# Patient Record
Sex: FEMALE | Race: BLACK OR AFRICAN AMERICAN | NOT HISPANIC OR LATINO
[De-identification: names, ages, dates, MRNs, and addresses within clinical notes are randomized per-mention and may not be internally consistent; named-entity substitution may affect disease eponyms.]

---

## 2019-10-17 ENCOUNTER — TRANSCRIPTION ENCOUNTER (OUTPATIENT)
Age: 54
End: 2019-10-17

## 2023-05-31 ENCOUNTER — NON-APPOINTMENT (OUTPATIENT)
Age: 58
End: 2023-05-31

## 2023-05-31 PROBLEM — Z00.00 ENCOUNTER FOR PREVENTIVE HEALTH EXAMINATION: Status: ACTIVE | Noted: 2023-05-31

## 2023-06-14 ENCOUNTER — APPOINTMENT (OUTPATIENT)
Dept: GYNECOLOGIC ONCOLOGY | Facility: CLINIC | Age: 58
End: 2023-06-14
Payer: COMMERCIAL

## 2023-06-14 VITALS
SYSTOLIC BLOOD PRESSURE: 131 MMHG | HEART RATE: 60 BPM | BODY MASS INDEX: 30.94 KG/M2 | HEIGHT: 61.5 IN | DIASTOLIC BLOOD PRESSURE: 80 MMHG | WEIGHT: 166 LBS

## 2023-06-14 DIAGNOSIS — Z80.9 FAMILY HISTORY OF MALIGNANT NEOPLASM, UNSPECIFIED: ICD-10-CM

## 2023-06-14 DIAGNOSIS — R73.03 PREDIABETES.: ICD-10-CM

## 2023-06-14 PROCEDURE — 99204 OFFICE O/P NEW MOD 45 MIN: CPT

## 2023-06-19 ENCOUNTER — NON-APPOINTMENT (OUTPATIENT)
Age: 58
End: 2023-06-19

## 2023-06-20 ENCOUNTER — NON-APPOINTMENT (OUTPATIENT)
Age: 58
End: 2023-06-20

## 2023-06-23 ENCOUNTER — NON-APPOINTMENT (OUTPATIENT)
Age: 58
End: 2023-06-23

## 2023-06-26 ENCOUNTER — NON-APPOINTMENT (OUTPATIENT)
Age: 58
End: 2023-06-26

## 2023-06-27 ENCOUNTER — NON-APPOINTMENT (OUTPATIENT)
Age: 58
End: 2023-06-27

## 2023-06-28 LAB
APTT BLD: 32.4 SEC
HCT VFR BLD CALC: 36.1 %
HGB BLD-MCNC: 11.1 G/DL
INR PPP: 1.18 RATIO
MCHC RBC-ENTMCNC: 25.6 PG
MCHC RBC-ENTMCNC: 30.7 GM/DL
MCV RBC AUTO: 83.2 FL
PLATELET # BLD AUTO: 461 K/UL
PT BLD: 13.7 SEC
RBC # BLD: 4.34 M/UL
RBC # FLD: 16.5 %
WBC # FLD AUTO: 7.52 K/UL

## 2023-06-29 ENCOUNTER — NON-APPOINTMENT (OUTPATIENT)
Age: 58
End: 2023-06-29

## 2023-06-30 ENCOUNTER — APPOINTMENT (OUTPATIENT)
Dept: ULTRASOUND IMAGING | Facility: IMAGING CENTER | Age: 58
End: 2023-06-30
Payer: COMMERCIAL

## 2023-06-30 ENCOUNTER — OUTPATIENT (OUTPATIENT)
Dept: OUTPATIENT SERVICES | Facility: HOSPITAL | Age: 58
LOS: 1 days | End: 2023-06-30
Payer: COMMERCIAL

## 2023-06-30 ENCOUNTER — RESULT REVIEW (OUTPATIENT)
Age: 58
End: 2023-06-30

## 2023-06-30 DIAGNOSIS — R19.00 INTRA-ABDOMINAL AND PELVIC SWELLING, MASS AND LUMP, UNSPECIFIED SITE: ICD-10-CM

## 2023-06-30 PROCEDURE — 88341 IMHCHEM/IMCYTCHM EA ADD ANTB: CPT

## 2023-06-30 PROCEDURE — 20206 BIOPSY MUSCLE PERQ NEEDLE: CPT

## 2023-06-30 PROCEDURE — 76942 ECHO GUIDE FOR BIOPSY: CPT

## 2023-06-30 PROCEDURE — 88360 TUMOR IMMUNOHISTOCHEM/MANUAL: CPT | Mod: 26,59

## 2023-06-30 PROCEDURE — 88342 IMHCHEM/IMCYTCHM 1ST ANTB: CPT | Mod: 26,59

## 2023-06-30 PROCEDURE — 88342 IMHCHEM/IMCYTCHM 1ST ANTB: CPT

## 2023-06-30 PROCEDURE — 88341 IMHCHEM/IMCYTCHM EA ADD ANTB: CPT | Mod: 26,59

## 2023-06-30 PROCEDURE — 88305 TISSUE EXAM BY PATHOLOGIST: CPT | Mod: 26

## 2023-06-30 PROCEDURE — 88360 TUMOR IMMUNOHISTOCHEM/MANUAL: CPT

## 2023-06-30 PROCEDURE — 88305 TISSUE EXAM BY PATHOLOGIST: CPT

## 2023-06-30 PROCEDURE — 76942 ECHO GUIDE FOR BIOPSY: CPT | Mod: 26

## 2023-07-13 LAB — SURGICAL PATHOLOGY STUDY: SIGNIFICANT CHANGE UP

## 2023-07-14 ENCOUNTER — NON-APPOINTMENT (OUTPATIENT)
Age: 58
End: 2023-07-14

## 2023-07-17 ENCOUNTER — APPOINTMENT (OUTPATIENT)
Dept: SURGICAL ONCOLOGY | Facility: CLINIC | Age: 58
End: 2023-07-17
Payer: COMMERCIAL

## 2023-07-17 VITALS
HEART RATE: 65 BPM | BODY MASS INDEX: 30.94 KG/M2 | DIASTOLIC BLOOD PRESSURE: 76 MMHG | RESPIRATION RATE: 16 BRPM | HEIGHT: 61.5 IN | OXYGEN SATURATION: 99 % | SYSTOLIC BLOOD PRESSURE: 136 MMHG | WEIGHT: 166 LBS

## 2023-07-17 PROCEDURE — 99205 OFFICE O/P NEW HI 60 MIN: CPT

## 2023-07-19 ENCOUNTER — OUTPATIENT (OUTPATIENT)
Dept: OUTPATIENT SERVICES | Facility: HOSPITAL | Age: 58
LOS: 1 days | Discharge: HOME | End: 2023-07-19
Payer: COMMERCIAL

## 2023-07-19 DIAGNOSIS — C18.1 MALIGNANT NEOPLASM OF APPENDIX: ICD-10-CM

## 2023-07-20 ENCOUNTER — RESULT REVIEW (OUTPATIENT)
Age: 58
End: 2023-07-20

## 2023-07-20 ENCOUNTER — APPOINTMENT (OUTPATIENT)
Dept: HEMATOLOGY ONCOLOGY | Facility: CLINIC | Age: 58
End: 2023-07-20
Payer: COMMERCIAL

## 2023-07-20 ENCOUNTER — NON-APPOINTMENT (OUTPATIENT)
Age: 58
End: 2023-07-20

## 2023-07-20 VITALS
WEIGHT: 165.56 LBS | HEART RATE: 69 BPM | HEIGHT: 61.54 IN | DIASTOLIC BLOOD PRESSURE: 86 MMHG | TEMPERATURE: 97.3 F | SYSTOLIC BLOOD PRESSURE: 138 MMHG | BODY MASS INDEX: 30.86 KG/M2 | RESPIRATION RATE: 16 BRPM

## 2023-07-20 DIAGNOSIS — Z78.9 OTHER SPECIFIED HEALTH STATUS: ICD-10-CM

## 2023-07-20 DIAGNOSIS — Z63.5 DISRUPTION OF FAMILY BY SEPARATION AND DIVORCE: ICD-10-CM

## 2023-07-20 DIAGNOSIS — Z87.59 PERSONAL HISTORY OF OTHER COMPLICATIONS OF PREGNANCY, CHILDBIRTH AND THE PUERPERIUM: ICD-10-CM

## 2023-07-20 LAB
ALBUMIN SERPL ELPH-MCNC: 4.4 G/DL
ALP BLD-CCNC: 79 U/L
ALT SERPL-CCNC: 16 U/L
ANION GAP SERPL CALC-SCNC: 14 MMOL/L
APTT BLD: 32.6 SEC
AST SERPL-CCNC: 20 U/L
BASOPHILS # BLD AUTO: 0.04 K/UL — SIGNIFICANT CHANGE UP (ref 0–0.2)
BASOPHILS NFR BLD AUTO: 0.5 % — SIGNIFICANT CHANGE UP (ref 0–2)
BILIRUB SERPL-MCNC: 0.4 MG/DL
BUN SERPL-MCNC: 16 MG/DL
CALCIUM SERPL-MCNC: 9.9 MG/DL
CEA SERPL-MCNC: 60.3 NG/ML
CHLORIDE SERPL-SCNC: 104 MMOL/L
CO2 SERPL-SCNC: 23 MMOL/L
CREAT SERPL-MCNC: 0.81 MG/DL
EGFR: 84 ML/MIN/1.73M2
EOSINOPHIL # BLD AUTO: 0.1 K/UL — SIGNIFICANT CHANGE UP (ref 0–0.5)
EOSINOPHIL NFR BLD AUTO: 1.3 % — SIGNIFICANT CHANGE UP (ref 0–6)
GLUCOSE SERPL-MCNC: 89 MG/DL
HCT VFR BLD CALC: 37 % — SIGNIFICANT CHANGE UP (ref 34.5–45)
HGB BLD-MCNC: 11.9 G/DL — SIGNIFICANT CHANGE UP (ref 11.5–15.5)
IMM GRANULOCYTES NFR BLD AUTO: 0.4 % — SIGNIFICANT CHANGE UP (ref 0–0.9)
INR PPP: 1.2 RATIO
LYMPHOCYTES # BLD AUTO: 1.69 K/UL — SIGNIFICANT CHANGE UP (ref 1–3.3)
LYMPHOCYTES # BLD AUTO: 21.5 % — SIGNIFICANT CHANGE UP (ref 13–44)
MCHC RBC-ENTMCNC: 25.1 PG — LOW (ref 27–34)
MCHC RBC-ENTMCNC: 32.2 G/DL — SIGNIFICANT CHANGE UP (ref 32–36)
MCV RBC AUTO: 78.1 FL — LOW (ref 80–100)
MONOCYTES # BLD AUTO: 0.41 K/UL — SIGNIFICANT CHANGE UP (ref 0–0.9)
MONOCYTES NFR BLD AUTO: 5.2 % — SIGNIFICANT CHANGE UP (ref 2–14)
NEUTROPHILS # BLD AUTO: 5.58 K/UL — SIGNIFICANT CHANGE UP (ref 1.8–7.4)
NEUTROPHILS NFR BLD AUTO: 71.1 % — SIGNIFICANT CHANGE UP (ref 43–77)
NRBC # BLD: 0 /100 WBCS — SIGNIFICANT CHANGE UP (ref 0–0)
PLATELET # BLD AUTO: 494 K/UL — HIGH (ref 150–400)
POTASSIUM SERPL-SCNC: 4 MMOL/L
PROT SERPL-MCNC: 8 G/DL
PT BLD: 14 SEC
RBC # BLD: 4.74 M/UL — SIGNIFICANT CHANGE UP (ref 3.8–5.2)
RBC # FLD: 15.8 % — HIGH (ref 10.3–14.5)
SODIUM SERPL-SCNC: 141 MMOL/L
WBC # BLD: 7.85 K/UL — SIGNIFICANT CHANGE UP (ref 3.8–10.5)
WBC # FLD AUTO: 7.85 K/UL — SIGNIFICANT CHANGE UP (ref 3.8–10.5)

## 2023-07-20 PROCEDURE — 99205 OFFICE O/P NEW HI 60 MIN: CPT

## 2023-07-20 RX ORDER — OMEPRAZOLE 20 MG/1
20 TABLET, DELAYED RELEASE ORAL
Qty: 30 | Refills: 0 | Status: ACTIVE | COMMUNITY
Start: 2023-07-20

## 2023-07-20 SDOH — SOCIAL STABILITY - SOCIAL INSECURITY: DISRUPTION OF FAMILY BY SEPARATION AND DIVORCE: Z63.5

## 2023-07-20 NOTE — PHYSICAL EXAM
[Normal] : oriented to person, place and time, with appropriate affect [de-identified] : abdominal distention, moderate; firmness without tenderness. Pelvic mass to level of subcostal region

## 2023-07-20 NOTE — ASSESSMENT
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 We discussed the options for appendiceal cancer being surgery first, with CRS-HIPEC, if a complete cytoreduction is anticipated at this time. I told the patient that given her 4-quadrant disease appearance on CT (right diaphragm and spleen also appear involved), a complete cytoreduction might be difficult, so I recommend systemic chemotherapy for 3-6 months now, followed by CRS-HIPEC, and I will have her see Dr. Ora Khan immediately for this. We discussed the possibility of her progressing while on chemo and needing to operate on a palliative basis.\par \par I explained to the patient that in the case of a high grade appendiceal lesion or true primary colonic malignancy, several cycles of systemic chemotherapy wound be indicated, after which I can attempt a cytoreduction and HIPEC if they achieve a good response. In the case of a low-grade appendiceal primary, I recommended a surgery first approach without systemic chemotherapy. I told the patient that CRS-HIPEC is not offered as a curative procedure, but one that can offer greater survival than systemic therapy alone when a good cytoreduction can be achieved. I counseled the patient that this surgery can involve a multi-visceral resection, has the possibility of ostomy creation, and carries a 30-40% major morbidity rate, than can include return to OR, prolonged hospitalization, or need for interventional radiology procedures.\par \par Plan:\par See Dr. Khan this week\par mediport placement ASAP\par presentation at tumor board to see if further tissue needed to make diagnosis. \par

## 2023-07-20 NOTE — REASON FOR VISIT
[Initial Consultation] : an initial consultation for [Spouse] : spouse [FreeTextEntry2] : mucinous adenocarcinoma of the appendix

## 2023-07-20 NOTE — HISTORY OF PRESENT ILLNESS
[de-identified] : Ms. Singletary presents to the office with her  for initial consultation for appendiceal cancer\par \par She is 58 year old female with history of pre diabetes noted to have pelvic mass on CT. Patient she was seen by her PCP due to concerns of weight gain since beginning of the year. PCP referred her to GYN pt had US done noted to have fluid within the endometrium, had tumor markers done that were elevated. She was then referred to GYN/ onc. Dr. Sanchez had CT scan done then had biopsy done of omentum mass pathology positive for mucinous adenocarcinoma. \par Patient reports she has been feeling well. Denies any recent SOB, CP, fever, chills, n/v/d. Appetite has been good no unintentional weight loss. Reports having GERD symptoms noticed it more since march, also reports abdominal bloating, distension and pelvic pressure over the last few months. She had repeat EGD/Colonoscopy on 6/16/23. \par \par She denies having any medical problems. Reports having ectopic pregnancy, had salpingectomy. She denies having any other surgeries. \par \par  \par

## 2023-07-21 ENCOUNTER — APPOINTMENT (OUTPATIENT)
Dept: SURGICAL ONCOLOGY | Facility: HOSPITAL | Age: 58
End: 2023-07-21

## 2023-07-21 ENCOUNTER — OUTPATIENT (OUTPATIENT)
Dept: INPATIENT UNIT | Facility: HOSPITAL | Age: 58
LOS: 1 days | Discharge: HOME | End: 2023-07-21
Payer: COMMERCIAL

## 2023-07-21 ENCOUNTER — TRANSCRIPTION ENCOUNTER (OUTPATIENT)
Age: 58
End: 2023-07-21

## 2023-07-21 VITALS
HEART RATE: 56 BPM | DIASTOLIC BLOOD PRESSURE: 69 MMHG | TEMPERATURE: 97 F | RESPIRATION RATE: 14 BRPM | SYSTOLIC BLOOD PRESSURE: 116 MMHG | OXYGEN SATURATION: 98 %

## 2023-07-21 VITALS
SYSTOLIC BLOOD PRESSURE: 129 MMHG | OXYGEN SATURATION: 100 % | RESPIRATION RATE: 14 BRPM | DIASTOLIC BLOOD PRESSURE: 88 MMHG | TEMPERATURE: 98 F | HEART RATE: 78 BPM | HEIGHT: 61 IN | WEIGHT: 166.01 LBS

## 2023-07-21 DIAGNOSIS — C18.1 MALIGNANT NEOPLASM OF APPENDIX: ICD-10-CM

## 2023-07-21 DIAGNOSIS — Z87.59 PERSONAL HISTORY OF OTHER COMPLICATIONS OF PREGNANCY, CHILDBIRTH AND THE PUERPERIUM: Chronic | ICD-10-CM

## 2023-07-21 DIAGNOSIS — Z98.890 OTHER SPECIFIED POSTPROCEDURAL STATES: Chronic | ICD-10-CM

## 2023-07-21 LAB
HBV CORE IGG+IGM SER QL: NONREACTIVE
HBV SURFACE AB SER QL: NONREACTIVE
HBV SURFACE AG SER QL: NONREACTIVE
HCV AB SER QL: NONREACTIVE
HCV S/CO RATIO: 0.09 S/CO

## 2023-07-21 PROCEDURE — 76000 FLUOROSCOPY <1 HR PHYS/QHP: CPT

## 2023-07-21 PROCEDURE — 71045 X-RAY EXAM CHEST 1 VIEW: CPT

## 2023-07-21 PROCEDURE — 71045 X-RAY EXAM CHEST 1 VIEW: CPT | Mod: 26

## 2023-07-21 PROCEDURE — C1788: CPT

## 2023-07-21 PROCEDURE — 36561 INSERT TUNNELED CV CATH: CPT | Mod: LT

## 2023-07-21 PROCEDURE — 77001 FLUOROGUIDE FOR VEIN DEVICE: CPT | Mod: 26

## 2023-07-21 PROCEDURE — C1894: CPT

## 2023-07-21 RX ORDER — ONDANSETRON 8 MG/1
4 TABLET, FILM COATED ORAL ONCE
Refills: 0 | Status: DISCONTINUED | OUTPATIENT
Start: 2023-07-21 | End: 2023-07-21

## 2023-07-21 RX ORDER — OXYCODONE HYDROCHLORIDE 5 MG/1
5 TABLET ORAL ONCE
Refills: 0 | Status: DISCONTINUED | OUTPATIENT
Start: 2023-07-21 | End: 2023-07-21

## 2023-07-21 RX ORDER — SODIUM CHLORIDE 9 MG/ML
1000 INJECTION, SOLUTION INTRAVENOUS
Refills: 0 | Status: DISCONTINUED | OUTPATIENT
Start: 2023-07-21 | End: 2023-07-21

## 2023-07-21 NOTE — BRIEF OPERATIVE NOTE - NSICDXBRIEFPROCEDURE_GEN_ALL_CORE_FT
PROCEDURES:  Insertion, central venous catheter, tunneled, with subcutaneous pump 21-Jul-2023 13:19:28  Kinsey Olivas

## 2023-07-21 NOTE — ASU DISCHARGE PLAN (ADULT/PEDIATRIC) - CARE PROVIDER_API CALL
Silvio Dong  Surgery  [address] [place] [address], [address]  [place] [address]  Phone: [phone]  Fax: [phone]  Follow Up Time: 2 weeks

## 2023-07-21 NOTE — ASU DISCHARGE PLAN (ADULT/PEDIATRIC) - NS MD DC FALL RISK RISK
For information on Fall & Injury Prevention, visit: [email or web address] OR  [email or web address] OR  https://www.cdc.gov/steadi/patient.html

## 2023-07-21 NOTE — ASU DISCHARGE PLAN (ADULT/PEDIATRIC) - NURSING INSTRUCTIONS
For any problems or concerns,contact your doctor. [facility] patients should call the [facility]. If you cannot reach the doctor or clinic, call [facility] Emergency Department at [phone] or go to your local Emergency Department.  A responsible adult should be with you for the rest of the day and night for your safety and to help you if you needed. Resume your medications as listed on the attached Medication Record. Begin with liquids and light food ( tea, toast, Jello, soups). Advance to what you normally eat. Liquids should taken in adequate amounts today.     CALL the DOCTOR:    -Fever greater than  101F  - Signs  of infection such as : increase pain,swelling,redness,or a bad  smell coming from the wound.  -Excessive amount of bleeding.  - Any pain that appears to be getting worse.  - Vomiting  -  If you have  not urinated 8 hours after surgery or have any difficulty urinating.     A responsible adult should be with you for the rest of the day and night for your safety and to help you if you needed.    Review attached FACT SHEET if applicable.

## 2023-07-21 NOTE — BRIEF OPERATIVE NOTE - OPERATION/FINDINGS
Mediport placed in the left subclavian and position of the tip at the aortocaval junction confirmed with Xray intraop

## 2023-07-23 PROBLEM — Z87.59 HISTORY OF ECTOPIC PREGNANCY: Status: RESOLVED | Noted: 2023-07-23 | Resolved: 2023-07-23

## 2023-07-23 NOTE — PHYSICAL EXAM
[Fully active, able to carry on all pre-disease performance without restriction] : Status 0 - Fully active, able to carry on all pre-disease performance without restriction [Normal] : affect appropriate [de-identified] : distended, firm abdomen, not tender

## 2023-07-23 NOTE — HISTORY OF PRESENT ILLNESS
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 \par \par Yojana started experiencing weight gain and lower abdominal pelvic pressure in April/May 2023. She went to her GYN who palpated a mass on exam. US Abdomen on 5/24/23 noted fluid within the endometrium, 12.9 cm complex cystic R adnexal mass suspicious for ovarian neoplasm. CEA and CA 19-9 noted to be elevated by AFP, CA  were normal. She was then referred to GYN/ onc. Dr. Sanchez and ultimately to Dr. Dong, surg onc. \par \par  6/20/23: CT CAP large bilateral complex adnexal masses. Extensive soft tissue infiltration of the mesentery and nodularity of the omentum as well as a moderate amount of ascites. \par 6/30/23: Omental bx: mucinous adenocarcinoma, CK7, CDX2, CK19+, neg for PAX8, TTF1, MMR intact. \par 7/20/23: Case reviewed at [facility], likely appendiceal primary given abnormal appendix and IHC staining suggesting GI, elevated CEA, normal , plan for neoadjuvant FOLFOX followed by cytoreduction, HIPEC and adjuvant chemo \par \par Referred to medical oncology for systemic therapy.  [de-identified] : pre treatment CEA 63 [de-identified] : c/o abdominal fullness, distention. No n/v. Normal BMs. + dyspepsia controlled with PPI. Denies any pain.

## 2023-07-23 NOTE — REASON FOR VISIT
[Initial Consultation] : an initial consultation [Family Member] : family member [FreeTextEntry2] : Stage IV appendiceal ca

## 2023-07-23 NOTE — REVIEW OF SYSTEMS
[Diarrhea: Grade 0] : Diarrhea: Grade 0 [Negative] : Allergic/Immunologic [FreeTextEntry7] : + abdominal distention

## 2023-07-24 ENCOUNTER — APPOINTMENT (OUTPATIENT)
Dept: HEMATOLOGY ONCOLOGY | Facility: CLINIC | Age: 58
End: 2023-07-24

## 2023-07-24 ENCOUNTER — APPOINTMENT (OUTPATIENT)
Dept: INFUSION THERAPY | Facility: HOSPITAL | Age: 58
End: 2023-07-24

## 2023-07-24 ENCOUNTER — NON-APPOINTMENT (OUTPATIENT)
Age: 58
End: 2023-07-24

## 2023-07-24 DIAGNOSIS — R11.2 NAUSEA WITH VOMITING, UNSPECIFIED: ICD-10-CM

## 2023-07-24 DIAGNOSIS — Z51.11 ENCOUNTER FOR ANTINEOPLASTIC CHEMOTHERAPY: ICD-10-CM

## 2023-07-24 PROCEDURE — 93010 ELECTROCARDIOGRAM REPORT: CPT

## 2023-07-25 DIAGNOSIS — K21.9 GASTRO-ESOPHAGEAL REFLUX DISEASE WITHOUT ESOPHAGITIS: ICD-10-CM

## 2023-07-25 DIAGNOSIS — C18.1 MALIGNANT NEOPLASM OF APPENDIX: ICD-10-CM

## 2023-07-25 DIAGNOSIS — Z88.0 ALLERGY STATUS TO PENICILLIN: ICD-10-CM

## 2023-07-25 DIAGNOSIS — C48.2 MALIGNANT NEOPLASM OF PERITONEUM, UNSPECIFIED: ICD-10-CM

## 2023-07-26 ENCOUNTER — APPOINTMENT (OUTPATIENT)
Dept: INFUSION THERAPY | Facility: HOSPITAL | Age: 58
End: 2023-07-26

## 2023-07-26 ENCOUNTER — APPOINTMENT (OUTPATIENT)
Dept: HEMATOLOGY ONCOLOGY | Facility: CLINIC | Age: 58
End: 2023-07-26
Payer: COMMERCIAL

## 2023-07-26 PROBLEM — Z86.2 PERSONAL HISTORY OF DISEASES OF THE BLOOD AND BLOOD-FORMING ORGANS AND CERTAIN DISORDERS INVOLVING THE IMMUNE MECHANISM: Chronic | Status: ACTIVE | Noted: 2023-07-21

## 2023-07-26 PROCEDURE — 99213 OFFICE O/P EST LOW 20 MIN: CPT

## 2023-07-26 NOTE — PHYSICAL EXAM
[Fully active, able to carry on all pre-disease performance without restriction] : Status 0 - Fully active, able to carry on all pre-disease performance without restriction [Normal] : RRR, normal S1S2, no murmurs, rubs, gallops [de-identified] : +distension

## 2023-07-26 NOTE — HISTORY OF PRESENT ILLNESS
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 \par \par Yojana started experiencing weight gain and lower abdominal pelvic pressure in April/May 2023. She went to her GYN who palpated a mass on exam. US Abdomen on 5/24/23 noted fluid within the endometrium, 12.9 cm complex cystic R adnexal mass suspicious for ovarian neoplasm. CEA and CA 19-9 noted to be elevated by AFP, CA  were normal. She was then referred to GYN/ onc. Dr. Sanchez and ultimately to Dr. Dong, surg onc. \par \par  6/20/23: CT CAP large bilateral complex adnexal masses. Extensive soft tissue infiltration of the mesentery and nodularity of the omentum as well as a moderate amount of ascites. \par 6/30/23: Omental bx: mucinous adenocarcinoma, CK7, CDX2, CK19+, neg for PAX8, TTF1, MMR intact. \par 7/20/23: Case reviewed at [facility], likely appendiceal primary given abnormal appendix and IHC staining suggesting GI, elevated CEA, normal , plan for neoadjuvant FOLFOX followed by cytoreduction, HIPEC and adjuvant chemo \par \par Referred to medical oncology for systemic therapy. \par \par 7/24/23: C1 mFOLFOX [de-identified] : mucinous adenocarcinoma [de-identified] : CEA 63 [Therapy: ___] : Therapy: [unfilled] [Cycle: ___] : Cycle: [unfilled] [Day: ___] : Day: [unfilled] [de-identified] : Patient came in today for CADD disconnect. C/o new mid-sternal pain. Patient states it started last night after sitting in the air conditioning. Worse with deep inspiration. radiates up and down her chest. Better with her mouth closed. Changes with position. VS /86, 02 97% RA, hr:75, temp 97.7. Of note, she had port placed last week. Did experience sore throat after procedure. She did not take PPI today. Very small amount of vomiting this morning. Eating less d/t GI upset

## 2023-07-27 ENCOUNTER — APPOINTMENT (OUTPATIENT)
Dept: CT IMAGING | Facility: IMAGING CENTER | Age: 58
End: 2023-07-27
Payer: COMMERCIAL

## 2023-07-27 ENCOUNTER — OUTPATIENT (OUTPATIENT)
Dept: OUTPATIENT SERVICES | Facility: HOSPITAL | Age: 58
LOS: 1 days | End: 2023-07-27
Payer: COMMERCIAL

## 2023-07-27 DIAGNOSIS — Z98.890 OTHER SPECIFIED POSTPROCEDURAL STATES: Chronic | ICD-10-CM

## 2023-07-27 DIAGNOSIS — C18.1 MALIGNANT NEOPLASM OF APPENDIX: ICD-10-CM

## 2023-07-27 PROCEDURE — 74177 CT ABD & PELVIS W/CONTRAST: CPT

## 2023-07-27 PROCEDURE — 74177 CT ABD & PELVIS W/CONTRAST: CPT | Mod: 26

## 2023-07-27 PROCEDURE — 71275 CT ANGIOGRAPHY CHEST: CPT

## 2023-07-27 PROCEDURE — 71275 CT ANGIOGRAPHY CHEST: CPT | Mod: 26

## 2023-08-03 ENCOUNTER — APPOINTMENT (OUTPATIENT)
Dept: HEMATOLOGY ONCOLOGY | Facility: CLINIC | Age: 58
End: 2023-08-03
Payer: COMMERCIAL

## 2023-08-03 PROCEDURE — 99213 OFFICE O/P EST LOW 20 MIN: CPT | Mod: 95

## 2023-08-03 NOTE — HISTORY OF PRESENT ILLNESS
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   Yojana started experiencing weight gain and lower abdominal pelvic pressure in April/May 2023. She went to her GYN who palpated a mass on exam. US Abdomen on 5/24/23 noted fluid within the endometrium, 12.9 cm complex cystic R adnexal mass suspicious for ovarian neoplasm. CEA and CA 19-9 noted to be elevated by AFP, CA  were normal. She was then referred to GYN/ onc. Dr. Sanchez and ultimately to Dr. Dong, surg onc.    6/20/23: CT CAP large bilateral complex adnexal masses. Extensive soft tissue infiltration of the mesentery and nodularity of the omentum as well as a moderate amount of ascites.  6/30/23: Omental bx: mucinous adenocarcinoma, CK7, CDX2, CK19+, neg for PAX8, TTF1, MMR intact.  7/20/23: Case reviewed at , likely appendiceal primary given abnormal appendix and IHC staining suggesting GI, elevated CEA, normal , plan for neoadjuvant FOLFOX followed by cytoreduction, HIPEC and adjuvant chemo   Referred to medical oncology for systemic therapy.   7/24/23: C1 mFOLFOX 7/27/23: CT Chest PE protocol: No PE. New small left lower lobe consolidation, considerations include atelectasis or pneumonia. Decreased size of right cardiophrenic LAD 7/27/23: CT AP: Appendix is dilated and hypodense promixally consistent with known m ucinous adenocarcinoma. Bilateral large, mildly complex adnexal cystic mass, likely ovarian etiology. Trace perihepatic ascites and moderate omental caking [de-identified] : mucinous adenocarcinoma [de-identified] : CEA 63 [Therapy: ___] : Therapy: [unfilled] [Cycle: ___] : Cycle: [unfilled] [Day: ___] : Day: [unfilled] [de-identified] : Patient seen today for clinical evaluation. Started antibiotics as directed for possible pneumonia. Reporting improvement in mid-sternal pain. Mostly resolved except if she lays on her right side. She is urinating more frequently without dysuria. Feels her distended abdomen is pressing on her bladder. Needs to use the bathroom about every 90 minutes. Interfering with her sleep. Appetite is good. Independent in all ADLs. Staying active

## 2023-08-03 NOTE — REASON FOR VISIT
[Follow-Up Visit] : a follow-up [FreeTextEntry2] : Stage IV mucinous appendiceal adenocarcinoma  [Home] : at home, [unfilled] , at the time of the visit. [Medical Office: ([facility])___] : at the medical office located in  [Patient] : the patient

## 2023-08-07 ENCOUNTER — APPOINTMENT (OUTPATIENT)
Dept: HEMATOLOGY ONCOLOGY | Facility: CLINIC | Age: 58
End: 2023-08-07

## 2023-08-07 ENCOUNTER — RESULT REVIEW (OUTPATIENT)
Age: 58
End: 2023-08-07

## 2023-08-07 ENCOUNTER — APPOINTMENT (OUTPATIENT)
Dept: INFUSION THERAPY | Facility: HOSPITAL | Age: 58
End: 2023-08-07

## 2023-08-07 LAB
ALBUMIN SERPL ELPH-MCNC: 3.8 G/DL — SIGNIFICANT CHANGE UP (ref 3.3–5)
ALP SERPL-CCNC: 62 U/L — SIGNIFICANT CHANGE UP (ref 40–120)
ALT FLD-CCNC: 10 U/L — SIGNIFICANT CHANGE UP (ref 10–45)
ANION GAP SERPL CALC-SCNC: 11 MMOL/L — SIGNIFICANT CHANGE UP (ref 5–17)
AST SERPL-CCNC: 23 U/L — SIGNIFICANT CHANGE UP (ref 10–40)
BASOPHILS # BLD AUTO: 0.04 K/UL — SIGNIFICANT CHANGE UP (ref 0–0.2)
BASOPHILS NFR BLD AUTO: 0.6 % — SIGNIFICANT CHANGE UP (ref 0–2)
BILIRUB SERPL-MCNC: 0.5 MG/DL — SIGNIFICANT CHANGE UP (ref 0.2–1.2)
BUN SERPL-MCNC: 9 MG/DL — SIGNIFICANT CHANGE UP (ref 7–23)
CALCIUM SERPL-MCNC: 9 MG/DL — SIGNIFICANT CHANGE UP (ref 8.4–10.5)
CEA SERPL-MCNC: 65.8 NG/ML — HIGH (ref 0–3.8)
CHLORIDE SERPL-SCNC: 104 MMOL/L — SIGNIFICANT CHANGE UP (ref 96–108)
CO2 SERPL-SCNC: 23 MMOL/L — SIGNIFICANT CHANGE UP (ref 22–31)
CREAT SERPL-MCNC: 0.69 MG/DL — SIGNIFICANT CHANGE UP (ref 0.5–1.3)
EGFR: 100 ML/MIN/1.73M2 — SIGNIFICANT CHANGE UP
EOSINOPHIL # BLD AUTO: 0.1 K/UL — SIGNIFICANT CHANGE UP (ref 0–0.5)
EOSINOPHIL NFR BLD AUTO: 1.4 % — SIGNIFICANT CHANGE UP (ref 0–6)
GLUCOSE SERPL-MCNC: 87 MG/DL — SIGNIFICANT CHANGE UP (ref 70–99)
HCT VFR BLD CALC: 31.1 % — LOW (ref 34.5–45)
HGB BLD-MCNC: 10.5 G/DL — LOW (ref 11.5–15.5)
IMM GRANULOCYTES NFR BLD AUTO: 1.3 % — HIGH (ref 0–0.9)
LYMPHOCYTES # BLD AUTO: 1.38 K/UL — SIGNIFICANT CHANGE UP (ref 1–3.3)
LYMPHOCYTES # BLD AUTO: 19.6 % — SIGNIFICANT CHANGE UP (ref 13–44)
MCHC RBC-ENTMCNC: 25.2 PG — LOW (ref 27–34)
MCHC RBC-ENTMCNC: 33.8 G/DL — SIGNIFICANT CHANGE UP (ref 32–36)
MCV RBC AUTO: 74.6 FL — LOW (ref 80–100)
MONOCYTES # BLD AUTO: 0.88 K/UL — SIGNIFICANT CHANGE UP (ref 0–0.9)
MONOCYTES NFR BLD AUTO: 12.5 % — SIGNIFICANT CHANGE UP (ref 2–14)
NEUTROPHILS # BLD AUTO: 4.54 K/UL — SIGNIFICANT CHANGE UP (ref 1.8–7.4)
NEUTROPHILS NFR BLD AUTO: 64.6 % — SIGNIFICANT CHANGE UP (ref 43–77)
NRBC # BLD: 0 /100 WBCS — SIGNIFICANT CHANGE UP (ref 0–0)
PLATELET # BLD AUTO: 345 K/UL — SIGNIFICANT CHANGE UP (ref 150–400)
POTASSIUM SERPL-MCNC: 4.2 MMOL/L — SIGNIFICANT CHANGE UP (ref 3.5–5.3)
POTASSIUM SERPL-SCNC: 4.2 MMOL/L — SIGNIFICANT CHANGE UP (ref 3.5–5.3)
PROT SERPL-MCNC: 7.5 G/DL — SIGNIFICANT CHANGE UP (ref 6–8.3)
RBC # BLD: 4.17 M/UL — SIGNIFICANT CHANGE UP (ref 3.8–5.2)
RBC # FLD: 15.4 % — HIGH (ref 10.3–14.5)
SODIUM SERPL-SCNC: 137 MMOL/L — SIGNIFICANT CHANGE UP (ref 135–145)
WBC # BLD: 7.03 K/UL — SIGNIFICANT CHANGE UP (ref 3.8–10.5)
WBC # FLD AUTO: 7.03 K/UL — SIGNIFICANT CHANGE UP (ref 3.8–10.5)

## 2023-08-09 ENCOUNTER — APPOINTMENT (OUTPATIENT)
Dept: INFUSION THERAPY | Facility: HOSPITAL | Age: 58
End: 2023-08-09

## 2023-08-14 ENCOUNTER — APPOINTMENT (OUTPATIENT)
Dept: HEMATOLOGY ONCOLOGY | Facility: CLINIC | Age: 58
End: 2023-08-14
Payer: COMMERCIAL

## 2023-08-14 PROCEDURE — 99213 OFFICE O/P EST LOW 20 MIN: CPT | Mod: 95

## 2023-08-14 NOTE — REVIEW OF SYSTEMS
[Diarrhea: Grade 0] : Diarrhea: Grade 0 [Negative] : Allergic/Immunologic [de-identified] : + cold sensitivity

## 2023-08-14 NOTE — HISTORY OF PRESENT ILLNESS
[Disease: _____________________] : Disease: [unfilled] [AJCC Stage: ____] : AJCC Stage: [unfilled] [Therapy: ___] : Therapy: [unfilled] [Cycle: ___] : Cycle: [unfilled] [Day: ___] : Day: [unfilled] [de-identified] : 58 F w/ pMMR, stage IV mucinous appendiceal adenocarcinoma presents for further management.   Yojana started experiencing weight gain and lower abdominal pelvic pressure in April/May 2023. She went to her GYN who palpated a mass on exam. US Abdomen on 5/24/23 noted fluid within the endometrium, 12.9 cm complex cystic R adnexal mass suspicious for ovarian neoplasm. CEA and CA 19-9 noted to be elevated by AFP, CA  were normal. She was then referred to GYN/ onc. Dr. Sanchez and ultimately to Dr. Dong, surg onc.    6/20/23: CT CAP large bilateral complex adnexal masses. Extensive soft tissue infiltration of the mesentery and nodularity of the omentum as well as a moderate amount of ascites.  6/30/23: Omental bx: mucinous adenocarcinoma, CK7, CDX2, CK19+, neg for PAX8, TTF1, MMR intact.  7/20/23: Case reviewed at , likely appendiceal primary given abnormal appendix and IHC staining suggesting GI, elevated CEA, normal , plan for neoadjuvant FOLFOX followed by cytoreduction, HIPEC and adjuvant chemo   Referred to medical oncology for systemic therapy.  [de-identified] : mucinous adenocarcinoma [de-identified] : pre treatment CEA 63 [de-identified] : still with cold sensitivity since getting the treatment last wed. Only with cold though. had diarrhea thurs-fri, 3 BMs on those days, loose. Did not take Imodium. No change in appetite. Nausea controlled with meds. Weighs 161 at home. Abdomen still feels distended. No pain. sleeps well. Appetite is stable.

## 2023-08-14 NOTE — PHYSICAL EXAM
[Fully active, able to carry on all pre-disease performance without restriction] : Status 0 - Fully active, able to carry on all pre-disease performance without restriction [Normal] : affect appropriate [de-identified] : no respiratory distress

## 2023-08-21 ENCOUNTER — APPOINTMENT (OUTPATIENT)
Dept: INFUSION THERAPY | Facility: HOSPITAL | Age: 58
End: 2023-08-21

## 2023-08-21 ENCOUNTER — RESULT REVIEW (OUTPATIENT)
Age: 58
End: 2023-08-21

## 2023-08-21 ENCOUNTER — NON-APPOINTMENT (OUTPATIENT)
Age: 58
End: 2023-08-21

## 2023-08-21 ENCOUNTER — APPOINTMENT (OUTPATIENT)
Dept: HEMATOLOGY ONCOLOGY | Facility: CLINIC | Age: 58
End: 2023-08-21

## 2023-08-21 LAB
ALBUMIN SERPL ELPH-MCNC: 3.8 G/DL — SIGNIFICANT CHANGE UP (ref 3.3–5)
ALP SERPL-CCNC: 59 U/L — SIGNIFICANT CHANGE UP (ref 40–120)
ALT FLD-CCNC: 7 U/L — LOW (ref 10–45)
ANION GAP SERPL CALC-SCNC: 11 MMOL/L — SIGNIFICANT CHANGE UP (ref 5–17)
AST SERPL-CCNC: 20 U/L — SIGNIFICANT CHANGE UP (ref 10–40)
BASOPHILS # BLD AUTO: 0.03 K/UL — SIGNIFICANT CHANGE UP (ref 0–0.2)
BASOPHILS NFR BLD AUTO: 0.5 % — SIGNIFICANT CHANGE UP (ref 0–2)
BILIRUB SERPL-MCNC: 0.4 MG/DL — SIGNIFICANT CHANGE UP (ref 0.2–1.2)
BUN SERPL-MCNC: 10 MG/DL — SIGNIFICANT CHANGE UP (ref 7–23)
CALCIUM SERPL-MCNC: 9.3 MG/DL — SIGNIFICANT CHANGE UP (ref 8.4–10.5)
CEA SERPL-MCNC: 69.5 NG/ML — HIGH (ref 0–3.8)
CHLORIDE SERPL-SCNC: 103 MMOL/L — SIGNIFICANT CHANGE UP (ref 96–108)
CO2 SERPL-SCNC: 24 MMOL/L — SIGNIFICANT CHANGE UP (ref 22–31)
CREAT SERPL-MCNC: 0.76 MG/DL — SIGNIFICANT CHANGE UP (ref 0.5–1.3)
EGFR: 90 ML/MIN/1.73M2 — SIGNIFICANT CHANGE UP
EOSINOPHIL # BLD AUTO: 0.11 K/UL — SIGNIFICANT CHANGE UP (ref 0–0.5)
EOSINOPHIL NFR BLD AUTO: 1.8 % — SIGNIFICANT CHANGE UP (ref 0–6)
GLUCOSE SERPL-MCNC: 90 MG/DL — SIGNIFICANT CHANGE UP (ref 70–99)
HCT VFR BLD CALC: 31.6 % — LOW (ref 34.5–45)
HGB BLD-MCNC: 10.3 G/DL — LOW (ref 11.5–15.5)
IMM GRANULOCYTES NFR BLD AUTO: 0.3 % — SIGNIFICANT CHANGE UP (ref 0–0.9)
LYMPHOCYTES # BLD AUTO: 1.32 K/UL — SIGNIFICANT CHANGE UP (ref 1–3.3)
LYMPHOCYTES # BLD AUTO: 21.7 % — SIGNIFICANT CHANGE UP (ref 13–44)
MCHC RBC-ENTMCNC: 24.6 PG — LOW (ref 27–34)
MCHC RBC-ENTMCNC: 32.6 G/DL — SIGNIFICANT CHANGE UP (ref 32–36)
MCV RBC AUTO: 75.4 FL — LOW (ref 80–100)
MONOCYTES # BLD AUTO: 0.69 K/UL — SIGNIFICANT CHANGE UP (ref 0–0.9)
MONOCYTES NFR BLD AUTO: 11.3 % — SIGNIFICANT CHANGE UP (ref 2–14)
NEUTROPHILS # BLD AUTO: 3.92 K/UL — SIGNIFICANT CHANGE UP (ref 1.8–7.4)
NEUTROPHILS NFR BLD AUTO: 64.4 % — SIGNIFICANT CHANGE UP (ref 43–77)
NRBC # BLD: 0 /100 WBCS — SIGNIFICANT CHANGE UP (ref 0–0)
PLATELET # BLD AUTO: 242 K/UL — SIGNIFICANT CHANGE UP (ref 150–400)
POTASSIUM SERPL-MCNC: 4 MMOL/L — SIGNIFICANT CHANGE UP (ref 3.5–5.3)
POTASSIUM SERPL-SCNC: 4 MMOL/L — SIGNIFICANT CHANGE UP (ref 3.5–5.3)
PROT SERPL-MCNC: 7.5 G/DL — SIGNIFICANT CHANGE UP (ref 6–8.3)
RBC # BLD: 4.19 M/UL — SIGNIFICANT CHANGE UP (ref 3.8–5.2)
RBC # FLD: 16 % — HIGH (ref 10.3–14.5)
SODIUM SERPL-SCNC: 138 MMOL/L — SIGNIFICANT CHANGE UP (ref 135–145)
WBC # BLD: 6.09 K/UL — SIGNIFICANT CHANGE UP (ref 3.8–10.5)
WBC # FLD AUTO: 6.09 K/UL — SIGNIFICANT CHANGE UP (ref 3.8–10.5)

## 2023-08-23 ENCOUNTER — APPOINTMENT (OUTPATIENT)
Dept: INFUSION THERAPY | Facility: HOSPITAL | Age: 58
End: 2023-08-23

## 2023-09-05 ENCOUNTER — RESULT REVIEW (OUTPATIENT)
Age: 58
End: 2023-09-05

## 2023-09-05 ENCOUNTER — APPOINTMENT (OUTPATIENT)
Dept: INFUSION THERAPY | Facility: HOSPITAL | Age: 58
End: 2023-09-05

## 2023-09-05 ENCOUNTER — APPOINTMENT (OUTPATIENT)
Dept: HEMATOLOGY ONCOLOGY | Facility: CLINIC | Age: 58
End: 2023-09-05
Payer: COMMERCIAL

## 2023-09-05 LAB
ALBUMIN SERPL ELPH-MCNC: 3.7 G/DL — SIGNIFICANT CHANGE UP (ref 3.3–5)
ALP SERPL-CCNC: 58 U/L — SIGNIFICANT CHANGE UP (ref 40–120)
ALT FLD-CCNC: 13 U/L — SIGNIFICANT CHANGE UP (ref 10–45)
ANION GAP SERPL CALC-SCNC: 14 MMOL/L — SIGNIFICANT CHANGE UP (ref 5–17)
AST SERPL-CCNC: 22 U/L — SIGNIFICANT CHANGE UP (ref 10–40)
BASOPHILS # BLD AUTO: 0.04 K/UL — SIGNIFICANT CHANGE UP (ref 0–0.2)
BASOPHILS NFR BLD AUTO: 0.8 % — SIGNIFICANT CHANGE UP (ref 0–2)
BILIRUB SERPL-MCNC: 0.6 MG/DL — SIGNIFICANT CHANGE UP (ref 0.2–1.2)
BUN SERPL-MCNC: 8 MG/DL — SIGNIFICANT CHANGE UP (ref 7–23)
CALCIUM SERPL-MCNC: 9.2 MG/DL — SIGNIFICANT CHANGE UP (ref 8.4–10.5)
CEA SERPL-MCNC: 58.9 NG/ML — HIGH (ref 0–3.8)
CHLORIDE SERPL-SCNC: 104 MMOL/L — SIGNIFICANT CHANGE UP (ref 96–108)
CO2 SERPL-SCNC: 23 MMOL/L — SIGNIFICANT CHANGE UP (ref 22–31)
CREAT SERPL-MCNC: 0.66 MG/DL — SIGNIFICANT CHANGE UP (ref 0.5–1.3)
EGFR: 101 ML/MIN/1.73M2 — SIGNIFICANT CHANGE UP
EOSINOPHIL # BLD AUTO: 0.06 K/UL — SIGNIFICANT CHANGE UP (ref 0–0.5)
EOSINOPHIL NFR BLD AUTO: 1.3 % — SIGNIFICANT CHANGE UP (ref 0–6)
GLUCOSE SERPL-MCNC: 89 MG/DL — SIGNIFICANT CHANGE UP (ref 70–99)
HCT VFR BLD CALC: 31.1 % — LOW (ref 34.5–45)
HGB BLD-MCNC: 10.2 G/DL — LOW (ref 11.5–15.5)
IMM GRANULOCYTES NFR BLD AUTO: 0.2 % — SIGNIFICANT CHANGE UP (ref 0–0.9)
LYMPHOCYTES # BLD AUTO: 1.31 K/UL — SIGNIFICANT CHANGE UP (ref 1–3.3)
LYMPHOCYTES # BLD AUTO: 27.3 % — SIGNIFICANT CHANGE UP (ref 13–44)
MCHC RBC-ENTMCNC: 24.7 PG — LOW (ref 27–34)
MCHC RBC-ENTMCNC: 32.8 G/DL — SIGNIFICANT CHANGE UP (ref 32–36)
MCV RBC AUTO: 75.3 FL — LOW (ref 80–100)
MONOCYTES # BLD AUTO: 0.64 K/UL — SIGNIFICANT CHANGE UP (ref 0–0.9)
MONOCYTES NFR BLD AUTO: 13.4 % — SIGNIFICANT CHANGE UP (ref 2–14)
NEUTROPHILS # BLD AUTO: 2.73 K/UL — SIGNIFICANT CHANGE UP (ref 1.8–7.4)
NEUTROPHILS NFR BLD AUTO: 57 % — SIGNIFICANT CHANGE UP (ref 43–77)
NRBC # BLD: 0 /100 WBCS — SIGNIFICANT CHANGE UP (ref 0–0)
PLATELET # BLD AUTO: 245 K/UL — SIGNIFICANT CHANGE UP (ref 150–400)
POTASSIUM SERPL-MCNC: 3.6 MMOL/L — SIGNIFICANT CHANGE UP (ref 3.5–5.3)
POTASSIUM SERPL-SCNC: 3.6 MMOL/L — SIGNIFICANT CHANGE UP (ref 3.5–5.3)
PROT SERPL-MCNC: 7.5 G/DL — SIGNIFICANT CHANGE UP (ref 6–8.3)
RBC # BLD: 4.13 M/UL — SIGNIFICANT CHANGE UP (ref 3.8–5.2)
RBC # FLD: 17.6 % — HIGH (ref 10.3–14.5)
SODIUM SERPL-SCNC: 140 MMOL/L — SIGNIFICANT CHANGE UP (ref 135–145)
WBC # BLD: 4.79 K/UL — SIGNIFICANT CHANGE UP (ref 3.8–10.5)
WBC # FLD AUTO: 4.79 K/UL — SIGNIFICANT CHANGE UP (ref 3.8–10.5)

## 2023-09-05 PROCEDURE — 99213 OFFICE O/P EST LOW 20 MIN: CPT

## 2023-09-05 NOTE — PHYSICAL EXAM
[Restricted in physically strenuous activity but ambulatory and able to carry out work of a light or sedentary nature] : Status 1- Restricted in physically strenuous activity but ambulatory and able to carry out work of a light or sedentary nature, e.g., light house work, office work [Normal] : affect appropriate [de-identified] : no respiratory distress  [de-identified] : +distended, TTP over umbilicus

## 2023-09-05 NOTE — HISTORY OF PRESENT ILLNESS
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   Yojana started experiencing weight gain and lower abdominal pelvic pressure in April/May 2023. She went to her GYN who palpated a mass on exam. US Abdomen on 5/24/23 noted fluid within the endometrium, 12.9 cm complex cystic R adnexal mass suspicious for ovarian neoplasm. CEA and CA 19-9 noted to be elevated by AFP, CA  were normal. She was then referred to GYN/ onc. Dr. Sanchez and ultimately to Dr. Dong, surg onc.    6/20/23: CT CAP large bilateral complex adnexal masses. Extensive soft tissue infiltration of the mesentery and nodularity of the omentum as well as a moderate amount of ascites.  6/30/23: Omental bx: mucinous adenocarcinoma, CK7, CDX2, CK19+, neg for PAX8, TTF1, MMR intact.  7/20/23: Case reviewed at , likely appendiceal primary given abnormal appendix and IHC staining suggesting GI, elevated CEA, normal , plan for neoadjuvant FOLFOX followed by cytoreduction, HIPEC and adjuvant chemo   Referred to medical oncology for systemic therapy.   7/24/23 - 9/5/23: C1-C4 FOLFOX  [de-identified] : mucinous adenocarcinoma [de-identified] : pre treatment CEA 62 [Therapy: ___] : Therapy: [unfilled] [Cycle: ___] : Cycle: [unfilled] [Day: ___] : Day: [unfilled] [de-identified] : patient here to continue treatment. C/o ongoing cold sensitivity. Lasts duration of two weeks. Hands always feel cold. Gets full quickly, lost 6 lbs since C3. Gets winded easily. Feels like there is a tight band around her upper abdomen. Has to urinate every 2 hours. Bowels are normal. C/o dry eyes

## 2023-09-05 NOTE — REVIEW OF SYSTEMS
[Recent Change In Weight] : ~T recent weight change [Dry Eyes] : dryness of the eyes [SOB on Exertion] : shortness of breath during exertion [Abdominal Pain] : abdominal pain [Diarrhea: Grade 0] : Diarrhea: Grade 0 [Negative] : Allergic/Immunologic [FreeTextEntry8] : +urinary frequency  [de-identified] : + cold sensitivity

## 2023-09-07 ENCOUNTER — APPOINTMENT (OUTPATIENT)
Dept: INFUSION THERAPY | Facility: HOSPITAL | Age: 58
End: 2023-09-07

## 2023-09-07 ENCOUNTER — RESULT REVIEW (OUTPATIENT)
Age: 58
End: 2023-09-07

## 2023-09-11 ENCOUNTER — OUTPATIENT (OUTPATIENT)
Dept: OUTPATIENT SERVICES | Facility: HOSPITAL | Age: 58
LOS: 1 days | Discharge: HOME | End: 2023-09-11

## 2023-09-11 ENCOUNTER — APPOINTMENT (OUTPATIENT)
Dept: CT IMAGING | Facility: IMAGING CENTER | Age: 58
End: 2023-09-11

## 2023-09-11 ENCOUNTER — NON-APPOINTMENT (OUTPATIENT)
Age: 58
End: 2023-09-11

## 2023-09-11 ENCOUNTER — OUTPATIENT (OUTPATIENT)
Dept: OUTPATIENT SERVICES | Facility: HOSPITAL | Age: 58
LOS: 1 days | End: 2023-09-11
Payer: COMMERCIAL

## 2023-09-11 ENCOUNTER — APPOINTMENT (OUTPATIENT)
Dept: ULTRASOUND IMAGING | Facility: IMAGING CENTER | Age: 58
End: 2023-09-11
Payer: COMMERCIAL

## 2023-09-11 DIAGNOSIS — C18.1 MALIGNANT NEOPLASM OF APPENDIX: ICD-10-CM

## 2023-09-11 DIAGNOSIS — Z98.890 OTHER SPECIFIED POSTPROCEDURAL STATES: Chronic | ICD-10-CM

## 2023-09-11 DIAGNOSIS — Z87.59 PERSONAL HISTORY OF OTHER COMPLICATIONS OF PREGNANCY, CHILDBIRTH AND THE PUERPERIUM: Chronic | ICD-10-CM

## 2023-09-11 DIAGNOSIS — Z00.8 ENCOUNTER FOR OTHER GENERAL EXAMINATION: ICD-10-CM

## 2023-09-11 PROCEDURE — 76700 US EXAM ABDOM COMPLETE: CPT | Mod: 26

## 2023-09-11 PROCEDURE — 71260 CT THORAX DX C+: CPT

## 2023-09-11 PROCEDURE — 74177 CT ABD & PELVIS W/CONTRAST: CPT | Mod: 26

## 2023-09-11 PROCEDURE — 74177 CT ABD & PELVIS W/CONTRAST: CPT

## 2023-09-11 PROCEDURE — 76700 US EXAM ABDOM COMPLETE: CPT

## 2023-09-11 PROCEDURE — 71260 CT THORAX DX C+: CPT | Mod: 26

## 2023-09-18 ENCOUNTER — APPOINTMENT (OUTPATIENT)
Dept: INFUSION THERAPY | Facility: HOSPITAL | Age: 58
End: 2023-09-18

## 2023-09-20 ENCOUNTER — APPOINTMENT (OUTPATIENT)
Dept: INFUSION THERAPY | Facility: HOSPITAL | Age: 58
End: 2023-09-20

## 2023-09-21 ENCOUNTER — NON-APPOINTMENT (OUTPATIENT)
Age: 58
End: 2023-09-21

## 2023-09-21 ENCOUNTER — OUTPATIENT (OUTPATIENT)
Dept: OUTPATIENT SERVICES | Facility: HOSPITAL | Age: 58
LOS: 1 days | End: 2023-09-21
Payer: COMMERCIAL

## 2023-09-21 VITALS
HEART RATE: 99 BPM | RESPIRATION RATE: 16 BRPM | HEIGHT: 61.5 IN | WEIGHT: 149.91 LBS | SYSTOLIC BLOOD PRESSURE: 113 MMHG | DIASTOLIC BLOOD PRESSURE: 78 MMHG | OXYGEN SATURATION: 100 % | TEMPERATURE: 99 F

## 2023-09-21 DIAGNOSIS — Z98.890 OTHER SPECIFIED POSTPROCEDURAL STATES: Chronic | ICD-10-CM

## 2023-09-21 DIAGNOSIS — C18.1 MALIGNANT NEOPLASM OF APPENDIX: ICD-10-CM

## 2023-09-21 DIAGNOSIS — I82.90 ACUTE EMBOLISM AND THROMBOSIS OF UNSPECIFIED VEIN: ICD-10-CM

## 2023-09-21 DIAGNOSIS — M54.2 CERVICALGIA: ICD-10-CM

## 2023-09-21 DIAGNOSIS — K21.9 GASTRO-ESOPHAGEAL REFLUX DISEASE WITHOUT ESOPHAGITIS: ICD-10-CM

## 2023-09-21 DIAGNOSIS — R06.09 OTHER FORMS OF DYSPNEA: ICD-10-CM

## 2023-09-21 DIAGNOSIS — Z87.59 PERSONAL HISTORY OF OTHER COMPLICATIONS OF PREGNANCY, CHILDBIRTH AND THE PUERPERIUM: Chronic | ICD-10-CM

## 2023-09-21 LAB
A1C WITH ESTIMATED AVERAGE GLUCOSE RESULT: 6.2 % — HIGH (ref 4–5.6)
ALBUMIN SERPL ELPH-MCNC: 4 G/DL — SIGNIFICANT CHANGE UP (ref 3.3–5)
ALP SERPL-CCNC: 61 U/L — SIGNIFICANT CHANGE UP (ref 40–120)
ALT FLD-CCNC: 7 U/L — SIGNIFICANT CHANGE UP (ref 4–33)
ANION GAP SERPL CALC-SCNC: 13 MMOL/L — SIGNIFICANT CHANGE UP (ref 7–14)
AST SERPL-CCNC: 13 U/L — SIGNIFICANT CHANGE UP (ref 4–32)
BILIRUB SERPL-MCNC: 0.4 MG/DL — SIGNIFICANT CHANGE UP (ref 0.2–1.2)
BLD GP AB SCN SERPL QL: NEGATIVE — SIGNIFICANT CHANGE UP
BUN SERPL-MCNC: 8 MG/DL — SIGNIFICANT CHANGE UP (ref 7–23)
CALCIUM SERPL-MCNC: 9.9 MG/DL — SIGNIFICANT CHANGE UP (ref 8.4–10.5)
CHLORIDE SERPL-SCNC: 102 MMOL/L — SIGNIFICANT CHANGE UP (ref 98–107)
CO2 SERPL-SCNC: 24 MMOL/L — SIGNIFICANT CHANGE UP (ref 22–31)
CREAT SERPL-MCNC: 0.7 MG/DL — SIGNIFICANT CHANGE UP (ref 0.5–1.3)
EGFR: 100 ML/MIN/1.73M2 — SIGNIFICANT CHANGE UP
ESTIMATED AVERAGE GLUCOSE: 131 — SIGNIFICANT CHANGE UP
GLUCOSE SERPL-MCNC: 85 MG/DL — SIGNIFICANT CHANGE UP (ref 70–99)
HCT VFR BLD CALC: 31.7 % — LOW (ref 34.5–45)
HGB BLD-MCNC: 10.2 G/DL — LOW (ref 11.5–15.5)
MCHC RBC-ENTMCNC: 24.4 PG — LOW (ref 27–34)
MCHC RBC-ENTMCNC: 32.2 GM/DL — SIGNIFICANT CHANGE UP (ref 32–36)
MCV RBC AUTO: 75.8 FL — LOW (ref 80–100)
NRBC # BLD: 0 /100 WBCS — SIGNIFICANT CHANGE UP (ref 0–0)
NRBC # FLD: 0 K/UL — SIGNIFICANT CHANGE UP (ref 0–0)
PLATELET # BLD AUTO: 361 K/UL — SIGNIFICANT CHANGE UP (ref 150–400)
POTASSIUM SERPL-MCNC: 3.7 MMOL/L — SIGNIFICANT CHANGE UP (ref 3.5–5.3)
POTASSIUM SERPL-SCNC: 3.7 MMOL/L — SIGNIFICANT CHANGE UP (ref 3.5–5.3)
PROT SERPL-MCNC: 8.6 G/DL — HIGH (ref 6–8.3)
RBC # BLD: 4.18 M/UL — SIGNIFICANT CHANGE UP (ref 3.8–5.2)
RBC # FLD: 19.9 % — HIGH (ref 10.3–14.5)
RH IG SCN BLD-IMP: POSITIVE — SIGNIFICANT CHANGE UP
SODIUM SERPL-SCNC: 139 MMOL/L — SIGNIFICANT CHANGE UP (ref 135–145)
WBC # BLD: 3.5 K/UL — LOW (ref 3.8–10.5)
WBC # FLD AUTO: 3.5 K/UL — LOW (ref 3.8–10.5)

## 2023-09-21 PROCEDURE — 93010 ELECTROCARDIOGRAM REPORT: CPT

## 2023-09-21 RX ORDER — SODIUM CHLORIDE 9 MG/ML
1000 INJECTION, SOLUTION INTRAVENOUS
Refills: 0 | Status: DISCONTINUED | OUTPATIENT
Start: 2023-10-04 | End: 2023-10-04

## 2023-09-21 NOTE — H&P PST ADULT - GASTROINTESTINAL COMMENTS
Pre op dx- malignant neoplasm of appendix Pt has recent hx of pelvic mass with malignant neoplasm of appendix.

## 2023-09-21 NOTE — H&P PST ADULT - ALLERGIC/IMMUNOLOGIC
[facility] placed at OSH. Removal in toto. Tip for cx. No signs of tract or pocket infection. Comp-none. details…

## 2023-09-21 NOTE — H&P PST ADULT - MUSCULOSKELETAL
ROM intact/no calf tenderness/normal gait/extremities exam details… ROM intact/no calf tenderness/normal gait/strength 5/5 bilateral upper extremities/strength 5/5 bilateral lower extremities/extremities exam

## 2023-09-21 NOTE — H&P PST ADULT - NSICDXPASTMEDICALHX_GEN_ALL_CORE_FT
PAST MEDICAL HISTORY:  History of sickle cell trait      PAST MEDICAL HISTORY:  History of sickle cell trait     Malignant neoplasm of appendix

## 2023-09-21 NOTE — H&P PST ADULT - PROBLEM SELECTOR PLAN 3
Unable to assess mets due to dyspnea on exertion .  Requesting cardiology evaluation. Unable to assess mets due to dyspnea on exertion .  Requesting cardiology evaluation.    Pt says she saw her cardiologist in 2020 and requesting a new cardiologist.  Dr Nate Lewis - cardiologist was recommended.

## 2023-09-21 NOTE — H&P PST ADULT - HISTORY OF PRESENT ILLNESS
Interval/Overnight Events: Nicardiprine increased to 1 mcg/kg/m for HTN    VITAL SIGNS:  T(C): 36.7, Max: 36.7 (03-14 @ 23:00)  HR: 86 (66 - 133)  BP: 132/68 (121/64 - 142/91)  ABP: --  ABP(mean): --  RR: 20 (10 - 23)  SpO2: 97% (94% - 100%)  Wt(kg): --  CVP(mm Hg): --  End-Tidal CO2:  NIRS:    ===============================RESPIRATORY==============================  [ ] FiO2: ___ 	[ ] Heliox: ____ 		[ ] BiPAP: ___   [ ] NC: __  Liters			[ ] HFNC: __ 	Liters, FiO2: __  [ ] Mechanical Ventilation:   [ ] Inhaled Nitric Oxide:    Respiratory Medications:    [ ] Extubation Readiness Assessed  Comments:    =============================CARDIOVASCULAR============================  Cardiovascular Medications:  niCARdipine Infusion - Peds 1MICROgram(s)/kG/Min IV Continuous <Continuous>    Cardiac Rhythm:	[x] NSR		[ ] Other:  Comments:    =========================HEMATOLOGY/ONCOLOGY=========================    Transfusions:	[ ] PRBC	[ ] Platelets	[ ] FFP		[ ] Cryoprecipitate    Hematologic/Oncologic Medications:    DVT Prophylaxis:  Comments:    ============================INFECTIOUS DISEASE===========================  Antimicrobials/Immunologic Medications:    RECENT CULTURES:  03-10 @ 11:05 URINE CATHETER               ======================FLUIDS/ELECTROLYTES/NUTRITION=====================  I&O's Summary    I & Os for current day (as of 15 Mar 2017 07:18)  =============================================  IN: 884.3 ml / OUT: 3050 ml / NET: -2165.7 ml    Daily       Diet:	[ ] Regular	[ ] Soft		[ ] Clears	[ ] NPO  .	[ ] Other:  .	[ ] NGT		[ ] NDT		[ ] GT		[ ] GJT    Gastrointestinal Medications:  ranitidine  Oral Tab/Cap - Peds 75milliGRAM(s) Oral two times a day    Comments:    ==============================NEUROLOGY===============================  [ ] SBS:		[ ] DANIELLE-1:	[ ] BIS:  [x] Adequacy of sedation and pain control has been assessed and adjusted    Neurologic Medications:  oxyCODONE  IR Oral Tab/Cap - Peds 5milliGRAM(s) Oral every 6 hours PRN  oxyCODONE  IR Oral Tab/Cap - Peds 10milliGRAM(s) Oral every 6 hours PRN  levETIRAcetam  Oral Tab/Cap - Peds 1000milliGRAM(s) Oral every 12 hours  acetaminophen   Oral Tab/Cap - Peds. 650milliGRAM(s) Oral every 6 hours PRN    Comments:    OTHER MEDICATIONS:  Endocrine/Metabolic Medications:  Genitourinary Medications:  Topical/Other Medications:      ======================PATIENT CARE ACCESS DEVICES=======================  [ ] Peripheral IV  [ ] Central Venous Line	[ ] R	[ ] L	[ ] IJ	[ ] Fem	[ ] SC			Placed:   [ ] Arterial Line		[ ] R	[ ] L	[ ] PT	[ ] DP	[ ] Fem	[ ] Rad	[ ] Ax	Placed:   [ ] PICC:				[ ] Broviac		[ ] Mediport  [ ] Urinary Catheter, Date Placed:   [ ] Necessity of urinary, arterial, and venous catheters discussed    =============================PHYSICAL EXAM=============================  GENERAL: In no acute distress  RESPIRATORY: Lungs clear to auscultation bilaterally. Good aeration. No rales, rhonchi, retractions or wheezing. Effort even and unlabored.  CARDIOVASCULAR: Regular rate and rhythm. Normal S1/S2. No murmurs, rubs, or gallop. Capillary refill < 2 seconds. Distal pulses 2+ and equal.  ABDOMEN: Soft, non-distended. Bowel sounds present. No palpable hepatosplenomegaly.  SKIN: No rash.  EXTREMITIES: Warm and well perfused. No gross extremity deformities.  NEUROLOGIC: Alert and oriented. No acute change from baseline exam.    =======================================================================  IMAGING STUDIES:    Parent/Guardian is at the bedside:	[ ] Yes	[ ] No  Patient and Parent/Guardian updated as to the progress/plan of care:	[ ] Yes	[ ] No    [ ] The patient remains in critical and unstable condition, and requires ICU care and monitoring  [ ] The patient is improving but requires continued monitoring and adjustment of therapy Interval/Overnight Events: Nicardiprine increased to 1 mcg/kg/m for HTN    VITAL SIGNS:  T(C): 36.7, Max: 36.7 (03-14 @ 23:00)  HR: 86 (66 - 133)  BP: 132/68 (121/64 - 142/91)  ABP: --  ABP(mean): --  RR: 20 (10 - 23)  SpO2: 97% (94% - 100%)  Wt(kg): --  CVP(mm Hg): --  End-Tidal CO2:  NIRS:    ===============================RESPIRATORY==============================  [ ] FiO2: ___ 	[ ] Heliox: ____ 		[ ] BiPAP: ___   [ ] NC: __  Liters			[ ] HFNC: __ 	Liters, FiO2: __  [ ] Mechanical Ventilation:   [ ] Inhaled Nitric Oxide:    Respiratory Medications:    [ ] Extubation Readiness Assessed  Comments:    =============================CARDIOVASCULAR============================  Cardiovascular Medications:  niCARdipine Infusion - Peds 1MICROgram(s)/kG/Min IV Continuous <Continuous>    Cardiac Rhythm:	[x] NSR		[ ] Other:  Comments:    =========================HEMATOLOGY/ONCOLOGY=========================    Transfusions:	[ ] PRBC	[ ] Platelets	[ ] FFP		[ ] Cryoprecipitate    Hematologic/Oncologic Medications:    DVT Prophylaxis:  Comments:    ============================INFECTIOUS DISEASE===========================  Antimicrobials/Immunologic Medications:    RECENT CULTURES:  03-10 @ 11:05 URINE CATHETER               ======================FLUIDS/ELECTROLYTES/NUTRITION=====================  I&O's Summary    I & Os for current day (as of 15 Mar 2017 07:18)  =============================================  IN: 884.3 ml / OUT: 3050 ml / NET: -2165.7 ml    Daily       Diet:	[ ] Regular	[ ] Soft		[ ] Clears	[ ] NPO  .	[ ] Other:  .	[ ] NGT		[ ] NDT		[ ] GT		[ ] GJT    Gastrointestinal Medications:  ranitidine  Oral Tab/Cap - Peds 75milliGRAM(s) Oral two times a day    Comments:    ==============================NEUROLOGY===============================  [ ] SBS:		[ ] DANIELLE-1:	[ ] BIS:  [x] Adequacy of sedation and pain control has been assessed and adjusted    Neurologic Medications:  oxyCODONE  IR Oral Tab/Cap - Peds 5milliGRAM(s) Oral every 6 hours PRN  oxyCODONE  IR Oral Tab/Cap - Peds 10milliGRAM(s) Oral every 6 hours PRN  levETIRAcetam  Oral Tab/Cap - Peds 1000milliGRAM(s) Oral every 12 hours  acetaminophen   Oral Tab/Cap - Peds. 650milliGRAM(s) Oral every 6 hours PRN    Comments:  3/14: MRI Head w/o contrast:   IMPRESSION:    Postoperative changes with associated mass effect and residual hemorrhage   within the postoperative cavity. The appearance is not significantly   changed from the prior exam given differences in scan technique.    Small amount of intraventricular blood and interhemispheric subdural   blood.      OTHER MEDICATIONS:  Endocrine/Metabolic Medications:  Genitourinary Medications:  Topical/Other Medications:      ======================PATIENT CARE ACCESS DEVICES=======================  [x ] Peripheral IV  [ ] Central Venous Line	[ ] R	[ ] L	[ ] IJ	[ ] Fem	[ ] SC			Placed:   [ ] Arterial Line		[ ] R	[ ] L	[ ] PT	[ ] DP	[ ] Fem	[ ] Rad	[ ] Ax	Placed:   [ ] PICC:				[ ] Broviac		[ ] Mediport  [ ] Urinary Catheter, Date Placed:   [ ] Necessity of urinary, arterial, and venous catheters discussed    =============================PHYSICAL EXAM=============================  GENERAL: In no acute distress  HEENT: incision and sutures CDI, PEARRLA, external ear nl, MMM, normal oropharynx  RESPIRATORY: Lungs clear to auscultation bilaterally. Good aeration. No rales, rhonchi, retractions or wheezing. Effort even and unlabored.  CARDIOVASCULAR: Regular rate and rhythm. Normal S1/S2. No murmurs, rubs, or gallop. Capillary refill < 2 seconds. Distal pulses 2+ and equal.  ABDOMEN: Soft, non-distended. Bowel sounds present. No palpable hepatosplenomegaly.  SKIN: No rash.  EXTREMITIES: Warm and well perfused. No gross extremity deformities.  NEUROLOGIC: Alert and oriented. No acute change from baseline exam.    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

## 2023-09-21 NOTE — H&P PST ADULT - OTHER CARE PROVIDERS
[facility] [phone] ,Dr Bill Payan - Oncologist [facility] [phone] ,Dr Bill Payan - Oncologist [phone]

## 2023-09-21 NOTE — H&P PST ADULT - PROBLEM SELECTOR PLAN 5
Pt c/o of neck pain due to left side catheter related clot.  Pt c/o pain during hyperextention and flexion of neck at left side.

## 2023-09-21 NOTE — H&P PST ADULT - PROBLEM SELECTOR PLAN 1
Patient tentatively scheduled for cytoreduction and hipec for appendiceal adenocarcinoma on 10/04/2023.    Pre-op instructions provided. Pt given verbal and written instructions with teach back on chlorhexidine wash  and pepcid. Pt verbalized understanding with return demonstration.    Labs done.

## 2023-09-21 NOTE — H&P PST ADULT - PROBLEM SELECTOR PLAN 4
Pt has catheter related clot - on Eliquis twice a day.  Requesting hematology evaluation and Eliquis plan pre op. Pt has left side catheter related clot - on Eliquis twice a day.  Requesting hematology evaluation and Eliquis plan pre op.

## 2023-09-21 NOTE — H&P PST ADULT - EKG AND INTERPRETATION
If you are a smoker, it is important for your health to stop smoking. Please be aware that second hand smoke is also harmful.
EKG

## 2023-09-21 NOTE — H&P PST ADULT - HEMATOLOGY/LYMPHATICS COMMENTS
as per hematologist -pt 's disease  is chemo resistant, large ovarian masses unlikely to shrink with further chemotherapy, plan for debulking with HIPEC , Pt has catheter related clot - on Eliquis twice a day.

## 2023-09-21 NOTE — H&P PST ADULT - NSANTHOSAYNRD_GEN_A_CORE
No. [place] screening performed.  STOP BANG Legend: 0-2 = LOW Risk; 3-4 = INTERMEDIATE Risk; 5-8 = HIGH Risk

## 2023-10-02 ENCOUNTER — APPOINTMENT (OUTPATIENT)
Dept: INFUSION THERAPY | Facility: HOSPITAL | Age: 58
End: 2023-10-02

## 2023-10-02 ENCOUNTER — APPOINTMENT (OUTPATIENT)
Dept: HEMATOLOGY ONCOLOGY | Facility: CLINIC | Age: 58
End: 2023-10-02

## 2023-10-03 ENCOUNTER — TRANSCRIPTION ENCOUNTER (OUTPATIENT)
Age: 58
End: 2023-10-03

## 2023-10-04 ENCOUNTER — APPOINTMENT (OUTPATIENT)
Dept: INFUSION THERAPY | Facility: HOSPITAL | Age: 58
End: 2023-10-04

## 2023-10-04 ENCOUNTER — RESULT REVIEW (OUTPATIENT)
Age: 58
End: 2023-10-04

## 2023-10-04 ENCOUNTER — INPATIENT (INPATIENT)
Facility: HOSPITAL | Age: 58
LOS: 34 days | Discharge: HOME HEALTH | End: 2023-11-08
Attending: SURGERY | Admitting: SURGERY
Payer: COMMERCIAL

## 2023-10-04 ENCOUNTER — APPOINTMENT (OUTPATIENT)
Dept: SURGICAL ONCOLOGY | Facility: HOSPITAL | Age: 58
End: 2023-10-04

## 2023-10-04 VITALS
OXYGEN SATURATION: 100 % | TEMPERATURE: 98 F | SYSTOLIC BLOOD PRESSURE: 132 MMHG | HEART RATE: 96 BPM | DIASTOLIC BLOOD PRESSURE: 85 MMHG | RESPIRATION RATE: 16 BRPM | WEIGHT: 151.9 LBS | HEIGHT: 61 IN

## 2023-10-04 DIAGNOSIS — Z87.59 PERSONAL HISTORY OF OTHER COMPLICATIONS OF PREGNANCY, CHILDBIRTH AND THE PUERPERIUM: Chronic | ICD-10-CM

## 2023-10-04 DIAGNOSIS — Z98.890 OTHER SPECIFIED POSTPROCEDURAL STATES: Chronic | ICD-10-CM

## 2023-10-04 DIAGNOSIS — C18.1 MALIGNANT NEOPLASM OF APPENDIX: ICD-10-CM

## 2023-10-04 LAB
ALBUMIN SERPL ELPH-MCNC: 2.8 G/DL — LOW (ref 3.3–5)
ALP SERPL-CCNC: 27 U/L — LOW (ref 40–120)
ALT FLD-CCNC: 192 U/L — HIGH (ref 4–33)
ANION GAP SERPL CALC-SCNC: 7 MMOL/L — SIGNIFICANT CHANGE UP (ref 7–14)
APTT BLD: 27.4 SEC — SIGNIFICANT CHANGE UP (ref 24.5–35.6)
APTT BLD: 29.8 SEC — SIGNIFICANT CHANGE UP (ref 24.5–35.6)
AST SERPL-CCNC: 398 U/L — HIGH (ref 4–32)
BILIRUB SERPL-MCNC: 1.2 MG/DL — SIGNIFICANT CHANGE UP (ref 0.2–1.2)
BLOOD GAS ARTERIAL - LYTES,HGB,ICA,LACT RESULT: SIGNIFICANT CHANGE UP
BUN SERPL-MCNC: 4 MG/DL — LOW (ref 7–23)
CALCIUM SERPL-MCNC: 7.9 MG/DL — LOW (ref 8.4–10.5)
CHLORIDE SERPL-SCNC: 117 MMOL/L — HIGH (ref 98–107)
CO2 SERPL-SCNC: 19 MMOL/L — LOW (ref 22–31)
CREAT SERPL-MCNC: 0.5 MG/DL — SIGNIFICANT CHANGE UP (ref 0.5–1.3)
EGFR: 108 ML/MIN/1.73M2 — SIGNIFICANT CHANGE UP
GAS PNL BLDA: SIGNIFICANT CHANGE UP
GLUCOSE BLDC GLUCOMTR-MCNC: 124 MG/DL — HIGH (ref 70–99)
GLUCOSE BLDC GLUCOMTR-MCNC: 95 MG/DL — SIGNIFICANT CHANGE UP (ref 70–99)
GLUCOSE SERPL-MCNC: 165 MG/DL — HIGH (ref 70–99)
HCT VFR BLD CALC: 22.8 % — LOW (ref 34.5–45)
HCT VFR BLD CALC: 28.1 % — LOW (ref 34.5–45)
HGB BLD-MCNC: 7.7 G/DL — LOW (ref 11.5–15.5)
HGB BLD-MCNC: 9.7 G/DL — LOW (ref 11.5–15.5)
INR BLD: 1.38 RATIO — HIGH (ref 0.85–1.18)
INR BLD: 1.59 RATIO — HIGH (ref 0.85–1.18)
MCHC RBC-ENTMCNC: 26.2 PG — LOW (ref 27–34)
MCHC RBC-ENTMCNC: 26.9 PG — LOW (ref 27–34)
MCHC RBC-ENTMCNC: 33.8 GM/DL — SIGNIFICANT CHANGE UP (ref 32–36)
MCHC RBC-ENTMCNC: 34.5 GM/DL — SIGNIFICANT CHANGE UP (ref 32–36)
MCV RBC AUTO: 77.6 FL — LOW (ref 80–100)
MCV RBC AUTO: 77.8 FL — LOW (ref 80–100)
NRBC # BLD: 0 /100 WBCS — SIGNIFICANT CHANGE UP (ref 0–0)
NRBC # BLD: 0 /100 WBCS — SIGNIFICANT CHANGE UP (ref 0–0)
NRBC # FLD: 0 K/UL — SIGNIFICANT CHANGE UP (ref 0–0)
NRBC # FLD: 0 K/UL — SIGNIFICANT CHANGE UP (ref 0–0)
PLATELET # BLD AUTO: 167 K/UL — SIGNIFICANT CHANGE UP (ref 150–400)
PLATELET # BLD AUTO: 236 K/UL — SIGNIFICANT CHANGE UP (ref 150–400)
POTASSIUM SERPL-MCNC: 4.2 MMOL/L — SIGNIFICANT CHANGE UP (ref 3.5–5.3)
POTASSIUM SERPL-SCNC: 4.2 MMOL/L — SIGNIFICANT CHANGE UP (ref 3.5–5.3)
PROT SERPL-MCNC: 4.5 G/DL — LOW (ref 6–8.3)
PROTHROM AB SERPL-ACNC: 15.3 SEC — HIGH (ref 9.5–13)
PROTHROM AB SERPL-ACNC: 17.6 SEC — HIGH (ref 9.5–13)
RBC # BLD: 2.94 M/UL — LOW (ref 3.8–5.2)
RBC # BLD: 3.61 M/UL — LOW (ref 3.8–5.2)
RBC # FLD: 17.5 % — HIGH (ref 10.3–14.5)
RBC # FLD: 18.5 % — HIGH (ref 10.3–14.5)
SODIUM SERPL-SCNC: 143 MMOL/L — SIGNIFICANT CHANGE UP (ref 135–145)
WBC # BLD: 6.83 K/UL — SIGNIFICANT CHANGE UP (ref 3.8–10.5)
WBC # BLD: 6.86 K/UL — SIGNIFICANT CHANGE UP (ref 3.8–10.5)
WBC # FLD AUTO: 6.83 K/UL — SIGNIFICANT CHANGE UP (ref 3.8–10.5)
WBC # FLD AUTO: 6.86 K/UL — SIGNIFICANT CHANGE UP (ref 3.8–10.5)

## 2023-10-04 PROCEDURE — 88305 TISSUE EXAM BY PATHOLOGIST: CPT | Mod: 26

## 2023-10-04 PROCEDURE — 71045 X-RAY EXAM CHEST 1 VIEW: CPT | Mod: 26

## 2023-10-04 PROCEDURE — 99291 CRITICAL CARE FIRST HOUR: CPT | Mod: 25

## 2023-10-04 PROCEDURE — 58150 TOTAL HYSTERECTOMY: CPT

## 2023-10-04 PROCEDURE — 44955 APPENDECTOMY ADD-ON: CPT

## 2023-10-04 PROCEDURE — 96542 CHEMOTHERAPY INJECTION: CPT

## 2023-10-04 PROCEDURE — 49205: CPT

## 2023-10-04 PROCEDURE — 88304 TISSUE EXAM BY PATHOLOGIST: CPT | Mod: 26

## 2023-10-04 PROCEDURE — 88307 TISSUE EXAM BY PATHOLOGIST: CPT | Mod: 26

## 2023-10-04 PROCEDURE — 58150 TOTAL HYSTERECTOMY: CPT | Mod: 80

## 2023-10-04 PROCEDURE — 49205: CPT | Mod: 80

## 2023-10-04 PROCEDURE — 44145 PARTIAL REMOVAL OF COLON: CPT | Mod: 22

## 2023-10-04 PROCEDURE — 44145 PARTIAL REMOVAL OF COLON: CPT | Mod: 80

## 2023-10-04 PROCEDURE — 77605 HYPERTHERMIA EXT GEN DEEP: CPT | Mod: 26,80

## 2023-10-04 PROCEDURE — 47120 PARTIAL REMOVAL OF LIVER: CPT

## 2023-10-04 PROCEDURE — 77605 HYPERTHERMIA EXT GEN DEEP: CPT | Mod: 26

## 2023-10-04 PROCEDURE — 96542 CHEMOTHERAPY INJECTION: CPT | Mod: 80

## 2023-10-04 PROCEDURE — 47120 PARTIAL REMOVAL OF LIVER: CPT | Mod: 80

## 2023-10-04 DEVICE — SURGICEL NU-KNIT 6 X 9": Type: IMPLANTABLE DEVICE | Status: FUNCTIONAL

## 2023-10-04 DEVICE — STAPLER COVIDIEN PURSTRING 65MM: Type: IMPLANTABLE DEVICE | Status: FUNCTIONAL

## 2023-10-04 DEVICE — STAPLER COVIDIEN TRI-STAPLE 45MM TAN RELOAD: Type: IMPLANTABLE DEVICE | Status: FUNCTIONAL

## 2023-10-04 DEVICE — LIGATING CLIPS WECK HORIZON LARGE (ORANGE) 24: Type: IMPLANTABLE DEVICE | Status: FUNCTIONAL

## 2023-10-04 DEVICE — LIGATING CLIPS WECK HORIZON MEDIUM (BLUE) 24: Type: IMPLANTABLE DEVICE | Status: FUNCTIONAL

## 2023-10-04 DEVICE — SURGICEL 2 X 14": Type: IMPLANTABLE DEVICE | Status: FUNCTIONAL

## 2023-10-04 DEVICE — VISTASEAL FIBRIN HUMAN 10ML: Type: IMPLANTABLE DEVICE | Status: FUNCTIONAL

## 2023-10-04 DEVICE — STAPLER COVIDIEN TRI-STAPLE 60MM PURPLE RELOAD: Type: IMPLANTABLE DEVICE | Status: FUNCTIONAL

## 2023-10-04 DEVICE — STAPLER CONTOUR CURVED WITH BLUE CART: Type: IMPLANTABLE DEVICE | Status: FUNCTIONAL

## 2023-10-04 RX ORDER — ONDANSETRON 8 MG/1
4 TABLET, FILM COATED ORAL EVERY 6 HOURS
Refills: 0 | Status: DISCONTINUED | OUTPATIENT
Start: 2023-10-04 | End: 2023-10-04

## 2023-10-04 RX ORDER — PANTOPRAZOLE SODIUM 20 MG/1
40 TABLET, DELAYED RELEASE ORAL EVERY 24 HOURS
Refills: 0 | Status: DISCONTINUED | OUTPATIENT
Start: 2023-10-04 | End: 2023-10-07

## 2023-10-04 RX ORDER — INSULIN LISPRO 100/ML
VIAL (ML) SUBCUTANEOUS EVERY 6 HOURS
Refills: 0 | Status: DISCONTINUED | OUTPATIENT
Start: 2023-10-04 | End: 2023-10-09

## 2023-10-04 RX ORDER — CHLORHEXIDINE GLUCONATE 213 G/1000ML
1 SOLUTION TOPICAL ONCE
Refills: 0 | Status: COMPLETED | OUTPATIENT
Start: 2023-10-04 | End: 2023-10-04

## 2023-10-04 RX ORDER — MITOMYCIN 5 MG/10ML
30 INJECTION, POWDER, LYOPHILIZED, FOR SOLUTION INTRAVENOUS ONCE
Refills: 0 | Status: DISCONTINUED | OUTPATIENT
Start: 2023-10-04 | End: 2023-10-05

## 2023-10-04 RX ORDER — HYDROMORPHONE HYDROCHLORIDE 2 MG/ML
0.5 INJECTION INTRAMUSCULAR; INTRAVENOUS; SUBCUTANEOUS
Refills: 0 | Status: DISCONTINUED | OUTPATIENT
Start: 2023-10-04 | End: 2023-10-05

## 2023-10-04 RX ORDER — PROPOFOL 10 MG/ML
50 INJECTION, EMULSION INTRAVENOUS
Qty: 1000 | Refills: 0 | Status: DISCONTINUED | OUTPATIENT
Start: 2023-10-04 | End: 2023-10-05

## 2023-10-04 RX ORDER — SODIUM CHLORIDE 9 MG/ML
1000 INJECTION, SOLUTION INTRAVENOUS
Refills: 0 | Status: DISCONTINUED | OUTPATIENT
Start: 2023-10-04 | End: 2023-10-05

## 2023-10-04 RX ORDER — CIPROFLOXACIN LACTATE 400MG/40ML
400 VIAL (ML) INTRAVENOUS EVERY 12 HOURS
Refills: 0 | Status: DISCONTINUED | OUTPATIENT
Start: 2023-10-04 | End: 2023-10-08

## 2023-10-04 RX ORDER — HYDROMORPHONE HYDROCHLORIDE 2 MG/ML
30 INJECTION INTRAMUSCULAR; INTRAVENOUS; SUBCUTANEOUS
Refills: 0 | Status: DISCONTINUED | OUTPATIENT
Start: 2023-10-04 | End: 2023-10-05

## 2023-10-04 RX ORDER — ONDANSETRON 8 MG/1
4 TABLET, FILM COATED ORAL ONCE
Refills: 0 | Status: DISCONTINUED | OUTPATIENT
Start: 2023-10-04 | End: 2023-10-04

## 2023-10-04 RX ORDER — PHENYLEPHRINE HYDROCHLORIDE 10 MG/ML
0.7 INJECTION INTRAVENOUS
Qty: 40 | Refills: 0 | Status: DISCONTINUED | OUTPATIENT
Start: 2023-10-04 | End: 2023-10-05

## 2023-10-04 RX ORDER — METRONIDAZOLE 500 MG
500 TABLET ORAL EVERY 8 HOURS
Refills: 0 | Status: DISCONTINUED | OUTPATIENT
Start: 2023-10-04 | End: 2023-10-08

## 2023-10-04 RX ORDER — HEPARIN SODIUM 5000 [USP'U]/ML
5000 INJECTION INTRAVENOUS; SUBCUTANEOUS EVERY 8 HOURS
Refills: 0 | Status: DISCONTINUED | OUTPATIENT
Start: 2023-10-04 | End: 2023-10-09

## 2023-10-04 RX ORDER — CHLORHEXIDINE GLUCONATE 213 G/1000ML
1 SOLUTION TOPICAL DAILY
Refills: 0 | Status: DISCONTINUED | OUTPATIENT
Start: 2023-10-04 | End: 2023-10-10

## 2023-10-04 RX ORDER — CHLORHEXIDINE GLUCONATE 213 G/1000ML
15 SOLUTION TOPICAL EVERY 12 HOURS
Refills: 0 | Status: DISCONTINUED | OUTPATIENT
Start: 2023-10-04 | End: 2023-10-04

## 2023-10-04 RX ORDER — HYDROMORPHONE HYDROCHLORIDE 2 MG/ML
1 INJECTION INTRAMUSCULAR; INTRAVENOUS; SUBCUTANEOUS
Refills: 0 | Status: DISCONTINUED | OUTPATIENT
Start: 2023-10-04 | End: 2023-10-05

## 2023-10-04 RX ORDER — MITOMYCIN 5 MG/10ML
10 INJECTION, POWDER, LYOPHILIZED, FOR SOLUTION INTRAVENOUS ONCE
Refills: 0 | Status: DISCONTINUED | OUTPATIENT
Start: 2023-10-04 | End: 2023-10-05

## 2023-10-04 RX ORDER — NALOXONE HYDROCHLORIDE 4 MG/.1ML
0.1 SPRAY NASAL
Refills: 0 | Status: DISCONTINUED | OUTPATIENT
Start: 2023-10-04 | End: 2023-11-08

## 2023-10-04 RX ADMIN — PHENYLEPHRINE HYDROCHLORIDE 18.1 MICROGRAM(S)/KG/MIN: 10 INJECTION INTRAVENOUS at 21:17

## 2023-10-04 RX ADMIN — CHLORHEXIDINE GLUCONATE 1 APPLICATION(S): 213 SOLUTION TOPICAL at 07:57

## 2023-10-04 RX ADMIN — SODIUM CHLORIDE 30 MILLILITER(S): 9 INJECTION, SOLUTION INTRAVENOUS at 07:57

## 2023-10-04 RX ADMIN — HEPARIN SODIUM 5000 UNIT(S): 5000 INJECTION INTRAVENOUS; SUBCUTANEOUS at 22:50

## 2023-10-04 RX ADMIN — PROPOFOL 20.7 MICROGRAM(S)/KG/MIN: 10 INJECTION, EMULSION INTRAVENOUS at 21:34

## 2023-10-04 NOTE — CONSULT NOTE ADULT - SUBJECTIVE AND OBJECTIVE BOX
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      Surgery Information   EBL: 1L       IV Fluids: 4.2L      Blood Products: 4 PRBC and 2 FFP  UOP: 1150          PAST MEDICAL & SURGICAL HISTORY:  History of sickle cell trait      Malignant neoplasm of appendix      History of ectopic pregnancy      H/O colonoscopy      H/O endoscopy        Home Meds: Home Medications:  Eliquis 5 mg oral tablet: 1 tab(s) orally 2 times a day started on 9/11/2023 (04 Oct 2023 08:00)  metoclopramide 10 mg oral tablet: 1 tab(s) orally every 6 hours as needed (04 Oct 2023 08:00)  omeprazole 40 mg oral delayed release capsule: 1 cap(s) orally once a day as needed (04 Oct 2023 08:00)  Tylenol 500 mg oral tablet: 1 tab(s) orally as needed (04 Oct 2023 08:00)    Allergies: Allergies    penicillin (Rash)    Intolerances      Soc:   Advanced Directives: Presumed Full Code     ROS:    REVIEW OF SYSTEMS    [ ] A ten-point review of systems was otherwise negative except as noted.  [ ] Due to altered mental status/intubation, subjective information were not able to be obtained from the patient. History was obtained, to the extent possible, from review of the chart and collateral sources of information.      CURRENT MEDICATIONS:   --------------------------------------------------------------------------------------  Neurologic Medications  HYDROmorphone  Injectable 1 milliGRAM(s) IV Push every 10 minutes PRN Severe Pain (7 - 10)  HYDROmorphone  Injectable 0.5 milliGRAM(s) IV Push every 10 minutes PRN Moderate Pain (4 - 6)  HYDROmorphone PCA (1 mG/mL) 30 milliLiter(s) PCA Continuous PCA Continuous  ondansetron Injectable 4 milliGRAM(s) IV Push every 6 hours PRN Nausea  ondansetron Injectable 4 milliGRAM(s) IV Push once PRN Nausea and/or Vomiting  propofol Infusion 50 MICROgram(s)/kG/Min IV Continuous <Continuous>    Respiratory Medications    Cardiovascular Medications  phenylephrine    Infusion 0.701 MICROgram(s)/kG/Min IV Continuous <Continuous>    Gastrointestinal Medications  lactated ringers. 1000 milliLiter(s) IV Continuous <Continuous>    Genitourinary Medications    Hematologic/Oncologic Medications  mitoMYcin INTRAPERITONEAL  (eMAR) 30 milliGRAM(s) IntraPeritoneal once  mitoMYcin INTRAPERITONEAL  (eMAR) 10 milliGRAM(s) IntraPeritoneal once    Antimicrobial/Immunologic Medications    Endocrine/Metabolic Medications    Topical/Other Medications  chlorhexidine 2% Cloths 1 Application(s) Topical daily  naloxone Injectable 0.1 milliGRAM(s) IV Push every 3 minutes PRN For ANY of the following changes in patient status:  A. RR LESS THAN 10 breaths per minute, B. Oxygen saturation LESS THAN 90%, C. Sedation score of 6    --------------------------------------------------------------------------------------    VITAL SIGNS, INS/OUTS (last 24 hours):  --------------------------------------------------------------------------------------  ICU Vital Signs Last 24 Hrs  T(C): 35.8 (04 Oct 2023 21:00), Max: 36.7 (04 Oct 2023 07:27)  T(F): 96.4 (04 Oct 2023 21:00), Max: 98.1 (04 Oct 2023 07:27)  HR: 63 (04 Oct 2023 21:00) (62 - 96)  BP: 106/75 (04 Oct 2023 21:00) (103/73 - 132/85)  BP(mean): 80 (04 Oct 2023 21:00) (80 - 90)  ABP: 107/59 (04 Oct 2023 21:00) (107/59 - 131/69)  ABP(mean): 78 (04 Oct 2023 21:00) (78 - 93)  RR: 12 (04 Oct 2023 21:00) (10 - 16)  SpO2: 100% (04 Oct 2023 21:00) (100% - 100%)    O2 Parameters below as of 04 Oct 2023 20:20  Patient On (Oxygen Delivery Method): ventilator    O2 Concentration (%): 50      I&O's Summary    --------------------------------------------------------------------------------------    EXAM:  General/Neuro  Exam: Sedated on propofol    Respiratory  Exam: Lungs clear to auscultation, Normal expansion/effort.  ***  [] Tracheostomy   [x] Intubated  Mechanical Ventilation: Mode: AC/ CMV (Assist Control/ Continuous Mandatory Ventilation)  RR (machine): 10  TV (machine): 450  FiO2: 50  PEEP: 5  ITime: 7.5  MAP: 9  PIP: 19      Cardiovascular  Exam: S1, S2.  Regular rate and rhythm. On Nash   Cardiac Rhythm: Normal Sinus Rhythm    GI  Exam: Abdomen soft, Non-tender, Non-distended. Nasogastric tube in place with bilious output. Ileostomy with red rubber  Wound: Preveena vac  Current Diet:  NPO    Extremities  Exam: Extremities warm, pink, well-perfused.      Derm:  Exam: Good skin turgor, no skin breakdown.      :   Exam: Pierce catheter in place.     Tubes/Lines/Drains  ***  [x] Peripheral IV  [] Central Venous Line     	[] R	[] L	[] IJ	[] Fem	[] SC        Type:	    Date Placed:   [] Arterial Line		[] R	[] L	[] Fem	[] Rad	[] Ax	Date Placed:   [] PICC:         	[] Midline		[] Mediport           [] Urinary Catheter		Date Placed:       LABS  --------------------------------------------------------------------------------------  Labs:  CAPILLARY BLOOD GLUCOSE      POCT Blood Glucose.: 95 mg/dL (04 Oct 2023 07:33)                          7.7    6.86  )-----------( 236      ( 04 Oct 2023 14:29 )             22.8        LFTs:     Blood Gas Arterial, Lactate: 1.4 mmol/L (10-04-23 @ 19:34)  Blood Gas Arterial, Lactate: 1.2 mmol/L (10-04-23 @ 17:45)  Blood Gas Arterial, Lactate: 1.1 mmol/L (10-04-23 @ 17:00)  Blood Gas Arterial, Lactate: 1.0 mmol/L (10-04-23 @ 15:32)  Blood Gas Arterial, Lactate: 0.9 mmol/L (10-04-23 @ 13:49)  Blood Gas Arterial, Lactate: 0.8 mmol/L (10-04-23 @ 12:52)  Blood Gas Arterial, Lactate: 0.9 mmol/L (10-04-23 @ 11:48)  Blood Gas Arterial, Lactate: 0.9 mmol/L (10-04-23 @ 10:52)  Blood Gas Arterial, Lactate: 1.3 mmol/L (10-04-23 @ 09:20)    ABG - ( 04 Oct 2023 19:34 )  pH: 7.23  /  pCO2: 42    /  pO2: 205   / HCO3: 18    / Base Excess: -9.4  /  SaO2: 99.8            ABG - ( 04 Oct 2023 17:45 )  pH: 7.28  /  pCO2: 35    /  pO2: 202   / HCO3: 16    / Base Excess: -9.5  /  SaO2: 98.5            ABG - ( 04 Oct 2023 17:00 )  pH: 7.23  /  pCO2: 37    /  pO2: 208   / HCO3: 16    / Base Excess: -11.2 /  SaO2: 99.8              Coags:     17.6   ----< 1.59    ( 04 Oct 2023 14:29 )     29.8              --------------------------------------------------------------------------------------    OTHER LABS    IMAGING RESULTS

## 2023-10-04 NOTE — CONSULT NOTE ADULT - ASSESSMENT
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        PLAN  NEUROLOGIC   -Sedated with prop and fent    RESPIRATORY   - Currently 10/450/5/50%  - Monitor SpO2 goal >92%    CARDIOVASCULAR   - Monitor hemodynamics   - MAP goal > 65  - Wean bari as tolerated    GASTROINTESTINAL   - Diet: NPO  - GI PPX: Protonix  - NGT to suction    /RENAL   - IV fluids: LR @ 100cc/hr  - Monitor BMP  - Strict I/Os  - Monitor electrolytes, replete PRN  - Maintain cristina catheter for 2 weeks per primary team    HEMATOLOGIC  - Monitor H/H   - DVT prophylaxis: SCDs and subq heparin    INFECTIOUS DISEASE  - Monitor fever/WC  - Cipro/ flagyl    ENDOCRINE  - Monitor gluc  - ISS    LINES  - rad a line  - PIV  - Cristina    Disposition:   SICU  --------------------------------------------------------------------------------------    Critical Care Diagnoses:

## 2023-10-04 NOTE — BRIEF OPERATIVE NOTE - NSICDXBRIEFPROCEDURE_GEN_ALL_CORE_FT
PROCEDURES:  Cytoreductive surgery, with HIPEC 04-Oct-2023 19:40:05  Augustina Bhakta  Exploratory laparotomy with total abdominal hysterectomy and bilateral salpingo-oophorectomy 04-Oct-2023 19:40:23  Augustina Bhakta  Bladder repair 04-Oct-2023 19:40:30  Augustina Bhakta  Open low anterior resection of colon 04-Oct-2023 19:40:43  Augustina Bhakta  Open creation of diverting loop ileostomy 04-Oct-2023 19:40:51  Augustina Bhakta  Cholecystectomy 04-Oct-2023 19:41:02  Augustina Bhakta  Appendectomy 04-Oct-2023 19:41:08  Augustina Bhakta  Omentectomy 04-Oct-2023 19:41:23  Augustina Bhakta  Extensive surgical removal of peritoneum with administration of hyperthermic intraperitoneal chemotherapy (HIPEC) 04-Oct-2023 19:42:03  Augustina Bhakta

## 2023-10-04 NOTE — BRIEF OPERATIVE NOTE - OPERATION/FINDINGS
At time of opening there was significant mucinous about 10 kg which was removed. The total PCI score was 24. The pelvic was cleaned and completed anterior pelvic exoneration was performed ( hysterectomy , strapping of bladder and repair of bleeder, low anterior resection). The peritoneum also was removed. Then peritoneum of lateral abdominal wall and diaphragm was removed. The omentectomy was performed. The left side of peritoneum also was removed. The e was limited disease on the bowel which was removed with cusa. the capsule of liver also was removed and cauterized and part of disease was removed by Cusa. Appendectomy was performed and appendix was ruptured with mucinous. Cholecystectomy was performed and the port hepatis also was cleaned. After complete cytoreduction she was CC1. HIPEC with mitomycin-C was perfused for 1.5 hours and then anastomosis was performed. Due to duration of case and receive a lot of product decision was made to make a ileostomy.

## 2023-10-04 NOTE — BRIEF OPERATIVE NOTE - SPECIMENS
total 15 specimens (Bilateral adenex, Omentum, Appendix, sigmoid colon , Uterus, Peritoneum , cervical os, right pelvic colic and diaphragm, Morison's pouch, round ligament, gallbladder, lesser omentum, portion of liver and diaphragm, left diaphragm peritonaeum, proximal and distal donuts)

## 2023-10-05 LAB
ANION GAP SERPL CALC-SCNC: 11 MMOL/L — SIGNIFICANT CHANGE UP (ref 7–14)
ANION GAP SERPL CALC-SCNC: 12 MMOL/L — SIGNIFICANT CHANGE UP (ref 7–14)
BLOOD GAS ARTERIAL - LYTES,HGB,ICA,LACT RESULT: SIGNIFICANT CHANGE UP
BUN SERPL-MCNC: 4 MG/DL — LOW (ref 7–23)
BUN SERPL-MCNC: 5 MG/DL — LOW (ref 7–23)
CALCIUM SERPL-MCNC: 7.7 MG/DL — LOW (ref 8.4–10.5)
CALCIUM SERPL-MCNC: 8.2 MG/DL — LOW (ref 8.4–10.5)
CHLORIDE SERPL-SCNC: 112 MMOL/L — HIGH (ref 98–107)
CHLORIDE SERPL-SCNC: 116 MMOL/L — HIGH (ref 98–107)
CO2 SERPL-SCNC: 18 MMOL/L — LOW (ref 22–31)
CO2 SERPL-SCNC: 19 MMOL/L — LOW (ref 22–31)
CREAT SERPL-MCNC: 0.53 MG/DL — SIGNIFICANT CHANGE UP (ref 0.5–1.3)
CREAT SERPL-MCNC: 0.6 MG/DL — SIGNIFICANT CHANGE UP (ref 0.5–1.3)
EGFR: 103 ML/MIN/1.73M2 — SIGNIFICANT CHANGE UP
EGFR: 106 ML/MIN/1.73M2 — SIGNIFICANT CHANGE UP
GLUCOSE BLDC GLUCOMTR-MCNC: 102 MG/DL — HIGH (ref 70–99)
GLUCOSE BLDC GLUCOMTR-MCNC: 104 MG/DL — HIGH (ref 70–99)
GLUCOSE BLDC GLUCOMTR-MCNC: 121 MG/DL — HIGH (ref 70–99)
GLUCOSE SERPL-MCNC: 145 MG/DL — HIGH (ref 70–99)
GLUCOSE SERPL-MCNC: 149 MG/DL — HIGH (ref 70–99)
HCT VFR BLD CALC: 27.7 % — LOW (ref 34.5–45)
HGB BLD-MCNC: 10 G/DL — LOW (ref 11.5–15.5)
MAGNESIUM SERPL-MCNC: 1.1 MG/DL — LOW (ref 1.6–2.6)
MAGNESIUM SERPL-MCNC: 2.6 MG/DL — SIGNIFICANT CHANGE UP (ref 1.6–2.6)
MCHC RBC-ENTMCNC: 27.9 PG — SIGNIFICANT CHANGE UP (ref 27–34)
MCHC RBC-ENTMCNC: 36.1 GM/DL — HIGH (ref 32–36)
MCV RBC AUTO: 77.2 FL — LOW (ref 80–100)
NRBC # BLD: 0 /100 WBCS — SIGNIFICANT CHANGE UP (ref 0–0)
NRBC # FLD: 0 K/UL — SIGNIFICANT CHANGE UP (ref 0–0)
PHOSPHATE SERPL-MCNC: 3 MG/DL — SIGNIFICANT CHANGE UP (ref 2.5–4.5)
PHOSPHATE SERPL-MCNC: 3.1 MG/DL — SIGNIFICANT CHANGE UP (ref 2.5–4.5)
PLATELET # BLD AUTO: 168 K/UL — SIGNIFICANT CHANGE UP (ref 150–400)
POTASSIUM SERPL-MCNC: 3.9 MMOL/L — SIGNIFICANT CHANGE UP (ref 3.5–5.3)
POTASSIUM SERPL-MCNC: 4.3 MMOL/L — SIGNIFICANT CHANGE UP (ref 3.5–5.3)
POTASSIUM SERPL-SCNC: 3.9 MMOL/L — SIGNIFICANT CHANGE UP (ref 3.5–5.3)
POTASSIUM SERPL-SCNC: 4.3 MMOL/L — SIGNIFICANT CHANGE UP (ref 3.5–5.3)
RBC # BLD: 3.59 M/UL — LOW (ref 3.8–5.2)
RBC # FLD: 17.2 % — HIGH (ref 10.3–14.5)
SODIUM SERPL-SCNC: 143 MMOL/L — SIGNIFICANT CHANGE UP (ref 135–145)
SODIUM SERPL-SCNC: 145 MMOL/L — SIGNIFICANT CHANGE UP (ref 135–145)
WBC # BLD: 8.52 K/UL — SIGNIFICANT CHANGE UP (ref 3.8–10.5)
WBC # FLD AUTO: 8.52 K/UL — SIGNIFICANT CHANGE UP (ref 3.8–10.5)

## 2023-10-05 PROCEDURE — 99292 CRITICAL CARE ADDL 30 MIN: CPT

## 2023-10-05 PROCEDURE — 99291 CRITICAL CARE FIRST HOUR: CPT

## 2023-10-05 PROCEDURE — 93010 ELECTROCARDIOGRAM REPORT: CPT

## 2023-10-05 RX ORDER — SODIUM CHLORIDE 9 MG/ML
1000 INJECTION, SOLUTION INTRAVENOUS ONCE
Refills: 0 | Status: COMPLETED | OUTPATIENT
Start: 2023-10-05 | End: 2023-10-05

## 2023-10-05 RX ORDER — HYDROMORPHONE HYDROCHLORIDE 2 MG/ML
0.2 INJECTION INTRAMUSCULAR; INTRAVENOUS; SUBCUTANEOUS EVERY 4 HOURS
Refills: 0 | Status: DISCONTINUED | OUTPATIENT
Start: 2023-10-05 | End: 2023-10-09

## 2023-10-05 RX ORDER — ACETAMINOPHEN 500 MG
1000 TABLET ORAL ONCE
Refills: 0 | Status: COMPLETED | OUTPATIENT
Start: 2023-10-05 | End: 2023-10-05

## 2023-10-05 RX ORDER — ONDANSETRON 8 MG/1
4 TABLET, FILM COATED ORAL EVERY 6 HOURS
Refills: 0 | Status: DISCONTINUED | OUTPATIENT
Start: 2023-10-15 | End: 2023-10-30

## 2023-10-05 RX ORDER — FENTANYL CITRATE 50 UG/ML
0.5 INJECTION INTRAVENOUS
Qty: 2500 | Refills: 0 | Status: DISCONTINUED | OUTPATIENT
Start: 2023-10-05 | End: 2023-10-05

## 2023-10-05 RX ORDER — PHENYLEPHRINE HYDROCHLORIDE 10 MG/ML
0.3 INJECTION INTRAVENOUS
Qty: 40 | Refills: 0 | Status: DISCONTINUED | OUTPATIENT
Start: 2023-10-05 | End: 2023-10-07

## 2023-10-05 RX ORDER — SODIUM CHLORIDE 9 MG/ML
1000 INJECTION, SOLUTION INTRAVENOUS
Refills: 0 | Status: DISCONTINUED | OUTPATIENT
Start: 2023-10-05 | End: 2023-10-06

## 2023-10-05 RX ORDER — HYDROMORPHONE HYDROCHLORIDE 2 MG/ML
0.5 INJECTION INTRAMUSCULAR; INTRAVENOUS; SUBCUTANEOUS EVERY 4 HOURS
Refills: 0 | Status: DISCONTINUED | OUTPATIENT
Start: 2023-10-05 | End: 2023-10-12

## 2023-10-05 RX ORDER — ONDANSETRON 8 MG/1
4 TABLET, FILM COATED ORAL EVERY 6 HOURS
Refills: 0 | Status: DISCONTINUED | OUTPATIENT
Start: 2023-10-05 | End: 2023-10-12

## 2023-10-05 RX ORDER — DEXMEDETOMIDINE HYDROCHLORIDE IN 0.9% SODIUM CHLORIDE 4 UG/ML
0.1 INJECTION INTRAVENOUS
Qty: 400 | Refills: 0 | Status: DISCONTINUED | OUTPATIENT
Start: 2023-10-05 | End: 2023-10-05

## 2023-10-05 RX ORDER — MAGNESIUM SULFATE 500 MG/ML
2 VIAL (ML) INJECTION
Refills: 0 | Status: DISCONTINUED | OUTPATIENT
Start: 2023-10-05 | End: 2023-10-05

## 2023-10-05 RX ORDER — CALCIUM GLUCONATE 100 MG/ML
1 VIAL (ML) INTRAVENOUS ONCE
Refills: 0 | Status: COMPLETED | OUTPATIENT
Start: 2023-10-05 | End: 2023-10-05

## 2023-10-05 RX ADMIN — PANTOPRAZOLE SODIUM 40 MILLIGRAM(S): 20 TABLET, DELAYED RELEASE ORAL at 11:16

## 2023-10-05 RX ADMIN — Medication 25 GRAM(S): at 04:49

## 2023-10-05 RX ADMIN — HEPARIN SODIUM 5000 UNIT(S): 5000 INJECTION INTRAVENOUS; SUBCUTANEOUS at 06:37

## 2023-10-05 RX ADMIN — FENTANYL CITRATE 3.45 MICROGRAM(S)/KG/HR: 50 INJECTION INTRAVENOUS at 02:11

## 2023-10-05 RX ADMIN — Medication 25 GRAM(S): at 06:55

## 2023-10-05 RX ADMIN — HEPARIN SODIUM 5000 UNIT(S): 5000 INJECTION INTRAVENOUS; SUBCUTANEOUS at 15:09

## 2023-10-05 RX ADMIN — Medication 100 MILLIGRAM(S): at 17:04

## 2023-10-05 RX ADMIN — SODIUM CHLORIDE 1000 MILLILITER(S): 9 INJECTION, SOLUTION INTRAVENOUS at 12:34

## 2023-10-05 RX ADMIN — SODIUM CHLORIDE 1000 MILLILITER(S): 9 INJECTION, SOLUTION INTRAVENOUS at 01:23

## 2023-10-05 RX ADMIN — SODIUM CHLORIDE 125 MILLILITER(S): 9 INJECTION, SOLUTION INTRAVENOUS at 17:05

## 2023-10-05 RX ADMIN — PHENYLEPHRINE HYDROCHLORIDE 7.75 MICROGRAM(S)/KG/MIN: 10 INJECTION INTRAVENOUS at 17:05

## 2023-10-05 RX ADMIN — Medication 200 MILLIGRAM(S): at 07:03

## 2023-10-05 RX ADMIN — DEXMEDETOMIDINE HYDROCHLORIDE IN 0.9% SODIUM CHLORIDE 1.72 MICROGRAM(S)/KG/HR: 4 INJECTION INTRAVENOUS at 01:15

## 2023-10-05 RX ADMIN — Medication 400 MILLIGRAM(S): at 18:49

## 2023-10-05 RX ADMIN — Medication 100 GRAM(S): at 03:33

## 2023-10-05 RX ADMIN — Medication 100 MILLIGRAM(S): at 03:41

## 2023-10-05 RX ADMIN — DEXMEDETOMIDINE HYDROCHLORIDE IN 0.9% SODIUM CHLORIDE 1.72 MICROGRAM(S)/KG/HR: 4 INJECTION INTRAVENOUS at 15:09

## 2023-10-05 RX ADMIN — Medication 100 MILLIGRAM(S): at 10:07

## 2023-10-05 RX ADMIN — Medication 200 MILLIGRAM(S): at 18:48

## 2023-10-05 NOTE — PROGRESS NOTE ADULT - ASSESSMENT
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 SICU sontuled for ventilator management and hemodynamic monitoring post-op.     PLAN  NEUROLOGIC   -Sedated with prop and fent    RESPIRATORY   - Currently 12/400/5/50%  - Monitor SpO2 goal >92%    CARDIOVASCULAR   - Monitor hemodynamics, on bari     GASTROINTESTINAL   - Diet: NPO  - GI PPX: Protonix  - NGT to suction    /RENAL   - IV fluids: LR @100cc/hr  - Maintain cristina catheter for 2 weeks per primary team    HEMATOLOGIC  - Monitor H/H   - DVT prophylaxis: SCDs and SQH    INFECTIOUS DISEASE  - IV abx ppx: Cipro/Flagyl    ENDOCRINE  - Monitor gluc  - ISS    LINES  - Rad a line, PIV, cristina, NGT     Disposition: SICU  Code: FULL

## 2023-10-05 NOTE — PROGRESS NOTE ADULT - SUBJECTIVE AND OBJECTIVE BOX
24 HOUR EVENTS:  - NAEO     SUBJECTIVE/ROS:  Patient seen at bedside this AM.     [x] Due to altered mental status/intubation, subjective information were not able to be obtained from the patient. History was obtained, to the extent possible, from review of the chart and collateral sources of information.    OBJECTIVE:    VITALS:  Vital Signs Last 24 Hrs  T(C): 36.8 (04 Oct 2023 23:30), Max: 36.9 (04 Oct 2023 23:30)  T(F): 98.4 (04 Oct 2023 23:30), Max: 98.4 (04 Oct 2023 23:30)  HR: 84 (05 Oct 2023 00:06) (62 - 96)  BP: 96/71 (04 Oct 2023 23:30) (93/67 - 132/85)  BP(mean): 77 (04 Oct 2023 23:30) (72 - 90)  RR: 12 (04 Oct 2023 23:30) (10 - 16)  SpO2: 100% (05 Oct 2023 00:06) (100% - 100%)    Parameters below as of 04 Oct 2023 20:20  Patient On (Oxygen Delivery Method): ventilator    O2 Concentration (%): 50  Mode: AC/ CMV (Assist Control/ Continuous Mandatory Ventilation)  RR (machine): 12  TV (machine): 400  FiO2: 40  PEEP: 5  ITime: 7.5  MAP: 10  PIP: 20    I&O's Summary    04 Oct 2023 07:01  -  05 Oct 2023 00:09  --------------------------------------------------------  IN: 136.7 mL / OUT: 595 mL / NET: -458.3 mL        PHYSICAL EXAM:    General/Neuro  Exam: Sedated on propofol    Respiratory  Mechanical Ventilation: Mode: AC/ CMV (Assist Control/ Continuous Mandatory Ventilation)    Cardiovascular  Exam: S1, S2.  Regular rate and rhythm. On Nash   Cardiac Rhythm: Normal Sinus Rhythm    GI  Exam: Abdomen soft, Non-tender, Non-distended. Nasogastric tube in place with bilious output. Ileostomy with red rubber  Wound: Preveena vac  Current Diet:  NPO    Extremities  Exam: Extremities warm, pink, well-perfused.      Derm:  Exam: Good skin turgor, no skin breakdown.      :   Exam: Pierce catheter in place.       LABS:                        9.7    6.83  )-----------( 167      ( 04 Oct 2023 22:00 )             28.1   10-04    143  |  117<H>  |  4<L>  ----------------------------<  165<H>  4.2   |  19<L>  |  0.50    Ca    7.9<L>      04 Oct 2023 22:00    TPro  4.5<L>  /  Alb  2.8<L>  /  TBili  1.2  /  DBili  x   /  AST  398<H>  /  ALT  192<H>  /  AlkPhos  27<L>  10-04    CAPILLARY BLOOD GLUCOSE  POCT Blood Glucose.: 124 mg/dL (04 Oct 2023 23:40)  POCT Blood Glucose.: 95 mg/dL (04 Oct 2023 07:33)      MEDICATIONS:   MEDICATIONS  (STANDING):  chlorhexidine 2% Cloths 1 Application(s) Topical daily  ciprofloxacin   IVPB 400 milliGRAM(s) IV Intermittent every 12 hours  heparin   Injectable 5000 Unit(s) SubCutaneous every 8 hours  HYDROmorphone PCA (1 mG/mL) 30 milliLiter(s) PCA Continuous PCA Continuous  insulin lispro (ADMELOG) corrective regimen sliding scale   SubCutaneous every 6 hours  lactated ringers. 1000 milliLiter(s) (100 mL/Hr) IV Continuous <Continuous>  metroNIDAZOLE  IVPB 500 milliGRAM(s) IV Intermittent every 8 hours  mitoMYcin INTRAPERITONEAL  (eMAR) 10 milliGRAM(s) IntraPeritoneal once  mitoMYcin INTRAPERITONEAL  (eMAR) 30 milliGRAM(s) IntraPeritoneal once  pantoprazole  Injectable 40 milliGRAM(s) IV Push every 24 hours  phenylephrine    Infusion 0.701 MICROgram(s)/kG/Min (18.1 mL/Hr) IV Continuous <Continuous>  propofol Infusion 50 MICROgram(s)/kG/Min (20.7 mL/Hr) IV Continuous <Continuous>    MEDICATIONS  (PRN):  HYDROmorphone  Injectable 0.5 milliGRAM(s) IV Push every 10 minutes PRN Moderate Pain (4 - 6)  HYDROmorphone  Injectable 1 milliGRAM(s) IV Push every 10 minutes PRN Severe Pain (7 - 10)  naloxone Injectable 0.1 milliGRAM(s) IV Push every 3 minutes PRN For ANY of the following changes in patient status:  A. RR LESS THAN 10 breaths per minute, B. Oxygen saturation LESS THAN 90%, C. Sedation score of 6

## 2023-10-05 NOTE — CONSULT NOTE ADULT - ASSESSMENT
Shock   possible related to sedatives on vent   on Nash  plan as per SICU  recent echo in my office showed normal LV / RV function     resp failure   on vent  wean off vent as tolerated     DVT prophylaxis  on heparin sq

## 2023-10-05 NOTE — PROGRESS NOTE ADULT - ASSESSMENT
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    PLAN:  - Appreciate SICU Care  - Extubate when possible  - IVF resuscitation/vasopressor support per SICU  - NPO/NGT  - IV Protonix  - Monitor Ostomy  - Monitor JUAQUIN outpt  - FS q6h with ISS  - ABX: Cipro/Flagyl  - VTE ppx: SQH    D Team Surgery  [id number]  weight-bearing as tolerated

## 2023-10-05 NOTE — CONSULT NOTE ADULT - SUBJECTIVE AND OBJECTIVE BOX
CHIEF COMPLAINT:Patient is a 58y old  Female who presents with a chief complaint of " I am having appendix surgery' (21 Sep 2023 16:45)      HISTORY OF PRESENT ILLNESS:    57 yo 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 pt known to me from office for pre op eval   currently on vent        PAST MEDICAL & SURGICAL HISTORY:  History of sickle cell trait      Malignant neoplasm of appendix      History of ectopic pregnancy      H/O colonoscopy      H/O endoscopy              MEDICATIONS:  heparin   Injectable 5000 Unit(s) SubCutaneous every 8 hours  phenylephrine    Infusion 0.701 MICROgram(s)/kG/Min IV Continuous <Continuous>    ciprofloxacin   IVPB 400 milliGRAM(s) IV Intermittent every 12 hours  metroNIDAZOLE  IVPB 500 milliGRAM(s) IV Intermittent every 8 hours      dexMEDEtomidine Infusion 0.1 MICROgram(s)/kG/Hr IV Continuous <Continuous>  fentaNYL   Infusion 0.5 MICROgram(s)/kG/Hr IV Continuous <Continuous>  HYDROmorphone  Injectable 1 milliGRAM(s) IV Push every 10 minutes PRN  HYDROmorphone  Injectable 0.5 milliGRAM(s) IV Push every 10 minutes PRN  HYDROmorphone PCA (1 mG/mL) 30 milliLiter(s) PCA Continuous PCA Continuous  propofol Infusion 50 MICROgram(s)/kG/Min IV Continuous <Continuous>    pantoprazole  Injectable 40 milliGRAM(s) IV Push every 24 hours    insulin lispro (ADMELOG) corrective regimen sliding scale   SubCutaneous every 6 hours    chlorhexidine 2% Cloths 1 Application(s) Topical daily  lactated ringers. 1000 milliLiter(s) IV Continuous <Continuous>  magnesium sulfate  IVPB 2 Gram(s) IV Intermittent every 2 hours  mitoMYcin INTRAPERITONEAL  (eMAR) 10 milliGRAM(s) IntraPeritoneal once  mitoMYcin INTRAPERITONEAL  (eMAR) 30 milliGRAM(s) IntraPeritoneal once      FAMILY HISTORY:      Non-contributory    SOCIAL HISTORY:    No tobacco, drugs or etoh    Allergies    penicillin (Rash)    Intolerances    	    REVIEW OF SYSTEMS:  as above  The rest of the 14 points ROS reviewed and except above they are unremarkable.        PHYSICAL EXAM:  T(C): 37 (10-05-23 @ 06:00), Max: 37 (10-05-23 @ 06:00)  HR: 69 (10-05-23 @ 06:00) (59 - 96)  BP: 105/72 (10-05-23 @ 06:00) (77/49 - 151/83)  RR: 13 (10-05-23 @ 06:00) (10 - 20)  SpO2: 100% (10-05-23 @ 06:00) (99% - 100%)  Wt(kg): --  I&O's Summary    04 Oct 2023 07:01  -  05 Oct 2023 07:00  --------------------------------------------------------  IN: 1123 mL / OUT: 2325 mL / NET: -1202 mL    Appearance: on vent 	  Cardiovascular: Normal S1 S2,    Murmur:   Neck: JVP limited to be evaluated   Respiratory: Lungs few rhonchi   Gastrointestinal:  Soft  Skin: normal   Neuro: limited as pt on vent      LABS/DATA:    TELEMETRY: 	    ECG:  	   	  CARDIAC MARKERS:                                      10.0   8.52  )-----------( 168      ( 05 Oct 2023 00:37 )             27.7     10-05    143  |  112<H>  |  5<L>  ----------------------------<  149<H>  4.3   |  19<L>  |  0.53    Ca    7.7<L>      05 Oct 2023 00:37  Phos  3.0     10-05  Mg     1.10     10-05    TPro  4.5<L>  /  Alb  2.8<L>  /  TBili  1.2  /  DBili  x   /  AST  398<H>  /  ALT  192<H>  /  AlkPhos  27<L>  10-04    proBNP:   Lipid Profile:   HgA1c:   TSH:

## 2023-10-05 NOTE — PROGRESS NOTE ADULT - SUBJECTIVE AND OBJECTIVE BOX
D TEAM Surgery Progress Note  Patient is a 58y old  Female who presents with a chief complaint of " I am having appendix surgery' (21 Sep 2023 16:45)    INTERVAL EVENTS: Patient is POD#1 s/p ex lap, BREONNA, TAHBSO, appendectomy, cholecystectomy, omentectomy, LAR, DLI, HIPEC. Patient remains in PACU under SICU care, intubated, sedated, on vasopressor support.     SUBJECTIVE: Patient seen and examined at bedside with surgical team, patient  intubated, sedated, and unable to offer CC or ROS.    OBJECTIVE:    Vital Signs Last 24 Hrs  T(C): 37.1 (05 Oct 2023 07:00), Max: 37.1 (05 Oct 2023 07:00)  T(F): 98.8 (05 Oct 2023 07:00), Max: 98.8 (05 Oct 2023 07:00)  HR: 67 (05 Oct 2023 09:00) (59 - 89)  BP: 107/70 (05 Oct 2023 09:00) (77/49 - 151/83)  BP(mean): 78 (05 Oct 2023 09:00) (56 - 97)  RR: 14 (05 Oct 2023 09:00) (10 - 20)  SpO2: 100% (05 Oct 2023 09:00) (99% - 100%)    Parameters below as of 05 Oct 2023 03:00  Patient On (Oxygen Delivery Method): ventilator    O2 Concentration (%): 50I&O's Detail    04 Oct 2023 07:01  -  05 Oct 2023 07:00  --------------------------------------------------------  IN:    Dexmedetomidine: 51 mL    FentaNYL: 68 mL    Lactated Ringers: 800 mL    Phenylephrine: 91.8 mL    Propofol: 112.2 mL  Total IN: 1123 mL    OUT:    Bulb (mL): 625 mL    Ileostomy (mL): 50 mL    Indwelling Catheter - Urethral (mL): 1850 mL    Nasogastric/Oral tube (mL): 75 mL  Total OUT: 2600 mL    Total NET: -1477 mL      05 Oct 2023 07:01  -  05 Oct 2023 09:31  --------------------------------------------------------  IN:    Dexmedetomidine: 20.4 mL    FentaNYL: 27.2 mL    Lactated Ringers: 100 mL    Phenylephrine: 28.1 mL    Propofol: 2.7 mL  Total IN: 178.4 mL    OUT:    Indwelling Catheter - Urethral (mL): 340 mL  Total OUT: 340 mL    Total NET: -161.6 mL      MEDICATIONS  (STANDING):  chlorhexidine 2% Cloths 1 Application(s) Topical daily  ciprofloxacin   IVPB 400 milliGRAM(s) IV Intermittent every 12 hours  dexMEDEtomidine Infusion 0.1 MICROgram(s)/kG/Hr (1.72 mL/Hr) IV Continuous <Continuous>  fentaNYL   Infusion 0.5 MICROgram(s)/kG/Hr (3.45 mL/Hr) IV Continuous <Continuous>  heparin   Injectable 5000 Unit(s) SubCutaneous every 8 hours  HYDROmorphone PCA (1 mG/mL) 30 milliLiter(s) PCA Continuous PCA Continuous  insulin lispro (ADMELOG) corrective regimen sliding scale   SubCutaneous every 6 hours  lactated ringers. 1000 milliLiter(s) (100 mL/Hr) IV Continuous <Continuous>  metroNIDAZOLE  IVPB 500 milliGRAM(s) IV Intermittent every 8 hours  mitoMYcin INTRAPERITONEAL  (eMAR) 30 milliGRAM(s) IntraPeritoneal once  mitoMYcin INTRAPERITONEAL  (eMAR) 10 milliGRAM(s) IntraPeritoneal once  pantoprazole  Injectable 40 milliGRAM(s) IV Push every 24 hours  phenylephrine    Infusion 0.701 MICROgram(s)/kG/Min (18.1 mL/Hr) IV Continuous <Continuous>  propofol Infusion 50 MICROgram(s)/kG/Min (20.7 mL/Hr) IV Continuous <Continuous>    MEDICATIONS  (PRN):  HYDROmorphone  Injectable 0.5 milliGRAM(s) IV Push every 10 minutes PRN Moderate Pain (4 - 6)  HYDROmorphone  Injectable 1 milliGRAM(s) IV Push every 10 minutes PRN Severe Pain (7 - 10)  naloxone Injectable 0.1 milliGRAM(s) IV Push every 3 minutes PRN For ANY of the following changes in patient status:  A. RR LESS THAN 10 breaths per minute, B. Oxygen saturation LESS THAN 90%, C. Sedation score of 6      PHYSICAL EXAM:  Constitutional: Sedated.  Respiratory: On ACMV, equal chest wall expansion b/l.   Abdomen: Soft, nondistended, NTTP. Midline incision with prevena vac in place. Ostomy pink with red rubber catheter in place, no gas or stool, bowel sweat. JUAQUIN drain SS.  Extremities: WWP, SARABIA spontaneously    LABS:                        10.0   8.52  )-----------( 168      ( 05 Oct 2023 00:37 )             27.7     10-05    143  |  112<H>  |  5<L>  ----------------------------<  149<H>  4.3   |  19<L>  |  0.53    Ca    7.7<L>      05 Oct 2023 00:37  Phos  3.0     10-05  Mg     1.10     10-05    TPro  4.5<L>  /  Alb  2.8<L>  /  TBili  1.2  /  DBili  x   /  AST  398<H>  /  ALT  192<H>  /  AlkPhos  27<L>  10-04    PT/INR - ( 04 Oct 2023 22:00 )   PT: 15.3 sec;   INR: 1.38 ratio         PTT - ( 04 Oct 2023 22:00 )  PTT:27.4 sec  LIVER FUNCTIONS - ( 04 Oct 2023 22:00 )  Alb: 2.8 g/dL / Pro: 4.5 g/dL / ALK PHOS: 27 U/L / ALT: 192 U/L / AST: 398 U/L / GGT: x           Urinalysis Basic - ( 05 Oct 2023 00:37 )    Color: x / Appearance: x / SG: x / pH: x  Gluc: 149 mg/dL / Ketone: x  / Bili: x / Urobili: x   Blood: x / Protein: x / Nitrite: x   Leuk Esterase: x / RBC: x / WBC x   Sq Epi: x / Non Sq Epi: x / Bacteria: x

## 2023-10-05 NOTE — PATIENT PROFILE ADULT - FALL HARM RISK - HARM RISK INTERVENTIONS

## 2023-10-06 LAB
ALBUMIN SERPL ELPH-MCNC: 2.3 G/DL — LOW (ref 3.3–5)
ALP SERPL-CCNC: 36 U/L — LOW (ref 40–120)
ALT FLD-CCNC: 140 U/L — HIGH (ref 4–33)
ANION GAP SERPL CALC-SCNC: 8 MMOL/L — SIGNIFICANT CHANGE UP (ref 7–14)
AST SERPL-CCNC: 129 U/L — HIGH (ref 4–32)
BILIRUB SERPL-MCNC: 0.7 MG/DL — SIGNIFICANT CHANGE UP (ref 0.2–1.2)
BUN SERPL-MCNC: 5 MG/DL — LOW (ref 7–23)
CALCIUM SERPL-MCNC: 8.1 MG/DL — LOW (ref 8.4–10.5)
CHLORIDE SERPL-SCNC: 114 MMOL/L — HIGH (ref 98–107)
CO2 SERPL-SCNC: 21 MMOL/L — LOW (ref 22–31)
CREAT SERPL-MCNC: 0.54 MG/DL — SIGNIFICANT CHANGE UP (ref 0.5–1.3)
EGFR: 106 ML/MIN/1.73M2 — SIGNIFICANT CHANGE UP
GLUCOSE BLDC GLUCOMTR-MCNC: 112 MG/DL — HIGH (ref 70–99)
GLUCOSE BLDC GLUCOMTR-MCNC: 115 MG/DL — HIGH (ref 70–99)
GLUCOSE BLDC GLUCOMTR-MCNC: 83 MG/DL — SIGNIFICANT CHANGE UP (ref 70–99)
GLUCOSE BLDC GLUCOMTR-MCNC: 94 MG/DL — SIGNIFICANT CHANGE UP (ref 70–99)
GLUCOSE SERPL-MCNC: 116 MG/DL — HIGH (ref 70–99)
HCT VFR BLD CALC: 28.3 % — LOW (ref 34.5–45)
HGB BLD-MCNC: 9.7 G/DL — LOW (ref 11.5–15.5)
MAGNESIUM SERPL-MCNC: 1.9 MG/DL — SIGNIFICANT CHANGE UP (ref 1.6–2.6)
MCHC RBC-ENTMCNC: 26.9 PG — LOW (ref 27–34)
MCHC RBC-ENTMCNC: 34.3 GM/DL — SIGNIFICANT CHANGE UP (ref 32–36)
MCV RBC AUTO: 78.6 FL — LOW (ref 80–100)
NRBC # BLD: 0 /100 WBCS — SIGNIFICANT CHANGE UP (ref 0–0)
NRBC # FLD: 0 K/UL — SIGNIFICANT CHANGE UP (ref 0–0)
PHOSPHATE SERPL-MCNC: 2.3 MG/DL — LOW (ref 2.5–4.5)
PLATELET # BLD AUTO: 199 K/UL — SIGNIFICANT CHANGE UP (ref 150–400)
POTASSIUM SERPL-MCNC: 3.5 MMOL/L — SIGNIFICANT CHANGE UP (ref 3.5–5.3)
POTASSIUM SERPL-SCNC: 3.5 MMOL/L — SIGNIFICANT CHANGE UP (ref 3.5–5.3)
PROT SERPL-MCNC: 4.6 G/DL — LOW (ref 6–8.3)
RBC # BLD: 3.6 M/UL — LOW (ref 3.8–5.2)
RBC # FLD: 18.6 % — HIGH (ref 10.3–14.5)
SODIUM SERPL-SCNC: 143 MMOL/L — SIGNIFICANT CHANGE UP (ref 135–145)
WBC # BLD: 12.97 K/UL — HIGH (ref 3.8–10.5)
WBC # FLD AUTO: 12.97 K/UL — HIGH (ref 3.8–10.5)

## 2023-10-06 PROCEDURE — 99024 POSTOP FOLLOW-UP VISIT: CPT

## 2023-10-06 RX ORDER — MAGNESIUM SULFATE 500 MG/ML
1 VIAL (ML) INJECTION ONCE
Refills: 0 | Status: COMPLETED | OUTPATIENT
Start: 2023-10-06 | End: 2023-10-06

## 2023-10-06 RX ORDER — KETOROLAC TROMETHAMINE 30 MG/ML
15 SYRINGE (ML) INJECTION EVERY 6 HOURS
Refills: 0 | Status: DISCONTINUED | OUTPATIENT
Start: 2023-10-06 | End: 2023-10-11

## 2023-10-06 RX ORDER — ONDANSETRON 8 MG/1
4 TABLET, FILM COATED ORAL ONCE
Refills: 0 | Status: DISCONTINUED | OUTPATIENT
Start: 2023-10-06 | End: 2023-10-07

## 2023-10-06 RX ORDER — ACETAMINOPHEN 500 MG
1000 TABLET ORAL EVERY 6 HOURS
Refills: 0 | Status: COMPLETED | OUTPATIENT
Start: 2023-10-06 | End: 2023-10-07

## 2023-10-06 RX ORDER — SODIUM CHLORIDE 9 MG/ML
1000 INJECTION, SOLUTION INTRAVENOUS ONCE
Refills: 0 | Status: COMPLETED | OUTPATIENT
Start: 2023-10-06 | End: 2023-10-06

## 2023-10-06 RX ORDER — POTASSIUM CHLORIDE 20 MEQ
10 PACKET (EA) ORAL
Refills: 0 | Status: COMPLETED | OUTPATIENT
Start: 2023-10-06 | End: 2023-10-06

## 2023-10-06 RX ORDER — POTASSIUM PHOSPHATE, MONOBASIC POTASSIUM PHOSPHATE, DIBASIC 236; 224 MG/ML; MG/ML
15 INJECTION, SOLUTION INTRAVENOUS ONCE
Refills: 0 | Status: COMPLETED | OUTPATIENT
Start: 2023-10-06 | End: 2023-10-06

## 2023-10-06 RX ORDER — DEXTROSE MONOHYDRATE, SODIUM CHLORIDE, AND POTASSIUM CHLORIDE 50; .745; 4.5 G/1000ML; G/1000ML; G/1000ML
1000 INJECTION, SOLUTION INTRAVENOUS
Refills: 0 | Status: DISCONTINUED | OUTPATIENT
Start: 2023-10-06 | End: 2023-10-07

## 2023-10-06 RX ORDER — INFLUENZA VIRUS VACCINE 15; 15; 15; 15 UG/.5ML; UG/.5ML; UG/.5ML; UG/.5ML
0.5 SUSPENSION INTRAMUSCULAR ONCE
Refills: 0 | Status: DISCONTINUED | OUTPATIENT
Start: 2023-10-06 | End: 2023-11-08

## 2023-10-06 RX ADMIN — HEPARIN SODIUM 5000 UNIT(S): 5000 INJECTION INTRAVENOUS; SUBCUTANEOUS at 06:04

## 2023-10-06 RX ADMIN — Medication 15 MILLIGRAM(S): at 17:42

## 2023-10-06 RX ADMIN — HYDROMORPHONE HYDROCHLORIDE 0.2 MILLIGRAM(S): 2 INJECTION INTRAMUSCULAR; INTRAVENOUS; SUBCUTANEOUS at 01:25

## 2023-10-06 RX ADMIN — Medication 1000 MILLIGRAM(S): at 17:43

## 2023-10-06 RX ADMIN — HYDROMORPHONE HYDROCHLORIDE 0.2 MILLIGRAM(S): 2 INJECTION INTRAMUSCULAR; INTRAVENOUS; SUBCUTANEOUS at 01:10

## 2023-10-06 RX ADMIN — PHENYLEPHRINE HYDROCHLORIDE 7.75 MICROGRAM(S)/KG/MIN: 10 INJECTION INTRAVENOUS at 08:10

## 2023-10-06 RX ADMIN — SODIUM CHLORIDE 125 MILLILITER(S): 9 INJECTION, SOLUTION INTRAVENOUS at 08:10

## 2023-10-06 RX ADMIN — Medication 15 MILLIGRAM(S): at 17:12

## 2023-10-06 RX ADMIN — HYDROMORPHONE HYDROCHLORIDE 0.5 MILLIGRAM(S): 2 INJECTION INTRAMUSCULAR; INTRAVENOUS; SUBCUTANEOUS at 08:35

## 2023-10-06 RX ADMIN — Medication 100 GRAM(S): at 04:45

## 2023-10-06 RX ADMIN — Medication 400 MILLIGRAM(S): at 17:13

## 2023-10-06 RX ADMIN — HEPARIN SODIUM 5000 UNIT(S): 5000 INJECTION INTRAVENOUS; SUBCUTANEOUS at 01:00

## 2023-10-06 RX ADMIN — Medication 200 MILLIGRAM(S): at 17:12

## 2023-10-06 RX ADMIN — Medication 100 MILLIGRAM(S): at 17:12

## 2023-10-06 RX ADMIN — Medication 100 MILLIGRAM(S): at 08:10

## 2023-10-06 RX ADMIN — Medication 1000 MILLIGRAM(S): at 12:37

## 2023-10-06 RX ADMIN — DEXTROSE MONOHYDRATE, SODIUM CHLORIDE, AND POTASSIUM CHLORIDE 100 MILLILITER(S): 50; .745; 4.5 INJECTION, SOLUTION INTRAVENOUS at 15:24

## 2023-10-06 RX ADMIN — PANTOPRAZOLE SODIUM 40 MILLIGRAM(S): 20 TABLET, DELAYED RELEASE ORAL at 12:11

## 2023-10-06 RX ADMIN — Medication 100 MILLIEQUIVALENT(S): at 04:45

## 2023-10-06 RX ADMIN — Medication 400 MILLIGRAM(S): at 12:07

## 2023-10-06 RX ADMIN — Medication 100 MILLIEQUIVALENT(S): at 05:35

## 2023-10-06 RX ADMIN — Medication 100 MILLIGRAM(S): at 01:00

## 2023-10-06 RX ADMIN — SODIUM CHLORIDE 1000 MILLILITER(S): 9 INJECTION, SOLUTION INTRAVENOUS at 12:07

## 2023-10-06 RX ADMIN — POTASSIUM PHOSPHATE, MONOBASIC POTASSIUM PHOSPHATE, DIBASIC 62.5 MILLIMOLE(S): 236; 224 INJECTION, SOLUTION INTRAVENOUS at 06:07

## 2023-10-06 RX ADMIN — Medication 15 MILLIGRAM(S): at 12:11

## 2023-10-06 RX ADMIN — HYDROMORPHONE HYDROCHLORIDE 0.5 MILLIGRAM(S): 2 INJECTION INTRAMUSCULAR; INTRAVENOUS; SUBCUTANEOUS at 08:05

## 2023-10-06 RX ADMIN — Medication 15 MILLIGRAM(S): at 12:41

## 2023-10-06 RX ADMIN — Medication 100 MILLIEQUIVALENT(S): at 06:09

## 2023-10-06 RX ADMIN — Medication 200 MILLIGRAM(S): at 06:04

## 2023-10-06 RX ADMIN — HEPARIN SODIUM 5000 UNIT(S): 5000 INJECTION INTRAVENOUS; SUBCUTANEOUS at 14:31

## 2023-10-06 RX ADMIN — CHLORHEXIDINE GLUCONATE 1 APPLICATION(S): 213 SOLUTION TOPICAL at 12:13

## 2023-10-06 RX ADMIN — PHENYLEPHRINE HYDROCHLORIDE 7.75 MICROGRAM(S)/KG/MIN: 10 INJECTION INTRAVENOUS at 04:45

## 2023-10-06 RX ADMIN — ONDANSETRON 4 MILLIGRAM(S): 8 TABLET, FILM COATED ORAL at 15:24

## 2023-10-06 NOTE — PROGRESS NOTE ADULT - ASSESSMENT
Shock   wean off pressers as tolerated  plan as per SICU  recent echo in my office showed normal LV / RV function     resp failure   off vent     DVT prophylaxis  on heparin sq

## 2023-10-06 NOTE — PROGRESS NOTE ADULT - SUBJECTIVE AND OBJECTIVE BOX
Subjective: Patient seen and examined. No new events except as noted.     SUBJECTIVE/ROS:  extubated  nad      MEDICATIONS:  MEDICATIONS  (STANDING):  chlorhexidine 2% Cloths 1 Application(s) Topical daily  ciprofloxacin   IVPB 400 milliGRAM(s) IV Intermittent every 12 hours  heparin   Injectable 5000 Unit(s) SubCutaneous every 8 hours  insulin lispro (ADMELOG) corrective regimen sliding scale   SubCutaneous every 6 hours  lactated ringers. 1000 milliLiter(s) (125 mL/Hr) IV Continuous <Continuous>  metroNIDAZOLE  IVPB 500 milliGRAM(s) IV Intermittent every 8 hours  pantoprazole  Injectable 40 milliGRAM(s) IV Push every 24 hours  phenylephrine    Infusion 0.3 MICROgram(s)/kG/Min (7.75 mL/Hr) IV Continuous <Continuous>      PHYSICAL EXAM:  T(C): 36.9 (10-06-23 @ 04:00), Max: 38.7 (10-05-23 @ 13:50)  HR: 52 (10-06-23 @ 06:00) (50 - 84)  BP: 129/57 (10-06-23 @ 06:00) (87/60 - 155/133)  RR: 17 (10-06-23 @ 06:00) (11 - 25)  SpO2: 99% (10-06-23 @ 06:00) (97% - 100%)  Wt(kg): --  I&O's Summary    05 Oct 2023 07:01  -  06 Oct 2023 07:00  --------------------------------------------------------  IN: 2793.1 mL / OUT: 2900 mL / NET: -106.9 mL            JVP: Normal  Neck: supple  Lung: clear   CV: S1 S2 , Murmur:  Abd: soft  Ext: No edema  neuro: Awake / alert  Psych: flat affect  Skin: normal``    LABS/DATA:    CARDIAC MARKERS:                                9.7    12.97 )-----------( 199      ( 06 Oct 2023 01:00 )             28.3     10-06    143  |  114<H>  |  5<L>  ----------------------------<  116<H>  3.5   |  21<L>  |  0.54    Ca    8.1<L>      06 Oct 2023 01:00  Phos  2.3     10-06  Mg     1.90     10-06    TPro  4.6<L>  /  Alb  2.3<L>  /  TBili  0.7  /  DBili  x   /  AST  129<H>  /  ALT  140<H>  /  AlkPhos  36<L>  10-06    proBNP:   Lipid Profile:   HgA1c:   TSH:     TELE:  EKG:

## 2023-10-06 NOTE — DIETITIAN INITIAL EVALUATION ADULT - ADD RECOMMEND
1. Suggest advancing diet as tolerated to Low Fiber. 2. Monitor weights, labs, BM's, skin integrity, p.o. intake and edema. 3. Follow pt as per protocol.

## 2023-10-06 NOTE — PROGRESS NOTE ADULT - ASSESSMENT
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    PLAN:  - Appreciate SICU Care  - Continue to IVF resuscitate and ween bari per SICU  - NPO/NGT  - IV Protonix  - Monitor Ostomy  - Monitor JUAQUIN outpt  - FS q6h with ISS  - ABX: Cipro/Flagyl  - VTE ppx: SQH    D Team Surgery  [id number]

## 2023-10-06 NOTE — PROGRESS NOTE ADULT - SUBJECTIVE AND OBJECTIVE BOX
SICU Daily Progress Note  =====================================================  24 HOUR EVENTS:  - Extubated  - LUE accucath placed    ALLERGIES:  penicillin (Rash)      --------------------------------------------------------------------------------------    MEDICATIONS:    Neurologic Medications  HYDROmorphone  Injectable 0.2 milliGRAM(s) IV Push every 4 hours PRN Moderate Pain (4 - 6)  HYDROmorphone  Injectable 0.5 milliGRAM(s) IV Push every 4 hours PRN Severe Pain (7 - 10)  ondansetron Injectable 4 milliGRAM(s) IV Push every 6 hours PRN Nausea and/or Vomiting    Respiratory Medications    Cardiovascular Medications  phenylephrine    Infusion 0.3 MICROgram(s)/kG/Min IV Continuous <Continuous>    Gastrointestinal Medications  lactated ringers. 1000 milliLiter(s) IV Continuous <Continuous>  pantoprazole  Injectable 40 milliGRAM(s) IV Push every 24 hours    Genitourinary Medications    Hematologic/Oncologic Medications  heparin   Injectable 5000 Unit(s) SubCutaneous every 8 hours    Antimicrobial/Immunologic Medications  ciprofloxacin   IVPB 400 milliGRAM(s) IV Intermittent every 12 hours  metroNIDAZOLE  IVPB 500 milliGRAM(s) IV Intermittent every 8 hours    Endocrine/Metabolic Medications  insulin lispro (ADMELOG) corrective regimen sliding scale   SubCutaneous every 6 hours    Topical/Other Medications  chlorhexidine 2% Cloths 1 Application(s) Topical daily  naloxone Injectable 0.1 milliGRAM(s) IV Push every 3 minutes PRN For ANY of the following changes in patient status:  A. RR LESS THAN 10 breaths per minute, B. Oxygen saturation LESS THAN 90%, C. Sedation score of 6    --------------------------------------------------------------------------------------    VITAL SIGNS:  ICU Vital Signs Last 24 Hrs  T(C): 36.8 (05 Oct 2023 20:00), Max: 38.7 (05 Oct 2023 13:50)  T(F): 98.3 (05 Oct 2023 20:00), Max: 101.6 (05 Oct 2023 13:50)  HR: 55 (06 Oct 2023 00:00) (52 - 85)  BP: 133/61 (06 Oct 2023 00:00) (77/49 - 155/133)  BP(mean): 81 (06 Oct 2023 00:00) (56 - 142)  ABP: 117/58 (06 Oct 2023 00:00) (72/41 - 160/76)  ABP(mean): 80 (06 Oct 2023 00:00) (18 - 107)  RR: 18 (06 Oct 2023 00:00) (11 - 22)  SpO2: 100% (06 Oct 2023 00:00) (97% - 100%)    O2 Parameters below as of 05 Oct 2023 18:00  Patient On (Oxygen Delivery Method): face tent  O2 Flow (L/min): 6  O2 Concentration (%): 28      --------------------------------------------------------------------------------------    INS AND OUTS:  I&O's Detail    04 Oct 2023 07:01  -  05 Oct 2023 07:00  --------------------------------------------------------  IN:    Dexmedetomidine: 51 mL    FentaNYL: 68 mL    Lactated Ringers: 800 mL    Phenylephrine: 91.8 mL    Propofol: 112.2 mL  Total IN: 1123 mL    OUT:    Bulb (mL): 625 mL    Ileostomy (mL): 50 mL    Indwelling Catheter - Urethral (mL): 1850 mL    Nasogastric/Oral tube (mL): 75 mL  Total OUT: 2600 mL    Total NET: -1477 mL      05 Oct 2023 07:01  -  06 Oct 2023 00:59  --------------------------------------------------------  IN:    Dexmedetomidine: 83.6 mL    FentaNYL: 68 mL    Lactated Ringers: 400 mL    Lactated Ringers: 750 mL    Phenylephrine: 81.8 mL    Phenylephrine: 137 mL    Propofol: 8.7 mL  Total IN: 1529.1 mL    OUT:    Bulb (mL): 435 mL    Indwelling Catheter - Urethral (mL): 1505 mL  Total OUT: 1940 mL    Total NET: -410.9 mL    --------------------------------------------------------------------------------------    General/Neuro  Exam: A&Ox4    Respiratory  Exam: satting well on RA    Cardiovascular  Exam: S1, S2.  Regular rate and rhythm. On Nash   Cardiac Rhythm: Normal Sinus Rhythm    GI  Exam: Abdomen soft, Non-tender, Non-distended. Nasogastric tube in place with bilious output. Ileostomy with red rubber  Wound: Preveena vac  Current Diet:  NPO    Extremities  Exam: Extremities warm, pink, well-perfused.      Derm:  Exam: Good skin turgor, no skin breakdown.      :   Exam: Pierce catheter in place.     METABOLIC / FLUIDS / ELECTROLYTES  lactated ringers. 1000 milliLiter(s) IV Continuous <Continuous>      HEMATOLOGIC  [x] VTE Prophylaxis: heparin   Injectable 5000 Unit(s) SubCutaneous every 8 hours    Transfusions:	[] PRBC	[] Platelets		[] FFP	[] Cryoprecipitate    INFECTIOUS DISEASE  Antimicrobials/Immunologic Medications:  ciprofloxacin   IVPB 400 milliGRAM(s) IV Intermittent every 12 hours  metroNIDAZOLE  IVPB 500 milliGRAM(s) IV Intermittent every 8 hours    Day #      of     ***    TUBES / LINES / DRAINS  ***  [x] Peripheral IV  [] Central Venous Line     	[] R	[] L	[] IJ	[] Fem	[] SC	Date Placed:   [] Arterial Line		[] R	[] L	[] Fem	[] Rad	[] Ax	Date Placed:   [] PICC		[] Midline		[] Mediport  [] Urinary Catheter		Date Placed:   [x] Necessity of urinary, arterial, and venous catheters discussed    --------------------------------------------------------------------------------------    LABS                          10.0   8.52  )-----------( 168      ( 05 Oct 2023 00:37 )             27.7   10-05    145  |  116<H>  |  4<L>  ----------------------------<  145<H>  3.9   |  18<L>  |  0.60    Ca    8.2<L>      05 Oct 2023 08:50  Phos  3.1     10-05  Mg     2.60     10-05    TPro  4.5<L>  /  Alb  2.8<L>  /  TBili  1.2  /  DBili  x   /  AST  398<H>  /  ALT  192<H>  /  AlkPhos  27<L>  10-04    --------------------------------------------------------------------------------------    OTHER LABORATORY:     IMAGING STUDIES:   CXR:

## 2023-10-06 NOTE — DIETITIAN INITIAL EVALUATION ADULT - % CHANGE
RAPID RESPONSE RESPIRATORY THERAPY PROACTIVE NOTE           Time of visit: 1154     Code Status: Full Code   : 1960  Bed: 1155/1155 A:   MRN: [id number]  Time spent at the bedside: < 15 min    SITUATION    Evaluated patient for: LDA Check     BACKGROUND    Patient has no past medical history on file.  Clinically Significant Surgical Hx: tracheostomy    24 Hours Vitals Range:  Temp:  [97.4 °F (36.3 °C)-98.5 °F (36.9 °C)]   Pulse:  [87-92]   Resp:  [17-18]   BP: (121-146)/(57-80)   SpO2:  [98 %-100 %]     Labs:    Recent Labs     23  0403 23  1028   * 139   K 4.5 4.1   CL 97 101   CO2 26 27   CREATININE 1.0 1.0   * 184*   PHOS  --  3.2   MG  --  2.0        No results for input(s): PH, PCO2, PO2, HCO3, POCSATURATED, BE in the last 72 hours.    ASSESSMENT/INTERVENTIONS  Pt resting in bed, no s/s of distress on exam. All supplies at bedside.      Last VS   Temp: 98.5 °F (36.9 °C) (1100)  Pulse: 88 (1100)  Resp: 18 (1100)  BP: 135/78 (1100)  SpO2: 98 % (1100)      Extra trachs at bedside: 6.0 & 5.0 Shiley XLT cuffed  Level of Consciousness: Level of Consciousness (AVPU): responds to voice  Respiratory Effort: Respiratory Effort: Normal, Unlabored Expansion/Accessory Muscle Usage: Expansion/Accessory Muscles/Retractions: (P) no use of accessory muscles  All Lung Field Breath Sounds: All Lung Fields Breath Sounds: (P) coarse  ELSIE Breath Sounds: continuous  O2 Device/Concentration: trach collar 5L/28%  Surgical airway: Yes, Type: Shiley Size: 6 XLT, cuffed  Ambu at bedside: Ambu bag with the patient?: Yes, Adult Ambu     Active Orders   Respiratory Care    Inhalation Treatment Q4H PRN     Frequency: Q4H PRN     Number of Occurrences: Until Specified    Oxygen PRN     Frequency: PRN     Number of Occurrences: Until Specified     Order Questions:      Device type: Low flow      Device: Trach Collar      Titrate O2 per Oxygen Titration Protocol: Yes      To maintain  SpO2 goal of: >= 90%      Notify MD of: Inability to achieve desired SpO2; Sudden change in patient status and requires 20% increase in FiO2; Patient requires >60% FiO2    Pulse Oximetry Q4H     Frequency: Q4H     Number of Occurrences: Until Specified    Routine tracheostomy care     Frequency: BID     Number of Occurrences: Until Specified       RECOMMENDATIONS    We recommend: RRT Recs: Continue POC per primary team.      FOLLOW-UP    Please call back the Rapid Response RT, Stef Benites, RRT at x [phone] for any questions or concerns.          8.98

## 2023-10-06 NOTE — DIETITIAN INITIAL EVALUATION ADULT - PERTINENT MEDS FT
MEDICATIONS  (STANDING):  acetaminophen   IVPB .. 1000 milliGRAM(s) IV Intermittent every 6 hours  chlorhexidine 2% Cloths 1 Application(s) Topical daily  ciprofloxacin   IVPB 400 milliGRAM(s) IV Intermittent every 12 hours  dextrose 5% + sodium chloride 0.45% with potassium chloride 20 mEq/L 1000 milliLiter(s) (100 mL/Hr) IV Continuous <Continuous>  heparin   Injectable 5000 Unit(s) SubCutaneous every 8 hours  insulin lispro (ADMELOG) corrective regimen sliding scale   SubCutaneous every 6 hours  ketorolac   Injectable 15 milliGRAM(s) IV Push every 6 hours  metroNIDAZOLE  IVPB 500 milliGRAM(s) IV Intermittent every 8 hours  pantoprazole  Injectable 40 milliGRAM(s) IV Push every 24 hours  phenylephrine    Infusion 0.3 MICROgram(s)/kG/Min (7.75 mL/Hr) IV Continuous <Continuous>    MEDICATIONS  (PRN):  HYDROmorphone  Injectable 0.2 milliGRAM(s) IV Push every 4 hours PRN Moderate Pain (4 - 6)  HYDROmorphone  Injectable 0.5 milliGRAM(s) IV Push every 4 hours PRN Severe Pain (7 - 10)  naloxone Injectable 0.1 milliGRAM(s) IV Push every 3 minutes PRN For ANY of the following changes in patient status:  A. RR LESS THAN 10 breaths per minute, B. Oxygen saturation LESS THAN 90%, C. Sedation score of 6  ondansetron Injectable 4 milliGRAM(s) IV Push once PRN Nausea and/or Vomiting  ondansetron Injectable 4 milliGRAM(s) IV Push every 6 hours PRN Nausea and/or Vomiting

## 2023-10-06 NOTE — PROGRESS NOTE ADULT - ASSESSMENT
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 SICU sontuled for ventilator management and hemodynamic monitoring post-op.     PLAN  NEUROLOGIC   - Pain control with tylenol and dilaudid PRN    RESPIRATORY   - on RA  - Monitor SpO2 goal >92%    CARDIOVASCULAR   - Monitor hemodynamics, on bari     GASTROINTESTINAL   - Diet: NPO with TF  - GI PPX: Protonix  - Zofran PRN    /RENAL   - IV fluids: LR @125cc/hr  - Maintain cristina catheter for 2 weeks per primary team  - JESSE REZA drain, monitor output     HEMATOLOGIC  - Monitor H/H   - DVT prophylaxis: SCDs and SQH    INFECTIOUS DISEASE  - IV abx ppx: Cipro/Flagyl    ENDOCRINE  - Monitor gluc  - ISS    LINES  - Rad a line, PIV, NGT, LLQ JUAQUIN, Cristina    Disposition: SICU  Code: FULL  57 yo 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 SICU sontuled for ventilator management and hemodynamic monitoring post-op.     PLAN  NEUROLOGIC   - Pain control with tylenol and dilaudid PRN    RESPIRATORY   - on RA  - Monitor SpO2 goal >92%    CARDIOVASCULAR   - Monitor hemodynamics, on bari     GASTROINTESTINAL   - Diet: NPO  - NGT to LCWS  - GI PPX: Protonix  - Zofran PRN    /RENAL   - IV fluids: LR @125cc/hr  - Maintain cristina catheter for 2 weeks per primary team  - JESSE REZA drain, monitor output     HEMATOLOGIC  - Monitor H/H   - DVT prophylaxis: SCDs and SQH    INFECTIOUS DISEASE  - IV abx ppx: Cipro/Flagyl    ENDOCRINE  - Monitor gluc  - ISS    LINES  - Rad a line, PIV, NGT, LLQ JUAQUIN, Cristina    Disposition: SICU  Code: FULL

## 2023-10-06 NOTE — DIETITIAN INITIAL EVALUATION ADULT - SIGNS/SYMPTOMS
>5% weight lost >1 month PTA with <75% of estimated nutrition needs met > 1 month PTA malignant appendix ca with demands for surgical healing

## 2023-10-06 NOTE — DIETITIAN INITIAL EVALUATION ADULT - NSFNSGIIOFT_GEN_A_CORE
10-05-23 @ 07:01  -  10-06-23 @ 07:00  --------------------------------------------------------  OUT:    Ileostomy (mL): 200 mL  Total OUT: 200 mL    Total NET: -200 mL      10-06-23 @ 07:01  -  10-06-23 @ 16:05  --------------------------------------------------------  OUT:    Nasogastric/Oral tube (mL): 50 mL  Total OUT: 50 mL    Total NET: -50 mL

## 2023-10-06 NOTE — DIETITIAN INITIAL EVALUATION ADULT - WEIGHT FOR BMI (KG)
Nadia and Emmett  Pt with recurrent small bowel bleeding I ordered shivam based on prior clinic notes but I think he is reluctanct   Can yall get him in to discuss with dr reed please thanks 68.9

## 2023-10-06 NOTE — PROGRESS NOTE ADULT - SUBJECTIVE AND OBJECTIVE BOX
Surgery Progress Note    S: Patient seen and examined. No acute events overnight. No complaints of nausea, vomiting, voiding without issues    O:  Physical Exam:  Gen: Laying in bed, NAD  HEENT: atrumatic, EMOI  Resp: Unlabored breathing  Abd: soft, dania-incisional tenderness, nondistended, no rebound or guarding. Drains serosanguinous, drain dressing changed during AM rounds with some serosang drainage, Ostomy productive of appropriate enteric contents  Ext: Moves 4 extremities spontaneously    Vital Signs Last 24 Hrs  T(C): 36.9 (06 Oct 2023 04:00), Max: 38.7 (05 Oct 2023 13:50)  T(F): 98.5 (06 Oct 2023 04:00), Max: 101.6 (05 Oct 2023 13:50)  HR: 63 (06 Oct 2023 07:00) (50 - 84)  BP: 132/60 (06 Oct 2023 07:00) (92/63 - 155/133)  BP(mean): 81 (06 Oct 2023 07:00) (70 - 142)  RR: 20 (06 Oct 2023 07:00) (11 - 25)  SpO2: 98% (06 Oct 2023 07:00) (97% - 100%)    Parameters below as of 05 Oct 2023 18:00  Patient On (Oxygen Delivery Method): face tent  O2 Flow (L/min): 6  O2 Concentration (%): 28    I&O's Detail    05 Oct 2023 07:01  -  06 Oct 2023 07:00  --------------------------------------------------------  IN:    Dexmedetomidine: 83.6 mL    FentaNYL: 68 mL    Lactated Ringers: 400 mL    Lactated Ringers: 1875 mL    Phenylephrine: 81.8 mL    Phenylephrine: 276 mL    Propofol: 8.7 mL  Total IN: 2793.1 mL    OUT:    Bulb (mL): 475 mL    Ileostomy (mL): 200 mL    Indwelling Catheter - Urethral (mL): 2225 mL  Total OUT: 2900 mL    Total NET: -106.9 mL                                9.7    12.97 )-----------( 199      ( 06 Oct 2023 01:00 )             28.3       10-06    143  |  114<H>  |  5<L>  ----------------------------<  116<H>  3.5   |  21<L>  |  0.54    Ca    8.1<L>      06 Oct 2023 01:00  Phos  2.3     10-06  Mg     1.90     10-06    TPro  4.6<L>  /  Alb  2.3<L>  /  TBili  0.7  /  DBili  x   /  AST  129<H>  /  ALT  140<H>  /  AlkPhos  36<L>  10-06

## 2023-10-06 NOTE — DIETITIAN INITIAL EVALUATION ADULT - OTHER INFO
59 y/o female with hx Sickle Cell Dz and malignant appendix ca s/p appendectomy, cholecystectomy, hysterectomy, omenectomy, peritenectomy, LAR with diverting loop ileostomy and bladder injury with primary repair on 10/4. Pt extubated today and maintained NPO receiving IVF of D5W + NS 0.45% and IVPB fluids. Visited with pt to obtain nutrition hx. Pt said she had inadequate oral intake over the past couple months due to her chemo Tx and lack of appetite. Her UBW had been 167 lbs a couple months ago with current dosing wt of 152 lbs indicating a clinically significant loss of 9%. Pt presents at severe risk for malnutrition based on suboptimal PO intake with noted loss of weight over the past 2 months PTA. NKFA. She denies food allergies, nausea/vomiting/diarrhea/constipation, or issues with chewing/swallowing. +Ileostomy output of 200 mL over the past 24 hrs. Suggest advancing diet as tolerated to Clear Liquids with Ensure Clear 3x daily (540 john and 24 gm protein) to optimize oral intake and overall nutrition. Consider Low Fiber Diet if pt able to tolerate diet advancement. Education pending nutrition plan of care appropriate for d/c.  RDN services to remain available as needed.
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
## 2023-10-07 LAB
ANION GAP SERPL CALC-SCNC: 11 MMOL/L — SIGNIFICANT CHANGE UP (ref 7–14)
BUN SERPL-MCNC: 6 MG/DL — LOW (ref 7–23)
CALCIUM SERPL-MCNC: 7.8 MG/DL — LOW (ref 8.4–10.5)
CHLORIDE SERPL-SCNC: 110 MMOL/L — HIGH (ref 98–107)
CO2 SERPL-SCNC: 20 MMOL/L — LOW (ref 22–31)
CREAT SERPL-MCNC: 0.51 MG/DL — SIGNIFICANT CHANGE UP (ref 0.5–1.3)
EGFR: 107 ML/MIN/1.73M2 — SIGNIFICANT CHANGE UP
GLUCOSE BLDC GLUCOMTR-MCNC: 100 MG/DL — HIGH (ref 70–99)
GLUCOSE BLDC GLUCOMTR-MCNC: 101 MG/DL — HIGH (ref 70–99)
GLUCOSE BLDC GLUCOMTR-MCNC: 146 MG/DL — HIGH (ref 70–99)
GLUCOSE BLDC GLUCOMTR-MCNC: 150 MG/DL — HIGH (ref 70–99)
GLUCOSE BLDC GLUCOMTR-MCNC: 88 MG/DL — SIGNIFICANT CHANGE UP (ref 70–99)
GLUCOSE SERPL-MCNC: 148 MG/DL — HIGH (ref 70–99)
HCT VFR BLD CALC: 23.9 % — LOW (ref 34.5–45)
HGB BLD-MCNC: 8.2 G/DL — LOW (ref 11.5–15.5)
MAGNESIUM SERPL-MCNC: 1.9 MG/DL — SIGNIFICANT CHANGE UP (ref 1.6–2.6)
MCHC RBC-ENTMCNC: 27.1 PG — SIGNIFICANT CHANGE UP (ref 27–34)
MCHC RBC-ENTMCNC: 34.3 GM/DL — SIGNIFICANT CHANGE UP (ref 32–36)
MCV RBC AUTO: 78.9 FL — LOW (ref 80–100)
NRBC # BLD: 0 /100 WBCS — SIGNIFICANT CHANGE UP (ref 0–0)
NRBC # FLD: 0 K/UL — SIGNIFICANT CHANGE UP (ref 0–0)
PHOSPHATE SERPL-MCNC: 1.6 MG/DL — LOW (ref 2.5–4.5)
PLATELET # BLD AUTO: 196 K/UL — SIGNIFICANT CHANGE UP (ref 150–400)
POTASSIUM SERPL-MCNC: 3.2 MMOL/L — LOW (ref 3.5–5.3)
POTASSIUM SERPL-SCNC: 3.2 MMOL/L — LOW (ref 3.5–5.3)
RBC # BLD: 3.03 M/UL — LOW (ref 3.8–5.2)
RBC # FLD: 19.4 % — HIGH (ref 10.3–14.5)
SODIUM SERPL-SCNC: 141 MMOL/L — SIGNIFICANT CHANGE UP (ref 135–145)
WBC # BLD: 11.83 K/UL — HIGH (ref 3.8–10.5)
WBC # FLD AUTO: 11.83 K/UL — HIGH (ref 3.8–10.5)

## 2023-10-07 PROCEDURE — 99233 SBSQ HOSP IP/OBS HIGH 50: CPT

## 2023-10-07 PROCEDURE — 74018 RADEX ABDOMEN 1 VIEW: CPT | Mod: 26

## 2023-10-07 RX ORDER — MAGNESIUM SULFATE 500 MG/ML
1 VIAL (ML) INJECTION ONCE
Refills: 0 | Status: COMPLETED | OUTPATIENT
Start: 2023-10-07 | End: 2023-10-07

## 2023-10-07 RX ORDER — DEXTROSE MONOHYDRATE, SODIUM CHLORIDE, AND POTASSIUM CHLORIDE 50; .745; 4.5 G/1000ML; G/1000ML; G/1000ML
1000 INJECTION, SOLUTION INTRAVENOUS
Refills: 0 | Status: DISCONTINUED | OUTPATIENT
Start: 2023-10-07 | End: 2023-10-10

## 2023-10-07 RX ORDER — ACETAMINOPHEN 500 MG
1000 TABLET ORAL EVERY 6 HOURS
Refills: 0 | Status: COMPLETED | OUTPATIENT
Start: 2023-10-07 | End: 2023-10-08

## 2023-10-07 RX ORDER — FOLIC ACID/VIT B COMPLEX AND C 400 MCG
10000 TABLET ORAL DAILY
Refills: 0 | Status: DISCONTINUED | OUTPATIENT
Start: 2023-10-07 | End: 2023-10-07

## 2023-10-07 RX ORDER — CALCIUM GLUCONATE 100 MG/ML
1 VIAL (ML) INTRAVENOUS ONCE
Refills: 0 | Status: COMPLETED | OUTPATIENT
Start: 2023-10-07 | End: 2023-10-07

## 2023-10-07 RX ORDER — POTASSIUM CHLORIDE 20 MEQ
10 PACKET (EA) ORAL
Refills: 0 | Status: COMPLETED | OUTPATIENT
Start: 2023-10-07 | End: 2023-10-07

## 2023-10-07 RX ORDER — POTASSIUM PHOSPHATE, MONOBASIC POTASSIUM PHOSPHATE, DIBASIC 236; 224 MG/ML; MG/ML
30 INJECTION, SOLUTION INTRAVENOUS ONCE
Refills: 0 | Status: COMPLETED | OUTPATIENT
Start: 2023-10-07 | End: 2023-10-07

## 2023-10-07 RX ADMIN — HEPARIN SODIUM 5000 UNIT(S): 5000 INJECTION INTRAVENOUS; SUBCUTANEOUS at 00:38

## 2023-10-07 RX ADMIN — Medication 15 MILLIGRAM(S): at 00:38

## 2023-10-07 RX ADMIN — CHLORHEXIDINE GLUCONATE 1 APPLICATION(S): 213 SOLUTION TOPICAL at 11:16

## 2023-10-07 RX ADMIN — Medication 15 MILLIGRAM(S): at 05:01

## 2023-10-07 RX ADMIN — DEXTROSE MONOHYDRATE, SODIUM CHLORIDE, AND POTASSIUM CHLORIDE 100 MILLILITER(S): 50; .745; 4.5 INJECTION, SOLUTION INTRAVENOUS at 07:40

## 2023-10-07 RX ADMIN — Medication 400 MILLIGRAM(S): at 19:32

## 2023-10-07 RX ADMIN — HEPARIN SODIUM 5000 UNIT(S): 5000 INJECTION INTRAVENOUS; SUBCUTANEOUS at 13:54

## 2023-10-07 RX ADMIN — Medication 100 MILLIEQUIVALENT(S): at 06:30

## 2023-10-07 RX ADMIN — Medication 100 MILLIGRAM(S): at 00:38

## 2023-10-07 RX ADMIN — HEPARIN SODIUM 5000 UNIT(S): 5000 INJECTION INTRAVENOUS; SUBCUTANEOUS at 05:01

## 2023-10-07 RX ADMIN — Medication 100 GRAM(S): at 04:54

## 2023-10-07 RX ADMIN — Medication 100 MILLIGRAM(S): at 09:34

## 2023-10-07 RX ADMIN — Medication 200 MILLIGRAM(S): at 17:15

## 2023-10-07 RX ADMIN — Medication 100 MILLIEQUIVALENT(S): at 04:55

## 2023-10-07 RX ADMIN — Medication 15 MILLIGRAM(S): at 17:15

## 2023-10-07 RX ADMIN — Medication 15 MILLIGRAM(S): at 12:00

## 2023-10-07 RX ADMIN — Medication 400 MILLIGRAM(S): at 00:38

## 2023-10-07 RX ADMIN — ONDANSETRON 4 MILLIGRAM(S): 8 TABLET, FILM COATED ORAL at 00:17

## 2023-10-07 RX ADMIN — PHENYLEPHRINE HYDROCHLORIDE 7.75 MICROGRAM(S)/KG/MIN: 10 INJECTION INTRAVENOUS at 07:40

## 2023-10-07 RX ADMIN — Medication 1000 MILLIGRAM(S): at 20:32

## 2023-10-07 RX ADMIN — Medication 100 GRAM(S): at 04:55

## 2023-10-07 RX ADMIN — Medication 15 MILLIGRAM(S): at 11:16

## 2023-10-07 RX ADMIN — Medication 400 MILLIGRAM(S): at 05:00

## 2023-10-07 RX ADMIN — DEXTROSE MONOHYDRATE, SODIUM CHLORIDE, AND POTASSIUM CHLORIDE 50 MILLILITER(S): 50; .745; 4.5 INJECTION, SOLUTION INTRAVENOUS at 19:33

## 2023-10-07 RX ADMIN — Medication 15 MILLIGRAM(S): at 18:00

## 2023-10-07 RX ADMIN — Medication 200 MILLIGRAM(S): at 05:00

## 2023-10-07 RX ADMIN — Medication 100 MILLIGRAM(S): at 16:31

## 2023-10-07 RX ADMIN — POTASSIUM PHOSPHATE, MONOBASIC POTASSIUM PHOSPHATE, DIBASIC 83.33 MILLIMOLE(S): 236; 224 INJECTION, SOLUTION INTRAVENOUS at 04:54

## 2023-10-07 RX ADMIN — ONDANSETRON 4 MILLIGRAM(S): 8 TABLET, FILM COATED ORAL at 19:48

## 2023-10-07 RX ADMIN — DEXTROSE MONOHYDRATE, SODIUM CHLORIDE, AND POTASSIUM CHLORIDE 50 MILLILITER(S): 50; .745; 4.5 INJECTION, SOLUTION INTRAVENOUS at 17:15

## 2023-10-07 RX ADMIN — Medication 100 MILLIEQUIVALENT(S): at 07:41

## 2023-10-07 RX ADMIN — HEPARIN SODIUM 5000 UNIT(S): 5000 INJECTION INTRAVENOUS; SUBCUTANEOUS at 22:59

## 2023-10-07 NOTE — PROGRESS NOTE ADULT - ASSESSMENT
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    Plan:   58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    PLAN:  - Appreciate SICU Care  - Continue to IVF resuscitate and ween bari per SICU  - NPO/NGT  - IV Protonix  - Monitor Ostomy  - Monitor JUAQUIN outpt  - FS q6h with ISS  - ABX: Cipro/Flagyl  - VTE ppx: SQH  - PT/OT consult appreciated  - KUB for nausea to evaluate stomach    D Team Surgery  [id number]

## 2023-10-07 NOTE — PROGRESS NOTE ADULT - SUBJECTIVE AND OBJECTIVE BOX
24 HOUR EVENTS:  - 1L bolus during day  - RUE midline placed       SUBJECTIVE/ROS:  Patient seen at bedside this AM.       OBJECTIVE:    VITALS:  Vital Signs Last 24 Hrs  T(C): 36.3 (07 Oct 2023 00:00), Max: 36.9 (06 Oct 2023 04:00)  T(F): 97.4 (07 Oct 2023 00:00), Max: 98.5 (06 Oct 2023 04:00)  HR: 55 (07 Oct 2023 01:00) (50 - 87)  BP: 114/64 (07 Oct 2023 00:00) (92/47 - 137/65)  BP(mean): 79 (07 Oct 2023 00:00) (60 - 89)  RR: 15 (07 Oct 2023 01:00) (0 - 21)  SpO2: 98% (07 Oct 2023 01:00) (95% - 100%)    Parameters below as of 06 Oct 2023 16:00  Patient On (Oxygen Delivery Method): room air        I&O's Summary    05 Oct 2023 07:01  -  06 Oct 2023 07:00  --------------------------------------------------------  IN: 2808.1 mL / OUT: 3000 mL / NET: -191.9 mL    06 Oct 2023 07:01  -  07 Oct 2023 01:16  --------------------------------------------------------  IN: 2986 mL / OUT: 930 mL / NET: 2056 mL        PHYSICAL EXAM:    General/Neuro  Exam: A&Ox4    Respiratory  Exam: satting well on RA    Cardiovascular  Exam: S1, S2.  Regular rate and rhythm. On Nash   Cardiac Rhythm: Normal Sinus Rhythm    GI  Exam: Abdomen soft, Non-tender, Non-distended. Nasogastric tube in place with bilious output. Ileostomy with some output   Wound: Preveena vac  Current Diet:  NPO    Extremities  Exam: Extremities warm, pink, well-perfused.      :   Exam: Pierce catheter in place.       LABS:                        8.2    11.83 )-----------( 196      ( 07 Oct 2023 01:08 )             23.9   10-06    143  |  114<H>  |  5<L>  ----------------------------<  116<H>  3.5   |  21<L>  |  0.54    Ca    8.1<L>      06 Oct 2023 01:00  Phos  2.3     10-06  Mg     1.90     10-06    TPro  4.6<L>  /  Alb  2.3<L>  /  TBili  0.7  /  DBili  x   /  AST  129<H>  /  ALT  140<H>  /  AlkPhos  36<L>  10-06    CAPILLARY BLOOD GLUCOSE      POCT Blood Glucose.: 146 mg/dL (07 Oct 2023 00:36)  POCT Blood Glucose.: 94 mg/dL (06 Oct 2023 17:09)  POCT Blood Glucose.: 83 mg/dL (06 Oct 2023 11:38)  POCT Blood Glucose.: 115 mg/dL (06 Oct 2023 06:29)      MEDICATIONS:   MEDICATIONS  (STANDING):  acetaminophen   IVPB .. 1000 milliGRAM(s) IV Intermittent every 6 hours  chlorhexidine 2% Cloths 1 Application(s) Topical daily  ciprofloxacin   IVPB 400 milliGRAM(s) IV Intermittent every 12 hours  dextrose 5% + sodium chloride 0.45% with potassium chloride 20 mEq/L 1000 milliLiter(s) (100 mL/Hr) IV Continuous <Continuous>  heparin   Injectable 5000 Unit(s) SubCutaneous every 8 hours  influenza   Vaccine 0.5 milliLiter(s) IntraMuscular once  insulin lispro (ADMELOG) corrective regimen sliding scale   SubCutaneous every 6 hours  ketorolac   Injectable 15 milliGRAM(s) IV Push every 6 hours  metroNIDAZOLE  IVPB 500 milliGRAM(s) IV Intermittent every 8 hours  pantoprazole  Injectable 40 milliGRAM(s) IV Push every 24 hours  phenylephrine    Infusion 0.3 MICROgram(s)/kG/Min (7.75 mL/Hr) IV Continuous <Continuous>    MEDICATIONS  (PRN):  HYDROmorphone  Injectable 0.2 milliGRAM(s) IV Push every 4 hours PRN Moderate Pain (4 - 6)  HYDROmorphone  Injectable 0.5 milliGRAM(s) IV Push every 4 hours PRN Severe Pain (7 - 10)  naloxone Injectable 0.1 milliGRAM(s) IV Push every 3 minutes PRN For ANY of the following changes in patient status:  A. RR LESS THAN 10 breaths per minute, B. Oxygen saturation LESS THAN 90%, C. Sedation score of 6  ondansetron Injectable 4 milliGRAM(s) IV Push every 6 hours PRN Nausea and/or Vomiting  ondansetron Injectable 4 milliGRAM(s) IV Push once PRN Nausea and/or Vomiting

## 2023-10-07 NOTE — PROGRESS NOTE ADULT - SUBJECTIVE AND OBJECTIVE BOX
Surgery Progress Note     Subjective:  Patient seen and examined.       Vital Signs:  Vital Signs Last 24 Hrs  T(C): 36.1 (07 Oct 2023 16:00), Max: 36.3 (07 Oct 2023 00:00)  T(F): 97 (07 Oct 2023 16:00), Max: 97.4 (07 Oct 2023 00:00)  HR: 60 (07 Oct 2023 18:00) (48 - 84)  BP: 114/65 (07 Oct 2023 08:00) (106/54 - 139/61)  BP(mean): 80 (07 Oct 2023 08:00) (68 - 89)  RR: 19 (07 Oct 2023 18:00) (0 - 23)  SpO2: 95% (07 Oct 2023 18:00) (94% - 100%)    Parameters below as of 07 Oct 2023 18:00  Patient On (Oxygen Delivery Method): room air        CAPILLARY BLOOD GLUCOSE      POCT Blood Glucose.: 88 mg/dL (07 Oct 2023 17:13)  POCT Blood Glucose.: 101 mg/dL (07 Oct 2023 11:15)  POCT Blood Glucose.: 150 mg/dL (07 Oct 2023 05:20)  POCT Blood Glucose.: 146 mg/dL (07 Oct 2023 00:36)      I&O's Detail    06 Oct 2023 07:01  -  07 Oct 2023 07:00  --------------------------------------------------------  IN:    dextrose 5% + sodium chloride 0.45% w/ Additives: 1700 mL    IV PiggyBack: 650 mL    Lactated Ringers: 625 mL    Lactated Ringers Bolus: 1000 mL    Phenylephrine: 307 mL  Total IN: 4282 mL    OUT:    Bulb (mL): 240 mL    Ileostomy (mL): 150 mL    Indwelling Catheter - Urethral (mL): 1405 mL    Nasogastric/Oral tube (mL): 50 mL  Total OUT: 1845 mL    Total NET: 2437 mL      07 Oct 2023 07:01  -  07 Oct 2023 18:44  --------------------------------------------------------  IN:    dextrose 5% + sodium chloride 0.45% w/ Additives: 200 mL    dextrose 5% + sodium chloride 0.45% w/ Additives: 150 mL    IV PiggyBack: 600 mL  Total IN: 950 mL    OUT:    Bulb (mL): 45 mL    Indwelling Catheter - Urethral (mL): 1075 mL    Phenylephrine: 0 mL    VAC (Vacuum Assisted Closure) System (mL): 0 mL  Total OUT: 1120 mL    Total NET: -170 mL            Physical Exam:  General: NAD, resting comfortably in bed  Respiratory: Nonlabored respirations  Cardio: pulse present  Abdomen: softly distended, appropriately tender  Vascular: extremities are warm and well perfused.       Labs:    10-07    141  |  110<H>  |  6<L>  ----------------------------<  148<H>  3.2<L>   |  20<L>  |  0.51    Ca    7.8<L>      07 Oct 2023 01:08  Phos  1.6     10-07  Mg     1.90     10-07    TPro  4.6<L>  /  Alb  2.3<L>  /  TBili  0.7  /  DBili  x   /  AST  129<H>  /  ALT  140<H>  /  AlkPhos  36<L>  10-06    LIVER FUNCTIONS - ( 06 Oct 2023 01:00 )  Alb: 2.3 g/dL / Pro: 4.6 g/dL / ALK PHOS: 36 U/L / ALT: 140 U/L / AST: 129 U/L / GGT: x                                 8.2    11.83 )-----------( 196      ( 07 Oct 2023 01:08 )             23.9          Surgery Progress Note     Subjective:  Patient seen and examined.       Vital Signs:  Vital Signs Last 24 Hrs  T(C): 36.1 (07 Oct 2023 16:00), Max: 36.3 (07 Oct 2023 00:00)  T(F): 97 (07 Oct 2023 16:00), Max: 97.4 (07 Oct 2023 00:00)  HR: 60 (07 Oct 2023 18:00) (48 - 84)  BP: 114/65 (07 Oct 2023 08:00) (106/54 - 139/61)  BP(mean): 80 (07 Oct 2023 08:00) (68 - 89)  RR: 19 (07 Oct 2023 18:00) (0 - 23)  SpO2: 95% (07 Oct 2023 18:00) (94% - 100%)    Parameters below as of 07 Oct 2023 18:00  Patient On (Oxygen Delivery Method): room air        CAPILLARY BLOOD GLUCOSE      POCT Blood Glucose.: 88 mg/dL (07 Oct 2023 17:13)  POCT Blood Glucose.: 101 mg/dL (07 Oct 2023 11:15)  POCT Blood Glucose.: 150 mg/dL (07 Oct 2023 05:20)  POCT Blood Glucose.: 146 mg/dL (07 Oct 2023 00:36)      I&O's Detail    06 Oct 2023 07:01  -  07 Oct 2023 07:00  --------------------------------------------------------  IN:    dextrose 5% + sodium chloride 0.45% w/ Additives: 1700 mL    IV PiggyBack: 650 mL    Lactated Ringers: 625 mL    Lactated Ringers Bolus: 1000 mL    Phenylephrine: 307 mL  Total IN: 4282 mL    OUT:    Bulb (mL): 240 mL    Ileostomy (mL): 150 mL    Indwelling Catheter - Urethral (mL): 1405 mL    Nasogastric/Oral tube (mL): 50 mL  Total OUT: 1845 mL    Total NET: 2437 mL      07 Oct 2023 07:01  -  07 Oct 2023 18:44  --------------------------------------------------------  IN:    dextrose 5% + sodium chloride 0.45% w/ Additives: 200 mL    dextrose 5% + sodium chloride 0.45% w/ Additives: 150 mL    IV PiggyBack: 600 mL  Total IN: 950 mL    OUT:    Bulb (mL): 45 mL    Indwelling Catheter - Urethral (mL): 1075 mL    Phenylephrine: 0 mL    VAC (Vacuum Assisted Closure) System (mL): 0 mL  Total OUT: 1120 mL    Total NET: -170 mL            Physical Exam:  Physical Exam:  Gen: Laying in bed, NAD  HEENT: atrumatic, EMOI  Resp: Unlabored breathing  Abd: soft, dania-incisional tenderness, mildly distended, no rebound or guarding. Drains serosanguinous, Ostomy productive of appropriate enteric contents  Ext: Moves 4 extremities spontaneously    Labs:    10-07    141  |  110<H>  |  6<L>  ----------------------------<  148<H>  3.2<L>   |  20<L>  |  0.51    Ca    7.8<L>      07 Oct 2023 01:08  Phos  1.6     10-07  Mg     1.90     10-07    TPro  4.6<L>  /  Alb  2.3<L>  /  TBili  0.7  /  DBili  x   /  AST  129<H>  /  ALT  140<H>  /  AlkPhos  36<L>  10-06    LIVER FUNCTIONS - ( 06 Oct 2023 01:00 )  Alb: 2.3 g/dL / Pro: 4.6 g/dL / ALK PHOS: 36 U/L / ALT: 140 U/L / AST: 129 U/L / GGT: x                                 8.2    11.83 )-----------( 196      ( 07 Oct 2023 01:08 )             23.9

## 2023-10-07 NOTE — PROGRESS NOTE ADULT - ASSESSMENT
57 yo 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 SICU sontuled for ventilator management and hemodynamic monitoring post-op.     PLAN  NEUROLOGIC   - AOx3   - Pain control: Tylenol and dilaudid PRN    RESPIRATORY   - on RA   - Monitor SpO2 goal >92%    CARDIOVASCULAR   - Monitor hemodynamics, on Nash, weaning     GASTROINTESTINAL   - Diet: NPO  - GI PPX: Protonix  - Zofran PRN    /RENAL   - IV fluids: LR @100cc/hr  - Maintain cristina catheter for 2 weeks per primary team  - JESSE REZA drain, monitor output     HEMATOLOGIC  - Monitor H/H   - DVT prophylaxis: SCDs and SQH    INFECTIOUS DISEASE  - IV abx ppx: Cipro/Flagyl    ENDOCRINE  - Monitor gluc  - ISS    LINES  - Rad a line, PIV, CELIAQ JUAQUIN, Cristina      Disposition: SICU  Code: FULL    57 yo F w/ PMHx of malignant neoplasm of appendix is s/p appendectomy, cholecystectomy, hysterectomy, omentectomy peritonectomy LAR with diverting loop ileostomy and bladder injury with primary repair on 10/4. SICU consulted for ventilator management and hemodynamic monitoring post-op. Patient extubated doing well.    PLAN  NEUROLOGIC   - AOx3   - Pain control: Tylenol and dilaudid PRN    RESPIRATORY   - on RA   - Monitor SpO2 goal >92%    CARDIOVASCULAR   - Monitor hemodynamics, on Nash, weaning     GASTROINTESTINAL   - Diet: NPO  - Zofran PRN    /RENAL   - IV fluids: LR @100cc/hr  - Maintain cristina catheter for 2 weeks per primary team  - JESSE REZA drain, monitor output     HEMATOLOGIC  - Monitor H/H   - DVT prophylaxis: SCDs and SQH    INFECTIOUS DISEASE  - IV abx ppx: Cipro/Flagyl until Sunday    ENDOCRINE  - Monitor gluc  - ISS    LINES  - Rad a line, PIV, LLQ JUAQUIN, Cristina      Disposition: SICU  Code: FULL

## 2023-10-07 NOTE — PROGRESS NOTE ADULT - SUBJECTIVE AND OBJECTIVE BOX
Subjective: Patient seen and examined. No new events except as noted.     SUBJECTIVE/ROS:  feels ok       MEDICATIONS:  MEDICATIONS  (STANDING):  chlorhexidine 2% Cloths 1 Application(s) Topical daily  ciprofloxacin   IVPB 400 milliGRAM(s) IV Intermittent every 12 hours  dextrose 5% + sodium chloride 0.45% with potassium chloride 20 mEq/L 1000 milliLiter(s) (100 mL/Hr) IV Continuous <Continuous>  heparin   Injectable 5000 Unit(s) SubCutaneous every 8 hours  influenza   Vaccine 0.5 milliLiter(s) IntraMuscular once  insulin lispro (ADMELOG) corrective regimen sliding scale   SubCutaneous every 6 hours  ketorolac   Injectable 15 milliGRAM(s) IV Push every 6 hours  metroNIDAZOLE  IVPB 500 milliGRAM(s) IV Intermittent every 8 hours  pantoprazole  Injectable 40 milliGRAM(s) IV Push every 24 hours  phenylephrine    Infusion 0.3 MICROgram(s)/kG/Min (7.75 mL/Hr) IV Continuous <Continuous>  vitamin A 23636 Unit(s) Oral daily      PHYSICAL EXAM:  T(C): 36.2 (10-07-23 @ 04:00), Max: 36.3 (10-07-23 @ 00:00)  HR: 65 (10-07-23 @ 06:00) (48 - 87)  BP: 120/60 (10-07-23 @ 06:00) (92/47 - 139/61)  RR: 16 (10-07-23 @ 06:00) (0 - 22)  SpO2: 97% (10-07-23 @ 06:00) (94% - 100%)  Wt(kg): --  I&O's Summary    06 Oct 2023 07:01  -  07 Oct 2023 07:00  --------------------------------------------------------  IN: 4179.5 mL / OUT: 1845 mL / NET: 2334.5 mL            JVP: Normal  Neck: supple  Lung: clear   CV: S1 S2 , Murmur:  Abd: soft  Ext: No edema  neuro: Awake / alert  Psych: flat affect  Skin: normal``    LABS/DATA:    CARDIAC MARKERS:                                8.2    11.83 )-----------( 196      ( 07 Oct 2023 01:08 )             23.9     10-07    141  |  110<H>  |  6<L>  ----------------------------<  148<H>  3.2<L>   |  20<L>  |  0.51    Ca    7.8<L>      07 Oct 2023 01:08  Phos  1.6     10-07  Mg     1.90     10-07    TPro  4.6<L>  /  Alb  2.3<L>  /  TBili  0.7  /  DBili  x   /  AST  129<H>  /  ALT  140<H>  /  AlkPhos  36<L>  10-06    proBNP:   Lipid Profile:   HgA1c:   TSH:     TELE:  EKG:

## 2023-10-08 LAB
ALBUMIN SERPL ELPH-MCNC: 2 G/DL — LOW (ref 3.3–5)
ALP SERPL-CCNC: 43 U/L — SIGNIFICANT CHANGE UP (ref 40–120)
ALT FLD-CCNC: 69 U/L — HIGH (ref 4–33)
ANION GAP SERPL CALC-SCNC: 11 MMOL/L — SIGNIFICANT CHANGE UP (ref 7–14)
AST SERPL-CCNC: 37 U/L — HIGH (ref 4–32)
BILIRUB SERPL-MCNC: 0.7 MG/DL — SIGNIFICANT CHANGE UP (ref 0.2–1.2)
BUN SERPL-MCNC: 4 MG/DL — LOW (ref 7–23)
CALCIUM SERPL-MCNC: 7.6 MG/DL — LOW (ref 8.4–10.5)
CHLORIDE SERPL-SCNC: 108 MMOL/L — HIGH (ref 98–107)
CO2 SERPL-SCNC: 19 MMOL/L — LOW (ref 22–31)
CREAT SERPL-MCNC: 0.51 MG/DL — SIGNIFICANT CHANGE UP (ref 0.5–1.3)
EGFR: 107 ML/MIN/1.73M2 — SIGNIFICANT CHANGE UP
GLUCOSE BLDC GLUCOMTR-MCNC: 83 MG/DL — SIGNIFICANT CHANGE UP (ref 70–99)
GLUCOSE BLDC GLUCOMTR-MCNC: 94 MG/DL — SIGNIFICANT CHANGE UP (ref 70–99)
GLUCOSE BLDC GLUCOMTR-MCNC: 95 MG/DL — SIGNIFICANT CHANGE UP (ref 70–99)
GLUCOSE SERPL-MCNC: 105 MG/DL — HIGH (ref 70–99)
HCT VFR BLD CALC: 22.7 % — LOW (ref 34.5–45)
HGB BLD-MCNC: 7.7 G/DL — LOW (ref 11.5–15.5)
MAGNESIUM SERPL-MCNC: 1.8 MG/DL — SIGNIFICANT CHANGE UP (ref 1.6–2.6)
MCHC RBC-ENTMCNC: 27.2 PG — SIGNIFICANT CHANGE UP (ref 27–34)
MCHC RBC-ENTMCNC: 33.9 GM/DL — SIGNIFICANT CHANGE UP (ref 32–36)
MCV RBC AUTO: 80.2 FL — SIGNIFICANT CHANGE UP (ref 80–100)
NRBC # BLD: 0 /100 WBCS — SIGNIFICANT CHANGE UP (ref 0–0)
NRBC # FLD: 0.02 K/UL — HIGH (ref 0–0)
PHOSPHATE SERPL-MCNC: 2.6 MG/DL — SIGNIFICANT CHANGE UP (ref 2.5–4.5)
PLATELET # BLD AUTO: 214 K/UL — SIGNIFICANT CHANGE UP (ref 150–400)
POTASSIUM SERPL-MCNC: 3.5 MMOL/L — SIGNIFICANT CHANGE UP (ref 3.5–5.3)
POTASSIUM SERPL-SCNC: 3.5 MMOL/L — SIGNIFICANT CHANGE UP (ref 3.5–5.3)
PROT SERPL-MCNC: 4.3 G/DL — LOW (ref 6–8.3)
RBC # BLD: 2.83 M/UL — LOW (ref 3.8–5.2)
RBC # FLD: 19.9 % — HIGH (ref 10.3–14.5)
SODIUM SERPL-SCNC: 138 MMOL/L — SIGNIFICANT CHANGE UP (ref 135–145)
WBC # BLD: 8.62 K/UL — SIGNIFICANT CHANGE UP (ref 3.8–10.5)
WBC # FLD AUTO: 8.62 K/UL — SIGNIFICANT CHANGE UP (ref 3.8–10.5)

## 2023-10-08 PROCEDURE — 99233 SBSQ HOSP IP/OBS HIGH 50: CPT

## 2023-10-08 RX ORDER — POTASSIUM CHLORIDE 20 MEQ
10 PACKET (EA) ORAL
Refills: 0 | Status: COMPLETED | OUTPATIENT
Start: 2023-10-08 | End: 2023-10-08

## 2023-10-08 RX ORDER — MAGNESIUM SULFATE 500 MG/ML
2 VIAL (ML) INJECTION ONCE
Refills: 0 | Status: COMPLETED | OUTPATIENT
Start: 2023-10-08 | End: 2023-10-08

## 2023-10-08 RX ORDER — ACETAMINOPHEN 500 MG
1000 TABLET ORAL EVERY 6 HOURS
Refills: 0 | Status: ACTIVE | OUTPATIENT
Start: 2023-10-08 | End: 2023-10-09

## 2023-10-08 RX ADMIN — Medication 100 MILLIGRAM(S): at 16:20

## 2023-10-08 RX ADMIN — Medication 100 MILLIGRAM(S): at 08:31

## 2023-10-08 RX ADMIN — Medication 1000 MILLIGRAM(S): at 06:39

## 2023-10-08 RX ADMIN — ONDANSETRON 4 MILLIGRAM(S): 8 TABLET, FILM COATED ORAL at 19:39

## 2023-10-08 RX ADMIN — Medication 15 MILLIGRAM(S): at 17:55

## 2023-10-08 RX ADMIN — Medication 400 MILLIGRAM(S): at 06:28

## 2023-10-08 RX ADMIN — Medication 15 MILLIGRAM(S): at 11:52

## 2023-10-08 RX ADMIN — Medication 400 MILLIGRAM(S): at 00:12

## 2023-10-08 RX ADMIN — HEPARIN SODIUM 5000 UNIT(S): 5000 INJECTION INTRAVENOUS; SUBCUTANEOUS at 13:31

## 2023-10-08 RX ADMIN — Medication 100 MILLIEQUIVALENT(S): at 03:19

## 2023-10-08 RX ADMIN — HYDROMORPHONE HYDROCHLORIDE 0.5 MILLIGRAM(S): 2 INJECTION INTRAMUSCULAR; INTRAVENOUS; SUBCUTANEOUS at 23:00

## 2023-10-08 RX ADMIN — CHLORHEXIDINE GLUCONATE 1 APPLICATION(S): 213 SOLUTION TOPICAL at 11:52

## 2023-10-08 RX ADMIN — Medication 200 MILLIGRAM(S): at 06:46

## 2023-10-08 RX ADMIN — Medication 200 MILLIGRAM(S): at 17:55

## 2023-10-08 RX ADMIN — Medication 100 MILLIEQUIVALENT(S): at 05:24

## 2023-10-08 RX ADMIN — Medication 15 MILLIGRAM(S): at 01:00

## 2023-10-08 RX ADMIN — Medication 100 MILLIEQUIVALENT(S): at 04:21

## 2023-10-08 RX ADMIN — Medication 15 MILLIGRAM(S): at 00:09

## 2023-10-08 RX ADMIN — Medication 15 MILLIGRAM(S): at 06:12

## 2023-10-08 RX ADMIN — HEPARIN SODIUM 5000 UNIT(S): 5000 INJECTION INTRAVENOUS; SUBCUTANEOUS at 22:05

## 2023-10-08 RX ADMIN — Medication 15 MILLIGRAM(S): at 12:30

## 2023-10-08 RX ADMIN — HEPARIN SODIUM 5000 UNIT(S): 5000 INJECTION INTRAVENOUS; SUBCUTANEOUS at 06:08

## 2023-10-08 RX ADMIN — Medication 15 MILLIGRAM(S): at 06:37

## 2023-10-08 RX ADMIN — Medication 400 MILLIGRAM(S): at 11:52

## 2023-10-08 RX ADMIN — Medication 1000 MILLIGRAM(S): at 01:00

## 2023-10-08 RX ADMIN — HYDROMORPHONE HYDROCHLORIDE 0.5 MILLIGRAM(S): 2 INJECTION INTRAMUSCULAR; INTRAVENOUS; SUBCUTANEOUS at 22:10

## 2023-10-08 RX ADMIN — Medication 100 MILLIGRAM(S): at 01:02

## 2023-10-08 RX ADMIN — DEXTROSE MONOHYDRATE, SODIUM CHLORIDE, AND POTASSIUM CHLORIDE 50 MILLILITER(S): 50; .745; 4.5 INJECTION, SOLUTION INTRAVENOUS at 07:35

## 2023-10-08 RX ADMIN — DEXTROSE MONOHYDRATE, SODIUM CHLORIDE, AND POTASSIUM CHLORIDE 50 MILLILITER(S): 50; .745; 4.5 INJECTION, SOLUTION INTRAVENOUS at 19:52

## 2023-10-08 RX ADMIN — Medication 1000 MILLIGRAM(S): at 12:45

## 2023-10-08 RX ADMIN — Medication 25 GRAM(S): at 03:23

## 2023-10-08 RX ADMIN — Medication 15 MILLIGRAM(S): at 18:22

## 2023-10-08 NOTE — PHYSICAL THERAPY INITIAL EVALUATION ADULT - DIAGNOSIS, PT EVAL
s/p appendectomy, cholecystectomy, hysterectomy, omentectomy, peritonectomy, diverting loop ileostomy and bladder injury with primary repair

## 2023-10-08 NOTE — PROGRESS NOTE ADULT - ASSESSMENT
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    PLAN:  - Appreciate SICU Care  - Continue to IVF resuscitate and ween bari per SICU  - NPO/NGT  - IV Protonix  - Monitor Ostomy  - Monitor JUAQUIN outpt  - FS q6h with ISS  - ABX: Cipro/Flagyl  - VTE ppx: SQH  - PT/OT consult appreciated  - KUB for nausea to evaluate stomach    D Team Surgery  [id number]  58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    PLAN:  - List to floor  - Do not touch cristina  - Follow up LUE US read  - NPO/NGT, sips ok  - IV Protonix  - Monitor Ostomy  - Monitor JUAQUIN outpt  - FS q6h with ISS  - ABX: Cipro/Flagyl  - VTE ppx: SQH  - PT/OT consult appreciated  - Appreciate SICU Care    D Team Surgery  [id number]

## 2023-10-08 NOTE — PROGRESS NOTE ADULT - SUBJECTIVE AND OBJECTIVE BOX
Surgery Progress Note    S: Patient seen and examined. No acute events overnight. Reports tolerating diet without nausea, vomiting, passing flatus, having bowel movements, voiding without issues, have been ambulating and out of bed. Denies fever, chills, SOB, chest pain.     O:  Physical Exam:  Gen: Laying in bed, NAD  HEENT: atrumatic, EMOI  Resp: Unlabored breathing  Abd: soft, dania-incisional tenderness, nondistended, no rebound or guarding. Drains serosanguinous   Ext: Moves 4 extremities spontaneously    Vital Signs Last 24 Hrs  T(C): 36.3 (08 Oct 2023 04:00), Max: 36.5 (08 Oct 2023 00:00)  T(F): 97.3 (08 Oct 2023 04:00), Max: 97.7 (08 Oct 2023 00:00)  HR: 58 (08 Oct 2023 04:00) (50 - 92)  BP: 114/65 (07 Oct 2023 08:00) (114/65 - 122/59)  BP(mean): 80 (07 Oct 2023 08:00) (77 - 80)  RR: 19 (08 Oct 2023 04:00) (10 - 23)  SpO2: 96% (08 Oct 2023 04:00) (94% - 100%)    Parameters below as of 08 Oct 2023 00:00  Patient On (Oxygen Delivery Method): room air        I&O's Detail    06 Oct 2023 07:01  -  07 Oct 2023 07:00  --------------------------------------------------------  IN:    dextrose 5% + sodium chloride 0.45% w/ Additives: 1700 mL    IV PiggyBack: 650 mL    Lactated Ringers: 625 mL    Lactated Ringers Bolus: 1000 mL    Phenylephrine: 307 mL  Total IN: 4282 mL    OUT:    Bulb (mL): 240 mL    Ileostomy (mL): 150 mL    Indwelling Catheter - Urethral (mL): 1405 mL    Nasogastric/Oral tube (mL): 50 mL  Total OUT: 1845 mL    Total NET: 2437 mL      07 Oct 2023 07:01  -  08 Oct 2023 04:37  --------------------------------------------------------  IN:    dextrose 5% + sodium chloride 0.45% w/ Additives: 200 mL    dextrose 5% + sodium chloride 0.45% w/ Additives: 600 mL    IV PiggyBack: 1050 mL  Total IN: 1850 mL    OUT:    Bulb (mL): 45 mL    Indwelling Catheter - Urethral (mL): 1580 mL    Phenylephrine: 0 mL    VAC (Vacuum Assisted Closure) System (mL): 0 mL  Total OUT: 1625 mL    Total NET: 225 mL                                7.7    8.62  )-----------( 214      ( 08 Oct 2023 01:05 )             22.7       10-08    138  |  108<H>  |  4<L>  ----------------------------<  105<H>  3.5   |  19<L>  |  0.51    Ca    7.6<L>      08 Oct 2023 01:05  Phos  2.6     10-08  Mg     1.80     10-08    TPro  4.3<L>  /  Alb  2.0<L>  /  TBili  0.7  /  DBili  x   /  AST  37<H>  /  ALT  69<H>  /  AlkPhos  43  10-08       Surgery Progress Note    S: Patient seen and examined. Was a little nauseous overnight, AXR showed nothing of concern, patient's nausea resolved on its own. Reports tolerating diet without nausea, vomiting, passing flatus, having bowel movements, voiding without issues, have been ambulating and out of bed. Denies fever, chills, SOB, chest pain.     O:  Physical Exam:  Gen: Laying in bed, NAD  HEENT: atrumatic, EMOI  Resp: Unlabored breathing  Abd: soft, dania-incisional tenderness, nondistended, no rebound or guarding. Drains serosanguinous   Ext: Moves 4 extremities spontaneously    Vital Signs Last 24 Hrs  T(C): 36.3 (08 Oct 2023 04:00), Max: 36.5 (08 Oct 2023 00:00)  T(F): 97.3 (08 Oct 2023 04:00), Max: 97.7 (08 Oct 2023 00:00)  HR: 58 (08 Oct 2023 04:00) (50 - 92)  BP: 114/65 (07 Oct 2023 08:00) (114/65 - 122/59)  BP(mean): 80 (07 Oct 2023 08:00) (77 - 80)  RR: 19 (08 Oct 2023 04:00) (10 - 23)  SpO2: 96% (08 Oct 2023 04:00) (94% - 100%)    Parameters below as of 08 Oct 2023 00:00  Patient On (Oxygen Delivery Method): room air        I&O's Detail    06 Oct 2023 07:01  -  07 Oct 2023 07:00  --------------------------------------------------------  IN:    dextrose 5% + sodium chloride 0.45% w/ Additives: 1700 mL    IV PiggyBack: 650 mL    Lactated Ringers: 625 mL    Lactated Ringers Bolus: 1000 mL    Phenylephrine: 307 mL  Total IN: 4282 mL    OUT:    Bulb (mL): 240 mL    Ileostomy (mL): 150 mL    Indwelling Catheter - Urethral (mL): 1405 mL    Nasogastric/Oral tube (mL): 50 mL  Total OUT: 1845 mL    Total NET: 2437 mL      07 Oct 2023 07:01  -  08 Oct 2023 04:37  --------------------------------------------------------  IN:    dextrose 5% + sodium chloride 0.45% w/ Additives: 200 mL    dextrose 5% + sodium chloride 0.45% w/ Additives: 600 mL    IV PiggyBack: 1050 mL  Total IN: 1850 mL    OUT:    Bulb (mL): 45 mL    Indwelling Catheter - Urethral (mL): 1580 mL    Phenylephrine: 0 mL    VAC (Vacuum Assisted Closure) System (mL): 0 mL  Total OUT: 1625 mL    Total NET: 225 mL                                7.7    8.62  )-----------( 214      ( 08 Oct 2023 01:05 )             22.7       10-08    138  |  108<H>  |  4<L>  ----------------------------<  105<H>  3.5   |  19<L>  |  0.51    Ca    7.6<L>      08 Oct 2023 01:05  Phos  2.6     10-08  Mg     1.80     10-08    TPro  4.3<L>  /  Alb  2.0<L>  /  TBili  0.7  /  DBili  x   /  AST  37<H>  /  ALT  69<H>  /  AlkPhos  43  10-08       Surgery Progress Note    S: Patient seen and examined. Was a little nauseous overnight, AXR showed nothing of concern, patient's nausea resolved on its own. Reports tolerating sips and chips without nausea, vomiting, with productive ostomy, voiding without issues, have been ambulating and out of bed to chair.     O:  Physical Exam:  Gen: Laying in bed, NAD  HEENT: atrumatic, EMOI  Resp: Unlabored breathing  Abd: soft, dania-incisional tenderness, nondistended, no rebound or guarding. Ostomy productive  Ext: Moves 4 extremities spontaneously    Vital Signs Last 24 Hrs  T(C): 36.3 (08 Oct 2023 04:00), Max: 36.5 (08 Oct 2023 00:00)  T(F): 97.3 (08 Oct 2023 04:00), Max: 97.7 (08 Oct 2023 00:00)  HR: 58 (08 Oct 2023 04:00) (50 - 92)  BP: 114/65 (07 Oct 2023 08:00) (114/65 - 122/59)  BP(mean): 80 (07 Oct 2023 08:00) (77 - 80)  RR: 19 (08 Oct 2023 04:00) (10 - 23)  SpO2: 96% (08 Oct 2023 04:00) (94% - 100%)    Parameters below as of 08 Oct 2023 00:00  Patient On (Oxygen Delivery Method): room air        I&O's Detail    06 Oct 2023 07:01  -  07 Oct 2023 07:00  --------------------------------------------------------  IN:    dextrose 5% + sodium chloride 0.45% w/ Additives: 1700 mL    IV PiggyBack: 650 mL    Lactated Ringers: 625 mL    Lactated Ringers Bolus: 1000 mL    Phenylephrine: 307 mL  Total IN: 4282 mL    OUT:    Bulb (mL): 240 mL    Ileostomy (mL): 150 mL    Indwelling Catheter - Urethral (mL): 1405 mL    Nasogastric/Oral tube (mL): 50 mL  Total OUT: 1845 mL    Total NET: 2437 mL      07 Oct 2023 07:01  -  08 Oct 2023 04:37  --------------------------------------------------------  IN:    dextrose 5% + sodium chloride 0.45% w/ Additives: 200 mL    dextrose 5% + sodium chloride 0.45% w/ Additives: 600 mL    IV PiggyBack: 1050 mL  Total IN: 1850 mL    OUT:    Bulb (mL): 45 mL    Indwelling Catheter - Urethral (mL): 1580 mL    Phenylephrine: 0 mL    VAC (Vacuum Assisted Closure) System (mL): 0 mL  Total OUT: 1625 mL    Total NET: 225 mL                                7.7    8.62  )-----------( 214      ( 08 Oct 2023 01:05 )             22.7       10-08    138  |  108<H>  |  4<L>  ----------------------------<  105<H>  3.5   |  19<L>  |  0.51    Ca    7.6<L>      08 Oct 2023 01:05  Phos  2.6     10-08  Mg     1.80     10-08    TPro  4.3<L>  /  Alb  2.0<L>  /  TBili  0.7  /  DBili  x   /  AST  37<H>  /  ALT  69<H>  /  AlkPhos  43  10-08

## 2023-10-08 NOTE — PROGRESS NOTE ADULT - SUBJECTIVE AND OBJECTIVE BOX
SICU Daily Progress Note  =====================================================  Interval/Overnight Events:       - Spit up overnight, ab xray without large gastric bubble, hold off on NGT     ALLERGIES:  penicillin (Rash)      --------------------------------------------------------------------------------------    MEDICATIONS:    Neurologic Medications  acetaminophen   IVPB .. 1000 milliGRAM(s) IV Intermittent every 6 hours  HYDROmorphone  Injectable 0.2 milliGRAM(s) IV Push every 4 hours PRN Moderate Pain (4 - 6)  HYDROmorphone  Injectable 0.5 milliGRAM(s) IV Push every 4 hours PRN Severe Pain (7 - 10)  ketorolac   Injectable 15 milliGRAM(s) IV Push every 6 hours  ondansetron Injectable 4 milliGRAM(s) IV Push every 6 hours PRN Nausea and/or Vomiting    Respiratory Medications    Cardiovascular Medications    Gastrointestinal Medications  dextrose 5% + sodium chloride 0.45% with potassium chloride 20 mEq/L 1000 milliLiter(s) IV Continuous <Continuous>    Genitourinary Medications    Hematologic/Oncologic Medications  heparin   Injectable 5000 Unit(s) SubCutaneous every 8 hours  influenza   Vaccine 0.5 milliLiter(s) IntraMuscular once    Antimicrobial/Immunologic Medications  ciprofloxacin   IVPB 400 milliGRAM(s) IV Intermittent every 12 hours  metroNIDAZOLE  IVPB 500 milliGRAM(s) IV Intermittent every 8 hours    Endocrine/Metabolic Medications  insulin lispro (ADMELOG) corrective regimen sliding scale   SubCutaneous every 6 hours    Topical/Other Medications  chlorhexidine 2% Cloths 1 Application(s) Topical daily  naloxone Injectable 0.1 milliGRAM(s) IV Push every 3 minutes PRN For ANY of the following changes in patient status:  A. RR LESS THAN 10 breaths per minute, B. Oxygen saturation LESS THAN 90%, C. Sedation score of 6    --------------------------------------------------------------------------------------    VITAL SIGNS:  T(C): 36.5 (10-08-23 @ 00:00), Max: 36.5 (10-08-23 @ 00:00)  HR: 62 (10-08-23 @ 00:00) (48 - 92)  BP: 114/65 (10-07-23 @ 08:00) (114/65 - 139/61)  RR: 20 (10-08-23 @ 00:00) (8 - 23)  SpO2: 96% (10-08-23 @ 00:00) (94% - 100%)  --------------------------------------------------------------------------------------    INS AND OUTS:    10-06-23 @ 07:01  -  10-07-23 @ 07:00  --------------------------------------------------------  IN: 4282 mL / OUT: 1845 mL / NET: 2437 mL    10-07-23 @ 07:01  -  10-08-23 @ 00:39  --------------------------------------------------------  IN: 1500 mL / OUT: 1430 mL / NET: 70 mL      --------------------------------------------------------------------------------------    EXAM    NEURO: CLAYTON KUHN: NC/AT  RESPIRATORY: nonlabored respirations, normal CW expansion  CARDIO: RRR, S1S2  ABDOMEN: soft, nontender, nondistended, incision c/d/i, JUAQUIN drain x 1 SS  EXTREMITIES: normal strength, no deformities    --------------------------------------------------------------------------------------    LABS                        8.2    11.83 )-----------( 196      ( 07 Oct 2023 01:08 )             23.9       10-07    141  |  110<H>  |  6<L>  ----------------------------<  148<H>  3.2<L>   |  20<L>  |  0.51    Ca    7.8<L>      07 Oct 2023 01:08  Phos  1.6     10-07  Mg     1.90     10-07    TPro  4.6<L>  /  Alb  2.3<L>  /  TBili  0.7  /  DBili  x   /  AST  129<H>  /  ALT  140<H>  /  AlkPhos  36<L>  10-06  --------------------------------------------------------------------------------------

## 2023-10-08 NOTE — PHYSICAL THERAPY INITIAL EVALUATION ADULT - PERTINENT HX OF CURRENT PROBLEM, REHAB EVAL
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

## 2023-10-08 NOTE — PROGRESS NOTE ADULT - ASSESSMENT
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 SICU consulted for ventilator management and hemodynamic monitoring post-op, now extubated.    PLAN  NEUROLOGIC   - AOx3   - Pain control: Tylenol, toradol and dilaudid PRN    RESPIRATORY   - on RA   - Monitor SpO2 goal >92%    CARDIOVASCULAR   - Monitor hemodynamics, MAP > 65 off pressors    GASTROINTESTINAL   - Diet: NPO  - Zofran PRN    /RENAL   - IV fluids: D5 1/2 + 20 K @ 50  - Maintain cristina catheter for 2 weeks per primary team for bladder injury  - LLQ JUAQUIN drain, monitor output     HEMATOLOGIC  - Monitor H/H   - DVT prophylaxis: SCDs and SQH    INFECTIOUS DISEASE  - IV abx ppx: Cipro/Flagyl until Sunday    ENDOCRINE  - Monitor gluc  - ISS    LINES  - Rad a line, PIV, LLQ JUAQUIN, Cristina    Disposition: SICU  Code: FULL

## 2023-10-08 NOTE — PROGRESS NOTE ADULT - SUBJECTIVE AND OBJECTIVE BOX
Subjective: Patient seen and examined. No new events except as noted.     SUBJECTIVE/ROS:  nad      MEDICATIONS:  MEDICATIONS  (STANDING):  acetaminophen   IVPB .. 1000 milliGRAM(s) IV Intermittent every 6 hours  chlorhexidine 2% Cloths 1 Application(s) Topical daily  ciprofloxacin   IVPB 400 milliGRAM(s) IV Intermittent every 12 hours  dextrose 5% + sodium chloride 0.45% with potassium chloride 20 mEq/L 1000 milliLiter(s) (50 mL/Hr) IV Continuous <Continuous>  heparin   Injectable 5000 Unit(s) SubCutaneous every 8 hours  influenza   Vaccine 0.5 milliLiter(s) IntraMuscular once  insulin lispro (ADMELOG) corrective regimen sliding scale   SubCutaneous every 6 hours  ketorolac   Injectable 15 milliGRAM(s) IV Push every 6 hours  metroNIDAZOLE  IVPB 500 milliGRAM(s) IV Intermittent every 8 hours      PHYSICAL EXAM:  T(C): 36.3 (10-08-23 @ 04:00), Max: 36.5 (10-08-23 @ 00:00)  HR: 62 (10-08-23 @ 06:00) (58 - 92)  BP: 114/65 (10-07-23 @ 08:00) (114/65 - 117/65)  RR: 19 (10-08-23 @ 06:00) (10 - 23)  SpO2: 96% (10-08-23 @ 06:00) (95% - 100%)  Wt(kg): --  I&O's Summary    06 Oct 2023 07:01  -  07 Oct 2023 07:00  --------------------------------------------------------  IN: 4282 mL / OUT: 1845 mL / NET: 2437 mL    07 Oct 2023 07:01  -  08 Oct 2023 06:59  --------------------------------------------------------  IN: 2450 mL / OUT: 2000 mL / NET: 450 mL            JVP: Normal  Neck: supple  Lung: clear   CV: S1 S2 , Murmur:  Abd: soft  Ext: No edema  neuro: Awake / alert  Psych: flat affect  Skin: normal``    LABS/DATA:    CARDIAC MARKERS:                                7.7    8.62  )-----------( 214      ( 08 Oct 2023 01:05 )             22.7     10-08    138  |  108<H>  |  4<L>  ----------------------------<  105<H>  3.5   |  19<L>  |  0.51    Ca    7.6<L>      08 Oct 2023 01:05  Phos  2.6     10-08  Mg     1.80     10-08    TPro  4.3<L>  /  Alb  2.0<L>  /  TBili  0.7  /  DBili  x   /  AST  37<H>  /  ALT  69<H>  /  AlkPhos  43  10-08    proBNP:   Lipid Profile:   HgA1c:   TSH:     TELE:  EKG:

## 2023-10-09 LAB
ANION GAP SERPL CALC-SCNC: 10 MMOL/L — SIGNIFICANT CHANGE UP (ref 7–14)
ANION GAP SERPL CALC-SCNC: 10 MMOL/L — SIGNIFICANT CHANGE UP (ref 7–14)
BUN SERPL-MCNC: 5 MG/DL — LOW (ref 7–23)
BUN SERPL-MCNC: 5 MG/DL — LOW (ref 7–23)
CA-I BLD-SCNC: 1.04 MMOL/L — LOW (ref 1.15–1.29)
CALCIUM SERPL-MCNC: 7.5 MG/DL — LOW (ref 8.4–10.5)
CALCIUM SERPL-MCNC: 7.5 MG/DL — LOW (ref 8.4–10.5)
CHLORIDE SERPL-SCNC: 110 MMOL/L — HIGH (ref 98–107)
CHLORIDE SERPL-SCNC: 110 MMOL/L — HIGH (ref 98–107)
CO2 SERPL-SCNC: 19 MMOL/L — LOW (ref 22–31)
CO2 SERPL-SCNC: 19 MMOL/L — LOW (ref 22–31)
CREAT SERPL-MCNC: 0.48 MG/DL — LOW (ref 0.5–1.3)
CREAT SERPL-MCNC: 0.48 MG/DL — LOW (ref 0.5–1.3)
EGFR: 109 ML/MIN/1.73M2 — SIGNIFICANT CHANGE UP
EGFR: 109 ML/MIN/1.73M2 — SIGNIFICANT CHANGE UP
GLUCOSE BLDC GLUCOMTR-MCNC: 100 MG/DL — HIGH (ref 70–99)
GLUCOSE BLDC GLUCOMTR-MCNC: 94 MG/DL — SIGNIFICANT CHANGE UP (ref 70–99)
GLUCOSE SERPL-MCNC: 102 MG/DL — HIGH (ref 70–99)
GLUCOSE SERPL-MCNC: 102 MG/DL — HIGH (ref 70–99)
HCT VFR BLD CALC: 25 % — LOW (ref 34.5–45)
HGB BLD-MCNC: 8.2 G/DL — LOW (ref 11.5–15.5)
MAGNESIUM SERPL-MCNC: 1.9 MG/DL — SIGNIFICANT CHANGE UP (ref 1.6–2.6)
MAGNESIUM SERPL-MCNC: 1.9 MG/DL — SIGNIFICANT CHANGE UP (ref 1.6–2.6)
MCHC RBC-ENTMCNC: 26.9 PG — LOW (ref 27–34)
MCHC RBC-ENTMCNC: 32.8 GM/DL — SIGNIFICANT CHANGE UP (ref 32–36)
MCV RBC AUTO: 82 FL — SIGNIFICANT CHANGE UP (ref 80–100)
NRBC # BLD: 0 /100 WBCS — SIGNIFICANT CHANGE UP (ref 0–0)
NRBC # FLD: 0 K/UL — SIGNIFICANT CHANGE UP (ref 0–0)
PHOSPHATE SERPL-MCNC: 2.5 MG/DL — SIGNIFICANT CHANGE UP (ref 2.5–4.5)
PHOSPHATE SERPL-MCNC: 2.5 MG/DL — SIGNIFICANT CHANGE UP (ref 2.5–4.5)
PLATELET # BLD AUTO: 239 K/UL — SIGNIFICANT CHANGE UP (ref 150–400)
POTASSIUM SERPL-MCNC: 4 MMOL/L — SIGNIFICANT CHANGE UP (ref 3.5–5.3)
POTASSIUM SERPL-MCNC: 4 MMOL/L — SIGNIFICANT CHANGE UP (ref 3.5–5.3)
POTASSIUM SERPL-SCNC: 4 MMOL/L — SIGNIFICANT CHANGE UP (ref 3.5–5.3)
POTASSIUM SERPL-SCNC: 4 MMOL/L — SIGNIFICANT CHANGE UP (ref 3.5–5.3)
RBC # BLD: 3.05 M/UL — LOW (ref 3.8–5.2)
RBC # FLD: 20.5 % — HIGH (ref 10.3–14.5)
SODIUM SERPL-SCNC: 139 MMOL/L — SIGNIFICANT CHANGE UP (ref 135–145)
SODIUM SERPL-SCNC: 139 MMOL/L — SIGNIFICANT CHANGE UP (ref 135–145)
WBC # BLD: 6.06 K/UL — SIGNIFICANT CHANGE UP (ref 3.8–10.5)
WBC # FLD AUTO: 6.06 K/UL — SIGNIFICANT CHANGE UP (ref 3.8–10.5)

## 2023-10-09 PROCEDURE — 93971 EXTREMITY STUDY: CPT | Mod: 26,LT

## 2023-10-09 PROCEDURE — 99233 SBSQ HOSP IP/OBS HIGH 50: CPT | Mod: GC

## 2023-10-09 RX ORDER — OMEPRAZOLE 10 MG/1
1 CAPSULE, DELAYED RELEASE ORAL
Refills: 0 | DISCHARGE

## 2023-10-09 RX ORDER — PANTOPRAZOLE SODIUM 20 MG/1
40 TABLET, DELAYED RELEASE ORAL
Refills: 0 | Status: ACTIVE | OUTPATIENT
Start: 2023-10-09 | End: 2024-09-06

## 2023-10-09 RX ORDER — ENOXAPARIN SODIUM 100 MG/ML
70 INJECTION SUBCUTANEOUS EVERY 12 HOURS
Refills: 0 | Status: DISCONTINUED | OUTPATIENT
Start: 2023-10-09 | End: 2023-10-16

## 2023-10-09 RX ORDER — MAGNESIUM SULFATE 500 MG/ML
2 VIAL (ML) INJECTION ONCE
Refills: 0 | Status: COMPLETED | OUTPATIENT
Start: 2023-10-09 | End: 2023-10-09

## 2023-10-09 RX ORDER — PANTOPRAZOLE SODIUM 20 MG/1
40 TABLET, DELAYED RELEASE ORAL DAILY
Refills: 0 | Status: DISCONTINUED | OUTPATIENT
Start: 2023-10-09 | End: 2023-10-09

## 2023-10-09 RX ADMIN — HYDROMORPHONE HYDROCHLORIDE 0.2 MILLIGRAM(S): 2 INJECTION INTRAMUSCULAR; INTRAVENOUS; SUBCUTANEOUS at 16:21

## 2023-10-09 RX ADMIN — ENOXAPARIN SODIUM 70 MILLIGRAM(S): 100 INJECTION SUBCUTANEOUS at 12:25

## 2023-10-09 RX ADMIN — Medication 15 MILLIGRAM(S): at 18:22

## 2023-10-09 RX ADMIN — Medication 15 MILLIGRAM(S): at 18:51

## 2023-10-09 RX ADMIN — HYDROMORPHONE HYDROCHLORIDE 0.2 MILLIGRAM(S): 2 INJECTION INTRAMUSCULAR; INTRAVENOUS; SUBCUTANEOUS at 18:22

## 2023-10-09 RX ADMIN — HYDROMORPHONE HYDROCHLORIDE 0.5 MILLIGRAM(S): 2 INJECTION INTRAMUSCULAR; INTRAVENOUS; SUBCUTANEOUS at 23:20

## 2023-10-09 RX ADMIN — Medication 63.75 MILLIMOLE(S): at 05:45

## 2023-10-09 RX ADMIN — Medication 15 MILLIGRAM(S): at 23:47

## 2023-10-09 RX ADMIN — Medication 15 MILLIGRAM(S): at 06:30

## 2023-10-09 RX ADMIN — Medication 15 MILLIGRAM(S): at 12:24

## 2023-10-09 RX ADMIN — Medication 15 MILLIGRAM(S): at 00:11

## 2023-10-09 RX ADMIN — HYDROMORPHONE HYDROCHLORIDE 0.5 MILLIGRAM(S): 2 INJECTION INTRAMUSCULAR; INTRAVENOUS; SUBCUTANEOUS at 22:50

## 2023-10-09 RX ADMIN — ENOXAPARIN SODIUM 70 MILLIGRAM(S): 100 INJECTION SUBCUTANEOUS at 23:16

## 2023-10-09 RX ADMIN — DEXTROSE MONOHYDRATE, SODIUM CHLORIDE, AND POTASSIUM CHLORIDE 50 MILLILITER(S): 50; .745; 4.5 INJECTION, SOLUTION INTRAVENOUS at 22:50

## 2023-10-09 RX ADMIN — Medication 15 MILLIGRAM(S): at 05:45

## 2023-10-09 RX ADMIN — Medication 1000 MILLIGRAM(S): at 06:30

## 2023-10-09 RX ADMIN — Medication 15 MILLIGRAM(S): at 23:17

## 2023-10-09 RX ADMIN — Medication 1000 MILLIGRAM(S): at 18:22

## 2023-10-09 RX ADMIN — Medication 1000 MILLIGRAM(S): at 00:11

## 2023-10-09 RX ADMIN — PANTOPRAZOLE SODIUM 40 MILLIGRAM(S): 20 TABLET, DELAYED RELEASE ORAL at 08:09

## 2023-10-09 RX ADMIN — Medication 400 MILLIGRAM(S): at 00:11

## 2023-10-09 RX ADMIN — Medication 25 GRAM(S): at 02:02

## 2023-10-09 RX ADMIN — Medication 400 MILLIGRAM(S): at 12:24

## 2023-10-09 RX ADMIN — CHLORHEXIDINE GLUCONATE 1 APPLICATION(S): 213 SOLUTION TOPICAL at 12:33

## 2023-10-09 RX ADMIN — HEPARIN SODIUM 5000 UNIT(S): 5000 INJECTION INTRAVENOUS; SUBCUTANEOUS at 05:45

## 2023-10-09 RX ADMIN — DEXTROSE MONOHYDRATE, SODIUM CHLORIDE, AND POTASSIUM CHLORIDE 50 MILLILITER(S): 50; .745; 4.5 INJECTION, SOLUTION INTRAVENOUS at 08:09

## 2023-10-09 RX ADMIN — Medication 1000 MILLIGRAM(S): at 18:51

## 2023-10-09 RX ADMIN — Medication 400 MILLIGRAM(S): at 05:45

## 2023-10-09 NOTE — PROGRESS NOTE ADULT - SUBJECTIVE AND OBJECTIVE BOX
Subjective: Patient seen and examined. No new events except as noted.     SUBJECTIVE/ROS:  nad      MEDICATIONS:  MEDICATIONS  (STANDING):  acetaminophen   IVPB .. 1000 milliGRAM(s) IV Intermittent every 6 hours  chlorhexidine 2% Cloths 1 Application(s) Topical daily  dextrose 5% + sodium chloride 0.45% with potassium chloride 20 mEq/L 1000 milliLiter(s) (50 mL/Hr) IV Continuous <Continuous>  heparin   Injectable 5000 Unit(s) SubCutaneous every 8 hours  influenza   Vaccine 0.5 milliLiter(s) IntraMuscular once  insulin lispro (ADMELOG) corrective regimen sliding scale   SubCutaneous every 6 hours  ketorolac   Injectable 15 milliGRAM(s) IV Push every 6 hours  pantoprazole  Injectable 40 milliGRAM(s) IV Push daily      PHYSICAL EXAM:  T(C): 36.2 (10-09-23 @ 08:00), Max: 36.4 (10-08-23 @ 20:00)  HR: 71 (10-09-23 @ 08:00) (55 - 86)  BP: 129/72 (10-09-23 @ 08:00) (129/72 - 129/72)  RR: 18 (10-09-23 @ 08:00) (15 - 24)  SpO2: 97% (10-09-23 @ 04:00) (94% - 100%)  Wt(kg): --  I&O's Summary    08 Oct 2023 07:01  -  09 Oct 2023 07:00  --------------------------------------------------------  IN: 2225 mL / OUT: 2505 mL / NET: -280 mL    09 Oct 2023 07:01  -  09 Oct 2023 08:32  --------------------------------------------------------  IN: 0 mL / OUT: 400 mL / NET: -400 mL            JVP: Normal  Neck: supple  Lung: clear   CV: S1 S2 , Murmur:  Abd: soft  Ext: No edema  neuro: Awake / alert  Psych: flat affect  Skin: normal``    LABS/DATA:    CARDIAC MARKERS:                                8.2    6.06  )-----------( 239      ( 09 Oct 2023 00:30 )             25.0     10-09    139  |  110<H>  |  5<L>  ----------------------------<  102<H>  4.0   |  19<L>  |  0.48<L>    Ca    7.5<L>      09 Oct 2023 00:30  Phos  2.5     10-09  Mg     1.90     10-09    TPro  4.3<L>  /  Alb  2.0<L>  /  TBili  0.7  /  DBili  x   /  AST  37<H>  /  ALT  69<H>  /  AlkPhos  43  10-08    proBNP:   Lipid Profile:   HgA1c:   TSH:     TELE:  EKG:

## 2023-10-09 NOTE — OCCUPATIONAL THERAPY INITIAL EVALUATION ADULT - PERTINENT HX OF CURRENT PROBLEM, REHAB EVAL
58 year old female with history of malignant neoplasm of appendix presenting s/p appendectomy, cholecystectomy, hysterectomy, omenectomy, peritenectomy, LAR with diverting loop ileostomy and bladder injury with primary repair on 10/4.

## 2023-10-09 NOTE — OCCUPATIONAL THERAPY INITIAL EVALUATION ADULT - NSOTDISCHREC_GEN_A_CORE
Anticipating home with no OT needs. Patient may benefit from continued PT services. OT to continue to follow. Anticipating home with home OT to improve ability to perform ADL tasks. Patient may benefit from continued PT services. OT to continue to follow./Home OT

## 2023-10-09 NOTE — OCCUPATIONAL THERAPY INITIAL EVALUATION ADULT - LIVES WITH, PROFILE
Patient lives alone on the 2nd floor of a 2 family home with 5 steps to enter + 14 steps to 2nd floor.

## 2023-10-09 NOTE — PROGRESS NOTE ADULT - ASSESSMENT
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 SICU consulted for ventilator management and hemodynamic monitoring post-op, now extubated.    PLAN  NEUROLOGIC   - AOx3   - Pain control: Tylenol, toradol and dilaudid PRN    RESPIRATORY   - on RA   - Monitor SpO2 goal >92%    CARDIOVASCULAR   - Monitor hemodynamics, MAP > 65 off pressors    GASTROINTESTINAL   - Diet: NPO  - Zofran PRN    /RENAL   - IV fluids: D5 1/2 + 20 K @ 50  - Maintain cristina catheter for 2 weeks per primary team for bladder injury  - LLQ JUAQUIN drain, monitor output     HEMATOLOGIC  - Monitor H/H   - DVT prophylaxis: SCDs and SQH    INFECTIOUS DISEASE  - IV abx ppx: Cipro/Flagyl discontinued     ENDOCRINE  - Monitor glucose  - ISS    LINES  - Rad a line, PIV, LLQ JUAQUIN, Cristina    Disposition: SICU  Code: FULL  59 yo 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 SICU consulted for ventilator management and hemodynamic monitoring post-op, now extubated.    PLAN  NEUROLOGIC   - AOx3   - Pain control: Tylenol, toradol and dilaudid PRN    RESPIRATORY   - on RA   - Monitor SpO2 goal >92%    CARDIOVASCULAR   - Monitor hemodynamics, MAP > 65 off pressors    GASTROINTESTINAL   - Diet: CLD   - Zofran PRN  - Protonix ppx     /RENAL   - IV fluids: D5 1/2 + 20 K @ 50  - Maintain cristina catheter for 2 weeks per primary team for bladder injury  - LLQ JUAQUIN drain, monitor output     HEMATOLOGIC  - DVT prophylaxis: SCDs and SQH  - F/u LUE duplex r/o DVT     INFECTIOUS DISEASE  - Completed abx Cipro/Flagyl (10/4 - 10/8)     ENDOCRINE  - Monitor glucose  - ISS    LINES  - Rad a line, PIV, LLQ JUAQUIN, Cristina    Disposition: SICU  Code: FULL

## 2023-10-09 NOTE — OCCUPATIONAL THERAPY INITIAL EVALUATION ADULT - DIAGNOSIS, OT EVAL
s/p appendectomy, s/p ileostomy, s/p cholecystectomy; decreased functional mobility, decreased ADL performance

## 2023-10-09 NOTE — PROGRESS NOTE ADULT - ASSESSMENT
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      PLAN:  - Do not touch cristina x2 weeks  - Follow up LUE US read  - NPO sips ok  - IV Protonix  - Monitor Ostomy  - Monitor JUAQUIN outpt  - FS q6h with ISS  - VTE ppx: SQH  - PT/OT consult appreciated  - Appreciate SICU Care    D Team Surgery  [id number]

## 2023-10-09 NOTE — PROGRESS NOTE ADULT - SUBJECTIVE AND OBJECTIVE BOX
SICU Consultation Note  ====================================================  24 Hours Events:  - Discontinued antibiotics  - Pending LUE duplex  to r/o DVT  - Listed to the floor     HPI:  58 year old female with pre op dx of malignant neoplasm of appendix is scheduled for cytoreduction and hipec for appendiceal adenocarcinoma. (21 Sep 2023 16:45)      PAST MEDICAL & SURGICAL HISTORY:  History of sickle cell trait      Malignant neoplasm of appendix      History of ectopic pregnancy      H/O colonoscopy      H/O endoscopy        Home Meds: Home Medications:  Eliquis 5 mg oral tablet: 1 tab(s) orally 2 times a day started on 9/11/2023 (04 Oct 2023 08:00)  metoclopramide 10 mg oral tablet: 1 tab(s) orally every 6 hours as needed (04 Oct 2023 08:00)  omeprazole 40 mg oral delayed release capsule: 1 cap(s) orally once a day as needed (04 Oct 2023 08:00)  Tylenol 500 mg oral tablet: 1 tab(s) orally as needed (04 Oct 2023 08:00)    Allergies: Allergies    penicillin (Rash)    Intolerances      Soc:   Advanced Directives: Presumed Full Code     ROS:    REVIEW OF SYSTEMS    [ ] A ten-point review of systems was otherwise negative except as noted.  [ ] Due to altered mental status/intubation, subjective information were not able to be obtained from the patient. History was obtained, to the extent possible, from review of the chart and collateral sources of information.      CURRENT MEDICATIONS:   --------------------------------------------------------------------------------------  Neurologic Medications  acetaminophen   IVPB .. 1000 milliGRAM(s) IV Intermittent every 6 hours  HYDROmorphone  Injectable 0.2 milliGRAM(s) IV Push every 4 hours PRN Moderate Pain (4 - 6)  HYDROmorphone  Injectable 0.5 milliGRAM(s) IV Push every 4 hours PRN Severe Pain (7 - 10)  ketorolac   Injectable 15 milliGRAM(s) IV Push every 6 hours  ondansetron Injectable 4 milliGRAM(s) IV Push every 6 hours PRN Nausea and/or Vomiting    Respiratory Medications    Cardiovascular Medications    Gastrointestinal Medications  dextrose 5% + sodium chloride 0.45% with potassium chloride 20 mEq/L 1000 milliLiter(s) IV Continuous <Continuous>    Genitourinary Medications    Hematologic/Oncologic Medications  heparin   Injectable 5000 Unit(s) SubCutaneous every 8 hours  influenza   Vaccine 0.5 milliLiter(s) IntraMuscular once    Antimicrobial/Immunologic Medications    Endocrine/Metabolic Medications  insulin lispro (ADMELOG) corrective regimen sliding scale   SubCutaneous every 6 hours    Topical/Other Medications  chlorhexidine 2% Cloths 1 Application(s) Topical daily  naloxone Injectable 0.1 milliGRAM(s) IV Push every 3 minutes PRN For ANY of the following changes in patient status:  A. RR LESS THAN 10 breaths per minute, B. Oxygen saturation LESS THAN 90%, C. Sedation score of 6    --------------------------------------------------------------------------------------    VITAL SIGNS, INS/OUTS (last 24 hours):  --------------------------------------------------------------------------------------  ICU Vital Signs Last 24 Hrs  T(C): 36.4 (08 Oct 2023 20:00), Max: 36.4 (08 Oct 2023 20:00)  T(F): 97.5 (08 Oct 2023 20:00), Max: 97.5 (08 Oct 2023 20:00)  HR: 63 (08 Oct 2023 22:00) (55 - 86)  BP: --  BP(mean): --  ABP: 131/71 (08 Oct 2023 22:00) (105/53 - 144/71)  ABP(mean): 95 (08 Oct 2023 22:00) (73 - 101)  RR: 21 (08 Oct 2023 22:00) (17 - 24)  SpO2: 100% (08 Oct 2023 22:00) (94% - 100%)    O2 Parameters below as of 08 Oct 2023 20:00  Patient On (Oxygen Delivery Method): room air          I&O's Summary    07 Oct 2023 07:01  -  08 Oct 2023 07:00  --------------------------------------------------------  IN: 2500 mL / OUT: 2000 mL / NET: 500 mL    08 Oct 2023 07:01  -  09 Oct 2023 00:01  --------------------------------------------------------  IN: 1325 mL / OUT: 1685 mL / NET: -360 mL      --------------------------------------------------------------------------------------    EXAM:  NEURO: NAD,   HEENT: NC/AT  RESPIRATORY: nonlabored respirations, normal CW expansion  CARDIO: RRR, S1S2  ABDOMEN: soft, nontender, nondistended, incision c/d/i, JUAQUIN drain x 1 SS  EXTREMITIES: normal strength, no deformities      LABS  --------------------------------------------------------------------------------------  Labs:  CAPILLARY BLOOD GLUCOSE      POCT Blood Glucose.: 83 mg/dL (08 Oct 2023 18:00)  POCT Blood Glucose.: 94 mg/dL (08 Oct 2023 11:46)  POCT Blood Glucose.: 95 mg/dL (08 Oct 2023 06:06)                          7.7    8.62  )-----------( 214      ( 08 Oct 2023 01:05 )             22.7         10-08    138  |  108<H>  |  4<L>  ----------------------------<  105<H>  3.5   |  19<L>  |  0.51      Calcium: 7.6 mg/dL (10-08-23 @ 01:05)      LFTs:             4.3  | 0.7  | 37       ------------------[43      ( 08 Oct 2023 01:05 )  2.0  | x    | 69          Lipase:x      Amylase:x           ABG - ( 05 Oct 2023 00:37 )  pH: 7.35  /  pCO2: 34    /  pO2: 215   / HCO3: 19    / Base Excess: -6.1  /  SaO2: 100.0           ABG - ( 04 Oct 2023 22:00 )  pH: 7.31  /  pCO2: 35    /  pO2: 187   / HCO3: 18    / Base Excess: -7.9  /  SaO2: 99.1            ABG - ( 04 Oct 2023 19:34 )  pH: 7.23  /  pCO2: 42    /  pO2: 205   / HCO3: 18    / Base Excess: -9.4  /  SaO2: 99.8              Coags:            Urinalysis Basic - ( 08 Oct 2023 01:05 )    Color: x / Appearance: x / SG: x / pH: x  Gluc: 105 mg/dL / Ketone: x  / Bili: x / Urobili: x   Blood: x / Protein: x / Nitrite: x   Leuk Esterase: x / RBC: x / WBC x   Sq Epi: x / Non Sq Epi: x / Bacteria: x          --------------------------------------------------------------------------------------      IMAGING RESULTS      ASSESSMENT:  58y Female ***    PLAN:   Neurologic:   Respiratory:   Cardiovascular:   Gastrointestinal/Nutrition:   Renal/Genitourinary:   Hematologic:   Infectious Disease:   Lines/Tubes:  Endocrine:   Disposition:       Critical Care Diagnoses:   SICU Consultation Note  ====================================================  24 Hours Events:  - Discontinued antibiotics  - Pending LUE duplex  to r/o DVT  - Listed to the floor     HPI:  58 year old female with pre op dx of malignant neoplasm of appendix is scheduled for cytoreduction and hipec for appendiceal adenocarcinoma. (21 Sep 2023 16:45)      PAST MEDICAL & SURGICAL HISTORY:  History of sickle cell trait      Malignant neoplasm of appendix      History of ectopic pregnancy      H/O colonoscopy      H/O endoscopy        Home Meds: Home Medications:  Eliquis 5 mg oral tablet: 1 tab(s) orally 2 times a day started on 9/11/2023 (04 Oct 2023 08:00)  metoclopramide 10 mg oral tablet: 1 tab(s) orally every 6 hours as needed (04 Oct 2023 08:00)  omeprazole 40 mg oral delayed release capsule: 1 cap(s) orally once a day as needed (04 Oct 2023 08:00)  Tylenol 500 mg oral tablet: 1 tab(s) orally as needed (04 Oct 2023 08:00)    Allergies: Allergies    penicillin (Rash)    Intolerances      Soc:   Advanced Directives: Presumed Full Code     ROS:    REVIEW OF SYSTEMS    [ ] A ten-point review of systems was otherwise negative except as noted.  [ ] Due to altered mental status/intubation, subjective information were not able to be obtained from the patient. History was obtained, to the extent possible, from review of the chart and collateral sources of information.      CURRENT MEDICATIONS:   --------------------------------------------------------------------------------------  Neurologic Medications  acetaminophen   IVPB .. 1000 milliGRAM(s) IV Intermittent every 6 hours  HYDROmorphone  Injectable 0.2 milliGRAM(s) IV Push every 4 hours PRN Moderate Pain (4 - 6)  HYDROmorphone  Injectable 0.5 milliGRAM(s) IV Push every 4 hours PRN Severe Pain (7 - 10)  ketorolac   Injectable 15 milliGRAM(s) IV Push every 6 hours  ondansetron Injectable 4 milliGRAM(s) IV Push every 6 hours PRN Nausea and/or Vomiting    Respiratory Medications    Cardiovascular Medications    Gastrointestinal Medications  dextrose 5% + sodium chloride 0.45% with potassium chloride 20 mEq/L 1000 milliLiter(s) IV Continuous <Continuous>    Genitourinary Medications    Hematologic/Oncologic Medications  heparin   Injectable 5000 Unit(s) SubCutaneous every 8 hours  influenza   Vaccine 0.5 milliLiter(s) IntraMuscular once    Antimicrobial/Immunologic Medications    Endocrine/Metabolic Medications  insulin lispro (ADMELOG) corrective regimen sliding scale   SubCutaneous every 6 hours    Topical/Other Medications  chlorhexidine 2% Cloths 1 Application(s) Topical daily  naloxone Injectable 0.1 milliGRAM(s) IV Push every 3 minutes PRN For ANY of the following changes in patient status:  A. RR LESS THAN 10 breaths per minute, B. Oxygen saturation LESS THAN 90%, C. Sedation score of 6    --------------------------------------------------------------------------------------    VITAL SIGNS, INS/OUTS (last 24 hours):  --------------------------------------------------------------------------------------  ICU Vital Signs Last 24 Hrs  T(C): 36.4 (08 Oct 2023 20:00), Max: 36.4 (08 Oct 2023 20:00)  T(F): 97.5 (08 Oct 2023 20:00), Max: 97.5 (08 Oct 2023 20:00)  HR: 63 (08 Oct 2023 22:00) (55 - 86)  BP: --  BP(mean): --  ABP: 131/71 (08 Oct 2023 22:00) (105/53 - 144/71)  ABP(mean): 95 (08 Oct 2023 22:00) (73 - 101)  RR: 21 (08 Oct 2023 22:00) (17 - 24)  SpO2: 100% (08 Oct 2023 22:00) (94% - 100%)    O2 Parameters below as of 08 Oct 2023 20:00  Patient On (Oxygen Delivery Method): room air          I&O's Summary    07 Oct 2023 07:01  -  08 Oct 2023 07:00  --------------------------------------------------------  IN: 2500 mL / OUT: 2000 mL / NET: 500 mL    08 Oct 2023 07:01  -  09 Oct 2023 00:01  --------------------------------------------------------  IN: 1325 mL / OUT: 1685 mL / NET: -360 mL      --------------------------------------------------------------------------------------    EXAM:  NEURO: NAD,   HEENT: NC/AT  RESPIRATORY: nonlabored respirations, normal CW expansion  CARDIO: RRR, S1S2  ABDOMEN: soft, nontender, nondistended, incision c/d/i, JUAQUIN drain x 1 SS  EXTREMITIES: normal strength, no deformities      LABS  --------------------------------------------------------------------------------------  Labs:  CAPILLARY BLOOD GLUCOSE      POCT Blood Glucose.: 83 mg/dL (08 Oct 2023 18:00)  POCT Blood Glucose.: 94 mg/dL (08 Oct 2023 11:46)  POCT Blood Glucose.: 95 mg/dL (08 Oct 2023 06:06)                          7.7    8.62  )-----------( 214      ( 08 Oct 2023 01:05 )             22.7         10-08    138  |  108<H>  |  4<L>  ----------------------------<  105<H>  3.5   |  19<L>  |  0.51      Calcium: 7.6 mg/dL (10-08-23 @ 01:05)      LFTs:             4.3  | 0.7  | 37       ------------------[43      ( 08 Oct 2023 01:05 )  2.0  | x    | 69          Lipase:x      Amylase:x           ABG - ( 05 Oct 2023 00:37 )  pH: 7.35  /  pCO2: 34    /  pO2: 215   / HCO3: 19    / Base Excess: -6.1  /  SaO2: 100.0           ABG - ( 04 Oct 2023 22:00 )  pH: 7.31  /  pCO2: 35    /  pO2: 187   / HCO3: 18    / Base Excess: -7.9  /  SaO2: 99.1            ABG - ( 04 Oct 2023 19:34 )  pH: 7.23  /  pCO2: 42    /  pO2: 205   / HCO3: 18    / Base Excess: -9.4  /  SaO2: 99.8              Coags:            Urinalysis Basic - ( 08 Oct 2023 01:05 )    Color: x / Appearance: x / SG: x / pH: x  Gluc: 105 mg/dL / Ketone: x  / Bili: x / Urobili: x   Blood: x / Protein: x / Nitrite: x   Leuk Esterase: x / RBC: x / WBC x   Sq Epi: x / Non Sq Epi: x / Bacteria: x          --------------------------------------------------------------------------------------       SICU Consultation Note  ====================================================  24 Hours Events:  - Advanced to CLD   - Completed abx cipro/flagyl (10/4-10/8)  - F/u LUE duplex r/o DVT  - Listed to the floor     HPI:  58 year old female with pre op dx of malignant neoplasm of appendix is scheduled for cytoreduction and hipec for appendiceal adenocarcinoma. (21 Sep 2023 16:45)      PAST MEDICAL & SURGICAL HISTORY:  History of sickle cell trait      Malignant neoplasm of appendix      History of ectopic pregnancy      H/O colonoscopy      H/O endoscopy        Home Meds: Home Medications:  Eliquis 5 mg oral tablet: 1 tab(s) orally 2 times a day started on 9/11/2023 (04 Oct 2023 08:00)  metoclopramide 10 mg oral tablet: 1 tab(s) orally every 6 hours as needed (04 Oct 2023 08:00)  omeprazole 40 mg oral delayed release capsule: 1 cap(s) orally once a day as needed (04 Oct 2023 08:00)  Tylenol 500 mg oral tablet: 1 tab(s) orally as needed (04 Oct 2023 08:00)    Allergies: Allergies    penicillin (Rash)    Intolerances      Soc:   Advanced Directives: Presumed Full Code     ROS:    REVIEW OF SYSTEMS    [ ] A ten-point review of systems was otherwise negative except as noted.  [ ] Due to altered mental status/intubation, subjective information were not able to be obtained from the patient. History was obtained, to the extent possible, from review of the chart and collateral sources of information.      CURRENT MEDICATIONS:   --------------------------------------------------------------------------------------  Neurologic Medications  acetaminophen   IVPB .. 1000 milliGRAM(s) IV Intermittent every 6 hours  HYDROmorphone  Injectable 0.2 milliGRAM(s) IV Push every 4 hours PRN Moderate Pain (4 - 6)  HYDROmorphone  Injectable 0.5 milliGRAM(s) IV Push every 4 hours PRN Severe Pain (7 - 10)  ketorolac   Injectable 15 milliGRAM(s) IV Push every 6 hours  ondansetron Injectable 4 milliGRAM(s) IV Push every 6 hours PRN Nausea and/or Vomiting    Respiratory Medications    Cardiovascular Medications    Gastrointestinal Medications  dextrose 5% + sodium chloride 0.45% with potassium chloride 20 mEq/L 1000 milliLiter(s) IV Continuous <Continuous>    Genitourinary Medications    Hematologic/Oncologic Medications  heparin   Injectable 5000 Unit(s) SubCutaneous every 8 hours  influenza   Vaccine 0.5 milliLiter(s) IntraMuscular once    Antimicrobial/Immunologic Medications    Endocrine/Metabolic Medications  insulin lispro (ADMELOG) corrective regimen sliding scale   SubCutaneous every 6 hours    Topical/Other Medications  chlorhexidine 2% Cloths 1 Application(s) Topical daily  naloxone Injectable 0.1 milliGRAM(s) IV Push every 3 minutes PRN For ANY of the following changes in patient status:  A. RR LESS THAN 10 breaths per minute, B. Oxygen saturation LESS THAN 90%, C. Sedation score of 6    --------------------------------------------------------------------------------------    VITAL SIGNS, INS/OUTS (last 24 hours):  --------------------------------------------------------------------------------------  ICU Vital Signs Last 24 Hrs  T(C): 36.4 (08 Oct 2023 20:00), Max: 36.4 (08 Oct 2023 20:00)  T(F): 97.5 (08 Oct 2023 20:00), Max: 97.5 (08 Oct 2023 20:00)  HR: 63 (08 Oct 2023 22:00) (55 - 86)  BP: --  BP(mean): --  ABP: 131/71 (08 Oct 2023 22:00) (105/53 - 144/71)  ABP(mean): 95 (08 Oct 2023 22:00) (73 - 101)  RR: 21 (08 Oct 2023 22:00) (17 - 24)  SpO2: 100% (08 Oct 2023 22:00) (94% - 100%)    O2 Parameters below as of 08 Oct 2023 20:00  Patient On (Oxygen Delivery Method): room air          I&O's Summary    07 Oct 2023 07:01  -  08 Oct 2023 07:00  --------------------------------------------------------  IN: 2500 mL / OUT: 2000 mL / NET: 500 mL    08 Oct 2023 07:01  -  09 Oct 2023 00:01  --------------------------------------------------------  IN: 1325 mL / OUT: 1685 mL / NET: -360 mL      --------------------------------------------------------------------------------------    EXAM:  NEURO: NAD,   HEENT: NC/AT  RESPIRATORY: nonlabored respirations, normal CW expansion  CARDIO: RRR, S1S2  ABDOMEN: soft, nontender, nondistended, incision c/d/i, JUAQUIN drain x 1 SS  EXTREMITIES: normal strength, no deformities      LABS  --------------------------------------------------------------------------------------  Labs:  CAPILLARY BLOOD GLUCOSE      POCT Blood Glucose.: 83 mg/dL (08 Oct 2023 18:00)  POCT Blood Glucose.: 94 mg/dL (08 Oct 2023 11:46)  POCT Blood Glucose.: 95 mg/dL (08 Oct 2023 06:06)                          7.7    8.62  )-----------( 214      ( 08 Oct 2023 01:05 )             22.7         10-08    138  |  108<H>  |  4<L>  ----------------------------<  105<H>  3.5   |  19<L>  |  0.51      Calcium: 7.6 mg/dL (10-08-23 @ 01:05)      LFTs:             4.3  | 0.7  | 37       ------------------[43      ( 08 Oct 2023 01:05 )  2.0  | x    | 69          Lipase:x      Amylase:x           ABG - ( 05 Oct 2023 00:37 )  pH: 7.35  /  pCO2: 34    /  pO2: 215   / HCO3: 19    / Base Excess: -6.1  /  SaO2: 100.0           ABG - ( 04 Oct 2023 22:00 )  pH: 7.31  /  pCO2: 35    /  pO2: 187   / HCO3: 18    / Base Excess: -7.9  /  SaO2: 99.1            ABG - ( 04 Oct 2023 19:34 )  pH: 7.23  /  pCO2: 42    /  pO2: 205   / HCO3: 18    / Base Excess: -9.4  /  SaO2: 99.8              Coags:            Urinalysis Basic - ( 08 Oct 2023 01:05 )    Color: x / Appearance: x / SG: x / pH: x  Gluc: 105 mg/dL / Ketone: x  / Bili: x / Urobili: x   Blood: x / Protein: x / Nitrite: x   Leuk Esterase: x / RBC: x / WBC x   Sq Epi: x / Non Sq Epi: x / Bacteria: x          --------------------------------------------------------------------------------------

## 2023-10-09 NOTE — PROGRESS NOTE ADULT - SUBJECTIVE AND OBJECTIVE BOX
TEAM [ D ] Surgery Daily Progress Note  =====================================================    SUBJECTIVE: Patient seen and examined at bedside on AM rounds. Patient reports that they're feeling well. NPO denies nausea, vomiting.   +gas/+stool ostomy . OOB/Amublating as tolerated. Denies fever, chills.    ALLERGIES:  penicillin (Rash)      --------------------------------------------------------------------------------------    MEDICATIONS:    Neurologic Medications  acetaminophen   IVPB .. 1000 milliGRAM(s) IV Intermittent every 6 hours  HYDROmorphone  Injectable 0.2 milliGRAM(s) IV Push every 4 hours PRN Moderate Pain (4 - 6)  HYDROmorphone  Injectable 0.5 milliGRAM(s) IV Push every 4 hours PRN Severe Pain (7 - 10)  ketorolac   Injectable 15 milliGRAM(s) IV Push every 6 hours  ondansetron Injectable 4 milliGRAM(s) IV Push every 6 hours PRN Nausea and/or Vomiting    Gastrointestinal Medications  dextrose 5% + sodium chloride 0.45% with potassium chloride 20 mEq/L 1000 milliLiter(s) IV Continuous <Continuous>  pantoprazole  Injectable 40 milliGRAM(s) IV Push daily    Genitourinary Medications    Hematologic/Oncologic Medications  heparin   Injectable 5000 Unit(s) SubCutaneous every 8 hours  influenza   Vaccine 0.5 milliLiter(s) IntraMuscular once    Antimicrobial/Immunologic Medications    Endocrine/Metabolic Medications  insulin lispro (ADMELOG) corrective regimen sliding scale   SubCutaneous every 6 hours    Topical/Other Medications  chlorhexidine 2% Cloths 1 Application(s) Topical daily  naloxone Injectable 0.1 milliGRAM(s) IV Push every 3 minutes PRN For ANY of the following changes in patient status:  A. RR LESS THAN 10 breaths per minute, B. Oxygen saturation LESS THAN 90%, C. Sedation score of 6    --------------------------------------------------------------------------------------    VITAL SIGNS:  T(C): 36.2 (10-09-23 @ 04:00), Max: 36.4 (10-08-23 @ 20:00)  HR: 55 (10-09-23 @ 04:00) (55 - 86)  BP: --  RR: 15 (10-09-23 @ 04:00) (15 - 24)  SpO2: 97% (10-09-23 @ 04:00) (94% - 100%)  --------------------------------------------------------------------------------------    EXAM    General: NAD, resting in bed comfortably.  Cardiac: regular rate, warm and well perfused  Respiratory: Nonlabored respirations, normal cw expansion.  Abdomen: soft, appropriately tender, midline incision with prevena vac removed on AM rounds, midline surgical incision c/d/i, ostomy +/+ with red rubber , simón drain right abdomen s/s output.   Extremities: normal strength, FROM, no deformities    --------------------------------------------------------------------------------------    LABS                        8.2    6.06  )-----------( 239      ( 09 Oct 2023 00:30 )             25.0     10-09    139  |  110<H>  |  5<L>  ----------------------------<  102<H>  4.0   |  19<L>  |  0.48<L>    Ca    7.5<L>      09 Oct 2023 00:30  Phos  2.5     10-09  Mg     1.90     10-09    TPro  4.3<L>  /  Alb  2.0<L>  /  TBili  0.7  /  DBili  x   /  AST  37<H>  /  ALT  69<H>  /  AlkPhos  43  10-08    --------------------------------------------------------------------------------------    INS AND OUTS:    10-08-23 @ 07:01  -  10-09-23 @ 07:00  --------------------------------------------------------  IN: 2225 mL / OUT: 2505 mL / NET: -280 mL      --------------------------------------------------------------------------------------

## 2023-10-10 LAB
ALBUMIN SERPL ELPH-MCNC: 2.3 G/DL — LOW (ref 3.3–5)
ALP SERPL-CCNC: 238 U/L — HIGH (ref 40–120)
ALT FLD-CCNC: 59 U/L — HIGH (ref 4–33)
ANION GAP SERPL CALC-SCNC: 7 MMOL/L — SIGNIFICANT CHANGE UP (ref 7–14)
AST SERPL-CCNC: 62 U/L — HIGH (ref 4–32)
BILIRUB SERPL-MCNC: 0.4 MG/DL — SIGNIFICANT CHANGE UP (ref 0.2–1.2)
BUN SERPL-MCNC: 7 MG/DL — SIGNIFICANT CHANGE UP (ref 7–23)
CALCIUM SERPL-MCNC: 7.9 MG/DL — LOW (ref 8.4–10.5)
CHLORIDE SERPL-SCNC: 109 MMOL/L — HIGH (ref 98–107)
CO2 SERPL-SCNC: 24 MMOL/L — SIGNIFICANT CHANGE UP (ref 22–31)
CREAT SERPL-MCNC: 0.48 MG/DL — LOW (ref 0.5–1.3)
EGFR: 109 ML/MIN/1.73M2 — SIGNIFICANT CHANGE UP
GLUCOSE SERPL-MCNC: 94 MG/DL — SIGNIFICANT CHANGE UP (ref 70–99)
HCT VFR BLD CALC: 24.3 % — LOW (ref 34.5–45)
HGB BLD-MCNC: 8.1 G/DL — LOW (ref 11.5–15.5)
MAGNESIUM SERPL-MCNC: 2 MG/DL — SIGNIFICANT CHANGE UP (ref 1.6–2.6)
MCHC RBC-ENTMCNC: 27.3 PG — SIGNIFICANT CHANGE UP (ref 27–34)
MCHC RBC-ENTMCNC: 33.3 GM/DL — SIGNIFICANT CHANGE UP (ref 32–36)
MCV RBC AUTO: 81.8 FL — SIGNIFICANT CHANGE UP (ref 80–100)
NRBC # BLD: 0 /100 WBCS — SIGNIFICANT CHANGE UP (ref 0–0)
NRBC # FLD: 0 K/UL — SIGNIFICANT CHANGE UP (ref 0–0)
PHOSPHATE SERPL-MCNC: 2.7 MG/DL — SIGNIFICANT CHANGE UP (ref 2.5–4.5)
PLATELET # BLD AUTO: 305 K/UL — SIGNIFICANT CHANGE UP (ref 150–400)
POTASSIUM SERPL-MCNC: 4 MMOL/L — SIGNIFICANT CHANGE UP (ref 3.5–5.3)
POTASSIUM SERPL-SCNC: 4 MMOL/L — SIGNIFICANT CHANGE UP (ref 3.5–5.3)
PROT SERPL-MCNC: 4.5 G/DL — LOW (ref 6–8.3)
RBC # BLD: 2.97 M/UL — LOW (ref 3.8–5.2)
RBC # FLD: 20.7 % — HIGH (ref 10.3–14.5)
SODIUM SERPL-SCNC: 140 MMOL/L — SIGNIFICANT CHANGE UP (ref 135–145)
WBC # BLD: 6.75 K/UL — SIGNIFICANT CHANGE UP (ref 3.8–10.5)
WBC # FLD AUTO: 6.75 K/UL — SIGNIFICANT CHANGE UP (ref 3.8–10.5)

## 2023-10-10 RX ORDER — ACETAMINOPHEN 500 MG
1000 TABLET ORAL EVERY 6 HOURS
Refills: 0 | Status: COMPLETED | OUTPATIENT
Start: 2023-10-10 | End: 2023-10-11

## 2023-10-10 RX ORDER — ACETAMINOPHEN 500 MG
1000 TABLET ORAL EVERY 6 HOURS
Refills: 0 | Status: DISCONTINUED | OUTPATIENT
Start: 2023-10-10 | End: 2023-10-10

## 2023-10-10 RX ADMIN — Medication 15 MILLIGRAM(S): at 13:34

## 2023-10-10 RX ADMIN — HYDROMORPHONE HYDROCHLORIDE 0.5 MILLIGRAM(S): 2 INJECTION INTRAMUSCULAR; INTRAVENOUS; SUBCUTANEOUS at 19:55

## 2023-10-10 RX ADMIN — HYDROMORPHONE HYDROCHLORIDE 0.5 MILLIGRAM(S): 2 INJECTION INTRAMUSCULAR; INTRAVENOUS; SUBCUTANEOUS at 04:53

## 2023-10-10 RX ADMIN — Medication 1000 MILLIGRAM(S): at 12:34

## 2023-10-10 RX ADMIN — HYDROMORPHONE HYDROCHLORIDE 0.5 MILLIGRAM(S): 2 INJECTION INTRAMUSCULAR; INTRAVENOUS; SUBCUTANEOUS at 21:47

## 2023-10-10 RX ADMIN — Medication 15 MILLIGRAM(S): at 17:32

## 2023-10-10 RX ADMIN — HYDROMORPHONE HYDROCHLORIDE 0.5 MILLIGRAM(S): 2 INJECTION INTRAMUSCULAR; INTRAVENOUS; SUBCUTANEOUS at 13:34

## 2023-10-10 RX ADMIN — HYDROMORPHONE HYDROCHLORIDE 0.5 MILLIGRAM(S): 2 INJECTION INTRAMUSCULAR; INTRAVENOUS; SUBCUTANEOUS at 05:23

## 2023-10-10 RX ADMIN — ENOXAPARIN SODIUM 70 MILLIGRAM(S): 100 INJECTION SUBCUTANEOUS at 12:34

## 2023-10-10 RX ADMIN — Medication 15 MILLIGRAM(S): at 06:22

## 2023-10-10 RX ADMIN — Medication 1000 MILLIGRAM(S): at 13:34

## 2023-10-10 RX ADMIN — HYDROMORPHONE HYDROCHLORIDE 0.5 MILLIGRAM(S): 2 INJECTION INTRAMUSCULAR; INTRAVENOUS; SUBCUTANEOUS at 12:33

## 2023-10-10 RX ADMIN — Medication 15 MILLIGRAM(S): at 12:34

## 2023-10-10 RX ADMIN — Medication 1000 MILLIGRAM(S): at 17:32

## 2023-10-10 RX ADMIN — Medication 15 MILLIGRAM(S): at 05:52

## 2023-10-10 NOTE — PROGRESS NOTE ADULT - ASSESSMENT
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 found to have LIJ And inominate vein acute non-occlusive thrombosis now on T lovenox.    PLAN:  - Do not touch cristina x2 weeks  - LFD  - PO protonix  - Monitor Ostomy: ostomy nursing consult  - Monitor JUAQUIN outpt  - FS q6h with ISS  - VTE ppx: T Lovenox for IJ and inominate thrombus  - ACE wrap LUE  - PT/OT consult appreciated    D Team Surgery  [id number]

## 2023-10-10 NOTE — PROGRESS NOTE ADULT - SUBJECTIVE AND OBJECTIVE BOX
Subjective: Patient seen and examined. No new events except as noted.     SUBJECTIVE/ROS:  No chest pain, dyspnea, palpitation, or dizziness.       MEDICATIONS:  MEDICATIONS  (STANDING):  acetaminophen     Tablet .. 1000 milliGRAM(s) Oral every 6 hours  enoxaparin Injectable 70 milliGRAM(s) SubCutaneous every 12 hours  influenza   Vaccine 0.5 milliLiter(s) IntraMuscular once  ketorolac   Injectable 15 milliGRAM(s) IV Push every 6 hours  pantoprazole    Tablet 40 milliGRAM(s) Oral before breakfast      PHYSICAL EXAM:  T(C): 36.7 (10-10-23 @ 04:40), Max: 36.9 (10-09-23 @ 20:00)  HR: 74 (10-10-23 @ 04:40) (60 - 77)  BP: 138/65 (10-10-23 @ 04:40) (129/72 - 138/65)  RR: 17 (10-10-23 @ 04:40) (17 - 21)  SpO2: 99% (10-10-23 @ 04:40) (99% - 100%)  Wt(kg): --  I&O's Summary    09 Oct 2023 07:01  -  10 Oct 2023 07:00  --------------------------------------------------------  IN: 2120 mL / OUT: 3465 mL / NET: -1345 mL            JVP: Normal  Neck: supple  Lung: clear   CV: S1 S2 , Murmur:  Abd: soft  Ext: No edema  neuro: Awake / alert  Psych: flat affect  Skin: normal``    LABS/DATA:    CARDIAC MARKERS:                                8.2    6.06  )-----------( 239      ( 09 Oct 2023 00:30 )             25.0     10-09    139  |  110<H>  |  5<L>  ----------------------------<  102<H>  4.0   |  19<L>  |  0.48<L>    Ca    7.5<L>      09 Oct 2023 00:30  Phos  2.5     10-09  Mg     1.90     10-09      proBNP:   Lipid Profile:   HgA1c:   TSH:     TELE:  EKG:

## 2023-10-10 NOTE — PROGRESS NOTE ADULT - SUBJECTIVE AND OBJECTIVE BOX
TEAM [ D ] Surgery Daily Progress Note  =====================================================    SUBJECTIVE: Patient seen and examined at bedside on AM rounds. Patient reports that they're feeling well. Tolerating diet, denies nausea, vomiting.   +gas/+stool ostomy bag. OOB/Amublating as tolerated. Denies fever, chills.    ALLERGIES:  penicillin (Rash)      --------------------------------------------------------------------------------------    MEDICATIONS:    Neurologic Medications  acetaminophen     Tablet .. 1000 milliGRAM(s) Oral every 6 hours  HYDROmorphone  Injectable 0.2 milliGRAM(s) IV Push every 4 hours PRN Moderate Pain (4 - 6)  HYDROmorphone  Injectable 0.5 milliGRAM(s) IV Push every 4 hours PRN Severe Pain (7 - 10)  ketorolac   Injectable 15 milliGRAM(s) IV Push every 6 hours  ondansetron Injectable 4 milliGRAM(s) IV Push every 6 hours PRN Nausea and/or Vomiting    Gastrointestinal Medications  pantoprazole    Tablet 40 milliGRAM(s) Oral before breakfast    Hematologic/Oncologic Medications  enoxaparin Injectable 70 milliGRAM(s) SubCutaneous every 12 hours  influenza   Vaccine 0.5 milliLiter(s) IntraMuscular once    Topical/Other Medications  naloxone Injectable 0.1 milliGRAM(s) IV Push every 3 minutes PRN For ANY of the following changes in patient status:  A. RR LESS THAN 10 breaths per minute, B. Oxygen saturation LESS THAN 90%, C. Sedation score of 6    --------------------------------------------------------------------------------------    VITAL SIGNS:  T(C): 36.7 (10-10-23 @ 04:40), Max: 36.9 (10-09-23 @ 20:00)  HR: 74 (10-10-23 @ 04:40) (60 - 77)  BP: 138/65 (10-10-23 @ 04:40) (129/72 - 138/65)  RR: 17 (10-10-23 @ 04:40) (17 - 21)  SpO2: 99% (10-10-23 @ 04:40) (99% - 100%)  --------------------------------------------------------------------------------------    EXAM    General: NAD, resting in bed comfortably.  Cardiac: regular rate, warm and well perfused  Respiratory: Nonlabored respirations, normal cw expansion.  Abdomen: soft, nontender, nondistended. midline incision is c/d/i, ostomy +/+ with red rubber, simón drain s/s   Extremities: normal strength, FROM, LUE is swollen with blistering    --------------------------------------------------------------------------------------    LABS                        8.2    6.06  )-----------( 239      ( 09 Oct 2023 00:30 )             25.0     10-09    139  |  110<H>  |  5<L>  ----------------------------<  102<H>  4.0   |  19<L>  |  0.48<L>    Ca    7.5<L>      09 Oct 2023 00:30  Phos  2.5     10-09  Mg     1.90     10-09      --------------------------------------------------------------------------------------    INS AND OUTS:    10-09-23 @ 07:01  -  10-10-23 @ 07:00  --------------------------------------------------------  IN: 2120 mL / OUT: 3465 mL / NET: -1345 mL      --------------------------------------------------------------------------------------

## 2023-10-10 NOTE — ADVANCED PRACTICE NURSE CONSULT - ASSESSMENT
General: A&Ox4, turns one assist, indwelling urinary cathter, RLQ ileostomy with loose stool. Skin warm, dry with increased moisture in intertriginous folds (bilateral breasts), adequate skin turgor, scattered areas of hyperpigmentation and hypopigmentation, naturally darkened pigmentation. LLQ JUAQUIN drain dressing reinforced due to drainage, midline surgical dressing- followed by surgery- changed earlier this morning as per patient.     Vascular of upper extremities: Bilateral upper extremities with scattered blisters. Edema to left arm including hand, (+) radial pulse, no temperature changes, Capillary refill <3 seconds. Scattered areas of ecchymosis to LUE.     Bilateral upper arms with multiple fluid filled blisters in different stages of healing, patient with sensitivity to adhesives:   Right upper arm- largest fluid filled blister measuring 3cmx1.4mur5jd, largest deroofed blister measuring 2pmm8lpe7.2cm exposing pink moist dermis  Left upper arm- largest fluid filled blister measuring 1pcr1fps4bi, largest deroofed blister measuring 3.2yzo1ike5.2cm exposing pale pink dermis. Scant serosanguinous drainage, no odor. Periwound skin with hyperpigmentation. no s/s of soft tissue infection. Goals of care: Atraumatic removal, Protect from friction and shear, moist wound healing.     Abdominal pannus with multiple intact blisters possible frictional:   Right side measuring 2fay4grt2ea  Left side measuring 4jsh0pmz7kh. No s/s of infection. No active drainage. Goals of care: Atraumatic removal, protect from friction and shear, maintain intact blisters.     RLQ ileostomy: Patient Actively participating in pouch change. Overview of surgical procedure and need of ostomy reinforced. Terms defined utilized: Ostomy, Ileostomy, wafer, pouch, stoma. Frequency of pouch change and emptying discussed, teach back method utilized. Stool consistency with ileostomy reviewed. Importance of hydration reinforced.  Patient and  able to show back how to open, empty and close pouch. Removed Ileostomy pouch using pull and push technique after application of adhesive removers, cleansed skin with water, patted dry. Taught patient how to properly assess stoma viability and peristomal skin. Actively participated in stoma measurement, creating template, cutting wafer, applying barrier ring (to protect peristomal skin from enzymatic irritation), applying pouch. Reviewed s/s to report to MD. Showering with a pouch discussed as well as dietary restrictions. Informed that visiting nurse would follow up after discharge to set up account for supplies. Educational material provided and reviewed.     Stoma size: 11/4" See A&I flowsheet for full stoma assessment details.  General: A&Ox4, turns one assist, indwelling urinary cathter, RLQ ileostomy with loose stool. Skin warm, dry with increased moisture in intertriginous folds (bilateral breasts), adequate skin turgor, scattered areas of hyperpigmentation and hypopigmentation, naturally darkened pigmentation. LLQ JUAQUIN drain dressing reinforced due to drainage, midline surgical dressing- followed by surgery- changed earlier this morning as per patient.     Vascular of upper extremities: Bilateral upper extremities with scattered blisters. Edema to left arm including hand, (+) radial pulse, no temperature changes, Capillary refill <3 seconds. Scattered areas of ecchymosis to LUE.     Bilateral upper arms with multiple fluid filled blisters in different stages of healing, patient with sensitivity to adhesives:   Right upper arm- largest fluid filled blister measuring 3cmx1.2rxa2uz, largest deroofed blister measuring 7rbt2gee8.2cm exposing pink moist dermis  Left upper arm- largest fluid filled blister measuring 8tfn3zru9wg, largest deroofed blister measuring 3.7uid9nro0.2cm exposing pale pink dermis. Scant serosanguinous drainage, no odor. Periwound skin with hyperpigmentation. no s/s of soft tissue infection. Goals of care: Atraumatic removal, Protect from friction and shear, moist wound healing.     Abdominal pannus with multiple intact blisters possible frictional:   Right side measuring 0bis9lxl4yy  Left side measuring 9kff6jfk6ql. No s/s of infection. No active drainage. Goals of care: Atraumatic removal, protect from friction and shear, maintain intact blisters.     RLQ ileostomy: Patient Actively participating in pouch change. Overview of surgical procedure and need of ostomy reinforced. Terms defined utilized: Ostomy, Ileostomy, wafer, pouch, stoma. Frequency of pouch change and emptying discussed, teach back method utilized. Stool consistency with ileostomy reviewed. Importance of hydration reinforced.  Patient and  able to show back how to open, empty and close pouch. Removed Ileostomy pouch using pull and push technique after application of adhesive removers, cleansed skin with water, patted dry. Taught patient how to properly assess stoma viability and peristomal skin. Actively participated in stoma measurement, creating template, cutting wafer, applying barrier ring (to protect peristomal skin from enzymatic irritation), applying pouch. Red rubber cathter removed as per surgical JOSÉ MIGUEL Solis. Reviewed s/s to report to MD. Showering with a pouch discussed as well as dietary restrictions. Informed that visiting nurse would follow up after discharge to set up account for supplies. Educational material provided and reviewed.     Stoma size: 11/4" See A&I flowsheet for full stoma assessment details.

## 2023-10-11 ENCOUNTER — TRANSCRIPTION ENCOUNTER (OUTPATIENT)
Age: 58
End: 2023-10-11

## 2023-10-11 LAB
ALBUMIN SERPL ELPH-MCNC: 2.3 G/DL — LOW (ref 3.3–5)
ALP SERPL-CCNC: 312 U/L — HIGH (ref 40–120)
ALT FLD-CCNC: 55 U/L — HIGH (ref 4–33)
ANION GAP SERPL CALC-SCNC: 10 MMOL/L — SIGNIFICANT CHANGE UP (ref 7–14)
AST SERPL-CCNC: 59 U/L — HIGH (ref 4–32)
BILIRUB SERPL-MCNC: 0.4 MG/DL — SIGNIFICANT CHANGE UP (ref 0.2–1.2)
BUN SERPL-MCNC: 8 MG/DL — SIGNIFICANT CHANGE UP (ref 7–23)
CALCIUM SERPL-MCNC: 8 MG/DL — LOW (ref 8.4–10.5)
CHLORIDE SERPL-SCNC: 107 MMOL/L — SIGNIFICANT CHANGE UP (ref 98–107)
CO2 SERPL-SCNC: 23 MMOL/L — SIGNIFICANT CHANGE UP (ref 22–31)
CREAT SERPL-MCNC: 0.45 MG/DL — LOW (ref 0.5–1.3)
EGFR: 111 ML/MIN/1.73M2 — SIGNIFICANT CHANGE UP
GLUCOSE SERPL-MCNC: 80 MG/DL — SIGNIFICANT CHANGE UP (ref 70–99)
HCT VFR BLD CALC: 23.5 % — LOW (ref 34.5–45)
HGB BLD-MCNC: 7.9 G/DL — LOW (ref 11.5–15.5)
MAGNESIUM SERPL-MCNC: 1.9 MG/DL — SIGNIFICANT CHANGE UP (ref 1.6–2.6)
MCHC RBC-ENTMCNC: 27.5 PG — SIGNIFICANT CHANGE UP (ref 27–34)
MCHC RBC-ENTMCNC: 33.6 GM/DL — SIGNIFICANT CHANGE UP (ref 32–36)
MCV RBC AUTO: 81.9 FL — SIGNIFICANT CHANGE UP (ref 80–100)
NRBC # BLD: 0 /100 WBCS — SIGNIFICANT CHANGE UP (ref 0–0)
NRBC # FLD: 0 K/UL — SIGNIFICANT CHANGE UP (ref 0–0)
PHOSPHATE SERPL-MCNC: 2.7 MG/DL — SIGNIFICANT CHANGE UP (ref 2.5–4.5)
PLATELET # BLD AUTO: 356 K/UL — SIGNIFICANT CHANGE UP (ref 150–400)
POTASSIUM SERPL-MCNC: 3.8 MMOL/L — SIGNIFICANT CHANGE UP (ref 3.5–5.3)
POTASSIUM SERPL-SCNC: 3.8 MMOL/L — SIGNIFICANT CHANGE UP (ref 3.5–5.3)
PROT SERPL-MCNC: 4.9 G/DL — LOW (ref 6–8.3)
RBC # BLD: 2.87 M/UL — LOW (ref 3.8–5.2)
RBC # FLD: 20.7 % — HIGH (ref 10.3–14.5)
SODIUM SERPL-SCNC: 140 MMOL/L — SIGNIFICANT CHANGE UP (ref 135–145)
WBC # BLD: 5.97 K/UL — SIGNIFICANT CHANGE UP (ref 3.8–10.5)
WBC # FLD AUTO: 5.97 K/UL — SIGNIFICANT CHANGE UP (ref 3.8–10.5)

## 2023-10-11 PROCEDURE — 99221 1ST HOSP IP/OBS SF/LOW 40: CPT

## 2023-10-11 PROCEDURE — 74018 RADEX ABDOMEN 1 VIEW: CPT | Mod: 26

## 2023-10-11 PROCEDURE — 93010 ELECTROCARDIOGRAM REPORT: CPT

## 2023-10-11 RX ORDER — METOCLOPRAMIDE HCL 10 MG
10 TABLET ORAL EVERY 8 HOURS
Refills: 0 | Status: DISCONTINUED | OUTPATIENT
Start: 2023-10-11 | End: 2023-10-19

## 2023-10-11 RX ORDER — POTASSIUM CHLORIDE 20 MEQ
20 PACKET (EA) ORAL ONCE
Refills: 0 | Status: COMPLETED | OUTPATIENT
Start: 2023-10-11 | End: 2023-10-11

## 2023-10-11 RX ORDER — METOCLOPRAMIDE HCL 10 MG
5 TABLET ORAL EVERY 8 HOURS
Refills: 0 | Status: DISCONTINUED | OUTPATIENT
Start: 2023-10-11 | End: 2023-10-11

## 2023-10-11 RX ADMIN — Medication 400 MILLIGRAM(S): at 13:18

## 2023-10-11 RX ADMIN — HYDROMORPHONE HYDROCHLORIDE 0.5 MILLIGRAM(S): 2 INJECTION INTRAMUSCULAR; INTRAVENOUS; SUBCUTANEOUS at 20:19

## 2023-10-11 RX ADMIN — Medication 10 MILLIGRAM(S): at 10:49

## 2023-10-11 RX ADMIN — Medication 400 MILLIGRAM(S): at 18:41

## 2023-10-11 RX ADMIN — Medication 10 MILLIGRAM(S): at 18:38

## 2023-10-11 RX ADMIN — Medication 15 MILLIGRAM(S): at 13:19

## 2023-10-11 RX ADMIN — Medication 1000 MILLIGRAM(S): at 06:03

## 2023-10-11 RX ADMIN — HYDROMORPHONE HYDROCHLORIDE 0.5 MILLIGRAM(S): 2 INJECTION INTRAMUSCULAR; INTRAVENOUS; SUBCUTANEOUS at 10:50

## 2023-10-11 RX ADMIN — Medication 10 MILLIGRAM(S): at 23:01

## 2023-10-11 RX ADMIN — Medication 15 MILLIGRAM(S): at 06:03

## 2023-10-11 RX ADMIN — Medication 400 MILLIGRAM(S): at 05:28

## 2023-10-11 RX ADMIN — Medication 15 MILLIGRAM(S): at 18:38

## 2023-10-11 RX ADMIN — HYDROMORPHONE HYDROCHLORIDE 0.5 MILLIGRAM(S): 2 INJECTION INTRAMUSCULAR; INTRAVENOUS; SUBCUTANEOUS at 20:35

## 2023-10-11 RX ADMIN — ENOXAPARIN SODIUM 70 MILLIGRAM(S): 100 INJECTION SUBCUTANEOUS at 13:20

## 2023-10-11 RX ADMIN — ENOXAPARIN SODIUM 70 MILLIGRAM(S): 100 INJECTION SUBCUTANEOUS at 00:07

## 2023-10-11 RX ADMIN — Medication 1000 MILLIGRAM(S): at 00:53

## 2023-10-11 RX ADMIN — Medication 400 MILLIGRAM(S): at 00:07

## 2023-10-11 RX ADMIN — Medication 15 MILLIGRAM(S): at 05:28

## 2023-10-11 RX ADMIN — ENOXAPARIN SODIUM 70 MILLIGRAM(S): 100 INJECTION SUBCUTANEOUS at 23:01

## 2023-10-11 NOTE — DISCHARGE NOTE PROVIDER - CARE PROVIDER_API CALL
Silvio Dong  Surgery  [address] [place] [address], [address]  [place] [address]  Phone: [phone]  Fax: [phone]  Follow Up Time: 2 weeks   Silvio Dong  Surgery  [address] [facility], [address] 2  [place] [address]  Phone: [phone]  Fax: [phone]  Follow Up Time: 2 weeks    Ora Khan  Hematology/Oncology  [address] [place] [address]  Phone: [phone]  Fax: [phone]  Established Patient  Scheduled Appointment: 11/06/2023 01:40 PM    Nate Lewis  Cardiovascular Disease  [address] [facility] [facility] [address]  [place] [address]  Phone: [phone]  Fax: [phone]  Follow Up Time: 2 weeks   Silvio Dong  Surgery  [address] [facility][[address]], Floor 2  [place] [address]  Phone: [phone]  Fax: [phone]  Follow Up Time: 2 weeks    Ora Khan  Hematology/Oncology  [address] [place] [address]  Phone: [phone]  Fax: [phone]  Established Patient  Scheduled Appointment: 11/13/2023 12:20 PM    Nate Lewis  Cardiovascular Disease  [address] [place] [address] [address]  [place] [address]  Phone: [phone]  Fax: [phone]  Follow Up Time: 1 month

## 2023-10-11 NOTE — DISCHARGE NOTE PROVIDER - NSDCCPCAREPLAN_GEN_ALL_CORE_FT
PRINCIPAL DISCHARGE DIAGNOSIS  Diagnosis: Malignant neoplasm of appendix  Assessment and Plan of Treatment: WOUND CARE:  Please keep incisions clean and dry. Please do not Scrub or rub incisions. Do not use lotion or powder on incisions.   BATHING: You may shower and/or sponge bathe. You may use warm soapy water in the shower and rinse, pat dry.  ACTIVITY: No heavy lifting or straining. Otherwise, you may return to your usual level of physical activity. If you are taking narcotic pain medication DO NOT drive a car, operate machinery or make important decisions.  DIET: Return to your usual diet.  NOTIFY YOUR SURGEON IF YOU HAVE: any bleeding that does not stop, any pus draining from your wound(s), any fever (over 100.4 F) persistent nausea/vomiting, or if your pain is not controlled on your discharge pain medications, unable to urinate.  Please follow up with your primary care physician in one week regarding your hospitalization, bring copies of your discharge paperwork.  Please follow up with your surgeon, Dr. Dong within 1-2weeks of discharge.     PRINCIPAL DISCHARGE DIAGNOSIS  Diagnosis: Malignant neoplasm of appendix  Assessment and Plan of Treatment: WOUND CARE:   Bilateral arms: Cleanse with NS, pat dry. Apply Liquid barrier film to periwound skin (allow to dry). Apply Adaptic touch silicone contact layer, cover with gauze, wrap with kerlix. Change every other day.   Abdominal pannus: Cleanse with NS, pat dry. Apply Liquid barrier film to periwound skin (allow to dry). Apply Adpatic touch silicone contact layer, gauze, abdominal pad, secure with mesh brief.    RLQ ileostomy:   Adhesive remover wipes   Skin barrier ring- item # [id number]  Flat waffer (1 3/4")- item # 72868  Drainable pouch (1 3/4")- item # 61202  BATHING: You may shower and/or sponge bathe. You may use warm soapy water in the shower and rinse, pat dry.  ACTIVITY: No heavy lifting or straining. Otherwise, you may return to your usual level of physical activity. If you are taking narcotic pain medication DO NOT drive a car, operate machinery or make important decisions.  DIET: Low fiber diet  NOTIFY YOUR SURGEON IF YOU HAVE: any bleeding that does not stop, any pus draining from your wound(s), any fever (over 100.4 F) persistent nausea/vomiting, or if your pain is not controlled on your discharge pain medications, unable to urinate.  Please follow up with your primary care physician in one week regarding your hospitalization, bring copies of your discharge paperwork.  Please follow up with your surgeon, Dr. Dong within 1-2weeks of discharge.      SECONDARY DISCHARGE DIAGNOSES  Diagnosis: Bladder injury  Assessment and Plan of Treatment: Please continue the cristina until 2 weeks post operatively (10/18/23).   Please follow up with Urology at the [facility] regarding your cristina catheter. Located at [address] [place] [address]. Call to make an appointment.  Phone: [phone]     PRINCIPAL DISCHARGE DIAGNOSIS  Diagnosis: Malignant neoplasm of appendix  Assessment and Plan of Treatment: WOUND CARE:   Bilateral arms: Cleanse with NS, pat dry. Apply Liquid barrier film to periwound skin (allow to dry). Apply Adaptic touch silicone contact layer, cover with gauze, wrap with kerlix. Change every other day.   Abdominal pannus: Cleanse with NS, pat dry. Apply Liquid barrier film to periwound skin (allow to dry). Apply Adpatic touch silicone contact layer, gauze, abdominal pad, secure with mesh brief.    RLQ ileostomy:   Adhesive remover wipes   Skin barrier ring- item # [id number]  Flat waffer (1 3/4")- item # 92305  Drainable pouch (1 3/4")- item # 26723  BATHING: You may shower and/or sponge bathe. You may use warm soapy water in the shower and rinse, pat dry.  ACTIVITY: No heavy lifting or straining. Otherwise, you may return to your usual level of physical activity. If you are taking narcotic pain medication DO NOT drive a car, operate machinery or make important decisions.  DIET: NPO with TPN  PICC: Continue to care for your PICC line you've been taught. A nurse will visit your home to help familiarize you with how to administer your TPN.   NOTIFY YOUR SURGEON IF YOU HAVE: any bleeding that does not stop, any pus draining from your wound(s), any fever (over 100.4 F) persistent nausea/vomiting, or if your pain is not controlled on your discharge pain medications, unable to urinate.  Please follow up with your primary care physician in one week regarding your hospitalization, bring copies of your discharge paperwork.  Please follow up with your surgeon, Dr. Dong within 1-2weeks of discharge.     PRINCIPAL DISCHARGE DIAGNOSIS  Diagnosis: Malignant neoplasm of appendix  Assessment and Plan of Treatment: WOUND CARE:   Bilateral arms: Cleanse with NS, pat dry. Apply Liquid barrier film to periwound skin (allow to dry). Apply Adaptic touch silicone contact layer, cover with gauze, wrap with kerlix. Change every other day.   Abdominal pannus: Cleanse with NS, pat dry. Apply Liquid barrier film to periwound skin (allow to dry). Apply Adpatic touch silicone contact layer, gauze, abdominal pad, secure with mesh brief.    RLQ ileostomy:   Adhesive remover wipes   Skin barrier ring- item # 030850  Flat waffer (1 3/4")- item # 12660  Drainable pouch (1 3/4")- item # 36668  BATHING: You may shower and/or sponge bathe. You may use warm soapy water in the shower and rinse, pat dry.  ACTIVITY: No heavy lifting or straining. Otherwise, you may return to your usual level of physical activity. If you are taking narcotic pain medication DO NOT drive a car, operate machinery or make important decisions.  DIET: NPO with TPN  PICC: Continue to care for your PICC line you've been taught. A nurse will visit your home to help familiarize you with how to administer your TPN.   NOTIFY YOUR SURGEON IF YOU HAVE: any bleeding that does not stop, any pus draining from your wound(s), any fever (over 100.4 F) persistent nausea/vomiting, or if your pain is not controlled on your discharge pain medications, unable to urinate.  Please follow up with your primary care physician in one week regarding your hospitalization, bring copies of your discharge paperwork.  Please follow up with your surgeon, Dr. Dong within 1-2weeks of discharge.  Please follow up with cardiologist Dr Lewis who has been seeing you at [facility]        SECONDARY DISCHARGE DIAGNOSES  Diagnosis: Malignant neoplasm of appendix  Assessment and Plan of Treatment: The pallative care team will contact you for follow up   Please follow up with Dr Khan your oncologist in the meantime as Dr Khan will manage your symptoms/pain control until follow up with pallative, we made you an appointment for 11/6/23 at 1:40 PM       PRINCIPAL DISCHARGE DIAGNOSIS  Diagnosis: Malignant neoplasm of appendix  Assessment and Plan of Treatment: WOUND CARE:   Bilateral arms: Cleanse with NS, pat dry. Apply Liquid barrier film to periwound skin (allow to dry). Apply Adaptic touch silicone contact layer, cover with gauze, wrap with kerlix. Change every other day.   Abdominal pannus: Cleanse with NS, pat dry. Apply Liquid barrier film to periwound skin (allow to dry). Apply Adpatic touch silicone contact layer, gauze, abdominal pad, secure with mesh brief.    RLQ ileostomy:   Adhesive remover wipes   Skin barrier ring- item # 039039  Flat waffer (1 3/4")- item # 78287  Drainable pouch (1 3/4")- item # 40190  BATHING: You may shower and/or sponge bathe. You may use warm soapy water in the shower and rinse, pat dry.  ACTIVITY: No heavy lifting or straining. Otherwise, you may return to your usual level of physical activity. If you are taking narcotic pain medication DO NOT drive a car, operate machinery or make important decisions.  DIET: NPO with TPN  PICC: Continue to care for your PICC line you've been taught. A nurse will visit your home to help familiarize you with how to administer your TPN.   NOTIFY YOUR SURGEON IF YOU HAVE: any bleeding that does not stop, any pus draining from your wound(s), any fever (over 100.4 F) persistent nausea/vomiting, or if your pain is not controlled on your discharge pain medications, unable to urinate.  Please follow up with your primary care physician in one week regarding your hospitalization, bring copies of your discharge paperwork.  Please follow up with your surgeon, Dr. Dong within 1-2weeks of discharge.  Please follow up with cardiologist Dr Lewis who has been seeing you at [facility]        SECONDARY DISCHARGE DIAGNOSES  Diagnosis: Malignant neoplasm of appendix  Assessment and Plan of Treatment: 1. Please follow up with Supportive Oncology (Palliative Care)  [facility], [facility] at [address] [facility] [address] [place] [address]  Phone: [phone]   You have an appointment scheduled for 12/15/23 at 9AM  2. Please follow up with Dr Khan your oncologist in the meantime as Dr Khan will manage your symptoms/pain control until follow up with pallative, we made you an appointment for ***********       PRINCIPAL DISCHARGE DIAGNOSIS  Diagnosis: Malignant neoplasm of appendix  Assessment and Plan of Treatment: WOUND CARE:   Bilateral arms: Cleanse with NS, pat dry. Apply Liquid barrier film to periwound skin (allow to dry). Apply Adaptic touch silicone contact layer, cover with gauze, wrap with kerlix. Change every other day.   Abdominal pannus: Cleanse with NS, pat dry. Apply Liquid barrier film to periwound skin (allow to dry). Apply Adpatic touch silicone contact layer, gauze, abdominal pad, secure with mesh brief.    RLQ ileostomy:   Adhesive remover wipes   Skin barrier ring- item # [id number]  Flat waffer (1 3/4")- item # 88199  Drainable pouch (1 3/4")- item # 71024  BATHING: You may shower and/or sponge bathe. You may use warm soapy water in the shower and rinse, pat dry.  ACTIVITY: No heavy lifting or straining. Otherwise, you may return to your usual level of physical activity. If you are taking narcotic pain medication DO NOT drive a car, operate machinery or make important decisions.  DIET: NPO with TPN  PICC: Continue to care for your PICC line you've been taught. A nurse will visit your home to help you administer your TPN.   NOTIFY YOUR SURGEON IF YOU HAVE: any bleeding that does not stop, any pus draining from your wound(s), any fever (over 100.4 F) persistent nausea/vomiting, or if your pain is not controlled on your discharge pain medications, unable to urinate.  FOLLOW UP:  1. Please follow up with Dr Khan your oncologist. You have an appointment for Monday 11/13 at 12:20PM.  2. Please follow up with Supportive Oncology (Palliative Care)  [facility], [facility] at [address] [facility] [address] [place] [address]  Phone: [phone]   You have an appointment scheduled for 12/15/23 at 9AM  3. Please follow up with your surgeon, Dr. Dong within 2 weeks of discharge.  4. Please follow up with your primary care physician in one week regarding your hospitalization, bring copies of your discharge paperwork.  5. Please follow up with cardiologist Dr Lewis within 1 month of discharge.        SECONDARY DISCHARGE DIAGNOSES  Diagnosis: Malignant neoplasm of appendix  Assessment and Plan of Treatment:

## 2023-10-11 NOTE — DISCHARGE NOTE PROVIDER - CARE PROVIDERS DIRECT ADDRESSES
,[email or web address]

## 2023-10-11 NOTE — PROGRESS NOTE ADULT - SUBJECTIVE AND OBJECTIVE BOX
TEAM [ D ] Surgery Daily Progress Note  =====================================================    SUBJECTIVE: Patient seen and examined at bedside on AM rounds. Patient reports that they're feeling well. Tolerating diet, denies nausea, vomiting.  +/+ ostomy function. OOB/Amublating as tolerated. Denies fever, chills.    ALLERGIES:  penicillin (Rash)      --------------------------------------------------------------------------------------    MEDICATIONS:    Neurologic Medications  acetaminophen   IVPB .. 1000 milliGRAM(s) IV Intermittent every 6 hours  HYDROmorphone  Injectable 0.2 milliGRAM(s) IV Push every 4 hours PRN Moderate Pain (4 - 6)  HYDROmorphone  Injectable 0.5 milliGRAM(s) IV Push every 4 hours PRN Severe Pain (7 - 10)  ketorolac   Injectable 15 milliGRAM(s) IV Push every 6 hours  ondansetron Injectable 4 milliGRAM(s) IV Push every 6 hours PRN Nausea and/or Vomiting    Respiratory Medications    Cardiovascular Medications    Gastrointestinal Medications  pantoprazole    Tablet 40 milliGRAM(s) Oral before breakfast    Genitourinary Medications    Hematologic/Oncologic Medications  enoxaparin Injectable 70 milliGRAM(s) SubCutaneous every 12 hours  influenza   Vaccine 0.5 milliLiter(s) IntraMuscular once    Antimicrobial/Immunologic Medications    Endocrine/Metabolic Medications    Topical/Other Medications  naloxone Injectable 0.1 milliGRAM(s) IV Push every 3 minutes PRN For ANY of the following changes in patient status:  A. RR LESS THAN 10 breaths per minute, B. Oxygen saturation LESS THAN 90%, C. Sedation score of 6    --------------------------------------------------------------------------------------    VITAL SIGNS:  T(C): 36.9 (10-11-23 @ 04:30), Max: 37.2 (10-10-23 @ 16:24)  HR: 89 (10-11-23 @ 04:30) (65 - 89)  BP: 150/50 (10-11-23 @ 04:30) (144/66 - 154/59)  RR: 18 (10-11-23 @ 04:30) (18 - 18)  SpO2: 100% (10-11-23 @ 04:30) (99% - 100%)  --------------------------------------------------------------------------------------    EXAM    General: NAD, resting in bed comfortably.  Cardiac: regular rate, warm and well perfused  Respiratory: Nonlabored respirations, normal cw expansion.  Abdomen: soft, nontender, nondistended. midline incision is c/d/i,+/+ ostomy, simón drain s/s output  Extremities: normal strength, FROM, no deformities    --------------------------------------------------------------------------------------    LABS                        8.1    6.75  )-----------( 305      ( 10 Oct 2023 07:10 )             24.3   10-10    140  |  109<H>  |  7   ----------------------------<  94  4.0   |  24  |  0.48<L>    Ca    7.9<L>      10 Oct 2023 07:10  Phos  2.7     10-10  Mg     2.00     10-10    TPro  4.5<L>  /  Alb  2.3<L>  /  TBili  0.4  /  DBili  x   /  AST  62<H>  /  ALT  59<H>  /  AlkPhos  238<H>  10-10      --------------------------------------------------------------------------------------    INS AND OUTS:    10-10-23 @ 07:01  -  10-11-23 @ 07:00  --------------------------------------------------------  IN: 180 mL / OUT: 2940 mL / NET: -2760 mL      --------------------------------------------------------------------------------------

## 2023-10-11 NOTE — DISCHARGE NOTE PROVIDER - PROVIDER TOKENS
PROVIDER:[TOKEN:[[id number]],FOLLOWUP:[2 weeks]] PROVIDER:[TOKEN:[[id number]],FOLLOWUP:[2 weeks]],PROVIDER:[TOKEN:[[id number]],SCHEDULEDAPPT:[11/06/2023],SCHEDULEDAPPTTIME:[01:40 PM],ESTABLISHEDPATIENT:[T]],PROVIDER:[TOKEN:[[id number]],FOLLOWUP:[2 weeks]] PROVIDER:[TOKEN:[[id number]],FOLLOWUP:[2 weeks]],PROVIDER:[TOKEN:[[id number]],SCHEDULEDAPPT:[11/13/2023],SCHEDULEDAPPTTIME:[12:20 PM],ESTABLISHEDPATIENT:[T]],PROVIDER:[TOKEN:[[id number]],FOLLOWUP:[1 month]]

## 2023-10-11 NOTE — PROGRESS NOTE ADULT - ASSESSMENT
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 found to have LIJ And inominate vein acute non-occlusive thrombosis now on T lovenox.    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 found to have LIJ And inominate vein acute non-occlusive thrombosis now on T lovenox.    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 found to have LIJ And inominate vein acute non-occlusive thrombosis now on T lovenox.    PLAN:  - Do not touch cristina x2 weeks  - LFD  - Abdominal x-ray  - Start Reglan 5mg q8 hours  - PO protonix  - Monitor Ostomy: ostomy nursing consult  - Monitor JUAQUIN outpt  - FS q6h with ISS  - VTE ppx: T Lovenox for IJ and inominate thrombus  - ACE wrap LUE  - PT/OT consult appreciated, PM&R consult for possible acute rehab disposition  - Acute rehab criteria: Hgb >8, oral pain management only, no chemotherapy during rehab admission.    D Team Surgery  [id number]

## 2023-10-11 NOTE — DISCHARGE NOTE PROVIDER - NSDCMRMEDTOKEN_GEN_ALL_CORE_FT
Eliquis 5 mg oral tablet: 1 tab(s) orally 2 times a day started on 9/11/2023  metoclopramide 10 mg oral tablet: 1 tab(s) orally every 6 hours as needed  omeprazole 40 mg oral delayed release capsule: 1 cap(s) orally once a day as needed  Tylenol 500 mg oral tablet: 1 tab(s) orally as needed   ciprofloxacin 500 mg oral tablet: 1 tab(s) orally every 12 hours  Dilaudid 2 mg oral tablet: 0.5 tab(s) orally MDD: 3 mg  Eliquis 5 mg oral tablet: 1 tab(s) orally 2 times a day started on 2023  fentaNYL 12 mcg/hr transdermal film, extended release: 1 patch transdermal every 72 hours MDD: 1  LORazepam 0.5 mg oral tablet: 0.5 tab(s) orally every 6 hours as needed for  nausea and vomiting MDD: 3  Narcan 4 mg/0.1 mL nasal spray: 4 milligram(s) intranasally every 2 to 3 minutes x 1 days as needed for Over sedation If used please call 911  omeprazole 40 mg oral delayed release capsule: 1 cap(s) orally once a day as needed  ondansetron 8 mg oral tablet, disintegratin tab(s) orally every 8 hours as needed for Nausea  scopolamine 1 mg/72 hr transdermal film, extended release: 1 patch transdermally every 3 days  Tylenol 500 mg oral tablet: 1 tab(s) orally every 6 hours as needed for pain as needed   ciprofloxacin 500 mg oral tablet: 1 tab(s) orally every 12 hours  Dilaudid 2 mg oral tablet: 0.5 tab(s) orally every 4 hours MDD: 3 mg  Eliquis 5 mg oral tablet: 1 tab(s) orally 2 times a day started on 2023  fentaNYL 12 mcg/hr transdermal film, extended release: 1 patch transdermal every 72 hours MDD: 1  LORazepam 0.5 mg oral tablet: 0.5 tab(s) orally every 6 hours as needed for  nausea and vomiting MDD: 3  Narcan 4 mg/0.1 mL nasal spray: 4 milligram(s) intranasally every 2 to 3 minutes x 1 days as needed for Over sedation If used please call 911  omeprazole 40 mg oral delayed release capsule: 1 cap(s) orally once a day as needed  ondansetron 8 mg oral tablet, disintegratin tab(s) orally every 8 hours as needed for Nausea  scopolamine 1 mg/72 hr transdermal film, extended release: 1 patch transdermally every 3 days  Tylenol 500 mg oral tablet: 1 tab(s) orally every 6 hours as needed for pain as needed   Dilaudid 2 mg oral tablet: 0.5 tab(s) orally every 4 hours MDD: 3 mg  Eliquis 5 mg oral tablet: 1 tab(s) orally 2 times a day started on 2023  fentaNYL 12 mcg/hr transdermal film, extended release: 1 patch transdermal every 72 hours MDD: 1  LORazepam 0.5 mg oral tablet: 0.5 tab(s) orally every 6 hours as needed for  nausea and vomiting MDD: 3  Narcan 4 mg/0.1 mL nasal spray: 4 milligram(s) intranasally every 2 to 3 minutes x 1 days as needed for Over sedation If used please call 911  omeprazole 40 mg oral delayed release capsule: 1 cap(s) orally once a day (before a meal)  ondansetron 8 mg oral tablet, disintegratin tab(s) orally every 8 hours as needed for Nausea  scopolamine 1 mg/72 hr transdermal film, extended release: 1 patch transdermally every 3 days  Tylenol 500 mg oral tablet: 1 tab(s) orally every 6 hours as needed for pain as needed

## 2023-10-11 NOTE — DISCHARGE NOTE PROVIDER - NSDCFUADDAPPT_GEN_ALL_CORE_FT
Supportive Oncology (Palliative Care)  [facility], [facility] Phone: [phone] [address] [id number] Supportive Oncology (Palliative Care)  [facility], [facility] Phone: [phone] [address] [place] [address]  You have an appointment scheduled for 12/15/23 at 9AM Supportive Oncology (Palliative Care)  [facility], [facility]   Phone: [phone] [address] [place] [address]  You have an appointment scheduled for 12/15/23 at 9AM Supportive Oncology (Palliative Care)  [facility], [facility]   Phone: [phone]   [address] [place] [address]  You have an appointment scheduled for 12/15/23 at 9AM    Outpatient Total Parenteral Nutrition (TPN)  via [facility] and follow up with Dr. Jeffery Sullivan  [address]  [place] [id number]  Entrance on [address]  Phone [phone]    When calling to make the appointment please schedule for the first MONDAY (11/13/23) after discharge from hospital. Please explain that you are on TPN when making the appointment.   Checking of labs, CMP, electrolytes, ionized Calcium, triglycerides and fingersticks- frequency is to be determined by Dr. Sullivan as outpatient.

## 2023-10-11 NOTE — DISCHARGE NOTE PROVIDER - NSDCFUSCHEDAPPT_GEN_ALL_CORE_FT
Petrona Hall  [facility] Physician [facility]  GERIATRICS [address] [place] [address]   Scheduled Appointment: 12/01/2023     Ora Khan  [facility] Physician [facility]  [place]  [place]  Scheduled Appointment: 11/13/2023    Petrona Hall  [facility] Physician [facility]  GERIATRICS [address] [place] [address]   Scheduled Appointment: 12/01/2023

## 2023-10-11 NOTE — DISCHARGE NOTE PROVIDER - HOSPITAL COURSE
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 found to have LIJ And inominate vein acute non-occlusive thrombosis now on T lovenox.   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  Post operatively the patient was recovered in SICU intubated.  10/5 Vasopressors weaned. Patient extubated to face tent.  10/6-7 Nash weaned off.  10/8 PT consulted, recommending rehab  10/9 OT consulted, recommending home with home OT. LUE duplex positive for LIJ thrombus, started on T Lovenox. Bowel function via ostomy. Started on CLD. Transferred from SICU to floors.  10/10 Ostomy nursing team consulted, recommendations appreciated. Education started for ostomy care.  10/11 Acute Rehab PM&R consulted, recommending Acute Rehab disposition.   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  Post operatively the patient was recovered in SICU intubated.  10/5 Vasopressors weaned. Patient extubated to face tent.  10/6-7 Nash weaned off.  10/8 PT consulted, recommending rehab  10/9 OT consulted, recommending home with home OT. LUE duplex positive for LIJ thrombus, started on T Lovenox. Bowel function via ostomy. Started on CLD. Transferred from SICU to floors.  10/10 Ostomy nursing team consulted, recommendations appreciated. Education started for ostomy care.  10/11 Acute Rehab PM&R consulted, recommending Acute Rehab disposition.  10/12 GI consulted for persistent spit up and intolerance of po since her surgery.  c/w reglan, would also add standing zofran if qtc allows  CT Abdomen and pelvis performed: Post surgical changes within the abdomen and pelvis as above. No evidence of abscess. New right greater than left pleural effusions with compressive lower lobe atelectasis. Heterogeneous enhancement of the right inferior hepatic lobe with subcapsular hypoattenuation, which may represent hepatic injury or focal infarct.  10/13  Gi recommending baclofen for possible diaphragmatic spasm  10/14-15 Patient reports that overall she has remained stable, and her symptoms of spitting up and low PO tolerance haven't gotten any worse or better for the past 48 hours.   10/16 GI consulted for possible venting G tube, feeding  J. Recommended to try ND tube feeds. If tolerates would possibly be candidate for PEg/J.  10/17 Discussed further with GI. Plan to place PEG-J.  10/18 Discussed further with patient and patient family, patient declining PEG-J  10/19 TPN consulted recommending calorie count with bariatric diet. Patient unable to tolerate  10/20 TPN re consulted, recommending post pyloric feeding. A ko feeding tube was placed post pyloric. Jevity started and was not tolerated by patient exemplified by vomiting. Palliative consulted for recommendations for symptom management of chemo related vomiting, recommended and started patient on Ativan.  10/21-22 Patient did not tolerated jevity tube feeds.    10/23 TPN reconsulted, recommended re-attempt post pyloric feedings, change tube feeds to 2Cal and reassess. Patient did not tolerate, vomiting tube feeds,  10/24 PICC line placed and TPN Was started.  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  Post operatively the patient was recovered in SICU intubated.  10/5 Vasopressors weaned. Patient extubated to face tent.  10/6-7 Nash weaned off.  10/8 PT consulted, recommending rehab  10/9 OT consulted, recommending home with home OT. LUE duplex positive for LIJ thrombus, started on T Lovenox. Bowel function via ostomy. Started on CLD. Transferred from SICU to floors.  10/10 Ostomy nursing team consulted, recommendations appreciated. Education started for ostomy care.  10/11 Acute Rehab PM&R consulted, recommending Acute Rehab disposition.  10/12 GI consulted for persistent spit up and intolerance of po since her surgery.  c/w reglan, would also add standing zofran if qtc allows  CT Abdomen and pelvis performed: Post surgical changes within the abdomen and pelvis as above. No evidence of abscess. New right greater than left pleural effusions with compressive lower lobe atelectasis. Heterogeneous enhancement of the right inferior hepatic lobe with subcapsular hypoattenuation, which may represent hepatic injury or focal infarct.  10/13  Gi recommending baclofen for possible diaphragmatic spasm  10/14-15 Patient reports that overall she has remained stable, and her symptoms of spitting up and low PO tolerance haven't gotten any worse or better for the past 48 hours.   10/16 GI consulted for possible venting G tube, feeding  J. Recommended to try ND tube feeds. If tolerates would possibly be candidate for PEg/J.  10/17 Discussed further with GI. Plan to place PEG-J.  10/18 Discussed further with patient and patient family, patient declining PEG-J  10/19 TPN consulted recommending calorie count with bariatric diet. Patient unable to tolerate  10/20 TPN re consulted, recommending post pyloric feeding. A ko feeding tube was placed post pyloric. Jevity started and was not tolerated by patient exemplified by vomiting. Palliative consulted for recommendations for symptom management of chemo related vomiting, recommended and started patient on Ativan.  10/21-22 Patient did not tolerated jevity tube feeds.    10/23 TPN reconsulted, recommended re-attempt post pyloric feedings, change tube feeds to 2Cal and reassess. Patient did not tolerate, vomiting tube feeds,  10/24 PICC line placed and TPN Was started.  10/26 UGI refused by patient  10/27 Patient seen for ileostomy follow up for ostomy RN  10/28 Patient intermittently tachycardiac and febrile ID consulted abx switch to CTX 1G and Flagyl 500mg BID, RVP was negative and chest x ray was clear  10/29  10/30 flagyl stopped by ID, will need abx until 11/7.   10/31 Patient started on fentanyl patch  11/1 Patient ready for discharge

## 2023-10-11 NOTE — CONSULT NOTE ADULT - SUBJECTIVE AND OBJECTIVE BOX
HPI:  58 year old female with pre op dx of malignant neoplasm of appendix is scheduled for cytoreduction and hipec for appendiceal adenocarcinoma. (21 Sep 2023 16:45). s/p appendectomy, cholecystectomy, hysterectomy, omenectomy, peritenectomy, LAR with diverting loop ileostomy and bladder injury with primary repair on 10/4. found to have LIJ And inominate vein acute non-occlusive thrombosis now on T lovenox.      REVIEW OF SYSTEMS/Subjective: Patient in bed in NAD, no SOB, no n/v, pain controlled.  Constitutional - No fever, (+) fatigue  HEENT - No visual disturbances,  No neck pain  Respiratory - No cough, No shortness of breath  Cardiovascular - No chest pain  Gastrointestinal - (+) abdominal pain  Genitourinary - No dysuria  Neurological - No headaches, No loss of strength, No numbness  Musculoskeletal - No joint pain, No joint swelling, No muscle pain  Psychiatric - No depression, No anxiety  All other review of systems negative    PAST MEDICAL & SURGICAL HISTORY  Malignant neoplasm of appendix    Primary appendiceal adenocarcinoma    Handoff    MEWS Score    History of sickle cell trait    Malignant neoplasm of appendix    Mucinous adenocarcinoma of appendix    Neoplasm of appendix    Malignant neoplasm of appendix    GERD (gastroesophageal reflux disease)    Dyspnea on exertion    Clot    Neck pain    Cytoreductive surgery, with HIPEC    Exploratory laparotomy with total abdominal hysterectomy and bilateral salpingo-oophorectomy    Bladder repair    Open low anterior resection of colon    Open creation of diverting loop ileostomy    Cholecystectomy    Appendectomy    Omentectomy    Extensive surgical removal of peritoneum with administration of hyperthermic intraperitoneal chemotherapy (HIPEC)    History of ectopic pregnancy    H/O colonoscopy    H/O endoscopy    [id number]        SOCIAL HISTORY    Smoking - Denied  EtOH - Denied   Drugs - Denied    FUNCTIONAL HISTORY  Lives alone in an apt with 1 flight of steps to negotiate. (-)HHA  Independent in ambulation, ADL's, transfers prior to hospitalization        FAMILY HISTORY   Reviewed and non-contributory    ALLERGIES  penicillin (Rash)    VITALS  T(C): 37.1 (10-11-23 @ 08:00)  T(F): 98.7 (10-11-23 @ 08:00), Max: 98.9 (10-10-23 @ 16:24)  HR: 84 (10-11-23 @ 08:00) (65 - 89)  BP: 142/64 (10-11-23 @ 08:00) (142/64 - 154/59)  RR:  (16 - 18)  SpO2:  (99% - 100%)  Wt(kg): --    PHYSICAL EXAM  Constitutional - NAD, Comfortable  HEENT - NCAT, MMM  Neck - Supple  Chest - CTA bilaterally  Cardiovascular - RRR, S1S2  Abdomen - BS+, Soft, ND, midline incision c/d/i, (+)ostomy  Extremities - (+)LUE edema, No calf tenderness   Neurologic Exam -                    Cognitive - Awake, Alert, Oriented to self, place, date, year, situation     Communication - Fluent, No dysarthria     Cranial Nerves - CN 2-12 grossly intact     Motor -                     LEFT    UE - ShAB 5/5, EF 5/5, EE 5/5, WE 5/5,  5/5                    RIGHT UE - ShAB 5/5, EF 5/5, EE 5/5, WE 5/5,  5/5                    LEFT    LE - HF 5/5, KE 5/5, DF 5/5, PF 5/5                    RIGHT LE - HF 5/5, KE 5/5, DF 5/5, PF 5/5        Sensory - Intact to light touch diffusely     Reflexes - DTR intact and symmetrical, Negative Babinski's bilaterally      Coordination - Finger-to-nose intact bilaterally   Psychiatric - Affect WNL    CURRENT FUNCTIONAL STATUS    Transfer: Stand to Sit:     · Physical Assist/Nonphysical Assist	1 person assist; verbal cues  · Assistive Device	rolling walker    Gait Skills:   · Physical Assist/Nonphysical Assist	1 person assist; verbal cues  · Assistive Device	rolling walker  · Gait Distance	25 feet      RECENT LABS/IMAGING                        7.9    5.97  )-----------( 356      ( 11 Oct 2023 05:00 )             23.5     10-11    140  |  107  |  8   ----------------------------<  80  3.8   |  23  |  0.45<L>    Ca    8.0<L>      11 Oct 2023 05:00  Phos  2.7     10-11  Mg     1.90     10-11    TPro  4.9<L>  /  Alb  2.3<L>  /  TBili  0.4  /  DBili  x   /  AST  59<H>  /  ALT  55<H>  /  AlkPhos  312<H>  10-11      Urinalysis Basic - ( 11 Oct 2023 05:00 )    Color: x / Appearance: x / SG: x / pH: x  Gluc: 80 mg/dL / Ketone: x  / Bili: x / Urobili: x   Blood: x / Protein: x / Nitrite: x   Leuk Esterase: x / RBC: x / WBC x   Sq Epi: x / Non Sq Epi: x / Bacteria: x        HbA1C -   TSH -   CHL -   Tri -   HDL -   LDL -     MEDICATIONS   MEDICATIONS  (STANDING):  acetaminophen   IVPB .. 1000 milliGRAM(s) IV Intermittent every 6 hours  enoxaparin Injectable 70 milliGRAM(s) SubCutaneous every 12 hours  influenza   Vaccine 0.5 milliLiter(s) IntraMuscular once  ketorolac   Injectable 15 milliGRAM(s) IV Push every 6 hours  metoclopramide Injectable 10 milliGRAM(s) IV Push every 8 hours  pantoprazole    Tablet 40 milliGRAM(s) Oral before breakfast    MEDICATIONS  (PRN):  HYDROmorphone  Injectable 0.2 milliGRAM(s) IV Push every 4 hours PRN Moderate Pain (4 - 6)  HYDROmorphone  Injectable 0.5 milliGRAM(s) IV Push every 4 hours PRN Severe Pain (7 - 10)  naloxone Injectable 0.1 milliGRAM(s) IV Push every 3 minutes PRN For ANY of the following changes in patient status:  A. RR LESS THAN 10 breaths per minute, B. Oxygen saturation LESS THAN 90%, C. Sedation score of 6  ondansetron Injectable 4 milliGRAM(s) IV Push every 6 hours PRN Nausea and/or Vomiting      ASSESSMENT/PLAN  The patient is a 58 year old female with pre op dx of malignant neoplasm of appendix s/p appendectomy, cholecystectomy, hysterectomy, omenectomy, peritenectomy, LAR with diverting loop ileostomy and bladder injury with primary repair on 10/4. s/p left inominate vein acute non-occlusive thrombosis now on lovenox with gait impairment and disuse muscle weakness.     Disposition - At this point in time patient is a candidate for restorative inpatient rehab, acute unit. Patient can tolerate 3 hours/day of rehab services and requires daily physician visits.  PT - ROM, Bed mobility, Transfers, Ambulation with assistive device  OT - ADLs, ROM  Precautions - Falls  DVT Prophylaxis - Lovenox  Weight bearing status - WBAT  Skin - Turn Q2hrs  Diet - low fiber

## 2023-10-11 NOTE — DISCHARGE NOTE PROVIDER - DETAILS OF MALNUTRITION DIAGNOSIS/DIAGNOSES
This patient has been assessed with a concern for Malnutrition and was treated during this hospitalization for the following Nutrition diagnosis/diagnoses:     -  10/06/2023: Severe protein-calorie malnutrition

## 2023-10-11 NOTE — PROGRESS NOTE ADULT - SUBJECTIVE AND OBJECTIVE BOX
Subjective: Patient seen and examined. No new events except as noted.     SUBJECTIVE/ROS:  nad      MEDICATIONS:  MEDICATIONS  (STANDING):  acetaminophen   IVPB .. 1000 milliGRAM(s) IV Intermittent every 6 hours  enoxaparin Injectable 70 milliGRAM(s) SubCutaneous every 12 hours  influenza   Vaccine 0.5 milliLiter(s) IntraMuscular once  ketorolac   Injectable 15 milliGRAM(s) IV Push every 6 hours  metoclopramide Injectable 10 milliGRAM(s) IV Push every 8 hours  pantoprazole    Tablet 40 milliGRAM(s) Oral before breakfast  potassium chloride    Tablet ER 20 milliEquivalent(s) Oral once      PHYSICAL EXAM:  T(C): 37.1 (10-11-23 @ 08:00), Max: 37.2 (10-10-23 @ 16:24)  HR: 84 (10-11-23 @ 08:00) (65 - 89)  BP: 142/64 (10-11-23 @ 08:00) (142/64 - 154/59)  RR: 16 (10-11-23 @ 08:00) (16 - 18)  SpO2: 100% (10-11-23 @ 08:00) (99% - 100%)  Wt(kg): --  I&O's Summary    10 Oct 2023 07:01  -  11 Oct 2023 07:00  --------------------------------------------------------  IN: 180 mL / OUT: 2940 mL / NET: -2760 mL            JVP: Normal  Neck: supple  Lung: clear   CV: S1 S2 , Murmur:  Abd: soft  Ext: No edema  neuro: Awake / alert  Psych: flat affect  Skin: normal``    LABS/DATA:    CARDIAC MARKERS:                                7.9    5.97  )-----------( 356      ( 11 Oct 2023 05:00 )             23.5     10-11    140  |  107  |  8   ----------------------------<  80  3.8   |  23  |  0.45<L>    Ca    8.0<L>      11 Oct 2023 05:00  Phos  2.7     10-11  Mg     1.90     10-11    TPro  4.9<L>  /  Alb  2.3<L>  /  TBili  0.4  /  DBili  x   /  AST  59<H>  /  ALT  55<H>  /  AlkPhos  312<H>  10-11    proBNP:   Lipid Profile:   HgA1c:   TSH:     TELE:  EKG:

## 2023-10-12 LAB
ALBUMIN SERPL ELPH-MCNC: 2.5 G/DL — LOW (ref 3.3–5)
ALP SERPL-CCNC: 319 U/L — HIGH (ref 40–120)
ALT FLD-CCNC: 47 U/L — HIGH (ref 4–33)
ANION GAP SERPL CALC-SCNC: 15 MMOL/L — HIGH (ref 7–14)
AST SERPL-CCNC: 43 U/L — HIGH (ref 4–32)
BILIRUB SERPL-MCNC: 0.3 MG/DL — SIGNIFICANT CHANGE UP (ref 0.2–1.2)
BUN SERPL-MCNC: 8 MG/DL — SIGNIFICANT CHANGE UP (ref 7–23)
CALCIUM SERPL-MCNC: 8 MG/DL — LOW (ref 8.4–10.5)
CHLORIDE SERPL-SCNC: 106 MMOL/L — SIGNIFICANT CHANGE UP (ref 98–107)
CO2 SERPL-SCNC: 19 MMOL/L — LOW (ref 22–31)
CREAT SERPL-MCNC: 0.46 MG/DL — LOW (ref 0.5–1.3)
EGFR: 110 ML/MIN/1.73M2 — SIGNIFICANT CHANGE UP
GLUCOSE SERPL-MCNC: 83 MG/DL — SIGNIFICANT CHANGE UP (ref 70–99)
HCT VFR BLD CALC: 23.2 % — LOW (ref 34.5–45)
HGB BLD-MCNC: 7.9 G/DL — LOW (ref 11.5–15.5)
MAGNESIUM SERPL-MCNC: 1.8 MG/DL — SIGNIFICANT CHANGE UP (ref 1.6–2.6)
MCHC RBC-ENTMCNC: 27.6 PG — SIGNIFICANT CHANGE UP (ref 27–34)
MCHC RBC-ENTMCNC: 34.1 GM/DL — SIGNIFICANT CHANGE UP (ref 32–36)
MCV RBC AUTO: 81.1 FL — SIGNIFICANT CHANGE UP (ref 80–100)
NRBC # BLD: 0 /100 WBCS — SIGNIFICANT CHANGE UP (ref 0–0)
NRBC # FLD: 0.02 K/UL — HIGH (ref 0–0)
PHOSPHATE SERPL-MCNC: 2.9 MG/DL — SIGNIFICANT CHANGE UP (ref 2.5–4.5)
PLATELET # BLD AUTO: 404 K/UL — HIGH (ref 150–400)
POTASSIUM SERPL-MCNC: 3.5 MMOL/L — SIGNIFICANT CHANGE UP (ref 3.5–5.3)
POTASSIUM SERPL-SCNC: 3.5 MMOL/L — SIGNIFICANT CHANGE UP (ref 3.5–5.3)
PROT SERPL-MCNC: 5.2 G/DL — LOW (ref 6–8.3)
RBC # BLD: 2.86 M/UL — LOW (ref 3.8–5.2)
RBC # FLD: 20.7 % — HIGH (ref 10.3–14.5)
SODIUM SERPL-SCNC: 140 MMOL/L — SIGNIFICANT CHANGE UP (ref 135–145)
WBC # BLD: 7.3 K/UL — SIGNIFICANT CHANGE UP (ref 3.8–10.5)
WBC # FLD AUTO: 7.3 K/UL — SIGNIFICANT CHANGE UP (ref 3.8–10.5)

## 2023-10-12 PROCEDURE — 74177 CT ABD & PELVIS W/CONTRAST: CPT | Mod: 26

## 2023-10-12 PROCEDURE — 99222 1ST HOSP IP/OBS MODERATE 55: CPT | Mod: GC

## 2023-10-12 RX ORDER — POTASSIUM CHLORIDE 20 MEQ
40 PACKET (EA) ORAL ONCE
Refills: 0 | Status: DISCONTINUED | OUTPATIENT
Start: 2023-10-12 | End: 2023-10-12

## 2023-10-12 RX ORDER — MAGNESIUM SULFATE 500 MG/ML
1 VIAL (ML) INJECTION ONCE
Refills: 0 | Status: COMPLETED | OUTPATIENT
Start: 2023-10-12 | End: 2023-10-12

## 2023-10-12 RX ORDER — POTASSIUM CHLORIDE 20 MEQ
10 PACKET (EA) ORAL
Refills: 0 | Status: COMPLETED | OUTPATIENT
Start: 2023-10-12 | End: 2023-10-12

## 2023-10-12 RX ORDER — PANTOPRAZOLE SODIUM 20 MG/1
40 TABLET, DELAYED RELEASE ORAL DAILY
Refills: 0 | Status: DISCONTINUED | OUTPATIENT
Start: 2023-10-12 | End: 2023-10-17

## 2023-10-12 RX ORDER — ACETAMINOPHEN 500 MG
1000 TABLET ORAL EVERY 6 HOURS
Refills: 0 | Status: DISCONTINUED | OUTPATIENT
Start: 2023-10-12 | End: 2023-10-12

## 2023-10-12 RX ORDER — POTASSIUM CHLORIDE 20 MEQ
20 PACKET (EA) ORAL
Refills: 0 | Status: COMPLETED | OUTPATIENT
Start: 2023-10-12 | End: 2023-10-12

## 2023-10-12 RX ORDER — ACETAMINOPHEN 500 MG
1000 TABLET ORAL EVERY 6 HOURS
Refills: 0 | Status: COMPLETED | OUTPATIENT
Start: 2023-10-12 | End: 2023-10-13

## 2023-10-12 RX ADMIN — Medication 1000 MILLIGRAM(S): at 21:40

## 2023-10-12 RX ADMIN — Medication 100 MILLIEQUIVALENT(S): at 16:05

## 2023-10-12 RX ADMIN — HYDROMORPHONE HYDROCHLORIDE 0.5 MILLIGRAM(S): 2 INJECTION INTRAMUSCULAR; INTRAVENOUS; SUBCUTANEOUS at 19:51

## 2023-10-12 RX ADMIN — ENOXAPARIN SODIUM 70 MILLIGRAM(S): 100 INJECTION SUBCUTANEOUS at 12:15

## 2023-10-12 RX ADMIN — HYDROMORPHONE HYDROCHLORIDE 0.5 MILLIGRAM(S): 2 INJECTION INTRAMUSCULAR; INTRAVENOUS; SUBCUTANEOUS at 12:45

## 2023-10-12 RX ADMIN — Medication 100 MILLIEQUIVALENT(S): at 18:05

## 2023-10-12 RX ADMIN — ENOXAPARIN SODIUM 70 MILLIGRAM(S): 100 INJECTION SUBCUTANEOUS at 23:22

## 2023-10-12 RX ADMIN — Medication 100 GRAM(S): at 12:14

## 2023-10-12 RX ADMIN — Medication 10 MILLIGRAM(S): at 21:22

## 2023-10-12 RX ADMIN — HYDROMORPHONE HYDROCHLORIDE 0.5 MILLIGRAM(S): 2 INJECTION INTRAMUSCULAR; INTRAVENOUS; SUBCUTANEOUS at 20:05

## 2023-10-12 RX ADMIN — Medication 10 MILLIGRAM(S): at 05:23

## 2023-10-12 RX ADMIN — Medication 400 MILLIGRAM(S): at 21:22

## 2023-10-12 RX ADMIN — Medication 400 MILLIGRAM(S): at 16:30

## 2023-10-12 RX ADMIN — HYDROMORPHONE HYDROCHLORIDE 0.5 MILLIGRAM(S): 2 INJECTION INTRAMUSCULAR; INTRAVENOUS; SUBCUTANEOUS at 04:51

## 2023-10-12 RX ADMIN — HYDROMORPHONE HYDROCHLORIDE 0.5 MILLIGRAM(S): 2 INJECTION INTRAMUSCULAR; INTRAVENOUS; SUBCUTANEOUS at 12:14

## 2023-10-12 RX ADMIN — Medication 100 MILLIEQUIVALENT(S): at 14:15

## 2023-10-12 RX ADMIN — Medication 1000 MILLIGRAM(S): at 17:00

## 2023-10-12 NOTE — CONSULT NOTE ADULT - SUBJECTIVE AND OBJECTIVE BOX
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 found to have LIJ And inominate vein acute non-occlusive thrombosis now on T lovenox.  GI consulted for persistent spit up and intolerance of po since her surgery. Patient states that prior to her surgery, she was already suffering from nausea and frequent spit-up once she started chemotherapy in June. At that time, she was tolerating solid food. Patient states that since the surgery, the spit-up has gone from clear to yellow/green and she continues to have nausea. The spit-up occurs about 30 minutes after having clears. Her diet had been advanced on 10/10 but she has not been eating solids because she does not think she will tolerate them. No change noted in loop ileostomy output quantity. Denies dysphagia, odynophagia, abdominal pain, regurgitation, melena, hematochezia, blood from ostomy, bloating, abdominal distension.  AXR 10/11 w/ nonobstructive pattern.    Allergies:  penicillin (Rash)    Home Medications:  Eliquis 5 mg oral tablet: 1 tab(s) orally 2 times a day started on 9/11/2023 (04 Oct 2023 08:00)  metoclopramide 10 mg oral tablet: 1 tab(s) orally every 6 hours as needed (04 Oct 2023 08:00)  omeprazole 40 mg oral delayed release capsule: 1 cap(s) orally once a day as needed (04 Oct 2023 08:00)  Tylenol 500 mg oral tablet: 1 tab(s) orally as needed (04 Oct 2023 08:00)    Hospital Medications:  acetaminophen     Tablet .. 1000 milliGRAM(s) Oral every 6 hours PRN  enoxaparin Injectable 70 milliGRAM(s) SubCutaneous every 12 hours  HYDROmorphone  Injectable 0.2 milliGRAM(s) IV Push every 4 hours PRN  HYDROmorphone  Injectable 0.5 milliGRAM(s) IV Push every 4 hours PRN  influenza   Vaccine 0.5 milliLiter(s) IntraMuscular once  metoclopramide Injectable 10 milliGRAM(s) IV Push every 8 hours  naloxone Injectable 0.1 milliGRAM(s) IV Push every 3 minutes PRN  pantoprazole    Tablet 40 milliGRAM(s) Oral before breakfast  potassium chloride    Tablet ER 20 milliEquivalent(s) Oral every 2 hours    PMHX/PSHX:  History of sickle cell trait    Malignant neoplasm of appendix    History of ectopic pregnancy    H/O colonoscopy    H/O endoscopy    Family history:      Denies family history of colon cancer/polyps, stomach cancer/polyps, pancreatic cancer/masses, liver cancer/disease, ovarian cancer and endometrial cancer.    Social History:   Tob: Denies  EtOH: Denies  Illicit Drugs: Denies    ROS:   General: no fevers, chills  ENT:  No sore throat, pain, dysphagia  CV:  No pain, palpitations   Pulm:  No dyspnea, cough   GI:  see HPI  Muscle:  No pain, weakness  Neuro:  No weakness, tingling  Psych:  No fatigue, insomnia  Heme:  No petechiae, ecchymosis  Skin:  No rash, tattoos    PHYSICAL EXAM:   GENERAL:  No acute distress  HEENT:  NCAT, no scleral icterus   CHEST:  no respiratory distress  HEART:  Regular rate and rhythm  ABDOMEN:  Soft, non-tender, non-distended. Healing vertical incision. LLQ drain. Ostomy w/ air and green stool  EXTREMITIES: No edema  SKIN:  No rash/erythema/ecchymoses   NEURO:  Alert and oriented x 3     Vital Signs:  Vital Signs Last 24 Hrs  T(C): 37.4 (12 Oct 2023 08:56), Max: 37.4 (12 Oct 2023 08:56)  T(F): 99.4 (12 Oct 2023 08:56), Max: 99.4 (12 Oct 2023 08:56)  HR: 80 (12 Oct 2023 08:56) (80 - 92)  BP: 129/62 (12 Oct 2023 08:56) (124/57 - 151/58)  BP(mean): --  RR: 18 (12 Oct 2023 08:56) (17 - 18)  SpO2: 98% (12 Oct 2023 08:56) (96% - 100%)    Parameters below as of 12 Oct 2023 08:56  Patient On (Oxygen Delivery Method): room air      Daily     Daily     LABS:                        7.9    7.30  )-----------( 404      ( 12 Oct 2023 05:41 )             23.2     Mean Cell Volume: 81.1 fL (10-12-23 @ 05:41)    10-12    140  |  106  |  8   ----------------------------<  83  3.5   |  19<L>  |  0.46<L>    Ca    8.0<L>      12 Oct 2023 05:41  Phos  2.9     10-12  Mg     1.80     10-12    TPro  5.2<L>  /  Alb  2.5<L>  /  TBili  0.3  /  DBili  x   /  AST  43<H>  /  ALT  47<H>  /  AlkPhos  319<H>  10-12    LIVER FUNCTIONS - ( 12 Oct 2023 05:41 )  Alb: 2.5 g/dL / Pro: 5.2 g/dL / ALK PHOS: 319 U/L / ALT: 47 U/L / AST: 43 U/L / GGT: x           Urinalysis Basic - ( 12 Oct 2023 05:41 )    Color: x / Appearance: x / SG: x / pH: x  Gluc: 83 mg/dL / Ketone: x  / Bili: x / Urobili: x   Blood: x / Protein: x / Nitrite: x   Leuk Esterase: x / RBC: x / WBC x   Sq Epi: x / Non Sq Epi: x / Bacteria: x                        7.9    7.30  )-----------( 404      ( 12 Oct 2023 05:41 )             23.2                         7.9    5.97  )-----------( 356      ( 11 Oct 2023 05:00 )             23.5                         8.1    6.75  )-----------( 305      ( 10 Oct 2023 07:10 )             24.3       Imaging:    EXAM: [id number] - CT ABDOMEN AND PELVIS OC IC  - ORDERED BY: WILLIAMS KELLER    EXAM: [id number] - CT CHEST IC  - ORDERED BY: WILLIAMS KELLER    PROCEDURE DATE:  09/11/2023        INTERPRETATION:  CLINICAL INFORMATION: Mucinous adenocarcinoma the   appendix. Restaging.    COMPARISON: CT 6/20/2023 and 7/27/2023.    CONTRAST/COMPLICATIONS:  IV Contrast: Omnipaque 350  90 cc administered   10 cc discarded  Oral Contrast: Omnipaque 300  Complications: None reported at time of study completion    PROCEDURE:  CT of the Chest, Abdomen and Pelvis was performed.  Sagittal and coronal reformats were performed.    FINDINGS:  CHEST:  LUNGS AND LARGE AIRWAYS: Patent central airways. Unchanged 4 mm upper   lobe pulmonary nodule (6, 23).  PLEURA: No pleural effusion.  VESSELS: Left chest wall port with catheter tip terminating at the   cavoatrial junction. Filling defect along the course of the catheter   within the left brachiocephalic vein and adjacent left internal jugular   vein (2, 6-16), consistent with line related thrombus.  HEART: Heart size is normal. No pericardial effusion.  MEDIASTINUM AND PATRICK: No lymphadenopathy.  CHEST WALL AND LOWER NECK: Within normal limits.    ABDOMEN AND PELVIS:  LIVER: Stable subcentimeter hypodense foci of the liver that are too   small to characterize.  BILE DUCTS: Normal caliber.  GALLBLADDER: Within normal limits.  SPLEEN: Within normal limits.  PANCREAS: Within normal limits.  ADRENALS: Within normal limits.  KIDNEYS/URETERS: Within normal limits.    BLADDER: Within normal limits.  REPRODUCTIVE ORGANS: Interval increase in size of bilateral adnexal   masses. Right adnexal mass measures 22.1 x 14.6 cm, previously 20.1 x   12.5 cm. Left adnexal mass measuring 14.8 x 12.1 cm, previously 12.7 x   10.4 cm.    BOWEL: No bowel obstruction. Mucinous appendiceal mass is without   significant change.  PERITONEUM: Mild increase in peritoneal carcinomatosis compared to   7/27/2023. For reference, area of nodularity within the left upper   quadrant measures 2.8x 1.5 cm (2, 50), previously 2.6 x 1.1 cm.   Additional left upper quadrant nodule measures 1.7 x 1.0 cm, previously   1.2 x 0.7 cm. Omental caking within the lower abdomen is without   significant change. Small volume ascites is increased.  VESSELS:Within normal limits.  RETROPERITONEUM/LYMPH NODES: No lymphadenopathy.  ABDOMINAL WALL: Within normal limits.  BONES: Within normal limits.    IMPRESSION:  1.  Progression of disease compared to 7/27/2023 with interval increase   in size of large bilateral adnexal masses and mild increase in peritoneal   carcinomatosis. Small volume ascites is increased.  2.  Line-related thrombus along the Mediport catheter within the left   brachiocephalic vein and adjacent internal jugular vein.      --- End of Report ---       MARISOL ARROYO DO; Attending Radiologist   This document has been electronically signed. Sep 11 2023  3:58PM

## 2023-10-12 NOTE — PROGRESS NOTE ADULT - ASSESSMENT
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 found to have LIJ And inominate vein acute non-occlusive thrombosis now on T lovenox.    PLAN:  - Do not touch cristina x2 weeks  - LFD  - Reglan 10mg q8 hours  - PO protonix  - Monitor Ostomy: ostomy nursing consult  - Monitor JUAQUIN outpt  - FS q6h with ISS  - VTE ppx: T Lovenox for IJ and inominate thrombus  - ACE wrap LUE  - PT/OT consult appreciated, PM&R consult for possible acute rehab disposition  - Acute rehab criteria: Hgb >8, oral pain management only, no chemotherapy during rehab admission.    D Team Surgery  [id number]

## 2023-10-12 NOTE — PROGRESS NOTE ADULT - SUBJECTIVE AND OBJECTIVE BOX
D TEAM Surgery Progress Note  Patient is a 58y old  Female who presents with a chief complaint of appendix Ca (11 Oct 2023 10:56)    INTERVAL EVENTS: Patient is POD#8 s/p appendectomy, cholecystectomy, hysterectomy, omenectomy, peritenectomy, LAR with diverting loop ileostomy and bladder injury with primary repair. No acute events overnight.    SUBJECTIVE: Patient seen and examined at bedside with surgical team, patient with abdominal pain that is tolerable on pain meds. Patient not taking in much of solid food. Denies nausea, vomiting. Ostomy functioning. Denies fever, chills, CP, SOB.    OBJECTIVE:    Vital Signs Last 24 Hrs  T(C): 36.9 (12 Oct 2023 04:50), Max: 37.2 (11 Oct 2023 12:25)  T(F): 98.4 (12 Oct 2023 04:50), Max: 98.9 (11 Oct 2023 12:25)  HR: 92 (12 Oct 2023 04:50) (81 - 92)  BP: 124/57 (12 Oct 2023 04:50) (124/57 - 151/58)  BP(mean): --  RR: 18 (12 Oct 2023 04:50) (17 - 18)  SpO2: 96% (12 Oct 2023 04:50) (96% - 100%)    Parameters below as of 12 Oct 2023 04:50  Patient On (Oxygen Delivery Method): room air    I&O's Detail    11 Oct 2023 07:01  -  12 Oct 2023 07:00  --------------------------------------------------------  IN:    Oral Fluid: 1150 mL  Total IN: 1150 mL    OUT:    Bulb (mL): 225 mL    Ileostomy (mL): 1050 mL    Indwelling Catheter - Urethral (mL): 2275 mL  Total OUT: 3550 mL    Total NET: -2400 mL      MEDICATIONS  (STANDING):  enoxaparin Injectable 70 milliGRAM(s) SubCutaneous every 12 hours  influenza   Vaccine 0.5 milliLiter(s) IntraMuscular once  magnesium sulfate  IVPB 1 Gram(s) IV Intermittent once  metoclopramide Injectable 10 milliGRAM(s) IV Push every 8 hours  pantoprazole    Tablet 40 milliGRAM(s) Oral before breakfast  potassium chloride    Tablet ER 40 milliEquivalent(s) Oral once    MEDICATIONS  (PRN):  acetaminophen     Tablet .. 1000 milliGRAM(s) Oral every 6 hours PRN Mild Pain (1 - 3)  HYDROmorphone  Injectable 0.2 milliGRAM(s) IV Push every 4 hours PRN Moderate Pain (4 - 6)  HYDROmorphone  Injectable 0.5 milliGRAM(s) IV Push every 4 hours PRN Severe Pain (7 - 10)  naloxone Injectable 0.1 milliGRAM(s) IV Push every 3 minutes PRN For ANY of the following changes in patient status:  A. RR LESS THAN 10 breaths per minute, B. Oxygen saturation LESS THAN 90%, C. Sedation score of 6      PHYSICAL EXAM:  Constitutional: A&Ox3, NAD  Respiratory: Unlabored breathing  Abdomen: Soft, nondistended, NTTP. No rebound or guarding. Midline incision C/D/I. Ostomy pink with gas and stool in bag. JUAQUIN drain SS.  Extremities: WWP, SARABIA spontaneously    LABS:                        7.9    7.30  )-----------( 404      ( 12 Oct 2023 05:41 )             23.2     10-12    140  |  106  |  8   ----------------------------<  83  3.5   |  19<L>  |  0.46<L>    Ca    8.0<L>      12 Oct 2023 05:41  Phos  2.9     10-12  Mg     1.80     10-12    TPro  5.2<L>  /  Alb  2.5<L>  /  TBili  0.3  /  DBili  x   /  AST  43<H>  /  ALT  47<H>  /  AlkPhos  319<H>  10-12      LIVER FUNCTIONS - ( 12 Oct 2023 05:41 )  Alb: 2.5 g/dL / Pro: 5.2 g/dL / ALK PHOS: 319 U/L / ALT: 47 U/L / AST: 43 U/L / GGT: x           Urinalysis Basic - ( 12 Oct 2023 05:41 )    Color: x / Appearance: x / SG: x / pH: x  Gluc: 83 mg/dL / Ketone: x  / Bili: x / Urobili: x   Blood: x / Protein: x / Nitrite: x   Leuk Esterase: x / RBC: x / WBC x   Sq Epi: x / Non Sq Epi: x / Bacteria: x

## 2023-10-12 NOTE — PROGRESS NOTE ADULT - SUBJECTIVE AND OBJECTIVE BOX
Subjective: Patient seen and examined. No new events except as noted.     SUBJECTIVE/ROS:  nad      MEDICATIONS:  MEDICATIONS  (STANDING):  enoxaparin Injectable 70 milliGRAM(s) SubCutaneous every 12 hours  influenza   Vaccine 0.5 milliLiter(s) IntraMuscular once  metoclopramide Injectable 10 milliGRAM(s) IV Push every 8 hours  pantoprazole    Tablet 40 milliGRAM(s) Oral before breakfast      PHYSICAL EXAM:  T(C): 36.9 (10-12-23 @ 04:50), Max: 37.2 (10-11-23 @ 12:25)  HR: 92 (10-12-23 @ 04:50) (81 - 92)  BP: 124/57 (10-12-23 @ 04:50) (124/57 - 151/58)  RR: 18 (10-12-23 @ 04:50) (16 - 18)  SpO2: 96% (10-12-23 @ 04:50) (96% - 100%)  Wt(kg): --  I&O's Summary    11 Oct 2023 07:01  -  12 Oct 2023 07:00  --------------------------------------------------------  IN: 1150 mL / OUT: 3550 mL / NET: -2400 mL            JVP: Normal  Neck: supple  Lung: clear   CV: S1 S2 , Murmur:  Abd: soft  Ext: No edema  neuro: Awake / alert  Psych: flat affect  Skin: normal``    LABS/DATA:    CARDIAC MARKERS:                                7.9    7.30  )-----------( 404      ( 12 Oct 2023 05:41 )             23.2     10-12    140  |  106  |  8   ----------------------------<  83  3.5   |  19<L>  |  0.46<L>    Ca    8.0<L>      12 Oct 2023 05:41  Phos  2.9     10-12  Mg     1.80     10-12    TPro  5.2<L>  /  Alb  2.5<L>  /  TBili  0.3  /  DBili  x   /  AST  43<H>  /  ALT  47<H>  /  AlkPhos  319<H>  10-12    proBNP:   Lipid Profile:   HgA1c:   TSH:     TELE:  EKG:

## 2023-10-12 NOTE — CONSULT NOTE ADULT - ASSESSMENT
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 found to have LIJ And inominate vein acute non-occlusive thrombosis now on T lovenox. GI consulted for persistent spit up and intolerance of po since her surgery.     #Spit-up, nausea, low po intake. Ddx broad, includes nausea 2/2 recent chemotherapy, obstruction, ileus, gastroparesis. Also may have component of food avoidance since patient is worried that solid food will aggravate her symptoms.   Denies vomiting, regurgitation, change in ostomy output.  AXR 10/11 w/ nonobstructive bowel gas pattern  #Neoplasm of appendic s/p appendectomy, cholecystectomy, hysterectomy, omenectomy, peritenectomy, LAR with diverting loop ileostomy and bladder injury with primary repair on 10/4    Recommendations  - c/w reglan, would also add standing zofran if qtc allows  - CT A/P w/ po contrast to rule out obstruction  - wean IV pain medications as tolerated  - diet as tolerated     All recommendations are tentative until note is attested by attending.     Petrona Coelho, PGY6  Gastroenterology/Hepatology Fellow  Available on Microsoft Teams  [phone] ([facility] Short Range Pager)  [phone] (Long Range Pager)    After 5pm, please contact the on-call GI fellow. [phone]

## 2023-10-13 LAB
ALBUMIN SERPL ELPH-MCNC: 2.7 G/DL — LOW (ref 3.3–5)
ALP SERPL-CCNC: 264 U/L — HIGH (ref 40–120)
ALT FLD-CCNC: 35 U/L — HIGH (ref 4–33)
ANION GAP SERPL CALC-SCNC: 15 MMOL/L — HIGH (ref 7–14)
AST SERPL-CCNC: 28 U/L — SIGNIFICANT CHANGE UP (ref 4–32)
BILIRUB SERPL-MCNC: 0.2 MG/DL — SIGNIFICANT CHANGE UP (ref 0.2–1.2)
BUN SERPL-MCNC: 6 MG/DL — LOW (ref 7–23)
CALCIUM SERPL-MCNC: 8.6 MG/DL — SIGNIFICANT CHANGE UP (ref 8.4–10.5)
CHLORIDE SERPL-SCNC: 108 MMOL/L — HIGH (ref 98–107)
CO2 SERPL-SCNC: 21 MMOL/L — LOW (ref 22–31)
CREAT SERPL-MCNC: 0.42 MG/DL — LOW (ref 0.5–1.3)
EGFR: 113 ML/MIN/1.73M2 — SIGNIFICANT CHANGE UP
GLUCOSE SERPL-MCNC: 92 MG/DL — SIGNIFICANT CHANGE UP (ref 70–99)
HCT VFR BLD CALC: 22.9 % — LOW (ref 34.5–45)
HGB BLD-MCNC: 7.5 G/DL — LOW (ref 11.5–15.5)
MAGNESIUM SERPL-MCNC: 2 MG/DL — SIGNIFICANT CHANGE UP (ref 1.6–2.6)
MCHC RBC-ENTMCNC: 27 PG — SIGNIFICANT CHANGE UP (ref 27–34)
MCHC RBC-ENTMCNC: 32.8 GM/DL — SIGNIFICANT CHANGE UP (ref 32–36)
MCV RBC AUTO: 82.4 FL — SIGNIFICANT CHANGE UP (ref 80–100)
NRBC # BLD: 0 /100 WBCS — SIGNIFICANT CHANGE UP (ref 0–0)
NRBC # FLD: 0.02 K/UL — HIGH (ref 0–0)
PHOSPHATE SERPL-MCNC: 2.8 MG/DL — SIGNIFICANT CHANGE UP (ref 2.5–4.5)
PLATELET # BLD AUTO: 459 K/UL — HIGH (ref 150–400)
POTASSIUM SERPL-MCNC: 3.6 MMOL/L — SIGNIFICANT CHANGE UP (ref 3.5–5.3)
POTASSIUM SERPL-SCNC: 3.6 MMOL/L — SIGNIFICANT CHANGE UP (ref 3.5–5.3)
PROT SERPL-MCNC: 5.6 G/DL — LOW (ref 6–8.3)
RBC # BLD: 2.78 M/UL — LOW (ref 3.8–5.2)
RBC # FLD: 21.2 % — HIGH (ref 10.3–14.5)
SODIUM SERPL-SCNC: 144 MMOL/L — SIGNIFICANT CHANGE UP (ref 135–145)
WBC # BLD: 7.09 K/UL — SIGNIFICANT CHANGE UP (ref 3.8–10.5)
WBC # FLD AUTO: 7.09 K/UL — SIGNIFICANT CHANGE UP (ref 3.8–10.5)

## 2023-10-13 PROCEDURE — 99232 SBSQ HOSP IP/OBS MODERATE 35: CPT | Mod: GC

## 2023-10-13 RX ORDER — POTASSIUM CHLORIDE 20 MEQ
10 PACKET (EA) ORAL
Refills: 0 | Status: COMPLETED | OUTPATIENT
Start: 2023-10-13 | End: 2023-10-13

## 2023-10-13 RX ORDER — ACETAMINOPHEN 500 MG
1000 TABLET ORAL EVERY 6 HOURS
Refills: 0 | Status: COMPLETED | OUTPATIENT
Start: 2023-10-13 | End: 2023-10-14

## 2023-10-13 RX ORDER — BACLOFEN 100 %
5 POWDER (GRAM) MISCELLANEOUS EVERY 8 HOURS
Refills: 0 | Status: DISCONTINUED | OUTPATIENT
Start: 2023-10-13 | End: 2023-10-13

## 2023-10-13 RX ORDER — HYDROMORPHONE HYDROCHLORIDE 2 MG/ML
0.5 INJECTION INTRAMUSCULAR; INTRAVENOUS; SUBCUTANEOUS EVERY 4 HOURS
Refills: 0 | Status: DISCONTINUED | OUTPATIENT
Start: 2023-10-13 | End: 2023-10-18

## 2023-10-13 RX ORDER — BACLOFEN 100 %
5 POWDER (GRAM) MISCELLANEOUS EVERY 8 HOURS
Refills: 0 | Status: DISCONTINUED | OUTPATIENT
Start: 2023-10-13 | End: 2023-10-22

## 2023-10-13 RX ORDER — HYDROMORPHONE HYDROCHLORIDE 2 MG/ML
0.2 INJECTION INTRAMUSCULAR; INTRAVENOUS; SUBCUTANEOUS EVERY 4 HOURS
Refills: 0 | Status: DISCONTINUED | OUTPATIENT
Start: 2023-10-13 | End: 2023-10-18

## 2023-10-13 RX ADMIN — Medication 1000 MILLIGRAM(S): at 12:18

## 2023-10-13 RX ADMIN — Medication 5 MILLIGRAM(S): at 22:37

## 2023-10-13 RX ADMIN — Medication 400 MILLIGRAM(S): at 11:55

## 2023-10-13 RX ADMIN — HYDROMORPHONE HYDROCHLORIDE 0.5 MILLIGRAM(S): 2 INJECTION INTRAMUSCULAR; INTRAVENOUS; SUBCUTANEOUS at 18:30

## 2023-10-13 RX ADMIN — Medication 100 MILLIEQUIVALENT(S): at 10:41

## 2023-10-13 RX ADMIN — Medication 100 MILLIEQUIVALENT(S): at 09:03

## 2023-10-13 RX ADMIN — Medication 400 MILLIGRAM(S): at 05:04

## 2023-10-13 RX ADMIN — HYDROMORPHONE HYDROCHLORIDE 0.5 MILLIGRAM(S): 2 INJECTION INTRAMUSCULAR; INTRAVENOUS; SUBCUTANEOUS at 02:30

## 2023-10-13 RX ADMIN — HYDROMORPHONE HYDROCHLORIDE 0.5 MILLIGRAM(S): 2 INJECTION INTRAMUSCULAR; INTRAVENOUS; SUBCUTANEOUS at 23:10

## 2023-10-13 RX ADMIN — HYDROMORPHONE HYDROCHLORIDE 0.5 MILLIGRAM(S): 2 INJECTION INTRAMUSCULAR; INTRAVENOUS; SUBCUTANEOUS at 11:15

## 2023-10-13 RX ADMIN — HYDROMORPHONE HYDROCHLORIDE 0.5 MILLIGRAM(S): 2 INJECTION INTRAMUSCULAR; INTRAVENOUS; SUBCUTANEOUS at 17:44

## 2023-10-13 RX ADMIN — Medication 10 MILLIGRAM(S): at 12:03

## 2023-10-13 RX ADMIN — Medication 10 MILLIGRAM(S): at 22:37

## 2023-10-13 RX ADMIN — Medication 400 MILLIGRAM(S): at 17:44

## 2023-10-13 RX ADMIN — HYDROMORPHONE HYDROCHLORIDE 0.5 MILLIGRAM(S): 2 INJECTION INTRAMUSCULAR; INTRAVENOUS; SUBCUTANEOUS at 10:41

## 2023-10-13 RX ADMIN — HYDROMORPHONE HYDROCHLORIDE 0.5 MILLIGRAM(S): 2 INJECTION INTRAMUSCULAR; INTRAVENOUS; SUBCUTANEOUS at 02:15

## 2023-10-13 RX ADMIN — Medication 1000 MILLIGRAM(S): at 05:20

## 2023-10-13 RX ADMIN — HYDROMORPHONE HYDROCHLORIDE 0.5 MILLIGRAM(S): 2 INJECTION INTRAMUSCULAR; INTRAVENOUS; SUBCUTANEOUS at 22:43

## 2023-10-13 RX ADMIN — ENOXAPARIN SODIUM 70 MILLIGRAM(S): 100 INJECTION SUBCUTANEOUS at 22:37

## 2023-10-13 RX ADMIN — PANTOPRAZOLE SODIUM 40 MILLIGRAM(S): 20 TABLET, DELAYED RELEASE ORAL at 11:56

## 2023-10-13 RX ADMIN — Medication 5 MILLIGRAM(S): at 15:46

## 2023-10-13 RX ADMIN — ENOXAPARIN SODIUM 70 MILLIGRAM(S): 100 INJECTION SUBCUTANEOUS at 11:56

## 2023-10-13 RX ADMIN — Medication 1000 MILLIGRAM(S): at 18:30

## 2023-10-13 RX ADMIN — Medication 100 MILLIEQUIVALENT(S): at 12:03

## 2023-10-13 RX ADMIN — Medication 10 MILLIGRAM(S): at 05:05

## 2023-10-13 NOTE — PROGRESS NOTE ADULT - SUBJECTIVE AND OBJECTIVE BOX
D TEAM Surgery Progress Note  Patient is a 58y old  Female who presents with a chief complaint of appendix Ca (11 Oct 2023 10:56)    INTERVAL EVENTS: Patient is POD#9 s/p appendectomy, cholecystectomy, hysterectomy, omenectomy, peritenectomy, LAR with diverting loop ileostomy and bladder injury with primary repair. No acute events overnight.    SUBJECTIVE: Patient seen and examined at bedside with surgical team, patient with abdominal pain that is tolerable on pain meds. Patient not taking in much of solid food. Denies nausea but is spitting. Denies fever, chills, CP, SOB.    OBJECTIVE:    Vital Signs Last 24 Hrs  T(C): 36.7 (10-13-23 @ 05:30), Max: 37.3 (10-12-23 @ 20:22)  HR: 76 (10-13-23 @ 05:30) (76 - 96)  BP: 132/57 (10-13-23 @ 05:30) (115/53 - 143/55)  BP(mean): --  ABP: --  ABP(mean): --  RR: 18 (10-13-23 @ 05:30) (18 - 18)  SpO2: 98% (10-13-23 @ 05:30) (98% - 100%)  Wt(kg): --  CVP(mm Hg): --  CI: --  CAPILLARY BLOOD GLUCOSE       N/A      10-12 @ 07:01  -  10-13 @ 07:00  --------------------------------------------------------  IN:    Oral Fluid: 920 mL  Total IN: 920 mL    OUT:    Bulb (mL): 47.5 mL    Ileostomy (mL): 400 mL    Indwelling Catheter - Urethral (mL): 2250 mL  Total OUT: 2697.5 mL    Total NET: -1777.5 mL    PHYSICAL EXAM:  Constitutional: A&Ox3, NAD  Respiratory: Unlabored breathing  Abdomen: Soft, nondistended, NTTP. No rebound or guarding. Midline incision C/D/I. Ostomy pink. JUAQUIN drain SS.  Extremities: WWP, SARABIA spontaneously    LABS:           CBC (10-13 @ 05:43)                              7.5<L>                         7.09    )----------------(  459<H>     --    % Neutrophils, --    % Lymphocytes, ANC: --                                  22.9<L>  CBC (10-12 @ 05:41)                              7.9<L>                         7.30    )----------------(  404<H>     --    % Neutrophils, --    % Lymphocytes, ANC: --                                  23.2<L>    BMP (10-13 @ 05:43)             144     |  108<H>  |  6<L>  		Ca++ --      Ca 8.6                ---------------------------------( 92    		Mg 2.00               3.6     |  21<L>   |  0.42<L>			Ph 2.8     BMP (10-12 @ 05:41)             140     |  106     |  8     		Ca++ --      Ca 8.0<L>             ---------------------------------( 83    		Mg 1.80               3.5     |  19<L>   |  0.46<L>			Ph 2.9       LFTs (10-13 @ 05:43)      TPro 5.6<L> / Alb 2.7<L> / TBili 0.2 / DBili -- / AST 28 / ALT 35<H> / AlkPhos 264<H>  LFTs (10-12 @ 05:41)      TPro 5.2<L> / Alb 2.5<L> / TBili 0.3 / DBili -- / AST 43<H> / ALT 47<H> / AlkPhos 319<H>    Urinalysis (10-13 @ 05:43):     Color:  / Appearance:  / SG:  / pH:  / Gluc: 92 / Ketones:  / Bili:  / Urobili:  / Protein : / Nitrites:  / Leuk.Est:  / RBC:  / WBC:  / Sq Epi:  / Non Sq Epi:  / Bacteria          Assessment and Plan:   · Assessment	  58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 found to have LIJ And inominate vein acute non-occlusive thrombosis now on T lovenox.    PLAN:  - Do not touch cristina x2 weeks  - LFD  - Reglan 10mg q8 hours  - PO protonix  - Monitor Ostomy: ostomy nursing consult  - Monitor JUAQUIN outpt  - FS premeal with ISS  - VTE ppx: T Lovenox for IJ and inominate thrombus  - ACE wrap LUE  - PT/OT consult appreciated, PM&R consult for possible acute rehab disposition  - Acute rehab criteria: Hgb >8, oral pain management only, no chemotherapy during rehab admission.    D Team Surgery  [id number]

## 2023-10-13 NOTE — PROGRESS NOTE ADULT - SUBJECTIVE AND OBJECTIVE BOX
Subjective: Patient seen and examined. No new events except as noted.     SUBJECTIVE/ROS:  nad      MEDICATIONS:  MEDICATIONS  (STANDING):  acetaminophen   IVPB .. 1000 milliGRAM(s) IV Intermittent every 6 hours  baclofen 5 milliGRAM(s) Oral every 8 hours  enoxaparin Injectable 70 milliGRAM(s) SubCutaneous every 12 hours  influenza   Vaccine 0.5 milliLiter(s) IntraMuscular once  metoclopramide Injectable 10 milliGRAM(s) IV Push every 8 hours  pantoprazole  Injectable 40 milliGRAM(s) IV Push daily  potassium chloride  10 mEq/100 mL IVPB 10 milliEquivalent(s) IV Intermittent every 1 hour      PHYSICAL EXAM:  T(C): 36.7 (10-13-23 @ 05:30), Max: 37.3 (10-12-23 @ 20:22)  HR: 76 (10-13-23 @ 05:30) (76 - 96)  BP: 132/57 (10-13-23 @ 05:30) (115/53 - 143/55)  RR: 18 (10-13-23 @ 05:30) (18 - 18)  SpO2: 98% (10-13-23 @ 05:30) (98% - 100%)  Wt(kg): --  I&O's Summary    12 Oct 2023 07:01  -  13 Oct 2023 07:00  --------------------------------------------------------  IN: 920 mL / OUT: 2697.5 mL / NET: -1777.5 mL            JVP: Normal  Neck: supple  Lung: clear   CV: S1 S2 , Murmur:  Abd: soft  Ext: No edema  neuro: Awake / alert  Psych: flat affect  Skin: normal``    LABS/DATA:    CARDIAC MARKERS:                                7.5    7.09  )-----------( 459      ( 13 Oct 2023 05:43 )             22.9     10-13    144  |  108<H>  |  6<L>  ----------------------------<  92  3.6   |  21<L>  |  0.42<L>    Ca    8.6      13 Oct 2023 05:43  Phos  2.8     10-13  Mg     2.00     10-13    TPro  5.6<L>  /  Alb  2.7<L>  /  TBili  0.2  /  DBili  x   /  AST  28  /  ALT  35<H>  /  AlkPhos  264<H>  10-13    proBNP:   Lipid Profile:   HgA1c:   TSH:     TELE:  EKG:

## 2023-10-13 NOTE — PROGRESS NOTE ADULT - ASSESSMENT
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 found to have LIJ And inominate vein acute non-occlusive thrombosis now on T lovenox. GI consulted for persistent spit up and intolerance of po since her surgery.     #Spit-up, nausea, low po intake. Ddx broad, includes nausea 2/2 recent chemotherapy, obstruction, ileus, gastroparesis, diaphragmatic spasm. Also may have component of food avoidance since patient is worried that solid food will aggravate her symptoms.   Denies vomiting, regurgitation, change in ostomy output. States she had a normal EGD when her symptoms began.  AXR 10/11 w/ nonobstructive bowel gas pattern  - CT A/P IVC (patient declined po) without evidence of obstruction  #Neoplasm of appendic s/p appendectomy, cholecystectomy, hysterectomy, omenectomy, peritenectomy, LAR with diverting loop ileostomy and bladder injury with primary repair on 10/4    Recommendations  - c/w reglan, can also add zofran if qtc allows  - wean IV pain medications as tolerated  - diet as tolerated, recommend small frequent meals  - c/w baclofen for possible diaphragmatic spasm  - daily PPI     All recommendations are tentative until note is attested by attending.     Petrona Coelho, PGY6  Gastroenterology/Hepatology Fellow  Available on [employer] Teams  [phone] ([facility] Short Range Pager)  [phone] (Long Range Pager)    After 5pm, please contact the on-call GI fellow. [phone]

## 2023-10-13 NOTE — PROGRESS NOTE ADULT - ASSESSMENT
Shock   resolved     resp failure   off vent   stable now    DVT prophylaxis  on heparin sq    anemia  Monitor hemoglobin, transfuse as needed.  plan as per primary team

## 2023-10-13 NOTE — PROGRESS NOTE ADULT - SUBJECTIVE AND OBJECTIVE BOX
Gastroenterology/Hepatology Progress Note    Interval Events: No events overnight. Patient states that her spitting is better today. States she would like to get some rest and sleep.    Allergies:  penicillin (Rash)    Hospital Medications:  acetaminophen   IVPB .. 1000 milliGRAM(s) IV Intermittent every 6 hours  baclofen 5 milliGRAM(s) Oral every 8 hours  enoxaparin Injectable 70 milliGRAM(s) SubCutaneous every 12 hours  HYDROmorphone  Injectable 0.5 milliGRAM(s) IV Push every 4 hours PRN  HYDROmorphone  Injectable 0.2 milliGRAM(s) IV Push every 4 hours PRN  influenza   Vaccine 0.5 milliLiter(s) IntraMuscular once  metoclopramide Injectable 10 milliGRAM(s) IV Push every 8 hours  naloxone Injectable 0.1 milliGRAM(s) IV Push every 3 minutes PRN  pantoprazole  Injectable 40 milliGRAM(s) IV Push daily    ROS: 14 point ROS negative unless otherwise state in subjective    PHYSICAL EXAM:   Vital Signs:  Vital Signs Last 24 Hrs  T(C): 36.7 (13 Oct 2023 12:26), Max: 37.3 (12 Oct 2023 20:22)  T(F): 98.1 (13 Oct 2023 12:26), Max: 99.2 (12 Oct 2023 20:22)  HR: 84 (13 Oct 2023 12:26) (76 - 96)  BP: 132/65 (13 Oct 2023 12:26) (128/50 - 143/55)  BP(mean): --  RR: 18 (13 Oct 2023 12:26) (16 - 18)  SpO2: 100% (13 Oct 2023 12:26) (98% - 100%)    Parameters below as of 13 Oct 2023 12:26  Patient On (Oxygen Delivery Method): room air      Daily     Daily     GENERAL:  No acute distress  HEENT:  NCAT, no scleral icterus  CHEST: no resp distress  HEART:  RRR  ABDOMEN:  Soft, non-tender, non-distended. Vertical healing incision, ostomy w/ green/brown liquid and semi-formed stool  EXTREMITIES:  No cyanosis   SKIN:  No rash/erythema/ecchymoses  NEURO:  Alert and oriented x 3     LABS:                        7.5    7.09  )-----------( 459      ( 13 Oct 2023 05:43 )             22.9     Mean Cell Volume: 82.4 fL (10-13-23 @ 05:43)    10-13    144  |  108<H>  |  6<L>  ----------------------------<  92  3.6   |  21<L>  |  0.42<L>    Ca    8.6      13 Oct 2023 05:43  Phos  2.8     10-13  Mg     2.00     10-13    TPro  5.6<L>  /  Alb  2.7<L>  /  TBili  0.2  /  DBili  x   /  AST  28  /  ALT  35<H>  /  AlkPhos  264<H>  10-13    LIVER FUNCTIONS - ( 13 Oct 2023 05:43 )  Alb: 2.7 g/dL / Pro: 5.6 g/dL / ALK PHOS: 264 U/L / ALT: 35 U/L / AST: 28 U/L / GGT: x             Urinalysis Basic - ( 13 Oct 2023 05:43 )    Color: x / Appearance: x / SG: x / pH: x  Gluc: 92 mg/dL / Ketone: x  / Bili: x / Urobili: x   Blood: x / Protein: x / Nitrite: x   Leuk Esterase: x / RBC: x / WBC x   Sq Epi: x / Non Sq Epi: x / Bacteria: x      Imaging:    ACC: [id number] EXAM:  CT ABDOMEN AND PELVIS IC   ORDERED BY: JASSON SHORE     PROCEDURE DATE:  10/12/2023          INTERPRETATION:  CLINICAL INFORMATION: Vomiting status post CORNELIA, BSO,   omentectomy, cholecystectomy, appendectomy, LAR, DLI HIPEC for mucinous   neoplasm.    COMPARISON: CT abdomen pelvis 9/11/2023    CONTRAST/COMPLICATIONS:  IV Contrast: Omnipaque 350  90 cc administered   10 cc discarded  Oral Contrast: NONE  Complications: None reported at time of study completion    PROCEDURE:  CT of the Abdomen and Pelvis was performed.  Sagittal and coronal reformats were performed.    FINDINGS:  LOWER CHEST: Small right greater than left pleural effusions with   compressive lower lobe atelectasis. Small amount of pneumomediastinum.    LIVER: Stable subcentimeter hypoattenuating foci too small to   characterize. Heterogeneous enhancement of the right inferior hepatic   lobe with subcapsular hypoattenuation, which may represent hepatic injury   or focal infarct (4/78).  BILE DUCTS: Normal caliber.  GALLBLADDER: Cholecystectomy.  SPLEEN: Within normal limits.  PANCREAS: Within normal limits.  ADRENALS: Within normal limits.  KIDNEYS/URETERS: Bilateral renal cysts and right subcentimeter   hypoattenuating foci too small to characterize. No hydronephrosis.    BLADDER: Pierce catheter.  REPRODUCTIVE ORGANS: Hysterectomy and bilateral oophorectomy.    BOWEL: Right lower quadrant diverting loop ileostomy. Rectocolic   anastomosis. No bowel obstruction. Appendectomy  PERITONEUM: Omentectomy with peritoneal carcinomatosis resection. Left   pelvic approach drain terminates within the lower pelvis with small fluid   in the presacral space. Trace ascites.  VESSELS: Within normal limits.  RETROPERITONEUM/LYMPH NODES: Small amount of dania rectal fluid. Small   amount of gas and fluid within the space of Retzius. No lymphadenopathy.  ABDOMINAL WALL: Right lower quadrant ostomy. Post surgical changes.  BONES: Within normal limits.    IMPRESSION:  Post surgical changes within the abdomen and pelvis as above. No evidence   of abscess.  New right greater than left pleural effusions with compressive lower lobe   atelectasis.  Small postoperative pneumomediastinum.  Heterogeneous enhancement of the right inferior hepatic lobe with   subcapsular hypoattenuation, which may represent hepatic injury or focal   infarct.    --- End of Report ---    MARYLOU HANSON MD; Resident Radiologist  This document has been electronically signed.  URBAN VARGAS MD; Attending Radiologist  This document has been electronically signed. Oct 13 2023  9:00AM

## 2023-10-14 LAB
ALBUMIN SERPL ELPH-MCNC: 2.8 G/DL — LOW (ref 3.3–5)
ALP SERPL-CCNC: 230 U/L — HIGH (ref 40–120)
ALT FLD-CCNC: 29 U/L — SIGNIFICANT CHANGE UP (ref 4–33)
ANION GAP SERPL CALC-SCNC: 14 MMOL/L — SIGNIFICANT CHANGE UP (ref 7–14)
AST SERPL-CCNC: 23 U/L — SIGNIFICANT CHANGE UP (ref 4–32)
BILIRUB SERPL-MCNC: 0.2 MG/DL — SIGNIFICANT CHANGE UP (ref 0.2–1.2)
BUN SERPL-MCNC: 6 MG/DL — LOW (ref 7–23)
CALCIUM SERPL-MCNC: 8.8 MG/DL — SIGNIFICANT CHANGE UP (ref 8.4–10.5)
CHLORIDE SERPL-SCNC: 109 MMOL/L — HIGH (ref 98–107)
CO2 SERPL-SCNC: 23 MMOL/L — SIGNIFICANT CHANGE UP (ref 22–31)
CREAT SERPL-MCNC: 0.4 MG/DL — LOW (ref 0.5–1.3)
EGFR: 114 ML/MIN/1.73M2 — SIGNIFICANT CHANGE UP
GLUCOSE SERPL-MCNC: 93 MG/DL — SIGNIFICANT CHANGE UP (ref 70–99)
HCT VFR BLD CALC: 23.6 % — LOW (ref 34.5–45)
HGB BLD-MCNC: 7.7 G/DL — LOW (ref 11.5–15.5)
MAGNESIUM SERPL-MCNC: 1.9 MG/DL — SIGNIFICANT CHANGE UP (ref 1.6–2.6)
MCHC RBC-ENTMCNC: 27.1 PG — SIGNIFICANT CHANGE UP (ref 27–34)
MCHC RBC-ENTMCNC: 32.6 GM/DL — SIGNIFICANT CHANGE UP (ref 32–36)
MCV RBC AUTO: 83.1 FL — SIGNIFICANT CHANGE UP (ref 80–100)
NRBC # BLD: 0 /100 WBCS — SIGNIFICANT CHANGE UP (ref 0–0)
NRBC # FLD: 0.06 K/UL — HIGH (ref 0–0)
PHOSPHATE SERPL-MCNC: 3.2 MG/DL — SIGNIFICANT CHANGE UP (ref 2.5–4.5)
PLATELET # BLD AUTO: 491 K/UL — HIGH (ref 150–400)
POTASSIUM SERPL-MCNC: 3.6 MMOL/L — SIGNIFICANT CHANGE UP (ref 3.5–5.3)
POTASSIUM SERPL-SCNC: 3.6 MMOL/L — SIGNIFICANT CHANGE UP (ref 3.5–5.3)
PROT SERPL-MCNC: 5.8 G/DL — LOW (ref 6–8.3)
RBC # BLD: 2.84 M/UL — LOW (ref 3.8–5.2)
RBC # FLD: 21.1 % — HIGH (ref 10.3–14.5)
SODIUM SERPL-SCNC: 146 MMOL/L — HIGH (ref 135–145)
WBC # BLD: 8.38 K/UL — SIGNIFICANT CHANGE UP (ref 3.8–10.5)
WBC # FLD AUTO: 8.38 K/UL — SIGNIFICANT CHANGE UP (ref 3.8–10.5)

## 2023-10-14 PROCEDURE — 74019 RADEX ABDOMEN 2 VIEWS: CPT | Mod: 26

## 2023-10-14 RX ORDER — POTASSIUM CHLORIDE 20 MEQ
10 PACKET (EA) ORAL
Refills: 0 | Status: COMPLETED | OUTPATIENT
Start: 2023-10-14 | End: 2023-10-14

## 2023-10-14 RX ORDER — MAGNESIUM SULFATE 500 MG/ML
1 VIAL (ML) INJECTION ONCE
Refills: 0 | Status: COMPLETED | OUTPATIENT
Start: 2023-10-14 | End: 2023-10-14

## 2023-10-14 RX ORDER — SODIUM CHLORIDE 9 MG/ML
1000 INJECTION, SOLUTION INTRAVENOUS
Refills: 0 | Status: DISCONTINUED | OUTPATIENT
Start: 2023-10-14 | End: 2023-10-15

## 2023-10-14 RX ADMIN — Medication 100 MILLIEQUIVALENT(S): at 14:18

## 2023-10-14 RX ADMIN — Medication 1000 MILLIGRAM(S): at 14:00

## 2023-10-14 RX ADMIN — HYDROMORPHONE HYDROCHLORIDE 0.5 MILLIGRAM(S): 2 INJECTION INTRAMUSCULAR; INTRAVENOUS; SUBCUTANEOUS at 18:23

## 2023-10-14 RX ADMIN — HYDROMORPHONE HYDROCHLORIDE 0.5 MILLIGRAM(S): 2 INJECTION INTRAMUSCULAR; INTRAVENOUS; SUBCUTANEOUS at 07:10

## 2023-10-14 RX ADMIN — HYDROMORPHONE HYDROCHLORIDE 0.5 MILLIGRAM(S): 2 INJECTION INTRAMUSCULAR; INTRAVENOUS; SUBCUTANEOUS at 06:47

## 2023-10-14 RX ADMIN — PANTOPRAZOLE SODIUM 40 MILLIGRAM(S): 20 TABLET, DELAYED RELEASE ORAL at 13:13

## 2023-10-14 RX ADMIN — Medication 1000 MILLIGRAM(S): at 07:00

## 2023-10-14 RX ADMIN — Medication 5 MILLIGRAM(S): at 06:27

## 2023-10-14 RX ADMIN — Medication 100 MILLIEQUIVALENT(S): at 15:51

## 2023-10-14 RX ADMIN — HYDROMORPHONE HYDROCHLORIDE 0.5 MILLIGRAM(S): 2 INJECTION INTRAMUSCULAR; INTRAVENOUS; SUBCUTANEOUS at 15:01

## 2023-10-14 RX ADMIN — SODIUM CHLORIDE 30 MILLILITER(S): 9 INJECTION, SOLUTION INTRAVENOUS at 18:04

## 2023-10-14 RX ADMIN — HYDROMORPHONE HYDROCHLORIDE 0.5 MILLIGRAM(S): 2 INJECTION INTRAMUSCULAR; INTRAVENOUS; SUBCUTANEOUS at 14:22

## 2023-10-14 RX ADMIN — Medication 400 MILLIGRAM(S): at 13:12

## 2023-10-14 RX ADMIN — Medication 10 MILLIGRAM(S): at 13:31

## 2023-10-14 RX ADMIN — Medication 5 MILLIGRAM(S): at 22:40

## 2023-10-14 RX ADMIN — HYDROMORPHONE HYDROCHLORIDE 0.5 MILLIGRAM(S): 2 INJECTION INTRAMUSCULAR; INTRAVENOUS; SUBCUTANEOUS at 19:00

## 2023-10-14 RX ADMIN — Medication 100 MILLIEQUIVALENT(S): at 17:16

## 2023-10-14 RX ADMIN — Medication 5 MILLIGRAM(S): at 13:31

## 2023-10-14 RX ADMIN — ENOXAPARIN SODIUM 70 MILLIGRAM(S): 100 INJECTION SUBCUTANEOUS at 13:12

## 2023-10-14 RX ADMIN — HYDROMORPHONE HYDROCHLORIDE 0.5 MILLIGRAM(S): 2 INJECTION INTRAMUSCULAR; INTRAVENOUS; SUBCUTANEOUS at 23:10

## 2023-10-14 RX ADMIN — Medication 400 MILLIGRAM(S): at 06:25

## 2023-10-14 RX ADMIN — Medication 10 MILLIGRAM(S): at 22:41

## 2023-10-14 RX ADMIN — HYDROMORPHONE HYDROCHLORIDE 0.5 MILLIGRAM(S): 2 INJECTION INTRAMUSCULAR; INTRAVENOUS; SUBCUTANEOUS at 22:40

## 2023-10-14 RX ADMIN — Medication 100 GRAM(S): at 13:12

## 2023-10-14 RX ADMIN — Medication 10 MILLIGRAM(S): at 06:27

## 2023-10-14 NOTE — SWALLOW BEDSIDE ASSESSMENT ADULT - COMMENTS
Pt. lying in bed in NAD upon SLP arrival. Pt. AAOx3 and agreed to participate.     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 found to have LIJ And inominate vein acute non-occlusive thrombosis now on T lovenox."    -WBC WNL per lab review  -CXR: "Postop left lower lobe atelectasis. Remainder of lungs are clear"    Pt. denied difficulty swallowing food/liquids, rather frequent spitting up and unable to tolerate food 2/2 chemotherapy treatments. Pt. reported after treatments it usually takes 10 days before she is able to tolerate more types of foods/liquids without spitting them up. Pt. denied any physical swallowing difficulty, rather "keeping it down". Pt. reported tolerating bananas, tea and fruits.

## 2023-10-14 NOTE — SWALLOW BEDSIDE ASSESSMENT ADULT - ADDITIONAL RECOMMENDATIONS
1). Pt. took 3 bites of a banana, did not want any liquid trials or further solid trials. Pt. denied difficulty swallowing.   2). Diet deferred to MD at this time  3). This service to f/u as scheduling permits   4). Medical team to re-consult as warranted

## 2023-10-14 NOTE — PROGRESS NOTE ADULT - ASSESSMENT
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 found to have LIJ And inominate vein acute non-occlusive thrombosis now on T lovenox.    PLAN:  - Do not touch cristina x2 weeks  - LFD  - Reglan 10mg q8 hours  - PO protonix  - Monitor Ostomy: ostomy nursing consult  - Monitor JUAQUIN outpt  - FS premeal with ISS  - VTE ppx: T Lovenox for IJ and inominate thrombus  - ACE wrap LUE  - PT/OT consult appreciated, PM&R consult for possible acute rehab disposition  - Acute rehab criteria: Hgb >8, oral pain management only, no chemotherapy during rehab admission.    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 found to have LIJ And inominate vein acute non-occlusive thrombosis now on T lovenox.    PLAN:  - Do not touch cristina x2 weeks  - Diet: regular as tolerated, given poor PO intake, consider initiating TPN on Monday if continues to have poor PO intake  - Follow up AXR  - Reglan 10mg q8 hours  - PO protonix  - Monitor Ostomy: ostomy nursing consult  - Monitor JUAQUIN outpt  - FS premeal with ISS  - VTE ppx: T Lovenox for IJ and inominate thrombus  - ACE wrap LUE  - PT/OT consult appreciated, PM&R consult for possible acute rehab disposition  - Acute rehab criteria: Hgb >8, oral pain management only, no chemotherapy during rehab admission.    D Team Surgery  [id number]

## 2023-10-14 NOTE — SWALLOW BEDSIDE ASSESSMENT ADULT - SWALLOW EVAL: DIAGNOSIS
Pt. took 3 bites of a banana. 1. Functional oral stage with adequate bolus retrieval and complete oral containment. Prompt swallow initiation with efficient bolus manipulation/formation and posterior transfer. Complete oral clearance noted. 2. Functional pharyngeal stage marked by present swallow trigger judged via hyolaryngeal digital palpation. No overt s/s of airway compromise. Of note: pt. refused other trials of solids and all liquids

## 2023-10-14 NOTE — PROGRESS NOTE ADULT - SUBJECTIVE AND OBJECTIVE BOX
Subjective: Patient seen and examined. No new events except as noted.     SUBJECTIVE/ROS:  No chest pain, dyspnea, palpitation, or dizziness.       MEDICATIONS:  MEDICATIONS  (STANDING):  acetaminophen   IVPB .. 1000 milliGRAM(s) IV Intermittent every 6 hours  baclofen 5 milliGRAM(s) Oral every 8 hours  enoxaparin Injectable 70 milliGRAM(s) SubCutaneous every 12 hours  influenza   Vaccine 0.5 milliLiter(s) IntraMuscular once  metoclopramide Injectable 10 milliGRAM(s) IV Push every 8 hours  pantoprazole  Injectable 40 milliGRAM(s) IV Push daily      PHYSICAL EXAM:  T(C): 37 (10-14-23 @ 05:46), Max: 37 (10-14-23 @ 05:46)  HR: 96 (10-14-23 @ 05:46) (76 - 98)  BP: 143/70 (10-14-23 @ 05:46) (127/59 - 150/58)  RR: 18 (10-14-23 @ 05:46) (16 - 18)  SpO2: 100% (10-14-23 @ 05:46) (100% - 100%)  Wt(kg): --  I&O's Summary    13 Oct 2023 07:01  -  14 Oct 2023 07:00  --------------------------------------------------------  IN: 980 mL / OUT: 2477.5 mL / NET: -1497.5 mL            JVP: Normal  Neck: supple  Lung: clear   CV: S1 S2 , Murmur:  Abd: soft  Ext: No edema  neuro: Awake / alert  Psych: flat affect  Skin: normal``    LABS/DATA:    CARDIAC MARKERS:                                7.7    8.38  )-----------( 491      ( 14 Oct 2023 05:52 )             23.6     10-13    144  |  108<H>  |  6<L>  ----------------------------<  92  3.6   |  21<L>  |  0.42<L>    Ca    8.6      13 Oct 2023 05:43  Phos  2.8     10-13  Mg     2.00     10-13    TPro  5.6<L>  /  Alb  2.7<L>  /  TBili  0.2  /  DBili  x   /  AST  28  /  ALT  35<H>  /  AlkPhos  264<H>  10-13    proBNP:   Lipid Profile:   HgA1c:   TSH:     TELE:  EKG:

## 2023-10-14 NOTE — PROGRESS NOTE ADULT - SUBJECTIVE AND OBJECTIVE BOX
Surgery Progress Note    S: Patient seen and examined. No acute events overnight. Reports tolerating diet without nausea, vomiting, passing flatus, having bowel movements, voiding without issues, have been ambulating and out of bed. Denies fever, chills, SOB, chest pain.     O:  Physical Exam:  Gen: Laying in bed, NAD  HEENT: atrumatic, EMOI  Resp: Unlabored breathing  Abd: soft, dania-incisional tenderness, nondistended, no rebound or guarding. Drains serosanguinous   Ext: Moves 4 extremities spontaneously    Vital Signs Last 24 Hrs  T(C): 36.7 (14 Oct 2023 09:15), Max: 37 (14 Oct 2023 05:46)  T(F): 98 (14 Oct 2023 09:15), Max: 98.6 (14 Oct 2023 05:46)  HR: 84 (14 Oct 2023 09:15) (76 - 98)  BP: 156/63 (14 Oct 2023 09:15) (127/59 - 156/63)  BP(mean): --  RR: 17 (14 Oct 2023 09:15) (17 - 18)  SpO2: 100% (14 Oct 2023 09:15) (100% - 100%)    Parameters below as of 14 Oct 2023 09:15  Patient On (Oxygen Delivery Method): room air        I&O's Detail    13 Oct 2023 07:01  -  14 Oct 2023 07:00  --------------------------------------------------------  IN:    Oral Fluid: 980 mL  Total IN: 980 mL    OUT:    Bulb (mL): 2.5 mL    Ileostomy (mL): 400 mL    Indwelling Catheter - Urethral (mL): 2075 mL  Total OUT: 2477.5 mL    Total NET: -1497.5 mL                                7.7    8.38  )-----------( 491      ( 14 Oct 2023 05:52 )             23.6       10-14    146<H>  |  109<H>  |  6<L>  ----------------------------<  93  3.6   |  23  |  0.40<L>    Ca    8.8      14 Oct 2023 05:52  Phos  3.2     10-14  Mg     1.90     10-14    TPro  5.8<L>  /  Alb  2.8<L>  /  TBili  0.2  /  DBili  x   /  AST  23  /  ALT  29  /  AlkPhos  230<H>  10-14       Surgery Progress Note    S: Patient seen and examined. No acute events overnight. Reports tolerating diet with nausea and continued spit ups with some more bile tinge in the afternoon, ostomy productive, voiding without issues, have been ambulating and out of bed. Denies fever, chills, SOB, chest pain.     O:  Physical Exam:  Gen: Laying in bed, NAD  HEENT: atrumatic, EMOI  Resp: Unlabored breathing  Abd: soft, dania-incisional tenderness, nondistended, no rebound or guarding. Ileostomy productive  Ext: Moves 4 extremities spontaneously    Vital Signs Last 24 Hrs  T(C): 36.7 (14 Oct 2023 09:15), Max: 37 (14 Oct 2023 05:46)  T(F): 98 (14 Oct 2023 09:15), Max: 98.6 (14 Oct 2023 05:46)  HR: 84 (14 Oct 2023 09:15) (76 - 98)  BP: 156/63 (14 Oct 2023 09:15) (127/59 - 156/63)  BP(mean): --  RR: 17 (14 Oct 2023 09:15) (17 - 18)  SpO2: 100% (14 Oct 2023 09:15) (100% - 100%)    Parameters below as of 14 Oct 2023 09:15  Patient On (Oxygen Delivery Method): room air        I&O's Detail    13 Oct 2023 07:01  -  14 Oct 2023 07:00  --------------------------------------------------------  IN:    Oral Fluid: 980 mL  Total IN: 980 mL    OUT:    Bulb (mL): 2.5 mL    Ileostomy (mL): 400 mL    Indwelling Catheter - Urethral (mL): 2075 mL  Total OUT: 2477.5 mL    Total NET: -1497.5 mL                                7.7    8.38  )-----------( 491      ( 14 Oct 2023 05:52 )             23.6       10-14    146<H>  |  109<H>  |  6<L>  ----------------------------<  93  3.6   |  23  |  0.40<L>    Ca    8.8      14 Oct 2023 05:52  Phos  3.2     10-14  Mg     1.90     10-14    TPro  5.8<L>  /  Alb  2.8<L>  /  TBili  0.2  /  DBili  x   /  AST  23  /  ALT  29  /  AlkPhos  230<H>  10-14

## 2023-10-15 LAB
ANION GAP SERPL CALC-SCNC: 13 MMOL/L — SIGNIFICANT CHANGE UP (ref 7–14)
BUN SERPL-MCNC: 7 MG/DL — SIGNIFICANT CHANGE UP (ref 7–23)
CALCIUM SERPL-MCNC: 8.7 MG/DL — SIGNIFICANT CHANGE UP (ref 8.4–10.5)
CHLORIDE SERPL-SCNC: 108 MMOL/L — HIGH (ref 98–107)
CO2 SERPL-SCNC: 24 MMOL/L — SIGNIFICANT CHANGE UP (ref 22–31)
CREAT SERPL-MCNC: 0.37 MG/DL — LOW (ref 0.5–1.3)
EGFR: 116 ML/MIN/1.73M2 — SIGNIFICANT CHANGE UP
GLUCOSE SERPL-MCNC: 106 MG/DL — HIGH (ref 70–99)
HCT VFR BLD CALC: 21.9 % — LOW (ref 34.5–45)
HGB BLD-MCNC: 7.1 G/DL — LOW (ref 11.5–15.5)
MAGNESIUM SERPL-MCNC: 2 MG/DL — SIGNIFICANT CHANGE UP (ref 1.6–2.6)
MCHC RBC-ENTMCNC: 26.9 PG — LOW (ref 27–34)
MCHC RBC-ENTMCNC: 32.4 GM/DL — SIGNIFICANT CHANGE UP (ref 32–36)
MCV RBC AUTO: 83 FL — SIGNIFICANT CHANGE UP (ref 80–100)
NRBC # BLD: 0 /100 WBCS — SIGNIFICANT CHANGE UP (ref 0–0)
NRBC # FLD: 0.06 K/UL — HIGH (ref 0–0)
PHOSPHATE SERPL-MCNC: 3 MG/DL — SIGNIFICANT CHANGE UP (ref 2.5–4.5)
PLATELET # BLD AUTO: 498 K/UL — HIGH (ref 150–400)
POTASSIUM SERPL-MCNC: 3.7 MMOL/L — SIGNIFICANT CHANGE UP (ref 3.5–5.3)
POTASSIUM SERPL-SCNC: 3.7 MMOL/L — SIGNIFICANT CHANGE UP (ref 3.5–5.3)
RBC # BLD: 2.64 M/UL — LOW (ref 3.8–5.2)
RBC # FLD: 21.6 % — HIGH (ref 10.3–14.5)
SODIUM SERPL-SCNC: 145 MMOL/L — SIGNIFICANT CHANGE UP (ref 135–145)
WBC # BLD: 8 K/UL — SIGNIFICANT CHANGE UP (ref 3.8–10.5)
WBC # FLD AUTO: 8 K/UL — SIGNIFICANT CHANGE UP (ref 3.8–10.5)

## 2023-10-15 PROCEDURE — 93010 ELECTROCARDIOGRAM REPORT: CPT

## 2023-10-15 RX ORDER — POTASSIUM CHLORIDE 20 MEQ
20 PACKET (EA) ORAL ONCE
Refills: 0 | Status: DISCONTINUED | OUTPATIENT
Start: 2023-10-15 | End: 2023-10-16

## 2023-10-15 RX ADMIN — PANTOPRAZOLE SODIUM 40 MILLIGRAM(S): 20 TABLET, DELAYED RELEASE ORAL at 13:15

## 2023-10-15 RX ADMIN — HYDROMORPHONE HYDROCHLORIDE 0.2 MILLIGRAM(S): 2 INJECTION INTRAMUSCULAR; INTRAVENOUS; SUBCUTANEOUS at 22:49

## 2023-10-15 RX ADMIN — ENOXAPARIN SODIUM 70 MILLIGRAM(S): 100 INJECTION SUBCUTANEOUS at 13:15

## 2023-10-15 RX ADMIN — HYDROMORPHONE HYDROCHLORIDE 0.5 MILLIGRAM(S): 2 INJECTION INTRAMUSCULAR; INTRAVENOUS; SUBCUTANEOUS at 18:27

## 2023-10-15 RX ADMIN — Medication 10 MILLIGRAM(S): at 22:01

## 2023-10-15 RX ADMIN — ONDANSETRON 4 MILLIGRAM(S): 8 TABLET, FILM COATED ORAL at 18:28

## 2023-10-15 RX ADMIN — ENOXAPARIN SODIUM 70 MILLIGRAM(S): 100 INJECTION SUBCUTANEOUS at 01:00

## 2023-10-15 RX ADMIN — HYDROMORPHONE HYDROCHLORIDE 0.2 MILLIGRAM(S): 2 INJECTION INTRAMUSCULAR; INTRAVENOUS; SUBCUTANEOUS at 22:34

## 2023-10-15 RX ADMIN — Medication 5 MILLIGRAM(S): at 22:01

## 2023-10-15 RX ADMIN — Medication 5 MILLIGRAM(S): at 13:15

## 2023-10-15 RX ADMIN — HYDROMORPHONE HYDROCHLORIDE 0.5 MILLIGRAM(S): 2 INJECTION INTRAMUSCULAR; INTRAVENOUS; SUBCUTANEOUS at 06:30

## 2023-10-15 RX ADMIN — Medication 5 MILLIGRAM(S): at 06:00

## 2023-10-15 RX ADMIN — HYDROMORPHONE HYDROCHLORIDE 0.5 MILLIGRAM(S): 2 INJECTION INTRAMUSCULAR; INTRAVENOUS; SUBCUTANEOUS at 06:00

## 2023-10-15 RX ADMIN — Medication 10 MILLIGRAM(S): at 06:00

## 2023-10-15 RX ADMIN — HYDROMORPHONE HYDROCHLORIDE 0.5 MILLIGRAM(S): 2 INJECTION INTRAMUSCULAR; INTRAVENOUS; SUBCUTANEOUS at 14:26

## 2023-10-15 RX ADMIN — HYDROMORPHONE HYDROCHLORIDE 0.5 MILLIGRAM(S): 2 INJECTION INTRAMUSCULAR; INTRAVENOUS; SUBCUTANEOUS at 13:26

## 2023-10-15 RX ADMIN — Medication 10 MILLIGRAM(S): at 13:15

## 2023-10-15 NOTE — PROGRESS NOTE ADULT - SUBJECTIVE AND OBJECTIVE BOX
TEAM [ ** ] Surgery Daily Progress Note  =====================================================    SUBJECTIVE: Patient seen and examined at bedside on AM rounds. Patient reports that they're feeling well. Denies fever, chills, N/V, chest pain, SOB    ALLERGIES:  penicillin (Rash)      --------------------------------------------------------------------------------------    MEDICATIONS:    Neurologic Medications  baclofen 5 milliGRAM(s) Oral every 8 hours  HYDROmorphone  Injectable 0.2 milliGRAM(s) IV Push every 4 hours PRN Moderate Pain (4 - 6)  HYDROmorphone  Injectable 0.5 milliGRAM(s) IV Push every 4 hours PRN Severe Pain (7 - 10)  metoclopramide Injectable 10 milliGRAM(s) IV Push every 8 hours  ondansetron Injectable 4 milliGRAM(s) IV Push every 6 hours PRN Nausea    Respiratory Medications    Cardiovascular Medications    Gastrointestinal Medications  pantoprazole  Injectable 40 milliGRAM(s) IV Push daily  potassium chloride   Powder 20 milliEquivalent(s) Oral once    Genitourinary Medications    Hematologic/Oncologic Medications  enoxaparin Injectable 70 milliGRAM(s) SubCutaneous every 12 hours  influenza   Vaccine 0.5 milliLiter(s) IntraMuscular once    Antimicrobial/Immunologic Medications    Endocrine/Metabolic Medications    Topical/Other Medications  naloxone Injectable 0.1 milliGRAM(s) IV Push every 3 minutes PRN For ANY of the following changes in patient status:  A. RR LESS THAN 10 breaths per minute, B. Oxygen saturation LESS THAN 90%, C. Sedation score of 6    --------------------------------------------------------------------------------------    VITAL SIGNS:  T(C): 36.9 (10-15-23 @ 19:54), Max: 36.9 (10-14-23 @ 20:38)  HR: 92 (10-15-23 @ 19:54) (83 - 95)  BP: 133/60 (10-15-23 @ 19:54) (126/57 - 153/60)  RR: 18 (10-15-23 @ 19:54) (17 - 18)  SpO2: 99% (10-15-23 @ 19:54) (99% - 100%)  --------------------------------------------------------------------------------------    INS AND OUTS:    10-14-23 @ 07:01  -  10-15-23 @ 07:00  --------------------------------------------------------  IN: 1290 mL / OUT: 1429.5 mL / NET: -139.5 mL    10-15-23 @ 07:01  -  10-15-23 @ 20:17  --------------------------------------------------------  IN: 630 mL / OUT: 952.5 mL / NET: -322.5 mL      --------------------------------------------------------------------------------------      EXAM    General: NAD, resting in bed comfortably.  Cardiac: regular rate, warm and well perfused  Respiratory: Nonlabored respirations, normal cw expansion.  Abdomen: soft, nontender, nondistended. ___ incision is c/d/i, ostomy, jonnie DAY.   Extremities: normal strength, FROM, no deformities    --------------------------------------------------------------------------------------    LABS       TEAM D Surgery Daily Progress Note  =====================================================    SUBJECTIVE: Patient seen and examined at bedside on AM rounds. Patient reports that overall she has remained stable, and her symptoms of spitting up and low PO tolerance haven't gotten any worse or better for the past 48 hours. Denies fever, chills, nausea, chest pain, SOB    ALLERGIES:  penicillin (Rash)      --------------------------------------------------------------------------------------    MEDICATIONS:    Neurologic Medications  baclofen 5 milliGRAM(s) Oral every 8 hours  HYDROmorphone  Injectable 0.2 milliGRAM(s) IV Push every 4 hours PRN Moderate Pain (4 - 6)  HYDROmorphone  Injectable 0.5 milliGRAM(s) IV Push every 4 hours PRN Severe Pain (7 - 10)  metoclopramide Injectable 10 milliGRAM(s) IV Push every 8 hours  ondansetron Injectable 4 milliGRAM(s) IV Push every 6 hours PRN Nausea    Respiratory Medications    Cardiovascular Medications    Gastrointestinal Medications  pantoprazole  Injectable 40 milliGRAM(s) IV Push daily  potassium chloride   Powder 20 milliEquivalent(s) Oral once    Genitourinary Medications    Hematologic/Oncologic Medications  enoxaparin Injectable 70 milliGRAM(s) SubCutaneous every 12 hours  influenza   Vaccine 0.5 milliLiter(s) IntraMuscular once    Antimicrobial/Immunologic Medications    Endocrine/Metabolic Medications    Topical/Other Medications  naloxone Injectable 0.1 milliGRAM(s) IV Push every 3 minutes PRN For ANY of the following changes in patient status:  A. RR LESS THAN 10 breaths per minute, B. Oxygen saturation LESS THAN 90%, C. Sedation score of 6    --------------------------------------------------------------------------------------    VITAL SIGNS:  T(C): 36.9 (10-15-23 @ 19:54), Max: 36.9 (10-14-23 @ 20:38)  HR: 92 (10-15-23 @ 19:54) (83 - 95)  BP: 133/60 (10-15-23 @ 19:54) (126/57 - 153/60)  RR: 18 (10-15-23 @ 19:54) (17 - 18)  SpO2: 99% (10-15-23 @ 19:54) (99% - 100%)  --------------------------------------------------------------------------------------    INS AND OUTS:    10-14-23 @ 07:01  -  10-15-23 @ 07:00  --------------------------------------------------------  IN: 1290 mL / OUT: 1429.5 mL / NET: -139.5 mL    10-15-23 @ 07:01  -  10-15-23 @ 20:17  --------------------------------------------------------  IN: 630 mL / OUT: 952.5 mL / NET: -322.5 mL      --------------------------------------------------------------------------------------      EXAM    General: NAD, resting in bed comfortably.  Cardiac: regular rate, warm and well perfused  Respiratory: Nonlabored respirations, normal cw expansion.  Abdomen: soft, nontender, nondistended, ostomy w/ gas and stool   Extremities: normal strength, FROM, no deformities    --------------------------------------------------------------------------------------    LABS

## 2023-10-15 NOTE — PROGRESS NOTE ADULT - ASSESSMENT
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 found to have LIJ And inominate vein acute non-occlusive thrombosis now on T lovenox.    PLAN:  - Do not touch cristina x2 weeks  - Diet: regular as tolerated, given poor PO intake, consider initiating TPN on Monday if continues to have poor PO intake  - Reglan 10mg q8 hours  - PO protonix  - Monitor JUAQUIN outpt  - FS premeal with ISS  - VTE ppx: T Lovenox for IJ and inominate thrombus  - PT/OT consult appreciated, PM&R consult for possible acute rehab disposition  - Acute rehab criteria: Hgb >8, oral pain management only, no chemotherapy during rehab admission.    D Team Surgery  [id number]

## 2023-10-15 NOTE — PROGRESS NOTE ADULT - SUBJECTIVE AND OBJECTIVE BOX
Subjective: Patient seen and examined. No new events except as noted.     SUBJECTIVE/ROS:  nad      MEDICATIONS:  MEDICATIONS  (STANDING):  baclofen 5 milliGRAM(s) Oral every 8 hours  dextrose 5% + lactated ringers. 1000 milliLiter(s) (30 mL/Hr) IV Continuous <Continuous>  enoxaparin Injectable 70 milliGRAM(s) SubCutaneous every 12 hours  influenza   Vaccine 0.5 milliLiter(s) IntraMuscular once  metoclopramide Injectable 10 milliGRAM(s) IV Push every 8 hours  pantoprazole  Injectable 40 milliGRAM(s) IV Push daily      PHYSICAL EXAM:  T(C): 36.9 (10-15-23 @ 04:00), Max: 37.1 (10-14-23 @ 17:31)  HR: 88 (10-15-23 @ 04:00) (80 - 90)  BP: 128/66 (10-15-23 @ 04:00) (126/68 - 168/64)  RR: 18 (10-15-23 @ 04:00) (17 - 18)  SpO2: 100% (10-15-23 @ 04:00) (99% - 100%)  Wt(kg): --  I&O's Summary    13 Oct 2023 07:01  -  14 Oct 2023 07:00  --------------------------------------------------------  IN: 980 mL / OUT: 2577.5 mL / NET: -1597.5 mL    14 Oct 2023 07:01  -  15 Oct 2023 06:48  --------------------------------------------------------  IN: 1290 mL / OUT: 1429.5 mL / NET: -139.5 mL            JVP: Normal  Neck: supple  Lung: clear   CV: S1 S2 , Murmur:  Abd: soft  Ext: No edema  neuro: Awake / alert  Psych: flat affect  Skin: normal``    LABS/DATA:    CARDIAC MARKERS:                                7.7    8.38  )-----------( 491      ( 14 Oct 2023 05:52 )             23.6     10-14    146<H>  |  109<H>  |  6<L>  ----------------------------<  93  3.6   |  23  |  0.40<L>    Ca    8.8      14 Oct 2023 05:52  Phos  3.2     10-14  Mg     1.90     10-14    TPro  5.8<L>  /  Alb  2.8<L>  /  TBili  0.2  /  DBili  x   /  AST  23  /  ALT  29  /  AlkPhos  230<H>  10-14    proBNP:   Lipid Profile:   HgA1c:   TSH:     TELE:  EKG:

## 2023-10-16 ENCOUNTER — APPOINTMENT (OUTPATIENT)
Dept: INFUSION THERAPY | Facility: HOSPITAL | Age: 58
End: 2023-10-16

## 2023-10-16 LAB
ANION GAP SERPL CALC-SCNC: 13 MMOL/L — SIGNIFICANT CHANGE UP (ref 7–14)
BLD GP AB SCN SERPL QL: NEGATIVE — SIGNIFICANT CHANGE UP
BUN SERPL-MCNC: 8 MG/DL — SIGNIFICANT CHANGE UP (ref 7–23)
CALCIUM SERPL-MCNC: 8.6 MG/DL — SIGNIFICANT CHANGE UP (ref 8.4–10.5)
CHLORIDE SERPL-SCNC: 107 MMOL/L — SIGNIFICANT CHANGE UP (ref 98–107)
CO2 SERPL-SCNC: 24 MMOL/L — SIGNIFICANT CHANGE UP (ref 22–31)
CREAT SERPL-MCNC: 0.42 MG/DL — LOW (ref 0.5–1.3)
EGFR: 113 ML/MIN/1.73M2 — SIGNIFICANT CHANGE UP
GLUCOSE SERPL-MCNC: 97 MG/DL — SIGNIFICANT CHANGE UP (ref 70–99)
HCT VFR BLD CALC: 22.1 % — LOW (ref 34.5–45)
HGB BLD-MCNC: 7.1 G/DL — LOW (ref 11.5–15.5)
MAGNESIUM SERPL-MCNC: 1.9 MG/DL — SIGNIFICANT CHANGE UP (ref 1.6–2.6)
MCHC RBC-ENTMCNC: 27.2 PG — SIGNIFICANT CHANGE UP (ref 27–34)
MCHC RBC-ENTMCNC: 32.1 GM/DL — SIGNIFICANT CHANGE UP (ref 32–36)
MCV RBC AUTO: 84.7 FL — SIGNIFICANT CHANGE UP (ref 80–100)
NRBC # BLD: 1 /100 WBCS — HIGH (ref 0–0)
NRBC # FLD: 0.07 K/UL — HIGH (ref 0–0)
PHOSPHATE SERPL-MCNC: 3.6 MG/DL — SIGNIFICANT CHANGE UP (ref 2.5–4.5)
PLATELET # BLD AUTO: 465 K/UL — HIGH (ref 150–400)
POTASSIUM SERPL-MCNC: 3.7 MMOL/L — SIGNIFICANT CHANGE UP (ref 3.5–5.3)
POTASSIUM SERPL-SCNC: 3.7 MMOL/L — SIGNIFICANT CHANGE UP (ref 3.5–5.3)
RBC # BLD: 2.61 M/UL — LOW (ref 3.8–5.2)
RBC # FLD: 22 % — HIGH (ref 10.3–14.5)
RH IG SCN BLD-IMP: POSITIVE — SIGNIFICANT CHANGE UP
SODIUM SERPL-SCNC: 144 MMOL/L — SIGNIFICANT CHANGE UP (ref 135–145)
WBC # BLD: 7.02 K/UL — SIGNIFICANT CHANGE UP (ref 3.8–10.5)
WBC # FLD AUTO: 7.02 K/UL — SIGNIFICANT CHANGE UP (ref 3.8–10.5)

## 2023-10-16 PROCEDURE — 99232 SBSQ HOSP IP/OBS MODERATE 35: CPT | Mod: GC

## 2023-10-16 RX ORDER — POTASSIUM CHLORIDE 20 MEQ
10 PACKET (EA) ORAL
Refills: 0 | Status: COMPLETED | OUTPATIENT
Start: 2023-10-16 | End: 2023-10-16

## 2023-10-16 RX ORDER — ACETAMINOPHEN 500 MG
1000 TABLET ORAL EVERY 6 HOURS
Refills: 0 | Status: COMPLETED | OUTPATIENT
Start: 2023-10-16 | End: 2023-10-17

## 2023-10-16 RX ORDER — MAGNESIUM SULFATE 500 MG/ML
1 VIAL (ML) INJECTION ONCE
Refills: 0 | Status: COMPLETED | OUTPATIENT
Start: 2023-10-16 | End: 2023-10-16

## 2023-10-16 RX ADMIN — HYDROMORPHONE HYDROCHLORIDE 0.2 MILLIGRAM(S): 2 INJECTION INTRAMUSCULAR; INTRAVENOUS; SUBCUTANEOUS at 20:16

## 2023-10-16 RX ADMIN — Medication 1000 MILLIGRAM(S): at 17:26

## 2023-10-16 RX ADMIN — PANTOPRAZOLE SODIUM 40 MILLIGRAM(S): 20 TABLET, DELAYED RELEASE ORAL at 12:13

## 2023-10-16 RX ADMIN — ONDANSETRON 4 MILLIGRAM(S): 8 TABLET, FILM COATED ORAL at 00:31

## 2023-10-16 RX ADMIN — Medication 400 MILLIGRAM(S): at 17:26

## 2023-10-16 RX ADMIN — Medication 10 MILLIGRAM(S): at 12:14

## 2023-10-16 RX ADMIN — HYDROMORPHONE HYDROCHLORIDE 0.2 MILLIGRAM(S): 2 INJECTION INTRAMUSCULAR; INTRAVENOUS; SUBCUTANEOUS at 10:19

## 2023-10-16 RX ADMIN — Medication 10 MILLIGRAM(S): at 05:26

## 2023-10-16 RX ADMIN — HYDROMORPHONE HYDROCHLORIDE 0.2 MILLIGRAM(S): 2 INJECTION INTRAMUSCULAR; INTRAVENOUS; SUBCUTANEOUS at 05:42

## 2023-10-16 RX ADMIN — Medication 100 MILLIEQUIVALENT(S): at 12:14

## 2023-10-16 RX ADMIN — HYDROMORPHONE HYDROCHLORIDE 0.2 MILLIGRAM(S): 2 INJECTION INTRAMUSCULAR; INTRAVENOUS; SUBCUTANEOUS at 20:01

## 2023-10-16 RX ADMIN — Medication 10 MILLIGRAM(S): at 21:33

## 2023-10-16 RX ADMIN — Medication 100 MILLIEQUIVALENT(S): at 10:20

## 2023-10-16 RX ADMIN — ENOXAPARIN SODIUM 70 MILLIGRAM(S): 100 INJECTION SUBCUTANEOUS at 12:14

## 2023-10-16 RX ADMIN — HYDROMORPHONE HYDROCHLORIDE 0.2 MILLIGRAM(S): 2 INJECTION INTRAMUSCULAR; INTRAVENOUS; SUBCUTANEOUS at 05:27

## 2023-10-16 RX ADMIN — Medication 5 MILLIGRAM(S): at 05:26

## 2023-10-16 RX ADMIN — Medication 5 MILLIGRAM(S): at 21:33

## 2023-10-16 RX ADMIN — HYDROMORPHONE HYDROCHLORIDE 0.2 MILLIGRAM(S): 2 INJECTION INTRAMUSCULAR; INTRAVENOUS; SUBCUTANEOUS at 10:45

## 2023-10-16 RX ADMIN — Medication 100 GRAM(S): at 09:25

## 2023-10-16 RX ADMIN — Medication 100 MILLIEQUIVALENT(S): at 09:25

## 2023-10-16 RX ADMIN — ENOXAPARIN SODIUM 70 MILLIGRAM(S): 100 INJECTION SUBCUTANEOUS at 00:33

## 2023-10-16 RX ADMIN — Medication 5 MILLIGRAM(S): at 12:14

## 2023-10-16 NOTE — CONSULT NOTE ADULT - SUBJECTIVE AND OBJECTIVE BOX
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 Post-op course c/b inability to tolerate PO since surgery 10/4/23 despite functioning loop ileostomy. CT A/P IVC (10/12/23) showing no e/o obstruction however PO contrast was not given due to c/f aspiration. Hospital course c/b left IJ thrombus and inominate vein acute non-occlusive thrombosis now on therapeutic lovenox. GI consulted for dx endoscopy and candidacy for GJ tube placement.      Allergies:  penicillin (Rash)      Home Medications:    Hospital Medications:  baclofen 5 milliGRAM(s) Oral every 8 hours  enoxaparin Injectable 70 milliGRAM(s) SubCutaneous every 12 hours  HYDROmorphone  Injectable 0.2 milliGRAM(s) IV Push every 4 hours PRN  HYDROmorphone  Injectable 0.5 milliGRAM(s) IV Push every 4 hours PRN  influenza   Vaccine 0.5 milliLiter(s) IntraMuscular once  metoclopramide Injectable 10 milliGRAM(s) IV Push every 8 hours  naloxone Injectable 0.1 milliGRAM(s) IV Push every 3 minutes PRN  ondansetron Injectable 4 milliGRAM(s) IV Push every 6 hours PRN  pantoprazole  Injectable 40 milliGRAM(s) IV Push daily      PMHX/PSHX:  History of sickle cell trait    Malignant neoplasm of appendix    History of ectopic pregnancy    H/O colonoscopy    H/O endoscopy        Family history:   Denies any family history of GI-related disease or cancers.    Social History:   ETOH: denies  Tobacco: denies  Illicit drug use: denies    ROS: 14 point ROS negative unless otherwise stated in HPI      Vital Signs:  Vital Signs Last 24 Hrs  T(C): 37.1 (16 Oct 2023 13:47), Max: 37.1 (16 Oct 2023 13:47)  T(F): 98.8 (16 Oct 2023 13:47), Max: 98.8 (16 Oct 2023 13:47)  HR: 89 (16 Oct 2023 13:47) (79 - 92)  BP: 135/59 (16 Oct 2023 13:47) (120/61 - 142/68)  BP(mean): --  RR: 18 (16 Oct 2023 13:47) (18 - 20)  SpO2: 100% (16 Oct 2023 13:47) (99% - 100%)    Parameters below as of 16 Oct 2023 13:47  Patient On (Oxygen Delivery Method): room air      Daily     Daily Weight in k.7 (16 Oct 2023 01:00)    PHYSICAL EXAM:     GENERAL:  Appears stated age, well-groomed, well-nourished, no distress  HEENT:  NC/AT,  conjunctivae clear and pink  CHEST:  Full & symmetric excursion, no increased effort, breath sounds clear  HEART:  Regular rhythm, S1, S2, no murmur/rub/S3/S4  ABDOMEN:  Soft, non-tender, non-distended, normoactive bowel sounds,    EXTREMITIES:  no cyanosis,clubbing or edema  SKIN:  No rash/erythema/ecchymoses/petechiae/wounds/abscess/warm/dry  NEURO:  Alert, oriented      LABS:                        7.1    7.02  )-----------( 465      ( 16 Oct 2023 06:25 )             22.1     10-16    144  |  107  |  8   ----------------------------<  97  3.7   |  24  |  0.42<L>    Ca    8.6      16 Oct 2023 06:25  Phos  3.6     10-16  Mg     1.90     10-16          Urinalysis Basic - ( 16 Oct 2023 06:25 )    Color: x / Appearance: x / SG: x / pH: x  Gluc: 97 mg/dL / Ketone: x  / Bili: x / Urobili: x   Blood: x / Protein: x / Nitrite: x   Leuk Esterase: x / RBC: x / WBC x   Sq Epi: x / Non Sq Epi: x / Bacteria: x          Imaging:         ACC: [id number] EXAM:  CT ABDOMEN AND PELVIS IC   ORDERED BY: JASSON SHORE     PROCEDURE DATE:  10/12/2023          INTERPRETATION:  CLINICAL INFORMATION: Vomiting status post CORNELIA, BSO,   omentectomy, cholecystectomy, appendectomy, LAR, DLI HIPEC for mucinous   neoplasm.    COMPARISON: CT abdomen pelvis 2023    CONTRAST/COMPLICATIONS:  IV Contrast: Omnipaque 350  90 cc administered   10 cc discarded  Oral Contrast: NONE  Complications: None reported at time of study completion    PROCEDURE:  CT of the Abdomen and Pelvis was performed.  Sagittal and coronal reformats were performed.    FINDINGS:  LOWER CHEST: Small right greater than left pleural effusions with   compressive lower lobe atelectasis. Small amount of pneumomediastinum.    LIVER: Stable subcentimeter hypoattenuating foci too small to   characterize. Heterogeneous enhancement of the right inferior hepatic   lobe with subcapsular hypoattenuation, which may represent hepatic injury   or focal infarct (/78).  BILE DUCTS: Normal caliber.  GALLBLADDER: Cholecystectomy.  SPLEEN: Within normal limits.  PANCREAS: Within normal limits.  ADRENALS: Within normal limits.  KIDNEYS/URETERS: Bilateral renal cysts and right subcentimeter   hypoattenuating foci too small to characterize. No hydronephrosis.    BLADDER: Pierce catheter.  REPRODUCTIVE ORGANS: Hysterectomy and bilateral oophorectomy.    BOWEL: Right lower quadrant diverting loop ileostomy. Rectocolic   anastomosis. No bowel obstruction. Appendectomy  PERITONEUM: Omentectomy with peritoneal carcinomatosis resection. Left   pelvic approach drain terminates within the lower pelvis with small fluid   in the presacral space. Trace ascites.  VESSELS: Within normal limits.  RETROPERITONEUM/LYMPH NODES: Small amount of dania rectal fluid. Small   amount of gas and fluid within the space of Retzius. No lymphadenopathy.  ABDOMINAL WALL: Right lower quadrant ostomy. Post surgical changes.  BONES: Within normal limits.    IMPRESSION:  Post surgical changes within the abdomen and pelvis as above. No evidence   of abscess.  New right greater than left pleural effusions with compressive lower lobe   atelectasis.  Small postoperative pneumomediastinum.  Heterogeneous enhancement of the right inferior hepatic lobe with   subcapsular hypoattenuation, which may represent hepatic injury or focal   infarct.         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 Post-op course c/b inability to tolerate PO since surgery 10/4/23 despite functioning loop ileostomy. Hospital course c/b left IJ thrombus and inominate vein acute non-occlusive thrombosis now on therapeutic lovenox. CT A/P IVC (10/12/23) showing no e/o obstruction however PO contrast was not given due to c/f aspiration. Pt reports progressively worsening vomiting with each subsequent cycle of chemo and thinks current vomiting might be related to chemo. On 10/16 pt reports vomiting has improved, able to eat 2/3 of a bowl of soup.  GI consulted for dx endoscopy and candidacy for GJ tube placement.      Allergies:  penicillin (Rash)      Home Medications:    Hospital Medications:  baclofen 5 milliGRAM(s) Oral every 8 hours  enoxaparin Injectable 70 milliGRAM(s) SubCutaneous every 12 hours  HYDROmorphone  Injectable 0.2 milliGRAM(s) IV Push every 4 hours PRN  HYDROmorphone  Injectable 0.5 milliGRAM(s) IV Push every 4 hours PRN  influenza   Vaccine 0.5 milliLiter(s) IntraMuscular once  metoclopramide Injectable 10 milliGRAM(s) IV Push every 8 hours  naloxone Injectable 0.1 milliGRAM(s) IV Push every 3 minutes PRN  ondansetron Injectable 4 milliGRAM(s) IV Push every 6 hours PRN  pantoprazole  Injectable 40 milliGRAM(s) IV Push daily      PMHX/PSHX:  History of sickle cell trait    Malignant neoplasm of appendix    History of ectopic pregnancy    H/O colonoscopy    H/O endoscopy        Family history:   Denies any family history of GI-related disease or cancers.    Social History:   ETOH: +socially  Tobacco: denies  Illicit drug use: denies    ROS: 14 point ROS negative unless otherwise stated in HPI      Vital Signs:  Vital Signs Last 24 Hrs  T(C): 37.1 (16 Oct 2023 13:47), Max: 37.1 (16 Oct 2023 13:47)  T(F): 98.8 (16 Oct 2023 13:47), Max: 98.8 (16 Oct 2023 13:47)  HR: 89 (16 Oct 2023 13:47) (79 - 92)  BP: 135/59 (16 Oct 2023 13:47) (120/61 - 142/68)  BP(mean): --  RR: 18 (16 Oct 2023 13:47) (18 - 20)  SpO2: 100% (16 Oct 2023 13:47) (99% - 100%)    Parameters below as of 16 Oct 2023 13:47  Patient On (Oxygen Delivery Method): room air      Daily     Daily Weight in k.7 (16 Oct 2023 01:00)    PHYSICAL EXAM:     GENERAL:  Appears stated age, well-groomed, +chronically ill appearing  HEENT:  NC/AT,  conjunctivae clear and pink  CHEST:  Full & symmetric excursion, no increased effort  HEART:  Regular rhythm, S1, S2, no murmur/rub/S3/S4  ABDOMEN:  Soft, non-tender, non-distended; +well healed surgical incision; +ostomy with gas and "salsa" consistency brown output  EXTREMITIES:  no cyanosis,clubbing or edema  SKIN:  No rash/erythema/ecchymoses/petechiae/wounds/abscess/warm/dry  NEURO:  Alert, orientedx3      LABS:                        7.1    7.02  )-----------( 465      ( 16 Oct 2023 06:25 )             22.1     10-16    144  |  107  |  8   ----------------------------<  97  3.7   |  24  |  0.42<L>    Ca    8.6      16 Oct 2023 06:25  Phos  3.6     10-16  Mg     1.90     10-16          Urinalysis Basic - ( 16 Oct 2023 06:25 )    Color: x / Appearance: x / SG: x / pH: x  Gluc: 97 mg/dL / Ketone: x  / Bili: x / Urobili: x   Blood: x / Protein: x / Nitrite: x   Leuk Esterase: x / RBC: x / WBC x   Sq Epi: x / Non Sq Epi: x / Bacteria: x          Imaging:         ACC: [id number] EXAM:  CT ABDOMEN AND PELVIS IC   ORDERED BY: JASSON SHORE     PROCEDURE DATE:  10/12/2023          INTERPRETATION:  CLINICAL INFORMATION: Vomiting status post CORNELIA, BSO,   omentectomy, cholecystectomy, appendectomy, LAR, DLI HIPEC for mucinous   neoplasm.    COMPARISON: CT abdomen pelvis 2023    CONTRAST/COMPLICATIONS:  IV Contrast: Omnipaque 350  90 cc administered   10 cc discarded  Oral Contrast: NONE  Complications: None reported at time of study completion    PROCEDURE:  CT of the Abdomen and Pelvis was performed.  Sagittal and coronal reformats were performed.    FINDINGS:  LOWER CHEST: Small right greater than left pleural effusions with   compressive lower lobe atelectasis. Small amount of pneumomediastinum.    LIVER: Stable subcentimeter hypoattenuating foci too small to   characterize. Heterogeneous enhancement of the right inferior hepatic   lobe with subcapsular hypoattenuation, which may represent hepatic injury   or focal infarct (/78).  BILE DUCTS: Normal caliber.  GALLBLADDER: Cholecystectomy.  SPLEEN: Within normal limits.  PANCREAS: Within normal limits.  ADRENALS: Within normal limits.  KIDNEYS/URETERS: Bilateral renal cysts and right subcentimeter   hypoattenuating foci too small to characterize. No hydronephrosis.    BLADDER: Pierce catheter.  REPRODUCTIVE ORGANS: Hysterectomy and bilateral oophorectomy.    BOWEL: Right lower quadrant diverting loop ileostomy. Rectocolic   anastomosis. No bowel obstruction. Appendectomy  PERITONEUM: Omentectomy with peritoneal carcinomatosis resection. Left   pelvic approach drain terminates within the lower pelvis with small fluid   in the presacral space. Trace ascites.  VESSELS: Within normal limits.  RETROPERITONEUM/LYMPH NODES: Small amount of dania rectal fluid. Small   amount of gas and fluid within the space of Retzius. No lymphadenopathy.  ABDOMINAL WALL: Right lower quadrant ostomy. Post surgical changes.  BONES: Within normal limits.    IMPRESSION:  Post surgical changes within the abdomen and pelvis as above. No evidence   of abscess.  New right greater than left pleural effusions with compressive lower lobe   atelectasis.  Small postoperative pneumomediastinum.  Heterogeneous enhancement of the right inferior hepatic lobe with   subcapsular hypoattenuation, which may represent hepatic injury or focal   infarct.         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 Post-op course c/b inability to tolerate PO since surgery 10/4/23 despite functioning loop ileostomy. Hospital course c/b left IJ thrombus and inominate vein acute non-occlusive thrombosis now on therapeutic lovenox. CT A/P IVC (10/12/23) showing no e/o obstruction however PO contrast was not given due to c/f aspiration. Pt reports progressively worsening vomiting with each subsequent cycle of chemo and thinks current vomiting might be related to chemo. On 10/16 pt reports vomiting has improved, able to eat 2/3 of a bowl of soup.  GI consulted for dx endoscopy and candidacy for GJ tube placement.      Allergies:  penicillin (Rash)      Home Medications:    Hospital Medications:  baclofen 5 milliGRAM(s) Oral every 8 hours  enoxaparin Injectable 70 milliGRAM(s) SubCutaneous every 12 hours  HYDROmorphone  Injectable 0.2 milliGRAM(s) IV Push every 4 hours PRN  HYDROmorphone  Injectable 0.5 milliGRAM(s) IV Push every 4 hours PRN  influenza   Vaccine 0.5 milliLiter(s) IntraMuscular once  metoclopramide Injectable 10 milliGRAM(s) IV Push every 8 hours  naloxone Injectable 0.1 milliGRAM(s) IV Push every 3 minutes PRN  ondansetron Injectable 4 milliGRAM(s) IV Push every 6 hours PRN  pantoprazole  Injectable 40 milliGRAM(s) IV Push daily      PMHX/PSHX:  History of sickle cell trait    Malignant neoplasm of appendix    History of ectopic pregnancy    H/O colonoscopy    H/O endoscopy        Family history:   Denies any family history of GI-related disease or cancers.    Social History:   ETOH: +socially  Tobacco: denies  Illicit drug use: denies    ROS: 14 point ROS negative unless otherwise stated in HPI      Vital Signs:  Vital Signs Last 24 Hrs  T(C): 37.1 (16 Oct 2023 13:47), Max: 37.1 (16 Oct 2023 13:47)  T(F): 98.8 (16 Oct 2023 13:47), Max: 98.8 (16 Oct 2023 13:47)  HR: 89 (16 Oct 2023 13:47) (79 - 92)  BP: 135/59 (16 Oct 2023 13:47) (120/61 - 142/68)  BP(mean): --  RR: 18 (16 Oct 2023 13:47) (18 - 20)  SpO2: 100% (16 Oct 2023 13:47) (99% - 100%)    Parameters below as of 16 Oct 2023 13:47  Patient On (Oxygen Delivery Method): room air      Daily     Daily Weight in k.7 (16 Oct 2023 01:00)    PHYSICAL EXAM:     GENERAL:  Appears stated age, well-groomed, +chronically ill appearing  HEENT:  NC/AT,  conjunctivae clear and pink  CHEST:  Full & symmetric excursion, no increased effort  HEART:  Regular rhythm, S1, S2, no murmur/rub/S3/S4  ABDOMEN:  Soft, non-tender, non-distended; +well healed surgical incision; +ostomy with gas and "salsa" consistency brown output; +serosanguinous OP from L abdominal JUAQUIN drain  EXTREMITIES:  no cyanosis,clubbing or edema  SKIN:  No rash/erythema/ecchymoses/petechiae/wounds/abscess/warm/dry  NEURO:  Alert, orientedx3      LABS:                        7.1    7.02  )-----------( 465      ( 16 Oct 2023 06:25 )             22.1     10-16    144  |  107  |  8   ----------------------------<  97  3.7   |  24  |  0.42<L>    Ca    8.6      16 Oct 2023 06:25  Phos  3.6     10-16  Mg     1.90     10-16          Urinalysis Basic - ( 16 Oct 2023 06:25 )    Color: x / Appearance: x / SG: x / pH: x  Gluc: 97 mg/dL / Ketone: x  / Bili: x / Urobili: x   Blood: x / Protein: x / Nitrite: x   Leuk Esterase: x / RBC: x / WBC x   Sq Epi: x / Non Sq Epi: x / Bacteria: x          Imaging:         ACC: [id number] EXAM:  CT ABDOMEN AND PELVIS IC   ORDERED BY: JASSON SHORE     PROCEDURE DATE:  10/12/2023          INTERPRETATION:  CLINICAL INFORMATION: Vomiting status post CORNELIA, BSO,   omentectomy, cholecystectomy, appendectomy, LAR, DLI HIPEC for mucinous   neoplasm.    COMPARISON: CT abdomen pelvis 2023    CONTRAST/COMPLICATIONS:  IV Contrast: Omnipaque 350  90 cc administered   10 cc discarded  Oral Contrast: NONE  Complications: None reported at time of study completion    PROCEDURE:  CT of the Abdomen and Pelvis was performed.  Sagittal and coronal reformats were performed.    FINDINGS:  LOWER CHEST: Small right greater than left pleural effusions with   compressive lower lobe atelectasis. Small amount of pneumomediastinum.    LIVER: Stable subcentimeter hypoattenuating foci too small to   characterize. Heterogeneous enhancement of the right inferior hepatic   lobe with subcapsular hypoattenuation, which may represent hepatic injury   or focal infarct ().  BILE DUCTS: Normal caliber.  GALLBLADDER: Cholecystectomy.  SPLEEN: Within normal limits.  PANCREAS: Within normal limits.  ADRENALS: Within normal limits.  KIDNEYS/URETERS: Bilateral renal cysts and right subcentimeter   hypoattenuating foci too small to characterize. No hydronephrosis.    BLADDER: Pierce catheter.  REPRODUCTIVE ORGANS: Hysterectomy and bilateral oophorectomy.    BOWEL: Right lower quadrant diverting loop ileostomy. Rectocolic   anastomosis. No bowel obstruction. Appendectomy  PERITONEUM: Omentectomy with peritoneal carcinomatosis resection. Left   pelvic approach drain terminates within the lower pelvis with small fluid   in the presacral space. Trace ascites.  VESSELS: Within normal limits.  RETROPERITONEUM/LYMPH NODES: Small amount of dania rectal fluid. Small   amount of gas and fluid within the space of Retzius. No lymphadenopathy.  ABDOMINAL WALL: Right lower quadrant ostomy. Post surgical changes.  BONES: Within normal limits.    IMPRESSION:  Post surgical changes within the abdomen and pelvis as above. No evidence   of abscess.  New right greater than left pleural effusions with compressive lower lobe   atelectasis.  Small postoperative pneumomediastinum.  Heterogeneous enhancement of the right inferior hepatic lobe with   subcapsular hypoattenuation, which may represent hepatic injury or focal   infarct.

## 2023-10-16 NOTE — PROGRESS NOTE ADULT - SUBJECTIVE AND OBJECTIVE BOX
Subjective: Patient seen and examined. No new events except as noted.     SUBJECTIVE/ROS:  nad      MEDICATIONS:  MEDICATIONS  (STANDING):  baclofen 5 milliGRAM(s) Oral every 8 hours  enoxaparin Injectable 70 milliGRAM(s) SubCutaneous every 12 hours  influenza   Vaccine 0.5 milliLiter(s) IntraMuscular once  metoclopramide Injectable 10 milliGRAM(s) IV Push every 8 hours  pantoprazole  Injectable 40 milliGRAM(s) IV Push daily  potassium chloride   Powder 20 milliEquivalent(s) Oral once  potassium chloride  10 mEq/100 mL IVPB 10 milliEquivalent(s) IV Intermittent every 1 hour      PHYSICAL EXAM:  T(C): 36.9 (10-16-23 @ 05:00), Max: 36.9 (10-15-23 @ 16:56)  HR: 79 (10-16-23 @ 05:00) (79 - 95)  BP: 133/60 (10-16-23 @ 05:00) (120/61 - 133/62)  RR: 18 (10-16-23 @ 05:00) (18 - 18)  SpO2: 100% (10-16-23 @ 05:00) (99% - 100%)  Wt(kg): --  I&O's Summary    15 Oct 2023 07:01  -  16 Oct 2023 07:00  --------------------------------------------------------  IN: 1230 mL / OUT: 1643.5 mL / NET: -413.5 mL            JVP: Normal  Neck: supple  Lung: clear   CV: S1 S2 , Murmur:  Abd: soft  Ext: No edema  neuro: Awake / alert  Psych: flat affect  Skin: normal``    LABS/DATA:    CARDIAC MARKERS:                                7.1    7.02  )-----------( 465      ( 16 Oct 2023 06:25 )             22.1     10-16    144  |  107  |  8   ----------------------------<  97  3.7   |  24  |  0.42<L>    Ca    8.6      16 Oct 2023 06:25  Phos  3.6     10-16  Mg     1.90     10-16      proBNP:   Lipid Profile:   HgA1c:   TSH:     TELE:  EKG:

## 2023-10-16 NOTE — CONSULT NOTE ADULT - ASSESSMENT
**THIS NOTE IS NOT FINALIZED UNTIL SIGNED BY THE ATTENDING**    Susan Gonzales MD  GI Fellow, PGY-6  Available via [employer] Teams    NON-URGENT CONSULTS:  Please email [email or web address]  [email or web address]  AT NIGHT AND ON WEEKENDS:  Contact on-call GI fellow via answering service ([phone]) from 5pm-8am and on weekends/holidays  MONDAY-FRIDAY 8AM-5PM:  Pager# [phone] (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 Post-op course c/b inability to tolerate PO since surgery 10/4/23 despite functioning loop ileostomy. Hospital course c/b left IJ thrombus and inominate vein acute non-occlusive thrombosis now on therapeutic lovenox. CT A/P IVC (10/12/23) showing no e/o obstruction however PO contrast was not given due to c/f aspiration. Pt reports progressively worsening vomiting with each subsequent cycle of chemo and thinks current vomiting might be related to chemo. On 10/16 pt reports vomiting has improved, able to eat 2/3 of a bowl of soup.  GI consulted for dx endoscopy and candidacy for GJ tube placement.    #malignant neoplasm of appendix (dx 06/2023 s/p FOLFOX x 4 cycles- last dose 10/4) s/p appendectomy, cholecystectomy, hysterectomy, omenectomy, peritenectomy, LAR with diverting loop ileostomy and bladder injury with primary repair on 10/4  #left IJ thrombus and inominate vein acute non-occlusive thrombosis now on therapeutic lovenox  #chemo (FOLFOX) induced vomiting- improving    Recs:  - antiemetics per primary team  - recommend calorie counts x 72 hours and encourage PO intake  - would hold off on GJ tube placement for now as pt has recently resumed eating 10/16  - no indication for venting G tube at this time since no signs of clinical obstruction  - c/w IV PPI 40 mg daily and reglan 10 mg IV q8h for now        **THIS NOTE IS NOT FINALIZED UNTIL SIGNED BY THE ATTENDING**    Susan Gonzales MD  GI Fellow, PGY-6  Available via [employer] Teams    NON-URGENT CONSULTS:  Please email [email or web address]  [email or web address]  AT NIGHT AND ON WEEKENDS:  Contact on-call GI fellow via answering service ([phone]) from 5pm-8am and on weekends/holidays  MONDAY-FRIDAY 8AM-5PM:  Pager# [phone] ([facility]) or [phone] ([facility])   58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 Post-op course c/b inability to tolerate PO since surgery 10/4/23 despite functioning loop ileostomy. Hospital course c/b left IJ thrombus and inominate vein acute non-occlusive thrombosis now on therapeutic lovenox. CT A/P IVC (10/12/23) showing no e/o obstruction however PO contrast was not given due to c/f aspiration. Pt reports progressively worsening vomiting with each subsequent cycle of chemo and thinks current vomiting might be related to chemo. On 10/16 pt reports vomiting has improved, able to eat 2/3 of a bowl of soup.  GI consulted for dx endoscopy and candidacy for GJ tube placement.    #malignant neoplasm of appendix (dx 06/2023 s/p FOLFOX x 4 cycles- last dose 10/4) s/p appendectomy, cholecystectomy, hysterectomy, omenectomy, peritenectomy, LAR with diverting loop ileostomy and bladder injury with primary repair on 10/4  #left IJ thrombus and inominate vein acute non-occlusive thrombosis now on therapeutic lovenox  #chemo (FOLFOX) induced vomiting- improving    Recs:  - antiemetics per primary team  - recommend calorie counts x 72 hours and encourage PO intake  - would hold off on GJ tube placement for now as pt has recently resumed eating 10/16 and would proceed with caution with placement of PEG in a pt with multiple recent abdominal surgeries  - no indication for venting G tube at this time since no signs of clinical obstruction  - c/w IV PPI 40 mg daily and reglan 10 mg IV q8h for now        **THIS NOTE IS NOT FINALIZED UNTIL SIGNED BY THE ATTENDING**    Susan Gonzales MD  GI Fellow, PGY-6  Available via [employer] Teams    NON-URGENT CONSULTS:  Please email [email or web address]  [email or web address]  AT NIGHT AND ON WEEKENDS:  Contact on-call GI fellow via answering service ([phone]) from 5pm-8am and on weekends/holidays  MONDAY-FRIDAY 8AM-5PM:  Pager# [phone] ([facility]) or [phone] ([facility])

## 2023-10-16 NOTE — PROGRESS NOTE ADULT - ASSESSMENT
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 found to have LIJ and inominate vein acute non-occlusive thrombosis now on T lovenox.    PLAN:  - Consider TPN and PICC line   - Do not touch cristina x2 weeks  - Reglan 10mg q8 hours  - PO protonix  - Monitor JUAQUIN outpt  - FS premeal with ISS  - VTE ppx: T Lovenox for IJ and inominate thrombus  - PT/OT consult appreciated, PM&R consult for possible acute rehab disposition  - Acute rehab criteria: Hgb >8, oral pain management only, no chemotherapy during rehab admission.    D Team Surgery  [id number]

## 2023-10-16 NOTE — ADVANCED PRACTICE NURSE CONSULT - ASSESSMENT
emptied stool into urinal, independent in emptying- patient known steps as well.  used adhesive remover wipes to assist with atraumatic removal while removing  the  pouch, cleansed skin with water, patted dry. Properly assessed stoma viability and peristomal skin.  actively participated in stoma measurement, creating template, cutting waffer, applying pouch. Reviewed s/s to report to MD. Importance of hydration and dietary changes reinforced including adequate protein intake. Intimacy with an ostomy discussed. Questions answered.      and patient need time to get used to ostomy management and change.  competent in pouch change and emptying.    Stoma size: 1 1/2" See A&I flowsheet for full stoma assessment details.

## 2023-10-16 NOTE — PROGRESS NOTE ADULT - SUBJECTIVE AND OBJECTIVE BOX
TEAM [ D ] Surgery Daily Progress Note  =====================================================    SUBJECTIVE: Patient seen and examined at bedside on AM rounds. Patient reports that they're feeling well. Patient without nausea, vomiting, or hiccups last night.    ALLERGIES:  penicillin (Rash)      --------------------------------------------------------------------------------------    MEDICATIONS:    Neurologic Medications  baclofen 5 milliGRAM(s) Oral every 8 hours  HYDROmorphone  Injectable 0.2 milliGRAM(s) IV Push every 4 hours PRN Moderate Pain (4 - 6)  HYDROmorphone  Injectable 0.5 milliGRAM(s) IV Push every 4 hours PRN Severe Pain (7 - 10)  metoclopramide Injectable 10 milliGRAM(s) IV Push every 8 hours  ondansetron Injectable 4 milliGRAM(s) IV Push every 6 hours PRN Nausea    Respiratory Medications    Cardiovascular Medications    Gastrointestinal Medications  pantoprazole  Injectable 40 milliGRAM(s) IV Push daily  potassium chloride   Powder 20 milliEquivalent(s) Oral once    Genitourinary Medications    Hematologic/Oncologic Medications  enoxaparin Injectable 70 milliGRAM(s) SubCutaneous every 12 hours  influenza   Vaccine 0.5 milliLiter(s) IntraMuscular once    Antimicrobial/Immunologic Medications    Endocrine/Metabolic Medications    Topical/Other Medications  naloxone Injectable 0.1 milliGRAM(s) IV Push every 3 minutes PRN For ANY of the following changes in patient status:  A. RR LESS THAN 10 breaths per minute, B. Oxygen saturation LESS THAN 90%, C. Sedation score of 6    --------------------------------------------------------------------------------------    VITAL SIGNS:  T(C): 36.9 (10-16-23 @ 05:00), Max: 36.9 (10-15-23 @ 16:56)  HR: 79 (10-16-23 @ 05:00) (79 - 95)  BP: 133/60 (10-16-23 @ 05:00) (120/61 - 153/60)  RR: 18 (10-16-23 @ 05:00) (17 - 18)  SpO2: 100% (10-16-23 @ 05:00) (99% - 100%)  --------------------------------------------------------------------------------------    INS AND OUTS:    10-15-23 @ 07:01  -  10-16-23 @ 07:00  --------------------------------------------------------  IN: 1230 mL / OUT: 1643.5 mL / NET: -413.5 mL      --------------------------------------------------------------------------------------      EXAM    General: NAD, resting in bed comfortably.  Cardiac: regular rate, warm and well perfused  Respiratory: Nonlabored respirations, normal cw expansion.  Abdomen: soft, nontender, nondistended, midline incision c/d/i, ileostomy with air and stool, L JUAQUIN x 1 SS  Extremities: normal strength, FROM, no deformities    --------------------------------------------------------------------------------------    LABS                        7.1    7.02  )-----------( 465      ( 16 Oct 2023 06:25 )             22.1     10-15    145  |  108<H>  |  7   ----------------------------<  106<H>  3.7   |  24  |  0.37<L>    Ca    8.7      15 Oct 2023 06:00  Phos  3.0     10-15  Mg     2.00     10-15

## 2023-10-17 LAB
ANION GAP SERPL CALC-SCNC: 13 MMOL/L — SIGNIFICANT CHANGE UP (ref 7–14)
ANION GAP SERPL CALC-SCNC: 13 MMOL/L — SIGNIFICANT CHANGE UP (ref 7–14)
BUN SERPL-MCNC: 6 MG/DL — LOW (ref 7–23)
BUN SERPL-MCNC: 6 MG/DL — LOW (ref 7–23)
CALCIUM SERPL-MCNC: 8.3 MG/DL — LOW (ref 8.4–10.5)
CALCIUM SERPL-MCNC: 8.3 MG/DL — LOW (ref 8.4–10.5)
CHLORIDE SERPL-SCNC: 106 MMOL/L — SIGNIFICANT CHANGE UP (ref 98–107)
CHLORIDE SERPL-SCNC: 106 MMOL/L — SIGNIFICANT CHANGE UP (ref 98–107)
CO2 SERPL-SCNC: 22 MMOL/L — SIGNIFICANT CHANGE UP (ref 22–31)
CO2 SERPL-SCNC: 22 MMOL/L — SIGNIFICANT CHANGE UP (ref 22–31)
CREAT SERPL-MCNC: 0.4 MG/DL — LOW (ref 0.5–1.3)
CREAT SERPL-MCNC: 0.4 MG/DL — LOW (ref 0.5–1.3)
EGFR: 114 ML/MIN/1.73M2 — SIGNIFICANT CHANGE UP
EGFR: 114 ML/MIN/1.73M2 — SIGNIFICANT CHANGE UP
GLUCOSE SERPL-MCNC: 102 MG/DL — HIGH (ref 70–99)
GLUCOSE SERPL-MCNC: 102 MG/DL — HIGH (ref 70–99)
HCT VFR BLD CALC: 26.3 % — LOW (ref 34.5–45)
HCT VFR BLD CALC: 26.3 % — LOW (ref 34.5–45)
HGB BLD-MCNC: 8.6 G/DL — LOW (ref 11.5–15.5)
HGB BLD-MCNC: 8.6 G/DL — LOW (ref 11.5–15.5)
MAGNESIUM SERPL-MCNC: 1.9 MG/DL — SIGNIFICANT CHANGE UP (ref 1.6–2.6)
MAGNESIUM SERPL-MCNC: 1.9 MG/DL — SIGNIFICANT CHANGE UP (ref 1.6–2.6)
MCHC RBC-ENTMCNC: 27.6 PG — SIGNIFICANT CHANGE UP (ref 27–34)
MCHC RBC-ENTMCNC: 27.6 PG — SIGNIFICANT CHANGE UP (ref 27–34)
MCHC RBC-ENTMCNC: 32.7 GM/DL — SIGNIFICANT CHANGE UP (ref 32–36)
MCHC RBC-ENTMCNC: 32.7 GM/DL — SIGNIFICANT CHANGE UP (ref 32–36)
MCV RBC AUTO: 84.3 FL — SIGNIFICANT CHANGE UP (ref 80–100)
MCV RBC AUTO: 84.3 FL — SIGNIFICANT CHANGE UP (ref 80–100)
NRBC # BLD: 0 /100 WBCS — SIGNIFICANT CHANGE UP (ref 0–0)
NRBC # BLD: 0 /100 WBCS — SIGNIFICANT CHANGE UP (ref 0–0)
NRBC # FLD: 0.04 K/UL — HIGH (ref 0–0)
NRBC # FLD: 0.04 K/UL — HIGH (ref 0–0)
PHOSPHATE SERPL-MCNC: 3 MG/DL — SIGNIFICANT CHANGE UP (ref 2.5–4.5)
PHOSPHATE SERPL-MCNC: 3 MG/DL — SIGNIFICANT CHANGE UP (ref 2.5–4.5)
PLATELET # BLD AUTO: 407 K/UL — HIGH (ref 150–400)
PLATELET # BLD AUTO: 407 K/UL — HIGH (ref 150–400)
POTASSIUM SERPL-MCNC: 3.8 MMOL/L — SIGNIFICANT CHANGE UP (ref 3.5–5.3)
POTASSIUM SERPL-MCNC: 3.8 MMOL/L — SIGNIFICANT CHANGE UP (ref 3.5–5.3)
POTASSIUM SERPL-SCNC: 3.8 MMOL/L — SIGNIFICANT CHANGE UP (ref 3.5–5.3)
POTASSIUM SERPL-SCNC: 3.8 MMOL/L — SIGNIFICANT CHANGE UP (ref 3.5–5.3)
RBC # BLD: 3.12 M/UL — LOW (ref 3.8–5.2)
RBC # BLD: 3.12 M/UL — LOW (ref 3.8–5.2)
RBC # FLD: 20.9 % — HIGH (ref 10.3–14.5)
RBC # FLD: 20.9 % — HIGH (ref 10.3–14.5)
SODIUM SERPL-SCNC: 141 MMOL/L — SIGNIFICANT CHANGE UP (ref 135–145)
SODIUM SERPL-SCNC: 141 MMOL/L — SIGNIFICANT CHANGE UP (ref 135–145)
WBC # BLD: 6.69 K/UL — SIGNIFICANT CHANGE UP (ref 3.8–10.5)
WBC # BLD: 6.69 K/UL — SIGNIFICANT CHANGE UP (ref 3.8–10.5)
WBC # FLD AUTO: 6.69 K/UL — SIGNIFICANT CHANGE UP (ref 3.8–10.5)
WBC # FLD AUTO: 6.69 K/UL — SIGNIFICANT CHANGE UP (ref 3.8–10.5)

## 2023-10-17 PROCEDURE — 99233 SBSQ HOSP IP/OBS HIGH 50: CPT | Mod: GC

## 2023-10-17 PROCEDURE — 74018 RADEX ABDOMEN 1 VIEW: CPT | Mod: 26

## 2023-10-17 RX ORDER — ENOXAPARIN SODIUM 100 MG/ML
70 INJECTION SUBCUTANEOUS EVERY 12 HOURS
Refills: 0 | Status: DISCONTINUED | OUTPATIENT
Start: 2023-10-17 | End: 2023-10-18

## 2023-10-17 RX ORDER — ENOXAPARIN SODIUM 100 MG/ML
70 INJECTION SUBCUTANEOUS EVERY 12 HOURS
Refills: 0 | Status: DISCONTINUED | OUTPATIENT
Start: 2023-10-17 | End: 2023-10-17

## 2023-10-17 RX ORDER — POTASSIUM CHLORIDE 20 MEQ
20 PACKET (EA) ORAL ONCE
Refills: 0 | Status: COMPLETED | OUTPATIENT
Start: 2023-10-17 | End: 2023-10-17

## 2023-10-17 RX ORDER — PANTOPRAZOLE SODIUM 20 MG/1
40 TABLET, DELAYED RELEASE ORAL
Refills: 0 | Status: DISCONTINUED | OUTPATIENT
Start: 2023-10-17 | End: 2023-10-30

## 2023-10-17 RX ADMIN — Medication 400 MILLIGRAM(S): at 10:35

## 2023-10-17 RX ADMIN — Medication 10 MILLIGRAM(S): at 05:24

## 2023-10-17 RX ADMIN — HYDROMORPHONE HYDROCHLORIDE 0.2 MILLIGRAM(S): 2 INJECTION INTRAMUSCULAR; INTRAVENOUS; SUBCUTANEOUS at 06:09

## 2023-10-17 RX ADMIN — Medication 1000 MILLIGRAM(S): at 00:04

## 2023-10-17 RX ADMIN — PANTOPRAZOLE SODIUM 40 MILLIGRAM(S): 20 TABLET, DELAYED RELEASE ORAL at 16:29

## 2023-10-17 RX ADMIN — Medication 1000 MILLIGRAM(S): at 06:09

## 2023-10-17 RX ADMIN — Medication 5 MILLIGRAM(S): at 22:39

## 2023-10-17 RX ADMIN — HYDROMORPHONE HYDROCHLORIDE 0.5 MILLIGRAM(S): 2 INJECTION INTRAMUSCULAR; INTRAVENOUS; SUBCUTANEOUS at 20:09

## 2023-10-17 RX ADMIN — Medication 10 MILLIGRAM(S): at 13:34

## 2023-10-17 RX ADMIN — ONDANSETRON 4 MILLIGRAM(S): 8 TABLET, FILM COATED ORAL at 10:35

## 2023-10-17 RX ADMIN — ENOXAPARIN SODIUM 70 MILLIGRAM(S): 100 INJECTION SUBCUTANEOUS at 06:05

## 2023-10-17 RX ADMIN — Medication 1000 MILLIGRAM(S): at 10:35

## 2023-10-17 RX ADMIN — Medication 10 MILLIGRAM(S): at 22:39

## 2023-10-17 RX ADMIN — HYDROMORPHONE HYDROCHLORIDE 0.5 MILLIGRAM(S): 2 INJECTION INTRAMUSCULAR; INTRAVENOUS; SUBCUTANEOUS at 13:34

## 2023-10-17 RX ADMIN — Medication 5 MILLIGRAM(S): at 06:05

## 2023-10-17 RX ADMIN — ONDANSETRON 4 MILLIGRAM(S): 8 TABLET, FILM COATED ORAL at 16:29

## 2023-10-17 RX ADMIN — HYDROMORPHONE HYDROCHLORIDE 0.5 MILLIGRAM(S): 2 INJECTION INTRAMUSCULAR; INTRAVENOUS; SUBCUTANEOUS at 19:54

## 2023-10-17 RX ADMIN — HYDROMORPHONE HYDROCHLORIDE 0.2 MILLIGRAM(S): 2 INJECTION INTRAMUSCULAR; INTRAVENOUS; SUBCUTANEOUS at 06:24

## 2023-10-17 RX ADMIN — Medication 400 MILLIGRAM(S): at 05:24

## 2023-10-17 RX ADMIN — Medication 5 MILLIGRAM(S): at 13:34

## 2023-10-17 RX ADMIN — Medication 20 MILLIEQUIVALENT(S): at 10:35

## 2023-10-17 RX ADMIN — ENOXAPARIN SODIUM 70 MILLIGRAM(S): 100 INJECTION SUBCUTANEOUS at 22:53

## 2023-10-17 RX ADMIN — PANTOPRAZOLE SODIUM 40 MILLIGRAM(S): 20 TABLET, DELAYED RELEASE ORAL at 06:05

## 2023-10-17 RX ADMIN — Medication 400 MILLIGRAM(S): at 00:04

## 2023-10-17 NOTE — PROGRESS NOTE ADULT - SUBJECTIVE AND OBJECTIVE BOX
TEAM [ D ] Surgery Daily Progress Note  =====================================================    SUBJECTIVE: Patient seen and examined at bedside on AM rounds. Patient reports that they're feeling well without episodes of emesis or nausea overnight. Yesterday, patient was able to eat chicken noodle soup with a few spoonfuls of fruits. Patient does complain of urine leakage around cristina catheter, saturating multiple chucks overnight. Denies fever, chills, chest pain, SOB    ALLERGIES:  penicillin (Rash)      --------------------------------------------------------------------------------------    MEDICATIONS:    Neurologic Medications  acetaminophen   IVPB .. 1000 milliGRAM(s) IV Intermittent every 6 hours  baclofen 5 milliGRAM(s) Oral every 8 hours  HYDROmorphone  Injectable 0.5 milliGRAM(s) IV Push every 4 hours PRN Severe Pain (7 - 10)  HYDROmorphone  Injectable 0.2 milliGRAM(s) IV Push every 4 hours PRN Moderate Pain (4 - 6)  metoclopramide Injectable 10 milliGRAM(s) IV Push every 8 hours  ondansetron Injectable 4 milliGRAM(s) IV Push every 6 hours PRN Nausea    Respiratory Medications    Cardiovascular Medications    Gastrointestinal Medications  pantoprazole  Injectable 40 milliGRAM(s) IV Push two times a day    Genitourinary Medications    Hematologic/Oncologic Medications  enoxaparin Injectable 70 milliGRAM(s) SubCutaneous every 12 hours  influenza   Vaccine 0.5 milliLiter(s) IntraMuscular once    Antimicrobial/Immunologic Medications    Endocrine/Metabolic Medications    Topical/Other Medications  naloxone Injectable 0.1 milliGRAM(s) IV Push every 3 minutes PRN For ANY of the following changes in patient status:  A. RR LESS THAN 10 breaths per minute, B. Oxygen saturation LESS THAN 90%, C. Sedation score of 6    --------------------------------------------------------------------------------------    VITAL SIGNS:  T(C): 36.7 (10-17-23 @ 04:51), Max: 37.2 (10-16-23 @ 17:36)  HR: 78 (10-17-23 @ 04:51) (77 - 89)  BP: 147/67 (10-17-23 @ 04:51) (113/66 - 147/67)  RR: 18 (10-17-23 @ 04:51) (18 - 20)  SpO2: 100% (10-17-23 @ 04:51) (100% - 100%)  --------------------------------------------------------------------------------------    INS AND OUTS:    10-16-23 @ 07:01  -  10-17-23 @ 07:00  --------------------------------------------------------  IN: 1260 mL / OUT: 1702.5 mL / NET: -442.5 mL      --------------------------------------------------------------------------------------      EXAM    General: NAD, resting in bed comfortably.  Cardiac: regular rate, warm and well perfused  Respiratory: Nonlabored respirations, normal cw expansion.  Abdomen: soft, nontender, nondistended, ileostomy with gas and stool, midline incision c/d/i, L JUAQUIN x 1 SS  : jonnie  Extremities: normal strength, FROM, no deformities    --------------------------------------------------------------------------------------    LABS                        8.6    6.69  )-----------( 407      ( 17 Oct 2023 05:40 )             26.3       10-17    141  |  106  |  6<L>  ----------------------------<  102<H>  3.8   |  22  |  0.40<L>    Ca    8.3<L>      17 Oct 2023 05:40  Phos  3.0     10-17  Mg     1.90     10-17

## 2023-10-17 NOTE — PROGRESS NOTE ADULT - ASSESSMENT
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 Post-op course c/b inability to tolerate PO since surgery 10/4/23 despite functioning loop ileostomy. Hospital course c/b left IJ thrombus and inominate vein acute non-occlusive thrombosis now on therapeutic lovenox. CT A/P IVC (10/12/23) showing no e/o obstruction however PO contrast was not given due to c/f aspiration. Pt reports progressively worsening vomiting with each subsequent cycle of chemo and thinks current vomiting might be related to chemo. On 10/16 pt reports vomiting has improved, able to eat 2/3 of a bowl of soup.  GI consulted for dx endoscopy and candidacy for GJ tube placement.    #malignant neoplasm of appendix (dx 06/2023 s/p FOLFOX x 4 cycles- last dose 10/4) s/p appendectomy, cholecystectomy, hysterectomy, omenectomy, peritenectomy, LAR with diverting loop ileostomy and bladder injury with primary repair on 10/4  #left IJ thrombus and inominate vein acute non-occlusive thrombosis now on therapeutic lovenox  #chemo (FOLFOX) induced vomiting- improving    Recs:  - recommend calorie counts x 72 hours and encourage PO intake  - consider GJ tube placement tentatively 10/18 vs 10/19 pending endoscopy availability; could alternatively try NJ tube feeds in interim to see how pt tolerated post-pyloric feeds  - NPO after MN tonight  - no indication for venting G tube at this time since no signs of clinical obstruction  - c/w IV PPI 40 mg daily and reglan 10 mg IV q8h for now  - currently on PRN Zofran but could try alternate PRN antiemetic such as compazine      **THIS NOTE IS NOT FINALIZED UNTIL SIGNED BY THE ATTENDING**    Susan Gonzales MD  GI Fellow, PGY-6  Available via [employer] Teams    NON-URGENT CONSULTS:  Please email [email or web address]  [email or web address]  AT NIGHT AND ON WEEKENDS:  Contact on-call GI fellow via answering service ([phone]) from 5pm-8am and on weekends/holidays  MONDAY-FRIDAY 8AM-5PM:  Pager# [phone] ([facility]) or [phone] ([facility])

## 2023-10-17 NOTE — PROGRESS NOTE ADULT - SUBJECTIVE AND OBJECTIVE BOX
Chief Complaint:  Patient is a 58y old  Female who presents with a chief complaint of appendix Ca (11 Oct 2023 10:56)      Interval Events: Attempted to eat. Reports 3 episodes of vomiting today. Still having epigastric abd pain. Vomiting episodes are triggered by belching.    Hospital Medications:  baclofen 5 milliGRAM(s) Oral every 8 hours  enoxaparin Injectable 70 milliGRAM(s) SubCutaneous every 12 hours  HYDROmorphone  Injectable 0.2 milliGRAM(s) IV Push every 4 hours PRN  HYDROmorphone  Injectable 0.5 milliGRAM(s) IV Push every 4 hours PRN  influenza   Vaccine 0.5 milliLiter(s) IntraMuscular once  metoclopramide Injectable 10 milliGRAM(s) IV Push every 8 hours  naloxone Injectable 0.1 milliGRAM(s) IV Push every 3 minutes PRN  ondansetron Injectable 4 milliGRAM(s) IV Push every 6 hours PRN  pantoprazole  Injectable 40 milliGRAM(s) IV Push two times a day      PMHX/PSHX:  History of sickle cell trait    Malignant neoplasm of appendix    History of ectopic pregnancy    H/O colonoscopy    H/O endoscopy            ROS: 14 point ROS negative unless otherwise stated in subjective      PHYSICAL EXAM:     GENERAL:  Appears stated age, well-groomed, +chronically ill appearing  HEENT:  NC/AT,  conjunctivae clear and pink  CHEST:  Full & symmetric excursion, no increased effort  HEART:  Regular rhythm, S1, S2, no murmur/rub/S3/S4  ABDOMEN:  Soft, non-tender, non-distended; +well healed surgical incision; +ostomy with gas and "salsa" consistency brown output; +serosanguinous OP from L abdominal JUAQUIN drain  EXTREMITIES:  no cyanosis,clubbing or edema  SKIN:  No rash/erythema/ecchymoses/petechiae/wounds/abscess/warm/dry  NEURO:  Alert, orientedx3      Vital Signs:  Vital Signs Last 24 Hrs  T(C): 36.9 (17 Oct 2023 16:51), Max: 37 (16 Oct 2023 23:51)  T(F): 98.4 (17 Oct 2023 16:51), Max: 98.6 (16 Oct 2023 23:51)  HR: 90 (17 Oct 2023 16:51) (68 - 90)  BP: 135/67 (17 Oct 2023 16:51) (113/66 - 147/67)  BP(mean): --  RR: 18 (17 Oct 2023 16:51) (18 - 18)  SpO2: 99% (17 Oct 2023 16:51) (98% - 100%)    Parameters below as of 17 Oct 2023 16:51  Patient On (Oxygen Delivery Method): room air      Daily     Daily     LABS:                        8.6    6.69  )-----------( 407      ( 17 Oct 2023 05:40 )             26.3     10-17    141  |  106  |  6<L>  ----------------------------<  102<H>  3.8   |  22  |  0.40<L>    Ca    8.3<L>      17 Oct 2023 05:40  Phos  3.0     10-17  Mg     1.90     10-17          Urinalysis Basic - ( 17 Oct 2023 05:40 )    Color: x / Appearance: x / SG: x / pH: x  Gluc: 102 mg/dL / Ketone: x  / Bili: x / Urobili: x   Blood: x / Protein: x / Nitrite: x   Leuk Esterase: x / RBC: x / WBC x   Sq Epi: x / Non Sq Epi: x / Bacteria: x          Imaging: No new abdominal imaging

## 2023-10-17 NOTE — PROGRESS NOTE ADULT - SUBJECTIVE AND OBJECTIVE BOX
Subjective: Patient seen and examined. No new events except as noted.     SUBJECTIVE/ROS:        MEDICATIONS:  MEDICATIONS  (STANDING):  acetaminophen   IVPB .. 1000 milliGRAM(s) IV Intermittent every 6 hours  baclofen 5 milliGRAM(s) Oral every 8 hours  enoxaparin Injectable 70 milliGRAM(s) SubCutaneous every 12 hours  influenza   Vaccine 0.5 milliLiter(s) IntraMuscular once  metoclopramide Injectable 10 milliGRAM(s) IV Push every 8 hours  pantoprazole  Injectable 40 milliGRAM(s) IV Push two times a day      PHYSICAL EXAM:  T(C): 36.7 (10-17-23 @ 04:51), Max: 37.2 (10-16-23 @ 17:36)  HR: 78 (10-17-23 @ 04:51) (77 - 89)  BP: 147/67 (10-17-23 @ 04:51) (113/66 - 147/67)  RR: 18 (10-17-23 @ 04:51) (18 - 20)  SpO2: 100% (10-17-23 @ 04:51) (100% - 100%)  Wt(kg): --  I&O's Summary    15 Oct 2023 07:01  -  16 Oct 2023 07:00  --------------------------------------------------------  IN: 1230 mL / OUT: 1643.5 mL / NET: -413.5 mL    16 Oct 2023 07:01  -  17 Oct 2023 06:55  --------------------------------------------------------  IN: 1260 mL / OUT: 1702.5 mL / NET: -442.5 mL            JVP: Normal  Neck: supple  Lung: clear   CV: S1 S2 , Murmur:  Abd: soft  Ext: No edema  neuro: Awake / alert  Psych: flat affect  Skin: normal``    LABS/DATA:    CARDIAC MARKERS:                                8.6    6.69  )-----------( 407      ( 17 Oct 2023 05:40 )             26.3     10-16    144  |  107  |  8   ----------------------------<  97  3.7   |  24  |  0.42<L>    Ca    8.6      16 Oct 2023 06:25  Phos  3.6     10-16  Mg     1.90     10-16      proBNP:   Lipid Profile:   HgA1c:   TSH:     TELE:  EKG:

## 2023-10-17 NOTE — CONSULT NOTE ADULT - SUBJECTIVE AND OBJECTIVE BOX
HPI:  Yojana Dukes is a 58 year old female with a PMHx of malignant neoplasm of appendix (dx 06/2023 s/p FOLFOX x 4 cycles- last dose 10/4) s/p appendectomy, cholecystectomy, hysterectomy, omenectomy, peritenectomy, LAR with diverting loop ileostomy and bladder injury with primary repair on 10/4 with post-op course c/b inability to tolerate PO since surgery 10/4/23 despite functioning loop ileostomy c/b left IJ thrombus and inominate vein acute non-occlusive thrombosis now on therapeutic lovenox. Advanced GI consulted for candidacy for GJ tube placement.    As noted above, pt presented on 10/4 and is now appendectomy, cholecystectomy, hysterectomy, omenectomy, peritenectomy, LAR with diverting loop ileostomy and bladder injury with primary repair on 10/4. Post-op course c/b inability to tolerate PO since surgery 10/4/23 despite functioning loop ileostomy. Hospital course c/b left IJ thrombus and inominate vein acute non-occlusive thrombosis now on therapeutic lovenox. CT A/P IVC (10/12/23) showing no e/o obstruction however PO contrast was not given due to c/f aspiration. Pt reports progressively worsening vomiting with each subsequent cycle of chemo and thinks current vomiting might be related to chemo. On 10/16 pt reports vomiting has improved, able to eat 2/3 of a bowl of soup.  GI consulted for dx endoscopy and candidacy for GJ tube placement.          Allergies:  penicillin (Rash)      Home Medications:    Hospital Medications:  baclofen 5 milliGRAM(s) Oral every 8 hours  enoxaparin Injectable 70 milliGRAM(s) SubCutaneous every 12 hours  HYDROmorphone  Injectable 0.2 milliGRAM(s) IV Push every 4 hours PRN  HYDROmorphone  Injectable 0.5 milliGRAM(s) IV Push every 4 hours PRN  influenza   Vaccine 0.5 milliLiter(s) IntraMuscular once  metoclopramide Injectable 10 milliGRAM(s) IV Push every 8 hours  naloxone Injectable 0.1 milliGRAM(s) IV Push every 3 minutes PRN  ondansetron Injectable 4 milliGRAM(s) IV Push every 6 hours PRN  pantoprazole  Injectable 40 milliGRAM(s) IV Push two times a day      PMHX/PSHX:    History of sickle cell trait  Malignant neoplasm of appendix  History of ectopic pregnancy  H/O colonoscopy  H/O endoscopy  Appendectomy  Cholecystectomy  Hysterectomy  Omenectomy  Peritenectomy  LAR with diverting loop ileostomy and bladder injury    Family history:  No family hx of any GI cancers     Social History:   Tob: Denies  EtOH: Denies  Illicit Drugs: Denies    ROS: Complete and normal except as mentioned above    PHYSICAL EXAM:   GENERAL:  No acute distress  HEENT:  NCAT, no scleral icterus   CHEST:  no respiratory distress  HEART:  Regular rate and rhythm  ABDOMEN:  Soft, non-tender, non-distended, normoactive bowel sounds,  no masses  EXTREMITIES: No edema  SKIN:  No rash/erythema/ecchymoses/petechiae/wounds/abscess/warm/dry  NEURO:  Alert and oriented x 3, no asterixis    Vital Signs:  Vital Signs Last 24 Hrs  T(C): 36.6 (17 Oct 2023 12:44), Max: 37.2 (16 Oct 2023 17:36)  T(F): 97.9 (17 Oct 2023 12:44), Max: 99 (16 Oct 2023 17:36)  HR: 68 (17 Oct 2023 12:44) (68 - 89)  BP: 133/65 (17 Oct 2023 12:44) (113/66 - 147/67)  BP(mean): --  RR: 18 (17 Oct 2023 12:44) (18 - 18)  SpO2: 98% (17 Oct 2023 12:44) (98% - 100%)    Parameters below as of 17 Oct 2023 12:44  Patient On (Oxygen Delivery Method): room air      Daily     Daily     LABS:                        8.6    6.69  )-----------( 407      ( 17 Oct 2023 05:40 )             26.3     Mean Cell Volume: 84.3 fL (10-17-23 @ 05:40)    10-17    141  |  106  |  6<L>  ----------------------------<  102<H>  3.8   |  22  |  0.40<L>    Ca    8.3<L>      17 Oct 2023 05:40  Phos  3.0     10-17  Mg     1.90     10-17          Urinalysis Basic - ( 17 Oct 2023 05:40 )    Color: x / Appearance: x / SG: x / pH: x  Gluc: 102 mg/dL / Ketone: x  / Bili: x / Urobili: x   Blood: x / Protein: x / Nitrite: x   Leuk Esterase: x / RBC: x / WBC x   Sq Epi: x / Non Sq Epi: x / Bacteria: x                              8.6    6.69  )-----------( 407      ( 17 Oct 2023 05:40 )             26.3                         7.1    7.02  )-----------( 465      ( 16 Oct 2023 06:25 )             22.1                         7.1    8.00  )-----------( 498      ( 15 Oct 2023 06:00 )             21.9       Imaging:    < from: Xray Abdomen 1 View PORTABLE -Urgent (Xray Abdomen 1 View PORTABLE -Urgent .) (10.17.23 @ 13:08) >    IMPRESSION: Postop with drain in place and no obstruction.      < end of copied text >  < from: CT Abdomen and Pelvis w/ IV Cont (10.12.23 @ 23:15) >  IMPRESSION:  Post surgical changes within the abdomen and pelvis as above. No evidence   of abscess.  New right greater than left pleural effusions with compressive lower lobe   atelectasis.  Small postoperative pneumomediastinum.  Heterogeneous enhancement of the right inferior hepatic lobe with   subcapsular hypoattenuation, which may represent hepatic injury or focal   infarct.      < end of copied text >

## 2023-10-17 NOTE — PROGRESS NOTE ADULT - ASSESSMENT
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 found to have LIJ and inominate vein acute non-occlusive thrombosis now on T lovenox.    PLAN:  - Diet: regular, self regulate, document % intake per meal  - Last day of cristina catheter tomorrow 10/18  - Reglan 10mg q8 hours  - Protonix BID  - Monitor JUAQUIN outpt  - FS premeal with ISS  - VTE ppx: T Lovenox for IJ and inominate thrombus  - PT/OT consult appreciated, PM&R consult for possible acute rehab disposition  - Acute rehab criteria: Hgb >8, oral pain management only, no chemotherapy during rehab admission  - Appreciate GI recommendations    D Team Surgery  [id number]

## 2023-10-18 ENCOUNTER — APPOINTMENT (OUTPATIENT)
Dept: INFUSION THERAPY | Facility: HOSPITAL | Age: 58
End: 2023-10-18

## 2023-10-18 LAB
ANION GAP SERPL CALC-SCNC: 15 MMOL/L — HIGH (ref 7–14)
ANION GAP SERPL CALC-SCNC: 15 MMOL/L — HIGH (ref 7–14)
APTT BLD: 36.2 SEC — HIGH (ref 24.5–35.6)
APTT BLD: 36.2 SEC — HIGH (ref 24.5–35.6)
BASOPHILS # BLD AUTO: 0.02 K/UL — SIGNIFICANT CHANGE UP (ref 0–0.2)
BASOPHILS # BLD AUTO: 0.02 K/UL — SIGNIFICANT CHANGE UP (ref 0–0.2)
BASOPHILS NFR BLD AUTO: 0.3 % — SIGNIFICANT CHANGE UP (ref 0–2)
BASOPHILS NFR BLD AUTO: 0.3 % — SIGNIFICANT CHANGE UP (ref 0–2)
BLD GP AB SCN SERPL QL: NEGATIVE — SIGNIFICANT CHANGE UP
BLD GP AB SCN SERPL QL: NEGATIVE — SIGNIFICANT CHANGE UP
BUN SERPL-MCNC: 7 MG/DL — SIGNIFICANT CHANGE UP (ref 7–23)
BUN SERPL-MCNC: 7 MG/DL — SIGNIFICANT CHANGE UP (ref 7–23)
CALCIUM SERPL-MCNC: 9.1 MG/DL — SIGNIFICANT CHANGE UP (ref 8.4–10.5)
CALCIUM SERPL-MCNC: 9.1 MG/DL — SIGNIFICANT CHANGE UP (ref 8.4–10.5)
CHLORIDE SERPL-SCNC: 104 MMOL/L — SIGNIFICANT CHANGE UP (ref 98–107)
CHLORIDE SERPL-SCNC: 104 MMOL/L — SIGNIFICANT CHANGE UP (ref 98–107)
CO2 SERPL-SCNC: 22 MMOL/L — SIGNIFICANT CHANGE UP (ref 22–31)
CO2 SERPL-SCNC: 22 MMOL/L — SIGNIFICANT CHANGE UP (ref 22–31)
CREAT SERPL-MCNC: 0.42 MG/DL — LOW (ref 0.5–1.3)
CREAT SERPL-MCNC: 0.42 MG/DL — LOW (ref 0.5–1.3)
EGFR: 113 ML/MIN/1.73M2 — SIGNIFICANT CHANGE UP
EGFR: 113 ML/MIN/1.73M2 — SIGNIFICANT CHANGE UP
EOSINOPHIL # BLD AUTO: 0.01 K/UL — SIGNIFICANT CHANGE UP (ref 0–0.5)
EOSINOPHIL # BLD AUTO: 0.01 K/UL — SIGNIFICANT CHANGE UP (ref 0–0.5)
EOSINOPHIL NFR BLD AUTO: 0.1 % — SIGNIFICANT CHANGE UP (ref 0–6)
EOSINOPHIL NFR BLD AUTO: 0.1 % — SIGNIFICANT CHANGE UP (ref 0–6)
FERRITIN SERPL-MCNC: 797 NG/ML — HIGH (ref 13–330)
FERRITIN SERPL-MCNC: 797 NG/ML — HIGH (ref 13–330)
FOLATE SERPL-MCNC: 9.5 NG/ML — SIGNIFICANT CHANGE UP (ref 3.1–17.5)
FOLATE SERPL-MCNC: 9.5 NG/ML — SIGNIFICANT CHANGE UP (ref 3.1–17.5)
GLUCOSE SERPL-MCNC: 100 MG/DL — HIGH (ref 70–99)
GLUCOSE SERPL-MCNC: 100 MG/DL — HIGH (ref 70–99)
HAPTOGLOB SERPL-MCNC: 428 MG/DL — HIGH (ref 34–200)
HAPTOGLOB SERPL-MCNC: 428 MG/DL — HIGH (ref 34–200)
HCT VFR BLD CALC: 25.7 % — LOW (ref 34.5–45)
HCT VFR BLD CALC: 25.7 % — LOW (ref 34.5–45)
HGB BLD-MCNC: 8.6 G/DL — LOW (ref 11.5–15.5)
HGB BLD-MCNC: 8.6 G/DL — LOW (ref 11.5–15.5)
IANC: 5.28 K/UL — SIGNIFICANT CHANGE UP (ref 1.8–7.4)
IANC: 5.28 K/UL — SIGNIFICANT CHANGE UP (ref 1.8–7.4)
IMM GRANULOCYTES NFR BLD AUTO: 0.6 % — SIGNIFICANT CHANGE UP (ref 0–0.9)
IMM GRANULOCYTES NFR BLD AUTO: 0.6 % — SIGNIFICANT CHANGE UP (ref 0–0.9)
INR BLD: 1.29 RATIO — HIGH (ref 0.85–1.18)
INR BLD: 1.29 RATIO — HIGH (ref 0.85–1.18)
IRON SATN MFR SERPL: 11 % — LOW (ref 14–50)
IRON SATN MFR SERPL: 11 % — LOW (ref 14–50)
IRON SATN MFR SERPL: 16 UG/DL — LOW (ref 30–160)
IRON SATN MFR SERPL: 16 UG/DL — LOW (ref 30–160)
LDH SERPL L TO P-CCNC: 280 U/L — HIGH (ref 135–225)
LDH SERPL L TO P-CCNC: 280 U/L — HIGH (ref 135–225)
LYMPHOCYTES # BLD AUTO: 0.89 K/UL — LOW (ref 1–3.3)
LYMPHOCYTES # BLD AUTO: 0.89 K/UL — LOW (ref 1–3.3)
LYMPHOCYTES # BLD AUTO: 12.4 % — LOW (ref 13–44)
LYMPHOCYTES # BLD AUTO: 12.4 % — LOW (ref 13–44)
MAGNESIUM SERPL-MCNC: 1.8 MG/DL — SIGNIFICANT CHANGE UP (ref 1.6–2.6)
MAGNESIUM SERPL-MCNC: 1.8 MG/DL — SIGNIFICANT CHANGE UP (ref 1.6–2.6)
MCHC RBC-ENTMCNC: 27.9 PG — SIGNIFICANT CHANGE UP (ref 27–34)
MCHC RBC-ENTMCNC: 27.9 PG — SIGNIFICANT CHANGE UP (ref 27–34)
MCHC RBC-ENTMCNC: 33.5 GM/DL — SIGNIFICANT CHANGE UP (ref 32–36)
MCHC RBC-ENTMCNC: 33.5 GM/DL — SIGNIFICANT CHANGE UP (ref 32–36)
MCV RBC AUTO: 83.4 FL — SIGNIFICANT CHANGE UP (ref 80–100)
MCV RBC AUTO: 83.4 FL — SIGNIFICANT CHANGE UP (ref 80–100)
MONOCYTES # BLD AUTO: 0.94 K/UL — HIGH (ref 0–0.9)
MONOCYTES # BLD AUTO: 0.94 K/UL — HIGH (ref 0–0.9)
MONOCYTES NFR BLD AUTO: 13.1 % — SIGNIFICANT CHANGE UP (ref 2–14)
MONOCYTES NFR BLD AUTO: 13.1 % — SIGNIFICANT CHANGE UP (ref 2–14)
NEUTROPHILS # BLD AUTO: 5.28 K/UL — SIGNIFICANT CHANGE UP (ref 1.8–7.4)
NEUTROPHILS # BLD AUTO: 5.28 K/UL — SIGNIFICANT CHANGE UP (ref 1.8–7.4)
NEUTROPHILS NFR BLD AUTO: 73.5 % — SIGNIFICANT CHANGE UP (ref 43–77)
NEUTROPHILS NFR BLD AUTO: 73.5 % — SIGNIFICANT CHANGE UP (ref 43–77)
NRBC # BLD: 0 /100 WBCS — SIGNIFICANT CHANGE UP (ref 0–0)
NRBC # BLD: 0 /100 WBCS — SIGNIFICANT CHANGE UP (ref 0–0)
NRBC # FLD: 0.02 K/UL — HIGH (ref 0–0)
NRBC # FLD: 0.02 K/UL — HIGH (ref 0–0)
PHOSPHATE SERPL-MCNC: 3.1 MG/DL — SIGNIFICANT CHANGE UP (ref 2.5–4.5)
PHOSPHATE SERPL-MCNC: 3.1 MG/DL — SIGNIFICANT CHANGE UP (ref 2.5–4.5)
PLATELET # BLD AUTO: 399 K/UL — SIGNIFICANT CHANGE UP (ref 150–400)
PLATELET # BLD AUTO: 399 K/UL — SIGNIFICANT CHANGE UP (ref 150–400)
POTASSIUM SERPL-MCNC: 3.9 MMOL/L — SIGNIFICANT CHANGE UP (ref 3.5–5.3)
POTASSIUM SERPL-MCNC: 3.9 MMOL/L — SIGNIFICANT CHANGE UP (ref 3.5–5.3)
POTASSIUM SERPL-SCNC: 3.9 MMOL/L — SIGNIFICANT CHANGE UP (ref 3.5–5.3)
POTASSIUM SERPL-SCNC: 3.9 MMOL/L — SIGNIFICANT CHANGE UP (ref 3.5–5.3)
PROTHROM AB SERPL-ACNC: 14.3 SEC — HIGH (ref 9.5–13)
PROTHROM AB SERPL-ACNC: 14.3 SEC — HIGH (ref 9.5–13)
RBC # BLD: 3.08 M/UL — LOW (ref 3.8–5.2)
RBC # BLD: 3.08 M/UL — LOW (ref 3.8–5.2)
RBC # BLD: 3.14 M/UL — LOW (ref 3.8–5.2)
RBC # BLD: 3.14 M/UL — LOW (ref 3.8–5.2)
RBC # FLD: 20.9 % — HIGH (ref 10.3–14.5)
RBC # FLD: 20.9 % — HIGH (ref 10.3–14.5)
RETICS #: 120.3 K/UL — SIGNIFICANT CHANGE UP (ref 25–125)
RETICS #: 120.3 K/UL — SIGNIFICANT CHANGE UP (ref 25–125)
RETICS/RBC NFR: 3.8 % — HIGH (ref 0.5–2.5)
RETICS/RBC NFR: 3.8 % — HIGH (ref 0.5–2.5)
RH IG SCN BLD-IMP: POSITIVE — SIGNIFICANT CHANGE UP
RH IG SCN BLD-IMP: POSITIVE — SIGNIFICANT CHANGE UP
SODIUM SERPL-SCNC: 141 MMOL/L — SIGNIFICANT CHANGE UP (ref 135–145)
SODIUM SERPL-SCNC: 141 MMOL/L — SIGNIFICANT CHANGE UP (ref 135–145)
TIBC SERPL-MCNC: 152 UG/DL — LOW (ref 220–430)
TIBC SERPL-MCNC: 152 UG/DL — LOW (ref 220–430)
TSH SERPL-MCNC: 1.18 UIU/ML — SIGNIFICANT CHANGE UP (ref 0.27–4.2)
TSH SERPL-MCNC: 1.18 UIU/ML — SIGNIFICANT CHANGE UP (ref 0.27–4.2)
UIBC SERPL-MCNC: 136 UG/DL — SIGNIFICANT CHANGE UP (ref 110–370)
UIBC SERPL-MCNC: 136 UG/DL — SIGNIFICANT CHANGE UP (ref 110–370)
VIT B12 SERPL-MCNC: 625 PG/ML — SIGNIFICANT CHANGE UP (ref 200–900)
VIT B12 SERPL-MCNC: 625 PG/ML — SIGNIFICANT CHANGE UP (ref 200–900)
WBC # BLD: 7.28 K/UL — SIGNIFICANT CHANGE UP (ref 3.8–10.5)
WBC # BLD: 7.28 K/UL — SIGNIFICANT CHANGE UP (ref 3.8–10.5)
WBC # FLD AUTO: 7.28 K/UL — SIGNIFICANT CHANGE UP (ref 3.8–10.5)
WBC # FLD AUTO: 7.28 K/UL — SIGNIFICANT CHANGE UP (ref 3.8–10.5)

## 2023-10-18 PROCEDURE — 99231 SBSQ HOSP IP/OBS SF/LOW 25: CPT | Mod: GC

## 2023-10-18 PROCEDURE — 99223 1ST HOSP IP/OBS HIGH 75: CPT

## 2023-10-18 PROCEDURE — 72192 CT PELVIS W/O DYE: CPT | Mod: 26

## 2023-10-18 RX ORDER — ACETAMINOPHEN 500 MG
1000 TABLET ORAL EVERY 6 HOURS
Refills: 0 | Status: DISCONTINUED | OUTPATIENT
Start: 2023-10-18 | End: 2023-10-18

## 2023-10-18 RX ORDER — ACETAMINOPHEN 500 MG
1000 TABLET ORAL EVERY 6 HOURS
Refills: 0 | Status: DISCONTINUED | OUTPATIENT
Start: 2023-10-18 | End: 2023-10-19

## 2023-10-18 RX ORDER — OXYCODONE HYDROCHLORIDE 5 MG/1
5 TABLET ORAL EVERY 4 HOURS
Refills: 0 | Status: DISCONTINUED | OUTPATIENT
Start: 2023-10-18 | End: 2023-10-19

## 2023-10-18 RX ORDER — ACETAMINOPHEN 500 MG
1000 TABLET ORAL ONCE
Refills: 0 | Status: COMPLETED | OUTPATIENT
Start: 2023-10-18 | End: 2023-10-18

## 2023-10-18 RX ORDER — DEXTROSE MONOHYDRATE, SODIUM CHLORIDE, AND POTASSIUM CHLORIDE 50; .745; 4.5 G/1000ML; G/1000ML; G/1000ML
1000 INJECTION, SOLUTION INTRAVENOUS
Refills: 0 | Status: DISCONTINUED | OUTPATIENT
Start: 2023-10-18 | End: 2023-10-18

## 2023-10-18 RX ORDER — MAGNESIUM SULFATE 500 MG/ML
2 VIAL (ML) INJECTION ONCE
Refills: 0 | Status: COMPLETED | OUTPATIENT
Start: 2023-10-18 | End: 2023-10-18

## 2023-10-18 RX ORDER — ACETAMINOPHEN 500 MG
650 TABLET ORAL EVERY 6 HOURS
Refills: 0 | Status: DISCONTINUED | OUTPATIENT
Start: 2023-10-18 | End: 2023-10-18

## 2023-10-18 RX ORDER — ENOXAPARIN SODIUM 100 MG/ML
70 INJECTION SUBCUTANEOUS EVERY 12 HOURS
Refills: 0 | Status: DISCONTINUED | OUTPATIENT
Start: 2023-10-18 | End: 2023-10-20

## 2023-10-18 RX ORDER — OXYCODONE HYDROCHLORIDE 5 MG/1
2.5 TABLET ORAL EVERY 4 HOURS
Refills: 0 | Status: DISCONTINUED | OUTPATIENT
Start: 2023-10-18 | End: 2023-10-19

## 2023-10-18 RX ADMIN — Medication 5 MILLIGRAM(S): at 21:19

## 2023-10-18 RX ADMIN — OXYCODONE HYDROCHLORIDE 5 MILLIGRAM(S): 5 TABLET ORAL at 12:36

## 2023-10-18 RX ADMIN — ONDANSETRON 4 MILLIGRAM(S): 8 TABLET, FILM COATED ORAL at 16:27

## 2023-10-18 RX ADMIN — Medication 1000 MILLIGRAM(S): at 05:58

## 2023-10-18 RX ADMIN — HYDROMORPHONE HYDROCHLORIDE 0.2 MILLIGRAM(S): 2 INJECTION INTRAMUSCULAR; INTRAVENOUS; SUBCUTANEOUS at 04:13

## 2023-10-18 RX ADMIN — OXYCODONE HYDROCHLORIDE 5 MILLIGRAM(S): 5 TABLET ORAL at 20:54

## 2023-10-18 RX ADMIN — OXYCODONE HYDROCHLORIDE 5 MILLIGRAM(S): 5 TABLET ORAL at 11:36

## 2023-10-18 RX ADMIN — DEXTROSE MONOHYDRATE, SODIUM CHLORIDE, AND POTASSIUM CHLORIDE 100 MILLILITER(S): 50; .745; 4.5 INJECTION, SOLUTION INTRAVENOUS at 05:57

## 2023-10-18 RX ADMIN — HYDROMORPHONE HYDROCHLORIDE 0.2 MILLIGRAM(S): 2 INJECTION INTRAMUSCULAR; INTRAVENOUS; SUBCUTANEOUS at 03:58

## 2023-10-18 RX ADMIN — Medication 5 MILLIGRAM(S): at 06:09

## 2023-10-18 RX ADMIN — Medication 400 MILLIGRAM(S): at 05:58

## 2023-10-18 RX ADMIN — Medication 1000 MILLIGRAM(S): at 11:36

## 2023-10-18 RX ADMIN — Medication 25 GRAM(S): at 11:37

## 2023-10-18 RX ADMIN — OXYCODONE HYDROCHLORIDE 5 MILLIGRAM(S): 5 TABLET ORAL at 20:24

## 2023-10-18 RX ADMIN — Medication 5 MILLIGRAM(S): at 14:55

## 2023-10-18 RX ADMIN — Medication 10 MILLIGRAM(S): at 21:19

## 2023-10-18 RX ADMIN — PANTOPRAZOLE SODIUM 40 MILLIGRAM(S): 20 TABLET, DELAYED RELEASE ORAL at 06:09

## 2023-10-18 RX ADMIN — Medication 10 MILLIGRAM(S): at 14:55

## 2023-10-18 RX ADMIN — Medication 10 MILLIGRAM(S): at 06:09

## 2023-10-18 RX ADMIN — ENOXAPARIN SODIUM 70 MILLIGRAM(S): 100 INJECTION SUBCUTANEOUS at 21:20

## 2023-10-18 RX ADMIN — ENOXAPARIN SODIUM 70 MILLIGRAM(S): 100 INJECTION SUBCUTANEOUS at 11:37

## 2023-10-18 RX ADMIN — PANTOPRAZOLE SODIUM 40 MILLIGRAM(S): 20 TABLET, DELAYED RELEASE ORAL at 18:11

## 2023-10-18 RX ADMIN — Medication 400 MILLIGRAM(S): at 19:01

## 2023-10-18 NOTE — PROGRESS NOTE ADULT - SUBJECTIVE AND OBJECTIVE BOX
TEAM [ D ] Surgery Daily Progress Note  =====================================================    SUBJECTIVE: Patient seen and examined at bedside on AM rounds. Patient reports that they're feeling well. She is tolerating sips of clear liquids. Denies fever, chills, N/V, chest pain, SOB    ALLERGIES:  penicillin (Rash)      --------------------------------------------------------------------------------------    MEDICATIONS:    Neurologic Medications  acetaminophen   IVPB .. 1000 milliGRAM(s) IV Intermittent every 6 hours  baclofen 5 milliGRAM(s) Oral every 8 hours  HYDROmorphone  Injectable 0.5 milliGRAM(s) IV Push every 4 hours PRN Severe Pain (7 - 10)  HYDROmorphone  Injectable 0.2 milliGRAM(s) IV Push every 4 hours PRN Moderate Pain (4 - 6)  metoclopramide Injectable 10 milliGRAM(s) IV Push every 8 hours  ondansetron Injectable 4 milliGRAM(s) IV Push every 6 hours PRN Nausea    Respiratory Medications    Cardiovascular Medications    Gastrointestinal Medications  dextrose 5% + sodium chloride 0.45% with potassium chloride 20 mEq/L 1000 milliLiter(s) IV Continuous <Continuous>  pantoprazole  Injectable 40 milliGRAM(s) IV Push two times a day    Genitourinary Medications    Hematologic/Oncologic Medications  influenza   Vaccine 0.5 milliLiter(s) IntraMuscular once    Antimicrobial/Immunologic Medications    Endocrine/Metabolic Medications    Topical/Other Medications  naloxone Injectable 0.1 milliGRAM(s) IV Push every 3 minutes PRN For ANY of the following changes in patient status:  A. RR LESS THAN 10 breaths per minute, B. Oxygen saturation LESS THAN 90%, C. Sedation score of 6    --------------------------------------------------------------------------------------    VITAL SIGNS:  T(C): 36.8 (10-18-23 @ 05:26), Max: 37.2 (10-17-23 @ 20:00)  HR: 89 (10-18-23 @ 05:26) (68 - 92)  BP: 111/58 (10-18-23 @ 05:26) (101/54 - 135/67)  RR: 18 (10-18-23 @ 05:26) (18 - 18)  SpO2: 100% (10-18-23 @ 05:26) (98% - 100%)  --------------------------------------------------------------------------------------    INS AND OUTS:    10-17-23 @ 07:01  -  10-18-23 @ 07:00  --------------------------------------------------------  IN: 480 mL / OUT: 1555 mL / NET: -1075 mL      --------------------------------------------------------------------------------------      EXAM    General: NAD, resting in bed comfortably.  Cardiac: regular rate, warm and well perfused  Respiratory: Nonlabored respirations, normal cw expansion.  Abdomen: soft, nontender, nondistended, ileostomy with gas and stool, midline incision c/d/i, L JUAQUIN x 1 serous  : jonnie  Extremities: normal strength, FROM, no deformities    --------------------------------------------------------------------------------------    LABS                          8.6    7.28  )-----------( 399      ( 18 Oct 2023 05:43 )             25.7   10-18    141  |  104  |  7   ----------------------------<  100<H>  3.9   |  22  |  0.42<L>    Ca    9.1      18 Oct 2023 05:43  Phos  3.1     10-18  Mg     1.80     10-18

## 2023-10-18 NOTE — PROGRESS NOTE ADULT - SUBJECTIVE AND OBJECTIVE BOX
Subjective: Patient seen and examined. No new events except as noted.     SUBJECTIVE/ROS:  No chest pain, dyspnea, palpitation, or dizziness.       MEDICATIONS:  MEDICATIONS  (STANDING):  acetaminophen   IVPB .. 1000 milliGRAM(s) IV Intermittent every 6 hours  baclofen 5 milliGRAM(s) Oral every 8 hours  dextrose 5% + sodium chloride 0.45% with potassium chloride 20 mEq/L 1000 milliLiter(s) (100 mL/Hr) IV Continuous <Continuous>  influenza   Vaccine 0.5 milliLiter(s) IntraMuscular once  metoclopramide Injectable 10 milliGRAM(s) IV Push every 8 hours  pantoprazole  Injectable 40 milliGRAM(s) IV Push two times a day      PHYSICAL EXAM:  T(C): 36.8 (10-18-23 @ 05:26), Max: 37.2 (10-17-23 @ 20:00)  HR: 89 (10-18-23 @ 05:26) (68 - 92)  BP: 111/58 (10-18-23 @ 05:26) (101/54 - 135/67)  RR: 18 (10-18-23 @ 05:26) (18 - 18)  SpO2: 100% (10-18-23 @ 05:26) (98% - 100%)  Wt(kg): --  I&O's Summary    17 Oct 2023 07:01  -  18 Oct 2023 07:00  --------------------------------------------------------  IN: 480 mL / OUT: 1555 mL / NET: -1075 mL            JVP: Normal  Neck: supple  Lung: clear   CV: S1 S2 , Murmur:  Abd: soft  Ext: No edema  neuro: Awake / alert  Psych: flat affect  Skin: normal``    LABS/DATA:    CARDIAC MARKERS:                                8.6    7.28  )-----------( 399      ( 18 Oct 2023 05:43 )             25.7     10-18    141  |  104  |  7   ----------------------------<  100<H>  3.9   |  22  |  0.42<L>    Ca    9.1      18 Oct 2023 05:43  Phos  3.1     10-18  Mg     1.80     10-18      proBNP:   Lipid Profile:   HgA1c:   TSH:     TELE:  EKG:

## 2023-10-18 NOTE — CONSULT NOTE ADULT - ASSESSMENT
59 yo F w/ PMHx of malignant neoplasm of appendix is s/p appendectomy, cholecystectomy, hysterectomy, omentectomy peritonectomy LAR with diverting loop ileostomy and bladder injury with primary repair on 10/4. SICU consulted for ventilator management and hemodynamic monitoring post-op. Primary surgery was October 4th. 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    Hematology consulted for anemia. Of note, Hgb normal in August. Labs today: WBC 7.2, Hgb 8.6, , nucleated RBC 0.02. Differential includes post-operative bleeding, AoCD, sepsis induced bone marrow suppression. Hgb drop occurred after surgery.    PLAN:  - Please order differential for CBC, retic count, iron studies (ferritin, TIBC, iron)  - Infectious workup per primary team  - Will review peripheral smear  - Will sign off after this consult note. Please feel free to reconsult hematology for any further questions.    ***NOTE INCOMPLETE***    ***************************************************************  Carley Hall, PGY4  Fellow Hematology/Oncology  pager: [phone]   Available on [employer] Teams  After 5pm or on weekends please contact  to page on-call fellow   ***************************************************************   59 yo F w/ PMHx of malignant neoplasm of appendix is s/p appendectomy, cholecystectomy, hysterectomy, omentectomy peritonectomy LAR with diverting loop ileostomy and bladder injury with primary repair on 10/4. SICU consulted for ventilator management and hemodynamic monitoring post-op. Primary surgery was October 4th. 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    Hematology consulted for anemia. Of note, Hgb normal in August. Labs today: WBC 7.2, Hgb 8.6, , nucleated RBC 0.02. Differential includes post-operative bleeding, AoCD, sepsis induced bone marrow suppression. Hgb drop occurred after surgery.     PLAN:  - Please order differential for CBC, retic count, iron studies (ferritin, TIBC, iron)  - Infectious workup per primary team  - Will review peripheral smear  - Will sign off after this consult note. Please feel free to reconsult hematology for any further questions.  - Patient will follow with Dr. Khan at [facility] upon hospital discharge    ***NOTE INCOMPLETE***    ***************************************************************  Carley Hall, PGY4  Fellow Hematology/Oncology  pager: [phone]   Available on Microsoft Teams  After 5pm or on weekends please contact  to page on-call fellow   ***************************************************************   59 yo F w/ PMHx of malignant neoplasm of appendix is s/p appendectomy, cholecystectomy, hysterectomy, omentectomy peritonectomy LAR with diverting loop ileostomy and bladder injury with primary repair on 10/4. SICU consulted for ventilator management and hemodynamic monitoring post-op. Primary surgery was October 4th. 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    Hematology consulted for anemia. Of note, Hgb normal in August. Labs today: WBC 7.2, Hgb 8.6, , nucleated RBC 0.02. Differential includes post-operative bleeding, AoCD, sepsis induced bone marrow suppression. Hgb drop occurred after surgery. Patient had dark stools, likely some element of bleeding. Iron studies c/w AoCD. Low retic index 1.6, hypoproliferative.    PLAN:  - Trend CBC w/ diff daily and reticulocytes  - Would consider giving IV iron if continues to have anemia given patient's likely blood loss  - Infectious workup per primary team  - Peripheral smear: true anemia, ta cells, microspherocytes, no schistocytes, true thrombocytopenia w/ 2-3 PLT pHPF   - Will sign off after this consult note. Please feel free to reconsult hematology for any further questions.  - Patient will follow [facility]. Khan at [facility] upon hospital discharge    ***NOTE INCOMPLETE UNTIL ATTENDING SIGNS***    ***************************************************************  Carley Hall, PGY4  Fellow Hematology/Oncology  pager: [phone]   Available on [employer] Teams  After 5pm or on weekends please contact  to page on-call fellow   ***************************************************************

## 2023-10-18 NOTE — PROGRESS NOTE ADULT - ASSESSMENT
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 Post-op course c/b inability to tolerate PO since surgery 10/4/23 despite functioning loop ileostomy. Hospital course c/b left IJ thrombus and inominate vein acute non-occlusive thrombosis now on therapeutic lovenox. CT A/P IVC (10/12/23) showing no e/o obstruction however PO contrast was not given due to c/f aspiration. Pt reports progressively worsening vomiting with each subsequent cycle of chemo and thinks current vomiting might be related to chemo. On 10/16 pt reports vomiting has improved, able to eat 2/3 of a bowl of soup.  GI consulted for dx endoscopy and candidacy for GJ tube placement.    #malignant neoplasm of appendix (dx 06/2023 s/p FOLFOX x 4 cycles- last dose 10/4) s/p appendectomy, cholecystectomy, hysterectomy, omenectomy, peritenectomy, LAR with diverting loop ileostomy and bladder injury with primary repair on 10/4  #left IJ thrombus and inominate vein acute non-occlusive thrombosis now on therapeutic lovenox  #chemo (FOLFOX) induced vomiting- improving    Recs:  - agree with plan for TPN with encouraged PO intake  - no plan for PEG-J at this time as pt is declining  - c/w IV PPI 40 mg daily and reglan 10 mg IV q8h for now  - currently on PRN Zofran but could try alternate PRN antiemetic such as compazine    We will sign off at this time. Please reconsult/page if questions.       **THIS NOTE IS NOT FINALIZED UNTIL SIGNED BY THE ATTENDING**    Susan Gonzales MD  GI Fellow, PGY-6  Available via [employer] Teams    NON-URGENT CONSULTS:  Please email [email or web address]  AT NIGHT AND ON WEEKENDS:  Contact on-call GI fellow via answering service ([phone]) from 5pm-8am and on weekends/holidays  MONDAY-FRIDAY 8AM-5PM:  Pager# [phone] ([facility]) or [phone] ([facility])

## 2023-10-18 NOTE — CONSULT NOTE ADULT - SUBJECTIVE AND OBJECTIVE BOX
HPI:  58 year old female with pre op dx of malignant neoplasm of appendix is scheduled for cytoreduction and hipec for appendiceal adenocarcinoma. (21 Sep 2023 16:45)      14 point ROS otherwise negative    PAST MEDICAL & SURGICAL HISTORY:  History of sickle cell trait      Malignant neoplasm of appendix      History of ectopic pregnancy      H/O colonoscopy      H/O endoscopy          Allergies    penicillin (Rash)    Intolerances        MEDICATIONS  (STANDING):  acetaminophen   IVPB .. 1000 milliGRAM(s) IV Intermittent every 6 hours  baclofen 5 milliGRAM(s) Oral every 8 hours  dextrose 5% + sodium chloride 0.45% with potassium chloride 20 mEq/L 1000 milliLiter(s) (100 mL/Hr) IV Continuous <Continuous>  influenza   Vaccine 0.5 milliLiter(s) IntraMuscular once  magnesium sulfate  IVPB 2 Gram(s) IV Intermittent once  metoclopramide Injectable 10 milliGRAM(s) IV Push every 8 hours  pantoprazole  Injectable 40 milliGRAM(s) IV Push two times a day    MEDICATIONS  (PRN):  HYDROmorphone  Injectable 0.5 milliGRAM(s) IV Push every 4 hours PRN Severe Pain (7 - 10)  HYDROmorphone  Injectable 0.2 milliGRAM(s) IV Push every 4 hours PRN Moderate Pain (4 - 6)  naloxone Injectable 0.1 milliGRAM(s) IV Push every 3 minutes PRN For ANY of the following changes in patient status:  A. RR LESS THAN 10 breaths per minute, B. Oxygen saturation LESS THAN 90%, C. Sedation score of 6  ondansetron Injectable 4 milliGRAM(s) IV Push every 6 hours PRN Nausea      FAMILY HISTORY:      SOCIAL HISTORY: No EtOH, no tobacco        VITALS:   T(F): 98.2 (10-18-23 @ 08:00), Max: 98.9 (10-17-23 @ 20:00)  HR: 80 (10-18-23 @ 08:00)  BP: 132/63 (10-18-23 @ 08:00)  RR: 16 (10-18-23 @ 08:00)  SpO2: 100% (10-18-23 @ 08:00)  Wt(kg): --    PHYSICAL EXAM    GENERAL: NAD, well-developed  HEAD:  Atraumatic, Normocephalic  EYES: EOMI, PERRLA, conjunctiva and sclera clear  NECK: Supple, No JVD  CHEST/LUNG: Clear to auscultation bilaterally; No wheeze  HEART: Regular rate and rhythm; No murmurs, rubs, or gallops  ABDOMEN: Soft, Nontender, Nondistended; Bowel sounds present  EXTREMITIES:  2+ Peripheral Pulses, No clubbing, cyanosis, or edema  NEUROLOGY: non-focal  SKIN: No rashes or lesions    LABS:                         8.6    7.28  )-----------( 399      ( 18 Oct 2023 05:43 )             25.7     10-18    141  |  104  |  7   ----------------------------<  100<H>  3.9   |  22  |  0.42<L>    Ca    9.1      18 Oct 2023 05:43  Phos  3.1     10-18  Mg     1.80     10-18      Phosphorus: 3.1 mg/dL (10-18 @ 05:43)  Magnesium: 1.80 mg/dL (10-18 @ 05:43)    PT/INR - ( 18 Oct 2023 05:43 )   PT: 14.3 sec;   INR: 1.29 ratio         PTT - ( 18 Oct 2023 05:43 )  PTT:36.2 sec      IMAGING: HPI:  58 year old female with pre op dx of malignant neoplasm of appendix is scheduled for cytoreduction and hipec for appendiceal adenocarcinoma. (21 Sep 2023 16:45)    Oncology History:  Yojana started experiencing weight gain and lower abdominal pelvic pressure in April/May 2023. She went to her GYN who palpated a mass on exam. US Abdomen on 5/24/23 noted fluid within the endometrium, 12.9 cm complex cystic R adnexal mass suspicious for ovarian neoplasm. CEA and CA 19-9 noted to be elevated by AFP, CA  were normal. She was then referred to GYN/ onc. Dr. Sanchez and ultimately to Dr. Dong, surg onc.  Now s/p debulking surgery (10/4/23).     6/20/23: CT CAP large bilateral complex adnexal masses. Extensive soft tissue infiltration of the mesentery and nodularity of the omentum as well as a moderate amount of ascites.    6/30/23: Omental bx: mucinous adenocarcinoma, CK7, CDX2, CK19+, neg for PAX8, TTF1, MMR intact.    7/20/23: Case reviewed at , likely appendiceal primary given abnormal appendix and IHC staining suggesting GI, elevated CEA, normal , plan for neoadjuvant FOLFOX followed by cytoreduction, HIPEC and adjuvant chemo      Referred to medical oncology for systemic therapy.  Has now completed 4 cycles FOLFOX.  7/24/23 - 9/5/23: C1-C4 FOLFOX.      Disease: Appendiceal Cancer     Pathology: mucinous adenocarcinoma    AJCC Stage: IV     Tumor Markers: pre treatment CEA 63 , CEA (9/5/23) 58.9.           14 point ROS otherwise negative    PAST MEDICAL & SURGICAL HISTORY:  History of sickle cell trait      Malignant neoplasm of appendix      History of ectopic pregnancy      H/O colonoscopy      H/O endoscopy          Allergies    penicillin (Rash)    Intolerances        MEDICATIONS  (STANDING):  acetaminophen   IVPB .. 1000 milliGRAM(s) IV Intermittent every 6 hours  baclofen 5 milliGRAM(s) Oral every 8 hours  dextrose 5% + sodium chloride 0.45% with potassium chloride 20 mEq/L 1000 milliLiter(s) (100 mL/Hr) IV Continuous <Continuous>  influenza   Vaccine 0.5 milliLiter(s) IntraMuscular once  magnesium sulfate  IVPB 2 Gram(s) IV Intermittent once  metoclopramide Injectable 10 milliGRAM(s) IV Push every 8 hours  pantoprazole  Injectable 40 milliGRAM(s) IV Push two times a day    MEDICATIONS  (PRN):  HYDROmorphone  Injectable 0.5 milliGRAM(s) IV Push every 4 hours PRN Severe Pain (7 - 10)  HYDROmorphone  Injectable 0.2 milliGRAM(s) IV Push every 4 hours PRN Moderate Pain (4 - 6)  naloxone Injectable 0.1 milliGRAM(s) IV Push every 3 minutes PRN For ANY of the following changes in patient status:  A. RR LESS THAN 10 breaths per minute, B. Oxygen saturation LESS THAN 90%, C. Sedation score of 6  ondansetron Injectable 4 milliGRAM(s) IV Push every 6 hours PRN Nausea      FAMILY HISTORY:      SOCIAL HISTORY: No EtOH, no tobacco        VITALS:   T(F): 98.2 (10-18-23 @ 08:00), Max: 98.9 (10-17-23 @ 20:00)  HR: 80 (10-18-23 @ 08:00)  BP: 132/63 (10-18-23 @ 08:00)  RR: 16 (10-18-23 @ 08:00)  SpO2: 100% (10-18-23 @ 08:00)  Wt(kg): --    PHYSICAL EXAM    GENERAL: NAD, well-developed  HEAD:  Atraumatic, Normocephalic  EYES: EOMI, PERRLA, conjunctiva and sclera clear  NECK: Supple, No JVD  CHEST/LUNG: Clear to auscultation bilaterally; No wheeze  HEART: Regular rate and rhythm; No murmurs, rubs, or gallops  ABDOMEN: Soft, Nontender, Nondistended; Bowel sounds present  EXTREMITIES:  2+ Peripheral Pulses, No clubbing, cyanosis, or edema  NEUROLOGY: non-focal  SKIN: No rashes or lesions    LABS:                         8.6    7.28  )-----------( 399      ( 18 Oct 2023 05:43 )             25.7     10-18    141  |  104  |  7   ----------------------------<  100<H>  3.9   |  22  |  0.42<L>    Ca    9.1      18 Oct 2023 05:43  Phos  3.1     10-18  Mg     1.80     10-18      Phosphorus: 3.1 mg/dL (10-18 @ 05:43)  Magnesium: 1.80 mg/dL (10-18 @ 05:43)    PT/INR - ( 18 Oct 2023 05:43 )   PT: 14.3 sec;   INR: 1.29 ratio         PTT - ( 18 Oct 2023 05:43 )  PTT:36.2 sec      IMAGING: HPI:  58 year old female with pre op dx of malignant neoplasm of appendix is scheduled for cytoreduction and hipec for appendiceal adenocarcinoma. (21 Sep 2023 16:45)    Oncology History:  Yojana started experiencing weight gain and lower abdominal pelvic pressure in April/May 2023. She went to her GYN who palpated a mass on exam. US Abdomen on 5/24/23 noted fluid within the endometrium, 12.9 cm complex cystic R adnexal mass suspicious for ovarian neoplasm. CEA and CA 19-9 noted to be elevated by AFP, CA  were normal. She was then referred to GYN/ onc. Dr. Sanchez and ultimately to Dr. Dong, surg onc.  Now s/p debulking surgery (10/4/23).     6/20/23: CT CAP large bilateral complex adnexal masses. Extensive soft tissue infiltration of the mesentery and nodularity of the omentum as well as a moderate amount of ascites.    6/30/23: Omental bx: mucinous adenocarcinoma, CK7, CDX2, CK19+, neg for PAX8, TTF1, MMR intact.    7/20/23: Case reviewed at , likely appendiceal primary given abnormal appendix and IHC staining suggesting GI, elevated CEA, normal , plan for neoadjuvant FOLFOX followed by cytoreduction, HIPEC and adjuvant chemo      Referred to medical oncology for systemic therapy.  Has now completed 4 cycles FOLFOX.  7/24/23 - 9/5/23: C1-C4 FOLFOX.      Disease: Appendiceal Cancer     Pathology: mucinous adenocarcinoma    AJCC Stage: IV     Tumor Markers: pre treatment CEA 63 , CEA (9/5/23) 58.9.           14 point ROS otherwise negative    PAST MEDICAL & SURGICAL HISTORY:  History of sickle cell trait      Malignant neoplasm of appendix      History of ectopic pregnancy      H/O colonoscopy      H/O endoscopy          Allergies    penicillin (Rash)    Intolerances        MEDICATIONS  (STANDING):  acetaminophen   IVPB .. 1000 milliGRAM(s) IV Intermittent every 6 hours  baclofen 5 milliGRAM(s) Oral every 8 hours  dextrose 5% + sodium chloride 0.45% with potassium chloride 20 mEq/L 1000 milliLiter(s) (100 mL/Hr) IV Continuous <Continuous>  influenza   Vaccine 0.5 milliLiter(s) IntraMuscular once  magnesium sulfate  IVPB 2 Gram(s) IV Intermittent once  metoclopramide Injectable 10 milliGRAM(s) IV Push every 8 hours  pantoprazole  Injectable 40 milliGRAM(s) IV Push two times a day    MEDICATIONS  (PRN):  HYDROmorphone  Injectable 0.5 milliGRAM(s) IV Push every 4 hours PRN Severe Pain (7 - 10)  HYDROmorphone  Injectable 0.2 milliGRAM(s) IV Push every 4 hours PRN Moderate Pain (4 - 6)  naloxone Injectable 0.1 milliGRAM(s) IV Push every 3 minutes PRN For ANY of the following changes in patient status:  A. RR LESS THAN 10 breaths per minute, B. Oxygen saturation LESS THAN 90%, C. Sedation score of 6  ondansetron Injectable 4 milliGRAM(s) IV Push every 6 hours PRN Nausea      FAMILY HISTORY:      SOCIAL HISTORY: No EtOH, no tobacco        VITALS:   T(F): 98.2 (10-18-23 @ 08:00), Max: 98.9 (10-17-23 @ 20:00)  HR: 80 (10-18-23 @ 08:00)  BP: 132/63 (10-18-23 @ 08:00)  RR: 16 (10-18-23 @ 08:00)  SpO2: 100% (10-18-23 @ 08:00)  Wt(kg): --    PHYSICAL EXAM    GENERAL: NAD, well-developed  HEAD:  Atraumatic, Normocephalic  EYES: EOMI, PERRLA, conjunctiva and sclera clear  NECK: Supple, No JVD  CHEST/LUNG: Clear to auscultation bilaterally; No wheeze  HEART: Regular rate and rhythm; No murmurs, rubs, or gallops  ABDOMEN: Soft, tender, Nondistended; Bowel sounds present; ostomy bag in place  EXTREMITIES:  2+ Peripheral Pulses, No clubbing, cyanosis, or edema  NEUROLOGY: non-focal  SKIN: No rashes or lesions    LABS:                         8.6    7.28  )-----------( 399      ( 18 Oct 2023 05:43 )             25.7     10-18    141  |  104  |  7   ----------------------------<  100<H>  3.9   |  22  |  0.42<L>    Ca    9.1      18 Oct 2023 05:43  Phos  3.1     10-18  Mg     1.80     10-18      Phosphorus: 3.1 mg/dL (10-18 @ 05:43)  Magnesium: 1.80 mg/dL (10-18 @ 05:43)    PT/INR - ( 18 Oct 2023 05:43 )   PT: 14.3 sec;   INR: 1.29 ratio         PTT - ( 18 Oct 2023 05:43 )  PTT:36.2 sec      IMAGING:

## 2023-10-18 NOTE — CONSULT NOTE ADULT - NS ATTEST RISK PROBLEM GEN_ALL_CORE FT
Patient has anemia, which carries a risk for dyspnea, hypoxia,, and other life-threatening complications.

## 2023-10-18 NOTE — PROGRESS NOTE ADULT - SUBJECTIVE AND OBJECTIVE BOX
Chief Complaint:  Patient is a 58y old  Female who presents with a chief complaint of Debulking surgery for suspected malignant neoplasm (18 Oct 2023 09:03)      Interval Events: Reports last episode of vomiting was yesterday. Able to tolerate eating watermelon and blueberries in the evening yesterday w/o N/V.  Per discussion with pt, her son, and surg onc team, plan for pt to be started on TPN and to continue to encourage PO.   Pt is declining PEG-J at this time.    Hospital Medications:  acetaminophen     Tablet .. 1000 milliGRAM(s) Oral every 6 hours  baclofen 5 milliGRAM(s) Oral every 8 hours  enoxaparin Injectable 70 milliGRAM(s) SubCutaneous every 12 hours  influenza   Vaccine 0.5 milliLiter(s) IntraMuscular once  metoclopramide Injectable 10 milliGRAM(s) IV Push every 8 hours  naloxone Injectable 0.1 milliGRAM(s) IV Push every 3 minutes PRN  ondansetron Injectable 4 milliGRAM(s) IV Push every 6 hours PRN  oxyCODONE    IR 2.5 milliGRAM(s) Oral every 4 hours PRN  oxyCODONE    IR 5 milliGRAM(s) Oral every 4 hours PRN  pantoprazole  Injectable 40 milliGRAM(s) IV Push two times a day      PMHX/PSHX:  History of sickle cell trait    Malignant neoplasm of appendix    History of ectopic pregnancy    H/O colonoscopy    H/O endoscopy            ROS: 14 point ROS negative unless otherwise stated in subjective      PHYSICAL EXAM:     GENERAL:  Appears stated age, well-groomed, +chronically ill appearing  HEENT:  NC/AT,  conjunctivae clear and pink  CHEST:  Full & symmetric excursion, no increased effort  HEART:  Regular rhythm, S1, S2, no murmur/rub/S3/S4  ABDOMEN:  Soft, non-tender, non-distended; +well healed surgical incision; +ostomy with gas and liquid consistency brown output  EXTREMITIES:  no cyanosis,clubbing or edema  SKIN:  No rash/erythema/ecchymoses/petechiae/wounds/abscess/warm/dry  NEURO:  Alert, orientedx3      Vital Signs:  Vital Signs Last 24 Hrs  T(C): 37 (18 Oct 2023 11:38), Max: 37.2 (17 Oct 2023 20:00)  T(F): 98.6 (18 Oct 2023 11:38), Max: 98.9 (17 Oct 2023 20:00)  HR: 98 (18 Oct 2023 11:38) (80 - 98)  BP: 146/70 (18 Oct 2023 11:38) (101/54 - 146/70)  BP(mean): --  RR: 16 (18 Oct 2023 11:38) (16 - 18)  SpO2: 100% (18 Oct 2023 11:38) (98% - 100%)    Parameters below as of 18 Oct 2023 11:38  Patient On (Oxygen Delivery Method): room air      Daily     Daily     LABS:                        8.6    7.28  )-----------( 399      ( 18 Oct 2023 05:43 )             25.7     10-18    141  |  104  |  7   ----------------------------<  100<H>  3.9   |  22  |  0.42<L>    Ca    9.1      18 Oct 2023 05:43  Phos  3.1     10-18  Mg     1.80     10-18        PT/INR - ( 18 Oct 2023 05:43 )   PT: 14.3 sec;   INR: 1.29 ratio         PTT - ( 18 Oct 2023 05:43 )  PTT:36.2 sec  Urinalysis Basic - ( 18 Oct 2023 05:43 )    Color: x / Appearance: x / SG: x / pH: x  Gluc: 100 mg/dL / Ketone: x  / Bili: x / Urobili: x   Blood: x / Protein: x / Nitrite: x   Leuk Esterase: x / RBC: x / WBC x   Sq Epi: x / Non Sq Epi: x / Bacteria: x          Imaging: No new abdominal imaging

## 2023-10-18 NOTE — PROGRESS NOTE ADULT - ASSESSMENT
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 found to have LIJ and inominate vein acute non-occlusive thrombosis now on T lovenox.    PLAN:  - GJ placement with GI today  - Hem consult for anemia  - Diet: regular, self regulate, document % intake per meal, NPO after midnight  - Keep cristina until tomorrow 10/19, CT urogram prior to dc  - Reglan 10mg q8 hours  - Protonix BID  - Monitor JUAQUIN outpt  - FS premeal with ISS  - VTE ppx: T Lovenox for IJ and inominate thrombus  - PT/OT consult appreciated, PM&R consult for possible acute rehab disposition  - Acute rehab criteria: Hgb >8, oral pain management only, no chemotherapy during rehab admission  - Appreciate GI recommendations    D Team Surgery  [id number]

## 2023-10-19 LAB
ANION GAP SERPL CALC-SCNC: 15 MMOL/L — HIGH (ref 7–14)
ANION GAP SERPL CALC-SCNC: 15 MMOL/L — HIGH (ref 7–14)
BUN SERPL-MCNC: 9 MG/DL — SIGNIFICANT CHANGE UP (ref 7–23)
BUN SERPL-MCNC: 9 MG/DL — SIGNIFICANT CHANGE UP (ref 7–23)
CALCIUM SERPL-MCNC: 8.8 MG/DL — SIGNIFICANT CHANGE UP (ref 8.4–10.5)
CALCIUM SERPL-MCNC: 8.8 MG/DL — SIGNIFICANT CHANGE UP (ref 8.4–10.5)
CHLORIDE SERPL-SCNC: 102 MMOL/L — SIGNIFICANT CHANGE UP (ref 98–107)
CHLORIDE SERPL-SCNC: 102 MMOL/L — SIGNIFICANT CHANGE UP (ref 98–107)
CO2 SERPL-SCNC: 22 MMOL/L — SIGNIFICANT CHANGE UP (ref 22–31)
CO2 SERPL-SCNC: 22 MMOL/L — SIGNIFICANT CHANGE UP (ref 22–31)
CREAT SERPL-MCNC: 0.45 MG/DL — LOW (ref 0.5–1.3)
CREAT SERPL-MCNC: 0.45 MG/DL — LOW (ref 0.5–1.3)
EGFR: 111 ML/MIN/1.73M2 — SIGNIFICANT CHANGE UP
EGFR: 111 ML/MIN/1.73M2 — SIGNIFICANT CHANGE UP
GLUCOSE SERPL-MCNC: 95 MG/DL — SIGNIFICANT CHANGE UP (ref 70–99)
GLUCOSE SERPL-MCNC: 95 MG/DL — SIGNIFICANT CHANGE UP (ref 70–99)
HCT VFR BLD CALC: 23.9 % — LOW (ref 34.5–45)
HCT VFR BLD CALC: 23.9 % — LOW (ref 34.5–45)
HCT VFR BLD CALC: 25.7 % — LOW (ref 34.5–45)
HCT VFR BLD CALC: 25.7 % — LOW (ref 34.5–45)
HGB BLD-MCNC: 7.8 G/DL — LOW (ref 11.5–15.5)
HGB BLD-MCNC: 7.8 G/DL — LOW (ref 11.5–15.5)
HGB BLD-MCNC: 8.4 G/DL — LOW (ref 11.5–15.5)
HGB BLD-MCNC: 8.4 G/DL — LOW (ref 11.5–15.5)
MAGNESIUM SERPL-MCNC: 2.2 MG/DL — SIGNIFICANT CHANGE UP (ref 1.6–2.6)
MAGNESIUM SERPL-MCNC: 2.2 MG/DL — SIGNIFICANT CHANGE UP (ref 1.6–2.6)
MCHC RBC-ENTMCNC: 27.9 PG — SIGNIFICANT CHANGE UP (ref 27–34)
MCHC RBC-ENTMCNC: 27.9 PG — SIGNIFICANT CHANGE UP (ref 27–34)
MCHC RBC-ENTMCNC: 28.3 PG — SIGNIFICANT CHANGE UP (ref 27–34)
MCHC RBC-ENTMCNC: 28.3 PG — SIGNIFICANT CHANGE UP (ref 27–34)
MCHC RBC-ENTMCNC: 32.6 GM/DL — SIGNIFICANT CHANGE UP (ref 32–36)
MCHC RBC-ENTMCNC: 32.6 GM/DL — SIGNIFICANT CHANGE UP (ref 32–36)
MCHC RBC-ENTMCNC: 32.7 GM/DL — SIGNIFICANT CHANGE UP (ref 32–36)
MCHC RBC-ENTMCNC: 32.7 GM/DL — SIGNIFICANT CHANGE UP (ref 32–36)
MCV RBC AUTO: 85.4 FL — SIGNIFICANT CHANGE UP (ref 80–100)
MCV RBC AUTO: 85.4 FL — SIGNIFICANT CHANGE UP (ref 80–100)
MCV RBC AUTO: 86.5 FL — SIGNIFICANT CHANGE UP (ref 80–100)
MCV RBC AUTO: 86.5 FL — SIGNIFICANT CHANGE UP (ref 80–100)
NRBC # BLD: 0 /100 WBCS — SIGNIFICANT CHANGE UP (ref 0–0)
NRBC # FLD: 0 K/UL — SIGNIFICANT CHANGE UP (ref 0–0)
NRBC # FLD: 0 K/UL — SIGNIFICANT CHANGE UP (ref 0–0)
NRBC # FLD: 0.03 K/UL — HIGH (ref 0–0)
NRBC # FLD: 0.03 K/UL — HIGH (ref 0–0)
PHOSPHATE SERPL-MCNC: 3.3 MG/DL — SIGNIFICANT CHANGE UP (ref 2.5–4.5)
PHOSPHATE SERPL-MCNC: 3.3 MG/DL — SIGNIFICANT CHANGE UP (ref 2.5–4.5)
PLATELET # BLD AUTO: 395 K/UL — SIGNIFICANT CHANGE UP (ref 150–400)
PLATELET # BLD AUTO: 395 K/UL — SIGNIFICANT CHANGE UP (ref 150–400)
PLATELET # BLD AUTO: 397 K/UL — SIGNIFICANT CHANGE UP (ref 150–400)
PLATELET # BLD AUTO: 397 K/UL — SIGNIFICANT CHANGE UP (ref 150–400)
POTASSIUM SERPL-MCNC: 3.9 MMOL/L — SIGNIFICANT CHANGE UP (ref 3.5–5.3)
POTASSIUM SERPL-MCNC: 3.9 MMOL/L — SIGNIFICANT CHANGE UP (ref 3.5–5.3)
POTASSIUM SERPL-SCNC: 3.9 MMOL/L — SIGNIFICANT CHANGE UP (ref 3.5–5.3)
POTASSIUM SERPL-SCNC: 3.9 MMOL/L — SIGNIFICANT CHANGE UP (ref 3.5–5.3)
RBC # BLD: 2.8 M/UL — LOW (ref 3.8–5.2)
RBC # BLD: 2.8 M/UL — LOW (ref 3.8–5.2)
RBC # BLD: 2.97 M/UL — LOW (ref 3.8–5.2)
RBC # BLD: 2.97 M/UL — LOW (ref 3.8–5.2)
RBC # FLD: 21.3 % — HIGH (ref 10.3–14.5)
RBC # FLD: 21.3 % — HIGH (ref 10.3–14.5)
RBC # FLD: 21.4 % — HIGH (ref 10.3–14.5)
RBC # FLD: 21.4 % — HIGH (ref 10.3–14.5)
SODIUM SERPL-SCNC: 139 MMOL/L — SIGNIFICANT CHANGE UP (ref 135–145)
SODIUM SERPL-SCNC: 139 MMOL/L — SIGNIFICANT CHANGE UP (ref 135–145)
WBC # BLD: 6.43 K/UL — SIGNIFICANT CHANGE UP (ref 3.8–10.5)
WBC # BLD: 6.43 K/UL — SIGNIFICANT CHANGE UP (ref 3.8–10.5)
WBC # BLD: 7.42 K/UL — SIGNIFICANT CHANGE UP (ref 3.8–10.5)
WBC # BLD: 7.42 K/UL — SIGNIFICANT CHANGE UP (ref 3.8–10.5)
WBC # FLD AUTO: 6.43 K/UL — SIGNIFICANT CHANGE UP (ref 3.8–10.5)
WBC # FLD AUTO: 6.43 K/UL — SIGNIFICANT CHANGE UP (ref 3.8–10.5)
WBC # FLD AUTO: 7.42 K/UL — SIGNIFICANT CHANGE UP (ref 3.8–10.5)
WBC # FLD AUTO: 7.42 K/UL — SIGNIFICANT CHANGE UP (ref 3.8–10.5)

## 2023-10-19 PROCEDURE — 93010 ELECTROCARDIOGRAM REPORT: CPT

## 2023-10-19 PROCEDURE — 99222 1ST HOSP IP/OBS MODERATE 55: CPT

## 2023-10-19 RX ORDER — PROCHLORPERAZINE MALEATE 5 MG
10 TABLET ORAL EVERY 8 HOURS
Refills: 0 | Status: DISCONTINUED | OUTPATIENT
Start: 2023-10-19 | End: 2023-10-20

## 2023-10-19 RX ORDER — ACETAMINOPHEN 500 MG
1000 TABLET ORAL EVERY 6 HOURS
Refills: 0 | Status: COMPLETED | OUTPATIENT
Start: 2023-10-19 | End: 2023-10-20

## 2023-10-19 RX ORDER — HYDROMORPHONE HYDROCHLORIDE 2 MG/ML
0.2 INJECTION INTRAMUSCULAR; INTRAVENOUS; SUBCUTANEOUS EVERY 4 HOURS
Refills: 0 | Status: DISCONTINUED | OUTPATIENT
Start: 2023-10-19 | End: 2023-10-19

## 2023-10-19 RX ORDER — HYDROMORPHONE HYDROCHLORIDE 2 MG/ML
0.5 INJECTION INTRAMUSCULAR; INTRAVENOUS; SUBCUTANEOUS EVERY 4 HOURS
Refills: 0 | Status: DISCONTINUED | OUTPATIENT
Start: 2023-10-19 | End: 2023-10-19

## 2023-10-19 RX ORDER — HYDROMORPHONE HYDROCHLORIDE 2 MG/ML
0.5 INJECTION INTRAMUSCULAR; INTRAVENOUS; SUBCUTANEOUS EVERY 6 HOURS
Refills: 0 | Status: DISCONTINUED | OUTPATIENT
Start: 2023-10-19 | End: 2023-10-26

## 2023-10-19 RX ORDER — SCOPALAMINE 1 MG/3D
1 PATCH, EXTENDED RELEASE TRANSDERMAL
Refills: 0 | Status: DISCONTINUED | OUTPATIENT
Start: 2023-10-19 | End: 2023-11-08

## 2023-10-19 RX ADMIN — Medication 10 MILLIGRAM(S): at 13:43

## 2023-10-19 RX ADMIN — ENOXAPARIN SODIUM 70 MILLIGRAM(S): 100 INJECTION SUBCUTANEOUS at 17:27

## 2023-10-19 RX ADMIN — PANTOPRAZOLE SODIUM 40 MILLIGRAM(S): 20 TABLET, DELAYED RELEASE ORAL at 05:09

## 2023-10-19 RX ADMIN — HYDROMORPHONE HYDROCHLORIDE 0.5 MILLIGRAM(S): 2 INJECTION INTRAMUSCULAR; INTRAVENOUS; SUBCUTANEOUS at 18:37

## 2023-10-19 RX ADMIN — HYDROMORPHONE HYDROCHLORIDE 0.2 MILLIGRAM(S): 2 INJECTION INTRAMUSCULAR; INTRAVENOUS; SUBCUTANEOUS at 09:00

## 2023-10-19 RX ADMIN — Medication 400 MILLIGRAM(S): at 17:27

## 2023-10-19 RX ADMIN — Medication 400 MILLIGRAM(S): at 06:08

## 2023-10-19 RX ADMIN — HYDROMORPHONE HYDROCHLORIDE 0.5 MILLIGRAM(S): 2 INJECTION INTRAMUSCULAR; INTRAVENOUS; SUBCUTANEOUS at 13:47

## 2023-10-19 RX ADMIN — PANTOPRAZOLE SODIUM 40 MILLIGRAM(S): 20 TABLET, DELAYED RELEASE ORAL at 18:37

## 2023-10-19 RX ADMIN — Medication 400 MILLIGRAM(S): at 12:47

## 2023-10-19 RX ADMIN — HYDROMORPHONE HYDROCHLORIDE 0.2 MILLIGRAM(S): 2 INJECTION INTRAMUSCULAR; INTRAVENOUS; SUBCUTANEOUS at 07:59

## 2023-10-19 RX ADMIN — SCOPALAMINE 1 PATCH: 1 PATCH, EXTENDED RELEASE TRANSDERMAL at 21:17

## 2023-10-19 RX ADMIN — Medication 10 MILLIGRAM(S): at 05:08

## 2023-10-19 NOTE — CONSULT NOTE ADULT - SUBJECTIVE AND OBJECTIVE BOX
Nutrition Support Consult Note    HPI:  58F w/ PMHx of malignant neoplasm of appendix is s/p appendectomy, cholecystectomy, hysterectomy, omenectomy, peritenectomy, LAR with diverting loop ileostomy and bladder injury with primary repair on 10/4/23. Nutrition support consult called for evaluation for parenteral nutrition. Patient reports no nausea but frequent vomiting. Pt's son at bedside stated that when his mom eats some food she tends to vomit soon after. Per GI, no plan for PEG-J at this time as pt is declining. At this time, pt is resting comfortably in bed, denies any chest pain or shortness of breath.    ROS: Except as noted above, all other systems reviewed and are negative     Allergies  penicillin (Rash)    PAST MEDICAL & SURGICAL HISTORY:  History of sickle cell trait  Malignant neoplasm of appendix  History of ectopic pregnancy  H/O colonoscopy  H/O endoscopy    Vital Signs Last 24 Hrs  T(C): 36.2 (19 Oct 2023 08:45), Max: 37.3 (18 Oct 2023 16:30)  T(F): 97.2 (19 Oct 2023 08:45), Max: 99.2 (18 Oct 2023 16:30)  HR: 82 (19 Oct 2023 08:45) (82 - 99)  BP: 112/59 (19 Oct 2023 08:45) (112/59 - 138/75)  RR: 18 (19 Oct 2023 08:45) (16 - 18)  SpO2: 100% (19 Oct 2023 08:45) (99% - 100%)    MEDICATIONS  (STANDING):  acetaminophen   IVPB .. 1000 milliGRAM(s) IV Intermittent every 6 hours  baclofen 5 milliGRAM(s) Oral every 8 hours  enoxaparin Injectable 70 milliGRAM(s) SubCutaneous every 12 hours  influenza   Vaccine 0.5 milliLiter(s) IntraMuscular once  pantoprazole  Injectable 40 milliGRAM(s) IV Push two times a day    MEDICATIONS  (PRN):  HYDROmorphone  Injectable 0.5 milliGRAM(s) IV Push every 4 hours PRN Severe Pain (7 - 10)  HYDROmorphone  Injectable 0.2 milliGRAM(s) IV Push every 4 hours PRN Moderate Pain (4 - 6)  naloxone Injectable 0.1 milliGRAM(s) IV Push every 3 minutes PRN For ANY of the following changes in patient status:  A. RR LESS THAN 10 breaths per minute, B. Oxygen saturation LESS THAN 90%, C. Sedation score of 6  ondansetron Injectable 4 milliGRAM(s) IV Push every 6 hours PRN Nausea  prochlorperazine   Tablet 10 milliGRAM(s) Oral every 8 hours PRN nausea    I&O's Detail    18 Oct 2023 07:01  -  19 Oct 2023 07:00  --------------------------------------------------------  IN:    dextrose 5% + sodium chloride 0.45% w/ Additives: 100 mL    Oral Fluid: 250 mL  Total IN: 350 mL    OUT:    Bulb (mL): 4.5 mL    Ileostomy (mL): 350 mL    Indwelling Catheter - Urethral (mL): 725 mL    Voided (mL): 500 mL  Total OUT: 1579.5 mL    Total NET: -1229.5 mL    PHYSICAL EXAM:  General: NAD, resting in bed comfortably.  Cardiac: regular rate, warm and well perfused  Respiratory: Nonlabored respirations, normal cw expansion.  Abdomen: soft, nontender, nondistended  Extremities: normal strength, FROM, no deformities    LABS:                        8.4    6.43  )-----------( 395      ( 19 Oct 2023 05:40 )             25.7     10-19    139  |  102  |  9   ----------------------------<  95  3.9   |  22  |  0.45<L>    Ca    8.8      19 Oct 2023 05:40  Phos  3.3     10-19  Mg     2.20     10-19    PT/INR - ( 18 Oct 2023 05:43 )   PT: 14.3 sec;   INR: 1.29 ratio      PTT - ( 18 Oct 2023 05:43 )  PTT:36.2 sec    Current Weight: 68.9 kG  Height: 154.9 cm    Diet, Regular:   Low Fat (LOWFAT)  No Carbonated Beverages  Supplement Feeding Modality:  Oral  Ensure Enlive Cans or Servings Per Day:  1       Frequency:  Three Times a day (10-18-23 @ 10:27) [Active]    Plan:  Parenteral nutrition is not indicated at this time since pt has a functional GI tract. Recommend bariatric diet with smaller meals 6-8 times/day.    please reconsult if needed    Nutrition Support [id number]

## 2023-10-19 NOTE — PROGRESS NOTE ADULT - ASSESSMENT
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 found to have LIJ and inominate vein acute non-occlusive thrombosis now on T lovenox.    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 found to have LIJ and inominate vein acute non-occlusive thrombosis now on T lovenox.    PLAN:    Nutrition:  - poor PO intake, Diet: regular, self regulate, document % intake per meal.   - possible PICC/TPN if oral intake poor.  - reglan 10mg q8 hrs  - add compazine PRN (close qtc monitoring 10/19, 487)  - Protonix BID  - Appreciate GI recommendations, patient declining PEG-J     Anemia:  - Hem consult for anemia: possible iron infusion, Oncology to follow.  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 found to have LIJ and inominate vein acute non-occlusive thrombosis now on T lovenox.    PLAN:    Nutrition:  - poor PO intake, Diet: regular, self regulate, document % intake per meal.   - possible PICC/TPN if oral intake poor.  - reglan 10mg q8 hrs  - add compazine PRN (close qtc monitoring 10/19, 487)  - Protonix BID  - Appreciate GI recommendations, patient declining PEG-J     Anemia:  - Hem consult for anemia: possible iron infusion, Oncology to follow.  - Continue daily CBC w/ differential.    Post Operative Care:  - CT urogram w/o leak, cristina dc'd 10/18 passed TOV  - Monitor JUAQUIN outpt  - FS premeal with ISS  - VTE ppx: T Lovenox for IJ and inominate thrombus  - OOB/ambulate as tolerated      Disposition:  - PT/OT consult appreciated, PM&R consult for possible acute rehab disposition  - Acute rehab criteria: Hgb >8, oral pain management only, no chemotherapy during rehab admission    D Team Surgery  [id number]

## 2023-10-19 NOTE — PROGRESS NOTE ADULT - SUBJECTIVE AND OBJECTIVE BOX
TEAM [ ** ] Surgery Daily Progress Note  =====================================================    SUBJECTIVE: Patient seen and examined at bedside on AM rounds. Patient reports that they're feeling well. NPO/Tolerating diet, denies nausea, vomiting.    Flatus/    BM. OOB/Amublating as tolerated. Denies fever, chills.    PMH:   ***    ALLERGIES:  penicillin (Rash)      --------------------------------------------------------------------------------------    MEDICATIONS:    Neurologic Medications  acetaminophen   IVPB .. 1000 milliGRAM(s) IV Intermittent every 6 hours  baclofen 5 milliGRAM(s) Oral every 8 hours  HYDROmorphone  Injectable 0.2 milliGRAM(s) IV Push every 4 hours PRN Severe Pain (7 - 10)  metoclopramide Injectable 10 milliGRAM(s) IV Push every 8 hours  ondansetron Injectable 4 milliGRAM(s) IV Push every 6 hours PRN Nausea  oxyCODONE    IR 2.5 milliGRAM(s) Oral every 4 hours PRN Moderate Pain (4 - 6)  oxyCODONE    IR 5 milliGRAM(s) Oral every 4 hours PRN Severe Pain (7 - 10)    Respiratory Medications    Cardiovascular Medications    Gastrointestinal Medications  pantoprazole  Injectable 40 milliGRAM(s) IV Push two times a day    Genitourinary Medications    Hematologic/Oncologic Medications  enoxaparin Injectable 70 milliGRAM(s) SubCutaneous every 12 hours  influenza   Vaccine 0.5 milliLiter(s) IntraMuscular once    Antimicrobial/Immunologic Medications    Endocrine/Metabolic Medications    Topical/Other Medications  naloxone Injectable 0.1 milliGRAM(s) IV Push every 3 minutes PRN For ANY of the following changes in patient status:  A. RR LESS THAN 10 breaths per minute, B. Oxygen saturation LESS THAN 90%, C. Sedation score of 6    --------------------------------------------------------------------------------------    VITAL SIGNS:  T(C): 36.5 (10-19-23 @ 04:52), Max: 37.3 (10-18-23 @ 16:30)  HR: 96 (10-19-23 @ 04:52) (80 - 99)  BP: 121/63 (10-19-23 @ 04:52) (121/63 - 146/70)  RR: 18 (10-19-23 @ 04:52) (16 - 18)  SpO2: 100% (10-19-23 @ 04:52) (99% - 100%)  --------------------------------------------------------------------------------------    EXAM    General: NAD, resting in bed comfortably.  Cardiac: regular rate, warm and well perfused  Respiratory: Nonlabored respirations, normal cw expansion.  Abdomen: soft, nontender, nondistended. ___ incision is c/d/i, ostomy, NGADAM, jonnie.   Extremities: normal strength, FROM, no deformities    --------------------------------------------------------------------------------------    LABS    --------------------------------------------------------------------------------------    INS AND OUTS:    10-18-23 @ 07:01  -  10-19-23 @ 07:00  --------------------------------------------------------  IN: 350 mL / OUT: 1579.5 mL / NET: -1229.5 mL      -------------------------------------------------------------------------------------- TEAM [ D ] Surgery Daily Progress Note  =====================================================    SUBJECTIVE: Patient seen and examined at bedside on AM rounds. Patient reports that they're feeling well. Patient states that she is not nauseas but she vomits frequently with talking, sitting up, sipping on water, eating.     ALLERGIES:  penicillin (Rash)      --------------------------------------------------------------------------------------    MEDICATIONS:    Neurologic Medications  acetaminophen   IVPB .. 1000 milliGRAM(s) IV Intermittent every 6 hours  baclofen 5 milliGRAM(s) Oral every 8 hours  HYDROmorphone  Injectable 0.2 milliGRAM(s) IV Push every 4 hours PRN Severe Pain (7 - 10)  metoclopramide Injectable 10 milliGRAM(s) IV Push every 8 hours  ondansetron Injectable 4 milliGRAM(s) IV Push every 6 hours PRN Nausea  oxyCODONE    IR 2.5 milliGRAM(s) Oral every 4 hours PRN Moderate Pain (4 - 6)  oxyCODONE    IR 5 milliGRAM(s) Oral every 4 hours PRN Severe Pain (7 - 10)    Gastrointestinal Medications  pantoprazole  Injectable 40 milliGRAM(s) IV Push two times a day    Hematologic/Oncologic Medications  enoxaparin Injectable 70 milliGRAM(s) SubCutaneous every 12 hours  influenza   Vaccine 0.5 milliLiter(s) IntraMuscular once    Topical/Other Medications  naloxone Injectable 0.1 milliGRAM(s) IV Push every 3 minutes PRN For ANY of the following changes in patient status:  A. RR LESS THAN 10 breaths per minute, B. Oxygen saturation LESS THAN 90%, C. Sedation score of 6    --------------------------------------------------------------------------------------    VITAL SIGNS:  T(C): 36.5 (10-19-23 @ 04:52), Max: 37.3 (10-18-23 @ 16:30)  HR: 96 (10-19-23 @ 04:52) (80 - 99)  BP: 121/63 (10-19-23 @ 04:52) (121/63 - 146/70)  RR: 18 (10-19-23 @ 04:52) (16 - 18)  SpO2: 100% (10-19-23 @ 04:52) (99% - 100%)  --------------------------------------------------------------------------------------    EXAM    General: NAD, resting in bed comfortably.  Cardiac: regular rate, warm and well perfused  Respiratory: Nonlabored respirations, normal cw expansion.  Abdomen: soft, nontender, nondistended.   Extremities: normal strength, FROM, no deformities    --------------------------------------------------------------------------------------    LABS                        8.6    7.28  )-----------( 399      ( 18 Oct 2023 05:43 )             25.7       10-18    141  |  104  |  7   ----------------------------<  100<H>  3.9   |  22  |  0.42<L>    Ca    9.1      18 Oct 2023 05:43  Phos  3.1     10-18  Mg     1.80     10-18        --------------------------------------------------------------------------------------    INS AND OUTS:    10-18-23 @ 07:01  -  10-19-23 @ 07:00  --------------------------------------------------------  IN: 350 mL / OUT: 1579.5 mL / NET: -1229.5 mL      -------------------------------------------------------------------------------------- TEAM [ D ] Surgery Daily Progress Note  =====================================================    SUBJECTIVE: Patient seen and examined at bedside on AM rounds. Patient reports that they're feeling well. Patient states that she is not nauseas but she vomits frequently with talking, sitting up, sipping on water, eating.     ALLERGIES:  penicillin (Rash)      --------------------------------------------------------------------------------------    MEDICATIONS:    Neurologic Medications  acetaminophen   IVPB .. 1000 milliGRAM(s) IV Intermittent every 6 hours  baclofen 5 milliGRAM(s) Oral every 8 hours  HYDROmorphone  Injectable 0.2 milliGRAM(s) IV Push every 4 hours PRN Severe Pain (7 - 10)  metoclopramide Injectable 10 milliGRAM(s) IV Push every 8 hours  ondansetron Injectable 4 milliGRAM(s) IV Push every 6 hours PRN Nausea  oxyCODONE    IR 2.5 milliGRAM(s) Oral every 4 hours PRN Moderate Pain (4 - 6)  oxyCODONE    IR 5 milliGRAM(s) Oral every 4 hours PRN Severe Pain (7 - 10)    Gastrointestinal Medications  pantoprazole  Injectable 40 milliGRAM(s) IV Push two times a day    Hematologic/Oncologic Medications  enoxaparin Injectable 70 milliGRAM(s) SubCutaneous every 12 hours  influenza   Vaccine 0.5 milliLiter(s) IntraMuscular once    Topical/Other Medications  naloxone Injectable 0.1 milliGRAM(s) IV Push every 3 minutes PRN For ANY of the following changes in patient status:  A. RR LESS THAN 10 breaths per minute, B. Oxygen saturation LESS THAN 90%, C. Sedation score of 6    --------------------------------------------------------------------------------------    VITAL SIGNS:  T(C): 36.5 (10-19-23 @ 04:52), Max: 37.3 (10-18-23 @ 16:30)  HR: 96 (10-19-23 @ 04:52) (80 - 99)  BP: 121/63 (10-19-23 @ 04:52) (121/63 - 146/70)  RR: 18 (10-19-23 @ 04:52) (16 - 18)  SpO2: 100% (10-19-23 @ 04:52) (99% - 100%)  --------------------------------------------------------------------------------------    EXAM    General: NAD, resting in bed comfortably.  Cardiac: regular rate, warm and well perfused  Respiratory: Nonlabored respirations, normal cw expansion.  Abdomen: soft, nontender, nondistended, ileostomy +/+, simón drain x1 s/s output.  Extremities: normal strength, FROM, no deformities    --------------------------------------------------------------------------------------    LABS                        8.6    7.28  )-----------( 399      ( 18 Oct 2023 05:43 )             25.7       10-18    141  |  104  |  7   ----------------------------<  100<H>  3.9   |  22  |  0.42<L>    Ca    9.1      18 Oct 2023 05:43  Phos  3.1     10-18  Mg     1.80     10-18        --------------------------------------------------------------------------------------    INS AND OUTS:    10-18-23 @ 07:01  -  10-19-23 @ 07:00  --------------------------------------------------------  IN: 350 mL / OUT: 1579.5 mL / NET: -1229.5 mL      --------------------------------------------------------------------------------------

## 2023-10-19 NOTE — PROGRESS NOTE ADULT - SUBJECTIVE AND OBJECTIVE BOX
Subjective: Patient seen and examined. No new events except as noted.     SUBJECTIVE/ROS:  nad      MEDICATIONS:  MEDICATIONS  (STANDING):  acetaminophen   IVPB .. 1000 milliGRAM(s) IV Intermittent every 6 hours  baclofen 5 milliGRAM(s) Oral every 8 hours  enoxaparin Injectable 70 milliGRAM(s) SubCutaneous every 12 hours  influenza   Vaccine 0.5 milliLiter(s) IntraMuscular once  metoclopramide Injectable 10 milliGRAM(s) IV Push every 8 hours  pantoprazole  Injectable 40 milliGRAM(s) IV Push two times a day      PHYSICAL EXAM:  T(C): 36.5 (10-19-23 @ 04:52), Max: 37.3 (10-18-23 @ 16:30)  HR: 96 (10-19-23 @ 04:52) (80 - 99)  BP: 121/63 (10-19-23 @ 04:52) (121/63 - 146/70)  RR: 18 (10-19-23 @ 04:52) (16 - 18)  SpO2: 100% (10-19-23 @ 04:52) (99% - 100%)  Wt(kg): --  I&O's Summary    18 Oct 2023 07:01  -  19 Oct 2023 07:00  --------------------------------------------------------  IN: 350 mL / OUT: 1579.5 mL / NET: -1229.5 mL            JVP: Normal  Neck: supple  Lung: clear   CV: S1 S2 , Murmur:  Abd: soft  Ext: No edema  neuro: Awake / alert  Psych: flat affect  Skin: normal``    LABS/DATA:    CARDIAC MARKERS:                                8.6    7.28  )-----------( 399      ( 18 Oct 2023 05:43 )             25.7     10-18    141  |  104  |  7   ----------------------------<  100<H>  3.9   |  22  |  0.42<L>    Ca    9.1      18 Oct 2023 05:43  Phos  3.1     10-18  Mg     1.80     10-18      proBNP:   Lipid Profile:   HgA1c:   TSH: Thyroid Stimulating Hormone, Serum: 1.18 uIU/mL (10-18 @ 10:09)      TELE:  EKG:

## 2023-10-20 LAB
ALBUMIN SERPL ELPH-MCNC: 3.2 G/DL — LOW (ref 3.3–5)
ALBUMIN SERPL ELPH-MCNC: 3.2 G/DL — LOW (ref 3.3–5)
ALBUMIN SERPL ELPH-MCNC: 3.4 G/DL — SIGNIFICANT CHANGE UP (ref 3.3–5)
ALBUMIN SERPL ELPH-MCNC: 3.4 G/DL — SIGNIFICANT CHANGE UP (ref 3.3–5)
ALP SERPL-CCNC: 253 U/L — HIGH (ref 40–120)
ALP SERPL-CCNC: 253 U/L — HIGH (ref 40–120)
ALP SERPL-CCNC: 291 U/L — HIGH (ref 40–120)
ALP SERPL-CCNC: 291 U/L — HIGH (ref 40–120)
ALT FLD-CCNC: 809 U/L — HIGH (ref 4–33)
ALT FLD-CCNC: 809 U/L — HIGH (ref 4–33)
ALT FLD-CCNC: 828 U/L — HIGH (ref 4–33)
ALT FLD-CCNC: 828 U/L — HIGH (ref 4–33)
ANION GAP SERPL CALC-SCNC: 15 MMOL/L — HIGH (ref 7–14)
ANION GAP SERPL CALC-SCNC: 15 MMOL/L — HIGH (ref 7–14)
ANION GAP SERPL CALC-SCNC: 17 MMOL/L — HIGH (ref 7–14)
ANION GAP SERPL CALC-SCNC: 17 MMOL/L — HIGH (ref 7–14)
APTT BLD: 33.8 SEC — SIGNIFICANT CHANGE UP (ref 24.5–35.6)
APTT BLD: 33.8 SEC — SIGNIFICANT CHANGE UP (ref 24.5–35.6)
AST SERPL-CCNC: 1800 U/L — HIGH (ref 4–32)
AST SERPL-CCNC: 1800 U/L — HIGH (ref 4–32)
AST SERPL-CCNC: 2540 U/L — HIGH (ref 4–32)
AST SERPL-CCNC: 2540 U/L — HIGH (ref 4–32)
BASOPHILS # BLD AUTO: 0.01 K/UL — SIGNIFICANT CHANGE UP (ref 0–0.2)
BASOPHILS # BLD AUTO: 0.01 K/UL — SIGNIFICANT CHANGE UP (ref 0–0.2)
BASOPHILS NFR BLD AUTO: 0.1 % — SIGNIFICANT CHANGE UP (ref 0–2)
BASOPHILS NFR BLD AUTO: 0.1 % — SIGNIFICANT CHANGE UP (ref 0–2)
BILIRUB DIRECT SERPL-MCNC: 0.2 MG/DL — SIGNIFICANT CHANGE UP (ref 0–0.3)
BILIRUB DIRECT SERPL-MCNC: 0.2 MG/DL — SIGNIFICANT CHANGE UP (ref 0–0.3)
BILIRUB INDIRECT FLD-MCNC: 0.3 MG/DL — SIGNIFICANT CHANGE UP (ref 0–1)
BILIRUB INDIRECT FLD-MCNC: 0.3 MG/DL — SIGNIFICANT CHANGE UP (ref 0–1)
BILIRUB SERPL-MCNC: 0.5 MG/DL — SIGNIFICANT CHANGE UP (ref 0.2–1.2)
BILIRUB SERPL-MCNC: 0.6 MG/DL — SIGNIFICANT CHANGE UP (ref 0.2–1.2)
BILIRUB SERPL-MCNC: 0.6 MG/DL — SIGNIFICANT CHANGE UP (ref 0.2–1.2)
BLD GP AB SCN SERPL QL: NEGATIVE — SIGNIFICANT CHANGE UP
BLD GP AB SCN SERPL QL: NEGATIVE — SIGNIFICANT CHANGE UP
BUN SERPL-MCNC: 15 MG/DL — SIGNIFICANT CHANGE UP (ref 7–23)
BUN SERPL-MCNC: 15 MG/DL — SIGNIFICANT CHANGE UP (ref 7–23)
BUN SERPL-MCNC: 16 MG/DL — SIGNIFICANT CHANGE UP (ref 7–23)
BUN SERPL-MCNC: 16 MG/DL — SIGNIFICANT CHANGE UP (ref 7–23)
CALCIUM SERPL-MCNC: 9.2 MG/DL — SIGNIFICANT CHANGE UP (ref 8.4–10.5)
CALCIUM SERPL-MCNC: 9.2 MG/DL — SIGNIFICANT CHANGE UP (ref 8.4–10.5)
CALCIUM SERPL-MCNC: 9.4 MG/DL — SIGNIFICANT CHANGE UP (ref 8.4–10.5)
CALCIUM SERPL-MCNC: 9.4 MG/DL — SIGNIFICANT CHANGE UP (ref 8.4–10.5)
CHLORIDE SERPL-SCNC: 103 MMOL/L — SIGNIFICANT CHANGE UP (ref 98–107)
CHLORIDE SERPL-SCNC: 103 MMOL/L — SIGNIFICANT CHANGE UP (ref 98–107)
CHLORIDE SERPL-SCNC: 105 MMOL/L — SIGNIFICANT CHANGE UP (ref 98–107)
CHLORIDE SERPL-SCNC: 105 MMOL/L — SIGNIFICANT CHANGE UP (ref 98–107)
CO2 SERPL-SCNC: 22 MMOL/L — SIGNIFICANT CHANGE UP (ref 22–31)
CO2 SERPL-SCNC: 22 MMOL/L — SIGNIFICANT CHANGE UP (ref 22–31)
CO2 SERPL-SCNC: 23 MMOL/L — SIGNIFICANT CHANGE UP (ref 22–31)
CO2 SERPL-SCNC: 23 MMOL/L — SIGNIFICANT CHANGE UP (ref 22–31)
CREAT SERPL-MCNC: 0.63 MG/DL — SIGNIFICANT CHANGE UP (ref 0.5–1.3)
CREAT SERPL-MCNC: 0.63 MG/DL — SIGNIFICANT CHANGE UP (ref 0.5–1.3)
CREAT SERPL-MCNC: 0.73 MG/DL — SIGNIFICANT CHANGE UP (ref 0.5–1.3)
CREAT SERPL-MCNC: 0.73 MG/DL — SIGNIFICANT CHANGE UP (ref 0.5–1.3)
EGFR: 102 ML/MIN/1.73M2 — SIGNIFICANT CHANGE UP
EGFR: 102 ML/MIN/1.73M2 — SIGNIFICANT CHANGE UP
EGFR: 95 ML/MIN/1.73M2 — SIGNIFICANT CHANGE UP
EGFR: 95 ML/MIN/1.73M2 — SIGNIFICANT CHANGE UP
EOSINOPHIL # BLD AUTO: 0 K/UL — SIGNIFICANT CHANGE UP (ref 0–0.5)
EOSINOPHIL # BLD AUTO: 0 K/UL — SIGNIFICANT CHANGE UP (ref 0–0.5)
EOSINOPHIL NFR BLD AUTO: 0 % — SIGNIFICANT CHANGE UP (ref 0–6)
EOSINOPHIL NFR BLD AUTO: 0 % — SIGNIFICANT CHANGE UP (ref 0–6)
GLUCOSE SERPL-MCNC: 113 MG/DL — HIGH (ref 70–99)
GLUCOSE SERPL-MCNC: 113 MG/DL — HIGH (ref 70–99)
GLUCOSE SERPL-MCNC: 116 MG/DL — HIGH (ref 70–99)
GLUCOSE SERPL-MCNC: 116 MG/DL — HIGH (ref 70–99)
HCT VFR BLD CALC: 23.3 % — LOW (ref 34.5–45)
HCT VFR BLD CALC: 23.3 % — LOW (ref 34.5–45)
HGB BLD-MCNC: 7.6 G/DL — LOW (ref 11.5–15.5)
HGB BLD-MCNC: 7.6 G/DL — LOW (ref 11.5–15.5)
IANC: 5.41 K/UL — SIGNIFICANT CHANGE UP (ref 1.8–7.4)
IANC: 5.41 K/UL — SIGNIFICANT CHANGE UP (ref 1.8–7.4)
IMM GRANULOCYTES NFR BLD AUTO: 0.5 % — SIGNIFICANT CHANGE UP (ref 0–0.9)
IMM GRANULOCYTES NFR BLD AUTO: 0.5 % — SIGNIFICANT CHANGE UP (ref 0–0.9)
INR BLD: 1.51 RATIO — HIGH (ref 0.85–1.18)
INR BLD: 1.51 RATIO — HIGH (ref 0.85–1.18)
LYMPHOCYTES # BLD AUTO: 0.94 K/UL — LOW (ref 1–3.3)
LYMPHOCYTES # BLD AUTO: 0.94 K/UL — LOW (ref 1–3.3)
LYMPHOCYTES # BLD AUTO: 12.9 % — LOW (ref 13–44)
LYMPHOCYTES # BLD AUTO: 12.9 % — LOW (ref 13–44)
MAGNESIUM SERPL-MCNC: 2.2 MG/DL — SIGNIFICANT CHANGE UP (ref 1.6–2.6)
MAGNESIUM SERPL-MCNC: 2.2 MG/DL — SIGNIFICANT CHANGE UP (ref 1.6–2.6)
MAGNESIUM SERPL-MCNC: 2.3 MG/DL — SIGNIFICANT CHANGE UP (ref 1.6–2.6)
MAGNESIUM SERPL-MCNC: 2.3 MG/DL — SIGNIFICANT CHANGE UP (ref 1.6–2.6)
MCHC RBC-ENTMCNC: 28.3 PG — SIGNIFICANT CHANGE UP (ref 27–34)
MCHC RBC-ENTMCNC: 28.3 PG — SIGNIFICANT CHANGE UP (ref 27–34)
MCHC RBC-ENTMCNC: 32.6 GM/DL — SIGNIFICANT CHANGE UP (ref 32–36)
MCHC RBC-ENTMCNC: 32.6 GM/DL — SIGNIFICANT CHANGE UP (ref 32–36)
MCV RBC AUTO: 86.6 FL — SIGNIFICANT CHANGE UP (ref 80–100)
MCV RBC AUTO: 86.6 FL — SIGNIFICANT CHANGE UP (ref 80–100)
MONOCYTES # BLD AUTO: 0.89 K/UL — SIGNIFICANT CHANGE UP (ref 0–0.9)
MONOCYTES # BLD AUTO: 0.89 K/UL — SIGNIFICANT CHANGE UP (ref 0–0.9)
MONOCYTES NFR BLD AUTO: 12.2 % — SIGNIFICANT CHANGE UP (ref 2–14)
MONOCYTES NFR BLD AUTO: 12.2 % — SIGNIFICANT CHANGE UP (ref 2–14)
NEUTROPHILS # BLD AUTO: 5.41 K/UL — SIGNIFICANT CHANGE UP (ref 1.8–7.4)
NEUTROPHILS # BLD AUTO: 5.41 K/UL — SIGNIFICANT CHANGE UP (ref 1.8–7.4)
NEUTROPHILS NFR BLD AUTO: 74.3 % — SIGNIFICANT CHANGE UP (ref 43–77)
NEUTROPHILS NFR BLD AUTO: 74.3 % — SIGNIFICANT CHANGE UP (ref 43–77)
NRBC # BLD: 0 /100 WBCS — SIGNIFICANT CHANGE UP (ref 0–0)
NRBC # BLD: 0 /100 WBCS — SIGNIFICANT CHANGE UP (ref 0–0)
NRBC # FLD: 0.03 K/UL — HIGH (ref 0–0)
NRBC # FLD: 0.03 K/UL — HIGH (ref 0–0)
PHOSPHATE SERPL-MCNC: 3.7 MG/DL — SIGNIFICANT CHANGE UP (ref 2.5–4.5)
PHOSPHATE SERPL-MCNC: 3.7 MG/DL — SIGNIFICANT CHANGE UP (ref 2.5–4.5)
PHOSPHATE SERPL-MCNC: 5 MG/DL — HIGH (ref 2.5–4.5)
PHOSPHATE SERPL-MCNC: 5 MG/DL — HIGH (ref 2.5–4.5)
PLATELET # BLD AUTO: 378 K/UL — SIGNIFICANT CHANGE UP (ref 150–400)
PLATELET # BLD AUTO: 378 K/UL — SIGNIFICANT CHANGE UP (ref 150–400)
POTASSIUM SERPL-MCNC: 4.3 MMOL/L — SIGNIFICANT CHANGE UP (ref 3.5–5.3)
POTASSIUM SERPL-MCNC: 4.3 MMOL/L — SIGNIFICANT CHANGE UP (ref 3.5–5.3)
POTASSIUM SERPL-MCNC: 4.4 MMOL/L — SIGNIFICANT CHANGE UP (ref 3.5–5.3)
POTASSIUM SERPL-MCNC: 4.4 MMOL/L — SIGNIFICANT CHANGE UP (ref 3.5–5.3)
POTASSIUM SERPL-SCNC: 4.3 MMOL/L — SIGNIFICANT CHANGE UP (ref 3.5–5.3)
POTASSIUM SERPL-SCNC: 4.3 MMOL/L — SIGNIFICANT CHANGE UP (ref 3.5–5.3)
POTASSIUM SERPL-SCNC: 4.4 MMOL/L — SIGNIFICANT CHANGE UP (ref 3.5–5.3)
POTASSIUM SERPL-SCNC: 4.4 MMOL/L — SIGNIFICANT CHANGE UP (ref 3.5–5.3)
PROT SERPL-MCNC: 7 G/DL — SIGNIFICANT CHANGE UP (ref 6–8.3)
PROT SERPL-MCNC: 7 G/DL — SIGNIFICANT CHANGE UP (ref 6–8.3)
PROT SERPL-MCNC: 7.4 G/DL — SIGNIFICANT CHANGE UP (ref 6–8.3)
PROT SERPL-MCNC: 7.4 G/DL — SIGNIFICANT CHANGE UP (ref 6–8.3)
PROTHROM AB SERPL-ACNC: 16.7 SEC — HIGH (ref 9.5–13)
PROTHROM AB SERPL-ACNC: 16.7 SEC — HIGH (ref 9.5–13)
RBC # BLD: 2.69 M/UL — LOW (ref 3.8–5.2)
RBC # BLD: 2.69 M/UL — LOW (ref 3.8–5.2)
RBC # FLD: 21.8 % — HIGH (ref 10.3–14.5)
RBC # FLD: 21.8 % — HIGH (ref 10.3–14.5)
RH IG SCN BLD-IMP: POSITIVE — SIGNIFICANT CHANGE UP
RH IG SCN BLD-IMP: POSITIVE — SIGNIFICANT CHANGE UP
SODIUM SERPL-SCNC: 142 MMOL/L — SIGNIFICANT CHANGE UP (ref 135–145)
SODIUM SERPL-SCNC: 142 MMOL/L — SIGNIFICANT CHANGE UP (ref 135–145)
SODIUM SERPL-SCNC: 143 MMOL/L — SIGNIFICANT CHANGE UP (ref 135–145)
SODIUM SERPL-SCNC: 143 MMOL/L — SIGNIFICANT CHANGE UP (ref 135–145)
SURGICAL PATHOLOGY STUDY: SIGNIFICANT CHANGE UP
SURGICAL PATHOLOGY STUDY: SIGNIFICANT CHANGE UP
WBC # BLD: 7.29 K/UL — SIGNIFICANT CHANGE UP (ref 3.8–10.5)
WBC # BLD: 7.29 K/UL — SIGNIFICANT CHANGE UP (ref 3.8–10.5)
WBC # FLD AUTO: 7.29 K/UL — SIGNIFICANT CHANGE UP (ref 3.8–10.5)
WBC # FLD AUTO: 7.29 K/UL — SIGNIFICANT CHANGE UP (ref 3.8–10.5)

## 2023-10-20 PROCEDURE — 71045 X-RAY EXAM CHEST 1 VIEW: CPT | Mod: 26,76

## 2023-10-20 PROCEDURE — 99223 1ST HOSP IP/OBS HIGH 75: CPT

## 2023-10-20 PROCEDURE — 74177 CT ABD & PELVIS W/CONTRAST: CPT | Mod: 26

## 2023-10-20 PROCEDURE — 93010 ELECTROCARDIOGRAM REPORT: CPT

## 2023-10-20 RX ORDER — SODIUM CHLORIDE 9 MG/ML
1000 INJECTION, SOLUTION INTRAVENOUS ONCE
Refills: 0 | Status: COMPLETED | OUTPATIENT
Start: 2023-10-20 | End: 2023-10-20

## 2023-10-20 RX ORDER — SODIUM CHLORIDE 9 MG/ML
500 INJECTION, SOLUTION INTRAVENOUS ONCE
Refills: 0 | Status: COMPLETED | OUTPATIENT
Start: 2023-10-20 | End: 2023-10-20

## 2023-10-20 RX ORDER — ACETAMINOPHEN 500 MG
1000 TABLET ORAL EVERY 6 HOURS
Refills: 0 | Status: COMPLETED | OUTPATIENT
Start: 2023-10-20 | End: 2023-10-21

## 2023-10-20 RX ORDER — SODIUM CHLORIDE 9 MG/ML
1000 INJECTION, SOLUTION INTRAVENOUS
Refills: 0 | Status: DISCONTINUED | OUTPATIENT
Start: 2023-10-20 | End: 2023-10-21

## 2023-10-20 RX ORDER — SODIUM CHLORIDE 9 MG/ML
1000 INJECTION, SOLUTION INTRAVENOUS
Refills: 0 | Status: DISCONTINUED | OUTPATIENT
Start: 2023-10-20 | End: 2023-10-20

## 2023-10-20 RX ORDER — PROCHLORPERAZINE MALEATE 5 MG
10 TABLET ORAL EVERY 8 HOURS
Refills: 0 | Status: DISCONTINUED | OUTPATIENT
Start: 2023-10-20 | End: 2023-10-22

## 2023-10-20 RX ADMIN — SCOPALAMINE 1 PATCH: 1 PATCH, EXTENDED RELEASE TRANSDERMAL at 19:00

## 2023-10-20 RX ADMIN — SODIUM CHLORIDE 100 MILLILITER(S): 9 INJECTION, SOLUTION INTRAVENOUS at 11:23

## 2023-10-20 RX ADMIN — Medication 400 MILLIGRAM(S): at 00:40

## 2023-10-20 RX ADMIN — SODIUM CHLORIDE 100 MILLILITER(S): 9 INJECTION, SOLUTION INTRAVENOUS at 23:49

## 2023-10-20 RX ADMIN — HYDROMORPHONE HYDROCHLORIDE 0.5 MILLIGRAM(S): 2 INJECTION INTRAMUSCULAR; INTRAVENOUS; SUBCUTANEOUS at 06:55

## 2023-10-20 RX ADMIN — Medication 10 MILLIGRAM(S): at 23:34

## 2023-10-20 RX ADMIN — Medication 0.25 MILLIGRAM(S): at 14:20

## 2023-10-20 RX ADMIN — Medication 1000 MILLIGRAM(S): at 22:21

## 2023-10-20 RX ADMIN — Medication 400 MILLIGRAM(S): at 21:51

## 2023-10-20 RX ADMIN — Medication 0.25 MILLIGRAM(S): at 22:43

## 2023-10-20 RX ADMIN — HYDROMORPHONE HYDROCHLORIDE 0.5 MILLIGRAM(S): 2 INJECTION INTRAMUSCULAR; INTRAVENOUS; SUBCUTANEOUS at 00:40

## 2023-10-20 RX ADMIN — HYDROMORPHONE HYDROCHLORIDE 0.5 MILLIGRAM(S): 2 INJECTION INTRAMUSCULAR; INTRAVENOUS; SUBCUTANEOUS at 17:31

## 2023-10-20 RX ADMIN — Medication 1000 MILLIGRAM(S): at 00:55

## 2023-10-20 RX ADMIN — Medication 5 MILLIGRAM(S): at 23:34

## 2023-10-20 RX ADMIN — HYDROMORPHONE HYDROCHLORIDE 0.5 MILLIGRAM(S): 2 INJECTION INTRAMUSCULAR; INTRAVENOUS; SUBCUTANEOUS at 23:49

## 2023-10-20 RX ADMIN — SODIUM CHLORIDE 1000 MILLILITER(S): 9 INJECTION, SOLUTION INTRAVENOUS at 10:09

## 2023-10-20 RX ADMIN — SODIUM CHLORIDE 500 MILLILITER(S): 9 INJECTION, SOLUTION INTRAVENOUS at 00:43

## 2023-10-20 RX ADMIN — HYDROMORPHONE HYDROCHLORIDE 0.5 MILLIGRAM(S): 2 INJECTION INTRAMUSCULAR; INTRAVENOUS; SUBCUTANEOUS at 06:40

## 2023-10-20 RX ADMIN — PANTOPRAZOLE SODIUM 40 MILLIGRAM(S): 20 TABLET, DELAYED RELEASE ORAL at 06:40

## 2023-10-20 RX ADMIN — PANTOPRAZOLE SODIUM 40 MILLIGRAM(S): 20 TABLET, DELAYED RELEASE ORAL at 17:31

## 2023-10-20 RX ADMIN — HYDROMORPHONE HYDROCHLORIDE 0.5 MILLIGRAM(S): 2 INJECTION INTRAMUSCULAR; INTRAVENOUS; SUBCUTANEOUS at 00:55

## 2023-10-20 NOTE — CONSULT NOTE ADULT - PROBLEM SELECTOR RECOMMENDATION 3
Uncontrolled  Pt indicates that there  is no nausea she just starts retching and vomits a clear/mucuous secretions  Would recommend:  - Ativan 0.25mg IV q6hrs PRN for nausea/vomiting  - Can continue Zofran, monitor QTc as it's 487 on 10/19  - Page for uncontrolled symptoms

## 2023-10-20 NOTE — CONSULT NOTE ADULT - ASSESSMENT
58 year old female with pre op dx of malignant neoplasm of appendix is scheduled for cytoreduction and hipec for appendiceal adenocarcinoma. Palliative Care consulted for complex symptom management in the setting of advanced illness

## 2023-10-20 NOTE — PROGRESS NOTE ADULT - ASSESSMENT
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 found to have LIJ and inominate vein acute non-occlusive thrombosis now on T lovenox.    PLAN:    Nutrition:  - poor PO intake, frequent vomiting.  - possible PICC/TPN if oral intake poor.  - add compazine PRN (close qtc monitoring 10/19, 487)  - Protonix BID  - Appreciate GI recommendations, patient declining PEG-J     Post Operative Care:  - Elevated LFT's, will repeat CMP  - NPO/IVF, LR bolus x1   - CT urogram w/o leak, cristina dc'd 10/18 passed TOV  - Monitor JUAQUIN outpt  - FS premeal with ISS  - VTE ppx: T Lovenox for IJ and inominate thrombus  - OOB/ambulate as tolerated    Anemia:  - Hem consult for anemia: possible iron infusion, Oncology to follow.  - Continue daily CBC w/ differential.    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 found to have LIJ and inominate vein acute non-occlusive thrombosis now on T lovenox.    PLAN:    Nutrition:  - Naso-duodenal feeds via Ko feeding tube, start at trickle 10cc overnight, goal Jevity 1.2 @ 42cc/hr (1200 kcal), consider nutrition consult for tube feed recommendations.  - poor PO intake, frequent vomiting.  - possible PICC/TPN if oral intake poor.  - add compazine PRN (close qtc monitoring 10/19, 487)  - Protonix BID  - Appreciate GI recommendations, patient declining PEG-J     Post Operative Care:  - Elevated LFT's, will repeat CMP  - NPO/IVF, LR bolus x1   - CT urogram w/o leak, cristina dc'd 10/18 passed TOV  - Monitor JUAQUIN outpt  - FS premeal with ISS  - VTE ppx: T Lovenox for IJ and inominate thrombus  - OOB/ambulate as tolerated    Anemia:  - Hem consult for anemia: possible iron infusion, Oncology to follow.  - Continue daily CBC w/ differential.    Disposition:  - PT/OT consult appreciated, PM&R consult for possible acute rehab disposition  - Acute rehab criteria: Hgb >8, oral pain management only, no chemotherapy during rehab admission    D Team Surgery  [id number]

## 2023-10-20 NOTE — PROGRESS NOTE ADULT - SUBJECTIVE AND OBJECTIVE BOX
TEAM [ D ] Surgery Daily Progress Note  =====================================================    SUBJECTIVE: Patient seen and examined at bedside on AM rounds. Patient reports that they're feeling well. Not tolerating diet denies nausea however has been vomiting. gas + stool in ileostomy. OOB/Amublating as tolerated. Denies fever, chills.    ALLERGIES:  penicillin (Rash)    --------------------------------------------------------------------------------------    MEDICATIONS:    Neurologic Medications  baclofen 5 milliGRAM(s) Oral every 8 hours  HYDROmorphone  Injectable 0.5 milliGRAM(s) IV Push every 6 hours PRN Moderate and Severe Pain  ondansetron Injectable 4 milliGRAM(s) IV Push every 6 hours PRN Nausea  prochlorperazine   Tablet 10 milliGRAM(s) Oral every 8 hours PRN nausea  scopolamine 1 mG/72 Hr(s) Patch 1 Patch Transdermal every 72 hours    Gastrointestinal Medications  dextrose 5% + lactated ringers. 1000 milliLiter(s) IV Continuous <Continuous>  lactated ringers Bolus 1000 milliLiter(s) IV Bolus once  pantoprazole  Injectable 40 milliGRAM(s) IV Push two times a day    Genitourinary Medications    Hematologic/Oncologic Medications  influenza   Vaccine 0.5 milliLiter(s) IntraMuscular once    Topical/Other Medications  naloxone Injectable 0.1 milliGRAM(s) IV Push every 3 minutes PRN For ANY of the following changes in patient status:  A. RR LESS THAN 10 breaths per minute, B. Oxygen saturation LESS THAN 90%, C. Sedation score of 6    --------------------------------------------------------------------------------------    VITAL SIGNS:  T(C): 36.6 (10-20-23 @ 05:07), Max: 36.7 (10-19-23 @ 22:40)  HR: 98 (10-20-23 @ 05:07) (82 - 122)  BP: 120/57 (10-20-23 @ 05:07) (108/59 - 141/86)  RR: 18 (10-20-23 @ 05:07) (17 - 18)  SpO2: 100% (10-20-23 @ 05:07) (96% - 100%)  --------------------------------------------------------------------------------------    EXAM    General: NAD, resting in bed comfortably.  Cardiac: regular rate, warm and well perfused  Respiratory: Nonlabored respirations, normal cw expansion.  Abdomen: soft, nontender, nondistended, ostomy +/+ simón drain with minimal s/s output  Extremities: normal strength, FROM, no deformities    --------------------------------------------------------------------------------------    LABS                        7.6    7.29  )-----------( 378      ( 20 Oct 2023 05:49 )             23.3     10-19    142  |  103  |  15  ----------------------------<  116<H>  4.3   |  22  |  0.73    Ca    9.2      19 Oct 2023 23:45  Phos  5.0     10-19  Mg     2.30     10-19    TPro  7.4  /  Alb  3.4  /  TBili  0.6  /  DBili  x   /  AST  2540<H>  /  ALT  828<H>  /  AlkPhos  253<H>  10-19      --------------------------------------------------------------------------------------    INS AND OUTS:    10-19-23 @ 07:01  -  10-20-23 @ 07:00  --------------------------------------------------------  IN: 850 mL / OUT: 1029.5 mL / NET: -179.5 mL      -------------------------------------------------------------------------------------- TEAM [ D ] Surgery Daily Progress Note  =====================================================    SUBJECTIVE: Patient seen and examined at bedside on AM rounds. Patient reports not tolerating diet denies nausea however has been vomiting. gas + stool in ileostomy. OOB/Amublating as tolerated. Denies fever, chills.    ALLERGIES:  penicillin (Rash)    --------------------------------------------------------------------------------------    MEDICATIONS:    Neurologic Medications  baclofen 5 milliGRAM(s) Oral every 8 hours  HYDROmorphone  Injectable 0.5 milliGRAM(s) IV Push every 6 hours PRN Moderate and Severe Pain  ondansetron Injectable 4 milliGRAM(s) IV Push every 6 hours PRN Nausea  prochlorperazine   Tablet 10 milliGRAM(s) Oral every 8 hours PRN nausea  scopolamine 1 mG/72 Hr(s) Patch 1 Patch Transdermal every 72 hours    Gastrointestinal Medications  dextrose 5% + lactated ringers. 1000 milliLiter(s) IV Continuous <Continuous>  lactated ringers Bolus 1000 milliLiter(s) IV Bolus once  pantoprazole  Injectable 40 milliGRAM(s) IV Push two times a day    Genitourinary Medications    Hematologic/Oncologic Medications  influenza   Vaccine 0.5 milliLiter(s) IntraMuscular once    Topical/Other Medications  naloxone Injectable 0.1 milliGRAM(s) IV Push every 3 minutes PRN For ANY of the following changes in patient status:  A. RR LESS THAN 10 breaths per minute, B. Oxygen saturation LESS THAN 90%, C. Sedation score of 6    --------------------------------------------------------------------------------------    VITAL SIGNS:  T(C): 36.6 (10-20-23 @ 05:07), Max: 36.7 (10-19-23 @ 22:40)  HR: 98 (10-20-23 @ 05:07) (82 - 122)  BP: 120/57 (10-20-23 @ 05:07) (108/59 - 141/86)  RR: 18 (10-20-23 @ 05:07) (17 - 18)  SpO2: 100% (10-20-23 @ 05:07) (96% - 100%)  --------------------------------------------------------------------------------------    EXAM    General: NAD, resting in bed comfortably.  Cardiac: regular rate, warm and well perfused  Respiratory: Nonlabored respirations, normal cw expansion.  Abdomen: soft, nontender, nondistended, ostomy +/+ simón drain with minimal s/s output  Extremities: normal strength, FROM, no deformities    --------------------------------------------------------------------------------------    LABS                        7.6    7.29  )-----------( 378      ( 20 Oct 2023 05:49 )             23.3     10-19    142  |  103  |  15  ----------------------------<  116<H>  4.3   |  22  |  0.73    Ca    9.2      19 Oct 2023 23:45  Phos  5.0     10-19  Mg     2.30     10-19    TPro  7.4  /  Alb  3.4  /  TBili  0.6  /  DBili  x   /  AST  2540<H>  /  ALT  828<H>  /  AlkPhos  253<H>  10-19      --------------------------------------------------------------------------------------    INS AND OUTS:    10-19-23 @ 07:01  -  10-20-23 @ 07:00  --------------------------------------------------------  IN: 850 mL / OUT: 1029.5 mL / NET: -179.5 mL      --------------------------------------------------------------------------------------

## 2023-10-20 NOTE — CONSULT NOTE ADULT - PROBLEM SELECTOR RECOMMENDATION 9
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   Plan from oncology is to follow up outpatient  Follows with Dr. Khan at [facility]

## 2023-10-20 NOTE — CONSULT NOTE ADULT - PROBLEM SELECTOR RECOMMENDATION 4
Pt with ongoing medical conditions  Including significant rise in LFTs  Pending CT abdomen  Would first determine cause of this before starting any medications for appetite stimulation  Discussed with pt options such as Remeron and medical cannabis

## 2023-10-20 NOTE — CONSULT NOTE ADULT - SUBJECTIVE AND OBJECTIVE BOX
Date of [id number] @ 16:13  HPI:  58 year old female with pre op dx of malignant neoplasm of appendix is scheduled for cytoreduction and hipec for appendiceal adenocarcinoma. (21 Sep 2023 16:45)    PERTINENT PM/SXH:   History of sickle cell trait    Malignant neoplasm of appendix      History of ectopic pregnancy    H/O colonoscopy    H/O endoscopy      FAMILY HISTORY:    Family Hx substance abuse [ ]yes [ ]no    ITEMS NOT CHECKED ARE NOT PRESENT    SOCIAL HISTORY:   Significant other/partner[x ]  Children[ ]  Worship/Spirituality:  Substance hx:  [ ]   Tobacco hx:  [ ]   Alcohol hx: [ ]   Home Opioid hx:  [ ] I-Stop Reference No:  Living Situation: [ x]Home  [ ]Long term care  [ ]Rehab [ ]Other    ADVANCE DIRECTIVES:    DNR/MOLST  [ ]  Living Will  [ ]   DECISION MAKER(s):  [ ] Health Care Proxy(s)  [ x] Surrogate(s)  [ ] Guardian           Name(s): Phone Number(s):  Lauro Dean [phone]    BASELINE (I)ADL(s) (prior to admission):  [place]: [x ]Total  [ ] Moderate [ ]Dependent    Allergies    penicillin (Rash)    Intolerances    MEDICATIONS  (STANDING):  acetaminophen   IVPB .. 1000 milliGRAM(s) IV Intermittent every 6 hours  baclofen 5 milliGRAM(s) Oral every 8 hours  dextrose 5% + lactated ringers. 1000 milliLiter(s) (100 mL/Hr) IV Continuous <Continuous>  influenza   Vaccine 0.5 milliLiter(s) IntraMuscular once  pantoprazole  Injectable 40 milliGRAM(s) IV Push two times a day  prochlorperazine   Tablet 10 milliGRAM(s) Oral every 8 hours  scopolamine 1 mG/72 Hr(s) Patch 1 Patch Transdermal every 72 hours    MEDICATIONS  (PRN):  HYDROmorphone  Injectable 0.5 milliGRAM(s) IV Push every 6 hours PRN Moderate and Severe Pain  LORazepam   Injectable 0.25 milliGRAM(s) IV Push every 6 hours PRN Nausea and/or Vomiting  naloxone Injectable 0.1 milliGRAM(s) IV Push every 3 minutes PRN For ANY of the following changes in patient status:  A. RR LESS THAN 10 breaths per minute, B. Oxygen saturation LESS THAN 90%, C. Sedation score of 6  ondansetron Injectable 4 milliGRAM(s) IV Push every 6 hours PRN Nausea    PRESENT SYMPTOMS: [ ]Unable to self-report  [ ] CPOT [ ] PAINADs [ ] RDOS  Source if other than patient:  [ ]Family   [ ]Team     Pain: [x ]yes [ ]no  QOL impact - unable to eat   Location - mid-abdomen               Aggravating factors -  Quality - aching  Radiation - none  Timing- intermittent  Severity (0-10 scale): 10/10  Minimal acceptable level/pain goal (0-10 scale): 2-3/10    CPOT:    https://www.s[facility].org/getattachment/nun26a78-3c5c-7m3b-1x0v-3603p2477w9p/Critical-Care-Pain-Observation-Tool-(CPOT)    PAIN AD Score:   http://geriatrictoolkit.[email or web address]/painad.pdf (press ctrl +  left click to view)    Dyspnea:                           [ ]Mild [ ]Moderate [ ]Severe    RDOS:  0 to 2  minimal or no respiratory distress   3  mild distress  4 to 6 moderate distress  >7 severe distress  https://homecareinformation.net/handouts/hen/Respiratory_Distress_Observation_Scale.pdf (Ctrl +  left click to view)     Anxiety:                             [ ]Mild [ ]Moderate [ ]Severe  Fatigue:                             [ ]Mild [ ]Moderate [ ]Severe  Nausea:                             [ ]Mild [ ]Moderate [ ]Severe  Loss of appetite:              [ ]Mild [ ]Moderate [ ]Severe  Constipation:                    [ ]Mild [ ]Moderate [ ]Severe    PCSSQ[Palliative Care Spiritual Screening Question]   Severity (0-10): deferred  Score of 4 or > indicate consideration of Chaplaincy referral.  Chaplaincy Referral: [ ] yes [ ] refused [ ] following [x ] Deferred     Caregiver [place]? : [ ] yes [ ] no [ ] Deferred [ x] Declined             Social work referral [ ] Patient & Family Centered Care Referral [ ]     Anticipatory Grief present?:  [ ] yes [ ] no  [x ] Deferred                  Social work referral [ ] Chaplaincy Referral[ ]    Other Symptoms:   [x ]All other review of systems negative     Palliative Performance Status Version 2:  40-50  %    http://[facility].org/files/news/palliative_performance_scale_ppsv2.pdf    PHYSICAL EXAM:    VITAL SIGNS:  T(C): 36.6 (10-20-23 @ 05:07), Max: 36.7 (10-19-23 @ 22:40)  HR: 98 (10-20-23 @ 05:07) (82 - 122)  BP: 120/57 (10-20-23 @ 05:07) (108/59 - 141/86)  RR: 18 (10-20-23 @ 05:07) (17 - 18)  SpO2: 100% (10-20-23 @ 05:07) (96% - 100%)    GENERAL: [ ]Cachexia    [x ]Alert  [x ]Oriented x 3  [ ]Lethargic  [ ]Unarousable  [ x]Verbal  [ ]Non-Verbal  Behavioral:   [ ] Anxiety  [ ] Delirium [ ] Agitation [ ] Other  HEENT:  [x ]Normal   [ ]Dry mouth   [ ]ET Tube/Trach  [ ]Oral lesions  PULMONARY:   [x ]Clear [ ]Tachypnea  [ ]Audible excessive secretions   [ ]Rhonchi        [ ]Right [ ]Left [ ]Bilateral  [ ]Crackles        [ ]Right [ ]Left [ ]Bilateral  [ ]Wheezing     [ ]Right [ ]Left [ ]Bilateral  [ ]Diminished breath sounds [ ]right [ ]left [ ]bilateral  CARDIOVASCULAR:    [x ]Regular [ ]Irregular [ ]Tachy  [ ]Bao [ ]Murmur [ ]Other  GASTROINTESTINAL:  [ ]Soft  [x ]Distended   [x ]+BS  [ ]Non tender [x ]Tender  [ ]Other [ ]PEG [ ]OGT/ NGT  Last BM:  GENITOURINARY:  [x ]Normal [ ] Incontinent   [ ]Oliguria/Anuria   [ ][place]  MUSCULOSKELETAL:   [ ]Normal   [ x]Weakness  [ ]Bed/Wheelchair bound [ ]Edema  NEUROLOGIC:   [x ]No focal deficits  [ ]Cognitive impairment  [ ]Dysphagia [ ]Dysarthria [ ]Paresis [ ]Other   SKIN:   [x ]Normal  [ ]Rash  [ ]Other  [ ]Pressure ulcer(s)       Present on admission [ ]y [ ]n    CRITICAL CARE:  [ ] Shock Present  [ ]Septic [ ]Cardiogenic [ ]Neurologic [ ]Hypovolemic  [ ]  Vasopressors [ ]  Inotropes   [ ]Respiratory failure present [ ]Mechanical ventilation [ ]Non-invasive ventilatory support [ ]High flow    [ ]Acute  [ ]Chronic [ ]Hypoxic  [ ]Hypercarbic [ ]Other  [ ]Other organ failure     LABS                        7.6    7.29  )-----------( 378      ( 20 Oct 2023 05:49 )             23.3     10-19    142  |  103  |  15  ----------------------------<  116<H>  4.3   |  22  |  0.73    Ca    9.2      19 Oct 2023 23:45  Phos  5.0     10-19  Mg     2.30     10-19    TPro  7.4  /  Alb  3.4  /  TBili  0.6  /  DBili  x   /  AST  2540<H>  /  ALT  828<H>  /  AlkPhos  253<H>  10-19    RADIOLOGY & ADDITIONAL STUDIES:  < from: CT Abdomen and Pelvis w/ IV Cont (10.20.23 @ 16:44) >  IMPRESSION:  Right subcapsular hematoma, increased in size since prior exam. New left   subcapsular hematoma with extension of hemorrhage intothe adjacent left   hepatic space. No focus of active contrast extravasation is seen in   either of these hematomas.  New irregular hypoattenuation of the posterior right hepatic lobe,   possibly hepatic infarct.  The portal veins, hepatic veins,and hepatic arteries are patent.    PROTEIN CALORIE MALNUTRITION PRESENT: [ ]mild [ ]moderate [ ]severe [ ]underweight [ ]morbid obesity  https://www.andeal.org/vault/2440/web/files/ONC/Table_Clinical%20Characteristics%20to%20Document%20Malnutrition-White%20JV%20et%20al%202012.pdf    Height (cm): 154.9 (10-04-23 @ 07:27), 156.3 (09-05-23 @ 11:06), 154.9 (07-21-23 @ 08:27)  Weight (kg): 68.9 (10-04-23 @ 07:27), 71.5 (09-05-23 @ 11:06), 75.3 (07-21-23 @ 08:27)  BMI (kg/m2): 28.7 (10-04-23 @ 07:27), 29.3 (09-05-23 @ 11:06), 31.4 (07-21-23 @ 08:27)    [ ]PPSV2 < or = to 30% [ ]significant weight loss  [ ]poor nutritional intake  [ ]anasarca[ ]Artificial Nutrition      Other REFERRALS:  [ ]Hospice  [ ]Child Life  [ ]Social Work  [ ]Case management [ ]Holistic Therapy     Goals of Care Document:

## 2023-10-21 DIAGNOSIS — Z51.5 ENCOUNTER FOR PALLIATIVE CARE: ICD-10-CM

## 2023-10-21 DIAGNOSIS — R11.2 NAUSEA WITH VOMITING, UNSPECIFIED: ICD-10-CM

## 2023-10-21 DIAGNOSIS — R63.0 ANOREXIA: ICD-10-CM

## 2023-10-21 DIAGNOSIS — G89.3 NEOPLASM RELATED PAIN (ACUTE) (CHRONIC): ICD-10-CM

## 2023-10-21 LAB
ALBUMIN SERPL ELPH-MCNC: 2.8 G/DL — LOW (ref 3.3–5)
ALBUMIN SERPL ELPH-MCNC: 2.8 G/DL — LOW (ref 3.3–5)
ALP SERPL-CCNC: 254 U/L — HIGH (ref 40–120)
ALP SERPL-CCNC: 254 U/L — HIGH (ref 40–120)
ALT FLD-CCNC: 575 U/L — HIGH (ref 4–33)
ALT FLD-CCNC: 575 U/L — HIGH (ref 4–33)
ANION GAP SERPL CALC-SCNC: 16 MMOL/L — HIGH (ref 7–14)
ANION GAP SERPL CALC-SCNC: 16 MMOL/L — HIGH (ref 7–14)
APTT BLD: 30.1 SEC — SIGNIFICANT CHANGE UP (ref 24.5–35.6)
APTT BLD: 30.1 SEC — SIGNIFICANT CHANGE UP (ref 24.5–35.6)
AST SERPL-CCNC: 722 U/L — HIGH (ref 4–32)
AST SERPL-CCNC: 722 U/L — HIGH (ref 4–32)
BASOPHILS # BLD AUTO: 0 K/UL — SIGNIFICANT CHANGE UP (ref 0–0.2)
BASOPHILS # BLD AUTO: 0 K/UL — SIGNIFICANT CHANGE UP (ref 0–0.2)
BASOPHILS NFR BLD AUTO: 0 % — SIGNIFICANT CHANGE UP (ref 0–2)
BASOPHILS NFR BLD AUTO: 0 % — SIGNIFICANT CHANGE UP (ref 0–2)
BILIRUB SERPL-MCNC: 0.6 MG/DL — SIGNIFICANT CHANGE UP (ref 0.2–1.2)
BILIRUB SERPL-MCNC: 0.6 MG/DL — SIGNIFICANT CHANGE UP (ref 0.2–1.2)
BLD GP AB SCN SERPL QL: NEGATIVE — SIGNIFICANT CHANGE UP
BUN SERPL-MCNC: 14 MG/DL — SIGNIFICANT CHANGE UP (ref 7–23)
BUN SERPL-MCNC: 14 MG/DL — SIGNIFICANT CHANGE UP (ref 7–23)
CALCIUM SERPL-MCNC: 8.7 MG/DL — SIGNIFICANT CHANGE UP (ref 8.4–10.5)
CALCIUM SERPL-MCNC: 8.7 MG/DL — SIGNIFICANT CHANGE UP (ref 8.4–10.5)
CHLORIDE SERPL-SCNC: 108 MMOL/L — HIGH (ref 98–107)
CHLORIDE SERPL-SCNC: 108 MMOL/L — HIGH (ref 98–107)
CO2 SERPL-SCNC: 23 MMOL/L — SIGNIFICANT CHANGE UP (ref 22–31)
CO2 SERPL-SCNC: 23 MMOL/L — SIGNIFICANT CHANGE UP (ref 22–31)
CREAT SERPL-MCNC: 0.6 MG/DL — SIGNIFICANT CHANGE UP (ref 0.5–1.3)
CREAT SERPL-MCNC: 0.6 MG/DL — SIGNIFICANT CHANGE UP (ref 0.5–1.3)
EGFR: 103 ML/MIN/1.73M2 — SIGNIFICANT CHANGE UP
EGFR: 103 ML/MIN/1.73M2 — SIGNIFICANT CHANGE UP
EOSINOPHIL # BLD AUTO: 0 K/UL — SIGNIFICANT CHANGE UP (ref 0–0.5)
EOSINOPHIL # BLD AUTO: 0 K/UL — SIGNIFICANT CHANGE UP (ref 0–0.5)
EOSINOPHIL NFR BLD AUTO: 0 % — SIGNIFICANT CHANGE UP (ref 0–6)
EOSINOPHIL NFR BLD AUTO: 0 % — SIGNIFICANT CHANGE UP (ref 0–6)
GLUCOSE SERPL-MCNC: 150 MG/DL — HIGH (ref 70–99)
GLUCOSE SERPL-MCNC: 150 MG/DL — HIGH (ref 70–99)
HCT VFR BLD CALC: 17 % — CRITICAL LOW (ref 34.5–45)
HCT VFR BLD CALC: 17 % — CRITICAL LOW (ref 34.5–45)
HCT VFR BLD CALC: 17.7 % — CRITICAL LOW (ref 34.5–45)
HCT VFR BLD CALC: 17.7 % — CRITICAL LOW (ref 34.5–45)
HCT VFR BLD CALC: 25.5 % — LOW (ref 34.5–45)
HCT VFR BLD CALC: 25.5 % — LOW (ref 34.5–45)
HGB BLD-MCNC: 5.6 G/DL — CRITICAL LOW (ref 11.5–15.5)
HGB BLD-MCNC: 5.6 G/DL — CRITICAL LOW (ref 11.5–15.5)
HGB BLD-MCNC: 5.9 G/DL — CRITICAL LOW (ref 11.5–15.5)
HGB BLD-MCNC: 5.9 G/DL — CRITICAL LOW (ref 11.5–15.5)
HGB BLD-MCNC: 8.4 G/DL — LOW (ref 11.5–15.5)
HGB BLD-MCNC: 8.4 G/DL — LOW (ref 11.5–15.5)
IANC: 7.18 K/UL — SIGNIFICANT CHANGE UP (ref 1.8–7.4)
IANC: 7.18 K/UL — SIGNIFICANT CHANGE UP (ref 1.8–7.4)
INR BLD: 1.49 RATIO — HIGH (ref 0.85–1.18)
INR BLD: 1.49 RATIO — HIGH (ref 0.85–1.18)
INR BLD: 1.5 RATIO — HIGH (ref 0.85–1.18)
INR BLD: 1.5 RATIO — HIGH (ref 0.85–1.18)
LYMPHOCYTES # BLD AUTO: 1.16 K/UL — SIGNIFICANT CHANGE UP (ref 1–3.3)
LYMPHOCYTES # BLD AUTO: 1.16 K/UL — SIGNIFICANT CHANGE UP (ref 1–3.3)
LYMPHOCYTES # BLD AUTO: 12.3 % — LOW (ref 13–44)
LYMPHOCYTES # BLD AUTO: 12.3 % — LOW (ref 13–44)
MAGNESIUM SERPL-MCNC: 2.2 MG/DL — SIGNIFICANT CHANGE UP (ref 1.6–2.6)
MAGNESIUM SERPL-MCNC: 2.2 MG/DL — SIGNIFICANT CHANGE UP (ref 1.6–2.6)
MCHC RBC-ENTMCNC: 28.4 PG — SIGNIFICANT CHANGE UP (ref 27–34)
MCHC RBC-ENTMCNC: 28.8 PG — SIGNIFICANT CHANGE UP (ref 27–34)
MCHC RBC-ENTMCNC: 28.8 PG — SIGNIFICANT CHANGE UP (ref 27–34)
MCHC RBC-ENTMCNC: 32.9 GM/DL — SIGNIFICANT CHANGE UP (ref 32–36)
MCHC RBC-ENTMCNC: 33.3 GM/DL — SIGNIFICANT CHANGE UP (ref 32–36)
MCHC RBC-ENTMCNC: 33.3 GM/DL — SIGNIFICANT CHANGE UP (ref 32–36)
MCV RBC AUTO: 86.1 FL — SIGNIFICANT CHANGE UP (ref 80–100)
MCV RBC AUTO: 86.1 FL — SIGNIFICANT CHANGE UP (ref 80–100)
MCV RBC AUTO: 86.3 FL — SIGNIFICANT CHANGE UP (ref 80–100)
MONOCYTES # BLD AUTO: 0.33 K/UL — SIGNIFICANT CHANGE UP (ref 0–0.9)
MONOCYTES # BLD AUTO: 0.33 K/UL — SIGNIFICANT CHANGE UP (ref 0–0.9)
MONOCYTES NFR BLD AUTO: 3.5 % — SIGNIFICANT CHANGE UP (ref 2–14)
MONOCYTES NFR BLD AUTO: 3.5 % — SIGNIFICANT CHANGE UP (ref 2–14)
NEUTROPHILS # BLD AUTO: 7.94 K/UL — HIGH (ref 1.8–7.4)
NEUTROPHILS # BLD AUTO: 7.94 K/UL — HIGH (ref 1.8–7.4)
NEUTROPHILS NFR BLD AUTO: 80.7 % — HIGH (ref 43–77)
NEUTROPHILS NFR BLD AUTO: 80.7 % — HIGH (ref 43–77)
NRBC # BLD: 0 /100 WBCS — SIGNIFICANT CHANGE UP (ref 0–0)
NRBC # BLD: 0 /100 WBCS — SIGNIFICANT CHANGE UP (ref 0–0)
NRBC # BLD: 1 /100 WBCS — HIGH (ref 0–0)
NRBC # BLD: 1 /100 WBCS — HIGH (ref 0–0)
NRBC # FLD: 0.09 K/UL — HIGH (ref 0–0)
NRBC # FLD: 0.09 K/UL — HIGH (ref 0–0)
NRBC # FLD: 0.13 K/UL — HIGH (ref 0–0)
NRBC # FLD: 0.13 K/UL — HIGH (ref 0–0)
PHOSPHATE SERPL-MCNC: 2.5 MG/DL — SIGNIFICANT CHANGE UP (ref 2.5–4.5)
PHOSPHATE SERPL-MCNC: 2.5 MG/DL — SIGNIFICANT CHANGE UP (ref 2.5–4.5)
PLATELET # BLD AUTO: 274 K/UL — SIGNIFICANT CHANGE UP (ref 150–400)
PLATELET # BLD AUTO: 274 K/UL — SIGNIFICANT CHANGE UP (ref 150–400)
PLATELET # BLD AUTO: 285 K/UL — SIGNIFICANT CHANGE UP (ref 150–400)
PLATELET # BLD AUTO: 285 K/UL — SIGNIFICANT CHANGE UP (ref 150–400)
PLATELET # BLD AUTO: 306 K/UL — SIGNIFICANT CHANGE UP (ref 150–400)
PLATELET # BLD AUTO: 306 K/UL — SIGNIFICANT CHANGE UP (ref 150–400)
POTASSIUM SERPL-MCNC: 3.9 MMOL/L — SIGNIFICANT CHANGE UP (ref 3.5–5.3)
POTASSIUM SERPL-MCNC: 3.9 MMOL/L — SIGNIFICANT CHANGE UP (ref 3.5–5.3)
POTASSIUM SERPL-SCNC: 3.9 MMOL/L — SIGNIFICANT CHANGE UP (ref 3.5–5.3)
POTASSIUM SERPL-SCNC: 3.9 MMOL/L — SIGNIFICANT CHANGE UP (ref 3.5–5.3)
PROT SERPL-MCNC: 6.4 G/DL — SIGNIFICANT CHANGE UP (ref 6–8.3)
PROT SERPL-MCNC: 6.4 G/DL — SIGNIFICANT CHANGE UP (ref 6–8.3)
PROTHROM AB SERPL-ACNC: 16.6 SEC — HIGH (ref 9.5–13)
PROTHROM AB SERPL-ACNC: 16.6 SEC — HIGH (ref 9.5–13)
PROTHROM AB SERPL-ACNC: 16.8 SEC — HIGH (ref 9.5–13)
PROTHROM AB SERPL-ACNC: 16.8 SEC — HIGH (ref 9.5–13)
RBC # BLD: 1.97 M/UL — LOW (ref 3.8–5.2)
RBC # BLD: 1.97 M/UL — LOW (ref 3.8–5.2)
RBC # BLD: 2.05 M/UL — LOW (ref 3.8–5.2)
RBC # BLD: 2.05 M/UL — LOW (ref 3.8–5.2)
RBC # BLD: 2.96 M/UL — LOW (ref 3.8–5.2)
RBC # BLD: 2.96 M/UL — LOW (ref 3.8–5.2)
RBC # FLD: 19.1 % — HIGH (ref 10.3–14.5)
RBC # FLD: 19.1 % — HIGH (ref 10.3–14.5)
RBC # FLD: 21.5 % — HIGH (ref 10.3–14.5)
RBC # FLD: 21.5 % — HIGH (ref 10.3–14.5)
RBC # FLD: 21.9 % — HIGH (ref 10.3–14.5)
RBC # FLD: 21.9 % — HIGH (ref 10.3–14.5)
RH IG SCN BLD-IMP: POSITIVE — SIGNIFICANT CHANGE UP
SODIUM SERPL-SCNC: 147 MMOL/L — HIGH (ref 135–145)
SODIUM SERPL-SCNC: 147 MMOL/L — HIGH (ref 135–145)
WBC # BLD: 10.24 K/UL — SIGNIFICANT CHANGE UP (ref 3.8–10.5)
WBC # BLD: 10.24 K/UL — SIGNIFICANT CHANGE UP (ref 3.8–10.5)
WBC # BLD: 10.44 K/UL — SIGNIFICANT CHANGE UP (ref 3.8–10.5)
WBC # BLD: 10.44 K/UL — SIGNIFICANT CHANGE UP (ref 3.8–10.5)
WBC # BLD: 9.43 K/UL — SIGNIFICANT CHANGE UP (ref 3.8–10.5)
WBC # BLD: 9.43 K/UL — SIGNIFICANT CHANGE UP (ref 3.8–10.5)
WBC # FLD AUTO: 10.24 K/UL — SIGNIFICANT CHANGE UP (ref 3.8–10.5)
WBC # FLD AUTO: 10.24 K/UL — SIGNIFICANT CHANGE UP (ref 3.8–10.5)
WBC # FLD AUTO: 10.44 K/UL — SIGNIFICANT CHANGE UP (ref 3.8–10.5)
WBC # FLD AUTO: 10.44 K/UL — SIGNIFICANT CHANGE UP (ref 3.8–10.5)
WBC # FLD AUTO: 9.43 K/UL — SIGNIFICANT CHANGE UP (ref 3.8–10.5)
WBC # FLD AUTO: 9.43 K/UL — SIGNIFICANT CHANGE UP (ref 3.8–10.5)

## 2023-10-21 PROCEDURE — 74019 RADEX ABDOMEN 2 VIEWS: CPT | Mod: 26

## 2023-10-21 RX ORDER — SODIUM CHLORIDE 9 MG/ML
1000 INJECTION, SOLUTION INTRAVENOUS
Refills: 0 | Status: DISCONTINUED | OUTPATIENT
Start: 2023-10-21 | End: 2023-10-25

## 2023-10-21 RX ORDER — POTASSIUM CHLORIDE 20 MEQ
10 PACKET (EA) ORAL ONCE
Refills: 0 | Status: COMPLETED | OUTPATIENT
Start: 2023-10-21 | End: 2023-10-21

## 2023-10-21 RX ADMIN — HYDROMORPHONE HYDROCHLORIDE 0.5 MILLIGRAM(S): 2 INJECTION INTRAMUSCULAR; INTRAVENOUS; SUBCUTANEOUS at 21:53

## 2023-10-21 RX ADMIN — SCOPALAMINE 1 PATCH: 1 PATCH, EXTENDED RELEASE TRANSDERMAL at 21:17

## 2023-10-21 RX ADMIN — HYDROMORPHONE HYDROCHLORIDE 0.5 MILLIGRAM(S): 2 INJECTION INTRAMUSCULAR; INTRAVENOUS; SUBCUTANEOUS at 22:17

## 2023-10-21 RX ADMIN — Medication 400 MILLIGRAM(S): at 04:59

## 2023-10-21 RX ADMIN — Medication 1000 MILLIGRAM(S): at 05:29

## 2023-10-21 RX ADMIN — HYDROMORPHONE HYDROCHLORIDE 0.5 MILLIGRAM(S): 2 INJECTION INTRAMUSCULAR; INTRAVENOUS; SUBCUTANEOUS at 15:24

## 2023-10-21 RX ADMIN — SCOPALAMINE 1 PATCH: 1 PATCH, EXTENDED RELEASE TRANSDERMAL at 19:00

## 2023-10-21 RX ADMIN — Medication 400 MILLIGRAM(S): at 17:41

## 2023-10-21 RX ADMIN — Medication 1000 MILLIGRAM(S): at 18:25

## 2023-10-21 RX ADMIN — Medication 400 MILLIGRAM(S): at 11:22

## 2023-10-21 RX ADMIN — Medication 1000 MILLIGRAM(S): at 12:00

## 2023-10-21 RX ADMIN — PANTOPRAZOLE SODIUM 40 MILLIGRAM(S): 20 TABLET, DELAYED RELEASE ORAL at 04:59

## 2023-10-21 RX ADMIN — HYDROMORPHONE HYDROCHLORIDE 0.5 MILLIGRAM(S): 2 INJECTION INTRAMUSCULAR; INTRAVENOUS; SUBCUTANEOUS at 16:00

## 2023-10-21 RX ADMIN — HYDROMORPHONE HYDROCHLORIDE 0.5 MILLIGRAM(S): 2 INJECTION INTRAMUSCULAR; INTRAVENOUS; SUBCUTANEOUS at 00:19

## 2023-10-21 RX ADMIN — Medication 10 MILLIGRAM(S): at 10:35

## 2023-10-21 RX ADMIN — Medication 0.25 MILLIGRAM(S): at 06:53

## 2023-10-21 RX ADMIN — Medication 100 MILLIEQUIVALENT(S): at 11:24

## 2023-10-21 RX ADMIN — PANTOPRAZOLE SODIUM 40 MILLIGRAM(S): 20 TABLET, DELAYED RELEASE ORAL at 17:42

## 2023-10-21 RX ADMIN — SODIUM CHLORIDE 100 MILLILITER(S): 9 INJECTION, SOLUTION INTRAVENOUS at 11:24

## 2023-10-21 RX ADMIN — SCOPALAMINE 1 PATCH: 1 PATCH, EXTENDED RELEASE TRANSDERMAL at 07:05

## 2023-10-21 RX ADMIN — Medication 5 MILLIGRAM(S): at 10:35

## 2023-10-21 RX ADMIN — Medication 63.75 MILLIMOLE(S): at 11:24

## 2023-10-21 NOTE — PROGRESS NOTE ADULT - ASSESSMENT
tachycardia  due to blood loss / hematoma  management of anemia and hematoma as per primary team

## 2023-10-21 NOTE — PROGRESS NOTE ADULT - SUBJECTIVE AND OBJECTIVE BOX
Surgery Progress Note    S: Patient seen and examined. No acute events overnight. Reports tolerating diet without nausea, vomiting, passing flatus, having bowel movements, voiding without issues, have been ambulating and out of bed. Denies fever, chills, SOB, chest pain.     O:  Physical Exam:  Gen: Laying in bed, NAD  HEENT: atrumatic, EMOI  Resp: Unlabored breathing  Abd: soft, dania-incisional tenderness, nondistended, no rebound or guarding. Drains serosanguinous   Ext: Moves 4 extremities spontaneously    Vital Signs Last 24 Hrs  T(C): 36.7 (21 Oct 2023 13:55), Max: 37.2 (20 Oct 2023 16:58)  T(F): 98.1 (21 Oct 2023 13:55), Max: 99 (20 Oct 2023 16:58)  HR: 94 (21 Oct 2023 13:55) (94 - 131)  BP: 107/47 (21 Oct 2023 13:55) (101/48 - 124/68)  BP(mean): --  RR: 16 (21 Oct 2023 13:55) (16 - 18)  SpO2: 99% (21 Oct 2023 13:55) (98% - 100%)    Parameters below as of 21 Oct 2023 13:55  Patient On (Oxygen Delivery Method): room air        I&O's Detail    20 Oct 2023 07:01  -  21 Oct 2023 07:00  --------------------------------------------------------  IN:    dextrose 5% + lactated ringers: 900 mL    Jevity 1.2: 70 mL  Total IN: 970 mL    OUT:    Ileostomy (mL): 100 mL    Oral Fluid: 0 mL    Voided (mL): 450 mL  Total OUT: 550 mL    Total NET: 420 mL      21 Oct 2023 07:01  -  21 Oct 2023 16:15  --------------------------------------------------------  IN:    dextrose 5% + lactated ringers: 100 mL    dextrose 5% + sodium chloride 0.45%: 800 mL    Jevity 1.2: 90 mL  Total IN: 990 mL    OUT:    Ileostomy (mL): 0 mL    Oral Fluid: 0 mL    Voided (mL): 800 mL  Total OUT: 800 mL    Total NET: 190 mL                                5.6    10.24 )-----------( 306      ( 21 Oct 2023 06:00 )             17.0       10-21    147<H>  |  108<H>  |  14  ----------------------------<  150<H>  3.9   |  23  |  0.60    Ca    8.7      21 Oct 2023 05:48  Phos  2.5     10-21  Mg     2.20     10-21    TPro  6.4  /  Alb  2.8<L>  /  TBili  0.6  /  DBili  x   /  AST  722<H>  /  ALT  575<H>  /  AlkPhos  254<H>  10-21

## 2023-10-21 NOTE — PROVIDER CONTACT NOTE (CRITICAL VALUE NOTIFICATION) - BACKGROUND
Pt s/p appendectomy, cholecystectomy, hysterectomy, omenectomy, peritonectomy, LAR with diverting loop ileostomy and bladder injury on 10/4
Pt s/p appendectomy, cholecystectomy, hysterectomy, omenectomy, peritonectomy, LAR with diverting loop ileostomy and bladder injury on 10/4

## 2023-10-21 NOTE — PROGRESS NOTE ADULT - ASSESSMENT
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 found to have LIJ and inominate vein acute non-occlusive thrombosis, T lovenox held iso expanding subcapsular liver hematomas found on CT 10/20/23.    PLAN:    Nutrition:  - Naso-duodenal feeds via Ko feeding tube, start at trickle 10cc overnight, goal Jevity 1.2 @ 42cc/hr (1200 kcal), consider nutrition consult for tube feed recommendations.  - poor PO intake, frequent vomiting.  - possible PICC/TPN if oral intake poor.  - add compazine PRN (close qtc monitoring 10/19, 487)  - Protonix BID  - Appreciate GI recommendations, patient declining PEG-J     Post Operative Care:  - Elevated LFT's, will repeat CMP  - NPO/IVF, LR bolus x1   - CT urogram w/o leak, cristina dc'd 10/18 passed TOV  - Monitor JUAQUIN outpt  - FS premeal with ISS  - VTE ppx: T Lovenox for IJ and inominate thrombus  - OOB/ambulate as tolerated    Anemia:  - Transfuse with 2u pRBC on 10/21 iso Hgb 5.6, f/u post-transfusion CBC  - Hem consult for anemia: possible iron infusion, Oncology to follow.  - Continue daily CBC w/ differential.    Disposition:  - PT/OT consult appreciated, PM&R consult for possible acute rehab disposition  - Acute rehab criteria: Hgb >8, oral pain management only, no chemotherapy during rehab admission    D Team Surgery  [id number]

## 2023-10-21 NOTE — PROVIDER CONTACT NOTE (CRITICAL VALUE NOTIFICATION) - ASSESSMENT
Pt A&O x4, lethargic. Pt vitals stable. Pt denies chest pain and shortness of breath. Pt denies lightheadedness, dizziness, and headache.

## 2023-10-21 NOTE — PROVIDER CONTACT NOTE (CRITICAL VALUE NOTIFICATION) - ACTION/TREATMENT ORDERED:
Provider made aware. Ordered 2 units of PRBC and to redraw labs after blood finishes Will continue to monitor.
Provider made aware. Reordered coags.
No

## 2023-10-21 NOTE — PROGRESS NOTE ADULT - SUBJECTIVE AND OBJECTIVE BOX
Subjective: Patient seen and examined. No new events except as noted.     SUBJECTIVE/ROS:  fatigue  no cp   no sob  no dizziness       MEDICATIONS:  MEDICATIONS  (STANDING):  acetaminophen   IVPB .. 1000 milliGRAM(s) IV Intermittent every 6 hours  baclofen 5 milliGRAM(s) Oral every 8 hours  dextrose 5% + lactated ringers. 1000 milliLiter(s) (100 mL/Hr) IV Continuous <Continuous>  influenza   Vaccine 0.5 milliLiter(s) IntraMuscular once  pantoprazole  Injectable 40 milliGRAM(s) IV Push two times a day  prochlorperazine   Tablet 10 milliGRAM(s) Oral every 8 hours  scopolamine 1 mG/72 Hr(s) Patch 1 Patch Transdermal every 72 hours      PHYSICAL EXAM:  T(C): 36.5 (10-21-23 @ 04:30), Max: 37.4 (10-20-23 @ 12:50)  HR: 102 (10-21-23 @ 04:30) (90 - 131)  BP: 116/63 (10-21-23 @ 04:30) (107/62 - 122/66)  RR: 18 (10-21-23 @ 04:30) (18 - 18)  SpO2: 100% (10-21-23 @ 04:30) (100% - 100%)  Wt(kg): --  I&O's Summary    19 Oct 2023 07:01  -  20 Oct 2023 07:00  --------------------------------------------------------  IN: 850 mL / OUT: 1029.5 mL / NET: -179.5 mL    20 Oct 2023 07:01  -  21 Oct 2023 06:53  --------------------------------------------------------  IN: 970 mL / OUT: 550 mL / NET: 420 mL            JVP: Normal  Neck: supple  Lung: clear   CV: S1 S2 , Murmur:  Abd: soft  Ext: No edema  neuro: Awake / alert  Psych: flat affect  Skin: normal``    LABS/DATA:    CARDIAC MARKERS:                                7.6    7.29  )-----------( 378      ( 20 Oct 2023 05:49 )             23.3     10-20    143  |  105  |  16  ----------------------------<  113<H>  4.4   |  23  |  0.63    Ca    9.4      20 Oct 2023 08:23  Phos  3.7     10-20  Mg     2.20     10-20    TPro  7.0  /  Alb  3.2<L>  /  TBili  0.5  /  DBili  0.2  /  AST  1800<H>  /  ALT  809<H>  /  AlkPhos  291<H>  10-20    proBNP:   Lipid Profile:   HgA1c:   TSH:     TELE:  EKG:

## 2023-10-21 NOTE — PROGRESS NOTE ADULT - ASSESSMENT
POD #17.    Cytoreduction surgery and HIPEC with LAR, diverting loop ileostomy, CORNELIA/BSO, and bladder repair.    Patient continues to have fairly constant retching.  No abdominal pain.    Nasojejunal feeding tube placed yesterday.  Tolerating trickle feeds.    Tm = 99.4.  Normotensive.  Slight tachycardia.  Voided 450 cc / 12 hours.  Ileostomy output = 100 cc / 24 hours.    Abdomen is nontender.    CT abdomen and pelvis yesterday:  Liver:  Right subcapsular hematoma, increased compared with 10/12/2023 study.  New left subcapsular hematoma.    This morning: H&H: 5.9/17.7 (was 7.6/23.3).  Repeat evaluation pending with patient being aware that she may require transfusion of PRBCs.  No significant electrolyte abnormalities.    We will continue to monitor especially given the decline in her hematocrit.

## 2023-10-22 LAB
ALBUMIN SERPL ELPH-MCNC: 2.6 G/DL — LOW (ref 3.3–5)
ALBUMIN SERPL ELPH-MCNC: 2.6 G/DL — LOW (ref 3.3–5)
ALP SERPL-CCNC: 271 U/L — HIGH (ref 40–120)
ALP SERPL-CCNC: 271 U/L — HIGH (ref 40–120)
ALT FLD-CCNC: 361 U/L — HIGH (ref 4–33)
ALT FLD-CCNC: 361 U/L — HIGH (ref 4–33)
ANION GAP SERPL CALC-SCNC: 12 MMOL/L — SIGNIFICANT CHANGE UP (ref 7–14)
ANION GAP SERPL CALC-SCNC: 12 MMOL/L — SIGNIFICANT CHANGE UP (ref 7–14)
AST SERPL-CCNC: 269 U/L — HIGH (ref 4–32)
AST SERPL-CCNC: 269 U/L — HIGH (ref 4–32)
BILIRUB SERPL-MCNC: 0.5 MG/DL — SIGNIFICANT CHANGE UP (ref 0.2–1.2)
BILIRUB SERPL-MCNC: 0.5 MG/DL — SIGNIFICANT CHANGE UP (ref 0.2–1.2)
BUN SERPL-MCNC: 14 MG/DL — SIGNIFICANT CHANGE UP (ref 7–23)
BUN SERPL-MCNC: 14 MG/DL — SIGNIFICANT CHANGE UP (ref 7–23)
CALCIUM SERPL-MCNC: 8.9 MG/DL — SIGNIFICANT CHANGE UP (ref 8.4–10.5)
CALCIUM SERPL-MCNC: 8.9 MG/DL — SIGNIFICANT CHANGE UP (ref 8.4–10.5)
CHLORIDE SERPL-SCNC: 109 MMOL/L — HIGH (ref 98–107)
CHLORIDE SERPL-SCNC: 109 MMOL/L — HIGH (ref 98–107)
CO2 SERPL-SCNC: 24 MMOL/L — SIGNIFICANT CHANGE UP (ref 22–31)
CO2 SERPL-SCNC: 24 MMOL/L — SIGNIFICANT CHANGE UP (ref 22–31)
CREAT SERPL-MCNC: 0.45 MG/DL — LOW (ref 0.5–1.3)
CREAT SERPL-MCNC: 0.45 MG/DL — LOW (ref 0.5–1.3)
EGFR: 111 ML/MIN/1.73M2 — SIGNIFICANT CHANGE UP
EGFR: 111 ML/MIN/1.73M2 — SIGNIFICANT CHANGE UP
GLUCOSE SERPL-MCNC: 139 MG/DL — HIGH (ref 70–99)
GLUCOSE SERPL-MCNC: 139 MG/DL — HIGH (ref 70–99)
HCT VFR BLD CALC: 23.9 % — LOW (ref 34.5–45)
HCT VFR BLD CALC: 23.9 % — LOW (ref 34.5–45)
HCT VFR BLD CALC: 24.3 % — LOW (ref 34.5–45)
HCT VFR BLD CALC: 24.3 % — LOW (ref 34.5–45)
HGB BLD-MCNC: 7.9 G/DL — LOW (ref 11.5–15.5)
HGB BLD-MCNC: 7.9 G/DL — LOW (ref 11.5–15.5)
HGB BLD-MCNC: 8 G/DL — LOW (ref 11.5–15.5)
HGB BLD-MCNC: 8 G/DL — LOW (ref 11.5–15.5)
MAGNESIUM SERPL-MCNC: 2 MG/DL — SIGNIFICANT CHANGE UP (ref 1.6–2.6)
MAGNESIUM SERPL-MCNC: 2 MG/DL — SIGNIFICANT CHANGE UP (ref 1.6–2.6)
MCHC RBC-ENTMCNC: 28.4 PG — SIGNIFICANT CHANGE UP (ref 27–34)
MCHC RBC-ENTMCNC: 28.4 PG — SIGNIFICANT CHANGE UP (ref 27–34)
MCHC RBC-ENTMCNC: 28.6 PG — SIGNIFICANT CHANGE UP (ref 27–34)
MCHC RBC-ENTMCNC: 28.6 PG — SIGNIFICANT CHANGE UP (ref 27–34)
MCHC RBC-ENTMCNC: 32.9 GM/DL — SIGNIFICANT CHANGE UP (ref 32–36)
MCHC RBC-ENTMCNC: 32.9 GM/DL — SIGNIFICANT CHANGE UP (ref 32–36)
MCHC RBC-ENTMCNC: 33.1 GM/DL — SIGNIFICANT CHANGE UP (ref 32–36)
MCHC RBC-ENTMCNC: 33.1 GM/DL — SIGNIFICANT CHANGE UP (ref 32–36)
MCV RBC AUTO: 86.2 FL — SIGNIFICANT CHANGE UP (ref 80–100)
MCV RBC AUTO: 86.2 FL — SIGNIFICANT CHANGE UP (ref 80–100)
MCV RBC AUTO: 86.6 FL — SIGNIFICANT CHANGE UP (ref 80–100)
MCV RBC AUTO: 86.6 FL — SIGNIFICANT CHANGE UP (ref 80–100)
NRBC # BLD: 0 /100 WBCS — SIGNIFICANT CHANGE UP (ref 0–0)
NRBC # FLD: 0.08 K/UL — HIGH (ref 0–0)
NRBC # FLD: 0.08 K/UL — HIGH (ref 0–0)
NRBC # FLD: 0.09 K/UL — HIGH (ref 0–0)
NRBC # FLD: 0.09 K/UL — HIGH (ref 0–0)
PHOSPHATE SERPL-MCNC: 2.4 MG/DL — LOW (ref 2.5–4.5)
PHOSPHATE SERPL-MCNC: 2.4 MG/DL — LOW (ref 2.5–4.5)
PLATELET # BLD AUTO: 245 K/UL — SIGNIFICANT CHANGE UP (ref 150–400)
PLATELET # BLD AUTO: 245 K/UL — SIGNIFICANT CHANGE UP (ref 150–400)
PLATELET # BLD AUTO: 251 K/UL — SIGNIFICANT CHANGE UP (ref 150–400)
PLATELET # BLD AUTO: 251 K/UL — SIGNIFICANT CHANGE UP (ref 150–400)
POTASSIUM SERPL-MCNC: 3.6 MMOL/L — SIGNIFICANT CHANGE UP (ref 3.5–5.3)
POTASSIUM SERPL-MCNC: 3.6 MMOL/L — SIGNIFICANT CHANGE UP (ref 3.5–5.3)
POTASSIUM SERPL-SCNC: 3.6 MMOL/L — SIGNIFICANT CHANGE UP (ref 3.5–5.3)
POTASSIUM SERPL-SCNC: 3.6 MMOL/L — SIGNIFICANT CHANGE UP (ref 3.5–5.3)
PROT SERPL-MCNC: 6 G/DL — SIGNIFICANT CHANGE UP (ref 6–8.3)
PROT SERPL-MCNC: 6 G/DL — SIGNIFICANT CHANGE UP (ref 6–8.3)
RBC # BLD: 2.76 M/UL — LOW (ref 3.8–5.2)
RBC # BLD: 2.76 M/UL — LOW (ref 3.8–5.2)
RBC # BLD: 2.82 M/UL — LOW (ref 3.8–5.2)
RBC # BLD: 2.82 M/UL — LOW (ref 3.8–5.2)
RBC # FLD: 19.6 % — HIGH (ref 10.3–14.5)
RBC # FLD: 19.6 % — HIGH (ref 10.3–14.5)
RBC # FLD: 19.8 % — HIGH (ref 10.3–14.5)
RBC # FLD: 19.8 % — HIGH (ref 10.3–14.5)
SODIUM SERPL-SCNC: 145 MMOL/L — SIGNIFICANT CHANGE UP (ref 135–145)
SODIUM SERPL-SCNC: 145 MMOL/L — SIGNIFICANT CHANGE UP (ref 135–145)
WBC # BLD: 10.28 K/UL — SIGNIFICANT CHANGE UP (ref 3.8–10.5)
WBC # BLD: 10.28 K/UL — SIGNIFICANT CHANGE UP (ref 3.8–10.5)
WBC # BLD: 10.68 K/UL — HIGH (ref 3.8–10.5)
WBC # BLD: 10.68 K/UL — HIGH (ref 3.8–10.5)
WBC # FLD AUTO: 10.28 K/UL — SIGNIFICANT CHANGE UP (ref 3.8–10.5)
WBC # FLD AUTO: 10.28 K/UL — SIGNIFICANT CHANGE UP (ref 3.8–10.5)
WBC # FLD AUTO: 10.68 K/UL — HIGH (ref 3.8–10.5)
WBC # FLD AUTO: 10.68 K/UL — HIGH (ref 3.8–10.5)

## 2023-10-22 RX ORDER — ACETAMINOPHEN 500 MG
1000 TABLET ORAL ONCE
Refills: 0 | Status: COMPLETED | OUTPATIENT
Start: 2023-10-22 | End: 2023-10-22

## 2023-10-22 RX ORDER — ACETAMINOPHEN 500 MG
1000 TABLET ORAL EVERY 6 HOURS
Refills: 0 | Status: COMPLETED | OUTPATIENT
Start: 2023-10-22 | End: 2023-10-23

## 2023-10-22 RX ORDER — POTASSIUM CHLORIDE 20 MEQ
10 PACKET (EA) ORAL
Refills: 0 | Status: COMPLETED | OUTPATIENT
Start: 2023-10-22 | End: 2023-10-22

## 2023-10-22 RX ORDER — POTASSIUM PHOSPHATE, MONOBASIC POTASSIUM PHOSPHATE, DIBASIC 236; 224 MG/ML; MG/ML
30 INJECTION, SOLUTION INTRAVENOUS ONCE
Refills: 0 | Status: COMPLETED | OUTPATIENT
Start: 2023-10-22 | End: 2023-10-22

## 2023-10-22 RX ADMIN — Medication 1000 MILLIGRAM(S): at 17:45

## 2023-10-22 RX ADMIN — Medication 100 MILLIEQUIVALENT(S): at 13:42

## 2023-10-22 RX ADMIN — Medication 100 MILLIEQUIVALENT(S): at 11:18

## 2023-10-22 RX ADMIN — HYDROMORPHONE HYDROCHLORIDE 0.5 MILLIGRAM(S): 2 INJECTION INTRAMUSCULAR; INTRAVENOUS; SUBCUTANEOUS at 19:10

## 2023-10-22 RX ADMIN — PANTOPRAZOLE SODIUM 40 MILLIGRAM(S): 20 TABLET, DELAYED RELEASE ORAL at 05:41

## 2023-10-22 RX ADMIN — HYDROMORPHONE HYDROCHLORIDE 0.5 MILLIGRAM(S): 2 INJECTION INTRAMUSCULAR; INTRAVENOUS; SUBCUTANEOUS at 23:54

## 2023-10-22 RX ADMIN — SCOPALAMINE 1 PATCH: 1 PATCH, EXTENDED RELEASE TRANSDERMAL at 19:00

## 2023-10-22 RX ADMIN — Medication 0.25 MILLIGRAM(S): at 00:08

## 2023-10-22 RX ADMIN — SODIUM CHLORIDE 100 MILLILITER(S): 9 INJECTION, SOLUTION INTRAVENOUS at 18:52

## 2023-10-22 RX ADMIN — POTASSIUM PHOSPHATE, MONOBASIC POTASSIUM PHOSPHATE, DIBASIC 83.33 MILLIMOLE(S): 236; 224 INJECTION, SOLUTION INTRAVENOUS at 11:18

## 2023-10-22 RX ADMIN — SCOPALAMINE 1 PATCH: 1 PATCH, EXTENDED RELEASE TRANSDERMAL at 07:45

## 2023-10-22 RX ADMIN — SCOPALAMINE 1 PATCH: 1 PATCH, EXTENDED RELEASE TRANSDERMAL at 22:30

## 2023-10-22 RX ADMIN — HYDROMORPHONE HYDROCHLORIDE 0.5 MILLIGRAM(S): 2 INJECTION INTRAMUSCULAR; INTRAVENOUS; SUBCUTANEOUS at 04:34

## 2023-10-22 RX ADMIN — PANTOPRAZOLE SODIUM 40 MILLIGRAM(S): 20 TABLET, DELAYED RELEASE ORAL at 17:20

## 2023-10-22 RX ADMIN — Medication 400 MILLIGRAM(S): at 17:20

## 2023-10-22 RX ADMIN — HYDROMORPHONE HYDROCHLORIDE 0.5 MILLIGRAM(S): 2 INJECTION INTRAMUSCULAR; INTRAVENOUS; SUBCUTANEOUS at 12:15

## 2023-10-22 RX ADMIN — Medication 0.25 MILLIGRAM(S): at 09:26

## 2023-10-22 RX ADMIN — HYDROMORPHONE HYDROCHLORIDE 0.5 MILLIGRAM(S): 2 INJECTION INTRAMUSCULAR; INTRAVENOUS; SUBCUTANEOUS at 11:40

## 2023-10-22 RX ADMIN — Medication 100 MILLIEQUIVALENT(S): at 12:28

## 2023-10-22 RX ADMIN — Medication 400 MILLIGRAM(S): at 23:54

## 2023-10-22 RX ADMIN — Medication 1000 MILLIGRAM(S): at 12:00

## 2023-10-22 RX ADMIN — HYDROMORPHONE HYDROCHLORIDE 0.5 MILLIGRAM(S): 2 INJECTION INTRAMUSCULAR; INTRAVENOUS; SUBCUTANEOUS at 04:04

## 2023-10-22 RX ADMIN — Medication 1000 MILLIGRAM(S): at 04:31

## 2023-10-22 RX ADMIN — Medication 400 MILLIGRAM(S): at 04:04

## 2023-10-22 RX ADMIN — HYDROMORPHONE HYDROCHLORIDE 0.5 MILLIGRAM(S): 2 INJECTION INTRAMUSCULAR; INTRAVENOUS; SUBCUTANEOUS at 18:51

## 2023-10-22 RX ADMIN — SODIUM CHLORIDE 100 MILLILITER(S): 9 INJECTION, SOLUTION INTRAVENOUS at 09:17

## 2023-10-22 RX ADMIN — SODIUM CHLORIDE 100 MILLILITER(S): 9 INJECTION, SOLUTION INTRAVENOUS at 00:09

## 2023-10-22 RX ADMIN — Medication 400 MILLIGRAM(S): at 11:18

## 2023-10-22 NOTE — PROGRESS NOTE ADULT - SUBJECTIVE AND OBJECTIVE BOX
Subjective: Patient seen and examined. No new events except as noted.     SUBJECTIVE/ROS:  had n/v      MEDICATIONS:  MEDICATIONS  (STANDING):  baclofen 5 milliGRAM(s) Oral every 8 hours  dextrose 5% + sodium chloride 0.45%. 1000 milliLiter(s) (100 mL/Hr) IV Continuous <Continuous>  influenza   Vaccine 0.5 milliLiter(s) IntraMuscular once  pantoprazole  Injectable 40 milliGRAM(s) IV Push two times a day  prochlorperazine   Tablet 10 milliGRAM(s) Oral every 8 hours  scopolamine 1 mG/72 Hr(s) Patch 1 Patch Transdermal every 72 hours      PHYSICAL EXAM:  T(C): 36.8 (10-22-23 @ 04:30), Max: 36.9 (10-21-23 @ 10:12)  HR: 79 (10-22-23 @ 04:30) (79 - 110)  BP: 123/58 (10-22-23 @ 04:30) (101/48 - 133/67)  RR: 18 (10-22-23 @ 04:30) (16 - 18)  SpO2: 100% (10-22-23 @ 04:30) (98% - 100%)  Wt(kg): --  I&O's Summary    21 Oct 2023 07:01  -  22 Oct 2023 07:00  --------------------------------------------------------  IN: 2190 mL / OUT: 1560 mL / NET: 630 mL            JVP: Normal  Neck: supple  Lung: clear   CV: S1 S2 , Murmur:  Abd: soft  Ext: No edema  neuro: Awake / alert  Psych: flat affect  Skin: normal``    LABS/DATA:    CARDIAC MARKERS:                                7.9    10.28 )-----------( 245      ( 22 Oct 2023 05:39 )             23.9     10-21    147<H>  |  108<H>  |  14  ----------------------------<  150<H>  3.9   |  23  |  0.60    Ca    8.7      21 Oct 2023 05:48  Phos  2.5     10-21  Mg     2.20     10-21    TPro  6.4  /  Alb  2.8<L>  /  TBili  0.6  /  DBili  x   /  AST  722<H>  /  ALT  575<H>  /  AlkPhos  254<H>  10-21    proBNP:   Lipid Profile:   HgA1c:   TSH:     TELE:  EKG:

## 2023-10-22 NOTE — PROGRESS NOTE ADULT - ASSESSMENT
tachycardia  due to blood loss / hematoma  now HR better     Monitor hemoglobin, transfuse as needed.

## 2023-10-22 NOTE — PROGRESS NOTE ADULT - ASSESSMENT
POD #18.  Cytoreductive surgery, LAR with diverting loop ileostomy, CORNELIA/BSO, bladder repair, and HIPEC.  Received 2 units PRBC yesterday.    This morning she is awake, alert, and comfortable.  She still has fairly constant retching.  We will be trying Ativan for symptomatic relief.    Afebrile with stable vital signs.    Abdomen is nontender.    Normal WBC count.  Appropriate H&H response to transfusion of 2 units PRBC.    Clinically stable.  We will continue present management.

## 2023-10-22 NOTE — PROVIDER CONTACT NOTE (EICU) - BACKGROUND
Pt s/p appendectomy, cholecystectomy, hysterectomy, omenectomy, peritonectomy, LAR with diverting loop ileostomy and bladder injury 10/4

## 2023-10-22 NOTE — PROVIDER CONTACT NOTE (EICU) - ASSESSMENT
pt is alert and oriented x4. VSS. Pt denies chest pain or SOB. Pt had one episode of emesis after flushing NGT.

## 2023-10-22 NOTE — PROGRESS NOTE ADULT - SUBJECTIVE AND OBJECTIVE BOX
Surgery Progress Note    S: Patient seen and examined. No acute events overnight. Continues to spit up without nausea, productive ileostomy, voiding without issues, have been ambulating and out of bed to bathroom. Denies fever, chills, SOB, chest pain.     O:  Physical Exam:  Gen: Laying in bed, NAD  HEENT: atrumatic, EMOI  Resp: Unlabored breathing  Abd: soft, dania-incisional tenderness, nondistended, no rebound or guarding. productive ostomy  Ext: Moves 4 extremities spontaneously    Vital Signs Last 24 Hrs  T(C): 36.6 (22 Oct 2023 09:00), Max: 36.8 (22 Oct 2023 04:30)  T(F): 97.8 (22 Oct 2023 09:00), Max: 98.3 (22 Oct 2023 04:30)  HR: 79 (22 Oct 2023 09:00) (79 - 99)  BP: 140/64 (22 Oct 2023 09:00) (101/48 - 140/64)  BP(mean): --  RR: 16 (22 Oct 2023 09:00) (16 - 18)  SpO2: 100% (22 Oct 2023 09:00) (98% - 100%)    Parameters below as of 22 Oct 2023 09:00  Patient On (Oxygen Delivery Method): room air        I&O's Detail    21 Oct 2023 07:01  -  22 Oct 2023 07:00  --------------------------------------------------------  IN:    dextrose 5% + lactated ringers: 100 mL    dextrose 5% + sodium chloride 0.45%: 2000 mL    Jevity 1.2: 90 mL  Total IN: 2190 mL    OUT:    Ileostomy (mL): 160 mL    Oral Fluid: 0 mL    Voided (mL): 1400 mL  Total OUT: 1560 mL    Total NET: 630 mL      22 Oct 2023 07:01  -  22 Oct 2023 11:42  --------------------------------------------------------  IN:    dextrose 5% + sodium chloride 0.45%: 100 mL  Total IN: 100 mL    OUT:    Ileostomy (mL): 0 mL    Jevity 1.2: 0 mL    Oral Fluid: 0 mL    Voided (mL): 300 mL  Total OUT: 300 mL    Total NET: -200 mL                                7.9    10.28 )-----------( 245      ( 22 Oct 2023 05:39 )             23.9       10-22    145  |  109<H>  |  14  ----------------------------<  139<H>  3.6   |  24  |  0.45<L>    Ca    8.9      22 Oct 2023 05:39  Phos  2.4     10-22  Mg     2.00     10-22    TPro  6.0  /  Alb  2.6<L>  /  TBili  0.5  /  DBili  x   /  AST  269<H>  /  ALT  361<H>  /  AlkPhos  271<H>  10-22

## 2023-10-22 NOTE — PROGRESS NOTE ADULT - ASSESSMENT
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 found to have LIJ and inominate vein acute non-occlusive thrombosis, T lovenox held iso expanding subcapsular liver hematomas found on CT 10/20/23.    PLAN:    Nutrition:  - Paused Naso-duodenal feeds via Ko feeding tube overnight due to intolerance: increased spit-ups resembling tube feed appearance despite AXR confirmed post-pyloric positioning of ND tube  - Plan for PICC and TPN on Monday  - Diet: comfort CLD and watermelon ok  - add compazine PRN (close qtc monitoring 10/19, 487)  - Ativan PRN for constant retching  - Protonix BID  - Appreciate GI recommendations, patient declining PEG-J     Post Operative Care:  - Downtrending LFT's and normalizing H/H, will continue to monitor CMP and CBC  - NPO/IVF, LR bolus x1   - CT urogram w/o leak, cristina dc'd 10/18 passed TOV  - Monitor JUAQUIN outpt  - FS premeal with ISS  - VTE ppx: T Lovenox for IJ and inominate thrombus  - OOB/ambulate as tolerated    Anemia:  - Transfuse with 2u pRBC on 10/21 iso Hgb 5.6, f/u post-transfusion CBC  - Hem consult for anemia: possible iron infusion, Oncology to follow.  - Continue daily CBC w/ differential.    Disposition:  - PT/OT consult appreciated, PM&R consult for possible acute rehab disposition  - Acute rehab criteria: Hgb >8, oral pain management only, no chemotherapy during rehab admission    D Team Surgery  [id number]

## 2023-10-23 LAB
ALBUMIN SERPL ELPH-MCNC: 2.6 G/DL — LOW (ref 3.3–5)
ALBUMIN SERPL ELPH-MCNC: 2.6 G/DL — LOW (ref 3.3–5)
ALP SERPL-CCNC: 284 U/L — HIGH (ref 40–120)
ALP SERPL-CCNC: 284 U/L — HIGH (ref 40–120)
ALT FLD-CCNC: 259 U/L — HIGH (ref 4–33)
ALT FLD-CCNC: 259 U/L — HIGH (ref 4–33)
ANION GAP SERPL CALC-SCNC: 12 MMOL/L — SIGNIFICANT CHANGE UP (ref 7–14)
ANION GAP SERPL CALC-SCNC: 12 MMOL/L — SIGNIFICANT CHANGE UP (ref 7–14)
AST SERPL-CCNC: 117 U/L — HIGH (ref 4–32)
AST SERPL-CCNC: 117 U/L — HIGH (ref 4–32)
BILIRUB SERPL-MCNC: 0.6 MG/DL — SIGNIFICANT CHANGE UP (ref 0.2–1.2)
BILIRUB SERPL-MCNC: 0.6 MG/DL — SIGNIFICANT CHANGE UP (ref 0.2–1.2)
BUN SERPL-MCNC: 8 MG/DL — SIGNIFICANT CHANGE UP (ref 7–23)
BUN SERPL-MCNC: 8 MG/DL — SIGNIFICANT CHANGE UP (ref 7–23)
CALCIUM SERPL-MCNC: 8.5 MG/DL — SIGNIFICANT CHANGE UP (ref 8.4–10.5)
CALCIUM SERPL-MCNC: 8.5 MG/DL — SIGNIFICANT CHANGE UP (ref 8.4–10.5)
CHLORIDE SERPL-SCNC: 109 MMOL/L — HIGH (ref 98–107)
CHLORIDE SERPL-SCNC: 109 MMOL/L — HIGH (ref 98–107)
CO2 SERPL-SCNC: 20 MMOL/L — LOW (ref 22–31)
CO2 SERPL-SCNC: 20 MMOL/L — LOW (ref 22–31)
CREAT SERPL-MCNC: 0.41 MG/DL — LOW (ref 0.5–1.3)
CREAT SERPL-MCNC: 0.41 MG/DL — LOW (ref 0.5–1.3)
EGFR: 113 ML/MIN/1.73M2 — SIGNIFICANT CHANGE UP
EGFR: 113 ML/MIN/1.73M2 — SIGNIFICANT CHANGE UP
GLUCOSE SERPL-MCNC: 114 MG/DL — HIGH (ref 70–99)
GLUCOSE SERPL-MCNC: 114 MG/DL — HIGH (ref 70–99)
HCT VFR BLD CALC: 23.8 % — LOW (ref 34.5–45)
HCT VFR BLD CALC: 23.8 % — LOW (ref 34.5–45)
HGB BLD-MCNC: 8.1 G/DL — LOW (ref 11.5–15.5)
HGB BLD-MCNC: 8.1 G/DL — LOW (ref 11.5–15.5)
MAGNESIUM SERPL-MCNC: 1.7 MG/DL — SIGNIFICANT CHANGE UP (ref 1.6–2.6)
MAGNESIUM SERPL-MCNC: 1.7 MG/DL — SIGNIFICANT CHANGE UP (ref 1.6–2.6)
MCHC RBC-ENTMCNC: 29.5 PG — SIGNIFICANT CHANGE UP (ref 27–34)
MCHC RBC-ENTMCNC: 29.5 PG — SIGNIFICANT CHANGE UP (ref 27–34)
MCHC RBC-ENTMCNC: 34 GM/DL — SIGNIFICANT CHANGE UP (ref 32–36)
MCHC RBC-ENTMCNC: 34 GM/DL — SIGNIFICANT CHANGE UP (ref 32–36)
MCV RBC AUTO: 86.5 FL — SIGNIFICANT CHANGE UP (ref 80–100)
MCV RBC AUTO: 86.5 FL — SIGNIFICANT CHANGE UP (ref 80–100)
NRBC # BLD: 0 /100 WBCS — SIGNIFICANT CHANGE UP (ref 0–0)
NRBC # BLD: 0 /100 WBCS — SIGNIFICANT CHANGE UP (ref 0–0)
NRBC # FLD: 0.07 K/UL — HIGH (ref 0–0)
NRBC # FLD: 0.07 K/UL — HIGH (ref 0–0)
PHOSPHATE SERPL-MCNC: 2.8 MG/DL — SIGNIFICANT CHANGE UP (ref 2.5–4.5)
PHOSPHATE SERPL-MCNC: 2.8 MG/DL — SIGNIFICANT CHANGE UP (ref 2.5–4.5)
PLATELET # BLD AUTO: 240 K/UL — SIGNIFICANT CHANGE UP (ref 150–400)
PLATELET # BLD AUTO: 240 K/UL — SIGNIFICANT CHANGE UP (ref 150–400)
POTASSIUM SERPL-MCNC: 3.5 MMOL/L — SIGNIFICANT CHANGE UP (ref 3.5–5.3)
POTASSIUM SERPL-MCNC: 3.5 MMOL/L — SIGNIFICANT CHANGE UP (ref 3.5–5.3)
POTASSIUM SERPL-SCNC: 3.5 MMOL/L — SIGNIFICANT CHANGE UP (ref 3.5–5.3)
POTASSIUM SERPL-SCNC: 3.5 MMOL/L — SIGNIFICANT CHANGE UP (ref 3.5–5.3)
PROT SERPL-MCNC: 6.2 G/DL — SIGNIFICANT CHANGE UP (ref 6–8.3)
PROT SERPL-MCNC: 6.2 G/DL — SIGNIFICANT CHANGE UP (ref 6–8.3)
RBC # BLD: 2.75 M/UL — LOW (ref 3.8–5.2)
RBC # BLD: 2.75 M/UL — LOW (ref 3.8–5.2)
RBC # FLD: 19.8 % — HIGH (ref 10.3–14.5)
RBC # FLD: 19.8 % — HIGH (ref 10.3–14.5)
SODIUM SERPL-SCNC: 141 MMOL/L — SIGNIFICANT CHANGE UP (ref 135–145)
SODIUM SERPL-SCNC: 141 MMOL/L — SIGNIFICANT CHANGE UP (ref 135–145)
WBC # BLD: 10.25 K/UL — SIGNIFICANT CHANGE UP (ref 3.8–10.5)
WBC # BLD: 10.25 K/UL — SIGNIFICANT CHANGE UP (ref 3.8–10.5)
WBC # FLD AUTO: 10.25 K/UL — SIGNIFICANT CHANGE UP (ref 3.8–10.5)
WBC # FLD AUTO: 10.25 K/UL — SIGNIFICANT CHANGE UP (ref 3.8–10.5)

## 2023-10-23 PROCEDURE — 99233 SBSQ HOSP IP/OBS HIGH 50: CPT

## 2023-10-23 RX ORDER — HYDROMORPHONE HYDROCHLORIDE 2 MG/ML
0.5 INJECTION INTRAMUSCULAR; INTRAVENOUS; SUBCUTANEOUS ONCE
Refills: 0 | Status: DISCONTINUED | OUTPATIENT
Start: 2023-10-23 | End: 2023-10-23

## 2023-10-23 RX ORDER — POTASSIUM CHLORIDE 20 MEQ
10 PACKET (EA) ORAL
Refills: 0 | Status: COMPLETED | OUTPATIENT
Start: 2023-10-23 | End: 2023-10-23

## 2023-10-23 RX ORDER — MAGNESIUM SULFATE 500 MG/ML
2 VIAL (ML) INJECTION ONCE
Refills: 0 | Status: COMPLETED | OUTPATIENT
Start: 2023-10-23 | End: 2023-10-23

## 2023-10-23 RX ADMIN — HYDROMORPHONE HYDROCHLORIDE 0.5 MILLIGRAM(S): 2 INJECTION INTRAMUSCULAR; INTRAVENOUS; SUBCUTANEOUS at 00:24

## 2023-10-23 RX ADMIN — HYDROMORPHONE HYDROCHLORIDE 0.5 MILLIGRAM(S): 2 INJECTION INTRAMUSCULAR; INTRAVENOUS; SUBCUTANEOUS at 20:00

## 2023-10-23 RX ADMIN — Medication 400 MILLIGRAM(S): at 05:38

## 2023-10-23 RX ADMIN — HYDROMORPHONE HYDROCHLORIDE 0.5 MILLIGRAM(S): 2 INJECTION INTRAMUSCULAR; INTRAVENOUS; SUBCUTANEOUS at 19:30

## 2023-10-23 RX ADMIN — ONDANSETRON 4 MILLIGRAM(S): 8 TABLET, FILM COATED ORAL at 08:44

## 2023-10-23 RX ADMIN — HYDROMORPHONE HYDROCHLORIDE 0.5 MILLIGRAM(S): 2 INJECTION INTRAMUSCULAR; INTRAVENOUS; SUBCUTANEOUS at 13:45

## 2023-10-23 RX ADMIN — HYDROMORPHONE HYDROCHLORIDE 0.5 MILLIGRAM(S): 2 INJECTION INTRAMUSCULAR; INTRAVENOUS; SUBCUTANEOUS at 08:44

## 2023-10-23 RX ADMIN — Medication 1000 MILLIGRAM(S): at 06:08

## 2023-10-23 RX ADMIN — HYDROMORPHONE HYDROCHLORIDE 0.5 MILLIGRAM(S): 2 INJECTION INTRAMUSCULAR; INTRAVENOUS; SUBCUTANEOUS at 05:24

## 2023-10-23 RX ADMIN — SCOPALAMINE 1 PATCH: 1 PATCH, EXTENDED RELEASE TRANSDERMAL at 19:30

## 2023-10-23 RX ADMIN — Medication 25 GRAM(S): at 15:38

## 2023-10-23 RX ADMIN — PANTOPRAZOLE SODIUM 40 MILLIGRAM(S): 20 TABLET, DELAYED RELEASE ORAL at 05:38

## 2023-10-23 RX ADMIN — Medication 1000 MILLIGRAM(S): at 00:24

## 2023-10-23 RX ADMIN — HYDROMORPHONE HYDROCHLORIDE 0.5 MILLIGRAM(S): 2 INJECTION INTRAMUSCULAR; INTRAVENOUS; SUBCUTANEOUS at 04:54

## 2023-10-23 RX ADMIN — Medication 100 MILLIEQUIVALENT(S): at 10:25

## 2023-10-23 RX ADMIN — PANTOPRAZOLE SODIUM 40 MILLIGRAM(S): 20 TABLET, DELAYED RELEASE ORAL at 18:22

## 2023-10-23 RX ADMIN — HYDROMORPHONE HYDROCHLORIDE 0.5 MILLIGRAM(S): 2 INJECTION INTRAMUSCULAR; INTRAVENOUS; SUBCUTANEOUS at 09:14

## 2023-10-23 RX ADMIN — SCOPALAMINE 1 PATCH: 1 PATCH, EXTENDED RELEASE TRANSDERMAL at 07:00

## 2023-10-23 RX ADMIN — Medication 100 MILLIEQUIVALENT(S): at 12:31

## 2023-10-23 RX ADMIN — SODIUM CHLORIDE 100 MILLILITER(S): 9 INJECTION, SOLUTION INTRAVENOUS at 05:37

## 2023-10-23 RX ADMIN — Medication 100 MILLIEQUIVALENT(S): at 11:28

## 2023-10-23 RX ADMIN — HYDROMORPHONE HYDROCHLORIDE 0.5 MILLIGRAM(S): 2 INJECTION INTRAMUSCULAR; INTRAVENOUS; SUBCUTANEOUS at 14:15

## 2023-10-23 NOTE — PROGRESS NOTE ADULT - SUBJECTIVE AND OBJECTIVE BOX
General Surgery Daily Resident Progress Note    Subjective and interval  Seen and examined at bedside. Overnight continued to experience retching abdominal discomfort, abdominal pain improved with one-time 1mg hydromorphone. Otherwise no acute events.   ___________________________________________________  Vital Signs  T(C): 37.1 (08:32), Max: 37.1 (08:32)  HR: 101 (08:32) (85 - 101)  BP: 158/91 (08:32) (141/70 - 158/91)  RR: 18 (08:32) (16 - 18)  SpO2: 100% (08:32) (100% - 100%)    In/Out  10-22  -  10-23  --------------------------------------------------------  IN: 1700 mL / OUT: 1500 mL / NET: 200 mL    10-22 @ 07:01  -  10-23 @ 07:00 OUT:  --------------------------------------------------------  Ileostomy (mL): 100 mL  Total OUT: 100 mL  ___________________________________________________  Physical Exam  General: NAD, resting comfortably in bed.   Cardiac: WWP.  Respiratory: Equal chest wall expansion bilaterally, nonlabored respirations.  Abdomen: +/+ in ileostomy.   ___________________________________________________  Labs  CBC Basic (10-23 @ 05:30)  WBC: 10.25 Hgb: 8.1 Hct: 23.8 Plt: 240    Metabolic Panel (10-23 @ 05:30)  141  |  109  |  8  ----------------------------<  114  3.5   |  20  |  0.41  Ca: 8.5/Phos: 2.8/Magnesium: 1.70    LFTs (10-23 @ 05:30)  Total protein 6.2 | Albumin 2.6  TBili 0.6 | DBili x   |  | AlkPhos 284    Urinalysis (10-23 @ 05:30)  Physical: Color x, Appearance x, Mucous x, Gross blood x  Analytic: SG x, pH x  Cells: RBC x, WBC x  UTI markers: Bacteria x, Leukocyte esterase x, Nitrites x  Chemical: Glucose 114, Ketones x, Protein x, Urobilinogen x, Bilirubin x  ___________________________________________________  Medications  Neuro, Psych and Analgesia  ·	HYDROmorphone  Injectable 0.5 milliGRAM(s) every 6 hours PRN  ·	LORazepam   Injectable 0.25 milliGRAM(s) every 6 hours PRN  ·	scopolamine 1 mG/72 Hr(s) Patch 1 Patch every 72 hours    GI, Endocrine and Nutrition  ·	pantoprazole  Injectable 40 milliGRAM(s) two times a day

## 2023-10-23 NOTE — PROGRESS NOTE ADULT - SUBJECTIVE AND OBJECTIVE BOX
SUBJECTIVE AND OBJECTIVE:  Pt indicates that she is still having the episodes of retching, overall not feeling well  Indicates that Ativan has helped    Indication for Geriatrics and Palliative Care Services/INTERVAL HPI: Complex symptom management in the setting of advanced illness    OVERNIGHT EVENTS:   over the weekend pt had liver hematoma, s/p drainage  currently being managed and followed by surgical team    DNR on chart: full code  Allergies    penicillin (Rash)    Intolerances    MEDICATIONS  (STANDING):  chlorhexidine 4% Liquid 1 Application(s) Topical <User Schedule>  dextrose 5% + sodium chloride 0.45%. 1000 milliLiter(s) (100 mL/Hr) IV Continuous <Continuous>  diatrizoate meglumine/diatrizoate sodium. 120 milliLiter(s) Oral once  influenza   Vaccine 0.5 milliLiter(s) IntraMuscular once  lipid, fat emulsion (Fish Oil and Plant Based) 20% Infusion 10.4 mL/Hr (10.4 mL/Hr) IV Continuous <Continuous>  pantoprazole  Injectable 40 milliGRAM(s) IV Push two times a day  Parenteral Nutrition - Adult 1 Each (42 mL/Hr) TPN Continuous <Continuous>  scopolamine 1 mG/72 Hr(s) Patch 1 Patch Transdermal every 72 hours    MEDICATIONS  (PRN):  acetaminophen   IVPB .. 1000 milliGRAM(s) IV Intermittent every 6 hours PRN Mild Pain (1 - 3), Moderate Pain (4 - 6)  HYDROmorphone  Injectable 0.5 milliGRAM(s) IV Push every 6 hours PRN Moderate and Severe Pain  LORazepam   Injectable 0.25 milliGRAM(s) IV Push every 6 hours PRN Nausea and/or Vomiting  naloxone Injectable 0.1 milliGRAM(s) IV Push every 3 minutes PRN For ANY of the following changes in patient status:  A. RR LESS THAN 10 breaths per minute, B. Oxygen saturation LESS THAN 90%, C. Sedation score of 6  ondansetron Injectable 4 milliGRAM(s) IV Push every 6 hours PRN Nausea  sodium chloride 0.9% lock flush 10 milliLiter(s) IV Push every 1 hour PRN Pre/post blood products, medications, blood draw, and to maintain line patency      ITEMS UNCHECKED ARE NOT PRESENT    PRESENT SYMPTOMS: [ ]Unable to self-report - see [ ] CPOT [ ] PAINADS [ ] RDOS  Source if other than patient:  [ ]Family   [ ]Team     Pain:  [x ]yes [ ]no  QOL impact -   Location -  mid abdomen                  Aggravating factors -  Quality - aching  Radiation - none  Timing- intermittent  Severity (0-10 scale): 10/10  Minimal acceptable level (0-10 scale): 2-3/10    CPOT:    https://[email or web address]/[email or web address]/Critical-Care-Pain-Observation-Tool-(CPOT)    PAIN AD Score:	  http://geriatrictoolkit.[email or web address]/painad.pdf (Ctrl + left click to view)    Dyspnea:                           [ ]Mild [ ]Moderate [ ]Severe    RDOS:  0 to 2  minimal or no respiratory distress   3  mild distress  4 to 6 moderate distress  >7 severe distress  https://homecareinformation.net/handouts/hen/Respiratory_Distress_Observation_Scale.pdf (Ctrl +  left click to view)     Anxiety:                             [ ]Mild [ ]Moderate [ ]Severe  Fatigue:                             [ ]Mild [x ]Moderate [ ]Severe  Nausea:                             [ ]Mild [ ]Moderate [ ]Severe  Loss of appetite:              [ ]Mild [ x]Moderate [ ]Severe  Constipation:                    [ ]Mild [ ]Moderate [ ]Severe    PCSSQ[Palliative Care Spiritual Screening Question]   Severity (0-10): deferred  Score of 4 or > indicate consideration of Chaplaincy referral.    Chaplaincy Referral: [ ] yes [ ] refused [ ] following [x] deferred    Other Symptoms: +retching with vomiting  [x ]All other review of systems negative     Palliative Performance Status Version 2: 50 %      http://npcrc.org/files/news/palliative_performance_scale_ppsv2.pdf    PHYSICAL EXAM:  T(C): 36.4 (10-23-23 @ 04:54), Max: 37 (10-22-23 @ 23:50)  HR: 95 (10-23-23 @ 04:54) (79 - 95)  BP: 152/82 (10-23-23 @ 04:54) (140/64 - 152/82)  RR: 17 (10-23-23 @ 04:54) (16 - 18)  SpO2: 100% (10-23-23 @ 04:54) (100% - 100%)  Wt(kg): --  I&O's Summary    22 Oct 2023 07:01  -  23 Oct 2023 07:00  --------------------------------------------------------  IN: 1700 mL / OUT: 1500 mL / NET: 200 mL    GENERAL: [ ]Cachexia    [x ]Alert  [x ]Oriented x 3  [ ]Lethargic  [ ]Unarousable  [x ]Verbal  [ ]Non-Verbal  Behavioral:   [ ]Anxiety  [ ]Delirium [ ]Agitation [ ]Other  HEENT:  [x ]Normal   [x]Dry mouth   [ ]ET Tube/Trach  [ ]Oral lesions  PULMONARY:   [ ]Clear [ ]Tachypnea  [ ]Audible excessive secretions   [ x]Rhonchi        [ ]Right [ ]Left [ ]Bilateral  [ ]Crackles        [ ]Right [ ]Left [ ]Bilateral  [ ]Wheezing     [ ]Right [ ]Left [ ]Bilateral  [ ]Diminished BS [ ] Right [ ]Left [ ]Bilateral  CARDIOVASCULAR:    [x ]Regular [ ]Irregular [ ]Tachy  [ ]Bao [ ]Murmur [ ]Other  GASTROINTESTINAL: has ostomy  [ x]Soft  [ ]Distended   [x]+BS  [ ]Non tender [x ]Tender  [ ]Other [ ]PEG [x ]OGT/ NGT   Last BM: 10/21  GENITOURINARY:  [x ]Normal [ ]Incontinent   [ ]Oliguria/Anuria   [ ]Pierce  MUSCULOSKELETAL:   [ ]Normal   [ x]Weakness  [ ]Bed/Wheelchair bound [ ]Edema  NEUROLOGIC:   [x ]No focal deficits  [ ] Cognitive impairment  [ ] Dysphagia [ ]Dysarthria [ ] Paresis [ ]Other   SKIN:   [ x]Normal  [ ]Rash  [ ]Other  [ ]Pressure ulcer(s) [ ]y [ ]n present on admission    CRITICAL CARE:  [ ]Shock Present  [ ]Septic [ ]Cardiogenic [ ]Neurologic [ ]Hypovolemic  [ ]Vasopressors [ ]Inotropes  [ ]Respiratory failure present [ ]Mechanical Ventilation [ ]Non-invasive ventilatory support [ ]High-Flow   [ ]Acute  [ ]Chronic [ ]Hypoxic  [ ]Hypercarbic [ ]Other  [ ]Other organ failure       LABS:               8.1    10.25 )-----------( 240      ( 23 Oct 2023 05:30 )             23.8     10-23    141  |  109<H>  |  8   ----------------------------<  114<H>  3.5   |  20<L>  |  0.41<L>    Ca    8.5      23 Oct 2023 05:30  Phos  2.8     10-23  Mg     1.70     10-23    TPro  6.2  /  Alb  2.6<L>  /  TBili  0.6  /  DBili  x   /  AST  117<H>  /  ALT  259<H>  /  AlkPhos  284<H>  10-23    RADIOLOGY & ADDITIONAL STUDIES: reviewed    Protein Calorie Malnutrition Present: [ ]mild [ ]moderate [ ]severe [ ]underweight [ ]morbid obesity  https://www.andeal.org/vault/2440/web/files/ONC/Table_Clinical%20Characteristics%20to%20Document%20Malnutrition-White%20JV%20et%20al%202012.pdf    Height (cm): 154.9 (10-04-23 @ 07:27), 156.3 (09-05-23 @ 11:06), 154.9 (07-21-23 @ 08:27)  Weight (kg): 68.9 (10-04-23 @ 07:27), 71.5 (09-05-23 @ 11:06), 75.3 (07-21-23 @ 08:27)  BMI (kg/m2): 28.7 (10-04-23 @ 07:27), 29.3 (09-05-23 @ 11:06), 31.4 (07-21-23 @ 08:27)    [ ]PPSV2 < or = 30%  [ ]significant weight loss [ ]poor nutritional intake [ ]anasarca[ ]Artificial Nutrition    Other REFERRALS:  [ ]Hospice  [ ]Child Life  [ ]Social Work  [ ]Case management [ ]Holistic Therapy     Goals of Care Document:

## 2023-10-23 NOTE — PROGRESS NOTE ADULT - ASSESSMENT
tachycardia  due to blood loss / hematoma  improving    HTN  monitor for now     Monitor hemoglobin, transfuse as needed.

## 2023-10-23 NOTE — PROGRESS NOTE ADULT - SUBJECTIVE AND OBJECTIVE BOX
Subjective: Patient seen and examined. No new events except as noted.     SUBJECTIVE/ROS:  No chest pain, dyspnea, palpitation, or dizziness.       MEDICATIONS:  MEDICATIONS  (STANDING):  dextrose 5% + sodium chloride 0.45%. 1000 milliLiter(s) (100 mL/Hr) IV Continuous <Continuous>  influenza   Vaccine 0.5 milliLiter(s) IntraMuscular once  pantoprazole  Injectable 40 milliGRAM(s) IV Push two times a day  scopolamine 1 mG/72 Hr(s) Patch 1 Patch Transdermal every 72 hours      PHYSICAL EXAM:  T(C): 36.4 (10-23-23 @ 04:54), Max: 37 (10-22-23 @ 23:50)  HR: 95 (10-23-23 @ 04:54) (79 - 95)  BP: 152/82 (10-23-23 @ 04:54) (140/64 - 152/82)  RR: 17 (10-23-23 @ 04:54) (16 - 18)  SpO2: 100% (10-23-23 @ 04:54) (100% - 100%)  Wt(kg): --  I&O's Summary    22 Oct 2023 07:01  -  23 Oct 2023 07:00  --------------------------------------------------------  IN: 1700 mL / OUT: 1500 mL / NET: 200 mL            JVP: Normal  Neck: supple  Lung: clear   CV: S1 S2 , Murmur:  Abd: soft  Ext: No edema  neuro: Awake / alert  Psych: flat affect  Skin: normal``    LABS/DATA:    CARDIAC MARKERS:                                8.1    10.25 )-----------( 240      ( 23 Oct 2023 05:30 )             23.8     10-23    141  |  109<H>  |  8   ----------------------------<  114<H>  3.5   |  20<L>  |  0.41<L>    Ca    8.5      23 Oct 2023 05:30  Phos  2.8     10-23  Mg     1.70     10-23    TPro  6.2  /  Alb  2.6<L>  /  TBili  0.6  /  DBili  x   /  AST  117<H>  /  ALT  259<H>  /  AlkPhos  284<H>  10-23    proBNP:   Lipid Profile:   HgA1c:   TSH:     TELE:  EKG:

## 2023-10-23 NOTE — PROGRESS NOTE ADULT - ASSESSMENT
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 Found to have L IJ and innominate vein acute non-occlusive thrombosis, therapeutic lovenox held i/s/o expanding subcapsular liver hematomas found on CT 10/20/23. Now complaining of persistent abdominal discomfort associated with retching, inability to tolerate tube feeds via ND tube.     Plan  - Nausea: Scopolamine, PRN lorazepam.   - Per TPN team, pt should be able to tolerate ND feeds, no indication for PICC/TPN.   - Diet: NPO w/ tube feeds.   - Protonix BID.   - Appreciate GI recommendations, patient declining PEG-J.  - IVF: D5 1/2 NS @ 100cc/hr.   - Pain: Dilaudid PRN.   - 10/21 Hgb 5.6, received 2u pRBC, now improved >8. Continue to follow.   - Hem consult for anemia: possible iron infusion, Oncology to follow.  - PT/OT consult appreciated, PM&R consulted, approved for acute rehab.   - Acute rehab criteria: Hgb >8, oral pain management only, no chemotherapy during rehab admission.    D Team Surgery  Pager [phone]

## 2023-10-24 LAB
ALBUMIN SERPL ELPH-MCNC: 2.5 G/DL — LOW (ref 3.3–5)
ALBUMIN SERPL ELPH-MCNC: 2.5 G/DL — LOW (ref 3.3–5)
ALP SERPL-CCNC: 319 U/L — HIGH (ref 40–120)
ALP SERPL-CCNC: 319 U/L — HIGH (ref 40–120)
ALT FLD-CCNC: 175 U/L — HIGH (ref 4–33)
ALT FLD-CCNC: 175 U/L — HIGH (ref 4–33)
ANION GAP SERPL CALC-SCNC: 10 MMOL/L — SIGNIFICANT CHANGE UP (ref 7–14)
ANION GAP SERPL CALC-SCNC: 10 MMOL/L — SIGNIFICANT CHANGE UP (ref 7–14)
APTT BLD: 27.8 SEC — SIGNIFICANT CHANGE UP (ref 24.5–35.6)
APTT BLD: 27.8 SEC — SIGNIFICANT CHANGE UP (ref 24.5–35.6)
AST SERPL-CCNC: 64 U/L — HIGH (ref 4–32)
AST SERPL-CCNC: 64 U/L — HIGH (ref 4–32)
BASOPHILS # BLD AUTO: 0.01 K/UL — SIGNIFICANT CHANGE UP (ref 0–0.2)
BASOPHILS # BLD AUTO: 0.01 K/UL — SIGNIFICANT CHANGE UP (ref 0–0.2)
BASOPHILS NFR BLD AUTO: 0.1 % — SIGNIFICANT CHANGE UP (ref 0–2)
BASOPHILS NFR BLD AUTO: 0.1 % — SIGNIFICANT CHANGE UP (ref 0–2)
BILIRUB SERPL-MCNC: 0.4 MG/DL — SIGNIFICANT CHANGE UP (ref 0.2–1.2)
BILIRUB SERPL-MCNC: 0.4 MG/DL — SIGNIFICANT CHANGE UP (ref 0.2–1.2)
BUN SERPL-MCNC: 8 MG/DL — SIGNIFICANT CHANGE UP (ref 7–23)
BUN SERPL-MCNC: 8 MG/DL — SIGNIFICANT CHANGE UP (ref 7–23)
CALCIUM SERPL-MCNC: 8.3 MG/DL — LOW (ref 8.4–10.5)
CALCIUM SERPL-MCNC: 8.3 MG/DL — LOW (ref 8.4–10.5)
CHLORIDE SERPL-SCNC: 106 MMOL/L — SIGNIFICANT CHANGE UP (ref 98–107)
CHLORIDE SERPL-SCNC: 106 MMOL/L — SIGNIFICANT CHANGE UP (ref 98–107)
CO2 SERPL-SCNC: 22 MMOL/L — SIGNIFICANT CHANGE UP (ref 22–31)
CO2 SERPL-SCNC: 22 MMOL/L — SIGNIFICANT CHANGE UP (ref 22–31)
CREAT SERPL-MCNC: 0.41 MG/DL — LOW (ref 0.5–1.3)
CREAT SERPL-MCNC: 0.41 MG/DL — LOW (ref 0.5–1.3)
EGFR: 113 ML/MIN/1.73M2 — SIGNIFICANT CHANGE UP
EGFR: 113 ML/MIN/1.73M2 — SIGNIFICANT CHANGE UP
EOSINOPHIL # BLD AUTO: 0.01 K/UL — SIGNIFICANT CHANGE UP (ref 0–0.5)
EOSINOPHIL # BLD AUTO: 0.01 K/UL — SIGNIFICANT CHANGE UP (ref 0–0.5)
EOSINOPHIL NFR BLD AUTO: 0.1 % — SIGNIFICANT CHANGE UP (ref 0–6)
EOSINOPHIL NFR BLD AUTO: 0.1 % — SIGNIFICANT CHANGE UP (ref 0–6)
GLUCOSE SERPL-MCNC: 121 MG/DL — HIGH (ref 70–99)
GLUCOSE SERPL-MCNC: 121 MG/DL — HIGH (ref 70–99)
HCT VFR BLD CALC: 23.9 % — LOW (ref 34.5–45)
HCT VFR BLD CALC: 23.9 % — LOW (ref 34.5–45)
HGB BLD-MCNC: 7.8 G/DL — LOW (ref 11.5–15.5)
HGB BLD-MCNC: 7.8 G/DL — LOW (ref 11.5–15.5)
IANC: 5.94 K/UL — SIGNIFICANT CHANGE UP (ref 1.8–7.4)
IANC: 5.94 K/UL — SIGNIFICANT CHANGE UP (ref 1.8–7.4)
IMM GRANULOCYTES NFR BLD AUTO: 0.7 % — SIGNIFICANT CHANGE UP (ref 0–0.9)
IMM GRANULOCYTES NFR BLD AUTO: 0.7 % — SIGNIFICANT CHANGE UP (ref 0–0.9)
INR BLD: 1.23 RATIO — HIGH (ref 0.85–1.18)
INR BLD: 1.23 RATIO — HIGH (ref 0.85–1.18)
LYMPHOCYTES # BLD AUTO: 1.07 K/UL — SIGNIFICANT CHANGE UP (ref 1–3.3)
LYMPHOCYTES # BLD AUTO: 1.07 K/UL — SIGNIFICANT CHANGE UP (ref 1–3.3)
LYMPHOCYTES # BLD AUTO: 14.1 % — SIGNIFICANT CHANGE UP (ref 13–44)
LYMPHOCYTES # BLD AUTO: 14.1 % — SIGNIFICANT CHANGE UP (ref 13–44)
MAGNESIUM SERPL-MCNC: 2 MG/DL — SIGNIFICANT CHANGE UP (ref 1.6–2.6)
MAGNESIUM SERPL-MCNC: 2 MG/DL — SIGNIFICANT CHANGE UP (ref 1.6–2.6)
MCHC RBC-ENTMCNC: 28.9 PG — SIGNIFICANT CHANGE UP (ref 27–34)
MCHC RBC-ENTMCNC: 28.9 PG — SIGNIFICANT CHANGE UP (ref 27–34)
MCHC RBC-ENTMCNC: 32.6 GM/DL — SIGNIFICANT CHANGE UP (ref 32–36)
MCHC RBC-ENTMCNC: 32.6 GM/DL — SIGNIFICANT CHANGE UP (ref 32–36)
MCV RBC AUTO: 88.5 FL — SIGNIFICANT CHANGE UP (ref 80–100)
MCV RBC AUTO: 88.5 FL — SIGNIFICANT CHANGE UP (ref 80–100)
MONOCYTES # BLD AUTO: 0.5 K/UL — SIGNIFICANT CHANGE UP (ref 0–0.9)
MONOCYTES # BLD AUTO: 0.5 K/UL — SIGNIFICANT CHANGE UP (ref 0–0.9)
MONOCYTES NFR BLD AUTO: 6.6 % — SIGNIFICANT CHANGE UP (ref 2–14)
MONOCYTES NFR BLD AUTO: 6.6 % — SIGNIFICANT CHANGE UP (ref 2–14)
NEUTROPHILS # BLD AUTO: 5.94 K/UL — SIGNIFICANT CHANGE UP (ref 1.8–7.4)
NEUTROPHILS # BLD AUTO: 5.94 K/UL — SIGNIFICANT CHANGE UP (ref 1.8–7.4)
NEUTROPHILS NFR BLD AUTO: 78.4 % — HIGH (ref 43–77)
NEUTROPHILS NFR BLD AUTO: 78.4 % — HIGH (ref 43–77)
NRBC # BLD: 0 /100 WBCS — SIGNIFICANT CHANGE UP (ref 0–0)
NRBC # BLD: 0 /100 WBCS — SIGNIFICANT CHANGE UP (ref 0–0)
NRBC # FLD: 0.07 K/UL — HIGH (ref 0–0)
NRBC # FLD: 0.07 K/UL — HIGH (ref 0–0)
PHOSPHATE SERPL-MCNC: 2.4 MG/DL — LOW (ref 2.5–4.5)
PHOSPHATE SERPL-MCNC: 2.4 MG/DL — LOW (ref 2.5–4.5)
PLATELET # BLD AUTO: 235 K/UL — SIGNIFICANT CHANGE UP (ref 150–400)
PLATELET # BLD AUTO: 235 K/UL — SIGNIFICANT CHANGE UP (ref 150–400)
POTASSIUM SERPL-MCNC: 3.9 MMOL/L — SIGNIFICANT CHANGE UP (ref 3.5–5.3)
POTASSIUM SERPL-MCNC: 3.9 MMOL/L — SIGNIFICANT CHANGE UP (ref 3.5–5.3)
POTASSIUM SERPL-SCNC: 3.9 MMOL/L — SIGNIFICANT CHANGE UP (ref 3.5–5.3)
POTASSIUM SERPL-SCNC: 3.9 MMOL/L — SIGNIFICANT CHANGE UP (ref 3.5–5.3)
PROT SERPL-MCNC: 5.8 G/DL — LOW (ref 6–8.3)
PROT SERPL-MCNC: 5.8 G/DL — LOW (ref 6–8.3)
PROTHROM AB SERPL-ACNC: 13.7 SEC — HIGH (ref 9.5–13)
PROTHROM AB SERPL-ACNC: 13.7 SEC — HIGH (ref 9.5–13)
RBC # BLD: 2.7 M/UL — LOW (ref 3.8–5.2)
RBC # BLD: 2.7 M/UL — LOW (ref 3.8–5.2)
RBC # FLD: 19.9 % — HIGH (ref 10.3–14.5)
RBC # FLD: 19.9 % — HIGH (ref 10.3–14.5)
SODIUM SERPL-SCNC: 138 MMOL/L — SIGNIFICANT CHANGE UP (ref 135–145)
SODIUM SERPL-SCNC: 138 MMOL/L — SIGNIFICANT CHANGE UP (ref 135–145)
WBC # BLD: 7.58 K/UL — SIGNIFICANT CHANGE UP (ref 3.8–10.5)
WBC # BLD: 7.58 K/UL — SIGNIFICANT CHANGE UP (ref 3.8–10.5)
WBC # FLD AUTO: 7.58 K/UL — SIGNIFICANT CHANGE UP (ref 3.8–10.5)
WBC # FLD AUTO: 7.58 K/UL — SIGNIFICANT CHANGE UP (ref 3.8–10.5)

## 2023-10-24 PROCEDURE — 36573 INSJ PICC RS&I 5 YR+: CPT

## 2023-10-24 PROCEDURE — 99232 SBSQ HOSP IP/OBS MODERATE 35: CPT

## 2023-10-24 RX ORDER — ELECTROLYTE SOLUTION,INJ
1 VIAL (ML) INTRAVENOUS
Refills: 0 | Status: DISCONTINUED | OUTPATIENT
Start: 2023-10-24 | End: 2023-10-24

## 2023-10-24 RX ORDER — ACETAMINOPHEN 500 MG
1000 TABLET ORAL EVERY 6 HOURS
Refills: 0 | Status: DISCONTINUED | OUTPATIENT
Start: 2023-10-24 | End: 2023-10-27

## 2023-10-24 RX ORDER — POTASSIUM PHOSPHATE, MONOBASIC POTASSIUM PHOSPHATE, DIBASIC 236; 224 MG/ML; MG/ML
15 INJECTION, SOLUTION INTRAVENOUS ONCE
Refills: 0 | Status: COMPLETED | OUTPATIENT
Start: 2023-10-24 | End: 2023-10-24

## 2023-10-24 RX ORDER — SODIUM CHLORIDE 9 MG/ML
10 INJECTION INTRAMUSCULAR; INTRAVENOUS; SUBCUTANEOUS
Refills: 0 | Status: DISCONTINUED | OUTPATIENT
Start: 2023-10-24 | End: 2023-11-08

## 2023-10-24 RX ORDER — DIATRIZOATE MEGLUMINE 180 MG/ML
120 INJECTION, SOLUTION INTRAVESICAL ONCE
Refills: 0 | Status: DISCONTINUED | OUTPATIENT
Start: 2023-10-24 | End: 2023-10-24

## 2023-10-24 RX ORDER — CHLORHEXIDINE GLUCONATE 213 G/1000ML
1 SOLUTION TOPICAL
Refills: 0 | Status: DISCONTINUED | OUTPATIENT
Start: 2023-10-24 | End: 2023-10-27

## 2023-10-24 RX ORDER — I.V. FAT EMULSION 20 G/100ML
10.4 EMULSION INTRAVENOUS
Qty: 25 | Refills: 0 | Status: DISCONTINUED | OUTPATIENT
Start: 2023-10-24 | End: 2023-10-25

## 2023-10-24 RX ADMIN — HYDROMORPHONE HYDROCHLORIDE 0.5 MILLIGRAM(S): 2 INJECTION INTRAMUSCULAR; INTRAVENOUS; SUBCUTANEOUS at 16:00

## 2023-10-24 RX ADMIN — Medication 400 MILLIGRAM(S): at 00:44

## 2023-10-24 RX ADMIN — SODIUM CHLORIDE 100 MILLILITER(S): 9 INJECTION, SOLUTION INTRAVENOUS at 11:36

## 2023-10-24 RX ADMIN — SCOPALAMINE 1 PATCH: 1 PATCH, EXTENDED RELEASE TRANSDERMAL at 13:55

## 2023-10-24 RX ADMIN — HYDROMORPHONE HYDROCHLORIDE 0.5 MILLIGRAM(S): 2 INJECTION INTRAMUSCULAR; INTRAVENOUS; SUBCUTANEOUS at 08:35

## 2023-10-24 RX ADMIN — HYDROMORPHONE HYDROCHLORIDE 0.5 MILLIGRAM(S): 2 INJECTION INTRAMUSCULAR; INTRAVENOUS; SUBCUTANEOUS at 02:12

## 2023-10-24 RX ADMIN — HYDROMORPHONE HYDROCHLORIDE 0.5 MILLIGRAM(S): 2 INJECTION INTRAMUSCULAR; INTRAVENOUS; SUBCUTANEOUS at 01:42

## 2023-10-24 RX ADMIN — SCOPALAMINE 1 PATCH: 1 PATCH, EXTENDED RELEASE TRANSDERMAL at 20:11

## 2023-10-24 RX ADMIN — HYDROMORPHONE HYDROCHLORIDE 0.5 MILLIGRAM(S): 2 INJECTION INTRAMUSCULAR; INTRAVENOUS; SUBCUTANEOUS at 08:20

## 2023-10-24 RX ADMIN — HYDROMORPHONE HYDROCHLORIDE 0.5 MILLIGRAM(S): 2 INJECTION INTRAMUSCULAR; INTRAVENOUS; SUBCUTANEOUS at 22:49

## 2023-10-24 RX ADMIN — Medication 1 EACH: at 18:16

## 2023-10-24 RX ADMIN — ONDANSETRON 4 MILLIGRAM(S): 8 TABLET, FILM COATED ORAL at 00:43

## 2023-10-24 RX ADMIN — I.V. FAT EMULSION 10.4 ML/HR: 20 EMULSION INTRAVENOUS at 18:16

## 2023-10-24 RX ADMIN — Medication 1000 MILLIGRAM(S): at 00:44

## 2023-10-24 RX ADMIN — POTASSIUM PHOSPHATE, MONOBASIC POTASSIUM PHOSPHATE, DIBASIC 62.5 MILLIMOLE(S): 236; 224 INJECTION, SOLUTION INTRAVENOUS at 11:36

## 2023-10-24 RX ADMIN — PANTOPRAZOLE SODIUM 40 MILLIGRAM(S): 20 TABLET, DELAYED RELEASE ORAL at 05:51

## 2023-10-24 RX ADMIN — PANTOPRAZOLE SODIUM 40 MILLIGRAM(S): 20 TABLET, DELAYED RELEASE ORAL at 18:00

## 2023-10-24 RX ADMIN — HYDROMORPHONE HYDROCHLORIDE 0.5 MILLIGRAM(S): 2 INJECTION INTRAMUSCULAR; INTRAVENOUS; SUBCUTANEOUS at 16:15

## 2023-10-24 RX ADMIN — HYDROMORPHONE HYDROCHLORIDE 0.5 MILLIGRAM(S): 2 INJECTION INTRAMUSCULAR; INTRAVENOUS; SUBCUTANEOUS at 22:19

## 2023-10-24 NOTE — PROGRESS NOTE ADULT - SUBJECTIVE AND OBJECTIVE BOX
Subjective: Patient seen and examined. No new events except as noted.     SUBJECTIVE/ROS:  nad      MEDICATIONS:  MEDICATIONS  (STANDING):  dextrose 5% + sodium chloride 0.45%. 1000 milliLiter(s) (100 mL/Hr) IV Continuous <Continuous>  influenza   Vaccine 0.5 milliLiter(s) IntraMuscular once  pantoprazole  Injectable 40 milliGRAM(s) IV Push two times a day  scopolamine 1 mG/72 Hr(s) Patch 1 Patch Transdermal every 72 hours      PHYSICAL EXAM:  T(C): 36.8 (10-24-23 @ 04:00), Max: 37.1 (10-23-23 @ 08:32)  HR: 86 (10-24-23 @ 04:00) (86 - 102)  BP: 136/71 (10-24-23 @ 04:00) (136/71 - 158/91)  RR: 18 (10-24-23 @ 04:00) (17 - 18)  SpO2: 100% (10-24-23 @ 04:00) (100% - 100%)  Wt(kg): --  I&O's Summary    23 Oct 2023 07:01  -  24 Oct 2023 07:00  --------------------------------------------------------  IN: 1140 mL / OUT: 900 mL / NET: 240 mL            JVP: Normal  Neck: supple  Lung: clear   CV: S1 S2 , Murmur:  Abd: soft  Ext: No edema  neuro: Awake / alert  Psych: flat affect  Skin: normal``    LABS/DATA:    CARDIAC MARKERS:                                8.1    10.25 )-----------( 240      ( 23 Oct 2023 05:30 )             23.8     10-23    141  |  109<H>  |  8   ----------------------------<  114<H>  3.5   |  20<L>  |  0.41<L>    Ca    8.5      23 Oct 2023 05:30  Phos  2.8     10-23  Mg     1.70     10-23    TPro  6.2  /  Alb  2.6<L>  /  TBili  0.6  /  DBili  x   /  AST  117<H>  /  ALT  259<H>  /  AlkPhos  284<H>  10-23    proBNP:   Lipid Profile:   HgA1c:   TSH:     TELE:  EKG:

## 2023-10-24 NOTE — PROGRESS NOTE ADULT - SUBJECTIVE AND OBJECTIVE BOX
NUTRITION NOTE  [id number] [facility]  ===============================    Interval events - Patient was seen and examined at bedside, no acute events overnight. Patient denies chest pain, shortness of breath, nausea or vomiting at this time. Pt did not tolerate tube feeds yesterday. Plan for PICC/TPN today. TPN volume and calories will be increased tomorrow.     ROS: Except as noted above, all other systems reviewed and are negative     Allergies  penicillin (Rash)    PAST MEDICAL & SURGICAL HISTORY:  History of sickle cell trait  Malignant neoplasm of appendix  History of ectopic pregnancy  H/O colonoscopy  H/O endoscopy    Vital Signs Last 24 Hrs  T(C): 36.8 (24 Oct 2023 08:00), Max: 37.1 (24 Oct 2023 00:17)  T(F): 98.2 (24 Oct 2023 08:00), Max: 98.7 (24 Oct 2023 00:17)  HR: 90 (24 Oct 2023 08:00) (86 - 102)  BP: 148/76 (24 Oct 2023 08:00) (136/71 - 156/72)  RR: 17 (24 Oct 2023 08:00) (17 - 18)  SpO2: 100% (24 Oct 2023 08:00) (100% - 100%)    MEDICATIONS  (STANDING):  dextrose 5% + sodium chloride 0.45%. 1000 milliLiter(s) (100 mL/Hr) IV Continuous <Continuous>  diatrizoate meglumine/diatrizoate sodium. 120 milliLiter(s) Oral once  influenza   Vaccine 0.5 milliLiter(s) IntraMuscular once  lipid, fat emulsion (Fish Oil and Plant Based) 20% Infusion 10.4 mL/Hr (10.4 mL/Hr) IV Continuous <Continuous>  pantoprazole  Injectable 40 milliGRAM(s) IV Push two times a day  Parenteral Nutrition - Adult 1 Each (42 mL/Hr) TPN Continuous <Continuous>  scopolamine 1 mG/72 Hr(s) Patch 1 Patch Transdermal every 72 hours    MEDICATIONS  (PRN):  acetaminophen   IVPB .. 1000 milliGRAM(s) IV Intermittent every 6 hours PRN Mild Pain (1 - 3), Moderate Pain (4 - 6)  HYDROmorphone  Injectable 0.5 milliGRAM(s) IV Push every 6 hours PRN Moderate and Severe Pain  LORazepam   Injectable 0.25 milliGRAM(s) IV Push every 6 hours PRN Nausea and/or Vomiting  naloxone Injectable 0.1 milliGRAM(s) IV Push every 3 minutes PRN For ANY of the following changes in patient status:  A. RR LESS THAN 10 breaths per minute, B. Oxygen saturation LESS THAN 90%, C. Sedation score of 6  ondansetron Injectable 4 milliGRAM(s) IV Push every 6 hours PRN Nausea    I&O's Detail    23 Oct 2023 07:01  -  24 Oct 2023 07:00  --------------------------------------------------------  IN:    dextrose 5% + sodium chloride 0.45%: 700 mL    IV PiggyBack: 350 mL    TwoCal HN: 90 mL  Total IN: 1140 mL    OUT:    Ileostomy (mL): 400 mL    Jevity 1.2: 0 mL    Oral Fluid: 0 mL    Voided (mL): 500 mL  Total OUT: 900 mL    Total NET: 240 mL    Drug Dosing Weight  Height (cm): 154.9 (04 Oct 2023 07:27)  Weight (kg): 68.9 (04 Oct 2023 07:27)  BMI (kg/m2): 28.7 (04 Oct 2023 07:27)  BSA (m2): 1.68 (04 Oct 2023 07:27)    PHYSICAL EXAM:  General: NAD, resting in bed comfortably.  Cardiac: regular rate, warm and well perfused  Respiratory: Nonlabored respirations, normal cw expansion.  Abdomen: soft, nontender, nondistended  Extremities: normal strength, FROM, no deformities    Diet: NPO and TPN/lipids (plan to start today 10/24/23)    LABORATORY                        7.8    7.58  )-----------( 235      ( 24 Oct 2023 06:00 )             23.9   10-24    138  |  106  |  8   ----------------------------<  121<H>  3.9   |  22  |  0.41<L>    Ca    8.3<L>      24 Oct 2023 06:00  Phos  2.4     10-24  Mg     2.00     10-24    TPro  5.8<L>  /  Alb  2.5<L>  /  TBili  0.4  /  DBili  x   /  AST  64<H>  /  ALT  175<H>  /  AlkPhos  319<H>  10-24    LIVER FUNCTIONS - ( 24 Oct 2023 06:00 )  Alb: 2.5 g/dL / Pro: 5.8 g/dL / ALK PHOS: 319 U/L / ALT: 175 U/L / AST: 64 U/L / GGT: x           ASSESSMENT/PLAN:  58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 Patient's course c/b continuous retching and intolerance to tube feeds. Nutrition support consult called for prolonged NPO and severe protein calorie malnutiriton.    Plan for PICC placement today.    initiate TPN with 1L infusion volume, TPN will provide 792 kcal/day, TPN volume and calories will be increased tomorrow     labs reviewed - electrolytes adjusted in TPN bag     monitor fingersticks, obtain daily weights     continue parenteral nutrition at this time, will follow up with primary team on plan     1.  Severe protein calorie malnutrition being optimized with TPN: CHO [110] gm.  AA [42] gm. SMOF Lipids [25] gm.  2.  Hyperglycemia managed with: [0] units of regular insulin    3.  Check fluid balance daily.  Strict I/O  [ ] [ ]   4.  Daily BMP, Ionized Calcium, Magnesium and Phosphorous   5.  Triglycerides at initiation of TPN and monthly [ ] [ ]     Nutrition Support [id number]

## 2023-10-24 NOTE — PROGRESS NOTE ADULT - ASSESSMENT
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 Found to have L IJ and innominate vein acute non-occlusive thrombosis, therapeutic lovenox held i/s/o expanding subcapsular liver hematomas found on CT 10/20/23. Course c/b continuous retching.    Plan  - Nausea: Scopolamine, PRN lorazepam.   - Per TPN team, likely PICC/TPN today  - Diet: NPO, unable to tolerate tube feeds x2  - Protonix BID.   - Appreciate GI recommendations, patient declining PEG-J.  - IVF: D5 1/2 NS @ 100cc/hr.   - Pain: Dilaudid PRN.   - 10/21 Hgb 5.6, received 2u pRBC, now improved >8. Continue to follow.   - Hem consult for anemia: possible iron infusion, Oncology to follow.  - PT/OT consult appreciated, PM&R consulted, approved for acute rehab.   - Acute rehab criteria: Hgb >8, oral pain management only, no chemotherapy during rehab admission.    D Team Surgery  Pager [phone]

## 2023-10-24 NOTE — PROGRESS NOTE ADULT - SUBJECTIVE AND OBJECTIVE BOX
TEAM [ D ] Surgery Daily Progress Note  =====================================================    SUBJECTIVE: Patient seen and examined at bedside on AM rounds. Patient reports increased vomiting of tube feeds content overnight once tube feeds were started. Continued vomiting. Mildly improved with ativan.    ALLERGIES:  penicillin (Rash)    --------------------------------------------------------------------------------------    MEDICATIONS:    Neurologic Medications  acetaminophen   IVPB .. 1000 milliGRAM(s) IV Intermittent every 6 hours PRN Mild Pain (1 - 3), Moderate Pain (4 - 6)  HYDROmorphone  Injectable 0.5 milliGRAM(s) IV Push every 6 hours PRN Moderate and Severe Pain  LORazepam   Injectable 0.25 milliGRAM(s) IV Push every 6 hours PRN Nausea and/or Vomiting  ondansetron Injectable 4 milliGRAM(s) IV Push every 6 hours PRN Nausea  scopolamine 1 mG/72 Hr(s) Patch 1 Patch Transdermal every 72 hours    Gastrointestinal Medications  dextrose 5% + sodium chloride 0.45%. 1000 milliLiter(s) IV Continuous <Continuous>  pantoprazole  Injectable 40 milliGRAM(s) IV Push two times a day    Hematologic/Oncologic Medications  influenza   Vaccine 0.5 milliLiter(s) IntraMuscular once    Topical/Other Medications  naloxone Injectable 0.1 milliGRAM(s) IV Push every 3 minutes PRN For ANY of the following changes in patient status:  A. RR LESS THAN 10 breaths per minute, B. Oxygen saturation LESS THAN 90%, C. Sedation score of 6    --------------------------------------------------------------------------------------    VITAL SIGNS:  T(C): 36.8 (10-24-23 @ 04:00), Max: 37.1 (10-23-23 @ 08:32)  HR: 86 (10-24-23 @ 04:00) (86 - 102)  BP: 136/71 (10-24-23 @ 04:00) (136/71 - 158/91)  RR: 18 (10-24-23 @ 04:00) (17 - 18)  SpO2: 100% (10-24-23 @ 04:00) (100% - 100%)  --------------------------------------------------------------------------------------    EXAM    General: NAD, resting in bed comfortably.  Cardiac: regular rate, warm and well perfused  Respiratory: Nonlabored respirations, normal cw expansion.  Abdomen: soft, nontender, nondistended. ko feeding tube, ileostomy +/+  Extremities: normal strength, FROM, no deformities    --------------------------------------------------------------------------------------    LABS                        7.8    7.58  )-----------( 235      ( 24 Oct 2023 06:00 )             23.9     10-24    138  |  106  |  8   ----------------------------<  121<H>  3.9   |  22  |  0.41<L>    Ca    8.3<L>      24 Oct 2023 06:00  Phos  2.4     10-24  Mg     2.00     10-24    TPro  5.8<L>  /  Alb  2.5<L>  /  TBili  0.4  /  DBili  x   /  AST  64<H>  /  ALT  175<H>  /  AlkPhos  319<H>  10-24    --------------------------------------------------------------------------------------    INS AND OUTS:    10-23-23 @ 07:01  -  10-24-23 @ 07:00  --------------------------------------------------------  IN: 1140 mL / OUT: 900 mL / NET: 240 mL      --------------------------------------------------------------------------------------

## 2023-10-25 LAB
ALBUMIN SERPL ELPH-MCNC: 2.3 G/DL — LOW (ref 3.3–5)
ALBUMIN SERPL ELPH-MCNC: 2.3 G/DL — LOW (ref 3.3–5)
ALP SERPL-CCNC: 264 U/L — HIGH (ref 40–120)
ALP SERPL-CCNC: 264 U/L — HIGH (ref 40–120)
ALT FLD-CCNC: 118 U/L — HIGH (ref 4–33)
ALT FLD-CCNC: 118 U/L — HIGH (ref 4–33)
ANION GAP SERPL CALC-SCNC: 11 MMOL/L — SIGNIFICANT CHANGE UP (ref 7–14)
ANION GAP SERPL CALC-SCNC: 11 MMOL/L — SIGNIFICANT CHANGE UP (ref 7–14)
AST SERPL-CCNC: 40 U/L — HIGH (ref 4–32)
AST SERPL-CCNC: 40 U/L — HIGH (ref 4–32)
BASOPHILS # BLD AUTO: 0.01 K/UL — SIGNIFICANT CHANGE UP (ref 0–0.2)
BASOPHILS # BLD AUTO: 0.01 K/UL — SIGNIFICANT CHANGE UP (ref 0–0.2)
BASOPHILS NFR BLD AUTO: 0.2 % — SIGNIFICANT CHANGE UP (ref 0–2)
BASOPHILS NFR BLD AUTO: 0.2 % — SIGNIFICANT CHANGE UP (ref 0–2)
BILIRUB SERPL-MCNC: 0.5 MG/DL — SIGNIFICANT CHANGE UP (ref 0.2–1.2)
BILIRUB SERPL-MCNC: 0.5 MG/DL — SIGNIFICANT CHANGE UP (ref 0.2–1.2)
BUN SERPL-MCNC: 7 MG/DL — SIGNIFICANT CHANGE UP (ref 7–23)
BUN SERPL-MCNC: 7 MG/DL — SIGNIFICANT CHANGE UP (ref 7–23)
CA-I BLD-SCNC: 1.17 MMOL/L — SIGNIFICANT CHANGE UP (ref 1.15–1.29)
CA-I BLD-SCNC: 1.17 MMOL/L — SIGNIFICANT CHANGE UP (ref 1.15–1.29)
CALCIUM SERPL-MCNC: 8.2 MG/DL — LOW (ref 8.4–10.5)
CALCIUM SERPL-MCNC: 8.2 MG/DL — LOW (ref 8.4–10.5)
CHLORIDE SERPL-SCNC: 105 MMOL/L — SIGNIFICANT CHANGE UP (ref 98–107)
CHLORIDE SERPL-SCNC: 105 MMOL/L — SIGNIFICANT CHANGE UP (ref 98–107)
CO2 SERPL-SCNC: 23 MMOL/L — SIGNIFICANT CHANGE UP (ref 22–31)
CO2 SERPL-SCNC: 23 MMOL/L — SIGNIFICANT CHANGE UP (ref 22–31)
CREAT SERPL-MCNC: 0.42 MG/DL — LOW (ref 0.5–1.3)
CREAT SERPL-MCNC: 0.42 MG/DL — LOW (ref 0.5–1.3)
EGFR: 113 ML/MIN/1.73M2 — SIGNIFICANT CHANGE UP
EGFR: 113 ML/MIN/1.73M2 — SIGNIFICANT CHANGE UP
EOSINOPHIL # BLD AUTO: 0.03 K/UL — SIGNIFICANT CHANGE UP (ref 0–0.5)
EOSINOPHIL # BLD AUTO: 0.03 K/UL — SIGNIFICANT CHANGE UP (ref 0–0.5)
EOSINOPHIL NFR BLD AUTO: 0.5 % — SIGNIFICANT CHANGE UP (ref 0–6)
EOSINOPHIL NFR BLD AUTO: 0.5 % — SIGNIFICANT CHANGE UP (ref 0–6)
GLUCOSE BLDC GLUCOMTR-MCNC: 108 MG/DL — HIGH (ref 70–99)
GLUCOSE BLDC GLUCOMTR-MCNC: 108 MG/DL — HIGH (ref 70–99)
GLUCOSE BLDC GLUCOMTR-MCNC: 110 MG/DL — HIGH (ref 70–99)
GLUCOSE BLDC GLUCOMTR-MCNC: 110 MG/DL — HIGH (ref 70–99)
GLUCOSE BLDC GLUCOMTR-MCNC: 112 MG/DL — HIGH (ref 70–99)
GLUCOSE BLDC GLUCOMTR-MCNC: 112 MG/DL — HIGH (ref 70–99)
GLUCOSE BLDC GLUCOMTR-MCNC: 118 MG/DL — HIGH (ref 70–99)
GLUCOSE BLDC GLUCOMTR-MCNC: 118 MG/DL — HIGH (ref 70–99)
GLUCOSE BLDC GLUCOMTR-MCNC: 128 MG/DL — HIGH (ref 70–99)
GLUCOSE BLDC GLUCOMTR-MCNC: 128 MG/DL — HIGH (ref 70–99)
GLUCOSE SERPL-MCNC: 101 MG/DL — HIGH (ref 70–99)
GLUCOSE SERPL-MCNC: 101 MG/DL — HIGH (ref 70–99)
HCT VFR BLD CALC: 23.2 % — LOW (ref 34.5–45)
HCT VFR BLD CALC: 23.2 % — LOW (ref 34.5–45)
HGB BLD-MCNC: 7.5 G/DL — LOW (ref 11.5–15.5)
HGB BLD-MCNC: 7.5 G/DL — LOW (ref 11.5–15.5)
IANC: 4.9 K/UL — SIGNIFICANT CHANGE UP (ref 1.8–7.4)
IANC: 4.9 K/UL — SIGNIFICANT CHANGE UP (ref 1.8–7.4)
IMM GRANULOCYTES NFR BLD AUTO: 0.8 % — SIGNIFICANT CHANGE UP (ref 0–0.9)
IMM GRANULOCYTES NFR BLD AUTO: 0.8 % — SIGNIFICANT CHANGE UP (ref 0–0.9)
LYMPHOCYTES # BLD AUTO: 0.92 K/UL — LOW (ref 1–3.3)
LYMPHOCYTES # BLD AUTO: 0.92 K/UL — LOW (ref 1–3.3)
LYMPHOCYTES # BLD AUTO: 14.9 % — SIGNIFICANT CHANGE UP (ref 13–44)
LYMPHOCYTES # BLD AUTO: 14.9 % — SIGNIFICANT CHANGE UP (ref 13–44)
MAGNESIUM SERPL-MCNC: 1.8 MG/DL — SIGNIFICANT CHANGE UP (ref 1.6–2.6)
MAGNESIUM SERPL-MCNC: 1.8 MG/DL — SIGNIFICANT CHANGE UP (ref 1.6–2.6)
MCHC RBC-ENTMCNC: 28.5 PG — SIGNIFICANT CHANGE UP (ref 27–34)
MCHC RBC-ENTMCNC: 28.5 PG — SIGNIFICANT CHANGE UP (ref 27–34)
MCHC RBC-ENTMCNC: 32.3 GM/DL — SIGNIFICANT CHANGE UP (ref 32–36)
MCHC RBC-ENTMCNC: 32.3 GM/DL — SIGNIFICANT CHANGE UP (ref 32–36)
MCV RBC AUTO: 88.2 FL — SIGNIFICANT CHANGE UP (ref 80–100)
MCV RBC AUTO: 88.2 FL — SIGNIFICANT CHANGE UP (ref 80–100)
MONOCYTES # BLD AUTO: 0.27 K/UL — SIGNIFICANT CHANGE UP (ref 0–0.9)
MONOCYTES # BLD AUTO: 0.27 K/UL — SIGNIFICANT CHANGE UP (ref 0–0.9)
MONOCYTES NFR BLD AUTO: 4.4 % — SIGNIFICANT CHANGE UP (ref 2–14)
MONOCYTES NFR BLD AUTO: 4.4 % — SIGNIFICANT CHANGE UP (ref 2–14)
NEUTROPHILS # BLD AUTO: 4.9 K/UL — SIGNIFICANT CHANGE UP (ref 1.8–7.4)
NEUTROPHILS # BLD AUTO: 4.9 K/UL — SIGNIFICANT CHANGE UP (ref 1.8–7.4)
NEUTROPHILS NFR BLD AUTO: 79.2 % — HIGH (ref 43–77)
NEUTROPHILS NFR BLD AUTO: 79.2 % — HIGH (ref 43–77)
NRBC # BLD: 0 /100 WBCS — SIGNIFICANT CHANGE UP (ref 0–0)
NRBC # BLD: 0 /100 WBCS — SIGNIFICANT CHANGE UP (ref 0–0)
NRBC # FLD: 0.04 K/UL — HIGH (ref 0–0)
NRBC # FLD: 0.04 K/UL — HIGH (ref 0–0)
PHOSPHATE SERPL-MCNC: 3.3 MG/DL — SIGNIFICANT CHANGE UP (ref 2.5–4.5)
PHOSPHATE SERPL-MCNC: 3.3 MG/DL — SIGNIFICANT CHANGE UP (ref 2.5–4.5)
PLATELET # BLD AUTO: 196 K/UL — SIGNIFICANT CHANGE UP (ref 150–400)
PLATELET # BLD AUTO: 196 K/UL — SIGNIFICANT CHANGE UP (ref 150–400)
POTASSIUM SERPL-MCNC: 4.4 MMOL/L — SIGNIFICANT CHANGE UP (ref 3.5–5.3)
POTASSIUM SERPL-MCNC: 4.4 MMOL/L — SIGNIFICANT CHANGE UP (ref 3.5–5.3)
POTASSIUM SERPL-SCNC: 4.4 MMOL/L — SIGNIFICANT CHANGE UP (ref 3.5–5.3)
POTASSIUM SERPL-SCNC: 4.4 MMOL/L — SIGNIFICANT CHANGE UP (ref 3.5–5.3)
PROT SERPL-MCNC: 5.4 G/DL — LOW (ref 6–8.3)
PROT SERPL-MCNC: 5.4 G/DL — LOW (ref 6–8.3)
RBC # BLD: 2.63 M/UL — LOW (ref 3.8–5.2)
RBC # BLD: 2.63 M/UL — LOW (ref 3.8–5.2)
RBC # FLD: 20 % — HIGH (ref 10.3–14.5)
RBC # FLD: 20 % — HIGH (ref 10.3–14.5)
SODIUM SERPL-SCNC: 139 MMOL/L — SIGNIFICANT CHANGE UP (ref 135–145)
SODIUM SERPL-SCNC: 139 MMOL/L — SIGNIFICANT CHANGE UP (ref 135–145)
TRIGL SERPL-MCNC: 87 MG/DL — SIGNIFICANT CHANGE UP
TRIGL SERPL-MCNC: 87 MG/DL — SIGNIFICANT CHANGE UP
WBC # BLD: 6.18 K/UL — SIGNIFICANT CHANGE UP (ref 3.8–10.5)
WBC # BLD: 6.18 K/UL — SIGNIFICANT CHANGE UP (ref 3.8–10.5)
WBC # FLD AUTO: 6.18 K/UL — SIGNIFICANT CHANGE UP (ref 3.8–10.5)
WBC # FLD AUTO: 6.18 K/UL — SIGNIFICANT CHANGE UP (ref 3.8–10.5)

## 2023-10-25 PROCEDURE — 99232 SBSQ HOSP IP/OBS MODERATE 35: CPT

## 2023-10-25 RX ORDER — I.V. FAT EMULSION 20 G/100ML
10.4 EMULSION INTRAVENOUS
Qty: 25 | Refills: 0 | Status: DISCONTINUED | OUTPATIENT
Start: 2023-10-25 | End: 2023-10-26

## 2023-10-25 RX ORDER — ELECTROLYTE SOLUTION,INJ
1 VIAL (ML) INTRAVENOUS
Refills: 0 | Status: DISCONTINUED | OUTPATIENT
Start: 2023-10-25 | End: 2023-10-25

## 2023-10-25 RX ADMIN — HYDROMORPHONE HYDROCHLORIDE 0.5 MILLIGRAM(S): 2 INJECTION INTRAMUSCULAR; INTRAVENOUS; SUBCUTANEOUS at 18:20

## 2023-10-25 RX ADMIN — HYDROMORPHONE HYDROCHLORIDE 0.5 MILLIGRAM(S): 2 INJECTION INTRAMUSCULAR; INTRAVENOUS; SUBCUTANEOUS at 12:03

## 2023-10-25 RX ADMIN — CHLORHEXIDINE GLUCONATE 1 APPLICATION(S): 213 SOLUTION TOPICAL at 07:25

## 2023-10-25 RX ADMIN — SCOPALAMINE 1 PATCH: 1 PATCH, EXTENDED RELEASE TRANSDERMAL at 21:28

## 2023-10-25 RX ADMIN — PANTOPRAZOLE SODIUM 40 MILLIGRAM(S): 20 TABLET, DELAYED RELEASE ORAL at 18:03

## 2023-10-25 RX ADMIN — HYDROMORPHONE HYDROCHLORIDE 0.5 MILLIGRAM(S): 2 INJECTION INTRAMUSCULAR; INTRAVENOUS; SUBCUTANEOUS at 04:58

## 2023-10-25 RX ADMIN — PANTOPRAZOLE SODIUM 40 MILLIGRAM(S): 20 TABLET, DELAYED RELEASE ORAL at 05:36

## 2023-10-25 RX ADMIN — SCOPALAMINE 1 PATCH: 1 PATCH, EXTENDED RELEASE TRANSDERMAL at 19:00

## 2023-10-25 RX ADMIN — SCOPALAMINE 1 PATCH: 1 PATCH, EXTENDED RELEASE TRANSDERMAL at 08:26

## 2023-10-25 RX ADMIN — HYDROMORPHONE HYDROCHLORIDE 0.5 MILLIGRAM(S): 2 INJECTION INTRAMUSCULAR; INTRAVENOUS; SUBCUTANEOUS at 04:28

## 2023-10-25 RX ADMIN — Medication 1 EACH: at 18:22

## 2023-10-25 RX ADMIN — I.V. FAT EMULSION 10.4 ML/HR: 20 EMULSION INTRAVENOUS at 18:23

## 2023-10-25 RX ADMIN — ONDANSETRON 4 MILLIGRAM(S): 8 TABLET, FILM COATED ORAL at 23:01

## 2023-10-25 RX ADMIN — HYDROMORPHONE HYDROCHLORIDE 0.5 MILLIGRAM(S): 2 INJECTION INTRAMUSCULAR; INTRAVENOUS; SUBCUTANEOUS at 12:20

## 2023-10-25 RX ADMIN — HYDROMORPHONE HYDROCHLORIDE 0.5 MILLIGRAM(S): 2 INJECTION INTRAMUSCULAR; INTRAVENOUS; SUBCUTANEOUS at 18:03

## 2023-10-25 NOTE — PROGRESS NOTE ADULT - ASSESSMENT
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 Found to have L IJ and innominate vein acute non-occlusive thrombosis, therapeutic lovenox held i/s/o expanding subcapsular liver hematomas found on CT 10/20/23. Course c/b continuous retching.    Plan  - Nausea: Scopolamine, PRN lorazepam.   - Per TPN team, likely PICC/TPN today  - Diet: NPO, unable to tolerate tube feeds x2  - Protonix BID.   - Appreciate GI recommendations, patient declining PEG-J.  - IVF: D5 1/2 NS @ 100cc/hr.   - Pain: Dilaudid PRN.   - 10/21 Hgb 5.6, received 2u pRBC, now improved >8. Continue to follow.   - Hem consult for anemia: possible iron infusion, Oncology to follow.  - PT/OT consult appreciated, PM&R consulted, approved for acute rehab.   - Acute rehab criteria: Hgb >8, oral pain management only, no chemotherapy during rehab admission.    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 Found to have L IJ and innominate vein acute non-occlusive thrombosis, therapeutic lovenox held i/s/o expanding subcapsular liver hematomas found on CT 10/20/23. Course c/b continuous retching.    Plan  - Nausea: Scopolamine, PRN lorazepam.   - PICC/TPN  - Diet: NPO, unable to tolerate tube feeds x2  - Protonix BID.   - Appreciate GI recommendations, patient declining PEG-J.  - Pain: Dilaudid PRN.   - Liver Hematoma --> 10/21 Hgb 5.6, received 2u pRBC, now improved >8. Continue to follow.   - Hem consult for anemia: possible iron infusion, Oncology to follow.  - PT/OT consult appreciated, PM&R consulted, approved for acute rehab.   - Acute rehab criteria: Hgb >8, oral pain management only, no chemotherapy during rehab admission.    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 Found to have L IJ and innominate vein acute non-occlusive thrombosis, therapeutic lovenox held i/s/o expanding subcapsular liver hematomas found on CT 10/20/23. Course c/b continuous retching.    Plan  - Nausea: Scopolamine, PRN lorazepam.   - PICC/TPN  - Diet: NPO, unable to tolerate tube feeds x2  - Protonix BID.   - Appreciate GI recommendations, patient declining PEG-J.  - Pain: Dilaudid PRN.   - Liver Hematoma --> 10/21 Hgb 5.6, received 2u pRBC, now improved >8. Continue to follow.   - Hem consult for anemia: possible iron infusion, Oncology to follow.  - PT/OT consult appreciated, PM&R consulted, approved for acute rehab. During course, patient continued inpatient PT. Now recommending home PT.    D Team Surgery  Pager [phone]

## 2023-10-25 NOTE — PROGRESS NOTE ADULT - SUBJECTIVE AND OBJECTIVE BOX
NUTRITION NOTE   ===============================    Interval events - Patient was seen and examined at bedside, no acute events overnight. Patient denies chest pain, shortness of breath, nausea or vomiting at this time.    ROS: Except as noted above, all other systems reviewed and are negative     Allergies  penicillin (Rash)    PAST MEDICAL & SURGICAL HISTORY:  History of sickle cell trait  Malignant neoplasm of appendix  History of ectopic pregnancy  H/O colonoscopy  H/O endoscopy    Vital Signs Last 24 Hrs  T(C): 37 (10-25-23 @ 12:00), Max: 37.4 (10-24-23 @ 20:27)  HR: 88 (10-25-23 @ 12:00) (81 - 94)  BP: 116/77 (10-25-23 @ 12:00) (116/77 - 169/64)  BP(mean): --  ABP: --  ABP(mean): --  RR: 17 (10-25-23 @ 12:00) (16 - 17)  SpO2: 100% (10-25-23 @ 12:00) (100% - 100%)  Wt(kg): --  CVP(mm Hg): --  CI: --  CAPILLARY BLOOD GLUCOSE      POCT Blood Glucose.: 108 mg/dL (25 Oct 2023 11:56)  POCT Blood Glucose.: 110 mg/dL (25 Oct 2023 06:03)  POCT Blood Glucose.: 118 mg/dL (25 Oct 2023 00:46)   N/A      10-24 @ 07:01  -  10-25 @ 07:00  --------------------------------------------------------  IN:  Total IN: 0 mL    OUT:    Ileostomy (mL): 350 mL    Voided (mL): 1650 mL  Total OUT: 2000 mL    Total NET: -2000 mL    PHYSICAL EXAM:  General: NAD, resting in bed comfortably.  Cardiac: regular rate, warm and well perfused  Respiratory: Nonlabored respirations, normal cw expansion.  Abdomen: soft, nontender, nondistended, ostomy pink  Extremities: normal strength, FROM, no deformities      LABORATORY          CBC (10-25 @ 06:00)                              7.5<L>                         6.18    )----------------(  196        79.2<H>% Neutrophils, 14.9  % Lymphocytes, ANC: 4.90                                23.2<L>  CBC (10-24 @ 06:00)                              7.8<L>                         7.58    )----------------(  235        78.4<H>% Neutrophils, 14.1  % Lymphocytes, ANC: 5.94                                23.9<L>    BMP (10-25 @ 06:00)             139     |  105     |  7     		Ca++ 1.17    Ca 8.2<L>             ---------------------------------( 101<H>		Mg 1.80               4.4     |  23      |  0.42<L>			Ph 3.3     BMP (10-24 @ 06:00)             138     |  106     |  8     		Ca++ --      Ca 8.3<L>             ---------------------------------( 121<H>		Mg 2.00               3.9     |  22      |  0.41<L>			Ph 2.4<L>    LFTs (10-25 @ 06:00)      TPro 5.4<L> / Alb 2.3<L> / TBili 0.5 / DBili -- / AST 40<H> / <H> / AlkPhos 264<H>  LFTs (10-24 @ 06:00)      TPro 5.8<L> / Alb 2.5<L> / TBili 0.4 / DBili -- / AST 64<H> / <H> / AlkPhos 319<H>    Coags (10-24 @ 06:00)  aPTT 27.8 / INR 1.23<H> / PT 13.7<H>    Urinalysis (10-25 @ 06:00):     Color:  / Appearance:  / SG:  / pH:  / Gluc: 101<H> / Ketones:  / Bili:  / Urobili:  / Protein : / Nitrites:  / Leuk.Est:  / RBC:  / WBC:  / Sq Epi:  / Non Sq Epi:  / Bacteria          ASSESSMENT/PLAN:  58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 Patient's course c/b continuous retching and intolerance to tube feeds. Nutrition support consult called for prolonged NPO and severe protein calorie malnutiriton.    Patient s/p PICC placement and TPN initiated yesterday.    TPN will provide 1584 kcal/day, TPN volume and calories will be increased today    labs reviewed - electrolytes adjusted in TPN bag     monitor fingersticks, obtain daily weights     continue parenteral nutrition at this time, will follow up with primary team on plan     1.  Severe protein calorie malnutrition being optimized with TPN: CHO [110] gm.  AA [42] gm. SMOF Lipids [25] gm.  2.  Hyperglycemia managed with: [0] units of regular insulin    3.  Check fluid balance daily.  Strict I/O  [ ] [ ]   4.  Daily BMP, Ionized Calcium, Magnesium and Phosphorous   5.  Triglycerides at initiation of TPN and monthly [ ] [ ]     Nutrition Support [id number]

## 2023-10-25 NOTE — PROGRESS NOTE ADULT - SUBJECTIVE AND OBJECTIVE BOX
TEAM [ D ] Surgery Daily Progress Note  =====================================================    SUBJECTIVE: Patient seen and examined at bedside on AM rounds. Patient reports that they're feeling well. NPO/Tolerating diet, denies nausea, vomiting.    +/OOB/Amublating as tolerated. Denies fever, chills.    ALLERGIES:  penicillin (Rash)    --------------------------------------------------------------------------------------    MEDICATIONS:    Neurologic Medications  acetaminophen   IVPB .. 1000 milliGRAM(s) IV Intermittent every 6 hours PRN Mild Pain (1 - 3), Moderate Pain (4 - 6)  HYDROmorphone  Injectable 0.5 milliGRAM(s) IV Push every 6 hours PRN Moderate and Severe Pain  LORazepam   Injectable 0.25 milliGRAM(s) IV Push every 6 hours PRN Nausea and/or Vomiting  ondansetron Injectable 4 milliGRAM(s) IV Push every 6 hours PRN Nausea  scopolamine 1 mG/72 Hr(s) Patch 1 Patch Transdermal every 72 hours    Gastrointestinal Medications  dextrose 5% + sodium chloride 0.45%. 1000 milliLiter(s) IV Continuous <Continuous>  lipid, fat emulsion (Fish Oil and Plant Based) 20% Infusion 10.4 mL/Hr IV Continuous <Continuous>  pantoprazole  Injectable 40 milliGRAM(s) IV Push two times a day  Parenteral Nutrition - Adult 1 Each TPN Continuous <Continuous>  sodium chloride 0.9% lock flush 10 milliLiter(s) IV Push every 1 hour PRN Pre/post blood products, medications, blood draw, and to maintain line patency    Hematologic/Oncologic Medications  influenza   Vaccine 0.5 milliLiter(s) IntraMuscular once    Topical/Other Medications  chlorhexidine 4% Liquid 1 Application(s) Topical <User Schedule>  naloxone Injectable 0.1 milliGRAM(s) IV Push every 3 minutes PRN For ANY of the following changes in patient status:  A. RR LESS THAN 10 breaths per minute, B. Oxygen saturation LESS THAN 90%, C. Sedation score of 6    --------------------------------------------------------------------------------------    VITAL SIGNS:  T(C): 37.1 (10-25-23 @ 04:22), Max: 37.4 (10-24-23 @ 20:27)  HR: 83 (10-25-23 @ 04:22) (81 - 94)  BP: 132/63 (10-25-23 @ 04:22) (130/61 - 169/64)  RR: 17 (10-25-23 @ 04:22) (16 - 17)  SpO2: 100% (10-25-23 @ 04:22) (100% - 100%)  --------------------------------------------------------------------------------------    EXAM    General: NAD, resting in bed comfortably.  Cardiac: regular rate, warm and well perfused  Respiratory: Nonlabored respirations, normal cw expansion.  Abdomen: soft, nontender, nondistended. ___ incision is c/d/i, ostomy, NGT, cristina.   Extremities: normal strength, FROM, no deformities    --------------------------------------------------------------------------------------    LABS    --------------------------------------------------------------------------------------    INS AND OUTS:    10-24-23 @ 07:01  -  10-25-23 @ 07:00  --------------------------------------------------------  IN: 0 mL / OUT: 2000 mL / NET: -2000 mL      -------------------------------------------------------------------------------------- TEAM [ D ] Surgery Daily Progress Note  =====================================================    SUBJECTIVE: Patient seen and examined at bedside on AM rounds. . Continued vomiting. Mildly improved with ativan.     ALLERGIES:  penicillin (Rash)    --------------------------------------------------------------------------------------    MEDICATIONS:    Neurologic Medications  acetaminophen   IVPB .. 1000 milliGRAM(s) IV Intermittent every 6 hours PRN Mild Pain (1 - 3), Moderate Pain (4 - 6)  HYDROmorphone  Injectable 0.5 milliGRAM(s) IV Push every 6 hours PRN Moderate and Severe Pain  LORazepam   Injectable 0.25 milliGRAM(s) IV Push every 6 hours PRN Nausea and/or Vomiting  ondansetron Injectable 4 milliGRAM(s) IV Push every 6 hours PRN Nausea  scopolamine 1 mG/72 Hr(s) Patch 1 Patch Transdermal every 72 hours    Gastrointestinal Medications  dextrose 5% + sodium chloride 0.45%. 1000 milliLiter(s) IV Continuous <Continuous>  lipid, fat emulsion (Fish Oil and Plant Based) 20% Infusion 10.4 mL/Hr IV Continuous <Continuous>  pantoprazole  Injectable 40 milliGRAM(s) IV Push two times a day  Parenteral Nutrition - Adult 1 Each TPN Continuous <Continuous>  sodium chloride 0.9% lock flush 10 milliLiter(s) IV Push every 1 hour PRN Pre/post blood products, medications, blood draw, and to maintain line patency    Hematologic/Oncologic Medications  influenza   Vaccine 0.5 milliLiter(s) IntraMuscular once    Topical/Other Medications  chlorhexidine 4% Liquid 1 Application(s) Topical <User Schedule>  naloxone Injectable 0.1 milliGRAM(s) IV Push every 3 minutes PRN For ANY of the following changes in patient status:  A. RR LESS THAN 10 breaths per minute, B. Oxygen saturation LESS THAN 90%, C. Sedation score of 6    --------------------------------------------------------------------------------------    VITAL SIGNS:  T(C): 37.1 (10-25-23 @ 04:22), Max: 37.4 (10-24-23 @ 20:27)  HR: 83 (10-25-23 @ 04:22) (81 - 94)  BP: 132/63 (10-25-23 @ 04:22) (130/61 - 169/64)  RR: 17 (10-25-23 @ 04:22) (16 - 17)  SpO2: 100% (10-25-23 @ 04:22) (100% - 100%)  --------------------------------------------------------------------------------------    EXAM    General: NAD, resting in bed comfortably.  Cardiac: regular rate, warm and well perfused  Respiratory: Nonlabored respirations, normal cw expansion.  Abdomen: soft, nontender, nondistended. ostomy +/+  Extremities: normal strength, FROM, no deformities    --------------------------------------------------------------------------------------        INS AND OUTS:    10-24-23 @ 07:01  -  10-25-23 @ 07:00  --------------------------------------------------------  IN: 0 mL / OUT: 2000 mL / NET: -2000 mL      --------------------------------------------------------------------------------------

## 2023-10-25 NOTE — PROGRESS NOTE ADULT - SUBJECTIVE AND OBJECTIVE BOX
Subjective: Patient seen and examined. No new events except as noted.     SUBJECTIVE/ROS:        MEDICATIONS:  MEDICATIONS  (STANDING):  chlorhexidine 4% Liquid 1 Application(s) Topical <User Schedule>  dextrose 5% + sodium chloride 0.45%. 1000 milliLiter(s) (100 mL/Hr) IV Continuous <Continuous>  influenza   Vaccine 0.5 milliLiter(s) IntraMuscular once  lipid, fat emulsion (Fish Oil and Plant Based) 20% Infusion 10.4 mL/Hr (10.4 mL/Hr) IV Continuous <Continuous>  pantoprazole  Injectable 40 milliGRAM(s) IV Push two times a day  Parenteral Nutrition - Adult 1 Each (42 mL/Hr) TPN Continuous <Continuous>  scopolamine 1 mG/72 Hr(s) Patch 1 Patch Transdermal every 72 hours      PHYSICAL EXAM:  T(C): 37.1 (10-25-23 @ 04:22), Max: 37.4 (10-24-23 @ 20:27)  HR: 83 (10-25-23 @ 04:22) (81 - 94)  BP: 132/63 (10-25-23 @ 04:22) (130/61 - 169/64)  RR: 17 (10-25-23 @ 04:22) (16 - 17)  SpO2: 100% (10-25-23 @ 04:22) (100% - 100%)  Wt(kg): --  I&O's Summary    24 Oct 2023 07:01  -  25 Oct 2023 07:00  --------------------------------------------------------  IN: 0 mL / OUT: 1900 mL / NET: -1900 mL            JVP: Normal  Neck: supple  Lung: clear   CV: S1 S2 , Murmur:  Abd: soft  Ext: No edema  neuro: Awake   Psych: flat affect  Skin: normal``    LABS/DATA:    CARDIAC MARKERS:                                7.8    7.58  )-----------( 235      ( 24 Oct 2023 06:00 )             23.9     10-24    138  |  106  |  8   ----------------------------<  121<H>  3.9   |  22  |  0.41<L>    Ca    8.3<L>      24 Oct 2023 06:00  Phos  2.4     10-24  Mg     2.00     10-24    TPro  5.8<L>  /  Alb  2.5<L>  /  TBili  0.4  /  DBili  x   /  AST  64<H>  /  ALT  175<H>  /  AlkPhos  319<H>  10-24    proBNP:   Lipid Profile:   HgA1c:   TSH:     TELE:  EKG:

## 2023-10-26 LAB
ALBUMIN SERPL ELPH-MCNC: 2.4 G/DL — LOW (ref 3.3–5)
ALBUMIN SERPL ELPH-MCNC: 2.4 G/DL — LOW (ref 3.3–5)
ALP SERPL-CCNC: 252 U/L — HIGH (ref 40–120)
ALP SERPL-CCNC: 252 U/L — HIGH (ref 40–120)
ALT FLD-CCNC: 87 U/L — HIGH (ref 4–33)
ALT FLD-CCNC: 87 U/L — HIGH (ref 4–33)
ANION GAP SERPL CALC-SCNC: 11 MMOL/L — SIGNIFICANT CHANGE UP (ref 7–14)
ANION GAP SERPL CALC-SCNC: 11 MMOL/L — SIGNIFICANT CHANGE UP (ref 7–14)
AST SERPL-CCNC: 38 U/L — HIGH (ref 4–32)
AST SERPL-CCNC: 38 U/L — HIGH (ref 4–32)
B PERT DNA SPEC QL NAA+PROBE: SIGNIFICANT CHANGE UP
B PERT DNA SPEC QL NAA+PROBE: SIGNIFICANT CHANGE UP
B PERT+PARAPERT DNA PNL SPEC NAA+PROBE: SIGNIFICANT CHANGE UP
B PERT+PARAPERT DNA PNL SPEC NAA+PROBE: SIGNIFICANT CHANGE UP
BASOPHILS # BLD AUTO: 0.01 K/UL — SIGNIFICANT CHANGE UP (ref 0–0.2)
BASOPHILS # BLD AUTO: 0.01 K/UL — SIGNIFICANT CHANGE UP (ref 0–0.2)
BASOPHILS NFR BLD AUTO: 0.2 % — SIGNIFICANT CHANGE UP (ref 0–2)
BASOPHILS NFR BLD AUTO: 0.2 % — SIGNIFICANT CHANGE UP (ref 0–2)
BILIRUB SERPL-MCNC: 0.4 MG/DL — SIGNIFICANT CHANGE UP (ref 0.2–1.2)
BILIRUB SERPL-MCNC: 0.4 MG/DL — SIGNIFICANT CHANGE UP (ref 0.2–1.2)
BORDETELLA PARAPERTUSSIS (RAPRVP): SIGNIFICANT CHANGE UP
BORDETELLA PARAPERTUSSIS (RAPRVP): SIGNIFICANT CHANGE UP
BUN SERPL-MCNC: 9 MG/DL — SIGNIFICANT CHANGE UP (ref 7–23)
BUN SERPL-MCNC: 9 MG/DL — SIGNIFICANT CHANGE UP (ref 7–23)
C PNEUM DNA SPEC QL NAA+PROBE: SIGNIFICANT CHANGE UP
C PNEUM DNA SPEC QL NAA+PROBE: SIGNIFICANT CHANGE UP
CA-I BLD-SCNC: 1.14 MMOL/L — LOW (ref 1.15–1.29)
CA-I BLD-SCNC: 1.14 MMOL/L — LOW (ref 1.15–1.29)
CALCIUM SERPL-MCNC: 8.4 MG/DL — SIGNIFICANT CHANGE UP (ref 8.4–10.5)
CALCIUM SERPL-MCNC: 8.4 MG/DL — SIGNIFICANT CHANGE UP (ref 8.4–10.5)
CHLORIDE SERPL-SCNC: 101 MMOL/L — SIGNIFICANT CHANGE UP (ref 98–107)
CHLORIDE SERPL-SCNC: 101 MMOL/L — SIGNIFICANT CHANGE UP (ref 98–107)
CO2 SERPL-SCNC: 22 MMOL/L — SIGNIFICANT CHANGE UP (ref 22–31)
CO2 SERPL-SCNC: 22 MMOL/L — SIGNIFICANT CHANGE UP (ref 22–31)
CREAT SERPL-MCNC: 0.4 MG/DL — LOW (ref 0.5–1.3)
CREAT SERPL-MCNC: 0.4 MG/DL — LOW (ref 0.5–1.3)
EGFR: 114 ML/MIN/1.73M2 — SIGNIFICANT CHANGE UP
EGFR: 114 ML/MIN/1.73M2 — SIGNIFICANT CHANGE UP
EOSINOPHIL # BLD AUTO: 0.02 K/UL — SIGNIFICANT CHANGE UP (ref 0–0.5)
EOSINOPHIL # BLD AUTO: 0.02 K/UL — SIGNIFICANT CHANGE UP (ref 0–0.5)
EOSINOPHIL NFR BLD AUTO: 0.3 % — SIGNIFICANT CHANGE UP (ref 0–6)
EOSINOPHIL NFR BLD AUTO: 0.3 % — SIGNIFICANT CHANGE UP (ref 0–6)
FLUAV SUBTYP SPEC NAA+PROBE: SIGNIFICANT CHANGE UP
FLUAV SUBTYP SPEC NAA+PROBE: SIGNIFICANT CHANGE UP
FLUBV RNA SPEC QL NAA+PROBE: SIGNIFICANT CHANGE UP
FLUBV RNA SPEC QL NAA+PROBE: SIGNIFICANT CHANGE UP
GLUCOSE BLDC GLUCOMTR-MCNC: 115 MG/DL — HIGH (ref 70–99)
GLUCOSE BLDC GLUCOMTR-MCNC: 115 MG/DL — HIGH (ref 70–99)
GLUCOSE BLDC GLUCOMTR-MCNC: 120 MG/DL — HIGH (ref 70–99)
GLUCOSE BLDC GLUCOMTR-MCNC: 120 MG/DL — HIGH (ref 70–99)
GLUCOSE BLDC GLUCOMTR-MCNC: 128 MG/DL — HIGH (ref 70–99)
GLUCOSE BLDC GLUCOMTR-MCNC: 128 MG/DL — HIGH (ref 70–99)
GLUCOSE BLDC GLUCOMTR-MCNC: 140 MG/DL — HIGH (ref 70–99)
GLUCOSE BLDC GLUCOMTR-MCNC: 140 MG/DL — HIGH (ref 70–99)
GLUCOSE SERPL-MCNC: 111 MG/DL — HIGH (ref 70–99)
GLUCOSE SERPL-MCNC: 111 MG/DL — HIGH (ref 70–99)
HADV DNA SPEC QL NAA+PROBE: SIGNIFICANT CHANGE UP
HADV DNA SPEC QL NAA+PROBE: SIGNIFICANT CHANGE UP
HCOV 229E RNA SPEC QL NAA+PROBE: SIGNIFICANT CHANGE UP
HCOV 229E RNA SPEC QL NAA+PROBE: SIGNIFICANT CHANGE UP
HCOV HKU1 RNA SPEC QL NAA+PROBE: SIGNIFICANT CHANGE UP
HCOV HKU1 RNA SPEC QL NAA+PROBE: SIGNIFICANT CHANGE UP
HCOV NL63 RNA SPEC QL NAA+PROBE: SIGNIFICANT CHANGE UP
HCOV NL63 RNA SPEC QL NAA+PROBE: SIGNIFICANT CHANGE UP
HCOV OC43 RNA SPEC QL NAA+PROBE: SIGNIFICANT CHANGE UP
HCOV OC43 RNA SPEC QL NAA+PROBE: SIGNIFICANT CHANGE UP
HCT VFR BLD CALC: 23.7 % — LOW (ref 34.5–45)
HCT VFR BLD CALC: 23.7 % — LOW (ref 34.5–45)
HCT VFR BLD CALC: 24.1 % — LOW (ref 34.5–45)
HCT VFR BLD CALC: 24.1 % — LOW (ref 34.5–45)
HGB BLD-MCNC: 7.6 G/DL — LOW (ref 11.5–15.5)
HGB BLD-MCNC: 7.6 G/DL — LOW (ref 11.5–15.5)
HGB BLD-MCNC: 8.1 G/DL — LOW (ref 11.5–15.5)
HGB BLD-MCNC: 8.1 G/DL — LOW (ref 11.5–15.5)
HMPV RNA SPEC QL NAA+PROBE: SIGNIFICANT CHANGE UP
HMPV RNA SPEC QL NAA+PROBE: SIGNIFICANT CHANGE UP
HPIV1 RNA SPEC QL NAA+PROBE: SIGNIFICANT CHANGE UP
HPIV1 RNA SPEC QL NAA+PROBE: SIGNIFICANT CHANGE UP
HPIV2 RNA SPEC QL NAA+PROBE: SIGNIFICANT CHANGE UP
HPIV2 RNA SPEC QL NAA+PROBE: SIGNIFICANT CHANGE UP
HPIV3 RNA SPEC QL NAA+PROBE: SIGNIFICANT CHANGE UP
HPIV3 RNA SPEC QL NAA+PROBE: SIGNIFICANT CHANGE UP
HPIV4 RNA SPEC QL NAA+PROBE: SIGNIFICANT CHANGE UP
HPIV4 RNA SPEC QL NAA+PROBE: SIGNIFICANT CHANGE UP
IANC: 4.74 K/UL — SIGNIFICANT CHANGE UP (ref 1.8–7.4)
IANC: 4.74 K/UL — SIGNIFICANT CHANGE UP (ref 1.8–7.4)
IMM GRANULOCYTES NFR BLD AUTO: 0.5 % — SIGNIFICANT CHANGE UP (ref 0–0.9)
IMM GRANULOCYTES NFR BLD AUTO: 0.5 % — SIGNIFICANT CHANGE UP (ref 0–0.9)
LYMPHOCYTES # BLD AUTO: 0.9 K/UL — LOW (ref 1–3.3)
LYMPHOCYTES # BLD AUTO: 0.9 K/UL — LOW (ref 1–3.3)
LYMPHOCYTES # BLD AUTO: 15.2 % — SIGNIFICANT CHANGE UP (ref 13–44)
LYMPHOCYTES # BLD AUTO: 15.2 % — SIGNIFICANT CHANGE UP (ref 13–44)
M PNEUMO DNA SPEC QL NAA+PROBE: SIGNIFICANT CHANGE UP
M PNEUMO DNA SPEC QL NAA+PROBE: SIGNIFICANT CHANGE UP
MAGNESIUM SERPL-MCNC: 1.8 MG/DL — SIGNIFICANT CHANGE UP (ref 1.6–2.6)
MAGNESIUM SERPL-MCNC: 1.8 MG/DL — SIGNIFICANT CHANGE UP (ref 1.6–2.6)
MCHC RBC-ENTMCNC: 28.9 PG — SIGNIFICANT CHANGE UP (ref 27–34)
MCHC RBC-ENTMCNC: 28.9 PG — SIGNIFICANT CHANGE UP (ref 27–34)
MCHC RBC-ENTMCNC: 29.2 PG — SIGNIFICANT CHANGE UP (ref 27–34)
MCHC RBC-ENTMCNC: 29.2 PG — SIGNIFICANT CHANGE UP (ref 27–34)
MCHC RBC-ENTMCNC: 32.1 GM/DL — SIGNIFICANT CHANGE UP (ref 32–36)
MCHC RBC-ENTMCNC: 32.1 GM/DL — SIGNIFICANT CHANGE UP (ref 32–36)
MCHC RBC-ENTMCNC: 33.6 GM/DL — SIGNIFICANT CHANGE UP (ref 32–36)
MCHC RBC-ENTMCNC: 33.6 GM/DL — SIGNIFICANT CHANGE UP (ref 32–36)
MCV RBC AUTO: 87 FL — SIGNIFICANT CHANGE UP (ref 80–100)
MCV RBC AUTO: 87 FL — SIGNIFICANT CHANGE UP (ref 80–100)
MCV RBC AUTO: 90.1 FL — SIGNIFICANT CHANGE UP (ref 80–100)
MCV RBC AUTO: 90.1 FL — SIGNIFICANT CHANGE UP (ref 80–100)
MONOCYTES # BLD AUTO: 0.24 K/UL — SIGNIFICANT CHANGE UP (ref 0–0.9)
MONOCYTES # BLD AUTO: 0.24 K/UL — SIGNIFICANT CHANGE UP (ref 0–0.9)
MONOCYTES NFR BLD AUTO: 4 % — SIGNIFICANT CHANGE UP (ref 2–14)
MONOCYTES NFR BLD AUTO: 4 % — SIGNIFICANT CHANGE UP (ref 2–14)
NEUTROPHILS # BLD AUTO: 4.74 K/UL — SIGNIFICANT CHANGE UP (ref 1.8–7.4)
NEUTROPHILS # BLD AUTO: 4.74 K/UL — SIGNIFICANT CHANGE UP (ref 1.8–7.4)
NEUTROPHILS NFR BLD AUTO: 79.8 % — HIGH (ref 43–77)
NEUTROPHILS NFR BLD AUTO: 79.8 % — HIGH (ref 43–77)
NRBC # BLD: 0 /100 WBCS — SIGNIFICANT CHANGE UP (ref 0–0)
NRBC # FLD: 0 K/UL — SIGNIFICANT CHANGE UP (ref 0–0)
PHOSPHATE SERPL-MCNC: 3.1 MG/DL — SIGNIFICANT CHANGE UP (ref 2.5–4.5)
PHOSPHATE SERPL-MCNC: 3.1 MG/DL — SIGNIFICANT CHANGE UP (ref 2.5–4.5)
PLATELET # BLD AUTO: 173 K/UL — SIGNIFICANT CHANGE UP (ref 150–400)
PLATELET # BLD AUTO: 173 K/UL — SIGNIFICANT CHANGE UP (ref 150–400)
PLATELET # BLD AUTO: 174 K/UL — SIGNIFICANT CHANGE UP (ref 150–400)
PLATELET # BLD AUTO: 174 K/UL — SIGNIFICANT CHANGE UP (ref 150–400)
POTASSIUM SERPL-MCNC: 4.6 MMOL/L — SIGNIFICANT CHANGE UP (ref 3.5–5.3)
POTASSIUM SERPL-MCNC: 4.6 MMOL/L — SIGNIFICANT CHANGE UP (ref 3.5–5.3)
POTASSIUM SERPL-SCNC: 4.6 MMOL/L — SIGNIFICANT CHANGE UP (ref 3.5–5.3)
POTASSIUM SERPL-SCNC: 4.6 MMOL/L — SIGNIFICANT CHANGE UP (ref 3.5–5.3)
PROT SERPL-MCNC: 6 G/DL — SIGNIFICANT CHANGE UP (ref 6–8.3)
PROT SERPL-MCNC: 6 G/DL — SIGNIFICANT CHANGE UP (ref 6–8.3)
RAPID RVP RESULT: SIGNIFICANT CHANGE UP
RAPID RVP RESULT: SIGNIFICANT CHANGE UP
RBC # BLD: 2.63 M/UL — LOW (ref 3.8–5.2)
RBC # BLD: 2.63 M/UL — LOW (ref 3.8–5.2)
RBC # BLD: 2.77 M/UL — LOW (ref 3.8–5.2)
RBC # BLD: 2.77 M/UL — LOW (ref 3.8–5.2)
RBC # FLD: 18.5 % — HIGH (ref 10.3–14.5)
RBC # FLD: 18.5 % — HIGH (ref 10.3–14.5)
RBC # FLD: 19.6 % — HIGH (ref 10.3–14.5)
RBC # FLD: 19.6 % — HIGH (ref 10.3–14.5)
RSV RNA SPEC QL NAA+PROBE: SIGNIFICANT CHANGE UP
RSV RNA SPEC QL NAA+PROBE: SIGNIFICANT CHANGE UP
RV+EV RNA SPEC QL NAA+PROBE: SIGNIFICANT CHANGE UP
RV+EV RNA SPEC QL NAA+PROBE: SIGNIFICANT CHANGE UP
SARS-COV-2 RNA SPEC QL NAA+PROBE: SIGNIFICANT CHANGE UP
SARS-COV-2 RNA SPEC QL NAA+PROBE: SIGNIFICANT CHANGE UP
SODIUM SERPL-SCNC: 134 MMOL/L — LOW (ref 135–145)
SODIUM SERPL-SCNC: 134 MMOL/L — LOW (ref 135–145)
WBC # BLD: 5.94 K/UL — SIGNIFICANT CHANGE UP (ref 3.8–10.5)
WBC # BLD: 5.94 K/UL — SIGNIFICANT CHANGE UP (ref 3.8–10.5)
WBC # BLD: 6.09 K/UL — SIGNIFICANT CHANGE UP (ref 3.8–10.5)
WBC # BLD: 6.09 K/UL — SIGNIFICANT CHANGE UP (ref 3.8–10.5)
WBC # FLD AUTO: 5.94 K/UL — SIGNIFICANT CHANGE UP (ref 3.8–10.5)
WBC # FLD AUTO: 5.94 K/UL — SIGNIFICANT CHANGE UP (ref 3.8–10.5)
WBC # FLD AUTO: 6.09 K/UL — SIGNIFICANT CHANGE UP (ref 3.8–10.5)
WBC # FLD AUTO: 6.09 K/UL — SIGNIFICANT CHANGE UP (ref 3.8–10.5)

## 2023-10-26 PROCEDURE — 99232 SBSQ HOSP IP/OBS MODERATE 35: CPT

## 2023-10-26 PROCEDURE — 99233 SBSQ HOSP IP/OBS HIGH 50: CPT | Mod: GC

## 2023-10-26 PROCEDURE — 71045 X-RAY EXAM CHEST 1 VIEW: CPT | Mod: 26

## 2023-10-26 PROCEDURE — 93971 EXTREMITY STUDY: CPT | Mod: 26,LT

## 2023-10-26 RX ORDER — ELECTROLYTE SOLUTION,INJ
1 VIAL (ML) INTRAVENOUS
Refills: 0 | Status: DISCONTINUED | OUTPATIENT
Start: 2023-10-26 | End: 2023-10-26

## 2023-10-26 RX ORDER — I.V. FAT EMULSION 20 G/100ML
10.4 EMULSION INTRAVENOUS
Qty: 25 | Refills: 0 | Status: DISCONTINUED | OUTPATIENT
Start: 2023-10-26 | End: 2023-10-26

## 2023-10-26 RX ORDER — IRON SUCROSE 20 MG/ML
200 INJECTION, SOLUTION INTRAVENOUS EVERY 24 HOURS
Refills: 0 | Status: DISCONTINUED | OUTPATIENT
Start: 2023-10-26 | End: 2023-10-26

## 2023-10-26 RX ORDER — ENOXAPARIN SODIUM 100 MG/ML
40 INJECTION SUBCUTANEOUS EVERY 24 HOURS
Refills: 0 | Status: DISCONTINUED | OUTPATIENT
Start: 2023-10-26 | End: 2023-10-26

## 2023-10-26 RX ORDER — ENOXAPARIN SODIUM 100 MG/ML
60 INJECTION SUBCUTANEOUS EVERY 12 HOURS
Refills: 0 | Status: DISCONTINUED | OUTPATIENT
Start: 2023-10-26 | End: 2023-10-30

## 2023-10-26 RX ORDER — I.V. FAT EMULSION 20 G/100ML
10.4 EMULSION INTRAVENOUS
Qty: 25 | Refills: 0 | Status: DISCONTINUED | OUTPATIENT
Start: 2023-10-26 | End: 2023-10-27

## 2023-10-26 RX ORDER — IRON SUCROSE 20 MG/ML
500 INJECTION, SOLUTION INTRAVENOUS ONCE
Refills: 0 | Status: DISCONTINUED | OUTPATIENT
Start: 2023-10-26 | End: 2023-10-26

## 2023-10-26 RX ADMIN — PANTOPRAZOLE SODIUM 40 MILLIGRAM(S): 20 TABLET, DELAYED RELEASE ORAL at 06:28

## 2023-10-26 RX ADMIN — HYDROMORPHONE HYDROCHLORIDE 0.5 MILLIGRAM(S): 2 INJECTION INTRAMUSCULAR; INTRAVENOUS; SUBCUTANEOUS at 20:11

## 2023-10-26 RX ADMIN — HYDROMORPHONE HYDROCHLORIDE 0.5 MILLIGRAM(S): 2 INJECTION INTRAMUSCULAR; INTRAVENOUS; SUBCUTANEOUS at 12:28

## 2023-10-26 RX ADMIN — PANTOPRAZOLE SODIUM 40 MILLIGRAM(S): 20 TABLET, DELAYED RELEASE ORAL at 18:32

## 2023-10-26 RX ADMIN — I.V. FAT EMULSION 10.4 ML/HR: 20 EMULSION INTRAVENOUS at 18:09

## 2023-10-26 RX ADMIN — HYDROMORPHONE HYDROCHLORIDE 0.5 MILLIGRAM(S): 2 INJECTION INTRAMUSCULAR; INTRAVENOUS; SUBCUTANEOUS at 00:28

## 2023-10-26 RX ADMIN — HYDROMORPHONE HYDROCHLORIDE 0.5 MILLIGRAM(S): 2 INJECTION INTRAMUSCULAR; INTRAVENOUS; SUBCUTANEOUS at 06:28

## 2023-10-26 RX ADMIN — Medication 1 EACH: at 18:09

## 2023-10-26 RX ADMIN — ENOXAPARIN SODIUM 60 MILLIGRAM(S): 100 INJECTION SUBCUTANEOUS at 18:07

## 2023-10-26 RX ADMIN — HYDROMORPHONE HYDROCHLORIDE 0.5 MILLIGRAM(S): 2 INJECTION INTRAMUSCULAR; INTRAVENOUS; SUBCUTANEOUS at 19:41

## 2023-10-26 RX ADMIN — Medication 400 MILLIGRAM(S): at 19:41

## 2023-10-26 RX ADMIN — HYDROMORPHONE HYDROCHLORIDE 0.5 MILLIGRAM(S): 2 INJECTION INTRAMUSCULAR; INTRAVENOUS; SUBCUTANEOUS at 00:13

## 2023-10-26 RX ADMIN — Medication 1000 MILLIGRAM(S): at 20:11

## 2023-10-26 RX ADMIN — HYDROMORPHONE HYDROCHLORIDE 0.5 MILLIGRAM(S): 2 INJECTION INTRAMUSCULAR; INTRAVENOUS; SUBCUTANEOUS at 13:28

## 2023-10-26 RX ADMIN — HYDROMORPHONE HYDROCHLORIDE 0.5 MILLIGRAM(S): 2 INJECTION INTRAMUSCULAR; INTRAVENOUS; SUBCUTANEOUS at 06:43

## 2023-10-26 NOTE — PROGRESS NOTE ADULT - ASSESSMENT
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 Primary surgery was October 4th.   Her hospital cause has been complicated by a L IJ and innominate vein acute non-occlusive thrombosis, therapeutic lovenox held i/s/o expanding subcapsular liver hematomas found on CT 10/20/23. Course c/b continuous retching.   Hematology reconsulted regarding anemia management as patient no longer actively bleeding, is s/p multiple transfusions of PRBCs, and still with anemia with Hgb of 7.6/8.1.    PLAN:  - Okay to give IV iron - recommend Venofer 200mg x5 doses, infused over 1 hour. Patient does not need to remain admitted for all 5 doses, can complete outpatient. Would avoid oral iron for now given patient still minimally taking food by mouth.    - Will sign off after this consult note. Please feel free to reconsult hematology for any further questions.  - Patient will follow with Dr. Khan at [facility] upon hospital discharge    **Please be aware note/recommendations not finalized until attending addendum complete.**    Nelly Christian DO, MPH  Medical Oncology Fellow, PGY-8  *Can be reached via Teams, but please contact on-call fellow after 5pm or on weekends.

## 2023-10-26 NOTE — PROGRESS NOTE ADULT - SUBJECTIVE AND OBJECTIVE BOX
NUTRITION NOTE   ===============================    Interval events - Patient was seen and examined at bedside, no acute events overnight. Patient denies chest pain, shortness of breath, nausea or vomiting at this time.    ROS: Except as noted above, all other systems reviewed and are negative     Allergies  penicillin (Rash)    PAST MEDICAL & SURGICAL HISTORY:  History of sickle cell trait  Malignant neoplasm of appendix  History of ectopic pregnancy  H/O colonoscopy  H/O endoscopy    Vital Signs Last 24 Hrs  T(C): 37.2 (10-26-23 @ 08:20), Max: 37.5 (10-26-23 @ 05:00)  HR: 76 (10-26-23 @ 08:20) (76 - 97)  BP: 120/59 (10-26-23 @ 08:20) (114/55 - 131/63)  BP(mean): --  ABP: --  ABP(mean): --  RR: 18 (10-26-23 @ 08:20) (16 - 18)  SpO2: 100% (10-26-23 @ 08:20) (98% - 100%)  Wt(kg): --  CVP(mm Hg): --  CI: --  CAPILLARY BLOOD GLUCOSE      POCT Blood Glucose.: 128 mg/dL (26 Oct 2023 05:16)  POCT Blood Glucose.: 140 mg/dL (26 Oct 2023 00:49)  POCT Blood Glucose.: 128 mg/dL (25 Oct 2023 21:07)  POCT Blood Glucose.: 112 mg/dL (25 Oct 2023 17:26)  POCT Blood Glucose.: 108 mg/dL (25 Oct 2023 11:56)   N/A      10-25 @ 07:01  -  10-26 @ 07:00  --------------------------------------------------------  IN:  Total IN: 0 mL    OUT:    Ileostomy (mL): 300 mL    Oral Fluid: 0 mL    Voided (mL): 2175 mL  Total OUT: 2475 mL    Total NET: -2475 mL    PHYSICAL EXAM:  General: NAD, resting in bed comfortably.  Cardiac: regular rate, warm and well perfused  Respiratory: Nonlabored respirations, normal cw expansion.  Abdomen: soft, nontender, nondistended, ostomy pink  Extremities: normal strength, FROM, no deformities      LABORATORY  CBC (10-26 @ 06:00)                              7.6<L>                         5.94    )----------------(  174        79.8<H>% Neutrophils, 15.2  % Lymphocytes, ANC: 4.74                                23.7<L>  CBC (10-25 @ 06:00)                              7.5<L>                         6.18    )----------------(  196        79.2<H>% Neutrophils, 14.9  % Lymphocytes, ANC: 4.90                                23.2<L>    BMP (10-26 @ 06:00)             134<L>  |  101     |  9     		Ca++ 1.14<L>  Ca 8.4                ---------------------------------( 111<H>		Mg 1.80               4.6     |  22      |  0.40<L>			Ph 3.1     BMP (10-25 @ 06:00)             139     |  105     |  7     		Ca++ 1.17    Ca 8.2<L>             ---------------------------------( 101<H>		Mg 1.80               4.4     |  23      |  0.42<L>			Ph 3.3       LFTs (10-26 @ 06:00)      TPro 6.0 / Alb 2.4<L> / TBili 0.4 / DBili -- / AST 38<H> / ALT 87<H> / AlkPhos 252<H>  LFTs (10-25 @ 06:00)      TPro 5.4<L> / Alb 2.3<L> / TBili 0.5 / DBili -- / AST 40<H> / <H> / AlkPhos 264<H>    Urinalysis (10-26 @ 06:00):     Color:  / Appearance:  / SG:  / pH:  / Gluc: 111<H> / Ketones:  / Bili:  / Urobili:  / Protein : / Nitrites:  / Leuk.Est:  / RBC:  / WBC:  / Sq Epi:  / Non Sq Epi:  / Bacteria          ASSESSMENT/PLAN:  58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 Patient's course c/b continuous retching and intolerance to tube feeds. Nutrition support consult called for prolonged NPO and severe protein calorie malnutiriton.    Patient s/p PICC placement and TPN initiated    TPN will provide 1584 kcal/day, TPN volume and calories will be cycled down to 18 hours today    labs reviewed - electrolytes adjusted in TPN bag     monitor fingersticks, obtain daily weights     continue parenteral nutrition at this time, will follow up with primary team on plan     1.  Severe protein calorie malnutrition being optimized with TPN: CHO [110] gm.  AA [42] gm. SMOF Lipids [25] gm.  2.  Hyperglycemia managed with: [0] units of regular insulin    3.  Check fluid balance daily.  Strict I/O  [ ] [ ]   4.  Daily BMP, Ionized Calcium, Magnesium and Phosphorous   5.  Triglycerides at initiation of TPN and monthly [ ] [ ]     Nutrition Support [id number]

## 2023-10-26 NOTE — PROGRESS NOTE ADULT - SUBJECTIVE AND OBJECTIVE BOX
Heme/Onc Progress Note    INTERVAL HPI/OVERNIGHT EVENTS:  Patient S&E at bedside. No o/n events, patient resting comfortably. Does endorse abdominal pain that occurs at the 3 hour narciso after receiving her pain medications and feels rather uncomfortable. No other complaints at this time.     VITAL SIGNS:  T(F): 100.5 (10-26-23 @ 17:04)  HR: 92 (10-26-23 @ 17:04)  BP: 144/67 (10-26-23 @ 17:04)  RR: 18 (10-26-23 @ 17:04)  SpO2: 99% (10-26-23 @ 17:04)  Wt(kg): --    PHYSICAL EXAM:    Constitutional: NAD  Eyes: non-icteric  Neck: supple  Respiratory: CTA b/l, good air entry b/l  Cardiovascular: RRR, no M/R/G  Gastrointestinal: soft, midline abdominal incision - healing, c/d/i, with staples; L-sided ostomy in place  Extremities: no gross deformities  Neurological: nonfocal    MEDICATIONS  (STANDING):  chlorhexidine 4% Liquid 1 Application(s) Topical <User Schedule>  enoxaparin Injectable 60 milliGRAM(s) SubCutaneous every 12 hours  influenza   Vaccine 0.5 milliLiter(s) IntraMuscular once  lipid, fat emulsion (Fish Oil and Plant Based) 20% Infusion 10.4 mL/Hr (10.4 mL/Hr) IV Continuous <Continuous>  pantoprazole  Injectable 40 milliGRAM(s) IV Push two times a day  Parenteral Nutrition - Adult 1 Each TPN Continuous <Continuous>  Parenteral Nutrition - Adult 1 Each (83 mL/Hr) TPN Continuous <Continuous>  scopolamine 1 mG/72 Hr(s) Patch 1 Patch Transdermal every 72 hours    MEDICATIONS  (PRN):  acetaminophen   IVPB .. 1000 milliGRAM(s) IV Intermittent every 6 hours PRN Mild Pain (1 - 3), Moderate Pain (4 - 6)  HYDROmorphone  Injectable 0.5 milliGRAM(s) IV Push every 6 hours PRN Moderate and Severe Pain  LORazepam   Injectable 0.25 milliGRAM(s) IV Push every 6 hours PRN Nausea and/or Vomiting  naloxone Injectable 0.1 milliGRAM(s) IV Push every 3 minutes PRN For ANY of the following changes in patient status:  A. RR LESS THAN 10 breaths per minute, B. Oxygen saturation LESS THAN 90%, C. Sedation score of 6  ondansetron Injectable 4 milliGRAM(s) IV Push every 6 hours PRN Nausea  sodium chloride 0.9% lock flush 10 milliLiter(s) IV Push every 1 hour PRN Pre/post blood products, medications, blood draw, and to maintain line patency      Allergies  penicillin (Rash)    LABS:                        8.1    6.09  )-----------( 173      ( 26 Oct 2023 19:00 )             24.1     10-26    134<L>  |  101  |  9   ----------------------------<  111<H>  4.6   |  22  |  0.40<L>    Ca    8.4      26 Oct 2023 06:00  Phos  3.1     10-26  Mg     1.80     10-26    TPro  6.0  /  Alb  2.4<L>  /  TBili  0.4  /  DBili  x   /  AST  38<H>  /  ALT  87<H>  /  AlkPhos  252<H>  10-26

## 2023-10-26 NOTE — PROGRESS NOTE ADULT - SUBJECTIVE AND OBJECTIVE BOX
Subjective: Patient seen and examined. No new events except as noted.     SUBJECTIVE/ROS:  nad  denies any chest pain, sob, palpitation, dizziness or syncope.       MEDICATIONS:  MEDICATIONS  (STANDING):  chlorhexidine 4% Liquid 1 Application(s) Topical <User Schedule>  influenza   Vaccine 0.5 milliLiter(s) IntraMuscular once  lipid, fat emulsion (Fish Oil and Plant Based) 20% Infusion 10.4 mL/Hr (10.4 mL/Hr) IV Continuous <Continuous>  pantoprazole  Injectable 40 milliGRAM(s) IV Push two times a day  Parenteral Nutrition - Adult 1 Each (83 mL/Hr) TPN Continuous <Continuous>  scopolamine 1 mG/72 Hr(s) Patch 1 Patch Transdermal every 72 hours      PHYSICAL EXAM:  T(C): 37.5 (10-26-23 @ 05:00), Max: 37.5 (10-26-23 @ 05:00)  HR: 97 (10-26-23 @ 05:00) (80 - 97)  BP: 124/59 (10-26-23 @ 05:00) (114/55 - 137/67)  RR: 17 (10-26-23 @ 05:00) (16 - 17)  SpO2: 98% (10-26-23 @ 05:00) (98% - 100%)  Wt(kg): --  I&O's Summary    24 Oct 2023 07:01  -  25 Oct 2023 07:00  --------------------------------------------------------  IN: 0 mL / OUT: 2000 mL / NET: -2000 mL    25 Oct 2023 07:01  -  26 Oct 2023 06:55  --------------------------------------------------------  IN: 0 mL / OUT: 2475 mL / NET: -2475 mL        Weight (kg): 56.4 (10-26 @ 04:00)      JVP: Normal  Neck: supple  Lung: clear   CV: S1 S2 , Murmur:  Abd: soft  Ext: No edema  neuro: Awake / alert  Psych: flat affect  Skin: normal``    LABS/DATA:    CARDIAC MARKERS:                                7.5    6.18  )-----------( 196      ( 25 Oct 2023 06:00 )             23.2     10-25    139  |  105  |  7   ----------------------------<  101<H>  4.4   |  23  |  0.42<L>    Ca    8.2<L>      25 Oct 2023 06:00  Phos  3.3     10-25  Mg     1.80     10-25    TPro  5.4<L>  /  Alb  2.3<L>  /  TBili  0.5  /  DBili  x   /  AST  40<H>  /  ALT  118<H>  /  AlkPhos  264<H>  10-25    proBNP:   Lipid Profile:   HgA1c:   TSH:     TELE:  EKG:

## 2023-10-26 NOTE — PROGRESS NOTE ADULT - ASSESSMENT
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 Found to have L IJ and innominate vein acute non-occlusive thrombosis, therapeutic lovenox held i/s/o expanding subcapsular liver hematomas found on CT 10/20/23. Course c/b continuous retching.    Plan  - Nausea: Scopolamine, PRN lorazepam.   - PICC/TPN  - Diet: NPO, unable to tolerate tube feeds x2  - Protonix BID.   - Appreciate GI recommendations, patient declining PEG-J.  - Pain: Dilaudid PRN.   - Liver Hematoma --> 10/21 Hgb 5.6, received 2u pRBC, now improved >8. Continue to follow.   - Hem consult for anemia: possible iron infusion, Oncology to follow.  - PT/OT consult appreciated, PM&R consulted, approved for acute rehab. During course, patient continued inpatient PT. Now recommending home PT.  - Please page [phone] w/ any questions    YUDITH Rodgers  Chief Resident  General Surgery

## 2023-10-26 NOTE — PROGRESS NOTE ADULT - SUBJECTIVE AND OBJECTIVE BOX
SURGICAL ONCOLOGY PROGRESS NOTE:    ========================================  D TEAM SURGERY PAGER   ========================================    Subjective:  Pt seen and examined at bedside. Reports feeling somewhat better this AM. Continues to have small volume spit up. Subxiphoid pain improved.       Objective:    PE:  Gen: NAD  Resp: airway patent, respirations unlabored, no increased WoB  CVS: RRR  Abd: soft, ND, NT, no rebound or guarding, incision c/d/i, ostomy p/p/p  Ext: no edema, WWP  Neuro: AAOx3, no focal deficits    Vital Signs Last 24 Hrs  T(C): 37.6 (26 Oct 2023 12:00), Max: 37.6 (26 Oct 2023 12:00)  T(F): 99.6 (26 Oct 2023 12:00), Max: 99.6 (26 Oct 2023 12:00)  HR: 91 (26 Oct 2023 12:00) (76 - 97)  BP: 134/62 (26 Oct 2023 12:00) (114/55 - 134/62)  BP(mean): --  RR: 18 (26 Oct 2023 12:00) (16 - 18)  SpO2: 100% (26 Oct 2023 12:00) (98% - 100%)    Parameters below as of 26 Oct 2023 12:00  Patient On (Oxygen Delivery Method): room air        I&O's Detail    25 Oct 2023 07:01  -  26 Oct 2023 07:00  --------------------------------------------------------  IN:  Total IN: 0 mL    OUT:    Ileostomy (mL): 300 mL    Oral Fluid: 0 mL    Voided (mL): 2175 mL  Total OUT: 2475 mL    Total NET: -2475 mL      26 Oct 2023 07:01  -  26 Oct 2023 13:11  --------------------------------------------------------  IN:  Total IN: 0 mL    OUT:    Voided (mL): 400 mL  Total OUT: 400 mL    Total NET: -400 mL          Daily     Daily Weight in k.4 (26 Oct 2023 04:00)    MEDICATIONS  (STANDING):  chlorhexidine 4% Liquid 1 Application(s) Topical <User Schedule>  enoxaparin Injectable 40 milliGRAM(s) SubCutaneous every 24 hours  influenza   Vaccine 0.5 milliLiter(s) IntraMuscular once  lipid, fat emulsion (Fish Oil and Plant Based) 20% Infusion 10.4 mL/Hr (10.4 mL/Hr) IV Continuous <Continuous>  pantoprazole  Injectable 40 milliGRAM(s) IV Push two times a day  Parenteral Nutrition - Adult 1 Each TPN Continuous <Continuous>  Parenteral Nutrition - Adult 1 Each (83 mL/Hr) TPN Continuous <Continuous>  scopolamine 1 mG/72 Hr(s) Patch 1 Patch Transdermal every 72 hours    MEDICATIONS  (PRN):  acetaminophen   IVPB .. 1000 milliGRAM(s) IV Intermittent every 6 hours PRN Mild Pain (1 - 3), Moderate Pain (4 - 6)  HYDROmorphone  Injectable 0.5 milliGRAM(s) IV Push every 6 hours PRN Moderate and Severe Pain  LORazepam   Injectable 0.25 milliGRAM(s) IV Push every 6 hours PRN Nausea and/or Vomiting  naloxone Injectable 0.1 milliGRAM(s) IV Push every 3 minutes PRN For ANY of the following changes in patient status:  A. RR LESS THAN 10 breaths per minute, B. Oxygen saturation LESS THAN 90%, C. Sedation score of 6  ondansetron Injectable 4 milliGRAM(s) IV Push every 6 hours PRN Nausea  sodium chloride 0.9% lock flush 10 milliLiter(s) IV Push every 1 hour PRN Pre/post blood products, medications, blood draw, and to maintain line patency      LABS:                        7.6    5.94  )-----------( 174      ( 26 Oct 2023 06:00 )             23.7     10-26    134<L>  |  101  |  9   ----------------------------<  111<H>  4.6   |  22  |  0.40<L>    Ca    8.4      26 Oct 2023 06:00  Phos  3.1     10-26  Mg     1.80     10-26    TPro  6.0  /  Alb  2.4<L>  /  TBili  0.4  /  DBili  x   /  AST  38<H>  /  ALT  87<H>  /  AlkPhos  252<H>  10-26      Urinalysis Basic - ( 26 Oct 2023 06:00 )    Color: x / Appearance: x / SG: x / pH: x  Gluc: 111 mg/dL / Ketone: x  / Bili: x / Urobili: x   Blood: x / Protein: x / Nitrite: x   Leuk Esterase: x / RBC: x / WBC x   Sq Epi: x / Non Sq Epi: x / Bacteria: x        RADIOLOGY & ADDITIONAL STUDIES:

## 2023-10-27 LAB
ALBUMIN SERPL ELPH-MCNC: 2.4 G/DL — LOW (ref 3.3–5)
ALBUMIN SERPL ELPH-MCNC: 2.4 G/DL — LOW (ref 3.3–5)
ALBUMIN SERPL ELPH-MCNC: 2.5 G/DL — LOW (ref 3.3–5)
ALBUMIN SERPL ELPH-MCNC: 2.5 G/DL — LOW (ref 3.3–5)
ALP SERPL-CCNC: 329 U/L — HIGH (ref 40–120)
ALP SERPL-CCNC: 329 U/L — HIGH (ref 40–120)
ALP SERPL-CCNC: 339 U/L — HIGH (ref 40–120)
ALP SERPL-CCNC: 339 U/L — HIGH (ref 40–120)
ALT FLD-CCNC: 91 U/L — HIGH (ref 4–33)
ALT FLD-CCNC: 91 U/L — HIGH (ref 4–33)
ALT FLD-CCNC: 92 U/L — HIGH (ref 4–33)
ALT FLD-CCNC: 92 U/L — HIGH (ref 4–33)
ANION GAP SERPL CALC-SCNC: 9 MMOL/L — SIGNIFICANT CHANGE UP (ref 7–14)
APPEARANCE UR: ABNORMAL
APPEARANCE UR: ABNORMAL
AST SERPL-CCNC: 73 U/L — HIGH (ref 4–32)
AST SERPL-CCNC: 73 U/L — HIGH (ref 4–32)
AST SERPL-CCNC: 75 U/L — HIGH (ref 4–32)
AST SERPL-CCNC: 75 U/L — HIGH (ref 4–32)
BACTERIA # UR AUTO: ABNORMAL /HPF
BACTERIA # UR AUTO: ABNORMAL /HPF
BASOPHILS # BLD AUTO: 0.01 K/UL — SIGNIFICANT CHANGE UP (ref 0–0.2)
BASOPHILS # BLD AUTO: 0.01 K/UL — SIGNIFICANT CHANGE UP (ref 0–0.2)
BASOPHILS NFR BLD AUTO: 0.2 % — SIGNIFICANT CHANGE UP (ref 0–2)
BASOPHILS NFR BLD AUTO: 0.2 % — SIGNIFICANT CHANGE UP (ref 0–2)
BILIRUB DIRECT SERPL-MCNC: 0.2 MG/DL — SIGNIFICANT CHANGE UP (ref 0–0.3)
BILIRUB DIRECT SERPL-MCNC: 0.2 MG/DL — SIGNIFICANT CHANGE UP (ref 0–0.3)
BILIRUB INDIRECT FLD-MCNC: 0.3 MG/DL — SIGNIFICANT CHANGE UP (ref 0–1)
BILIRUB INDIRECT FLD-MCNC: 0.3 MG/DL — SIGNIFICANT CHANGE UP (ref 0–1)
BILIRUB SERPL-MCNC: 0.5 MG/DL — SIGNIFICANT CHANGE UP (ref 0.2–1.2)
BILIRUB UR-MCNC: NEGATIVE — SIGNIFICANT CHANGE UP
BILIRUB UR-MCNC: NEGATIVE — SIGNIFICANT CHANGE UP
BUN SERPL-MCNC: 12 MG/DL — SIGNIFICANT CHANGE UP (ref 7–23)
BUN SERPL-MCNC: 12 MG/DL — SIGNIFICANT CHANGE UP (ref 7–23)
BUN SERPL-MCNC: 13 MG/DL — SIGNIFICANT CHANGE UP (ref 7–23)
BUN SERPL-MCNC: 13 MG/DL — SIGNIFICANT CHANGE UP (ref 7–23)
CA-I BLD-SCNC: 1.14 MMOL/L — LOW (ref 1.15–1.29)
CA-I BLD-SCNC: 1.14 MMOL/L — LOW (ref 1.15–1.29)
CALCIUM SERPL-MCNC: 8.4 MG/DL — SIGNIFICANT CHANGE UP (ref 8.4–10.5)
CAST: 2 /LPF — SIGNIFICANT CHANGE UP (ref 0–4)
CAST: 2 /LPF — SIGNIFICANT CHANGE UP (ref 0–4)
CHLORIDE SERPL-SCNC: 101 MMOL/L — SIGNIFICANT CHANGE UP (ref 98–107)
CO2 SERPL-SCNC: 23 MMOL/L — SIGNIFICANT CHANGE UP (ref 22–31)
COLOR SPEC: YELLOW — SIGNIFICANT CHANGE UP
COLOR SPEC: YELLOW — SIGNIFICANT CHANGE UP
CREAT SERPL-MCNC: 0.39 MG/DL — LOW (ref 0.5–1.3)
DIFF PNL FLD: ABNORMAL
DIFF PNL FLD: ABNORMAL
EGFR: 115 ML/MIN/1.73M2 — SIGNIFICANT CHANGE UP
EOSINOPHIL # BLD AUTO: 0.04 K/UL — SIGNIFICANT CHANGE UP (ref 0–0.5)
EOSINOPHIL # BLD AUTO: 0.04 K/UL — SIGNIFICANT CHANGE UP (ref 0–0.5)
EOSINOPHIL NFR BLD AUTO: 0.8 % — SIGNIFICANT CHANGE UP (ref 0–6)
EOSINOPHIL NFR BLD AUTO: 0.8 % — SIGNIFICANT CHANGE UP (ref 0–6)
GLUCOSE BLDC GLUCOMTR-MCNC: 103 MG/DL — HIGH (ref 70–99)
GLUCOSE BLDC GLUCOMTR-MCNC: 103 MG/DL — HIGH (ref 70–99)
GLUCOSE BLDC GLUCOMTR-MCNC: 111 MG/DL — HIGH (ref 70–99)
GLUCOSE BLDC GLUCOMTR-MCNC: 111 MG/DL — HIGH (ref 70–99)
GLUCOSE BLDC GLUCOMTR-MCNC: 115 MG/DL — HIGH (ref 70–99)
GLUCOSE BLDC GLUCOMTR-MCNC: 115 MG/DL — HIGH (ref 70–99)
GLUCOSE BLDC GLUCOMTR-MCNC: 130 MG/DL — HIGH (ref 70–99)
GLUCOSE BLDC GLUCOMTR-MCNC: 130 MG/DL — HIGH (ref 70–99)
GLUCOSE SERPL-MCNC: 102 MG/DL — HIGH (ref 70–99)
GLUCOSE SERPL-MCNC: 102 MG/DL — HIGH (ref 70–99)
GLUCOSE SERPL-MCNC: 124 MG/DL — HIGH (ref 70–99)
GLUCOSE SERPL-MCNC: 124 MG/DL — HIGH (ref 70–99)
GLUCOSE UR QL: NEGATIVE MG/DL — SIGNIFICANT CHANGE UP
GLUCOSE UR QL: NEGATIVE MG/DL — SIGNIFICANT CHANGE UP
HCT VFR BLD CALC: 23.5 % — LOW (ref 34.5–45)
HCT VFR BLD CALC: 23.5 % — LOW (ref 34.5–45)
HGB BLD-MCNC: 7.6 G/DL — LOW (ref 11.5–15.5)
HGB BLD-MCNC: 7.6 G/DL — LOW (ref 11.5–15.5)
IANC: 4.15 K/UL — SIGNIFICANT CHANGE UP (ref 1.8–7.4)
IANC: 4.15 K/UL — SIGNIFICANT CHANGE UP (ref 1.8–7.4)
IMM GRANULOCYTES NFR BLD AUTO: 0.8 % — SIGNIFICANT CHANGE UP (ref 0–0.9)
IMM GRANULOCYTES NFR BLD AUTO: 0.8 % — SIGNIFICANT CHANGE UP (ref 0–0.9)
KETONES UR-MCNC: NEGATIVE MG/DL — SIGNIFICANT CHANGE UP
KETONES UR-MCNC: NEGATIVE MG/DL — SIGNIFICANT CHANGE UP
LEUKOCYTE ESTERASE UR-ACNC: ABNORMAL
LEUKOCYTE ESTERASE UR-ACNC: ABNORMAL
LYMPHOCYTES # BLD AUTO: 0.84 K/UL — LOW (ref 1–3.3)
LYMPHOCYTES # BLD AUTO: 0.84 K/UL — LOW (ref 1–3.3)
LYMPHOCYTES # BLD AUTO: 15.8 % — SIGNIFICANT CHANGE UP (ref 13–44)
LYMPHOCYTES # BLD AUTO: 15.8 % — SIGNIFICANT CHANGE UP (ref 13–44)
MAGNESIUM SERPL-MCNC: 2 MG/DL — SIGNIFICANT CHANGE UP (ref 1.6–2.6)
MCHC RBC-ENTMCNC: 28.7 PG — SIGNIFICANT CHANGE UP (ref 27–34)
MCHC RBC-ENTMCNC: 28.7 PG — SIGNIFICANT CHANGE UP (ref 27–34)
MCHC RBC-ENTMCNC: 32.3 GM/DL — SIGNIFICANT CHANGE UP (ref 32–36)
MCHC RBC-ENTMCNC: 32.3 GM/DL — SIGNIFICANT CHANGE UP (ref 32–36)
MCV RBC AUTO: 88.7 FL — SIGNIFICANT CHANGE UP (ref 80–100)
MCV RBC AUTO: 88.7 FL — SIGNIFICANT CHANGE UP (ref 80–100)
MONOCYTES # BLD AUTO: 0.24 K/UL — SIGNIFICANT CHANGE UP (ref 0–0.9)
MONOCYTES # BLD AUTO: 0.24 K/UL — SIGNIFICANT CHANGE UP (ref 0–0.9)
MONOCYTES NFR BLD AUTO: 4.5 % — SIGNIFICANT CHANGE UP (ref 2–14)
MONOCYTES NFR BLD AUTO: 4.5 % — SIGNIFICANT CHANGE UP (ref 2–14)
NEUTROPHILS # BLD AUTO: 4.15 K/UL — SIGNIFICANT CHANGE UP (ref 1.8–7.4)
NEUTROPHILS # BLD AUTO: 4.15 K/UL — SIGNIFICANT CHANGE UP (ref 1.8–7.4)
NEUTROPHILS NFR BLD AUTO: 77.9 % — HIGH (ref 43–77)
NEUTROPHILS NFR BLD AUTO: 77.9 % — HIGH (ref 43–77)
NITRITE UR-MCNC: NEGATIVE — SIGNIFICANT CHANGE UP
NITRITE UR-MCNC: NEGATIVE — SIGNIFICANT CHANGE UP
NRBC # BLD: 0 /100 WBCS — SIGNIFICANT CHANGE UP (ref 0–0)
NRBC # BLD: 0 /100 WBCS — SIGNIFICANT CHANGE UP (ref 0–0)
NRBC # FLD: 0 K/UL — SIGNIFICANT CHANGE UP (ref 0–0)
NRBC # FLD: 0 K/UL — SIGNIFICANT CHANGE UP (ref 0–0)
PH UR: 6.5 — SIGNIFICANT CHANGE UP (ref 5–8)
PH UR: 6.5 — SIGNIFICANT CHANGE UP (ref 5–8)
PHOSPHATE SERPL-MCNC: 3 MG/DL — SIGNIFICANT CHANGE UP (ref 2.5–4.5)
PLATELET # BLD AUTO: 164 K/UL — SIGNIFICANT CHANGE UP (ref 150–400)
PLATELET # BLD AUTO: 164 K/UL — SIGNIFICANT CHANGE UP (ref 150–400)
POTASSIUM SERPL-MCNC: 3.7 MMOL/L — SIGNIFICANT CHANGE UP (ref 3.5–5.3)
POTASSIUM SERPL-MCNC: 3.7 MMOL/L — SIGNIFICANT CHANGE UP (ref 3.5–5.3)
POTASSIUM SERPL-MCNC: 5.4 MMOL/L — HIGH (ref 3.5–5.3)
POTASSIUM SERPL-MCNC: 5.4 MMOL/L — HIGH (ref 3.5–5.3)
POTASSIUM SERPL-SCNC: 3.7 MMOL/L — SIGNIFICANT CHANGE UP (ref 3.5–5.3)
POTASSIUM SERPL-SCNC: 3.7 MMOL/L — SIGNIFICANT CHANGE UP (ref 3.5–5.3)
POTASSIUM SERPL-SCNC: 5.4 MMOL/L — HIGH (ref 3.5–5.3)
POTASSIUM SERPL-SCNC: 5.4 MMOL/L — HIGH (ref 3.5–5.3)
PROT SERPL-MCNC: 5.7 G/DL — LOW (ref 6–8.3)
PROT SERPL-MCNC: 5.7 G/DL — LOW (ref 6–8.3)
PROT SERPL-MCNC: 6.2 G/DL — SIGNIFICANT CHANGE UP (ref 6–8.3)
PROT SERPL-MCNC: 6.2 G/DL — SIGNIFICANT CHANGE UP (ref 6–8.3)
PROT UR-MCNC: SIGNIFICANT CHANGE UP MG/DL
PROT UR-MCNC: SIGNIFICANT CHANGE UP MG/DL
RBC # BLD: 2.65 M/UL — LOW (ref 3.8–5.2)
RBC # BLD: 2.65 M/UL — LOW (ref 3.8–5.2)
RBC # FLD: 19.1 % — HIGH (ref 10.3–14.5)
RBC # FLD: 19.1 % — HIGH (ref 10.3–14.5)
RBC CASTS # UR COMP ASSIST: 1 /HPF — SIGNIFICANT CHANGE UP (ref 0–4)
RBC CASTS # UR COMP ASSIST: 1 /HPF — SIGNIFICANT CHANGE UP (ref 0–4)
SODIUM SERPL-SCNC: 133 MMOL/L — LOW (ref 135–145)
SP GR SPEC: 1.01 — SIGNIFICANT CHANGE UP (ref 1–1.03)
SP GR SPEC: 1.01 — SIGNIFICANT CHANGE UP (ref 1–1.03)
SQUAMOUS # UR AUTO: 14 /HPF — HIGH (ref 0–5)
SQUAMOUS # UR AUTO: 14 /HPF — HIGH (ref 0–5)
UROBILINOGEN FLD QL: 0.2 MG/DL — SIGNIFICANT CHANGE UP (ref 0.2–1)
UROBILINOGEN FLD QL: 0.2 MG/DL — SIGNIFICANT CHANGE UP (ref 0.2–1)
WBC # BLD: 5.32 K/UL — SIGNIFICANT CHANGE UP (ref 3.8–10.5)
WBC # BLD: 5.32 K/UL — SIGNIFICANT CHANGE UP (ref 3.8–10.5)
WBC # FLD AUTO: 5.32 K/UL — SIGNIFICANT CHANGE UP (ref 3.8–10.5)
WBC # FLD AUTO: 5.32 K/UL — SIGNIFICANT CHANGE UP (ref 3.8–10.5)
WBC UR QL: >50 /HPF — SIGNIFICANT CHANGE UP (ref 0–5)
WBC UR QL: >50 /HPF — SIGNIFICANT CHANGE UP (ref 0–5)

## 2023-10-27 PROCEDURE — 99232 SBSQ HOSP IP/OBS MODERATE 35: CPT

## 2023-10-27 RX ORDER — HYDROMORPHONE HYDROCHLORIDE 2 MG/ML
0.2 INJECTION INTRAMUSCULAR; INTRAVENOUS; SUBCUTANEOUS EVERY 6 HOURS
Refills: 0 | Status: DISCONTINUED | OUTPATIENT
Start: 2023-10-27 | End: 2023-10-27

## 2023-10-27 RX ORDER — ELECTROLYTE SOLUTION,INJ
1 VIAL (ML) INTRAVENOUS
Refills: 0 | Status: DISCONTINUED | OUTPATIENT
Start: 2023-10-27 | End: 2023-10-27

## 2023-10-27 RX ORDER — CHLORHEXIDINE GLUCONATE 213 G/1000ML
1 SOLUTION TOPICAL DAILY
Refills: 0 | Status: DISCONTINUED | OUTPATIENT
Start: 2023-10-27 | End: 2023-11-08

## 2023-10-27 RX ORDER — CIPROFLOXACIN LACTATE 400MG/40ML
400 VIAL (ML) INTRAVENOUS EVERY 12 HOURS
Refills: 0 | Status: DISCONTINUED | OUTPATIENT
Start: 2023-10-27 | End: 2023-10-28

## 2023-10-27 RX ORDER — HYDROMORPHONE HYDROCHLORIDE 2 MG/ML
0.5 INJECTION INTRAMUSCULAR; INTRAVENOUS; SUBCUTANEOUS EVERY 4 HOURS
Refills: 0 | Status: DISCONTINUED | OUTPATIENT
Start: 2023-10-27 | End: 2023-10-27

## 2023-10-27 RX ORDER — HYDROMORPHONE HYDROCHLORIDE 2 MG/ML
0.5 INJECTION INTRAMUSCULAR; INTRAVENOUS; SUBCUTANEOUS EVERY 4 HOURS
Refills: 0 | Status: DISCONTINUED | OUTPATIENT
Start: 2023-10-27 | End: 2023-10-29

## 2023-10-27 RX ORDER — I.V. FAT EMULSION 20 G/100ML
10.4 EMULSION INTRAVENOUS
Qty: 25 | Refills: 0 | Status: DISCONTINUED | OUTPATIENT
Start: 2023-10-27 | End: 2023-10-27

## 2023-10-27 RX ORDER — ACETAMINOPHEN 500 MG
1000 TABLET ORAL ONCE
Refills: 0 | Status: COMPLETED | OUTPATIENT
Start: 2023-10-27 | End: 2023-10-27

## 2023-10-27 RX ORDER — I.V. FAT EMULSION 20 G/100ML
10.4 EMULSION INTRAVENOUS
Qty: 25 | Refills: 0 | Status: DISCONTINUED | OUTPATIENT
Start: 2023-10-27 | End: 2023-10-28

## 2023-10-27 RX ADMIN — HYDROMORPHONE HYDROCHLORIDE 0.2 MILLIGRAM(S): 2 INJECTION INTRAMUSCULAR; INTRAVENOUS; SUBCUTANEOUS at 01:42

## 2023-10-27 RX ADMIN — HYDROMORPHONE HYDROCHLORIDE 0.5 MILLIGRAM(S): 2 INJECTION INTRAMUSCULAR; INTRAVENOUS; SUBCUTANEOUS at 16:42

## 2023-10-27 RX ADMIN — I.V. FAT EMULSION 10.4 ML/HR: 20 EMULSION INTRAVENOUS at 18:15

## 2023-10-27 RX ADMIN — Medication 1 EACH: at 18:15

## 2023-10-27 RX ADMIN — Medication 200 MILLIGRAM(S): at 18:14

## 2023-10-27 RX ADMIN — HYDROMORPHONE HYDROCHLORIDE 0.5 MILLIGRAM(S): 2 INJECTION INTRAMUSCULAR; INTRAVENOUS; SUBCUTANEOUS at 09:15

## 2023-10-27 RX ADMIN — PANTOPRAZOLE SODIUM 40 MILLIGRAM(S): 20 TABLET, DELAYED RELEASE ORAL at 06:18

## 2023-10-27 RX ADMIN — ONDANSETRON 4 MILLIGRAM(S): 8 TABLET, FILM COATED ORAL at 18:15

## 2023-10-27 RX ADMIN — HYDROMORPHONE HYDROCHLORIDE 0.5 MILLIGRAM(S): 2 INJECTION INTRAMUSCULAR; INTRAVENOUS; SUBCUTANEOUS at 10:15

## 2023-10-27 RX ADMIN — ENOXAPARIN SODIUM 60 MILLIGRAM(S): 100 INJECTION SUBCUTANEOUS at 18:13

## 2023-10-27 RX ADMIN — PANTOPRAZOLE SODIUM 40 MILLIGRAM(S): 20 TABLET, DELAYED RELEASE ORAL at 18:13

## 2023-10-27 RX ADMIN — ENOXAPARIN SODIUM 60 MILLIGRAM(S): 100 INJECTION SUBCUTANEOUS at 06:18

## 2023-10-27 RX ADMIN — HYDROMORPHONE HYDROCHLORIDE 0.5 MILLIGRAM(S): 2 INJECTION INTRAMUSCULAR; INTRAVENOUS; SUBCUTANEOUS at 21:48

## 2023-10-27 RX ADMIN — Medication 1000 MILLIGRAM(S): at 04:57

## 2023-10-27 RX ADMIN — HYDROMORPHONE HYDROCHLORIDE 0.5 MILLIGRAM(S): 2 INJECTION INTRAMUSCULAR; INTRAVENOUS; SUBCUTANEOUS at 15:42

## 2023-10-27 RX ADMIN — Medication 200 MILLIGRAM(S): at 06:18

## 2023-10-27 RX ADMIN — HYDROMORPHONE HYDROCHLORIDE 0.2 MILLIGRAM(S): 2 INJECTION INTRAMUSCULAR; INTRAVENOUS; SUBCUTANEOUS at 02:12

## 2023-10-27 RX ADMIN — Medication 400 MILLIGRAM(S): at 16:31

## 2023-10-27 RX ADMIN — CHLORHEXIDINE GLUCONATE 1 APPLICATION(S): 213 SOLUTION TOPICAL at 18:16

## 2023-10-27 RX ADMIN — Medication 400 MILLIGRAM(S): at 04:27

## 2023-10-27 RX ADMIN — HYDROMORPHONE HYDROCHLORIDE 0.5 MILLIGRAM(S): 2 INJECTION INTRAMUSCULAR; INTRAVENOUS; SUBCUTANEOUS at 22:18

## 2023-10-27 NOTE — PROVIDER CONTACT NOTE (OTHER) - REASON
Pt eating soup while NPO
Tachycardia, bleeding from JUAQUIN drain site
Pt hypotensive and febrile
Pt had another dark bowel movement, looks black and had another episodes of emesis
Pt vomited looks like its straight tube feedings, different than color before
Pt had one dark bowel movement, looks black and had two episodes of emesis
Pt blood pressure 101/ 48 after 1 unit of PRBC
Pt had another dark bowel movement, looks black

## 2023-10-27 NOTE — PROVIDER CONTACT NOTE (OTHER) - BACKGROUND
Pt s/p appendectomy, cholecystectomy, hysterectomy, omenectomy, peritonectomy, LAR with diverting loop ileostomy and bladder injury on 10/4
Patient s/p appendectomy, cholecystectomy, hysterectomy , with ileostomy creation.
Pt s/p appendectomy, cholecystectomy, hysterectomy, omenectomy, peritonectomy, LAR with diverting loop ileostomy and bladder injury on 10/4
Pt s/p appendectomy, cholecystectomy, hysterectomy, omenectomy, peritonectomy, LAR with diverting loop ileostomy and bladder injury on 10/4
Pt s/o appendectomy, cholecystectomy, hysterectomy, omentectomy, peritonectomy, LAR with diverting loop ileostomy and bladder injury. C/B by vomiting and UTI
Pt s/o appendectomy, cholecystectomy, hysterectomy, omentectomy, peritonectomy, LAR with diverting loop ileostomy and bladder injury. C/B by vomiting and UTI

## 2023-10-27 NOTE — ADVANCED PRACTICE NURSE CONSULT - RECOMMEDATIONS
Supplies recommended:  Adhesive remover wipes   Skin barrier ring- item # [id number]  Flat wafer (1 3/4")- item # [id number]  Drainable pouch (1 3/4")- item # [id number]    Ostomy team will continue to follow.
Please contact Wound/Ostomy Care Service Line if we can be of further assistance (ext [phone]). 
Topical recommendations:     Bilateral arms: Cleanse with NS, pat dry. Apply Liquid barrier film to periwound skin (allow to dry). Apply Adaptic touch silicone contact layer, cover with gauze, wrap with kerlix. Change every other day.     Abdominal pannus: Cleanse with NS, pat dry. Apply Liquid barrier film to periwound skin (allow to dry). Apply Adpatic touch silicone contact layer, gauze, abdominal pad, secure with mesh brief.      RLQ ileostomy:   Adhesive remover wipes   Skin barrier ring- item # [id number]  Flat waffer (1 3/4")- item # [id number]  Drainable pouch (1 3/4")- item # [id number]    Plan discussed with patient. Patient educated on topical wound therapy to optimize wound healing and ostomy management. Questions answered.     Please contact Wound/Ostomy Care Service Line if we can be of further assistance (ext [phone]).   
Recommending Psych consult- possible depression/depressive symptoms.     Ostomy team to continue to follow.     Patient educated on importance of turning and positioning while in bed with use of a positioning device/pillow to prevent pressure injuries. Patient refusing pillow at this time as she states anything under her back causes severe pain. Encouraged to shift weight to allow for adequate blood flow to bony prominences, verbalized understanding.    Findings and recs discussed with primary RN- stating she notified provider for psych consult.     Please contact Wound/Ostomy Care Service Line if we can be of further assistance (ext [phone]).     
Supplies for home provided:   Flat waffer (1 3/4")- item # [id number]  Drainable pouch (1 3/4")- item # [id number]    Please contact Wound/Ostomy Care Service Line if we can be of further assistance (ext [phone]).

## 2023-10-27 NOTE — PROVIDER CONTACT NOTE (OTHER) - RECOMMENDATIONS
Give ordered Tylenol
Keep pt NPO as per order.
Recommend patient be assessed by provider and possibly be upgraded to SICU.
recommend provider to order CBC
Assess patient at bedside.
Recommend pausing the tube feeds.

## 2023-10-27 NOTE — PROGRESS NOTE ADULT - ASSESSMENT
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 Found to have L IJ and innominate vein acute non-occlusive thrombosis, therapeutic lovenox held i/s/o expanding subcapsular liver hematomas found on CT 10/20/23. Course c/b continuous retching.    Plan  - febrile, +UA, urineculture sent, started on abx.   - f/u blood cultures.  - Nausea: Scopolamine, PRN lorazepam.   - PICC/TPN  - Diet: NPO, unable to tolerate tube feeds x2  - Protonix BID.   - Appreciate GI recommendations, patient declining PEG-J.  - Pain: Dilaudid PRN.   - Liver Hematoma --> 10/21 Hgb 5.6, received 2u pRBC, now improved >8. Continue to follow.   - Hem consult for anemia: possible iron infusion, Oncology to follow.  - PT/OT consult appreciated, PM&R consulted, approved for acute rehab. During course, patient continued inpatient PT. Now recommending home PT.      D Team Surgery  [id number]

## 2023-10-27 NOTE — PROGRESS NOTE ADULT - SUBJECTIVE AND OBJECTIVE BOX
TEAM [ D ] Surgery Daily Progress Note  =====================================================    SUBJECTIVE: Patient seen and examined at bedside on AM rounds. Patient reports that they're feeling well. NPO, reports vomiting as controlled.    +gas/liquid in ostomy. OOB/Amublating as tolerated. Denies fever, chills.    ALLERGIES:  penicillin (Rash)    --------------------------------------------------------------------------------------    MEDICATIONS:    Neurologic Medications  acetaminophen   IVPB .. 1000 milliGRAM(s) IV Intermittent every 6 hours PRN Mild Pain (1 - 3), Moderate Pain (4 - 6)  HYDROmorphone  Injectable 0.2 milliGRAM(s) IV Push every 6 hours PRN Severe Pain (7 - 10)  LORazepam   Injectable 0.25 milliGRAM(s) IV Push every 6 hours PRN Nausea and/or Vomiting  ondansetron Injectable 4 milliGRAM(s) IV Push every 6 hours PRN Nausea  scopolamine 1 mG/72 Hr(s) Patch 1 Patch Transdermal every 72 hours    Gastrointestinal Medications  lipid, fat emulsion (Fish Oil and Plant Based) 20% Infusion 10.4 mL/Hr IV Continuous <Continuous>  pantoprazole  Injectable 40 milliGRAM(s) IV Push two times a day  Parenteral Nutrition - Adult 1 Each TPN Continuous <Continuous>  sodium chloride 0.9% lock flush 10 milliLiter(s) IV Push every 1 hour PRN Pre/post blood products, medications, blood draw, and to maintain line patency    Genitourinary Medications    Hematologic/Oncologic Medications  enoxaparin Injectable 60 milliGRAM(s) SubCutaneous every 12 hours  influenza   Vaccine 0.5 milliLiter(s) IntraMuscular once    Antimicrobial/Immunologic Medications  ciprofloxacin   IVPB 400 milliGRAM(s) IV Intermittent every 12 hours    Endocrine/Metabolic Medications    Topical/Other Medications  chlorhexidine 2% Cloths 1 Application(s) Topical daily  naloxone Injectable 0.1 milliGRAM(s) IV Push every 3 minutes PRN For ANY of the following changes in patient status:  A. RR LESS THAN 10 breaths per minute, B. Oxygen saturation LESS THAN 90%, C. Sedation score of 6    --------------------------------------------------------------------------------------    VITAL SIGNS:  T(C): 37.2 (10-27-23 @ 05:45), Max: 38.4 (10-26-23 @ 19:58)  HR: 95 (10-27-23 @ 05:45) (76 - 111)  BP: 106/61 (10-27-23 @ 05:45) (106/61 - 144/67)  RR: 18 (10-27-23 @ 05:45) (18 - 18)  SpO2: 100% (10-27-23 @ 05:45) (97% - 100%)  --------------------------------------------------------------------------------------    EXAM    General: NAD, resting in bed comfortably.  Cardiac: regular rate, warm and well perfused  Respiratory: Nonlabored respirations, normal cw expansion.  Abdomen: soft, nontender, nondistended.  ostomy +/+  Extremities: normal strength, FROM, no deformities    --------------------------------------------------------------------------------------        INS AND OUTS:    10-26-23 @ 07:01  -  10-27-23 @ 07:00  --------------------------------------------------------  IN: 0 mL / OUT: 2000 mL / NET: -2000 mL      --------------------------------------------------------------------------------------

## 2023-10-27 NOTE — PROVIDER CONTACT NOTE (OTHER) - NAME OF MD/NP/PA/DO NOTIFIED:
Durga Worley MD
Justin Smith
Durga Worley MD
Justin Smith
[place] [id number]

## 2023-10-27 NOTE — PROGRESS NOTE ADULT - SUBJECTIVE AND OBJECTIVE BOX
NUTRITION NOTE   ===============================    Interval events - Patient was seen and examined at bedside, no acute events overnight. Patient denies chest pain, shortness of breath, nausea or vomiting at this time.    ROS: Except as noted above, all other systems reviewed and are negative     Allergies  penicillin (Rash)    PAST MEDICAL & SURGICAL HISTORY:  History of sickle cell trait  Malignant neoplasm of appendix  History of ectopic pregnancy  H/O colonoscopy  H/O endoscopy    Vital Signs Last 24 Hrs  T(C): 37.1 (10-27-23 @ 08:30), Max: 38.4 (10-26-23 @ 19:58)  HR: 91 (10-27-23 @ 08:30) (87 - 111)  BP: 119/64 (10-27-23 @ 08:30) (106/61 - 144/67)  BP(mean): --  ABP: --  ABP(mean): --  RR: 17 (10-27-23 @ 08:30) (17 - 18)  SpO2: 100% (10-27-23 @ 08:30) (97% - 100%)  Wt(kg): --  CVP(mm Hg): --  CI: --  CAPILLARY BLOOD GLUCOSE      POCT Blood Glucose.: 111 mg/dL (27 Oct 2023 05:51)  POCT Blood Glucose.: 130 mg/dL (27 Oct 2023 00:22)  POCT Blood Glucose.: 115 mg/dL (26 Oct 2023 18:00)  POCT Blood Glucose.: 120 mg/dL (26 Oct 2023 12:00)   N/A      10-26 @ 07:01  -  10-27 @ 07:00  --------------------------------------------------------  IN:  Total IN: 0 mL    OUT:    Ileostomy (mL): 100 mL    Oral Fluid: 0 mL    Voided (mL): 1900 mL  Total OUT: 2000 mL    Total NET: -2000 mL      10-27 @ 07:01  -  10-27 @ 10:36  --------------------------------------------------------  IN:  Total IN: 0 mL    OUT:    Oral Fluid: 0 mL    Voided (mL): 300 mL  Total OUT: 300 mL    Total NET: -300 mL    PHYSICAL EXAM:  General: NAD, resting in bed comfortably.  Cardiac: regular rate, warm and well perfused  Respiratory: Nonlabored respirations, normal cw expansion.  Abdomen: soft, nontender, nondistended, ostomy pink  Extremities: normal strength, FROM, no deformities      LABORATORY  CBC (10-27 @ 06:09)                              7.6<L>                         5.32    )----------------(  164        77.9<H>% Neutrophils, 15.8  % Lymphocytes, ANC: 4.15                                23.5<L>  CBC (10-26 @ 19:00)                              8.1<L>                         6.09    )----------------(  173        --    % Neutrophils, --    % Lymphocytes, ANC: --                                  24.1<L>    BMP (10-27 @ 09:18)             133<L>  |  101     |  12    		Ca++ --      Ca 8.4                ---------------------------------( 124<H>		Mg 2.00               3.7     |  23      |  0.39<L>			Ph 3.0     BMP (10-27 @ 06:09)             133<L>  |  101     |  13    		Ca++ 1.14<L>  Ca 8.4                ---------------------------------( 102<H>		Mg 2.00               5.4<H>  |  23      |  0.39<L>			Ph 3.0       LFTs (10-27 @ 06:09)      TPro 6.2 / Alb 2.5<L> / TBili 0.5 / DBili -- / AST 75<H> / ALT 91<H> / AlkPhos 329<H>  LFTs (10-26 @ 06:00)      TPro 6.0 / Alb 2.4<L> / TBili 0.4 / DBili -- / AST 38<H> / ALT 87<H> / AlkPhos 252<H>    Urinalysis (10-27 @ 09:18):     Color:  / Appearance:  / SG:  / pH:  / Gluc: 124<H> / Ketones:  / Bili:  / Urobili:  / Protein : / Nitrites:  / Leuk.Est:  / RBC:  / WBC:  / Sq Epi:  / Non Sq Epi:  / Bacteria            ASSESSMENT/PLAN:  58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 Patient's course c/b continuous retching and intolerance to tube feeds. Nutrition support consult called for prolonged NPO and severe protein calorie malnutiriton.    Patient s/p PICC placement and TPN initiated    TPN will provide 1584 kcal/day, TPN volume and calories will be cycled down to 12 hours today    labs reviewed - electrolytes adjusted in TPN bag     monitor fingersticks, obtain daily weights     continue parenteral nutrition at this time, will follow up with primary team on plan     1.  Severe protein calorie malnutrition being optimized with TPN: CHO [110] gm.  AA [42] gm. SMOF Lipids [25] gm.  2.  Hyperglycemia managed with: [0] units of regular insulin    3.  Check fluid balance daily.  Strict I/O  [ ] [ ]   4.  Daily BMP, Ionized Calcium, Magnesium and Phosphorous   5.  Triglycerides at initiation of TPN and monthly [ ] [ ]     Nutrition Support [id number]

## 2023-10-27 NOTE — ADVANCED PRACTICE NURSE CONSULT - REASON FOR CONSULT
Attempted to see patient 2x for ostomy education. Patient in pain, resting in bed, both times. Stating she would prefer to have her  present during pouch change. As per discussion with primary RN patient refusing certain meds, PT, participation in ADLs. 
Patient seen for follow up Ileostomy teaching.  at bedside participating in pouch change. Patient stating she is unable to do anything related to the pouch due to how "gross" it is- "if it was on someone elses body i could handle it, but not on my own". Patient stating she prefers to sit home for 6-8weeks until she has her pouch reversed, ostomy care referred to . 
Patient seen on rounds for follow up ileostomy teaching. 
Patient seen for ileostomy follow up. 
Patient seen on skin care rounds after wound care referral received for assessment of skin impairment and recommendations of topical management as well as new ileostomy teaching. Patient H/O of malignant neoplasm of appendix is s/p appendectomy, cholecystectomy, hysterectomy, omenectomy, peritenectomy, LAR with diverting loop ileostomy and bladder injury with primary repair on 10/4. found to have LIJ And inominate vein acute non-occlusive thrombosis now on T lovenox.   Chart reviewed: WBC 6.75, H/H 8.1/24.3, platelets 305, Serum albumin 2.3, A1C 6.2%, BMI 28.7, Pipe 20, US duplex (+) Acute, non-occlusive deep vein thromboses in the left innominate and internal jugular veins.      at bedside participating in care.

## 2023-10-27 NOTE — PROVIDER CONTACT NOTE (OTHER) - ACTION/TREATMENT ORDERED:
Awaiting blood cultures from 10/26. Tylenol given.
Provider made aware. Ordered to stop the tube feeds overnight.
Provider made aware. Will continue to monitor.
Provider made aware. Will continue to monitor. Ordered CBC. provider came to bedside
Will assess at bedside
Provider made aware. Came to assess patient and looked at emesis and bowel movement.
According to team, pt is allowed comfort clears but no order is present. Team made aware that order must be placed.
Provider made aware and spoke to his chief. Provider came to assess patient. Provider said to hang the second unit of blood and will follow up with CBC. If any changes in vitals or pt will notify

## 2023-10-27 NOTE — PROGRESS NOTE ADULT - SUBJECTIVE AND OBJECTIVE BOX
Subjective: Patient seen and examined. No new events except as noted.     SUBJECTIVE/ROS:  MEDICATIONS:  MEDICATIONS  (STANDING):  chlorhexidine 2% Cloths 1 Application(s) Topical daily  ciprofloxacin   IVPB 400 milliGRAM(s) IV Intermittent every 12 hours  enoxaparin Injectable 60 milliGRAM(s) SubCutaneous every 12 hours  influenza   Vaccine 0.5 milliLiter(s) IntraMuscular once  lipid, fat emulsion (Fish Oil and Plant Based) 20% Infusion 10.4 mL/Hr (10.4 mL/Hr) IV Continuous <Continuous>  pantoprazole  Injectable 40 milliGRAM(s) IV Push two times a day  Parenteral Nutrition - Adult 1 Each TPN Continuous <Continuous>  scopolamine 1 mG/72 Hr(s) Patch 1 Patch Transdermal every 72 hours      PHYSICAL EXAM:  T(C): 37.2 (10-27-23 @ 05:45), Max: 38.4 (10-26-23 @ 19:58)  HR: 95 (10-27-23 @ 05:45) (76 - 111)  BP: 106/61 (10-27-23 @ 05:45) (106/61 - 144/67)  RR: 18 (10-27-23 @ 05:45) (18 - 18)  SpO2: 100% (10-27-23 @ 05:45) (97% - 100%)  Wt(kg): --  I&O's Summary    26 Oct 2023 07:01  -  27 Oct 2023 07:00  --------------------------------------------------------  IN: 0 mL / OUT: 2000 mL / NET: -2000 mL            JVP: Normal  Neck: supple  Lung: clear   CV: S1 S2 , Murmur:  Abd: soft  Ext: No edema  neuro: Awake   Psych: flat affect  Skin: normal``    LABS/DATA:    CARDIAC MARKERS:                                8.1    6.09  )-----------( 173      ( 26 Oct 2023 19:00 )             24.1     10-26    134<L>  |  101  |  9   ----------------------------<  111<H>  4.6   |  22  |  0.40<L>    Ca    8.4      26 Oct 2023 06:00  Phos  3.1     10-26  Mg     1.80     10-26    TPro  6.0  /  Alb  2.4<L>  /  TBili  0.4  /  DBili  x   /  AST  38<H>  /  ALT  87<H>  /  AlkPhos  252<H>  10-26    proBNP:   Lipid Profile:   HgA1c:   TSH:     TELE:  EKG:

## 2023-10-27 NOTE — ADVANCED PRACTICE NURSE CONSULT - ASSESSMENT
Patient resting comfortably in bed. Stating pouch changed last night due to leakage. Pouch intact but convex wafer noted. Pouch changed to assess stoma viability. Patient with eyes closed during pouch change, ear phones in ears, minimally verbal.     RLQ ileostomy - stoma pink moist viable with liquid stool in pouch. Stoma size: 1" x 1 1/4" See A&I flowsheet for full stoma assessment details.

## 2023-10-27 NOTE — PROVIDER CONTACT NOTE (OTHER) - DATE AND TIME:
21-Oct-2023 11:45
21-Oct-2023 13:19
19-Oct-2023 21:30
21-Oct-2023 14:58
27-Oct-2023 16:30
27-Oct-2023 18:10
22-Oct-2023 17:34
21-Oct-2023 17:45

## 2023-10-27 NOTE — PROVIDER CONTACT NOTE (OTHER) - SITUATION
Pt febrile, and hypotensive
Pt had one dark bowel movement, looks black and JUAQUIN site dressing saturated with blood.
Pt blood pressure soft, 101/ 48 after 1 unit of PRBC. Took it three times and was consistent. pt family at bedside says patient looks more lethargic than baseline.
Walked in pts room and pt was eating soup with chopped vegetables. PT questioned on why she was eating and pt stated that she was told by a doctor that she could eat.
Pt vomited looks like its straight tube feedings, different than color before
Pt had one dark bowel movement, looks black and had two episodes of emesis
Pt had one dark bowel movement, looks black and had an episodes of emesis
Patient . Experiencing bleeding from JUAQUIN drain site. Dressing completely saturated with some blood down left lower extremity.

## 2023-10-27 NOTE — PROVIDER CONTACT NOTE (OTHER) - ASSESSMENT
Patient Aox4. Denies pain, shortness of breath, chest pain, and dizziness. Reporting some episodes of vomiting, but denies current nausea and refuses zofran. JUAQUIN drain to left abdomen. Dressing fully saturated with blood and drain contents sanguinous. Dressing changed with gauze and tegaderm, with no active bleeding site noted. Drain sutures in place. Temp 97.7, /64, RR 18, O2 96.
Pt A&O x4. Pt vitals stable. Pt denies chest pain and shortness of breath. Pt has one episodes of emesis, looks green. Has nausea. Pt had dark bowel movement. Pt denies lightheadedness, denies headache, denies dizziness.
Pt A&O x4. Pt vitals stable. Pt denies chest pain and shortness of breath. Pt has two episodes of emesis, looks green. Has nausea. Pt had dark bowel movement. Pt denies lightheadedness, denies headache, denies dizziness.
Pt A&O x4. Pt vitals stable, except tachycardic in low 100s and blood pressure 101/48. . Pt denies chest pain and shortness of breath.  Pt denies lightheadedness, denies headache, denies dizziness.
Pt A&O x4. Pt vitals stable. Pt denies chest pain and shortness of breath.  Pt had dark bowel movement. Pt denies lightheadedness, denies headache, denies dizziness.
Pt A&O x4. Pt vitals stable. Pt denies chest pain and shortness of breath. Pt has one episodes of emesis, looks the color of the tube feedings
Pt stable. PT asking for Zofran for nausea
Temp 101.2     BP 96/55  02- 100% on RA

## 2023-10-28 LAB
ALBUMIN SERPL ELPH-MCNC: 2.5 G/DL — LOW (ref 3.3–5)
ALBUMIN SERPL ELPH-MCNC: 2.5 G/DL — LOW (ref 3.3–5)
ALP SERPL-CCNC: 348 U/L — HIGH (ref 40–120)
ALP SERPL-CCNC: 348 U/L — HIGH (ref 40–120)
ALT FLD-CCNC: 90 U/L — HIGH (ref 4–33)
ALT FLD-CCNC: 90 U/L — HIGH (ref 4–33)
ANION GAP SERPL CALC-SCNC: 9 MMOL/L — SIGNIFICANT CHANGE UP (ref 7–14)
AST SERPL-CCNC: 81 U/L — HIGH (ref 4–32)
AST SERPL-CCNC: 81 U/L — HIGH (ref 4–32)
BASOPHILS # BLD AUTO: 0.01 K/UL — SIGNIFICANT CHANGE UP (ref 0–0.2)
BASOPHILS # BLD AUTO: 0.01 K/UL — SIGNIFICANT CHANGE UP (ref 0–0.2)
BASOPHILS NFR BLD AUTO: 0.2 % — SIGNIFICANT CHANGE UP (ref 0–2)
BASOPHILS NFR BLD AUTO: 0.2 % — SIGNIFICANT CHANGE UP (ref 0–2)
BILIRUB SERPL-MCNC: 0.5 MG/DL — SIGNIFICANT CHANGE UP (ref 0.2–1.2)
BILIRUB SERPL-MCNC: 0.5 MG/DL — SIGNIFICANT CHANGE UP (ref 0.2–1.2)
BUN SERPL-MCNC: 16 MG/DL — SIGNIFICANT CHANGE UP (ref 7–23)
CA-I BLD-SCNC: 1.12 MMOL/L — LOW (ref 1.15–1.29)
CA-I BLD-SCNC: 1.12 MMOL/L — LOW (ref 1.15–1.29)
CALCIUM SERPL-MCNC: 8.3 MG/DL — LOW (ref 8.4–10.5)
CALCIUM SERPL-MCNC: 8.3 MG/DL — LOW (ref 8.4–10.5)
CALCIUM SERPL-MCNC: 8.4 MG/DL — SIGNIFICANT CHANGE UP (ref 8.4–10.5)
CALCIUM SERPL-MCNC: 8.4 MG/DL — SIGNIFICANT CHANGE UP (ref 8.4–10.5)
CHLORIDE SERPL-SCNC: 100 MMOL/L — SIGNIFICANT CHANGE UP (ref 98–107)
CO2 SERPL-SCNC: 23 MMOL/L — SIGNIFICANT CHANGE UP (ref 22–31)
CREAT SERPL-MCNC: 0.4 MG/DL — LOW (ref 0.5–1.3)
CREAT SERPL-MCNC: 0.4 MG/DL — LOW (ref 0.5–1.3)
CREAT SERPL-MCNC: 0.44 MG/DL — LOW (ref 0.5–1.3)
CREAT SERPL-MCNC: 0.44 MG/DL — LOW (ref 0.5–1.3)
EGFR: 111 ML/MIN/1.73M2 — SIGNIFICANT CHANGE UP
EGFR: 111 ML/MIN/1.73M2 — SIGNIFICANT CHANGE UP
EGFR: 114 ML/MIN/1.73M2 — SIGNIFICANT CHANGE UP
EGFR: 114 ML/MIN/1.73M2 — SIGNIFICANT CHANGE UP
EOSINOPHIL # BLD AUTO: 0.04 K/UL — SIGNIFICANT CHANGE UP (ref 0–0.5)
EOSINOPHIL # BLD AUTO: 0.04 K/UL — SIGNIFICANT CHANGE UP (ref 0–0.5)
EOSINOPHIL NFR BLD AUTO: 0.6 % — SIGNIFICANT CHANGE UP (ref 0–6)
EOSINOPHIL NFR BLD AUTO: 0.6 % — SIGNIFICANT CHANGE UP (ref 0–6)
GLUCOSE BLDC GLUCOMTR-MCNC: 118 MG/DL — HIGH (ref 70–99)
GLUCOSE BLDC GLUCOMTR-MCNC: 118 MG/DL — HIGH (ref 70–99)
GLUCOSE BLDC GLUCOMTR-MCNC: 123 MG/DL — HIGH (ref 70–99)
GLUCOSE BLDC GLUCOMTR-MCNC: 123 MG/DL — HIGH (ref 70–99)
GLUCOSE BLDC GLUCOMTR-MCNC: 129 MG/DL — HIGH (ref 70–99)
GLUCOSE BLDC GLUCOMTR-MCNC: 129 MG/DL — HIGH (ref 70–99)
GLUCOSE BLDC GLUCOMTR-MCNC: 90 MG/DL — SIGNIFICANT CHANGE UP (ref 70–99)
GLUCOSE BLDC GLUCOMTR-MCNC: 90 MG/DL — SIGNIFICANT CHANGE UP (ref 70–99)
GLUCOSE SERPL-MCNC: 113 MG/DL — HIGH (ref 70–99)
GLUCOSE SERPL-MCNC: 113 MG/DL — HIGH (ref 70–99)
GLUCOSE SERPL-MCNC: 88 MG/DL — SIGNIFICANT CHANGE UP (ref 70–99)
GLUCOSE SERPL-MCNC: 88 MG/DL — SIGNIFICANT CHANGE UP (ref 70–99)
HCT VFR BLD CALC: 23.4 % — LOW (ref 34.5–45)
HCT VFR BLD CALC: 23.4 % — LOW (ref 34.5–45)
HGB BLD-MCNC: 7.8 G/DL — LOW (ref 11.5–15.5)
HGB BLD-MCNC: 7.8 G/DL — LOW (ref 11.5–15.5)
IANC: 4.99 K/UL — SIGNIFICANT CHANGE UP (ref 1.8–7.4)
IANC: 4.99 K/UL — SIGNIFICANT CHANGE UP (ref 1.8–7.4)
IMM GRANULOCYTES NFR BLD AUTO: 0.5 % — SIGNIFICANT CHANGE UP (ref 0–0.9)
IMM GRANULOCYTES NFR BLD AUTO: 0.5 % — SIGNIFICANT CHANGE UP (ref 0–0.9)
LYMPHOCYTES # BLD AUTO: 0.95 K/UL — LOW (ref 1–3.3)
LYMPHOCYTES # BLD AUTO: 0.95 K/UL — LOW (ref 1–3.3)
LYMPHOCYTES # BLD AUTO: 15.1 % — SIGNIFICANT CHANGE UP (ref 13–44)
LYMPHOCYTES # BLD AUTO: 15.1 % — SIGNIFICANT CHANGE UP (ref 13–44)
MAGNESIUM SERPL-MCNC: 2.1 MG/DL — SIGNIFICANT CHANGE UP (ref 1.6–2.6)
MCHC RBC-ENTMCNC: 28.9 PG — SIGNIFICANT CHANGE UP (ref 27–34)
MCHC RBC-ENTMCNC: 28.9 PG — SIGNIFICANT CHANGE UP (ref 27–34)
MCHC RBC-ENTMCNC: 33.3 GM/DL — SIGNIFICANT CHANGE UP (ref 32–36)
MCHC RBC-ENTMCNC: 33.3 GM/DL — SIGNIFICANT CHANGE UP (ref 32–36)
MCV RBC AUTO: 86.7 FL — SIGNIFICANT CHANGE UP (ref 80–100)
MCV RBC AUTO: 86.7 FL — SIGNIFICANT CHANGE UP (ref 80–100)
MONOCYTES # BLD AUTO: 0.29 K/UL — SIGNIFICANT CHANGE UP (ref 0–0.9)
MONOCYTES # BLD AUTO: 0.29 K/UL — SIGNIFICANT CHANGE UP (ref 0–0.9)
MONOCYTES NFR BLD AUTO: 4.6 % — SIGNIFICANT CHANGE UP (ref 2–14)
MONOCYTES NFR BLD AUTO: 4.6 % — SIGNIFICANT CHANGE UP (ref 2–14)
NEUTROPHILS # BLD AUTO: 4.99 K/UL — SIGNIFICANT CHANGE UP (ref 1.8–7.4)
NEUTROPHILS # BLD AUTO: 4.99 K/UL — SIGNIFICANT CHANGE UP (ref 1.8–7.4)
NEUTROPHILS NFR BLD AUTO: 79 % — HIGH (ref 43–77)
NEUTROPHILS NFR BLD AUTO: 79 % — HIGH (ref 43–77)
NRBC # BLD: 0 /100 WBCS — SIGNIFICANT CHANGE UP (ref 0–0)
NRBC # BLD: 0 /100 WBCS — SIGNIFICANT CHANGE UP (ref 0–0)
NRBC # FLD: 0 K/UL — SIGNIFICANT CHANGE UP (ref 0–0)
NRBC # FLD: 0 K/UL — SIGNIFICANT CHANGE UP (ref 0–0)
PHOSPHATE SERPL-MCNC: 2.8 MG/DL — SIGNIFICANT CHANGE UP (ref 2.5–4.5)
PHOSPHATE SERPL-MCNC: 2.8 MG/DL — SIGNIFICANT CHANGE UP (ref 2.5–4.5)
PHOSPHATE SERPL-MCNC: 3.1 MG/DL — SIGNIFICANT CHANGE UP (ref 2.5–4.5)
PHOSPHATE SERPL-MCNC: 3.1 MG/DL — SIGNIFICANT CHANGE UP (ref 2.5–4.5)
PLATELET # BLD AUTO: 160 K/UL — SIGNIFICANT CHANGE UP (ref 150–400)
PLATELET # BLD AUTO: 160 K/UL — SIGNIFICANT CHANGE UP (ref 150–400)
POTASSIUM SERPL-MCNC: 4.1 MMOL/L — SIGNIFICANT CHANGE UP (ref 3.5–5.3)
POTASSIUM SERPL-MCNC: 4.1 MMOL/L — SIGNIFICANT CHANGE UP (ref 3.5–5.3)
POTASSIUM SERPL-MCNC: 5.4 MMOL/L — HIGH (ref 3.5–5.3)
POTASSIUM SERPL-MCNC: 5.4 MMOL/L — HIGH (ref 3.5–5.3)
POTASSIUM SERPL-SCNC: 4.1 MMOL/L — SIGNIFICANT CHANGE UP (ref 3.5–5.3)
POTASSIUM SERPL-SCNC: 4.1 MMOL/L — SIGNIFICANT CHANGE UP (ref 3.5–5.3)
POTASSIUM SERPL-SCNC: 5.4 MMOL/L — HIGH (ref 3.5–5.3)
POTASSIUM SERPL-SCNC: 5.4 MMOL/L — HIGH (ref 3.5–5.3)
PROT SERPL-MCNC: 6.4 G/DL — SIGNIFICANT CHANGE UP (ref 6–8.3)
PROT SERPL-MCNC: 6.4 G/DL — SIGNIFICANT CHANGE UP (ref 6–8.3)
RBC # BLD: 2.7 M/UL — LOW (ref 3.8–5.2)
RBC # BLD: 2.7 M/UL — LOW (ref 3.8–5.2)
RBC # FLD: 18.9 % — HIGH (ref 10.3–14.5)
RBC # FLD: 18.9 % — HIGH (ref 10.3–14.5)
SODIUM SERPL-SCNC: 132 MMOL/L — LOW (ref 135–145)
WBC # BLD: 6.31 K/UL — SIGNIFICANT CHANGE UP (ref 3.8–10.5)
WBC # BLD: 6.31 K/UL — SIGNIFICANT CHANGE UP (ref 3.8–10.5)
WBC # FLD AUTO: 6.31 K/UL — SIGNIFICANT CHANGE UP (ref 3.8–10.5)
WBC # FLD AUTO: 6.31 K/UL — SIGNIFICANT CHANGE UP (ref 3.8–10.5)

## 2023-10-28 PROCEDURE — 74177 CT ABD & PELVIS W/CONTRAST: CPT | Mod: 26

## 2023-10-28 PROCEDURE — 99232 SBSQ HOSP IP/OBS MODERATE 35: CPT

## 2023-10-28 PROCEDURE — 99222 1ST HOSP IP/OBS MODERATE 55: CPT

## 2023-10-28 RX ORDER — ELECTROLYTE SOLUTION,INJ
1 VIAL (ML) INTRAVENOUS
Refills: 0 | Status: DISCONTINUED | OUTPATIENT
Start: 2023-10-28 | End: 2023-10-28

## 2023-10-28 RX ORDER — METRONIDAZOLE 500 MG
500 TABLET ORAL EVERY 8 HOURS
Refills: 0 | Status: DISCONTINUED | OUTPATIENT
Start: 2023-10-28 | End: 2023-10-30

## 2023-10-28 RX ORDER — I.V. FAT EMULSION 20 G/100ML
10.4 EMULSION INTRAVENOUS
Qty: 25 | Refills: 0 | Status: DISCONTINUED | OUTPATIENT
Start: 2023-10-28 | End: 2023-10-29

## 2023-10-28 RX ORDER — ACETAMINOPHEN 500 MG
1000 TABLET ORAL ONCE
Refills: 0 | Status: COMPLETED | OUTPATIENT
Start: 2023-10-28 | End: 2023-10-28

## 2023-10-28 RX ORDER — CIPROFLOXACIN LACTATE 400MG/40ML
400 VIAL (ML) INTRAVENOUS EVERY 12 HOURS
Refills: 0 | Status: DISCONTINUED | OUTPATIENT
Start: 2023-10-28 | End: 2023-10-28

## 2023-10-28 RX ORDER — CEFTRIAXONE 500 MG/1
1000 INJECTION, POWDER, FOR SOLUTION INTRAMUSCULAR; INTRAVENOUS EVERY 24 HOURS
Refills: 0 | Status: COMPLETED | OUTPATIENT
Start: 2023-10-28 | End: 2023-10-31

## 2023-10-28 RX ORDER — METRONIDAZOLE 500 MG
500 TABLET ORAL ONCE
Refills: 0 | Status: COMPLETED | OUTPATIENT
Start: 2023-10-28 | End: 2023-10-28

## 2023-10-28 RX ORDER — METRONIDAZOLE 500 MG
TABLET ORAL
Refills: 0 | Status: DISCONTINUED | OUTPATIENT
Start: 2023-10-28 | End: 2023-10-30

## 2023-10-28 RX ADMIN — PANTOPRAZOLE SODIUM 40 MILLIGRAM(S): 20 TABLET, DELAYED RELEASE ORAL at 05:41

## 2023-10-28 RX ADMIN — ONDANSETRON 4 MILLIGRAM(S): 8 TABLET, FILM COATED ORAL at 17:58

## 2023-10-28 RX ADMIN — ENOXAPARIN SODIUM 60 MILLIGRAM(S): 100 INJECTION SUBCUTANEOUS at 17:47

## 2023-10-28 RX ADMIN — HYDROMORPHONE HYDROCHLORIDE 0.5 MILLIGRAM(S): 2 INJECTION INTRAMUSCULAR; INTRAVENOUS; SUBCUTANEOUS at 18:30

## 2023-10-28 RX ADMIN — Medication 200 MILLIGRAM(S): at 05:41

## 2023-10-28 RX ADMIN — Medication 100 MILLIGRAM(S): at 21:16

## 2023-10-28 RX ADMIN — Medication 400 MILLIGRAM(S): at 06:04

## 2023-10-28 RX ADMIN — ONDANSETRON 4 MILLIGRAM(S): 8 TABLET, FILM COATED ORAL at 06:04

## 2023-10-28 RX ADMIN — HYDROMORPHONE HYDROCHLORIDE 0.5 MILLIGRAM(S): 2 INJECTION INTRAMUSCULAR; INTRAVENOUS; SUBCUTANEOUS at 08:07

## 2023-10-28 RX ADMIN — Medication 100 MILLIGRAM(S): at 13:28

## 2023-10-28 RX ADMIN — Medication 200 MILLIGRAM(S): at 17:48

## 2023-10-28 RX ADMIN — CHLORHEXIDINE GLUCONATE 1 APPLICATION(S): 213 SOLUTION TOPICAL at 13:15

## 2023-10-28 RX ADMIN — HYDROMORPHONE HYDROCHLORIDE 0.5 MILLIGRAM(S): 2 INJECTION INTRAMUSCULAR; INTRAVENOUS; SUBCUTANEOUS at 03:36

## 2023-10-28 RX ADMIN — Medication 1000 MILLIGRAM(S): at 06:34

## 2023-10-28 RX ADMIN — HYDROMORPHONE HYDROCHLORIDE 0.5 MILLIGRAM(S): 2 INJECTION INTRAMUSCULAR; INTRAVENOUS; SUBCUTANEOUS at 03:06

## 2023-10-28 RX ADMIN — HYDROMORPHONE HYDROCHLORIDE 0.5 MILLIGRAM(S): 2 INJECTION INTRAMUSCULAR; INTRAVENOUS; SUBCUTANEOUS at 22:04

## 2023-10-28 RX ADMIN — HYDROMORPHONE HYDROCHLORIDE 0.5 MILLIGRAM(S): 2 INJECTION INTRAMUSCULAR; INTRAVENOUS; SUBCUTANEOUS at 08:31

## 2023-10-28 RX ADMIN — HYDROMORPHONE HYDROCHLORIDE 0.5 MILLIGRAM(S): 2 INJECTION INTRAMUSCULAR; INTRAVENOUS; SUBCUTANEOUS at 13:28

## 2023-10-28 RX ADMIN — ENOXAPARIN SODIUM 60 MILLIGRAM(S): 100 INJECTION SUBCUTANEOUS at 05:41

## 2023-10-28 RX ADMIN — HYDROMORPHONE HYDROCHLORIDE 0.5 MILLIGRAM(S): 2 INJECTION INTRAMUSCULAR; INTRAVENOUS; SUBCUTANEOUS at 22:34

## 2023-10-28 RX ADMIN — PANTOPRAZOLE SODIUM 40 MILLIGRAM(S): 20 TABLET, DELAYED RELEASE ORAL at 17:48

## 2023-10-28 RX ADMIN — I.V. FAT EMULSION 10.4 ML/HR: 20 EMULSION INTRAVENOUS at 18:02

## 2023-10-28 RX ADMIN — HYDROMORPHONE HYDROCHLORIDE 0.5 MILLIGRAM(S): 2 INJECTION INTRAMUSCULAR; INTRAVENOUS; SUBCUTANEOUS at 17:58

## 2023-10-28 RX ADMIN — HYDROMORPHONE HYDROCHLORIDE 0.5 MILLIGRAM(S): 2 INJECTION INTRAMUSCULAR; INTRAVENOUS; SUBCUTANEOUS at 14:00

## 2023-10-28 RX ADMIN — Medication 1 EACH: at 18:01

## 2023-10-28 NOTE — PROGRESS NOTE ADULT - ASSESSMENT
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 Patient's course c/b continuous retching and intolerance to tube feeds. Nutrition support consult called for prolonged NPO and severe protein calorie malnutiriton.    PLAN:  - Continue TPN formula at 2000mL cycled over 12h as follows: CHO [220] gm.  AA [85] gm. SMOF Lipids [25] gm. Calories [1338].  - Labs reviewed - hyponatremia noted-electrolytes adjusted in TPN bag   - Monitor fingersticks [stable 103-129]  - Obtain daily weights   - Check fluid balance daily.  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 Patient's course c/b continuous retching and intolerance to tube feeds. Nutrition support consult called for prolonged NPO and severe protein calorie malnutrition.     PLAN:  - Continue TPN formula at 2000mL cycled over 12h as follows: CHO [220] gm.  AA [85] gm. SMOF Lipids [25] gm. Calories [1338].  - Labs reviewed - hyponatremia noted-electrolytes adjusted in TPN bag   - Monitor fingersticks [stable 103-129]  - Obtain daily weights   - Check fluid balance daily.  Strict I/O  - Triglycerides at initiation of TPN and monthly [87 on 10/25]  - Continue parenteral nutrition at this time, anticipate home with TPN next week; will follow up with primary team on plan     Nutrition Support   [id number]

## 2023-10-28 NOTE — PROGRESS NOTE ADULT - SUBJECTIVE AND OBJECTIVE BOX
D TEAM Surgery Progress Note  Patient is a 58y old  Female who presents with a chief complaint of Debulking surgery for suspected malignant neoplasm (18 Oct 2023 09:03)    INTERVAL EVENTS: Patient is POD#24 s/p appendectomy, cholecystectomy, hysterectomy, omentectomy, peritonectomy, LAR with diverting loop ileostomy, bladder injury with primary repair. Day #5 on TPN. Febrile to Tmax 101.2 in the last 24h. Blood cultures 10/26 no growth to date. UA+ on Cipro. Pending CT abdomen and pelvis today.    SUBJECTIVE: Patient seen and examined at bedside with surgical team, patient without complaints. She reports fever but denies chills. Reports right lower back pain that she has had since prior to hospitalization. Denies abdominal pain. Denies nausea, vomiting. Reports her ostomy is functioning. Denies dysuria, hematuria. Denies coughing, sneezing, congestion. Denies CP, SOB.    OBJECTIVE:    Vital Signs Last 24 Hrs  T(C): 36.6 (28 Oct 2023 08:19), Max: 38.4 (27 Oct 2023 16:00)  T(F): 97.8 (28 Oct 2023 08:19), Max: 101.2 (27 Oct 2023 16:00)  HR: 88 (28 Oct 2023 08:19) (88 - 107)  BP: 91/66 (28 Oct 2023 08:19) (91/66 - 127/62)  BP(mean): --  RR: 18 (28 Oct 2023 08:19) (18 - 18)  SpO2: 96% (28 Oct 2023 08:19) (96% - 100%)    Parameters below as of 28 Oct 2023 08:19    O2 Flow (L/min): 2  I&O's Detail    27 Oct 2023 07:01  -  28 Oct 2023 07:00  --------------------------------------------------------  IN:  Total IN: 0 mL    OUT:    Ileostomy (mL): 200 mL    Oral Fluid: 0 mL    Voided (mL): 1700 mL  Total OUT: 1900 mL    Total NET: -1900 mL      28 Oct 2023 07:01  -  28 Oct 2023 12:00  --------------------------------------------------------  IN:  Total IN: 0 mL    OUT:    Voided (mL): 200 mL  Total OUT: 200 mL    Total NET: -200 mL      MEDICATIONS  (STANDING):  chlorhexidine 2% Cloths 1 Application(s) Topical daily  enoxaparin Injectable 60 milliGRAM(s) SubCutaneous every 12 hours  influenza   Vaccine 0.5 milliLiter(s) IntraMuscular once  lipid, fat emulsion (Fish Oil and Plant Based) 20% Infusion 10.4 mL/Hr (10.4 mL/Hr) IV Continuous <Continuous>  pantoprazole  Injectable 40 milliGRAM(s) IV Push two times a day  Parenteral Nutrition - Adult 1 Each TPN Continuous <Continuous>  Parenteral Nutrition - Adult 1 Each TPN Continuous <Continuous>  scopolamine 1 mG/72 Hr(s) Patch 1 Patch Transdermal every 72 hours    MEDICATIONS  (PRN):  HYDROmorphone  Injectable 0.5 milliGRAM(s) IV Push every 4 hours PRN Moderate and Severe Pain  naloxone Injectable 0.1 milliGRAM(s) IV Push every 3 minutes PRN For ANY of the following changes in patient status:  A. RR LESS THAN 10 breaths per minute, B. Oxygen saturation LESS THAN 90%, C. Sedation score of 6  ondansetron Injectable 4 milliGRAM(s) IV Push every 6 hours PRN Nausea  sodium chloride 0.9% lock flush 10 milliLiter(s) IV Push every 1 hour PRN Pre/post blood products, medications, blood draw, and to maintain line patency      PHYSICAL EXAM:  Constitutional: A&Ox3, NAD  Respiratory: Unlabored breathing  Abdomen: Soft, nondistended, NTTP. No rebound or guarding. Incision C/D/I. Ostomy pink with gas and liquid stool.   Extremities: WWP, SARABIA spontaneously  Access: RUE PICC C/D/I no erythema or edema.    LABS:                        7.8    6.31  )-----------( 160      ( 28 Oct 2023 05:36 )             23.4     10-28    132<L>  |  100  |  16  ----------------------------<  113<H>  4.1   |  23  |  0.44<L>    Ca    8.4      28 Oct 2023 09:32  Phos  3.1     10-28  Mg     2.10     10-28    TPro  6.4  /  Alb  2.5<L>  /  TBili  0.5  /  DBili  x   /  AST  81<H>  /  ALT  90<H>  /  AlkPhos  348<H>  10-28      LIVER FUNCTIONS - ( 28 Oct 2023 05:36 )  Alb: 2.5 g/dL / Pro: 6.4 g/dL / ALK PHOS: 348 U/L / ALT: 90 U/L / AST: 81 U/L / GGT: x           Urinalysis Basic - ( 28 Oct 2023 09:32 )    Color: x / Appearance: x / SG: x / pH: x  Gluc: 113 mg/dL / Ketone: x  / Bili: x / Urobili: x   Blood: x / Protein: x / Nitrite: x   Leuk Esterase: x / RBC: x / WBC x   Sq Epi: x / Non Sq Epi: x / Bacteria: x    Culture - Blood (10.26.23 @ 18:30)   Specimen Source: .Blood Blood-Venous  Culture Results:   No growth at 24 hoursCulture - Blood (10.26.23 @ 18:30)   Specimen Source: .Blood Blood-Venous  Culture Results:   No growth at 24 hours           D TEAM Surgery Progress Note  Patient is a 58y old  Female who presents with a chief complaint of Debulking surgery for suspected malignant neoplasm (18 Oct 2023 09:03)    INTERVAL EVENTS: Patient is POD#24 s/p appendectomy, cholecystectomy, hysterectomy, omentectomy, peritonectomy, LAR with diverting loop ileostomy, bladder injury with primary repair. Day #5 on TPN. Febrile to Tmax 101.2 in the last 24h. Blood cultures 10/26 no growth to date. UA+ on Cipro. Pending CT abdomen and pelvis today.    SUBJECTIVE: Patient seen and examined at bedside with surgical team, patient without complaints. She reports fever but denies chills. Reports right lower back pain that she has had since prior to hospitalization. Denies abdominal pain. She reports nausea after receiving Ciprofloxacin. Denies vomiting. Reports her ostomy is functioning. Denies dysuria, hematuria. Denies coughing, sneezing, congestion. Denies CP, SOB.    OBJECTIVE:    Vital Signs Last 24 Hrs  T(C): 36.6 (28 Oct 2023 08:19), Max: 38.4 (27 Oct 2023 16:00)  T(F): 97.8 (28 Oct 2023 08:19), Max: 101.2 (27 Oct 2023 16:00)  HR: 88 (28 Oct 2023 08:19) (88 - 107)  BP: 91/66 (28 Oct 2023 08:19) (91/66 - 127/62)  BP(mean): --  RR: 18 (28 Oct 2023 08:19) (18 - 18)  SpO2: 96% (28 Oct 2023 08:19) (96% - 100%)    Parameters below as of 28 Oct 2023 08:19    O2 Flow (L/min): 2  I&O's Detail    27 Oct 2023 07:01  -  28 Oct 2023 07:00  --------------------------------------------------------  IN:  Total IN: 0 mL    OUT:    Ileostomy (mL): 200 mL    Oral Fluid: 0 mL    Voided (mL): 1700 mL  Total OUT: 1900 mL    Total NET: -1900 mL      28 Oct 2023 07:01  -  28 Oct 2023 12:00  --------------------------------------------------------  IN:  Total IN: 0 mL    OUT:    Voided (mL): 200 mL  Total OUT: 200 mL    Total NET: -200 mL      MEDICATIONS  (STANDING):  chlorhexidine 2% Cloths 1 Application(s) Topical daily  enoxaparin Injectable 60 milliGRAM(s) SubCutaneous every 12 hours  influenza   Vaccine 0.5 milliLiter(s) IntraMuscular once  lipid, fat emulsion (Fish Oil and Plant Based) 20% Infusion 10.4 mL/Hr (10.4 mL/Hr) IV Continuous <Continuous>  pantoprazole  Injectable 40 milliGRAM(s) IV Push two times a day  Parenteral Nutrition - Adult 1 Each TPN Continuous <Continuous>  Parenteral Nutrition - Adult 1 Each TPN Continuous <Continuous>  scopolamine 1 mG/72 Hr(s) Patch 1 Patch Transdermal every 72 hours    MEDICATIONS  (PRN):  HYDROmorphone  Injectable 0.5 milliGRAM(s) IV Push every 4 hours PRN Moderate and Severe Pain  naloxone Injectable 0.1 milliGRAM(s) IV Push every 3 minutes PRN For ANY of the following changes in patient status:  A. RR LESS THAN 10 breaths per minute, B. Oxygen saturation LESS THAN 90%, C. Sedation score of 6  ondansetron Injectable 4 milliGRAM(s) IV Push every 6 hours PRN Nausea  sodium chloride 0.9% lock flush 10 milliLiter(s) IV Push every 1 hour PRN Pre/post blood products, medications, blood draw, and to maintain line patency      PHYSICAL EXAM:  Constitutional: A&Ox3, NAD  Respiratory: Unlabored breathing  Abdomen: Soft, nondistended, NTTP. No rebound or guarding. Incision C/D/I. Ostomy pink with gas and liquid stool.   Extremities: WWP, SARABIA spontaneously  Access: RUE PICC C/D/I no erythema or edema.    LABS:                        7.8    6.31  )-----------( 160      ( 28 Oct 2023 05:36 )             23.4     10-28    132<L>  |  100  |  16  ----------------------------<  113<H>  4.1   |  23  |  0.44<L>    Ca    8.4      28 Oct 2023 09:32  Phos  3.1     10-28  Mg     2.10     10-28    TPro  6.4  /  Alb  2.5<L>  /  TBili  0.5  /  DBili  x   /  AST  81<H>  /  ALT  90<H>  /  AlkPhos  348<H>  10-28      LIVER FUNCTIONS - ( 28 Oct 2023 05:36 )  Alb: 2.5 g/dL / Pro: 6.4 g/dL / ALK PHOS: 348 U/L / ALT: 90 U/L / AST: 81 U/L / GGT: x           Urinalysis Basic - ( 28 Oct 2023 09:32 )    Color: x / Appearance: x / SG: x / pH: x  Gluc: 113 mg/dL / Ketone: x  / Bili: x / Urobili: x   Blood: x / Protein: x / Nitrite: x   Leuk Esterase: x / RBC: x / WBC x   Sq Epi: x / Non Sq Epi: x / Bacteria: x    Culture - Blood (10.26.23 @ 18:30)   Specimen Source: .Blood Blood-Venous  Culture Results:   No growth at 24 hoursCulture - Blood (10.26.23 @ 18:30)   Specimen Source: .Blood Blood-Venous  Culture Results:   No growth at 24 hours           Nutrition Support Progress Note  Patient is a 58y old  Female who presents with a chief complaint of Debulking surgery for suspected malignant neoplasm (18 Oct 2023 09:03)    INTERVAL EVENTS: Patient is POD#24 s/p appendectomy, cholecystectomy, hysterectomy, omentectomy, peritonectomy, LAR with diverting loop ileostomy, bladder injury with primary repair. Day #5 on TPN. Febrile to Tmax 101.2 in the last 24h. Blood cultures 10/26 no growth to date. UA+ on Cipro. Pending CT abdomen and pelvis today.    SUBJECTIVE: Patient seen and examined at bedside with surgical team, patient without complaints. She reports fever but denies chills. Reports right lower back pain that she has had since prior to hospitalization. Denies abdominal pain. She reports nausea after receiving Ciprofloxacin. Denies vomiting. Reports her ostomy is functioning. Denies dysuria, hematuria. Denies coughing, sneezing, congestion. Denies CP, SOB.    OBJECTIVE:    Vital Signs Last 24 Hrs  T(C): 36.6 (28 Oct 2023 08:19), Max: 38.4 (27 Oct 2023 16:00)  T(F): 97.8 (28 Oct 2023 08:19), Max: 101.2 (27 Oct 2023 16:00)  HR: 88 (28 Oct 2023 08:19) (88 - 107)  BP: 91/66 (28 Oct 2023 08:19) (91/66 - 127/62)  BP(mean): --  RR: 18 (28 Oct 2023 08:19) (18 - 18)  SpO2: 96% (28 Oct 2023 08:19) (96% - 100%)    Parameters below as of 28 Oct 2023 08:19    O2 Flow (L/min): 2  I&O's Detail    27 Oct 2023 07:01  -  28 Oct 2023 07:00  --------------------------------------------------------  IN:  Total IN: 0 mL    OUT:    Ileostomy (mL): 200 mL    Oral Fluid: 0 mL    Voided (mL): 1700 mL  Total OUT: 1900 mL    Total NET: -1900 mL      28 Oct 2023 07:01  -  28 Oct 2023 12:00  --------------------------------------------------------  IN:  Total IN: 0 mL    OUT:    Voided (mL): 200 mL  Total OUT: 200 mL    Total NET: -200 mL      MEDICATIONS  (STANDING):  chlorhexidine 2% Cloths 1 Application(s) Topical daily  enoxaparin Injectable 60 milliGRAM(s) SubCutaneous every 12 hours  influenza   Vaccine 0.5 milliLiter(s) IntraMuscular once  lipid, fat emulsion (Fish Oil and Plant Based) 20% Infusion 10.4 mL/Hr (10.4 mL/Hr) IV Continuous <Continuous>  pantoprazole  Injectable 40 milliGRAM(s) IV Push two times a day  Parenteral Nutrition - Adult 1 Each TPN Continuous <Continuous>  Parenteral Nutrition - Adult 1 Each TPN Continuous <Continuous>  scopolamine 1 mG/72 Hr(s) Patch 1 Patch Transdermal every 72 hours    MEDICATIONS  (PRN):  HYDROmorphone  Injectable 0.5 milliGRAM(s) IV Push every 4 hours PRN Moderate and Severe Pain  naloxone Injectable 0.1 milliGRAM(s) IV Push every 3 minutes PRN For ANY of the following changes in patient status:  A. RR LESS THAN 10 breaths per minute, B. Oxygen saturation LESS THAN 90%, C. Sedation score of 6  ondansetron Injectable 4 milliGRAM(s) IV Push every 6 hours PRN Nausea  sodium chloride 0.9% lock flush 10 milliLiter(s) IV Push every 1 hour PRN Pre/post blood products, medications, blood draw, and to maintain line patency      PHYSICAL EXAM:  Constitutional: A&Ox3, NAD  Respiratory: Unlabored breathing  Abdomen: Soft, nondistended, NTTP. No rebound or guarding. Incision C/D/I. Ostomy pink with gas and liquid stool.   Extremities: WWP, SARABIA spontaneously  Access: RUE PICC C/D/I no erythema or edema.    LABS:                        7.8    6.31  )-----------( 160      ( 28 Oct 2023 05:36 )             23.4     10-28    132<L>  |  100  |  16  ----------------------------<  113<H>  4.1   |  23  |  0.44<L>    Ca    8.4      28 Oct 2023 09:32  Phos  3.1     10-28  Mg     2.10     10-28    TPro  6.4  /  Alb  2.5<L>  /  TBili  0.5  /  DBili  x   /  AST  81<H>  /  ALT  90<H>  /  AlkPhos  348<H>  10-28      LIVER FUNCTIONS - ( 28 Oct 2023 05:36 )  Alb: 2.5 g/dL / Pro: 6.4 g/dL / ALK PHOS: 348 U/L / ALT: 90 U/L / AST: 81 U/L / GGT: x           Urinalysis Basic - ( 28 Oct 2023 09:32 )    Color: x / Appearance: x / SG: x / pH: x  Gluc: 113 mg/dL / Ketone: x  / Bili: x / Urobili: x   Blood: x / Protein: x / Nitrite: x   Leuk Esterase: x / RBC: x / WBC x   Sq Epi: x / Non Sq Epi: x / Bacteria: x    Culture - Blood (10.26.23 @ 18:30)   Specimen Source: .Blood Blood-Venous  Culture Results:   No growth at 24 hoursCulture - Blood (10.26.23 @ 18:30)   Specimen Source: .Blood Blood-Venous  Culture Results:   No growth at 24 hours

## 2023-10-28 NOTE — PROGRESS NOTE ADULT - SUBJECTIVE AND OBJECTIVE BOX
Surgery Progress Note      -Patient intermittently tachycardiac and febrile overnight     SUBJECTIVE: Pt seen and examined at bedside. Patient comfortable and in no-apparent distress. No nausea, vomiting, diarrhea. Pain is controlled.     Vital Signs Last 24 Hrs  T(C): 36.6 (28 Oct 2023 08:19), Max: 38.4 (27 Oct 2023 16:00)  T(F): 97.8 (28 Oct 2023 08:19), Max: 101.2 (27 Oct 2023 16:00)  HR: 88 (28 Oct 2023 08:19) (88 - 107)  BP: 91/66 (28 Oct 2023 08:19) (91/66 - 127/62)  BP(mean): --  RR: 18 (28 Oct 2023 08:19) (18 - 18)  SpO2: 96% (28 Oct 2023 08:19) (96% - 100%)    Parameters below as of 28 Oct 2023 08:19    O2 Flow (L/min): 2      Physical Exam:  General Appearance: Appears well, NAD  Respiratory: No labored breathing  CV: Pulse regularly present  Abdomen: Soft, nontender, ***incision c/d/i    LABS:                        7.8    6.31  )-----------( 160      ( 28 Oct 2023 05:36 )             23.4     10-28    132<L>  |  100  |  16  ----------------------------<  113<H>  4.1   |  23  |  0.44<L>    Ca    8.4      28 Oct 2023 09:32  Phos  3.1     10-28  Mg     2.10     10-28    TPro  6.4  /  Alb  2.5<L>  /  TBili  0.5  /  DBili  x   /  AST  81<H>  /  ALT  90<H>  /  AlkPhos  348<H>  10-28      Urinalysis Basic - ( 28 Oct 2023 09:32 )    Color: x / Appearance: x / SG: x / pH: x  Gluc: 113 mg/dL / Ketone: x  / Bili: x / Urobili: x   Blood: x / Protein: x / Nitrite: x   Leuk Esterase: x / RBC: x / WBC x   Sq Epi: x / Non Sq Epi: x / Bacteria: x        INs and OUTs:    10-27-23 @ 07:01  -  10-28-23 @ 07:00  --------------------------------------------------------  IN: 0 mL / OUT: 1900 mL / NET: -1900 mL    10-28-23 @ 07:01  -  10-28-23 @ 12:27  --------------------------------------------------------  IN: 0 mL / OUT: 200 mL / NET: -200 mL

## 2023-10-28 NOTE — PROGRESS NOTE ADULT - ASSESSMENT
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 Found to have L IJ and innominate vein acute non-occlusive thrombosis, therapeutic lovenox held i/s/o expanding subcapsular liver hematomas found on CT 10/20/23. Course c/b continuous retching.    Plan  - CT scan stat to rule out intra-abdominal collection vs pyelonephritis   - febrile, +UA, urineculture sent, started on abx.   - f/u blood cultures.  - Nausea: Scopolamine, PRN lorazepam.   - PICC/TPN  - Diet: NPO, unable to tolerate tube feeds x2  - Protonix BID.   - Appreciate GI recommendations, patient declining PEG-J.  - Pain: Dilaudid PRN.   - Liver Hematoma --> 10/21 Hgb 5.6, received 2u pRBC, now improved >8. Continue to follow.   - Hem consult for anemia: possible iron infusion, Oncology to follow.  - PT/OT consult appreciated, PM&R consulted, approved for acute rehab. During course, patient continued inpatient PT. Now recommending home PT.      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 Found to have L IJ and innominate vein acute non-occlusive thrombosis, therapeutic lovenox held i/s/o expanding subcapsular liver hematomas found on CT 10/20/23. Course c/b continuous retching.    Plan  - CT scan stat to rule out intra-abdominal collection vs pyelonephritis   - febrile, +UA, urineculture sent, started on abx.   - f/u blood cultures.  - Nausea: Scopolamine, PRN lorazepam.   - PICC/TPN  - Diet: NPO, unable to tolerate tube feeds x2  - Protonix BID.   - Appreciate GI recommendations, patient declining PEG-J.  - Pain: Dilaudid PRN.   - Liver Hematoma --> 10/21 Hgb 5.6, received 2u pRBC, now improved >8. Continue to follow.   - Hem consult for anemia: possible iron infusion, Oncology to follow.  - PT/OT consult appreciated, PM&R consulted, approved for acute rehab. During course, patient continued inpatient PT. Now recommending home PT.    PATIENT SEEN WITH DR. BRAVO DURING MORNING ROUNDS       D Team Surgery  [id number]

## 2023-10-28 NOTE — CONSULT NOTE ADULT - SUBJECTIVE AND OBJECTIVE BOX
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    Patient ---     prior hospital charts reviewed [  ]  primary team notes reviewed [  ]  other consultant notes reviewed [  ]    PAST MEDICAL & SURGICAL HISTORY:  History of sickle cell trait      Malignant neoplasm of appendix      History of ectopic pregnancy      H/O colonoscopy      H/O endoscopy          Allergies  penicillin (Rash)    ANTIMICROBIALS (past 90 days)  MEDICATIONS  (STANDING):  ciprofloxacin   IVPB   200 mL/Hr IV Intermittent (10-28-23 @ 05:41)   200 mL/Hr IV Intermittent (10-27-23 @ 18:14)   200 mL/Hr IV Intermittent (10-27-23 @ 06:18)    ciprofloxacin   IVPB   200 mL/Hr IV Intermittent (10-08-23 @ 17:55)   200 mL/Hr IV Intermittent (10-08-23 @ 06:46)   200 mL/Hr IV Intermittent (10-07-23 @ 17:15)   200 mL/Hr IV Intermittent (10-07-23 @ 05:00)   200 mL/Hr IV Intermittent (10-06-23 @ 17:12)   200 mL/Hr IV Intermittent (10-06-23 @ 06:04)   200 mL/Hr IV Intermittent (10-05-23 @ 18:48)   200 mL/Hr IV Intermittent (10-05-23 @ 07:03)    metroNIDAZOLE  IVPB   100 mL/Hr IV Intermittent (10-28-23 @ 13:28)    metroNIDAZOLE  IVPB   100 mL/Hr IV Intermittent (10-08-23 @ 16:20)   100 mL/Hr IV Intermittent (10-08-23 @ 08:31)   100 mL/Hr IV Intermittent (10-08-23 @ 01:02)   100 mL/Hr IV Intermittent (10-07-23 @ 16:31)   100 mL/Hr IV Intermittent (10-07-23 @ 09:34)   100 mL/Hr IV Intermittent (10-07-23 @ 00:38)   100 mL/Hr IV Intermittent (10-06-23 @ 17:12)   100 mL/Hr IV Intermittent (10-06-23 @ 08:10)   100 mL/Hr IV Intermittent (10-06-23 @ 01:00)   100 mL/Hr IV Intermittent (10-05-23 @ 17:04)   100 mL/Hr IV Intermittent (10-05-23 @ 10:07)   100 mL/Hr IV Intermittent (10-05-23 @ 03:41)        ciprofloxacin   IVPB 400 every 12 hours  metroNIDAZOLE  IVPB 500 every 8 hours      MEDICATIONS  (STANDING):  enoxaparin Injectable 60 every 12 hours  HYDROmorphone  Injectable 0.5 every 4 hours PRN  influenza   Vaccine 0.5 once  ondansetron Injectable 4 every 6 hours PRN  pantoprazole  Injectable 40 two times a day  scopolamine 1 mG/72 Hr(s) Patch 1 every 72 hours    SOCIAL HISTORY:       FAMILY HISTORY:    REVIEW OF SYSTEMS  [  ] ROS unobtainable because:    [  ] All other systems negative except as noted below:	    Constitutional:  [ ] fever [ ] chills  [ ] weight loss  [ ] weakness  Skin:  [ ] rash [ ] phlebitis	  Eyes: [ ] icterus [ ] pain  [ ] discharge	  ENMT: [ ] sore throat  [ ] thrush [ ] ulcers [ ] exudates  Respiratory: [ ] dyspnea [ ] hemoptysis [ ] cough [ ] sputum	  Cardiovascular:  [ ] chest pain [ ] palpitations [ ] edema	  Gastrointestinal:  [ ] nausea [ ] vomiting [ ] diarrhea [ ] constipation [ ] pain	  Genitourinary:  [ ] dysuria [ ] frequency [ ] hematuria [ ] discharge [ ] flank pain  [ ] incontinence  Musculoskeletal:  [ ] myalgias [ ] arthralgias [ ] arthritis  [ ] back pain  Neurological:  [ ] headache [ ] seizures  [ ] confusion/altered mental status  Psychiatric:  [ ] anxiety [ ] depression	  Hematology/Lymphatics:  [ ] lymphadenopathy  Endocrine:  [ ] adrenal [ ] thyroid  Allergic/Immunologic:	 [ ] transplant [ ] seasonal    Vital Signs Last 24 Hrs  T(F): 98.2 (10-28-23 @ 13:38), Max: 101.2 (10-26-23 @ 19:58)  Vital Signs Last 24 Hrs  HR: 85 (10-28-23 @ 13:38) (85 - 107)  BP: 136/53 (10-28-23 @ 13:38) (96/55 - 136/53)  RR: 18 (10-28-23 @ 13:38)  SpO2: 98% (10-28-23 @ 13:38) (97% - 100%)  Wt(kg): --    PHYSICAL EXAM:                              7.8    6.31  )-----------( 160      ( 28 Oct 2023 05:36 )             23.4   10-28    132<L>  |  100  |  16  ----------------------------<  113<H>  4.1   |  23  |  0.44<L>    Ca    8.4      28 Oct 2023 09:32  Phos  3.1     10-28  Mg     2.10     10-28    TPro  6.4  /  Alb  2.5<L>  /  TBili  0.5  /  DBili  x   /  AST  81<H>  /  ALT  90<H>  /  AlkPhos  348<H>  10-28    Urinalysis Basic - ( 28 Oct 2023 09:32 )    Color: x / Appearance: x / SG: x / pH: x  Gluc: 113 mg/dL / Ketone: x  / Bili: x / Urobili: x   Blood: x / Protein: x / Nitrite: x   Leuk Esterase: x / RBC: x / WBC x   Sq Epi: x / Non Sq Epi: x / Bacteria: x    MICROBIOLOGY:  Culture - Blood (collected 26 Oct 2023 18:30)  Source: .Blood Blood-Venous  Preliminary Report (27 Oct 2023 22:02):    No growth at 24 hours    Culture - Blood (collected 26 Oct 2023 18:30)  Source: .Blood Blood-Venous  Preliminary Report (27 Oct 2023 22:02):    No growth at 24 hours        Rapid RVP Result: NotDetec (10-26 @ 18:47)      RADIOLOGY:  imaging below personally reviewed and agree with findings  < from: Xray Chest 1 View- PORTABLE-Urgent (Xray Chest 1 View- PORTABLE-Urgent .) (10.26.23 @ 20:31) >    FINDINGS:  Left-sided medication port terminates in the cavoatrial junction.  The heart is not assessed due to obscuration by adjacent opacity..  Right-sided pleural effusion. Left lung isclear.  No pneumothorax.  No acute bony abnormality.    IMPRESSION: Right-sided pleural effusion but no focal consolidation to   account for fever.    < end of copied text >  < from: Xray Abdomen 2 View PORTABLE -Urgent (Xray Abdomen 2 View PORTABLE -Urgent .) (10.21.23 @ 20:12) >  IMPRESSION:  Enteric tube with tip in the duodenum.    < end of copied text >  < from: CT Abdomen and Pelvis w/ IV Cont (10.20.23 @ 16:44) >  IMPRESSION:  Right subcapsular hematoma, increased in size since prior exam. New left   subcapsular hematoma with extension of hemorrhage intothe adjacent left   hepatic space. No focus of active contrast extravasation is seen in   either of these hematomas.    New irregular hypoattenuation of the posterior right hepatic lobe,   possibly hepatic infarct.    The portal veins, hepatic veins,and hepatic arteries are patent.    Findings were discussed with Dr. ANDRE 10/20/2023 5:24 PM by Dr. Seo   with read back confirmation.    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    Reports no acute complaints  Denies any cough, runny nose, n/v, diarrhea or dysuria, urinary frequency  Abdominal pain is the same, improved from surgery  R PICC line without issues, not overly painful    prior hospital charts reviewed [ x ]  primary team notes reviewed [ x ]  other consultant notes reviewed [ x ]    PAST MEDICAL & SURGICAL HISTORY:  History of sickle cell trait      Malignant neoplasm of appendix      History of ectopic pregnancy      H/O colonoscopy      H/O endoscopy          Allergies  penicillin (Rash)    ANTIMICROBIALS (past 90 days)  MEDICATIONS  (STANDING):  ciprofloxacin   IVPB   200 mL/Hr IV Intermittent (10-28-23 @ 05:41)   200 mL/Hr IV Intermittent (10-27-23 @ 18:14)   200 mL/Hr IV Intermittent (10-27-23 @ 06:18)    ciprofloxacin   IVPB   200 mL/Hr IV Intermittent (10-08-23 @ 17:55)   200 mL/Hr IV Intermittent (10-08-23 @ 06:46)   200 mL/Hr IV Intermittent (10-07-23 @ 17:15)   200 mL/Hr IV Intermittent (10-07-23 @ 05:00)   200 mL/Hr IV Intermittent (10-06-23 @ 17:12)   200 mL/Hr IV Intermittent (10-06-23 @ 06:04)   200 mL/Hr IV Intermittent (10-05-23 @ 18:48)   200 mL/Hr IV Intermittent (10-05-23 @ 07:03)    metroNIDAZOLE  IVPB   100 mL/Hr IV Intermittent (10-28-23 @ 13:28)    metroNIDAZOLE  IVPB   100 mL/Hr IV Intermittent (10-08-23 @ 16:20)   100 mL/Hr IV Intermittent (10-08-23 @ 08:31)   100 mL/Hr IV Intermittent (10-08-23 @ 01:02)   100 mL/Hr IV Intermittent (10-07-23 @ 16:31)   100 mL/Hr IV Intermittent (10-07-23 @ 09:34)   100 mL/Hr IV Intermittent (10-07-23 @ 00:38)   100 mL/Hr IV Intermittent (10-06-23 @ 17:12)   100 mL/Hr IV Intermittent (10-06-23 @ 08:10)   100 mL/Hr IV Intermittent (10-06-23 @ 01:00)   100 mL/Hr IV Intermittent (10-05-23 @ 17:04)   100 mL/Hr IV Intermittent (10-05-23 @ 10:07)   100 mL/Hr IV Intermittent (10-05-23 @ 03:41)        ciprofloxacin   IVPB 400 every 12 hours  metroNIDAZOLE  IVPB 500 every 8 hours      MEDICATIONS  (STANDING):  enoxaparin Injectable 60 every 12 hours  HYDROmorphone  Injectable 0.5 every 4 hours PRN  influenza   Vaccine 0.5 once  ondansetron Injectable 4 every 6 hours PRN  pantoprazole  Injectable 40 two times a day  scopolamine 1 mG/72 Hr(s) Patch 1 every 72 hours    SOCIAL HISTORY:  Denies alcohol, tobacco, recreational drug use      FAMILY HISTORY: Prostate Ca (father)    REVIEW OF SYSTEMS  [  ] ROS unobtainable because:    [x  ] All other systems negative except as noted below:	    Constitutional:  [ ] fever [ ] chills  [ ] weight loss  [ ] weakness  Skin:  [ ] rash [ ] phlebitis	  Eyes: [ ] icterus [ ] pain  [ ] discharge	  ENMT: [ ] sore throat  [ ] thrush [ ] ulcers [ ] exudates  Respiratory: [ ] dyspnea [ ] hemoptysis [ ] cough [ ] sputum	  Cardiovascular:  [ ] chest pain [ ] palpitations [ ] edema	  Gastrointestinal:  [ ] nausea [ ] vomiting [ ] diarrhea [ ] constipation [ ] pain	  Genitourinary:  [ ] dysuria [ ] frequency [ ] hematuria [ ] discharge [ ] flank pain  [ ] incontinence  Musculoskeletal:  [ ] myalgias [ ] arthralgias [ ] arthritis  [ ] back pain  Neurological:  [ ] headache [ ] seizures  [ ] confusion/altered mental status  Psychiatric:  [ ] anxiety [ ] depression	  Hematology/Lymphatics:  [ ] lymphadenopathy  Endocrine:  [ ] adrenal [ ] thyroid  Allergic/Immunologic:	 [ ] transplant [ ] seasonal    Vital Signs Last 24 Hrs  T(F): 98.2 (10-28-23 @ 13:38), Max: 101.2 (10-26-23 @ 19:58)  Vital Signs Last 24 Hrs  HR: 85 (10-28-23 @ 13:38) (85 - 107)  BP: 136/53 (10-28-23 @ 13:38) (96/55 - 136/53)  RR: 18 (10-28-23 @ 13:38)  SpO2: 98% (10-28-23 @ 13:38) (97% - 100%)  Wt(kg): --    Physical Exam:  Constitutional:  frail, comfortable  Head/Eyes: no icterus  ENT:  supple, no cervical lymphadenopathy   LUNGS:  CTA  CVS:  regular rhythm, no murmur  Abd:  soft, non-tender; non-distended +colostomy  Ext:  no edema   Vascular:  R PICC c/d/i  MSK:  joints without swelling  Neuro: AAO X 3, non- focal                                7.8    6.31  )-----------( 160      ( 28 Oct 2023 05:36 )             23.4   10-28    132<L>  |  100  |  16  ----------------------------<  113<H>  4.1   |  23  |  0.44<L>    Ca    8.4      28 Oct 2023 09:32  Phos  3.1     10-28  Mg     2.10     10-28    TPro  6.4  /  Alb  2.5<L>  /  TBili  0.5  /  DBili  x   /  AST  81<H>  /  ALT  90<H>  /  AlkPhos  348<H>  10-28    Urinalysis Basic - ( 28 Oct 2023 09:32 )    Color: x / Appearance: x / SG: x / pH: x  Gluc: 113 mg/dL / Ketone: x  / Bili: x / Urobili: x   Blood: x / Protein: x / Nitrite: x   Leuk Esterase: x / RBC: x / WBC x   Sq Epi: x / Non Sq Epi: x / Bacteria: x    MICROBIOLOGY:  Culture - Blood (collected 26 Oct 2023 18:30)  Source: .Blood Blood-Venous  Preliminary Report (27 Oct 2023 22:02):    No growth at 24 hours    Culture - Blood (collected 26 Oct 2023 18:30)  Source: .Blood Blood-Venous  Preliminary Report (27 Oct 2023 22:02):    No growth at 24 hours        Rapid RVP Result: NotDetec (10-26 @ 18:47)      RADIOLOGY:  imaging below personally reviewed and agree with findings  < from: Xray Chest 1 View- PORTABLE-Urgent (Xray Chest 1 View- PORTABLE-Urgent .) (10.26.23 @ 20:31) >    FINDINGS:  Left-sided medication port terminates in the cavoatrial junction.  The heart is not assessed due to obscuration by adjacent opacity..  Right-sided pleural effusion. Left lung isclear.  No pneumothorax.  No acute bony abnormality.    IMPRESSION: Right-sided pleural effusion but no focal consolidation to   account for fever.    < end of copied text >  < from: Xray Abdomen 2 View PORTABLE -Urgent (Xray Abdomen 2 View PORTABLE -Urgent .) (10.21.23 @ 20:12) >  IMPRESSION:  Enteric tube with tip in the duodenum.    < end of copied text >  < from: CT Abdomen and Pelvis w/ IV Cont (10.20.23 @ 16:44) >  IMPRESSION:  Right subcapsular hematoma, increased in size since prior exam. New left   subcapsular hematoma with extension of hemorrhage intothe adjacent left   hepatic space. No focus of active contrast extravasation is seen in   either of these hematomas.    New irregular hypoattenuation of the posterior right hepatic lobe,   possibly hepatic infarct.    The portal veins, hepatic veins,and hepatic arteries are patent.    Findings were discussed with Dr. ANDRE 10/20/2023 5:24 PM by Dr. Seo   with read back confirmation.    < end of copied text >

## 2023-10-28 NOTE — PROGRESS NOTE ADULT - SUBJECTIVE AND OBJECTIVE BOX
Subjective: Patient seen and examined. No new events except as noted.     SUBJECTIVE/ROS:  No chest pain, dyspnea, palpitation, or dizziness.       MEDICATIONS:  MEDICATIONS  (STANDING):  chlorhexidine 2% Cloths 1 Application(s) Topical daily  ciprofloxacin   IVPB 400 milliGRAM(s) IV Intermittent every 12 hours  enoxaparin Injectable 60 milliGRAM(s) SubCutaneous every 12 hours  influenza   Vaccine 0.5 milliLiter(s) IntraMuscular once  lipid, fat emulsion (Fish Oil and Plant Based) 20% Infusion 10.4 mL/Hr (10.4 mL/Hr) IV Continuous <Continuous>  pantoprazole  Injectable 40 milliGRAM(s) IV Push two times a day  Parenteral Nutrition - Adult 1 Each TPN Continuous <Continuous>  scopolamine 1 mG/72 Hr(s) Patch 1 Patch Transdermal every 72 hours      PHYSICAL EXAM:  T(C): 37.9 (10-28-23 @ 05:45), Max: 38.4 (10-27-23 @ 16:00)  HR: 106 (10-28-23 @ 05:45) (91 - 107)  BP: 107/59 (10-28-23 @ 05:45) (96/55 - 127/62)  RR: 18 (10-28-23 @ 05:45) (17 - 18)  SpO2: 97% (10-28-23 @ 05:45) (97% - 100%)  Wt(kg): --  I&O's Summary    27 Oct 2023 07:01  -  28 Oct 2023 07:00  --------------------------------------------------------  IN: 0 mL / OUT: 1900 mL / NET: -1900 mL            JVP: Normal  Neck: supple  Lung: clear   CV: S1 S2 , Murmur:  Abd: soft  Ext: No edema  neuro: Awake / alert  Psych: flat affect  Skin: normal``    LABS/DATA:    CARDIAC MARKERS:                                7.6    5.32  )-----------( 164      ( 27 Oct 2023 06:09 )             23.5     10-27    133<L>  |  101  |  12  ----------------------------<  124<H>  3.7   |  23  |  0.39<L>    Ca    8.4      27 Oct 2023 09:18  Phos  3.0     10-27  Mg     2.00     10-27    TPro  5.7<L>  /  Alb  2.4<L>  /  TBili  0.5  /  DBili  0.2  /  AST  73<H>  /  ALT  92<H>  /  AlkPhos  339<H>  10-27    proBNP:   Lipid Profile:   HgA1c:   TSH:     TELE:  EKG:

## 2023-10-28 NOTE — CONSULT NOTE ADULT - ASSESSMENT
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    Reports no acute complaints  Denies any cough, runny nose, n/v, diarrhea or dysuria, urinary frequency  Abdominal pain is the same, improved from surgery  R PICC line without issues, not overly painful    ANTIBIOTIC:  Cipro/Flagyl (10/5 - 10/8) (10/27 ---> )    DIAGNOSIS and IMPRESSION:  #Fever  #Neoplasm of appendix s/p OR (10/4)  #L IJ Thrombus  #Subscapular Hepatic Hematoma     - no urinary symptoms, unlikely UTI  - ?intrabdominal focus of infection ?reactive to hematoma     RECOMMENDATIONS:        Above recommendations are Preliminary until Attending's Addendum which includes Final Recommendations      Vince Zaman DO, PGY-5   ID fellow  [employer] Preferred  After 5pm/weekends call [phone]   58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    Reports no acute complaints  Denies any cough, runny nose, n/v, diarrhea or dysuria, urinary frequency  Abdominal pain is the same, improved from surgery  R PICC line without issues, not overly painful    ANTIBIOTIC:  Cipro/Flagyl (10/5 - 10/8) (10/27 ---> )    DIAGNOSIS and IMPRESSION:  #Fever  #Neoplasm of appendix s/p OR (10/4)  #L IJ Thrombus  #Subscapular Hepatic Hematoma   #PCN Allergy (Hives as a child, Mom was RN who avoided PCN)    - no urinary symptoms, unlikely UTI  - ?intrabdominal focus of infection ?reactive to hematoma     RECOMMENDATIONS:        Above recommendations are Preliminary until Attending's Addendum which includes Final Recommendations      Vince Zaman DO, PGY-5   ID fellow  [employer] Preferred  After 5pm/weekends call [phone]   58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    Reports no acute complaints  Denies any cough, runny nose, n/v, diarrhea or dysuria, urinary frequency  Abdominal pain is the same, improved from surgery  R PICC line without issues, not overly painful    ANTIBIOTIC:  Cipro/Flagyl (10/5 - 10/8) (10/27 ---> )    DIAGNOSIS and IMPRESSION:  #Fever  #Neoplasm of appendix s/p OR (10/4)  #L IJ Thrombus  #Subscapular Hepatic Hematoma   #PCN Allergy (Hives as a child, Mom was RN who avoided PCN)    - no urinary symptoms, unlikely UTI  - ?intrabdominal focus of infection ?reactive to hematoma     RECOMMENDATIONS:  - switch to CTX 1G and Flagyl 500mg BID  - monitor temperature curve  - trend WBC  - obtain repeat CT AP with contrast preferably   - follow up BCx x2, UCx      Above recommendations are Preliminary until Attending's Addendum which includes Final Recommendations      Vince Zaman DO, PGY-5   ID fellow  [employer] Preferred  After 5pm/weekends call [phone]   58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    Reports no acute complaints  Denies any cough, runny nose, n/v, diarrhea or dysuria, urinary frequency  Abdominal pain is the same, improved from surgery  R PICC line without issues, not overly painful    ANTIBIOTIC:  Cipro/Flagyl (10/5 - 10/8) (10/27 ---> )    DIAGNOSIS and IMPRESSION:  #Fever  #Neoplasm of appendix s/p OR (10/4)  #L IJ Thrombus  #Subscapular Hepatic Hematoma   #PCN Allergy (Hives as a child, Mom was RN who avoided PCN)    - no urinary symptoms, unlikely UTI  - ?intrabdominal focus of infection ?reactive to hematoma     RECOMMENDATIONS:  - switch to CTX 1G and Flagyl 500mg BID  - monitor temperature curve  - trend WBC  - obtain repeat CT AP with contrast preferably   - follow up BCx x2, UCx    Case d/w attending and primary team        Vince Zaman DO, PGY-5   ID fellow  [employer] Preferred  After 5pm/weekends call [phone]

## 2023-10-29 LAB
ALBUMIN SERPL ELPH-MCNC: 2.5 G/DL — LOW (ref 3.3–5)
ALBUMIN SERPL ELPH-MCNC: 2.5 G/DL — LOW (ref 3.3–5)
ALP SERPL-CCNC: 312 U/L — HIGH (ref 40–120)
ALP SERPL-CCNC: 312 U/L — HIGH (ref 40–120)
ALT FLD-CCNC: 76 U/L — HIGH (ref 4–33)
ALT FLD-CCNC: 76 U/L — HIGH (ref 4–33)
ANION GAP SERPL CALC-SCNC: 14 MMOL/L — SIGNIFICANT CHANGE UP (ref 7–14)
ANION GAP SERPL CALC-SCNC: 14 MMOL/L — SIGNIFICANT CHANGE UP (ref 7–14)
AST SERPL-CCNC: 58 U/L — HIGH (ref 4–32)
AST SERPL-CCNC: 58 U/L — HIGH (ref 4–32)
BASOPHILS # BLD AUTO: 0.02 K/UL — SIGNIFICANT CHANGE UP (ref 0–0.2)
BASOPHILS # BLD AUTO: 0.02 K/UL — SIGNIFICANT CHANGE UP (ref 0–0.2)
BASOPHILS NFR BLD AUTO: 0.3 % — SIGNIFICANT CHANGE UP (ref 0–2)
BASOPHILS NFR BLD AUTO: 0.3 % — SIGNIFICANT CHANGE UP (ref 0–2)
BILIRUB SERPL-MCNC: 0.4 MG/DL — SIGNIFICANT CHANGE UP (ref 0.2–1.2)
BILIRUB SERPL-MCNC: 0.4 MG/DL — SIGNIFICANT CHANGE UP (ref 0.2–1.2)
BUN SERPL-MCNC: 17 MG/DL — SIGNIFICANT CHANGE UP (ref 7–23)
BUN SERPL-MCNC: 17 MG/DL — SIGNIFICANT CHANGE UP (ref 7–23)
CALCIUM SERPL-MCNC: 8.3 MG/DL — LOW (ref 8.4–10.5)
CALCIUM SERPL-MCNC: 8.3 MG/DL — LOW (ref 8.4–10.5)
CHLORIDE SERPL-SCNC: 100 MMOL/L — SIGNIFICANT CHANGE UP (ref 98–107)
CHLORIDE SERPL-SCNC: 100 MMOL/L — SIGNIFICANT CHANGE UP (ref 98–107)
CO2 SERPL-SCNC: 20 MMOL/L — LOW (ref 22–31)
CO2 SERPL-SCNC: 20 MMOL/L — LOW (ref 22–31)
CREAT SERPL-MCNC: 0.42 MG/DL — LOW (ref 0.5–1.3)
CREAT SERPL-MCNC: 0.42 MG/DL — LOW (ref 0.5–1.3)
EGFR: 113 ML/MIN/1.73M2 — SIGNIFICANT CHANGE UP
EGFR: 113 ML/MIN/1.73M2 — SIGNIFICANT CHANGE UP
EOSINOPHIL # BLD AUTO: 0.03 K/UL — SIGNIFICANT CHANGE UP (ref 0–0.5)
EOSINOPHIL # BLD AUTO: 0.03 K/UL — SIGNIFICANT CHANGE UP (ref 0–0.5)
EOSINOPHIL NFR BLD AUTO: 0.5 % — SIGNIFICANT CHANGE UP (ref 0–6)
EOSINOPHIL NFR BLD AUTO: 0.5 % — SIGNIFICANT CHANGE UP (ref 0–6)
GLUCOSE BLDC GLUCOMTR-MCNC: 108 MG/DL — HIGH (ref 70–99)
GLUCOSE BLDC GLUCOMTR-MCNC: 108 MG/DL — HIGH (ref 70–99)
GLUCOSE BLDC GLUCOMTR-MCNC: 131 MG/DL — HIGH (ref 70–99)
GLUCOSE BLDC GLUCOMTR-MCNC: 131 MG/DL — HIGH (ref 70–99)
GLUCOSE BLDC GLUCOMTR-MCNC: 157 MG/DL — HIGH (ref 70–99)
GLUCOSE BLDC GLUCOMTR-MCNC: 157 MG/DL — HIGH (ref 70–99)
GLUCOSE BLDC GLUCOMTR-MCNC: 94 MG/DL — SIGNIFICANT CHANGE UP (ref 70–99)
GLUCOSE BLDC GLUCOMTR-MCNC: 94 MG/DL — SIGNIFICANT CHANGE UP (ref 70–99)
GLUCOSE SERPL-MCNC: 117 MG/DL — HIGH (ref 70–99)
GLUCOSE SERPL-MCNC: 117 MG/DL — HIGH (ref 70–99)
HCT VFR BLD CALC: 22 % — LOW (ref 34.5–45)
HCT VFR BLD CALC: 22 % — LOW (ref 34.5–45)
HGB BLD-MCNC: 7.3 G/DL — LOW (ref 11.5–15.5)
HGB BLD-MCNC: 7.3 G/DL — LOW (ref 11.5–15.5)
IANC: 5.03 K/UL — SIGNIFICANT CHANGE UP (ref 1.8–7.4)
IANC: 5.03 K/UL — SIGNIFICANT CHANGE UP (ref 1.8–7.4)
IMM GRANULOCYTES NFR BLD AUTO: 0.3 % — SIGNIFICANT CHANGE UP (ref 0–0.9)
IMM GRANULOCYTES NFR BLD AUTO: 0.3 % — SIGNIFICANT CHANGE UP (ref 0–0.9)
LYMPHOCYTES # BLD AUTO: 0.95 K/UL — LOW (ref 1–3.3)
LYMPHOCYTES # BLD AUTO: 0.95 K/UL — LOW (ref 1–3.3)
LYMPHOCYTES # BLD AUTO: 15.1 % — SIGNIFICANT CHANGE UP (ref 13–44)
LYMPHOCYTES # BLD AUTO: 15.1 % — SIGNIFICANT CHANGE UP (ref 13–44)
MAGNESIUM SERPL-MCNC: 2.1 MG/DL — SIGNIFICANT CHANGE UP (ref 1.6–2.6)
MAGNESIUM SERPL-MCNC: 2.1 MG/DL — SIGNIFICANT CHANGE UP (ref 1.6–2.6)
MCHC RBC-ENTMCNC: 28.6 PG — SIGNIFICANT CHANGE UP (ref 27–34)
MCHC RBC-ENTMCNC: 28.6 PG — SIGNIFICANT CHANGE UP (ref 27–34)
MCHC RBC-ENTMCNC: 33.2 GM/DL — SIGNIFICANT CHANGE UP (ref 32–36)
MCHC RBC-ENTMCNC: 33.2 GM/DL — SIGNIFICANT CHANGE UP (ref 32–36)
MCV RBC AUTO: 86.3 FL — SIGNIFICANT CHANGE UP (ref 80–100)
MCV RBC AUTO: 86.3 FL — SIGNIFICANT CHANGE UP (ref 80–100)
MONOCYTES # BLD AUTO: 0.23 K/UL — SIGNIFICANT CHANGE UP (ref 0–0.9)
MONOCYTES # BLD AUTO: 0.23 K/UL — SIGNIFICANT CHANGE UP (ref 0–0.9)
MONOCYTES NFR BLD AUTO: 3.7 % — SIGNIFICANT CHANGE UP (ref 2–14)
MONOCYTES NFR BLD AUTO: 3.7 % — SIGNIFICANT CHANGE UP (ref 2–14)
NEUTROPHILS # BLD AUTO: 5.03 K/UL — SIGNIFICANT CHANGE UP (ref 1.8–7.4)
NEUTROPHILS # BLD AUTO: 5.03 K/UL — SIGNIFICANT CHANGE UP (ref 1.8–7.4)
NEUTROPHILS NFR BLD AUTO: 80.1 % — HIGH (ref 43–77)
NEUTROPHILS NFR BLD AUTO: 80.1 % — HIGH (ref 43–77)
NRBC # BLD: 0 /100 WBCS — SIGNIFICANT CHANGE UP (ref 0–0)
NRBC # BLD: 0 /100 WBCS — SIGNIFICANT CHANGE UP (ref 0–0)
NRBC # FLD: 0 K/UL — SIGNIFICANT CHANGE UP (ref 0–0)
NRBC # FLD: 0 K/UL — SIGNIFICANT CHANGE UP (ref 0–0)
PHOSPHATE SERPL-MCNC: 2.9 MG/DL — SIGNIFICANT CHANGE UP (ref 2.5–4.5)
PHOSPHATE SERPL-MCNC: 2.9 MG/DL — SIGNIFICANT CHANGE UP (ref 2.5–4.5)
PLATELET # BLD AUTO: 167 K/UL — SIGNIFICANT CHANGE UP (ref 150–400)
PLATELET # BLD AUTO: 167 K/UL — SIGNIFICANT CHANGE UP (ref 150–400)
POTASSIUM SERPL-MCNC: 4.2 MMOL/L — SIGNIFICANT CHANGE UP (ref 3.5–5.3)
POTASSIUM SERPL-MCNC: 4.2 MMOL/L — SIGNIFICANT CHANGE UP (ref 3.5–5.3)
POTASSIUM SERPL-SCNC: 4.2 MMOL/L — SIGNIFICANT CHANGE UP (ref 3.5–5.3)
POTASSIUM SERPL-SCNC: 4.2 MMOL/L — SIGNIFICANT CHANGE UP (ref 3.5–5.3)
PROT SERPL-MCNC: 6.2 G/DL — SIGNIFICANT CHANGE UP (ref 6–8.3)
PROT SERPL-MCNC: 6.2 G/DL — SIGNIFICANT CHANGE UP (ref 6–8.3)
RBC # BLD: 2.55 M/UL — LOW (ref 3.8–5.2)
RBC # BLD: 2.55 M/UL — LOW (ref 3.8–5.2)
RBC # FLD: 18.6 % — HIGH (ref 10.3–14.5)
RBC # FLD: 18.6 % — HIGH (ref 10.3–14.5)
SODIUM SERPL-SCNC: 134 MMOL/L — LOW (ref 135–145)
SODIUM SERPL-SCNC: 134 MMOL/L — LOW (ref 135–145)
WBC # BLD: 6.28 K/UL — SIGNIFICANT CHANGE UP (ref 3.8–10.5)
WBC # BLD: 6.28 K/UL — SIGNIFICANT CHANGE UP (ref 3.8–10.5)
WBC # FLD AUTO: 6.28 K/UL — SIGNIFICANT CHANGE UP (ref 3.8–10.5)
WBC # FLD AUTO: 6.28 K/UL — SIGNIFICANT CHANGE UP (ref 3.8–10.5)

## 2023-10-29 PROCEDURE — 99232 SBSQ HOSP IP/OBS MODERATE 35: CPT

## 2023-10-29 RX ORDER — ACETAMINOPHEN 500 MG
750 TABLET ORAL EVERY 6 HOURS
Refills: 0 | Status: DISCONTINUED | OUTPATIENT
Start: 2023-10-29 | End: 2023-10-30

## 2023-10-29 RX ORDER — I.V. FAT EMULSION 20 G/100ML
10.4 EMULSION INTRAVENOUS
Qty: 25 | Refills: 0 | Status: DISCONTINUED | OUTPATIENT
Start: 2023-10-29 | End: 2023-10-30

## 2023-10-29 RX ORDER — ELECTROLYTE SOLUTION,INJ
1 VIAL (ML) INTRAVENOUS
Refills: 0 | Status: DISCONTINUED | OUTPATIENT
Start: 2023-10-29 | End: 2023-10-30

## 2023-10-29 RX ORDER — HYDROMORPHONE HYDROCHLORIDE 2 MG/ML
0.5 INJECTION INTRAMUSCULAR; INTRAVENOUS; SUBCUTANEOUS
Refills: 0 | Status: DISCONTINUED | OUTPATIENT
Start: 2023-10-29 | End: 2023-10-30

## 2023-10-29 RX ADMIN — PANTOPRAZOLE SODIUM 40 MILLIGRAM(S): 20 TABLET, DELAYED RELEASE ORAL at 05:34

## 2023-10-29 RX ADMIN — I.V. FAT EMULSION 10.4 ML/HR: 20 EMULSION INTRAVENOUS at 17:47

## 2023-10-29 RX ADMIN — PANTOPRAZOLE SODIUM 40 MILLIGRAM(S): 20 TABLET, DELAYED RELEASE ORAL at 16:55

## 2023-10-29 RX ADMIN — HYDROMORPHONE HYDROCHLORIDE 0.5 MILLIGRAM(S): 2 INJECTION INTRAMUSCULAR; INTRAVENOUS; SUBCUTANEOUS at 16:46

## 2023-10-29 RX ADMIN — Medication 100 MILLIGRAM(S): at 13:05

## 2023-10-29 RX ADMIN — HYDROMORPHONE HYDROCHLORIDE 0.5 MILLIGRAM(S): 2 INJECTION INTRAMUSCULAR; INTRAVENOUS; SUBCUTANEOUS at 17:24

## 2023-10-29 RX ADMIN — Medication 100 MILLIGRAM(S): at 05:34

## 2023-10-29 RX ADMIN — Medication 100 MILLIGRAM(S): at 22:18

## 2023-10-29 RX ADMIN — Medication 300 MILLIGRAM(S): at 01:06

## 2023-10-29 RX ADMIN — CHLORHEXIDINE GLUCONATE 1 APPLICATION(S): 213 SOLUTION TOPICAL at 13:10

## 2023-10-29 RX ADMIN — CEFTRIAXONE 100 MILLIGRAM(S): 500 INJECTION, POWDER, FOR SOLUTION INTRAMUSCULAR; INTRAVENOUS at 02:34

## 2023-10-29 RX ADMIN — HYDROMORPHONE HYDROCHLORIDE 0.5 MILLIGRAM(S): 2 INJECTION INTRAMUSCULAR; INTRAVENOUS; SUBCUTANEOUS at 21:18

## 2023-10-29 RX ADMIN — HYDROMORPHONE HYDROCHLORIDE 0.5 MILLIGRAM(S): 2 INJECTION INTRAMUSCULAR; INTRAVENOUS; SUBCUTANEOUS at 02:32

## 2023-10-29 RX ADMIN — HYDROMORPHONE HYDROCHLORIDE 0.5 MILLIGRAM(S): 2 INJECTION INTRAMUSCULAR; INTRAVENOUS; SUBCUTANEOUS at 10:53

## 2023-10-29 RX ADMIN — Medication 750 MILLIGRAM(S): at 01:30

## 2023-10-29 RX ADMIN — HYDROMORPHONE HYDROCHLORIDE 0.5 MILLIGRAM(S): 2 INJECTION INTRAMUSCULAR; INTRAVENOUS; SUBCUTANEOUS at 06:43

## 2023-10-29 RX ADMIN — HYDROMORPHONE HYDROCHLORIDE 0.5 MILLIGRAM(S): 2 INJECTION INTRAMUSCULAR; INTRAVENOUS; SUBCUTANEOUS at 11:23

## 2023-10-29 RX ADMIN — HYDROMORPHONE HYDROCHLORIDE 0.5 MILLIGRAM(S): 2 INJECTION INTRAMUSCULAR; INTRAVENOUS; SUBCUTANEOUS at 03:00

## 2023-10-29 RX ADMIN — HYDROMORPHONE HYDROCHLORIDE 0.5 MILLIGRAM(S): 2 INJECTION INTRAMUSCULAR; INTRAVENOUS; SUBCUTANEOUS at 20:46

## 2023-10-29 RX ADMIN — ENOXAPARIN SODIUM 60 MILLIGRAM(S): 100 INJECTION SUBCUTANEOUS at 16:55

## 2023-10-29 RX ADMIN — Medication 1 EACH: at 17:46

## 2023-10-29 RX ADMIN — ENOXAPARIN SODIUM 60 MILLIGRAM(S): 100 INJECTION SUBCUTANEOUS at 05:34

## 2023-10-29 NOTE — PROGRESS NOTE ADULT - ASSESSMENT
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 Patient's course c/b continuous retching and intolerance to tube feeds. Nutrition support consult called for prolonged NPO and severe protein calorie malnutrition.    PLAN:  - Continue TPN formula at 2000mL cycled over 12h as follows: CHO [220] gm.  AA [85] gm. SMOF Lipids [25] gm. Calories [1338].  - Labs reviewed - hyponatremia improving  - Monitor fingersticks [stable ]  - Obtain daily weights   - Check fluid balance daily.  Strict I/O  - Triglycerides at initiation of TPN and monthly [87 on 10/25]  - Continue parenteral nutrition at this time, anticipate home with TPN next week; will follow up with primary team on plan     Nutrition Support   [id number]    58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 Patient's course c/b continuous retching and intolerance to tube feeds. Nutrition support consult called for prolonged NPO and severe protein calorie malnutrition.    PLAN:  - Continue TPN formula at 2000mL cycled over 12h as follows: CHO [220] gm.  AA [85] gm. SMOF Lipids [25] gm. Calories [1338].  - Labs reviewed - hyponatremia improving  - Monitor fingersticks [stable ]  - Obtain daily weights   - Check fluid balance daily.  Strict I/O  - Triglycerides at initiation of TPN and monthly [87 on 10/25]  - Continue parenteral nutrition at this time, will follow up with primary team on plan     Nutrition Support   [id number]

## 2023-10-29 NOTE — PROGRESS NOTE ADULT - SUBJECTIVE AND OBJECTIVE BOX
Follow Up:      Interval History/ROS: Afebrile overnight. Denies any acute complaints. Denies any dysuria or urinary frequency      REVIEW OF SYSTEMS  [  ] ROS unobtainable because:    [ x ] All other systems negative except as noted below    Constitutional:  [ ] fever [ ] chills  [ ] weight loss  [ ]night sweat  [ ]poor appetite/PO intake [ ]fatigue   Skin:  [ ] rash [ ] phlebitis	  Eyes: [ ] icterus [ ] pain  [ ] discharge	  ENMT: [ ] sore throat  [ ] thrush [ ] ulcers [ ] exudates [ ]anosmia  Respiratory: [ ] dyspnea [ ] hemoptysis [ ] cough [ ] sputum	  Cardiovascular:  [ ] chest pain [ ] palpitations [ ] edema	  Gastrointestinal:  [ ] nausea [ ] vomiting [ ] diarrhea [ ] constipation [ ] pain	  Genitourinary:  [ ] dysuria [ ] frequency [ ] hematuria [ ] discharge [ ] flank pain  [ ] incontinence  Musculoskeletal:  [ ] myalgias [ ] arthralgias [ ] arthritis  [ ] back pain  Neurological:  [ ] headache [ ] weakness [ ] seizures  [ ] confusion/altered mental status    Allergies  penicillin (Rash)        ANTIMICROBIALS:    cefTRIAXone   IVPB 1000 every 24 hours  metroNIDAZOLE  IVPB 500 every 8 hours  metroNIDAZOLE  IVPB          OTHER MEDS: MEDICATIONS  (STANDING):  acetaminophen   IVPB .. 750 every 6 hours PRN  enoxaparin Injectable 60 every 12 hours  HYDROmorphone  Injectable 0.5 every 4 hours PRN  influenza   Vaccine 0.5 once  ondansetron Injectable 4 every 6 hours PRN  pantoprazole  Injectable 40 two times a day  scopolamine 1 mG/72 Hr(s) Patch 1 every 72 hours      Vital Signs Last 24 Hrs  T(F): 98.1 (10-29-23 @ 04:00), Max: 101.2 (10-26-23 @ 19:58)    Vital Signs Last 24 Hrs  HR: 94 (10-29-23 @ 04:00) (85 - 109)  BP: 123/66 (10-29-23 @ 04:00) (109/59 - 136/53)  RR: 18 (10-29-23 @ 04:00)  SpO2: 100% (10-29-23 @ 04:00) (98% - 100%)  Wt(kg): --    EXAM:  Constitutional:  frail, comfortable  Head/Eyes: no icterus  ENT:  supple, no cervical lymphadenopathy   LUNGS:  CTA  CVS:  regular rhythm, no murmur  Abd:  soft, non-tender; non-distended +colostomy  Ext:  no edema   Vascular:  R PICC c/d/i  MSK:  joints without swelling  Neuro: AAO X 3, non- focal    Labs:                        7.3    6.28  )-----------( 167      ( 29 Oct 2023 05:45 )             22.0     10-29    134<L>  |  100  |  17  ----------------------------<  117<H>  4.2   |  20<L>  |  0.42<L>    Ca    8.3<L>      29 Oct 2023 05:45  Phos  2.9     10-29  Mg     2.10     10-29    TPro  6.2  /  Alb  2.5<L>  /  TBili  0.4  /  DBili  x   /  AST  58<H>  /  ALT  76<H>  /  AlkPhos  312<H>  10-29      WBC Trend:  WBC Count: 6.28 (10-29-23 @ 05:45)  WBC Count: 6.31 (10-28-23 @ 05:36)  WBC Count: 5.32 (10-27-23 @ 06:09)  WBC Count: 6.09 (10-26-23 @ 19:00)      Creatine Trend:  Creatinine: 0.42 (10-29)  Creatinine: 0.44 (10-28)  Creatinine: 0.40 (10-28)  Creatinine: 0.39 (10-27)      Liver Biochemical Testing Trend:  Alanine Aminotransferase (ALT/SGPT): 76 *H* (10-29)  Alanine Aminotransferase (ALT/SGPT): 90 *H* (10-28)  Alanine Aminotransferase (ALT/SGPT): 92 *H* (10-27)  Alanine Aminotransferase (ALT/SGPT): 91 *H* (10-27)  Alanine Aminotransferase (ALT/SGPT): 87 *H* (10-26)  Aspartate Aminotransferase (AST/SGOT): 58 (10-29-23 @ 05:45)  Aspartate Aminotransferase (AST/SGOT): 81 (10-28-23 @ 05:36)  Aspartate Aminotransferase (AST/SGOT): 73 (10-27-23 @ 09:18)  Aspartate Aminotransferase (AST/SGOT): 75 (10-27-23 @ 06:09)  Aspartate Aminotransferase (AST/SGOT): 38 (10-26-23 @ 06:00)  Bilirubin Total: 0.4 (10-29)  Bilirubin Total: 0.5 (10-28)  Bilirubin Direct: 0.2 (10-27)  Bilirubin Total: 0.5 (10-27)  Bilirubin Total: 0.5 (10-27)      Trend LDH  10-18-23 @ 10:09  280<H>      Urinalysis Basic - ( 29 Oct 2023 05:45 )    Color: x / Appearance: x / SG: x / pH: x  Gluc: 117 mg/dL / Ketone: x  / Bili: x / Urobili: x   Blood: x / Protein: x / Nitrite: x   Leuk Esterase: x / RBC: x / WBC x   Sq Epi: x / Non Sq Epi: x / Bacteria: x        MICROBIOLOGY:        Culture - Urine (collected 27 Oct 2023 09:12)  Source: Clean Catch Clean Catch (Midstream)  Preliminary Report:    >100,000 CFU/ml Gram Negative Rods    Culture - Blood (collected 26 Oct 2023 18:30)  Source: .Blood Blood-Venous  Preliminary Report:    No growth at 48 Hours    Culture - Blood (collected 26 Oct 2023 18:30)  Source: .Blood Blood-Venous  Preliminary Report:    No growth at 48 Hours        Rapid RVP Result: NotDetec (10-26 @ 18:47)            RADIOLOGY:  imaging below personally reviewed    Xray Chest 1 View- PORTABLE-Urgent:  (26 Oct 2023 20:31)              CT Abdomen and Pelvis w/ IV Cont:   ACC: [id number] EXAM:  CT ABDOMEN AND PELVIS IC   ORDERED BY: ABAD CAMERON     PROCEDURE DATE:  10/28/2023          INTERPRETATION:  CLINICAL INFORMATION: Fevers    COMPARISON: CT abdomen pelvis 10/20/2023    CONTRAST/COMPLICATIONS:  IV Contrast: Omnipaque 350  80 cc administered   20 cc discarded  Oral Contrast: NONE  Complications: None reported at time of study completion    PROCEDURE:  CT of the Abdomen and Pelvis was performed.  Sagittal and coronal reformats were performed.    FINDINGS:  LOWER CHEST: Large right pleural effusion, with associated atelectasis.   Small left pleural effusion. Right-sided PICC tip and left portal   terminate at the cavoatrial junction.    LIVER:  Decreased size of mixed attenuation subcapsular collection along the   inferior aspect of the right hepatic lobe, now measuring 8.7 x 7.8.   Similar wedge-shaped infarct superior to this collection.    Similar subcapsular hematoma along the left hepatic lobe measuring 7.6 x   3.7 cm.  The fluid collection adjacent to this left hepatic lobe collection is   decreased from prior measuring 5.6 x 1.2 cm.    BILE DUCTS: Normal caliber.  GALLBLADDER: Cholecystectomy.  SPLEEN: Within normal limits.  PANCREAS: Within normal limits.  ADRENALS: Within normal limits.  KIDNEYS/URETERS: No hydronephrosis. Bilateral renal cysts. Slight mass   effect of the right sided hematomas on a right interpolar region cyst   (3-79).    BLADDER: Air in the bladder. Correlate clinically for recent   instrumentation.  REPRODUCTIVE ORGANS: Hysterectomy.    BOWEL: Right lower quadrant ileostomy. Colorectal anastomosis. No bowel   obstruction. Appendectomy.  PERITONEUM: Small pelvic fluid. Hepatic and perihepatic collections as   above.  VESSELS: Within normal limits.  RETROPERITONEUM/LYMPH NODES: No lymphadenopathy.  ABDOMINAL WALL: Postsurgical changes. Similar supraumbilical hematoma.  BONES: Within normal limits.    IMPRESSION:  Increased size of right pleural effusion, now large.    Similar/decreased hepatic and perihepatichematomas. Similar right   hepatic lobe hypodensities, possibly representing infarct or small   collections.

## 2023-10-29 NOTE — PROGRESS NOTE ADULT - SUBJECTIVE AND OBJECTIVE BOX
General Surgery Daily Resident Progress Note    Subjective and interval  Seen and examined at bedside. No acute events overnight.  ___________________________________________________  Vital Signs  T(C): 36.8 (12:00), Max: 37.8 (20:00)  HR: 91 (12:00) (87 - 109)  BP: 107/55 (12:00) (107/55 - 123/66)  RR: 18 (12:00) (18 - 18)  SpO2: 98% (12:00) (98% - 100%)    In/Out    10-28 @ 07:01  -  10-29 @ 07:00  --------------------------------------------------------  IN: 118.8 mL    10-29 @ 07:01  -  10-29 @ 14:21  --------------------------------------------------------  IN: 0 mL    10-28 @ 07:01  -  10-29 @ 07:00  --------------------------------------------------------  OUT:    Ileostomy (mL): 300 mL    Urine (mL): 2100 mL  Total OUT: 300 mL    10-29 @ 07:01  -  10-29 @ 14:21  --------------------------------------------------------  OUT:    Ileostomy (mL): 100 mL    Urine (mL): 350 mL  Total OUT: 100 mL  ___________________________________________________  Physical Exam  General: NAD, resting comfortably in bed.   Cardiac: WWP.  Respiratory: Equal chest wall expansion bilaterally, nonlabored respirations.  Abdomen: Soft, nondistended. Mild TTP in bilateral upper quadrants. Ileostomy pink, viable-appearing, gas and stool in bag.   ___________________________________________________  Labs  CBC Basic (10-29 @ 05:45)  WBC: 6.28 Hgb: 7.3 Hct: 22.0 Plt: 167    Metabolic Panel (10-29 @ 05:45)  134  |  100  |  17  ----------------------------<  117  4.2   |  20  |  0.42  Ca: 8.3/Phos: 2.9/Magnesium: 2.10    LFTs (10-29 @ 05:45)  Total protein 6.2 | Albumin 2.5  TBili 0.4 | DBili x  AST 58 | ALT 76 | AlkPhos 312    Urinalysis (10-29 @ 05:45)  Physical: Color x, Appearance x, Mucous x, Gross blood x  Analytic: SG x, pH x  Cells: RBC x, WBC x  UTI markers: Bacteria x, Leukocyte esterase x, Nitrites x  Chemical: Glucose 117, Ketones x, Protein x, Urobilinogen x, Bilirubin x  ___________________________________________________  Microbiology  Culture - Blood (collected 10-26 @ 18:30)  Preliminary Report: No growth at 48 Hours    Culture - Blood (collected 10-26 @ 18:30)  Preliminary Report: No growth at 48 Hours    Culture - Urine (collected 10-27 @ 09:12)  Preliminary Report: >100,000 CFU/ml Gram Negative Rods    Culture - Blood (collected 10-28 @ 10:00)  Preliminary Report: No growth at 24 hours    Culture - Blood (collected 10-28 @ 10:10)  Preliminary Report: No growth at 24 hours  ___________________________________________________  Medications  Antimicrobial and Immunologic  ·	cefTRIAXone   IVPB 1000 milliGRAM(s) every 24 hours  ·	influenza   Vaccine 0.5 milliLiter(s) once  ·	metroNIDAZOLE  IVPB 500 milliGRAM(s) every 8 hours  ·	metroNIDAZOLE  IVPB       Neuro, Psych and Analgesia  ·	acetaminophen   IVPB .. 750 milliGRAM(s) every 6 hours PRN  ·	HYDROmorphone  Injectable 0.5 milliGRAM(s) every 3 hours PRN  ·	scopolamine 1 mG/72 Hr(s) Patch 1 Patch every 72 hours    Cardiovascular and Hematologic (includes DVT ppx/AC/Antiplatelet agents)  ·	enoxaparin Injectable 60 milliGRAM(s) every 12 hours    GI, Endocrine and Nutrition  ·	lipid, fat emulsion (Fish Oil and Plant Based) 20% Infusion 10.4 mL/Hr <Continuous>  ·	pantoprazole  Injectable 40 milliGRAM(s) two times a day  ·	Parenteral Nutrition - Adult 1 Each <Continuous>  ·	Parenteral Nutrition - Adult 1 Each <Continuous>

## 2023-10-29 NOTE — PROGRESS NOTE ADULT - ASSESSMENT
tachycardia  due to anemia and sepsis  stable    sepsis  uri  on abx  plan as per ID     HTN  stable     Monitor hemoglobin, transfuse as needed.

## 2023-10-29 NOTE — PROGRESS NOTE ADULT - ASSESSMENT
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    Reports no acute complaints  Denies any cough, runny nose, n/v, diarrhea or dysuria, urinary frequency  Abdominal pain is the same, improved from surgery  R PICC line without issues, not overly painful    ANTIBIOTIC:  Cipro/Flagyl (10/5 - 10/8) (10/27 ---> )    DIAGNOSIS and IMPRESSION:  #Fever  #Neoplasm of appendix s/p OR (10/4)  #L IJ Thrombus  #Subscapular Hepatic Hematoma   #PCN Allergy (Hives as a child, Mom was RN who avoided PCN)    - ?intrabdominal focus of infection ?reactive to hematoma   - BCx negative  - UCx with GNR, though patient remains asymptomatic, denies any dysuria or urinary symptoms    RECOMMENDATIONS:  - continue CTX 1G and Flagyl 500mg BID  - monitor temperature curve  - trend WBC  - obtain repeat CT AP with contrast preferably       Above recommendations are Preliminary until Attending's Addendum which includes Final Recommendations      Vince Zaman DO, PGY-5   ID fellow  [employer] Teams Preferred  After 5pm/weekends call [phone]

## 2023-10-29 NOTE — PROGRESS NOTE ADULT - ASSESSMENT
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 Found to have L IJ and innominate vein acute non-occlusive thrombosis, therapeutic lovenox held i/s/o expanding subcapsular liver hematomas found on CT 10/20/23. Course complicated by post-operative nausea now resolved, intermittent fevers and tachycardia, fever workup revealing for bacteriuria with cultures positive for >100,000 CFU GNR per hpf. Infectious diseases consulted for assistance with management, notes UTI less likely cause of systemic response in absence of urinary symptomatology. Per ID consult patient switched from cipro/metronidazole to ceftriaxone/flagyl. CT A/P 10/28 notable for large R pleural effusion, increased in size from previous imaging, decreased hepatic and perihepatic hematomas, similar R hepatic lobe hypodensities (unclear if infarct vs small collections).     - AC/DVT/Antiplatelet: LVX 60mg BID.   - Antimicrobials: Ceftriaxone 1000mg qd, metronidazole 500mg q8h.   - Diet: NPO/TPN.    - Pain: APAP ATC, hydromorphone 0.5mg q3h.   - Continue scopolamine and lorazepam for nausea control, current regimen appears to be working well.   - Hematology consulted for CARMELA (likely anemia of inflammation), had planned for iron infusion but being held at this time due to concern for systemic infection.   - Wound care: Continue to evaluate ostomy daily.   - PT/OT consult appreciated, PM&R consulted, approved for acute rehab. During course, patient continued inpatient PT. Now recommending home PT.  - Dispo: Pending clinical course.     Durga Worley, PGY-1  D team surgery ([id number])

## 2023-10-29 NOTE — PROGRESS NOTE ADULT - SUBJECTIVE AND OBJECTIVE BOX
Nutrition Support Progress Note  Patient is a 58y old  Female who presents with a chief complaint of Debulking surgery for suspected malignant neoplasm (18 Oct 2023 09:03)    INTERVAL EVENTS: Patient is POD#25 s/p appendectomy, cholecystectomy, hysterectomy, omentectomy, peritonectomy, LAR with diverting loop ileostomy, bladder injury with primary repair. Day #6 on TPN. CT performed yesterday shows large right pleural effusion and similar/decreased hepatic and perihepatic hematomas. Afebrile overnight. Urine culture + GNR, ID consulted, antibiotics changed to ceftriaxone, flagyl.    SUBJECTIVE: Patient seen and examined at bedside with surgical team, patient without complaints.     OBJECTIVE:    T(C): 36.9 (10-29-23 @ 08:15), Max: 37.8 (10-28-23 @ 20:00)  HR: 92 (10-29-23 @ 08:15) (85 - 109)  BP: 111/59 (10-29-23 @ 08:15) (109/59 - 136/53)  RR: 18 (10-29-23 @ 08:15) (18 - 18)  SpO2: 100% (10-29-23 @ 08:15) (98% - 100%)    CAPILLARY BLOOD GLUCOSE  POCT Blood Glucose.: 131 mg/dL (29 Oct 2023 05:50)  POCT Blood Glucose.: 157 mg/dL (29 Oct 2023 00:36)  POCT Blood Glucose.: 90 mg/dL (28 Oct 2023 17:04)  POCT Blood Glucose.: 118 mg/dL (28 Oct 2023 12:01)   N/A      10-28 @ 07:01  -  10-29 @ 07:00  --------------------------------------------------------  IN:    Fat Emulsion (Fish Oil &amp; Plant Based) 20% Infusion: 118.8 mL  Total IN: 118.8 mL    OUT:    Ileostomy (mL): 300 mL    Voided (mL): 2100 mL  Total OUT: 2400 mL    Total NET: -2281.2 mL      10-29 @ 07:01  -  10-29 @ 10:32  --------------------------------------------------------  IN:  Total IN: 0 mL    OUT:    Ileostomy (mL): 50 mL    TwoCal HN: 0 mL    Voided (mL): 0 mL  Total OUT: 50 mL    Total NET: -50 mL    MEDICATIONS  (STANDING):  cefTRIAXone   IVPB 1000 milliGRAM(s) IV Intermittent every 24 hours  chlorhexidine 2% Cloths 1 Application(s) Topical daily  enoxaparin Injectable 60 milliGRAM(s) SubCutaneous every 12 hours  influenza   Vaccine 0.5 milliLiter(s) IntraMuscular once  lipid, fat emulsion (Fish Oil and Plant Based) 20% Infusion 10.4 mL/Hr (10.4 mL/Hr) IV Continuous <Continuous>  metroNIDAZOLE  IVPB 500 milliGRAM(s) IV Intermittent every 8 hours  metroNIDAZOLE  IVPB      pantoprazole  Injectable 40 milliGRAM(s) IV Push two times a day  Parenteral Nutrition - Adult 1 Each TPN Continuous <Continuous>  Parenteral Nutrition - Adult 1 Each TPN Continuous <Continuous>  scopolamine 1 mG/72 Hr(s) Patch 1 Patch Transdermal every 72 hours    MEDICATIONS  (PRN):  acetaminophen   IVPB .. 750 milliGRAM(s) IV Intermittent every 6 hours PRN Temp greater or equal to 38C (100.4F), Mild Pain (1 - 3)  HYDROmorphone  Injectable 0.5 milliGRAM(s) IV Push every 4 hours PRN Moderate and Severe Pain  naloxone Injectable 0.1 milliGRAM(s) IV Push every 3 minutes PRN For ANY of the following changes in patient status:  A. RR LESS THAN 10 breaths per minute, B. Oxygen saturation LESS THAN 90%, C. Sedation score of 6  ondansetron Injectable 4 milliGRAM(s) IV Push every 6 hours PRN Nausea  sodium chloride 0.9% lock flush 10 milliLiter(s) IV Push every 1 hour PRN Pre/post blood products, medications, blood draw, and to maintain line patency        PHYSICAL EXAM:  Constitutional: A&Ox3, NAD  Respiratory: Unlabored breathing  Abdomen: Soft, nondistended, NTTP. No rebound or guarding. Incision C/D/I. Ostomy pink with gas and liquid stool.   Extremities: WWP, SARABIA spontaneously  Access: RUE PICC C/D/I no erythema or edema.    LABS:                                   7.3    6.28  )-----------( 167      ( 29 Oct 2023 05:45 )             22.0   10-29    134<L>  |  100  |  17  ----------------------------<  117<H>  4.2   |  20<L>  |  0.42<L>    Ca    8.3<L>      29 Oct 2023 05:45  Phos  2.9     10-29  Mg     2.10     10-29    TPro  6.2  /  Alb  2.5<L>  /  TBili  0.4  /  DBili  x   /  AST  58<H>  /  ALT  76<H>  /  AlkPhos  312<H>  10-29  Urinalysis Basic - ( 28 Oct 2023 09:32 )    Color: x / Appearance: x / SG: x / pH: x  Gluc: 113 mg/dL / Ketone: x  / Bili: x / Urobili: x   Blood: x / Protein: x / Nitrite: x   Leuk Esterase: x / RBC: x / WBC x   Sq Epi: x / Non Sq Epi: x / Bacteria: x    Culture - Blood (10.26.23 @ 18:30)   Specimen Source: .Blood Blood-Venous  Culture Results:   No growth at 24 hoursCulture - Blood (10.26.23 @ 18:30)   Specimen Source: .Blood Blood-Venous  Culture Results:   No growth at 24 hours    Culture - Urine (10.27.23 @ 09:12)   Specimen Source: Clean Catch Clean Catch (Midstream)  Culture Results:   >100,000 CFU/ml Gram Negative Rods           Nutrition Support Progress Note  Patient is a 58y old  Female who presents with a chief complaint of Debulking surgery for suspected malignant neoplasm (18 Oct 2023 09:03)    INTERVAL EVENTS: Patient is POD#25 s/p appendectomy, cholecystectomy, hysterectomy, omentectomy, peritonectomy, LAR with diverting loop ileostomy, bladder injury with primary repair. Day #6 on TPN. CT performed yesterday shows large right pleural effusion and similar/decreased hepatic and perihepatic hematomas. Afebrile overnight. Urine culture + GNR, ID consulted, antibiotics changed to ceftriaxone, flagyl.    SUBJECTIVE: Patient seen and examined at bedside with surgical team, patient without complaints. Denies abdominal pain. She reports intermittent nausea. Denies vomiting. Reports her ostomy is functioning. Denies dysuria, hematuria. Denies coughing, sneezing, congestion. Denies fever, chills, CP, SOB.    OBJECTIVE:    T(C): 36.9 (10-29-23 @ 08:15), Max: 37.8 (10-28-23 @ 20:00)  HR: 92 (10-29-23 @ 08:15) (85 - 109)  BP: 111/59 (10-29-23 @ 08:15) (109/59 - 136/53)  RR: 18 (10-29-23 @ 08:15) (18 - 18)  SpO2: 100% (10-29-23 @ 08:15) (98% - 100%)    CAPILLARY BLOOD GLUCOSE  POCT Blood Glucose.: 131 mg/dL (29 Oct 2023 05:50)  POCT Blood Glucose.: 157 mg/dL (29 Oct 2023 00:36)  POCT Blood Glucose.: 90 mg/dL (28 Oct 2023 17:04)  POCT Blood Glucose.: 118 mg/dL (28 Oct 2023 12:01)   N/A      10-28 @ 07:01  -  10-29 @ 07:00  --------------------------------------------------------  IN:    Fat Emulsion (Fish Oil &amp; Plant Based) 20% Infusion: 118.8 mL  Total IN: 118.8 mL    OUT:    Ileostomy (mL): 300 mL    Voided (mL): 2100 mL  Total OUT: 2400 mL    Total NET: -2281.2 mL      10-29 @ 07:01  -  10-29 @ 10:32  --------------------------------------------------------  IN:  Total IN: 0 mL    OUT:    Ileostomy (mL): 50 mL    TwoCal HN: 0 mL    Voided (mL): 0 mL  Total OUT: 50 mL    Total NET: -50 mL    MEDICATIONS  (STANDING):  cefTRIAXone   IVPB 1000 milliGRAM(s) IV Intermittent every 24 hours  chlorhexidine 2% Cloths 1 Application(s) Topical daily  enoxaparin Injectable 60 milliGRAM(s) SubCutaneous every 12 hours  influenza   Vaccine 0.5 milliLiter(s) IntraMuscular once  lipid, fat emulsion (Fish Oil and Plant Based) 20% Infusion 10.4 mL/Hr (10.4 mL/Hr) IV Continuous <Continuous>  metroNIDAZOLE  IVPB 500 milliGRAM(s) IV Intermittent every 8 hours  metroNIDAZOLE  IVPB      pantoprazole  Injectable 40 milliGRAM(s) IV Push two times a day  Parenteral Nutrition - Adult 1 Each TPN Continuous <Continuous>  Parenteral Nutrition - Adult 1 Each TPN Continuous <Continuous>  scopolamine 1 mG/72 Hr(s) Patch 1 Patch Transdermal every 72 hours    MEDICATIONS  (PRN):  acetaminophen   IVPB .. 750 milliGRAM(s) IV Intermittent every 6 hours PRN Temp greater or equal to 38C (100.4F), Mild Pain (1 - 3)  HYDROmorphone  Injectable 0.5 milliGRAM(s) IV Push every 4 hours PRN Moderate and Severe Pain  naloxone Injectable 0.1 milliGRAM(s) IV Push every 3 minutes PRN For ANY of the following changes in patient status:  A. RR LESS THAN 10 breaths per minute, B. Oxygen saturation LESS THAN 90%, C. Sedation score of 6  ondansetron Injectable 4 milliGRAM(s) IV Push every 6 hours PRN Nausea  sodium chloride 0.9% lock flush 10 milliLiter(s) IV Push every 1 hour PRN Pre/post blood products, medications, blood draw, and to maintain line patency        PHYSICAL EXAM:  Constitutional: A&Ox3, NAD  Respiratory: Unlabored breathing  Abdomen: Soft, nondistended, NTTP. No rebound or guarding. Incision C/D/I. Ostomy pink with gas and liquid stool.   Extremities: WWP, SARABIA spontaneously  Access: RUE PICC C/D/I no erythema or edema.    LABS:                                   7.3    6.28  )-----------( 167      ( 29 Oct 2023 05:45 )             22.0   10-29    134<L>  |  100  |  17  ----------------------------<  117<H>  4.2   |  20<L>  |  0.42<L>    Ca    8.3<L>      29 Oct 2023 05:45  Phos  2.9     10-29  Mg     2.10     10-29    TPro  6.2  /  Alb  2.5<L>  /  TBili  0.4  /  DBili  x   /  AST  58<H>  /  ALT  76<H>  /  AlkPhos  312<H>  10-29  Urinalysis Basic - ( 28 Oct 2023 09:32 )    Color: x / Appearance: x / SG: x / pH: x  Gluc: 113 mg/dL / Ketone: x  / Bili: x / Urobili: x   Blood: x / Protein: x / Nitrite: x   Leuk Esterase: x / RBC: x / WBC x   Sq Epi: x / Non Sq Epi: x / Bacteria: x    Culture - Blood (10.26.23 @ 18:30)   Specimen Source: .Blood Blood-Venous  Culture Results:   No growth at 24 hoursCulture - Blood (10.26.23 @ 18:30)   Specimen Source: .Blood Blood-Venous  Culture Results:   No growth at 24 hours    Culture - Urine (10.27.23 @ 09:12)   Specimen Source: Clean Catch Clean Catch (Midstream)  Culture Results:   >100,000 CFU/ml Gram Negative Rods

## 2023-10-29 NOTE — PROGRESS NOTE ADULT - SUBJECTIVE AND OBJECTIVE BOX
Subjective: Patient seen and examined. No new events except as noted.     SUBJECTIVE/ROS:  nad  had fever       MEDICATIONS:  MEDICATIONS  (STANDING):  cefTRIAXone   IVPB 1000 milliGRAM(s) IV Intermittent every 24 hours  chlorhexidine 2% Cloths 1 Application(s) Topical daily  enoxaparin Injectable 60 milliGRAM(s) SubCutaneous every 12 hours  influenza   Vaccine 0.5 milliLiter(s) IntraMuscular once  lipid, fat emulsion (Fish Oil and Plant Based) 20% Infusion 10.4 mL/Hr (10.4 mL/Hr) IV Continuous <Continuous>  metroNIDAZOLE  IVPB 500 milliGRAM(s) IV Intermittent every 8 hours  metroNIDAZOLE  IVPB      pantoprazole  Injectable 40 milliGRAM(s) IV Push two times a day  Parenteral Nutrition - Adult 1 Each TPN Continuous <Continuous>  scopolamine 1 mG/72 Hr(s) Patch 1 Patch Transdermal every 72 hours      PHYSICAL EXAM:  T(C): 36.7 (10-29-23 @ 04:00), Max: 37.8 (10-28-23 @ 20:00)  HR: 94 (10-29-23 @ 04:00) (85 - 109)  BP: 123/66 (10-29-23 @ 04:00) (109/59 - 136/53)  RR: 18 (10-29-23 @ 04:00) (18 - 18)  SpO2: 100% (10-29-23 @ 04:00) (98% - 100%)  Wt(kg): --  I&O's Summary    28 Oct 2023 07:01  -  29 Oct 2023 07:00  --------------------------------------------------------  IN: 0 mL / OUT: 1700 mL / NET: -1700 mL            JVP: Normal  Neck: supple  Lung: clear   CV: S1 S2 , Murmur:  Abd: soft  Ext: No edema  neuro: Awake / alert  Psych: flat affect  Skin: normal``    LABS/DATA:    CARDIAC MARKERS:                                7.3    6.28  )-----------( 167      ( 29 Oct 2023 05:45 )             22.0     10-28    132<L>  |  100  |  16  ----------------------------<  113<H>  4.1   |  23  |  0.44<L>    Ca    8.4      28 Oct 2023 09:32  Phos  3.1     10-28  Mg     2.10     10-28    TPro  6.4  /  Alb  2.5<L>  /  TBili  0.5  /  DBili  x   /  AST  81<H>  /  ALT  90<H>  /  AlkPhos  348<H>  10-28    proBNP:   Lipid Profile:   HgA1c:   TSH:     TELE:  EKG:

## 2023-10-30 ENCOUNTER — APPOINTMENT (OUTPATIENT)
Dept: INFUSION THERAPY | Facility: HOSPITAL | Age: 58
End: 2023-10-30

## 2023-10-30 LAB
-  AMIKACIN: SIGNIFICANT CHANGE UP
-  AMIKACIN: SIGNIFICANT CHANGE UP
-  AMOXICILLIN/CLAVULANIC ACID: SIGNIFICANT CHANGE UP
-  AMOXICILLIN/CLAVULANIC ACID: SIGNIFICANT CHANGE UP
-  AMPICILLIN/SULBACTAM: SIGNIFICANT CHANGE UP
-  AMPICILLIN/SULBACTAM: SIGNIFICANT CHANGE UP
-  AMPICILLIN: SIGNIFICANT CHANGE UP
-  AMPICILLIN: SIGNIFICANT CHANGE UP
-  AZTREONAM: SIGNIFICANT CHANGE UP
-  AZTREONAM: SIGNIFICANT CHANGE UP
-  CEFAZOLIN: SIGNIFICANT CHANGE UP
-  CEFAZOLIN: SIGNIFICANT CHANGE UP
-  CEFEPIME: SIGNIFICANT CHANGE UP
-  CEFEPIME: SIGNIFICANT CHANGE UP
-  CEFOXITIN: SIGNIFICANT CHANGE UP
-  CEFOXITIN: SIGNIFICANT CHANGE UP
-  CEFTRIAXONE: SIGNIFICANT CHANGE UP
-  CEFTRIAXONE: SIGNIFICANT CHANGE UP
-  CEFUROXIME: SIGNIFICANT CHANGE UP
-  CEFUROXIME: SIGNIFICANT CHANGE UP
-  CIPROFLOXACIN: SIGNIFICANT CHANGE UP
-  CIPROFLOXACIN: SIGNIFICANT CHANGE UP
-  ERTAPENEM: SIGNIFICANT CHANGE UP
-  ERTAPENEM: SIGNIFICANT CHANGE UP
-  GENTAMICIN: SIGNIFICANT CHANGE UP
-  GENTAMICIN: SIGNIFICANT CHANGE UP
-  IMIPENEM: SIGNIFICANT CHANGE UP
-  IMIPENEM: SIGNIFICANT CHANGE UP
-  LEVOFLOXACIN: SIGNIFICANT CHANGE UP
-  LEVOFLOXACIN: SIGNIFICANT CHANGE UP
-  MEROPENEM: SIGNIFICANT CHANGE UP
-  MEROPENEM: SIGNIFICANT CHANGE UP
-  NITROFURANTOIN: SIGNIFICANT CHANGE UP
-  NITROFURANTOIN: SIGNIFICANT CHANGE UP
-  PIPERACILLIN/TAZOBACTAM: SIGNIFICANT CHANGE UP
-  PIPERACILLIN/TAZOBACTAM: SIGNIFICANT CHANGE UP
-  TOBRAMYCIN: SIGNIFICANT CHANGE UP
-  TOBRAMYCIN: SIGNIFICANT CHANGE UP
-  TRIMETHOPRIM/SULFAMETHOXAZOLE: SIGNIFICANT CHANGE UP
-  TRIMETHOPRIM/SULFAMETHOXAZOLE: SIGNIFICANT CHANGE UP
ALBUMIN SERPL ELPH-MCNC: 2.5 G/DL — LOW (ref 3.3–5)
ALBUMIN SERPL ELPH-MCNC: 2.5 G/DL — LOW (ref 3.3–5)
ALP SERPL-CCNC: 266 U/L — HIGH (ref 40–120)
ALP SERPL-CCNC: 266 U/L — HIGH (ref 40–120)
ALT FLD-CCNC: 60 U/L — HIGH (ref 4–33)
ALT FLD-CCNC: 60 U/L — HIGH (ref 4–33)
ANION GAP SERPL CALC-SCNC: 11 MMOL/L — SIGNIFICANT CHANGE UP (ref 7–14)
ANION GAP SERPL CALC-SCNC: 11 MMOL/L — SIGNIFICANT CHANGE UP (ref 7–14)
AST SERPL-CCNC: 38 U/L — HIGH (ref 4–32)
AST SERPL-CCNC: 38 U/L — HIGH (ref 4–32)
BASOPHILS # BLD AUTO: 0 K/UL — SIGNIFICANT CHANGE UP (ref 0–0.2)
BASOPHILS # BLD AUTO: 0 K/UL — SIGNIFICANT CHANGE UP (ref 0–0.2)
BASOPHILS NFR BLD AUTO: 0 % — SIGNIFICANT CHANGE UP (ref 0–2)
BASOPHILS NFR BLD AUTO: 0 % — SIGNIFICANT CHANGE UP (ref 0–2)
BILIRUB SERPL-MCNC: 0.3 MG/DL — SIGNIFICANT CHANGE UP (ref 0.2–1.2)
BILIRUB SERPL-MCNC: 0.3 MG/DL — SIGNIFICANT CHANGE UP (ref 0.2–1.2)
BLD GP AB SCN SERPL QL: NEGATIVE — SIGNIFICANT CHANGE UP
BUN SERPL-MCNC: 15 MG/DL — SIGNIFICANT CHANGE UP (ref 7–23)
BUN SERPL-MCNC: 15 MG/DL — SIGNIFICANT CHANGE UP (ref 7–23)
CA-I BLD-SCNC: 1.1 MMOL/L — LOW (ref 1.15–1.29)
CA-I BLD-SCNC: 1.1 MMOL/L — LOW (ref 1.15–1.29)
CALCIUM SERPL-MCNC: 8.2 MG/DL — LOW (ref 8.4–10.5)
CALCIUM SERPL-MCNC: 8.2 MG/DL — LOW (ref 8.4–10.5)
CHLORIDE SERPL-SCNC: 103 MMOL/L — SIGNIFICANT CHANGE UP (ref 98–107)
CHLORIDE SERPL-SCNC: 103 MMOL/L — SIGNIFICANT CHANGE UP (ref 98–107)
CO2 SERPL-SCNC: 21 MMOL/L — LOW (ref 22–31)
CO2 SERPL-SCNC: 21 MMOL/L — LOW (ref 22–31)
CREAT SERPL-MCNC: 0.39 MG/DL — LOW (ref 0.5–1.3)
CREAT SERPL-MCNC: 0.39 MG/DL — LOW (ref 0.5–1.3)
CULTURE RESULTS: ABNORMAL
CULTURE RESULTS: ABNORMAL
EGFR: 115 ML/MIN/1.73M2 — SIGNIFICANT CHANGE UP
EGFR: 115 ML/MIN/1.73M2 — SIGNIFICANT CHANGE UP
EOSINOPHIL # BLD AUTO: 0.04 K/UL — SIGNIFICANT CHANGE UP (ref 0–0.5)
EOSINOPHIL # BLD AUTO: 0.04 K/UL — SIGNIFICANT CHANGE UP (ref 0–0.5)
EOSINOPHIL NFR BLD AUTO: 0.6 % — SIGNIFICANT CHANGE UP (ref 0–6)
EOSINOPHIL NFR BLD AUTO: 0.6 % — SIGNIFICANT CHANGE UP (ref 0–6)
GLUCOSE BLDC GLUCOMTR-MCNC: 105 MG/DL — HIGH (ref 70–99)
GLUCOSE BLDC GLUCOMTR-MCNC: 105 MG/DL — HIGH (ref 70–99)
GLUCOSE BLDC GLUCOMTR-MCNC: 118 MG/DL — HIGH (ref 70–99)
GLUCOSE BLDC GLUCOMTR-MCNC: 118 MG/DL — HIGH (ref 70–99)
GLUCOSE BLDC GLUCOMTR-MCNC: 119 MG/DL — HIGH (ref 70–99)
GLUCOSE BLDC GLUCOMTR-MCNC: 119 MG/DL — HIGH (ref 70–99)
GLUCOSE BLDC GLUCOMTR-MCNC: 154 MG/DL — HIGH (ref 70–99)
GLUCOSE BLDC GLUCOMTR-MCNC: 154 MG/DL — HIGH (ref 70–99)
GLUCOSE SERPL-MCNC: 131 MG/DL — HIGH (ref 70–99)
GLUCOSE SERPL-MCNC: 131 MG/DL — HIGH (ref 70–99)
HCT VFR BLD CALC: 21.3 % — LOW (ref 34.5–45)
HGB BLD-MCNC: 7 G/DL — CRITICAL LOW (ref 11.5–15.5)
HGB BLD-MCNC: 7 G/DL — CRITICAL LOW (ref 11.5–15.5)
HGB BLD-MCNC: 7.1 G/DL — LOW (ref 11.5–15.5)
HGB BLD-MCNC: 7.1 G/DL — LOW (ref 11.5–15.5)
IANC: 4.98 K/UL — SIGNIFICANT CHANGE UP (ref 1.8–7.4)
IANC: 4.98 K/UL — SIGNIFICANT CHANGE UP (ref 1.8–7.4)
IMM GRANULOCYTES NFR BLD AUTO: 0.3 % — SIGNIFICANT CHANGE UP (ref 0–0.9)
IMM GRANULOCYTES NFR BLD AUTO: 0.3 % — SIGNIFICANT CHANGE UP (ref 0–0.9)
LYMPHOCYTES # BLD AUTO: 0.98 K/UL — LOW (ref 1–3.3)
LYMPHOCYTES # BLD AUTO: 0.98 K/UL — LOW (ref 1–3.3)
LYMPHOCYTES # BLD AUTO: 15.8 % — SIGNIFICANT CHANGE UP (ref 13–44)
LYMPHOCYTES # BLD AUTO: 15.8 % — SIGNIFICANT CHANGE UP (ref 13–44)
MAGNESIUM SERPL-MCNC: 2 MG/DL — SIGNIFICANT CHANGE UP (ref 1.6–2.6)
MAGNESIUM SERPL-MCNC: 2 MG/DL — SIGNIFICANT CHANGE UP (ref 1.6–2.6)
MCHC RBC-ENTMCNC: 28.6 PG — SIGNIFICANT CHANGE UP (ref 27–34)
MCHC RBC-ENTMCNC: 28.6 PG — SIGNIFICANT CHANGE UP (ref 27–34)
MCHC RBC-ENTMCNC: 28.7 PG — SIGNIFICANT CHANGE UP (ref 27–34)
MCHC RBC-ENTMCNC: 28.7 PG — SIGNIFICANT CHANGE UP (ref 27–34)
MCHC RBC-ENTMCNC: 32.9 GM/DL — SIGNIFICANT CHANGE UP (ref 32–36)
MCHC RBC-ENTMCNC: 32.9 GM/DL — SIGNIFICANT CHANGE UP (ref 32–36)
MCHC RBC-ENTMCNC: 33.3 GM/DL — SIGNIFICANT CHANGE UP (ref 32–36)
MCHC RBC-ENTMCNC: 33.3 GM/DL — SIGNIFICANT CHANGE UP (ref 32–36)
MCV RBC AUTO: 86.2 FL — SIGNIFICANT CHANGE UP (ref 80–100)
MCV RBC AUTO: 86.2 FL — SIGNIFICANT CHANGE UP (ref 80–100)
MCV RBC AUTO: 86.9 FL — SIGNIFICANT CHANGE UP (ref 80–100)
MCV RBC AUTO: 86.9 FL — SIGNIFICANT CHANGE UP (ref 80–100)
METHOD TYPE: SIGNIFICANT CHANGE UP
METHOD TYPE: SIGNIFICANT CHANGE UP
MONOCYTES # BLD AUTO: 0.2 K/UL — SIGNIFICANT CHANGE UP (ref 0–0.9)
MONOCYTES # BLD AUTO: 0.2 K/UL — SIGNIFICANT CHANGE UP (ref 0–0.9)
MONOCYTES NFR BLD AUTO: 3.2 % — SIGNIFICANT CHANGE UP (ref 2–14)
MONOCYTES NFR BLD AUTO: 3.2 % — SIGNIFICANT CHANGE UP (ref 2–14)
NEUTROPHILS # BLD AUTO: 4.98 K/UL — SIGNIFICANT CHANGE UP (ref 1.8–7.4)
NEUTROPHILS # BLD AUTO: 4.98 K/UL — SIGNIFICANT CHANGE UP (ref 1.8–7.4)
NEUTROPHILS NFR BLD AUTO: 80.1 % — HIGH (ref 43–77)
NEUTROPHILS NFR BLD AUTO: 80.1 % — HIGH (ref 43–77)
NRBC # BLD: 0 /100 WBCS — SIGNIFICANT CHANGE UP (ref 0–0)
NRBC # FLD: 0 K/UL — SIGNIFICANT CHANGE UP (ref 0–0)
ORGANISM # SPEC MICROSCOPIC CNT: ABNORMAL
PHOSPHATE SERPL-MCNC: 2.7 MG/DL — SIGNIFICANT CHANGE UP (ref 2.5–4.5)
PHOSPHATE SERPL-MCNC: 2.7 MG/DL — SIGNIFICANT CHANGE UP (ref 2.5–4.5)
PLATELET # BLD AUTO: 167 K/UL — SIGNIFICANT CHANGE UP (ref 150–400)
PLATELET # BLD AUTO: 167 K/UL — SIGNIFICANT CHANGE UP (ref 150–400)
PLATELET # BLD AUTO: 171 K/UL — SIGNIFICANT CHANGE UP (ref 150–400)
PLATELET # BLD AUTO: 171 K/UL — SIGNIFICANT CHANGE UP (ref 150–400)
POTASSIUM SERPL-MCNC: 4 MMOL/L — SIGNIFICANT CHANGE UP (ref 3.5–5.3)
POTASSIUM SERPL-MCNC: 4 MMOL/L — SIGNIFICANT CHANGE UP (ref 3.5–5.3)
POTASSIUM SERPL-SCNC: 4 MMOL/L — SIGNIFICANT CHANGE UP (ref 3.5–5.3)
POTASSIUM SERPL-SCNC: 4 MMOL/L — SIGNIFICANT CHANGE UP (ref 3.5–5.3)
PROT SERPL-MCNC: 6.3 G/DL — SIGNIFICANT CHANGE UP (ref 6–8.3)
PROT SERPL-MCNC: 6.3 G/DL — SIGNIFICANT CHANGE UP (ref 6–8.3)
RBC # BLD: 2.45 M/UL — LOW (ref 3.8–5.2)
RBC # BLD: 2.45 M/UL — LOW (ref 3.8–5.2)
RBC # BLD: 2.47 M/UL — LOW (ref 3.8–5.2)
RBC # BLD: 2.47 M/UL — LOW (ref 3.8–5.2)
RBC # FLD: 18.8 % — HIGH (ref 10.3–14.5)
RBC # FLD: 18.8 % — HIGH (ref 10.3–14.5)
RBC # FLD: 19.2 % — HIGH (ref 10.3–14.5)
RBC # FLD: 19.2 % — HIGH (ref 10.3–14.5)
RH IG SCN BLD-IMP: POSITIVE — SIGNIFICANT CHANGE UP
SODIUM SERPL-SCNC: 135 MMOL/L — SIGNIFICANT CHANGE UP (ref 135–145)
SODIUM SERPL-SCNC: 135 MMOL/L — SIGNIFICANT CHANGE UP (ref 135–145)
SPECIMEN SOURCE: SIGNIFICANT CHANGE UP
SPECIMEN SOURCE: SIGNIFICANT CHANGE UP
WBC # BLD: 6.22 K/UL — SIGNIFICANT CHANGE UP (ref 3.8–10.5)
WBC # BLD: 6.22 K/UL — SIGNIFICANT CHANGE UP (ref 3.8–10.5)
WBC # BLD: 6.38 K/UL — SIGNIFICANT CHANGE UP (ref 3.8–10.5)
WBC # BLD: 6.38 K/UL — SIGNIFICANT CHANGE UP (ref 3.8–10.5)
WBC # FLD AUTO: 6.22 K/UL — SIGNIFICANT CHANGE UP (ref 3.8–10.5)
WBC # FLD AUTO: 6.22 K/UL — SIGNIFICANT CHANGE UP (ref 3.8–10.5)
WBC # FLD AUTO: 6.38 K/UL — SIGNIFICANT CHANGE UP (ref 3.8–10.5)
WBC # FLD AUTO: 6.38 K/UL — SIGNIFICANT CHANGE UP (ref 3.8–10.5)

## 2023-10-30 PROCEDURE — 99232 SBSQ HOSP IP/OBS MODERATE 35: CPT

## 2023-10-30 RX ORDER — APIXABAN 2.5 MG/1
5 TABLET, FILM COATED ORAL EVERY 12 HOURS
Refills: 0 | Status: DISCONTINUED | OUTPATIENT
Start: 2023-10-30 | End: 2023-11-02

## 2023-10-30 RX ORDER — ONDANSETRON 8 MG/1
8 TABLET, FILM COATED ORAL EVERY 8 HOURS
Refills: 0 | Status: DISCONTINUED | OUTPATIENT
Start: 2023-10-30 | End: 2023-11-08

## 2023-10-30 RX ORDER — ACETAMINOPHEN 500 MG
650 TABLET ORAL EVERY 6 HOURS
Refills: 0 | Status: DISCONTINUED | OUTPATIENT
Start: 2023-10-30 | End: 2023-11-08

## 2023-10-30 RX ORDER — OXYCODONE HYDROCHLORIDE 5 MG/1
7.5 TABLET ORAL
Refills: 0 | Status: DISCONTINUED | OUTPATIENT
Start: 2023-10-30 | End: 2023-10-30

## 2023-10-30 RX ORDER — IRON SUCROSE 20 MG/ML
200 INJECTION, SOLUTION INTRAVENOUS EVERY 24 HOURS
Refills: 0 | Status: COMPLETED | OUTPATIENT
Start: 2023-10-30 | End: 2023-11-03

## 2023-10-30 RX ORDER — HYDROMORPHONE HYDROCHLORIDE 2 MG/ML
1 INJECTION INTRAMUSCULAR; INTRAVENOUS; SUBCUTANEOUS EVERY 4 HOURS
Refills: 0 | Status: DISCONTINUED | OUTPATIENT
Start: 2023-10-30 | End: 2023-11-03

## 2023-10-30 RX ORDER — OXYCODONE HYDROCHLORIDE 5 MG/1
5 TABLET ORAL
Refills: 0 | Status: DISCONTINUED | OUTPATIENT
Start: 2023-10-30 | End: 2023-10-30

## 2023-10-30 RX ORDER — ACETAMINOPHEN 500 MG
1000 TABLET ORAL EVERY 6 HOURS
Refills: 0 | Status: COMPLETED | OUTPATIENT
Start: 2023-10-30 | End: 2023-10-31

## 2023-10-30 RX ORDER — PANTOPRAZOLE SODIUM 20 MG/1
40 TABLET, DELAYED RELEASE ORAL
Refills: 0 | Status: DISCONTINUED | OUTPATIENT
Start: 2023-10-30 | End: 2023-11-08

## 2023-10-30 RX ORDER — I.V. FAT EMULSION 20 G/100ML
10.4 EMULSION INTRAVENOUS
Qty: 25 | Refills: 0 | Status: DISCONTINUED | OUTPATIENT
Start: 2023-10-30 | End: 2023-10-31

## 2023-10-30 RX ORDER — ELECTROLYTE SOLUTION,INJ
1 VIAL (ML) INTRAVENOUS
Refills: 0 | Status: DISCONTINUED | OUTPATIENT
Start: 2023-10-30 | End: 2023-10-30

## 2023-10-30 RX ADMIN — IRON SUCROSE 110 MILLIGRAM(S): 20 INJECTION, SOLUTION INTRAVENOUS at 17:35

## 2023-10-30 RX ADMIN — HYDROMORPHONE HYDROCHLORIDE 0.5 MILLIGRAM(S): 2 INJECTION INTRAMUSCULAR; INTRAVENOUS; SUBCUTANEOUS at 06:29

## 2023-10-30 RX ADMIN — Medication 400 MILLIGRAM(S): at 22:10

## 2023-10-30 RX ADMIN — Medication 100 MILLIGRAM(S): at 05:19

## 2023-10-30 RX ADMIN — HYDROMORPHONE HYDROCHLORIDE 1 MILLIGRAM(S): 2 INJECTION INTRAMUSCULAR; INTRAVENOUS; SUBCUTANEOUS at 20:07

## 2023-10-30 RX ADMIN — HYDROMORPHONE HYDROCHLORIDE 1 MILLIGRAM(S): 2 INJECTION INTRAMUSCULAR; INTRAVENOUS; SUBCUTANEOUS at 16:09

## 2023-10-30 RX ADMIN — HYDROMORPHONE HYDROCHLORIDE 0.5 MILLIGRAM(S): 2 INJECTION INTRAMUSCULAR; INTRAVENOUS; SUBCUTANEOUS at 06:58

## 2023-10-30 RX ADMIN — CEFTRIAXONE 100 MILLIGRAM(S): 500 INJECTION, POWDER, FOR SOLUTION INTRAMUSCULAR; INTRAVENOUS at 02:27

## 2023-10-30 RX ADMIN — HYDROMORPHONE HYDROCHLORIDE 0.5 MILLIGRAM(S): 2 INJECTION INTRAMUSCULAR; INTRAVENOUS; SUBCUTANEOUS at 03:23

## 2023-10-30 RX ADMIN — CHLORHEXIDINE GLUCONATE 1 APPLICATION(S): 213 SOLUTION TOPICAL at 13:21

## 2023-10-30 RX ADMIN — Medication 1 EACH: at 18:26

## 2023-10-30 RX ADMIN — HYDROMORPHONE HYDROCHLORIDE 1 MILLIGRAM(S): 2 INJECTION INTRAMUSCULAR; INTRAVENOUS; SUBCUTANEOUS at 15:32

## 2023-10-30 RX ADMIN — PANTOPRAZOLE SODIUM 40 MILLIGRAM(S): 20 TABLET, DELAYED RELEASE ORAL at 05:21

## 2023-10-30 RX ADMIN — APIXABAN 5 MILLIGRAM(S): 2.5 TABLET, FILM COATED ORAL at 18:29

## 2023-10-30 RX ADMIN — Medication 1000 MILLIGRAM(S): at 22:50

## 2023-10-30 RX ADMIN — HYDROMORPHONE HYDROCHLORIDE 1 MILLIGRAM(S): 2 INJECTION INTRAMUSCULAR; INTRAVENOUS; SUBCUTANEOUS at 11:36

## 2023-10-30 RX ADMIN — HYDROMORPHONE HYDROCHLORIDE 1 MILLIGRAM(S): 2 INJECTION INTRAMUSCULAR; INTRAVENOUS; SUBCUTANEOUS at 20:37

## 2023-10-30 RX ADMIN — HYDROMORPHONE HYDROCHLORIDE 0.5 MILLIGRAM(S): 2 INJECTION INTRAMUSCULAR; INTRAVENOUS; SUBCUTANEOUS at 00:18

## 2023-10-30 RX ADMIN — HYDROMORPHONE HYDROCHLORIDE 0.5 MILLIGRAM(S): 2 INJECTION INTRAMUSCULAR; INTRAVENOUS; SUBCUTANEOUS at 04:11

## 2023-10-30 RX ADMIN — Medication 100 MILLIGRAM(S): at 13:23

## 2023-10-30 RX ADMIN — HYDROMORPHONE HYDROCHLORIDE 0.5 MILLIGRAM(S): 2 INJECTION INTRAMUSCULAR; INTRAVENOUS; SUBCUTANEOUS at 00:55

## 2023-10-30 RX ADMIN — HYDROMORPHONE HYDROCHLORIDE 1 MILLIGRAM(S): 2 INJECTION INTRAMUSCULAR; INTRAVENOUS; SUBCUTANEOUS at 12:09

## 2023-10-30 RX ADMIN — I.V. FAT EMULSION 10.4 ML/HR: 20 EMULSION INTRAVENOUS at 18:19

## 2023-10-30 RX ADMIN — ENOXAPARIN SODIUM 60 MILLIGRAM(S): 100 INJECTION SUBCUTANEOUS at 05:22

## 2023-10-30 NOTE — PROGRESS NOTE ADULT - SUBJECTIVE AND OBJECTIVE BOX
Subjective: Patient seen and examined. No new events except as noted.     SUBJECTIVE/ROS:  MEDICATIONS:  MEDICATIONS  (STANDING):  cefTRIAXone   IVPB 1000 milliGRAM(s) IV Intermittent every 24 hours  chlorhexidine 2% Cloths 1 Application(s) Topical daily  enoxaparin Injectable 60 milliGRAM(s) SubCutaneous every 12 hours  influenza   Vaccine 0.5 milliLiter(s) IntraMuscular once  lipid, fat emulsion (Fish Oil and Plant Based) 20% Infusion 10.4 mL/Hr (10.4 mL/Hr) IV Continuous <Continuous>  metroNIDAZOLE  IVPB      metroNIDAZOLE  IVPB 500 milliGRAM(s) IV Intermittent every 8 hours  pantoprazole  Injectable 40 milliGRAM(s) IV Push two times a day  Parenteral Nutrition - Adult 1 Each TPN Continuous <Continuous>  Parenteral Nutrition - Adult 1 Each TPN Continuous <Continuous>  scopolamine 1 mG/72 Hr(s) Patch 1 Patch Transdermal every 72 hours      PHYSICAL EXAM:  T(C): 37.2 (10-30-23 @ 04:29), Max: 37.6 (10-29-23 @ 16:00)  HR: 105 (10-30-23 @ 04:29) (91 - 110)  BP: 126/62 (10-30-23 @ 04:29) (107/55 - 126/62)  RR: 18 (10-30-23 @ 04:29) (18 - 18)  SpO2: 95% (10-30-23 @ 04:29) (95% - 100%)  Wt(kg): --  I&O's Summary    29 Oct 2023 07:01  -  30 Oct 2023 07:00  --------------------------------------------------------  IN: 83.2 mL / OUT: 2100 mL / NET: -2016.8 mL            JVP: Normal  Neck: supple  Lung: clear   CV: S1 S2 , Murmur:  Abd: soft  Ext: No edema  neuro: Awake / alert  Psych: flat affect  Skin: normal``    LABS/DATA:    CARDIAC MARKERS:                                7.0    6.22  )-----------( 171      ( 30 Oct 2023 05:44 )             21.3     10-30    135  |  103  |  15  ----------------------------<  131<H>  4.0   |  21<L>  |  0.39<L>    Ca    8.2<L>      30 Oct 2023 05:44  Phos  2.7     10-30  Mg     2.00     10-30    TPro  6.3  /  Alb  2.5<L>  /  TBili  0.3  /  DBili  x   /  AST  38<H>  /  ALT  60<H>  /  AlkPhos  266<H>  10-30    proBNP:   Lipid Profile:   HgA1c:   TSH:     TELE:  EKG:

## 2023-10-30 NOTE — PROGRESS NOTE ADULT - SUBJECTIVE AND OBJECTIVE BOX
Follow Up:      Inverval History/ROS:Patient is a 58y old  Female who presents with a chief complaint of Debulking surgery for suspected malignant neoplasm (18 Oct 2023 09:03)    No fever  No events    Allergies    penicillin (Rash)    Intolerances        ANTIMICROBIALS:  cefTRIAXone   IVPB 1000 every 24 hours  metroNIDAZOLE  IVPB 500 every 8 hours  metroNIDAZOLE  IVPB        OTHER MEDS:  acetaminophen     Tablet .. 650 milliGRAM(s) Oral every 6 hours PRN  apixaban 5 milliGRAM(s) Oral every 12 hours  chlorhexidine 2% Cloths 1 Application(s) Topical daily  HYDROmorphone   Tablet 1 milliGRAM(s) Oral every 4 hours PRN  influenza   Vaccine 0.5 milliLiter(s) IntraMuscular once  iron sucrose IVPB 200 milliGRAM(s) IV Intermittent every 24 hours  lipid, fat emulsion (Fish Oil and Plant Based) 20% Infusion 10.4 mL/Hr IV Continuous <Continuous>  naloxone Injectable 0.1 milliGRAM(s) IV Push every 3 minutes PRN  ondansetron   Disintegrating Tablet 8 milliGRAM(s) Oral every 8 hours PRN  pantoprazole    Tablet 40 milliGRAM(s) Oral before breakfast  Parenteral Nutrition - Adult 1 Each TPN Continuous <Continuous>  scopolamine 1 mG/72 Hr(s) Patch 1 Patch Transdermal every 72 hours  sodium chloride 0.9% lock flush 10 milliLiter(s) IV Push every 1 hour PRN      Vital Signs Last 24 Hrs  T(C): 37.4 (30 Oct 2023 11:26), Max: 37.6 (29 Oct 2023 16:00)  T(F): 99.3 (30 Oct 2023 11:26), Max: 99.6 (29 Oct 2023 16:00)  HR: 103 (30 Oct 2023 11:26) (93 - 110)  BP: 135/68 (30 Oct 2023 11:26) (110/62 - 135/68)  BP(mean): --  RR: 17 (30 Oct 2023 11:26) (17 - 18)  SpO2: 99% (30 Oct 2023 11:26) (95% - 100%)    Parameters below as of 30 Oct 2023 11:26  Patient On (Oxygen Delivery Method): room air        PHYSICAL EXAM:  General: [x ] non-toxic  HEAD/EYES: [ ] PERRL [x ] white sclera [ ] icterus  ENT:  [ ] normal x[ ] supple [ ] thrush [ ] pharyngeal exudate  Cardiovascular:   [ ] murmur [ x] normal [ ] PPM/AICD  Respiratory:  [x ] clear to ausculation bilaterally  GI:  [x ] soft, non-tender, normal bowel sounds  :  [ ] cristina [ ] no CVA tenderness   Musculoskeletal:  [ ] no synovitis  Neurologic:  [ ] non-focal exam   Skin:  [ x] no rash  Lymph: [ x] no lymphadenopathy  Psychiatric:  [ ] appropriate affect [ ] alert & oriented  Lines:  [x ] no phlebitis [ ] central line                                7.1    6.38  )-----------( 167      ( 30 Oct 2023 08:36 )             21.3       10-30    135  |  103  |  15  ----------------------------<  131<H>  4.0   |  21<L>  |  0.39<L>    Ca    8.2<L>      30 Oct 2023 05:44  Phos  2.7     10-30  Mg     2.00     10-30    TPro  6.3  /  Alb  2.5<L>  /  TBili  0.3  /  DBili  x   /  AST  38<H>  /  ALT  60<H>  /  AlkPhos  266<H>  10-30      Urinalysis Basic - ( 30 Oct 2023 05:44 )    Color: x / Appearance: x / SG: x / pH: x  Gluc: 131 mg/dL / Ketone: x  / Bili: x / Urobili: x   Blood: x / Protein: x / Nitrite: x   Leuk Esterase: x / RBC: x / WBC x   Sq Epi: x / Non Sq Epi: x / Bacteria: x        MICROBIOLOGY:Culture Results:   No growth at 24 hours (10-28-23 @ 10:10)  Culture Results:   No growth at 24 hours (10-28-23 @ 10:00)  Culture Results:   >100,000 CFU/ml Klebsiella pneumoniae (10-27-23 @ 09:12)  Culture Results:   No growth at 72 Hours (10-26-23 @ 18:30)  Culture Results:   No growth at 72 Hours (10-26-23 @ 18:30)      RADIOLOGY:    < from: CT Abdomen and Pelvis w/ IV Cont (10.28.23 @ 12:54) >  IMPRESSION:  Increased size of right pleural effusion, now large.    Similar/decreased hepatic and perihepatichematomas. Similar right   hepatic lobe hypodensities, possibly representing infarct or small   collections.    < end of copied text >

## 2023-10-30 NOTE — PROGRESS NOTE ADULT - ASSESSMENT
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 Found to have L IJ and innominate vein acute non-occlusive thrombosis, therapeutic lovenox held i/s/o expanding subcapsular liver hematomas found on CT 10/20/23. Course complicated by post-operative nausea now resolved,     - Intermittent fevers and tachycardia, fever workup revealing for bacteriuria with cultures positive for >100,000 CFU GNR per hpf.   ·	ID consulted, notes UTI less likely cause of systemic response in absence of urinary symptomatology.   ·	CT A/P 10/28 notable for large R pleural effusion, increased in size from previous imaging, decreased hepatic and perihepatic hematomas, similar R hepatic lobe hypodensities (unclear if infarct vs small collections).   ·	ABx: Ceftriaxone 1000mg qd, metronidazole 500mg q8h.   - Diet: NPO/TPN.    - Pain: APAP ATC, hydromorphone 0.5mg q3h.   - Continue scopolamine and lorazepam for nausea control, current regimen appears to be working well.   - Hematology consulted for CARMELA (likely anemia of inflammation), had planned for iron infusion but being held at this time due to concern for systemic infection.   - Wound care: Continue to evaluate ostomy daily.   - AC: lue dvt LVX 60mg BID.   - PT/OT consult appreciated, home with home PT.  - Dispo: Pending

## 2023-10-30 NOTE — PROGRESS NOTE ADULT - ASSESSMENT
tachycardia  due to anemia and sepsis    sepsis  uri  on abx  plan as per ID     HTN  stable     Monitor hemoglobin, transfuse as needed.

## 2023-10-30 NOTE — PROGRESS NOTE ADULT - ASSESSMENT
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     Possible UTI-    Can stop flagyl    Plan 10 d antibiotic course  Continue Ceftriaxone while admitted  Can change to cipro if stable for dc    Follow up imaging of liver with her pmd/ oncologist

## 2023-10-30 NOTE — PROGRESS NOTE ADULT - SUBJECTIVE AND OBJECTIVE BOX
TEAM [ D ] Surgery Daily Progress Note  =====================================================    SUBJECTIVE: Patient seen and examined at bedside on AM rounds. Patient reports that they're feeling well, yesterday she states her pain is worse but today she said her pain is improving. NPO diet, denies nausea, vomiting. +/+ ostomy function. OOB/Amublating as tolerated. Denies fever, chills.    ALLERGIES:  penicillin (Rash)      --------------------------------------------------------------------------------------    MEDICATIONS:    Neurologic Medications  acetaminophen   IVPB .. 750 milliGRAM(s) IV Intermittent every 6 hours PRN Temp greater or equal to 38C (100.4F), Mild Pain (1 - 3)  HYDROmorphone  Injectable 0.5 milliGRAM(s) IV Push every 3 hours PRN Moderate and Severe Pain  ondansetron Injectable 4 milliGRAM(s) IV Push every 6 hours PRN Nausea  scopolamine 1 mG/72 Hr(s) Patch 1 Patch Transdermal every 72 hours    Respiratory Medications    Cardiovascular Medications    Gastrointestinal Medications  pantoprazole  Injectable 40 milliGRAM(s) IV Push two times a day  Parenteral Nutrition - Adult 1 Each TPN Continuous <Continuous>  sodium chloride 0.9% lock flush 10 milliLiter(s) IV Push every 1 hour PRN Pre/post blood products, medications, blood draw, and to maintain line patency    Genitourinary Medications    Hematologic/Oncologic Medications  enoxaparin Injectable 60 milliGRAM(s) SubCutaneous every 12 hours  influenza   Vaccine 0.5 milliLiter(s) IntraMuscular once    Antimicrobial/Immunologic Medications  cefTRIAXone   IVPB 1000 milliGRAM(s) IV Intermittent every 24 hours  metroNIDAZOLE  IVPB 500 milliGRAM(s) IV Intermittent every 8 hours  metroNIDAZOLE  IVPB        Endocrine/Metabolic Medications    Topical/Other Medications  chlorhexidine 2% Cloths 1 Application(s) Topical daily  naloxone Injectable 0.1 milliGRAM(s) IV Push every 3 minutes PRN For ANY of the following changes in patient status:  A. RR LESS THAN 10 breaths per minute, B. Oxygen saturation LESS THAN 90%, C. Sedation score of 6    --------------------------------------------------------------------------------------    VITAL SIGNS:  T(C): 37.2 (10-30-23 @ 04:29), Max: 37.6 (10-29-23 @ 16:00)  HR: 105 (10-30-23 @ 04:29) (91 - 110)  BP: 126/62 (10-30-23 @ 04:29) (107/55 - 126/62)  RR: 18 (10-30-23 @ 04:29) (18 - 18)  SpO2: 95% (10-30-23 @ 04:29) (95% - 100%)  --------------------------------------------------------------------------------------    EXAM    General: NAD, resting in bed comfortably.  Cardiac: regular rate, warm and well perfused  Respiratory: Nonlabored respirations, normal cw expansion.  Abdomen: soft, nontender, nondistended. +/+ostomy,   Extremities: normal strength, FROM, no deformities    --------------------------------------------------------------------------------------    LABS                        7.0    6.22  )-----------( 171      ( 30 Oct 2023 05:44 )             21.3       10-30    135  |  103  |  15  ----------------------------<  131<H>  4.0   |  21<L>  |  0.39<L>    Ca    8.2<L>      30 Oct 2023 05:44  Phos  2.7     10-30  Mg     2.00     10-30    TPro  6.3  /  Alb  2.5<L>  /  TBili  0.3  /  DBili  x   /  AST  38<H>  /  ALT  60<H>  /  AlkPhos  266<H>  10-30      --------------------------------------------------------------------------------------    INS AND OUTS:    10-29-23 @ 07:01  -  10-30-23 @ 07:00  --------------------------------------------------------  IN: 83.2 mL / OUT: 2100 mL / NET: -2016.8 mL      --------------------------------------------------------------------------------------

## 2023-10-30 NOTE — PROGRESS NOTE ADULT - SUBJECTIVE AND OBJECTIVE BOX
Nutrition Support Progress Note  Patient is a 58y old  Female who presents with a chief complaint of Debulking surgery for suspected malignant neoplasm (18 Oct 2023 09:03)    INTERVAL EVENTS: Patient is POD#26 s/p appendectomy, cholecystectomy, hysterectomy, omentectomy, peritonectomy, LAR with diverting loop ileostomy, bladder injury with primary repair. Day #7 on TPN.     SUBJECTIVE: Patient seen and examined at bedside with surgical team, patient without complaints. Denies abdominal pain. She reports intermittent nausea. Denies vomiting. Reports her ostomy is functioning. Denies dysuria, hematuria. Denies coughing, sneezing, congestion. Denies fever, chills, CP, SOB.    OBJECTIVE:    T(C): 37.2 (10-30-23 @ 04:29), Max: 37.6 (10-29-23 @ 16:00)  HR: 105 (10-30-23 @ 04:29) (91 - 110)  BP: 126/62 (10-30-23 @ 04:29) (107/55 - 126/62)  BP(mean): --  ABP: --  ABP(mean): --  RR: 18 (10-30-23 @ 04:29) (18 - 18)  SpO2: 95% (10-30-23 @ 04:29) (95% - 100%)  Wt(kg): --  CVP(mm Hg): --  CI: --  CAPILLARY BLOOD GLUCOSE      POCT Blood Glucose.: 154 mg/dL (30 Oct 2023 00:41)  POCT Blood Glucose.: 94 mg/dL (29 Oct 2023 17:04)  POCT Blood Glucose.: 108 mg/dL (29 Oct 2023 11:34)  POCT Blood Glucose.: 131 mg/dL (29 Oct 2023 05:50)   N/A      10-28 @ 07:01  -  10-29 @ 07:00  --------------------------------------------------------  IN:    Fat Emulsion (Fish Oil &amp; Plant Based) 20% Infusion: 118.8 mL  Total IN: 118.8 mL    OUT:    Ileostomy (mL): 300 mL    Voided (mL): 2100 mL  Total OUT: 2400 mL    Total NET: -2281.2 mL      10-29 @ 07:01  -  10-30 @ 05:49  --------------------------------------------------------  IN:    Fat Emulsion (Fish Oil &amp; Plant Based) 20% Infusion: 72.8 mL  Total IN: 72.8 mL    OUT:    Ileostomy (mL): 300 mL    Oral Fluid: 0 mL    TwoCal HN: 0 mL    Voided (mL): 1800 mL  Total OUT: 2100 mL    Total NET: -2027.2 mL        MEDICATIONS  (STANDING):  cefTRIAXone   IVPB 1000 milliGRAM(s) IV Intermittent every 24 hours  chlorhexidine 2% Cloths 1 Application(s) Topical daily  enoxaparin Injectable 60 milliGRAM(s) SubCutaneous every 12 hours  influenza   Vaccine 0.5 milliLiter(s) IntraMuscular once  lipid, fat emulsion (Fish Oil and Plant Based) 20% Infusion 10.4 mL/Hr (10.4 mL/Hr) IV Continuous <Continuous>  metroNIDAZOLE  IVPB 500 milliGRAM(s) IV Intermittent every 8 hours  metroNIDAZOLE  IVPB      pantoprazole  Injectable 40 milliGRAM(s) IV Push two times a day  Parenteral Nutrition - Adult 1 Each TPN Continuous <Continuous>  Parenteral Nutrition - Adult 1 Each TPN Continuous <Continuous>  scopolamine 1 mG/72 Hr(s) Patch 1 Patch Transdermal every 72 hours    MEDICATIONS  (PRN):  acetaminophen   IVPB .. 750 milliGRAM(s) IV Intermittent every 6 hours PRN Temp greater or equal to 38C (100.4F), Mild Pain (1 - 3)  HYDROmorphone  Injectable 0.5 milliGRAM(s) IV Push every 4 hours PRN Moderate and Severe Pain  naloxone Injectable 0.1 milliGRAM(s) IV Push every 3 minutes PRN For ANY of the following changes in patient status:  A. RR LESS THAN 10 breaths per minute, B. Oxygen saturation LESS THAN 90%, C. Sedation score of 6  ondansetron Injectable 4 milliGRAM(s) IV Push every 6 hours PRN Nausea  sodium chloride 0.9% lock flush 10 milliLiter(s) IV Push every 1 hour PRN Pre/post blood products, medications, blood draw, and to maintain line patency        PHYSICAL EXAM:  Constitutional: A&Ox3, NAD  Respiratory: Unlabored breathing  Abdomen: Soft, nondistended, NTTP. No rebound or guarding. Incision C/D/I. Ostomy pink with gas and liquid stool.   Extremities: WWP, SARABIA spontaneously  Access: RUE PICC C/D/I no erythema or edema.    LABS:                   Urinalysis Basic - ( 28 Oct 2023 09:32 )    Color: x / Appearance: x / SG: x / pH: x  Gluc: 113 mg/dL / Ketone: x  / Bili: x / Urobili: x   Blood: x / Protein: x / Nitrite: x   Leuk Esterase: x / RBC: x / WBC x   Sq Epi: x / Non Sq Epi: x / Bacteria: x    Culture - Blood (10.26.23 @ 18:30)   Specimen Source: .Blood Blood-Venous  Culture Results:   No growth at 24 hoursCulture - Blood (10.26.23 @ 18:30)   Specimen Source: .Blood Blood-Venous  Culture Results:   No growth at 24 hours    Culture - Urine (10.27.23 @ 09:12)   Specimen Source: Clean Catch Clean Catch (Midstream)  Culture Results:   >100,000 CFU/ml Gram Negative Rods           Nutrition Support Progress Note  Patient is a 58y old  Female who presents with a chief complaint of Debulking surgery for suspected malignant neoplasm (18 Oct 2023 09:03)    INTERVAL EVENTS: Patient is POD#26 s/p appendectomy, cholecystectomy, hysterectomy, omentectomy, peritonectomy, LAR with diverting loop ileostomy, bladder injury with primary repair. Day #7 on TPN.     SUBJECTIVE: Patient seen and examined at bedside with surgical team, patient reports abdominal pain and "spit-up" today. Denies vomiting. Reports her ostomy is functioning. Denies dysuria, hematuria. Denies coughing, sneezing, congestion. Denies fever, chills, CP, SOB.    OBJECTIVE:    T(C): 37.2 (10-30-23 @ 04:29), Max: 37.6 (10-29-23 @ 16:00)  HR: 105 (10-30-23 @ 04:29) (91 - 110)  BP: 126/62 (10-30-23 @ 04:29) (107/55 - 126/62)  RR: 18 (10-30-23 @ 04:29) (18 - 18)  SpO2: 95% (10-30-23 @ 04:29) (95% - 100%)  CAPILLARY BLOOD GLUCOSE      POCT Blood Glucose.: 154 mg/dL (30 Oct 2023 00:41)  POCT Blood Glucose.: 94 mg/dL (29 Oct 2023 17:04)  POCT Blood Glucose.: 108 mg/dL (29 Oct 2023 11:34)  POCT Blood Glucose.: 131 mg/dL (29 Oct 2023 05:50)   N/A      10-28 @ 07:01  -  10-29 @ 07:00  --------------------------------------------------------  IN:    Fat Emulsion (Fish Oil &amp; Plant Based) 20% Infusion: 118.8 mL  Total IN: 118.8 mL    OUT:    Ileostomy (mL): 300 mL    Voided (mL): 2100 mL  Total OUT: 2400 mL    Total NET: -2281.2 mL      10-29 @ 07:01  -  10-30 @ 05:49  --------------------------------------------------------  IN:    Fat Emulsion (Fish Oil &amp; Plant Based) 20% Infusion: 72.8 mL  Total IN: 72.8 mL    OUT:    Ileostomy (mL): 300 mL    Oral Fluid: 0 mL    TwoCal HN: 0 mL    Voided (mL): 1800 mL  Total OUT: 2100 mL    Total NET: -2027.2 mL        MEDICATIONS  (STANDING):  cefTRIAXone   IVPB 1000 milliGRAM(s) IV Intermittent every 24 hours  chlorhexidine 2% Cloths 1 Application(s) Topical daily  enoxaparin Injectable 60 milliGRAM(s) SubCutaneous every 12 hours  influenza   Vaccine 0.5 milliLiter(s) IntraMuscular once  lipid, fat emulsion (Fish Oil and Plant Based) 20% Infusion 10.4 mL/Hr (10.4 mL/Hr) IV Continuous <Continuous>  metroNIDAZOLE  IVPB 500 milliGRAM(s) IV Intermittent every 8 hours  metroNIDAZOLE  IVPB      pantoprazole  Injectable 40 milliGRAM(s) IV Push two times a day  Parenteral Nutrition - Adult 1 Each TPN Continuous <Continuous>  Parenteral Nutrition - Adult 1 Each TPN Continuous <Continuous>  scopolamine 1 mG/72 Hr(s) Patch 1 Patch Transdermal every 72 hours    MEDICATIONS  (PRN):  acetaminophen   IVPB .. 750 milliGRAM(s) IV Intermittent every 6 hours PRN Temp greater or equal to 38C (100.4F), Mild Pain (1 - 3)  HYDROmorphone  Injectable 0.5 milliGRAM(s) IV Push every 4 hours PRN Moderate and Severe Pain  naloxone Injectable 0.1 milliGRAM(s) IV Push every 3 minutes PRN For ANY of the following changes in patient status:  A. RR LESS THAN 10 breaths per minute, B. Oxygen saturation LESS THAN 90%, C. Sedation score of 6  ondansetron Injectable 4 milliGRAM(s) IV Push every 6 hours PRN Nausea  sodium chloride 0.9% lock flush 10 milliLiter(s) IV Push every 1 hour PRN Pre/post blood products, medications, blood draw, and to maintain line patency        PHYSICAL EXAM:  Constitutional: A&Ox3, NAD  Respiratory: Unlabored breathing  Abdomen: Soft, nondistended, NTTP. No rebound or guarding. Incision C/D/I. Ostomy pink with gas and liquid stool.   Extremities: WWP, SARABIA spontaneously  Access: RUE PICC C/D/I no erythema or edema.    LABS:                                   7.1    6.38  )-----------( 167      ( 30 Oct 2023 08:36 )             21.3   10-30    135  |  103  |  15  ----------------------------<  131<H>  4.0   |  21<L>  |  0.39<L>    Ca    8.2<L>      30 Oct 2023 05:44  Phos  2.7     10-30  Mg     2.00     10-30    TPro  6.3  /  Alb  2.5<L>  /  TBili  0.3  /  DBili  x   /  AST  38<H>  /  ALT  60<H>  /  AlkPhos  266<H>  10-30        Urinalysis Basic - ( 28 Oct 2023 09:32 )    Color: x / Appearance: x / SG: x / pH: x  Gluc: 113 mg/dL / Ketone: x  / Bili: x / Urobili: x   Blood: x / Protein: x / Nitrite: x   Leuk Esterase: x / RBC: x / WBC x   Sq Epi: x / Non Sq Epi: x / Bacteria: x    Culture - Blood (10.26.23 @ 18:30)   Specimen Source: .Blood Blood-Venous  Culture Results:   No growth at 24 hoursCulture - Blood (10.26.23 @ 18:30)   Specimen Source: .Blood Blood-Venous  Culture Results:   No growth at 24 hours    Culture - Urine (10.27.23 @ 09:12)   Specimen Source: Clean Catch Clean Catch (Midstream)  Culture Results:   >100,000 CFU/ml Gram Negative Rods

## 2023-10-30 NOTE — PROGRESS NOTE ADULT - ASSESSMENT
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 Patient's course c/b continuous retching and intolerance to tube feeds. Nutrition support consult called for prolonged NPO and severe protein calorie malnutrition.    PLAN:  - Continue TPN formula at 2000mL cycled over 12h as follows: CHO [220] gm.  AA [85] gm. SMOF Lipids [25] gm. Calories [1338].  - Labs reviewed - hyponatremia improving  - Monitor fingersticks [stable ]  - Obtain daily weights   - Check fluid balance daily.  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 Patient's course c/b continuous retching and intolerance to tube feeds. Nutrition support consult called for prolonged NPO and severe protein calorie malnutrition.    PLAN:  - Continue TPN formula at 2000mL cycled over 12h as follows: CHO [220] gm.  AA [85] gm. SMOF Lipids [25] gm. Calories [1338].  - Labs reviewed - hyponatremia resolved, hypophosphatemia noted and electrolytes adjusted in TPN  - Monitor fingersticks [stable ]  - Obtain daily weights   - Check fluid balance daily.  Strict I/O  - Triglycerides at initiation of TPN and monthly [87 on 10/25]  - Continue parenteral nutrition at this time, will follow up with primary team on plan     Nutrition Support   [id number]

## 2023-10-31 LAB
ALBUMIN SERPL ELPH-MCNC: 2.6 G/DL — LOW (ref 3.3–5)
ALBUMIN SERPL ELPH-MCNC: 2.6 G/DL — LOW (ref 3.3–5)
ALP SERPL-CCNC: 259 U/L — HIGH (ref 40–120)
ALP SERPL-CCNC: 259 U/L — HIGH (ref 40–120)
ALT FLD-CCNC: 50 U/L — HIGH (ref 4–33)
ALT FLD-CCNC: 50 U/L — HIGH (ref 4–33)
ANION GAP SERPL CALC-SCNC: 13 MMOL/L — SIGNIFICANT CHANGE UP (ref 7–14)
ANION GAP SERPL CALC-SCNC: 13 MMOL/L — SIGNIFICANT CHANGE UP (ref 7–14)
AST SERPL-CCNC: 34 U/L — HIGH (ref 4–32)
AST SERPL-CCNC: 34 U/L — HIGH (ref 4–32)
BASOPHILS # BLD AUTO: 0.01 K/UL — SIGNIFICANT CHANGE UP (ref 0–0.2)
BASOPHILS # BLD AUTO: 0.01 K/UL — SIGNIFICANT CHANGE UP (ref 0–0.2)
BASOPHILS NFR BLD AUTO: 0.2 % — SIGNIFICANT CHANGE UP (ref 0–2)
BASOPHILS NFR BLD AUTO: 0.2 % — SIGNIFICANT CHANGE UP (ref 0–2)
BILIRUB SERPL-MCNC: 0.4 MG/DL — SIGNIFICANT CHANGE UP (ref 0.2–1.2)
BILIRUB SERPL-MCNC: 0.4 MG/DL — SIGNIFICANT CHANGE UP (ref 0.2–1.2)
BUN SERPL-MCNC: 14 MG/DL — SIGNIFICANT CHANGE UP (ref 7–23)
BUN SERPL-MCNC: 14 MG/DL — SIGNIFICANT CHANGE UP (ref 7–23)
CA-I BLD-SCNC: 1.1 MMOL/L — LOW (ref 1.15–1.29)
CA-I BLD-SCNC: 1.1 MMOL/L — LOW (ref 1.15–1.29)
CALCIUM SERPL-MCNC: 8.3 MG/DL — LOW (ref 8.4–10.5)
CALCIUM SERPL-MCNC: 8.3 MG/DL — LOW (ref 8.4–10.5)
CHLORIDE SERPL-SCNC: 103 MMOL/L — SIGNIFICANT CHANGE UP (ref 98–107)
CHLORIDE SERPL-SCNC: 103 MMOL/L — SIGNIFICANT CHANGE UP (ref 98–107)
CO2 SERPL-SCNC: 19 MMOL/L — LOW (ref 22–31)
CO2 SERPL-SCNC: 19 MMOL/L — LOW (ref 22–31)
CREAT SERPL-MCNC: 0.36 MG/DL — LOW (ref 0.5–1.3)
CREAT SERPL-MCNC: 0.36 MG/DL — LOW (ref 0.5–1.3)
CULTURE RESULTS: SIGNIFICANT CHANGE UP
EGFR: 117 ML/MIN/1.73M2 — SIGNIFICANT CHANGE UP
EGFR: 117 ML/MIN/1.73M2 — SIGNIFICANT CHANGE UP
EOSINOPHIL # BLD AUTO: 0.05 K/UL — SIGNIFICANT CHANGE UP (ref 0–0.5)
EOSINOPHIL # BLD AUTO: 0.05 K/UL — SIGNIFICANT CHANGE UP (ref 0–0.5)
EOSINOPHIL NFR BLD AUTO: 0.9 % — SIGNIFICANT CHANGE UP (ref 0–6)
EOSINOPHIL NFR BLD AUTO: 0.9 % — SIGNIFICANT CHANGE UP (ref 0–6)
GLUCOSE BLDC GLUCOMTR-MCNC: 111 MG/DL — HIGH (ref 70–99)
GLUCOSE BLDC GLUCOMTR-MCNC: 111 MG/DL — HIGH (ref 70–99)
GLUCOSE BLDC GLUCOMTR-MCNC: 158 MG/DL — HIGH (ref 70–99)
GLUCOSE BLDC GLUCOMTR-MCNC: 158 MG/DL — HIGH (ref 70–99)
GLUCOSE BLDC GLUCOMTR-MCNC: 186 MG/DL — HIGH (ref 70–99)
GLUCOSE BLDC GLUCOMTR-MCNC: 186 MG/DL — HIGH (ref 70–99)
GLUCOSE SERPL-MCNC: 144 MG/DL — HIGH (ref 70–99)
GLUCOSE SERPL-MCNC: 144 MG/DL — HIGH (ref 70–99)
HCT VFR BLD CALC: 24.5 % — LOW (ref 34.5–45)
HCT VFR BLD CALC: 24.5 % — LOW (ref 34.5–45)
HGB BLD-MCNC: 8.3 G/DL — LOW (ref 11.5–15.5)
HGB BLD-MCNC: 8.3 G/DL — LOW (ref 11.5–15.5)
IANC: 4.67 K/UL — SIGNIFICANT CHANGE UP (ref 1.8–7.4)
IANC: 4.67 K/UL — SIGNIFICANT CHANGE UP (ref 1.8–7.4)
IMM GRANULOCYTES NFR BLD AUTO: 0.4 % — SIGNIFICANT CHANGE UP (ref 0–0.9)
IMM GRANULOCYTES NFR BLD AUTO: 0.4 % — SIGNIFICANT CHANGE UP (ref 0–0.9)
LYMPHOCYTES # BLD AUTO: 0.77 K/UL — LOW (ref 1–3.3)
LYMPHOCYTES # BLD AUTO: 0.77 K/UL — LOW (ref 1–3.3)
LYMPHOCYTES # BLD AUTO: 13.5 % — SIGNIFICANT CHANGE UP (ref 13–44)
LYMPHOCYTES # BLD AUTO: 13.5 % — SIGNIFICANT CHANGE UP (ref 13–44)
MAGNESIUM SERPL-MCNC: 2.1 MG/DL — SIGNIFICANT CHANGE UP (ref 1.6–2.6)
MAGNESIUM SERPL-MCNC: 2.1 MG/DL — SIGNIFICANT CHANGE UP (ref 1.6–2.6)
MCHC RBC-ENTMCNC: 29.1 PG — SIGNIFICANT CHANGE UP (ref 27–34)
MCHC RBC-ENTMCNC: 29.1 PG — SIGNIFICANT CHANGE UP (ref 27–34)
MCHC RBC-ENTMCNC: 33.9 GM/DL — SIGNIFICANT CHANGE UP (ref 32–36)
MCHC RBC-ENTMCNC: 33.9 GM/DL — SIGNIFICANT CHANGE UP (ref 32–36)
MCV RBC AUTO: 86 FL — SIGNIFICANT CHANGE UP (ref 80–100)
MCV RBC AUTO: 86 FL — SIGNIFICANT CHANGE UP (ref 80–100)
MONOCYTES # BLD AUTO: 0.17 K/UL — SIGNIFICANT CHANGE UP (ref 0–0.9)
MONOCYTES # BLD AUTO: 0.17 K/UL — SIGNIFICANT CHANGE UP (ref 0–0.9)
MONOCYTES NFR BLD AUTO: 3 % — SIGNIFICANT CHANGE UP (ref 2–14)
MONOCYTES NFR BLD AUTO: 3 % — SIGNIFICANT CHANGE UP (ref 2–14)
NEUTROPHILS # BLD AUTO: 4.67 K/UL — SIGNIFICANT CHANGE UP (ref 1.8–7.4)
NEUTROPHILS # BLD AUTO: 4.67 K/UL — SIGNIFICANT CHANGE UP (ref 1.8–7.4)
NEUTROPHILS NFR BLD AUTO: 82 % — HIGH (ref 43–77)
NEUTROPHILS NFR BLD AUTO: 82 % — HIGH (ref 43–77)
NRBC # BLD: 0 /100 WBCS — SIGNIFICANT CHANGE UP (ref 0–0)
NRBC # BLD: 0 /100 WBCS — SIGNIFICANT CHANGE UP (ref 0–0)
NRBC # FLD: 0 K/UL — SIGNIFICANT CHANGE UP (ref 0–0)
NRBC # FLD: 0 K/UL — SIGNIFICANT CHANGE UP (ref 0–0)
PHOSPHATE SERPL-MCNC: 2.8 MG/DL — SIGNIFICANT CHANGE UP (ref 2.5–4.5)
PHOSPHATE SERPL-MCNC: 2.8 MG/DL — SIGNIFICANT CHANGE UP (ref 2.5–4.5)
PLATELET # BLD AUTO: 202 K/UL — SIGNIFICANT CHANGE UP (ref 150–400)
PLATELET # BLD AUTO: 202 K/UL — SIGNIFICANT CHANGE UP (ref 150–400)
POTASSIUM SERPL-MCNC: 3.8 MMOL/L — SIGNIFICANT CHANGE UP (ref 3.5–5.3)
POTASSIUM SERPL-MCNC: 3.8 MMOL/L — SIGNIFICANT CHANGE UP (ref 3.5–5.3)
POTASSIUM SERPL-SCNC: 3.8 MMOL/L — SIGNIFICANT CHANGE UP (ref 3.5–5.3)
POTASSIUM SERPL-SCNC: 3.8 MMOL/L — SIGNIFICANT CHANGE UP (ref 3.5–5.3)
PROT SERPL-MCNC: 6.6 G/DL — SIGNIFICANT CHANGE UP (ref 6–8.3)
PROT SERPL-MCNC: 6.6 G/DL — SIGNIFICANT CHANGE UP (ref 6–8.3)
RBC # BLD: 2.85 M/UL — LOW (ref 3.8–5.2)
RBC # BLD: 2.85 M/UL — LOW (ref 3.8–5.2)
RBC # FLD: 18.4 % — HIGH (ref 10.3–14.5)
RBC # FLD: 18.4 % — HIGH (ref 10.3–14.5)
SODIUM SERPL-SCNC: 135 MMOL/L — SIGNIFICANT CHANGE UP (ref 135–145)
SODIUM SERPL-SCNC: 135 MMOL/L — SIGNIFICANT CHANGE UP (ref 135–145)
SPECIMEN SOURCE: SIGNIFICANT CHANGE UP
WBC # BLD: 5.69 K/UL — SIGNIFICANT CHANGE UP (ref 3.8–10.5)
WBC # BLD: 5.69 K/UL — SIGNIFICANT CHANGE UP (ref 3.8–10.5)
WBC # FLD AUTO: 5.69 K/UL — SIGNIFICANT CHANGE UP (ref 3.8–10.5)
WBC # FLD AUTO: 5.69 K/UL — SIGNIFICANT CHANGE UP (ref 3.8–10.5)

## 2023-10-31 PROCEDURE — 99232 SBSQ HOSP IP/OBS MODERATE 35: CPT

## 2023-10-31 PROCEDURE — 99233 SBSQ HOSP IP/OBS HIGH 50: CPT | Mod: GC

## 2023-10-31 RX ORDER — CIPROFLOXACIN LACTATE 400MG/40ML
250 VIAL (ML) INTRAVENOUS EVERY 12 HOURS
Refills: 0 | Status: DISCONTINUED | OUTPATIENT
Start: 2023-10-31 | End: 2023-10-31

## 2023-10-31 RX ORDER — ELECTROLYTE SOLUTION,INJ
1 VIAL (ML) INTRAVENOUS
Refills: 0 | Status: DISCONTINUED | OUTPATIENT
Start: 2023-10-31 | End: 2023-10-31

## 2023-10-31 RX ORDER — DIATRIZOATE MEGLUMINE 180 MG/ML
120 INJECTION, SOLUTION INTRAVESICAL ONCE
Refills: 0 | Status: COMPLETED | OUTPATIENT
Start: 2023-10-31 | End: 2023-10-31

## 2023-10-31 RX ORDER — PROCHLORPERAZINE MALEATE 5 MG
10 TABLET ORAL EVERY 6 HOURS
Refills: 0 | Status: DISCONTINUED | OUTPATIENT
Start: 2023-10-31 | End: 2023-10-31

## 2023-10-31 RX ORDER — FENTANYL CITRATE 50 UG/ML
1 INJECTION INTRAVENOUS
Refills: 0 | Status: DISCONTINUED | OUTPATIENT
Start: 2023-10-31 | End: 2023-11-02

## 2023-10-31 RX ORDER — I.V. FAT EMULSION 20 G/100ML
14.5 EMULSION INTRAVENOUS
Qty: 35 | Refills: 0 | Status: DISCONTINUED | OUTPATIENT
Start: 2023-10-31 | End: 2023-10-31

## 2023-10-31 RX ORDER — CIPROFLOXACIN LACTATE 400MG/40ML
500 VIAL (ML) INTRAVENOUS EVERY 12 HOURS
Refills: 0 | Status: DISCONTINUED | OUTPATIENT
Start: 2023-10-31 | End: 2023-11-02

## 2023-10-31 RX ORDER — I.V. FAT EMULSION 20 G/100ML
14.5 EMULSION INTRAVENOUS
Qty: 35 | Refills: 0 | Status: DISCONTINUED | OUTPATIENT
Start: 2023-10-31 | End: 2023-11-01

## 2023-10-31 RX ORDER — CIPROFLOXACIN LACTATE 400MG/40ML
500 VIAL (ML) INTRAVENOUS DAILY
Refills: 0 | Status: DISCONTINUED | OUTPATIENT
Start: 2023-10-31 | End: 2023-10-31

## 2023-10-31 RX ADMIN — HYDROMORPHONE HYDROCHLORIDE 1 MILLIGRAM(S): 2 INJECTION INTRAMUSCULAR; INTRAVENOUS; SUBCUTANEOUS at 07:43

## 2023-10-31 RX ADMIN — Medication 400 MILLIGRAM(S): at 11:58

## 2023-10-31 RX ADMIN — HYDROMORPHONE HYDROCHLORIDE 1 MILLIGRAM(S): 2 INJECTION INTRAMUSCULAR; INTRAVENOUS; SUBCUTANEOUS at 07:04

## 2023-10-31 RX ADMIN — FENTANYL CITRATE 1 PATCH: 50 INJECTION INTRAVENOUS at 19:05

## 2023-10-31 RX ADMIN — HYDROMORPHONE HYDROCHLORIDE 1 MILLIGRAM(S): 2 INJECTION INTRAMUSCULAR; INTRAVENOUS; SUBCUTANEOUS at 02:15

## 2023-10-31 RX ADMIN — CHLORHEXIDINE GLUCONATE 1 APPLICATION(S): 213 SOLUTION TOPICAL at 11:59

## 2023-10-31 RX ADMIN — Medication 1000 MILLIGRAM(S): at 04:01

## 2023-10-31 RX ADMIN — Medication 400 MILLIGRAM(S): at 03:29

## 2023-10-31 RX ADMIN — Medication 1000 MILLIGRAM(S): at 12:47

## 2023-10-31 RX ADMIN — Medication 500 MILLIGRAM(S): at 11:59

## 2023-10-31 RX ADMIN — HYDROMORPHONE HYDROCHLORIDE 1 MILLIGRAM(S): 2 INJECTION INTRAMUSCULAR; INTRAVENOUS; SUBCUTANEOUS at 02:51

## 2023-10-31 RX ADMIN — Medication 1000 MILLIGRAM(S): at 13:30

## 2023-10-31 RX ADMIN — CEFTRIAXONE 100 MILLIGRAM(S): 500 INJECTION, POWDER, FOR SOLUTION INTRAMUSCULAR; INTRAVENOUS at 02:18

## 2023-10-31 RX ADMIN — APIXABAN 5 MILLIGRAM(S): 2.5 TABLET, FILM COATED ORAL at 18:57

## 2023-10-31 RX ADMIN — Medication 500 MILLIGRAM(S): at 18:57

## 2023-10-31 RX ADMIN — IRON SUCROSE 110 MILLIGRAM(S): 20 INJECTION, SOLUTION INTRAVENOUS at 13:55

## 2023-10-31 RX ADMIN — Medication 1000 MILLIGRAM(S): at 18:20

## 2023-10-31 RX ADMIN — I.V. FAT EMULSION 14.5 ML/HR: 20 EMULSION INTRAVENOUS at 19:22

## 2023-10-31 RX ADMIN — Medication 1 EACH: at 19:21

## 2023-10-31 NOTE — PROGRESS NOTE ADULT - SUBJECTIVE AND OBJECTIVE BOX
Subjective: Patient seen and examined. No new events except as noted.     SUBJECTIVE/ROS:  MEDICATIONS:  MEDICATIONS  (STANDING):  acetaminophen   IVPB .. 1000 milliGRAM(s) IV Intermittent every 6 hours  apixaban 5 milliGRAM(s) Oral every 12 hours  chlorhexidine 2% Cloths 1 Application(s) Topical daily  influenza   Vaccine 0.5 milliLiter(s) IntraMuscular once  iron sucrose IVPB 200 milliGRAM(s) IV Intermittent every 24 hours  lipid, fat emulsion (Fish Oil and Plant Based) 20% Infusion 10.4 mL/Hr (10.4 mL/Hr) IV Continuous <Continuous>  pantoprazole    Tablet 40 milliGRAM(s) Oral before breakfast  Parenteral Nutrition - Adult 1 Each TPN Continuous <Continuous>  scopolamine 1 mG/72 Hr(s) Patch 1 Patch Transdermal every 72 hours      PHYSICAL EXAM:  T(C): 37.1 (10-31-23 @ 04:56), Max: 37.7 (10-30-23 @ 21:37)  HR: 96 (10-31-23 @ 04:56) (96 - 107)  BP: 134/62 (10-31-23 @ 04:56) (114/61 - 135/68)  RR: 17 (10-31-23 @ 04:56) (17 - 17)  SpO2: 98% (10-31-23 @ 04:56) (97% - 100%)  Wt(kg): --  I&O's Summary    30 Oct 2023 07:01  -  31 Oct 2023 07:00  --------------------------------------------------------  IN: 463.6 mL / OUT: 1850 mL / NET: -1386.4 mL            JVP: Normal  Neck: supple  Lung: clear   CV: S1 S2 , Murmur:  Abd: soft  Ext: No edema  neuro: Awake / alert  Psych: flat affect  Skin: normal``    LABS/DATA:    CARDIAC MARKERS:                                7.1    6.38  )-----------( 167      ( 30 Oct 2023 08:36 )             21.3     10-30    135  |  103  |  15  ----------------------------<  131<H>  4.0   |  21<L>  |  0.39<L>    Ca    8.2<L>      30 Oct 2023 05:44  Phos  2.7     10-30  Mg     2.00     10-30    TPro  6.3  /  Alb  2.5<L>  /  TBili  0.3  /  DBili  x   /  AST  38<H>  /  ALT  60<H>  /  AlkPhos  266<H>  10-30    proBNP:   Lipid Profile:   HgA1c:   TSH:     TELE:  EKG:

## 2023-10-31 NOTE — PROGRESS NOTE ADULT - ASSESSMENT
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 Found to have L IJ and innominate vein acute non-occlusive thrombosis, therapeutic lovenox held i/s/o expanding subcapsular liver hematomas found on CT 10/20/23. Course complicated by post-operative nausea now resolved,     - Intermittent fevers and tachycardia, fever workup revealing for bacteriuria with cultures positive for >100,000 CFU GNR per hpf.   ID consulted, notes UTI less likely cause of systemic response in absence of urinary symptomatology.   CT A/P 10/28 notable for large R pleural effusion, increased in size from previous imaging, decreased hepatic and perihepatic hematomas, similar R hepatic lobe hypodensities (unclear if infarct vs small collections).   ABx: Ceftriaxone 1000mg qd, change to cipro on discharge  - Diet: NPO/TPN.    - Pain: APAP ATC, po dilaudid?, awaiting palliative recommendations  - Continue scopolamine and lorazepam for nausea control, current regimen appears to be working well.   - Hematology consulted for CARMELA (likely anemia of inflammation), start Iron infusion 10/30 x 5 days. s/p 1u PRBC 10/30  - Wound care: Continue to evaluate ostomy daily.   - AC: Eliquis 5mg BID  - PT/OT consult appreciated, home with home PT.  - Dispo: Pending

## 2023-10-31 NOTE — PROGRESS NOTE ADULT - SUBJECTIVE AND OBJECTIVE BOX
SUBJECTIVE AND OBJECTIVE:  Pt indicates that she is still having the episodes of retching, overall not feeling well  Indicates that Ativan has not helped    Indication for Geriatrics and Palliative Care Services/INTERVAL HPI: Complex symptom management in the setting of advanced illness    OVERNIGHT EVENTS:   Started trial of PO meds  Worsening retching and spitting up    DNR on chart: full code  Allergies    penicillin (Rash)    Intolerances    MEDICATIONS  (STANDING):  apixaban 5 milliGRAM(s) Oral every 12 hours  chlorhexidine 2% Cloths 1 Application(s) Topical daily  ciprofloxacin     Tablet 500 milliGRAM(s) Oral every 12 hours  fentaNYL   Patch  12 MICROgram(s)/Hr 1 Patch Transdermal every 72 hours  influenza   Vaccine 0.5 milliLiter(s) IntraMuscular once  iron sucrose IVPB 200 milliGRAM(s) IV Intermittent every 24 hours  lipid, fat emulsion (Fish Oil and Plant Based) 20% Infusion 14.5 mL/Hr (14.5 mL/Hr) IV Continuous <Continuous>  pantoprazole    Tablet 40 milliGRAM(s) Oral before breakfast  Parenteral Nutrition - Adult 1 Each TPN Continuous <Continuous>  Parenteral Nutrition - Adult 1 Each TPN Continuous <Continuous>  scopolamine 1 mG/72 Hr(s) Patch 1 Patch Transdermal every 72 hours    MEDICATIONS  (PRN):  acetaminophen     Tablet .. 650 milliGRAM(s) Oral every 6 hours PRN Mild Pain (1 - 3)  HYDROmorphone   Tablet 1 milliGRAM(s) Oral every 4 hours PRN Moderate and Severe Pain  LORazepam   Injectable 0.25 milliGRAM(s) IV Push every 6 hours PRN Nausea and/or Vomiting  naloxone Injectable 0.1 milliGRAM(s) IV Push every 3 minutes PRN For ANY of the following changes in patient status:  A. RR LESS THAN 10 breaths per minute, B. Oxygen saturation LESS THAN 90%, C. Sedation score of 6  ondansetron   Disintegrating Tablet 8 milliGRAM(s) Oral every 8 hours PRN Nausea  sodium chloride 0.9% lock flush 10 milliLiter(s) IV Push every 1 hour PRN Pre/post blood products, medications, blood draw, and to maintain line patency      ITEMS UNCHECKED ARE NOT PRESENT    PRESENT SYMPTOMS: [ ]Unable to self-report - see [ ] CPOT [ ] PAINADS [ ] RDOS  Source if other than patient:  [ ]Family   [ ]Team     Pain:  [x ]yes [ ]no  QOL impact -   Location -  mid abdomen                  Aggravating factors -  Quality - aching  Radiation - none  Timing- intermittent  Severity (0-10 scale): 10/10  Minimal acceptable level (0-10 scale): 2-3/10    CPOT:    https://www.[facility].org/getattachment/[email or web address]/Critical-Care-Pain-Observation-Tool-(CPOT)    PAIN AD Score:	  http://geriatrictoolkit.[facility]/cog/painad.pdf (Ctrl + left click to view)    Dyspnea:                           [ ]Mild [ ]Moderate [ ]Severe    RDOS:  0 to 2  minimal or no respiratory distress   3  mild distress  4 to 6 moderate distress  >7 severe distress  https://homecareinformation.net/handouts/hen/Respiratory_Distress_Observation_Scale.pdf (Ctrl +  left click to view)     Anxiety:                             [ ]Mild [ ]Moderate [ ]Severe  Fatigue:                             [ ]Mild [x ]Moderate [ ]Severe  Nausea:                             [ ]Mild [ ]Moderate [ ]Severe  Loss of appetite:              [ ]Mild [ x]Moderate [ ]Severe  Constipation:                    [ ]Mild [ ]Moderate [ ]Severe    PCSSQ[Palliative Care Spiritual Screening Question]   Severity (0-10): deferred  Score of 4 or > indicate consideration of Chaplaincy referral.    Chaplaincy Referral: [ ] yes [ ] refused [ ] following [x] deferred    Other Symptoms: +retching with vomiting  [x ]All other review of systems negative     Palliative Performance Status Version 2: 50 %      http://npcrc.org/files/news/palliative_performance_scale_ppsv2.pdf    PHYSICAL EXAM:  Vital Signs Last 24 Hrs  T(C): 37.1 (31 Oct 2023 12:00), Max: 37.7 (30 Oct 2023 21:37)  T(F): 98.8 (31 Oct 2023 12:00), Max: 99.9 (30 Oct 2023 21:37)  HR: 108 (31 Oct 2023 12:00) (96 - 108)  BP: 125/63 (31 Oct 2023 12:00) (125/63 - 134/62)  BP(mean): --  RR: 17 (31 Oct 2023 12:00) (17 - 17)  SpO2: 100% (31 Oct 2023 12:00) (97% - 100%)    Parameters below as of 31 Oct 2023 12:00  Patient On (Oxygen Delivery Method): room air    I&O's Summary    30 Oct 2023 07:01  -  31 Oct 2023 07:00  --------------------------------------------------------  IN: 463.6 mL / OUT: 1950 mL / NET: -1486.4 mL    31 Oct 2023 07:01  -  31 Oct 2023 14:42  --------------------------------------------------------  IN: 0 mL / OUT: 300 mL / NET: -300 mL    GENERAL: [ ]Cachexia    [x ]Alert  [x ]Oriented x 3  [ ]Lethargic  [ ]Unarousable  [x ]Verbal  [ ]Non-Verbal  Behavioral:   [ ]Anxiety  [ ]Delirium [ ]Agitation [ ]Other  HEENT:  [x ]Normal   [x]Dry mouth   [ ]ET Tube/Trach  [ ]Oral lesions  PULMONARY:   [ ]Clear [ ]Tachypnea  [ ]Audible excessive secretions   [ x]Rhonchi        [ ]Right [ ]Left [ ]Bilateral  [ ]Crackles        [ ]Right [ ]Left [ ]Bilateral  [ ]Wheezing     [ ]Right [ ]Left [ ]Bilateral  [ ]Diminished BS [ ] Right [ ]Left [ ]Bilateral  CARDIOVASCULAR:    [x ]Regular [ ]Irregular [ ]Tachy  [ ]Bao [ ]Murmur [ ]Other  GASTROINTESTINAL: has ostomy  [ x]Soft  [ ]Distended   [x]+BS  [ ]Non tender [x ]Tender  [ ]Other [ ]PEG [ ]OGT/ NGT   Last BM:ostomy  GENITOURINARY:  [x ]Normal [ ]Incontinent   [ ]Oliguria/Anuria   [ ]Pierce  MUSCULOSKELETAL:   [ ]Normal   [ x]Weakness  [ ]Bed/Wheelchair bound [ ]Edema  NEUROLOGIC:   [x ]No focal deficits  [ ] Cognitive impairment  [ ] Dysphagia [ ]Dysarthria [ ] Paresis [ ]Other   SKIN:   [ x]Normal  [ ]Rash  [ ]Other  [ ]Pressure ulcer(s) [ ]y [ ]n present on admission    CRITICAL CARE:  [ ]Shock Present  [ ]Septic [ ]Cardiogenic [ ]Neurologic [ ]Hypovolemic  [ ]Vasopressors [ ]Inotropes  [ ]Respiratory failure present [ ]Mechanical Ventilation [ ]Non-invasive ventilatory support [ ]High-Flow   [ ]Acute  [ ]Chronic [ ]Hypoxic  [ ]Hypercarbic [ ]Other  [ ]Other organ failure       LABS:                        8.3    5.69  )-----------( 202      ( 31 Oct 2023 05:45 )             24.5     10-31    135  |  103  |  14  ----------------------------<  144<H>  3.8   |  19<L>  |  0.36<L>    Ca    8.3<L>      31 Oct 2023 05:45  Phos  2.8     10-31  Mg     2.10     10-31    TPro  6.6  /  Alb  2.6<L>  /  TBili  0.4  /  DBili  x   /  AST  34<H>  /  ALT  50<H>  /  AlkPhos  259<H>  10-31    Urinalysis Basic - ( 31 Oct 2023 05:45 )    Color: x / Appearance: x / SG: x / pH: x  Gluc: 144 mg/dL / Ketone: x  / Bili: x / Urobili: x   Blood: x / Protein: x / Nitrite: x   Leuk Esterase: x / RBC: x / WBC x   Sq Epi: x / Non Sq Epi: x / Bacteria: x    RADIOLOGY & ADDITIONAL STUDIES: reviewed    Protein Calorie Malnutrition Present: [ ]mild [ ]moderate [ ]severe [ ]underweight [ ]morbid obesity  https://www.andeal.org/vault/2440/web/files/ONC/Table_Clinical%20Characteristics%20to%20Document%20Malnutrition-White%20JV%20et%20al%202012.pdf    Height (cm): 154.9 (10-04-23 @ 07:27), 156.3 (09-05-23 @ 11:06), 154.9 (07-21-23 @ 08:27)  Weight (kg): 68.9 (10-04-23 @ 07:27), 71.5 (09-05-23 @ 11:06), 75.3 (07-21-23 @ 08:27)  BMI (kg/m2): 28.7 (10-04-23 @ 07:27), 29.3 (09-05-23 @ 11:06), 31.4 (07-21-23 @ 08:27)    [ ]PPSV2 < or = 30%  [ ]significant weight loss [ ]poor nutritional intake [ ]anasarca[ ]Artificial Nutrition    Other REFERRALS:  [ ]Hospice  [ ]Child Life  [ ]Social Work  [ ]Case management [ ]Holistic Therapy     Goals of Care Document:

## 2023-10-31 NOTE — PROGRESS NOTE ADULT - SUBJECTIVE AND OBJECTIVE BOX
NUTRITION NOTE  [id number] [facility]  ===============================    Interval events - Patient was seen and examined at bedside, no acute events overnight. Patient denies chest pain, shortness of breath, nausea or vomiting at this time. Pt remains on TPN cycled over 12 hours.     ROS: Except as noted above, all other systems reviewed and are negative     Allergies  penicillin (Rash)    PAST MEDICAL & SURGICAL HISTORY:  History of sickle cell trait  Malignant neoplasm of appendix  History of ectopic pregnancy  H/O colonoscopy  H/O endoscopy    Vital Signs Last 24 Hrs  T(C): 37.1 (31 Oct 2023 12:00), Max: 37.7 (30 Oct 2023 21:37)  T(F): 98.8 (31 Oct 2023 12:00), Max: 99.9 (30 Oct 2023 21:37)  HR: 108 (31 Oct 2023 12:00) (96 - 108)  BP: 125/63 (31 Oct 2023 12:00) (125/63 - 134/62)  RR: 17 (31 Oct 2023 12:00) (17 - 17)  SpO2: 100% (31 Oct 2023 12:00) (97% - 100%)    MEDICATIONS  (STANDING):  apixaban 5 milliGRAM(s) Oral every 12 hours  chlorhexidine 2% Cloths 1 Application(s) Topical daily  ciprofloxacin     Tablet 500 milliGRAM(s) Oral every 12 hours  diatrizoate meglumine/diatrizoate sodium. 120 milliLiter(s) Oral once  influenza   Vaccine 0.5 milliLiter(s) IntraMuscular once  iron sucrose IVPB 200 milliGRAM(s) IV Intermittent every 24 hours  lipid, fat emulsion (Fish Oil and Plant Based) 20% Infusion 14.5 mL/Hr (14.5 mL/Hr) IV Continuous <Continuous>  pantoprazole    Tablet 40 milliGRAM(s) Oral before breakfast  Parenteral Nutrition - Adult 1 Each TPN Continuous <Continuous>  Parenteral Nutrition - Adult 1 Each TPN Continuous <Continuous>  scopolamine 1 mG/72 Hr(s) Patch 1 Patch Transdermal every 72 hours    MEDICATIONS  (PRN):  acetaminophen     Tablet .. 650 milliGRAM(s) Oral every 6 hours PRN Mild Pain (1 - 3)  HYDROmorphone   Tablet 1 milliGRAM(s) Oral every 4 hours PRN Moderate and Severe Pain  naloxone Injectable 0.1 milliGRAM(s) IV Push every 3 minutes PRN For ANY of the following changes in patient status:  A. RR LESS THAN 10 breaths per minute, B. Oxygen saturation LESS THAN 90%, C. Sedation score of 6  ondansetron   Disintegrating Tablet 8 milliGRAM(s) Oral every 8 hours PRN Nausea  sodium chloride 0.9% lock flush 10 milliLiter(s) IV Push every 1 hour PRN Pre/post blood products, medications, blood draw, and to maintain line patency    I&O's Detail    30 Oct 2023 07:01  -  31 Oct 2023 07:00  --------------------------------------------------------  IN:    Fat Emulsion (Fish Oil &amp; Plant Based) 20% Infusion: 93.6 mL    IV PiggyBack: 100 mL    PRBCs (Packed Red Blood Cells): 270 mL  Total IN: 463.6 mL    OUT:    Ileostomy (mL): 500 mL    Oral Fluid: 0 mL    Voided (mL): 1450 mL  Total OUT: 1950 mL    Total NET: -1486.4 mL      31 Oct 2023 07:01  -  31 Oct 2023 12:52  --------------------------------------------------------  IN:  Total IN: 0 mL    OUT:    Voided (mL): 300 mL  Total OUT: 300 mL    Total NET: -300 mL    POCT Blood Glucose.: 111 mg/dL (31 Oct 2023 12:09)  POCT Blood Glucose.: 186 mg/dL (31 Oct 2023 05:02)  POCT Blood Glucose.: 158 mg/dL (31 Oct 2023 00:11)  POCT Blood Glucose.: 105 mg/dL (30 Oct 2023 17:53)    Drug Dosing Weight  Height (cm): 154.9 (04 Oct 2023 07:27)  Weight (kg): 68.9 (04 Oct 2023 07:27)  BMI (kg/m2): 28.7 (04 Oct 2023 07:27)  BSA (m2): 1.68 (04 Oct 2023 07:27)    PHYSICAL EXAM:  General: NAD, resting in bed comfortably.  Cardiac: regular rate, warm and well perfused  Respiratory: Nonlabored respirations, normal cw expansion.  Abdomen: soft, nontender, nondistended  Extremities: normal strength, FROM, no deformities  PICC Site: [facility] PICC site clean and day (placed on 10/24/23)    Diet: NPO and TPN/lipids (started on 10/24/23)    LABORATORY                                 8.3    5.69  )-----------( 202      ( 31 Oct 2023 05:45 )             24.5   10-31    135  |  103  |  14  ----------------------------<  144<H>  3.8   |  19<L>  |  0.36<L>    Ca    8.3<L>      31 Oct 2023 05:45  Phos  2.8     10-31  Mg     2.10     10-31    TPro  6.6  /  Alb  2.6<L>  /  TBili  0.4  /  DBili  x   /  AST  34<H>  /  ALT  50<H>  /  AlkPhos  259<H>  10-31    LIVER FUNCTIONS - ( 31 Oct 2023 05:45 )  Alb: 2.6 g/dL / Pro: 6.6 g/dL / ALK PHOS: 259 U/L / ALT: 50 U/L / AST: 34 U/L / GGT: x           10-25 Chol -- LDL -- HDL -- Trig 87    CT A/P 10/28 notable for large R pleural effusion, increased in size from previous imaging, decreased hepatic and perihepatic hematomas, similar R hepatic lobe hypodensities (unclear if infarct vs small collections).     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 Patient's course c/b continuous retching and intolerance to tube feeds. Nutrition support consult called for prolonged NPO and severe protein calorie malnutrition. PICC placed and TPN started on 10/24/23.    TPN infusion volume decreased to 1.5L, TPN will provide 1450 kcal/day    labs reviewed - electrolytes adjusted in TPN bag     monitor fingersticks, obtain daily weights     continue parenteral nutrition at this time, will follow up with primary team on plan, discharge planning in process      1.  Severe protein calorie malnutrition being optimized with TPN: CHO [220] gm.  AA [88] gm. SMOF Lipids [35] gm.  2.  Hyperglycemia managed with: [0] units of regular insulin    3.  Check fluid balance daily.  Strict I/O  [ ] [ ]   4.  Daily BMP, Ionized Calcium, Magnesium and Phosphorous   5.  Triglycerides at initiation of TPN and monthly 10-25 Chol -- LDL -- HDL -- Trig 87    Nutrition Support [id number]

## 2023-10-31 NOTE — PROGRESS NOTE ADULT - ASSESSMENT
tachycardia  due to anemia and sepsis    sepsis  uti  on abx  plan as per ID     HTN  stable     Monitor hemoglobin, transfuse as needed.

## 2023-10-31 NOTE — PROGRESS NOTE ADULT - SUBJECTIVE AND OBJECTIVE BOX
TEAM [ D ] Surgery Daily Progress Note  =====================================================    SUBJECTIVE: Patient seen and examined at bedside on AM rounds. Patient reports an episode of emesis after oral pain medication. NPO tolerating sips of liquids. .  +/+ ostomy. OOB/Amublating as tolerated. Denies fever, chills.    ALLERGIES:  penicillin (Rash)    --------------------------------------------------------------------------------------    MEDICATIONS:    Neurologic Medications  acetaminophen     Tablet .. 650 milliGRAM(s) Oral every 6 hours PRN Mild Pain (1 - 3)  acetaminophen   IVPB .. 1000 milliGRAM(s) IV Intermittent every 6 hours  HYDROmorphone   Tablet 1 milliGRAM(s) Oral every 4 hours PRN Moderate and Severe Pain  ondansetron   Disintegrating Tablet 8 milliGRAM(s) Oral every 8 hours PRN Nausea  scopolamine 1 mG/72 Hr(s) Patch 1 Patch Transdermal every 72 hours    Gastrointestinal Medications  iron sucrose IVPB 200 milliGRAM(s) IV Intermittent every 24 hours  lipid, fat emulsion (Fish Oil and Plant Based) 20% Infusion 10.4 mL/Hr IV Continuous <Continuous>  pantoprazole    Tablet 40 milliGRAM(s) Oral before breakfast  Parenteral Nutrition - Adult 1 Each TPN Continuous <Continuous>  sodium chloride 0.9% lock flush 10 milliLiter(s) IV Push every 1 hour PRN Pre/post blood products, medications, blood draw, and to maintain line patency    Genitourinary Medications    Hematologic/Oncologic Medications  apixaban 5 milliGRAM(s) Oral every 12 hours  influenza   Vaccine 0.5 milliLiter(s) IntraMuscular once    Antimicrobial/Immunologic Medications    Endocrine/Metabolic Medications    Topical/Other Medications  chlorhexidine 2% Cloths 1 Application(s) Topical daily  naloxone Injectable 0.1 milliGRAM(s) IV Push every 3 minutes PRN For ANY of the following changes in patient status:  A. RR LESS THAN 10 breaths per minute, B. Oxygen saturation LESS THAN 90%, C. Sedation score of 6    --------------------------------------------------------------------------------------    VITAL SIGNS:  T(C): 37.1 (10-31-23 @ 04:56), Max: 37.7 (10-30-23 @ 21:37)  HR: 96 (10-31-23 @ 04:56) (96 - 107)  BP: 134/62 (10-31-23 @ 04:56) (114/61 - 135/68)  RR: 17 (10-31-23 @ 04:56) (17 - 17)  SpO2: 98% (10-31-23 @ 04:56) (97% - 100%)  --------------------------------------------------------------------------------------    EXAM    General: NAD, resting in bed comfortably.  Cardiac: regular rate, warm and well perfused  Respiratory: Nonlabored respirations, normal cw expansion.  Abdomen: soft, nontender, nondistended, small hematoma at mid portion of midline incision.  Extremities: normal strength, FROM, no deformities    --------------------------------------------------------------------------------------      INS AND OUTS:    10-30-23 @ 07:01  -  10-31-23 @ 07:00  --------------------------------------------------------  IN: 463.6 mL / OUT: 1950 mL / NET: -1486.4 mL      --------------------------------------------------------------------------------------

## 2023-11-01 ENCOUNTER — APPOINTMENT (OUTPATIENT)
Dept: INFUSION THERAPY | Facility: HOSPITAL | Age: 58
End: 2023-11-01

## 2023-11-01 ENCOUNTER — TRANSCRIPTION ENCOUNTER (OUTPATIENT)
Age: 58
End: 2023-11-01

## 2023-11-01 PROBLEM — C18.1 MALIGNANT NEOPLASM OF APPENDIX: Chronic | Status: ACTIVE | Noted: 2023-09-21

## 2023-11-01 LAB
ALBUMIN SERPL ELPH-MCNC: 2.6 G/DL — LOW (ref 3.3–5)
ALBUMIN SERPL ELPH-MCNC: 2.6 G/DL — LOW (ref 3.3–5)
ALP SERPL-CCNC: 254 U/L — HIGH (ref 40–120)
ALP SERPL-CCNC: 254 U/L — HIGH (ref 40–120)
ALT FLD-CCNC: 42 U/L — HIGH (ref 4–33)
ALT FLD-CCNC: 42 U/L — HIGH (ref 4–33)
ANION GAP SERPL CALC-SCNC: 12 MMOL/L — SIGNIFICANT CHANGE UP (ref 7–14)
ANION GAP SERPL CALC-SCNC: 12 MMOL/L — SIGNIFICANT CHANGE UP (ref 7–14)
AST SERPL-CCNC: 33 U/L — HIGH (ref 4–32)
AST SERPL-CCNC: 33 U/L — HIGH (ref 4–32)
BASOPHILS # BLD AUTO: 0.01 K/UL — SIGNIFICANT CHANGE UP (ref 0–0.2)
BASOPHILS # BLD AUTO: 0.01 K/UL — SIGNIFICANT CHANGE UP (ref 0–0.2)
BASOPHILS NFR BLD AUTO: 0.2 % — SIGNIFICANT CHANGE UP (ref 0–2)
BASOPHILS NFR BLD AUTO: 0.2 % — SIGNIFICANT CHANGE UP (ref 0–2)
BILIRUB SERPL-MCNC: 0.3 MG/DL — SIGNIFICANT CHANGE UP (ref 0.2–1.2)
BILIRUB SERPL-MCNC: 0.3 MG/DL — SIGNIFICANT CHANGE UP (ref 0.2–1.2)
BUN SERPL-MCNC: 16 MG/DL — SIGNIFICANT CHANGE UP (ref 7–23)
BUN SERPL-MCNC: 16 MG/DL — SIGNIFICANT CHANGE UP (ref 7–23)
CA-I BLD-SCNC: 1.09 MMOL/L — LOW (ref 1.15–1.29)
CA-I BLD-SCNC: 1.09 MMOL/L — LOW (ref 1.15–1.29)
CALCIUM SERPL-MCNC: 8.7 MG/DL — SIGNIFICANT CHANGE UP (ref 8.4–10.5)
CALCIUM SERPL-MCNC: 8.7 MG/DL — SIGNIFICANT CHANGE UP (ref 8.4–10.5)
CHLORIDE SERPL-SCNC: 106 MMOL/L — SIGNIFICANT CHANGE UP (ref 98–107)
CHLORIDE SERPL-SCNC: 106 MMOL/L — SIGNIFICANT CHANGE UP (ref 98–107)
CO2 SERPL-SCNC: 21 MMOL/L — LOW (ref 22–31)
CO2 SERPL-SCNC: 21 MMOL/L — LOW (ref 22–31)
CREAT SERPL-MCNC: 0.35 MG/DL — LOW (ref 0.5–1.3)
CREAT SERPL-MCNC: 0.35 MG/DL — LOW (ref 0.5–1.3)
EGFR: 118 ML/MIN/1.73M2 — SIGNIFICANT CHANGE UP
EGFR: 118 ML/MIN/1.73M2 — SIGNIFICANT CHANGE UP
EOSINOPHIL # BLD AUTO: 0.03 K/UL — SIGNIFICANT CHANGE UP (ref 0–0.5)
EOSINOPHIL # BLD AUTO: 0.03 K/UL — SIGNIFICANT CHANGE UP (ref 0–0.5)
EOSINOPHIL NFR BLD AUTO: 0.6 % — SIGNIFICANT CHANGE UP (ref 0–6)
EOSINOPHIL NFR BLD AUTO: 0.6 % — SIGNIFICANT CHANGE UP (ref 0–6)
GLUCOSE BLDC GLUCOMTR-MCNC: 106 MG/DL — HIGH (ref 70–99)
GLUCOSE BLDC GLUCOMTR-MCNC: 106 MG/DL — HIGH (ref 70–99)
GLUCOSE BLDC GLUCOMTR-MCNC: 151 MG/DL — HIGH (ref 70–99)
GLUCOSE BLDC GLUCOMTR-MCNC: 151 MG/DL — HIGH (ref 70–99)
GLUCOSE BLDC GLUCOMTR-MCNC: 169 MG/DL — HIGH (ref 70–99)
GLUCOSE BLDC GLUCOMTR-MCNC: 169 MG/DL — HIGH (ref 70–99)
GLUCOSE SERPL-MCNC: 174 MG/DL — HIGH (ref 70–99)
GLUCOSE SERPL-MCNC: 174 MG/DL — HIGH (ref 70–99)
HCT VFR BLD CALC: 26.4 % — LOW (ref 34.5–45)
HCT VFR BLD CALC: 26.4 % — LOW (ref 34.5–45)
HGB BLD-MCNC: 8.8 G/DL — LOW (ref 11.5–15.5)
HGB BLD-MCNC: 8.8 G/DL — LOW (ref 11.5–15.5)
IANC: 4.16 K/UL — SIGNIFICANT CHANGE UP (ref 1.8–7.4)
IANC: 4.16 K/UL — SIGNIFICANT CHANGE UP (ref 1.8–7.4)
IMM GRANULOCYTES NFR BLD AUTO: 0.6 % — SIGNIFICANT CHANGE UP (ref 0–0.9)
IMM GRANULOCYTES NFR BLD AUTO: 0.6 % — SIGNIFICANT CHANGE UP (ref 0–0.9)
LYMPHOCYTES # BLD AUTO: 0.95 K/UL — LOW (ref 1–3.3)
LYMPHOCYTES # BLD AUTO: 0.95 K/UL — LOW (ref 1–3.3)
LYMPHOCYTES # BLD AUTO: 17.7 % — SIGNIFICANT CHANGE UP (ref 13–44)
LYMPHOCYTES # BLD AUTO: 17.7 % — SIGNIFICANT CHANGE UP (ref 13–44)
MAGNESIUM SERPL-MCNC: 2.1 MG/DL — SIGNIFICANT CHANGE UP (ref 1.6–2.6)
MAGNESIUM SERPL-MCNC: 2.1 MG/DL — SIGNIFICANT CHANGE UP (ref 1.6–2.6)
MCHC RBC-ENTMCNC: 28.9 PG — SIGNIFICANT CHANGE UP (ref 27–34)
MCHC RBC-ENTMCNC: 28.9 PG — SIGNIFICANT CHANGE UP (ref 27–34)
MCHC RBC-ENTMCNC: 33.3 GM/DL — SIGNIFICANT CHANGE UP (ref 32–36)
MCHC RBC-ENTMCNC: 33.3 GM/DL — SIGNIFICANT CHANGE UP (ref 32–36)
MCV RBC AUTO: 86.8 FL — SIGNIFICANT CHANGE UP (ref 80–100)
MCV RBC AUTO: 86.8 FL — SIGNIFICANT CHANGE UP (ref 80–100)
MONOCYTES # BLD AUTO: 0.18 K/UL — SIGNIFICANT CHANGE UP (ref 0–0.9)
MONOCYTES # BLD AUTO: 0.18 K/UL — SIGNIFICANT CHANGE UP (ref 0–0.9)
MONOCYTES NFR BLD AUTO: 3.4 % — SIGNIFICANT CHANGE UP (ref 2–14)
MONOCYTES NFR BLD AUTO: 3.4 % — SIGNIFICANT CHANGE UP (ref 2–14)
NEUTROPHILS # BLD AUTO: 4.16 K/UL — SIGNIFICANT CHANGE UP (ref 1.8–7.4)
NEUTROPHILS # BLD AUTO: 4.16 K/UL — SIGNIFICANT CHANGE UP (ref 1.8–7.4)
NEUTROPHILS NFR BLD AUTO: 77.5 % — HIGH (ref 43–77)
NEUTROPHILS NFR BLD AUTO: 77.5 % — HIGH (ref 43–77)
NRBC # BLD: 0 /100 WBCS — SIGNIFICANT CHANGE UP (ref 0–0)
NRBC # BLD: 0 /100 WBCS — SIGNIFICANT CHANGE UP (ref 0–0)
NRBC # FLD: 0.04 K/UL — HIGH (ref 0–0)
NRBC # FLD: 0.04 K/UL — HIGH (ref 0–0)
PHOSPHATE SERPL-MCNC: 3.3 MG/DL — SIGNIFICANT CHANGE UP (ref 2.5–4.5)
PHOSPHATE SERPL-MCNC: 3.3 MG/DL — SIGNIFICANT CHANGE UP (ref 2.5–4.5)
PLATELET # BLD AUTO: 222 K/UL — SIGNIFICANT CHANGE UP (ref 150–400)
PLATELET # BLD AUTO: 222 K/UL — SIGNIFICANT CHANGE UP (ref 150–400)
POTASSIUM SERPL-MCNC: 4.1 MMOL/L — SIGNIFICANT CHANGE UP (ref 3.5–5.3)
POTASSIUM SERPL-MCNC: 4.1 MMOL/L — SIGNIFICANT CHANGE UP (ref 3.5–5.3)
POTASSIUM SERPL-SCNC: 4.1 MMOL/L — SIGNIFICANT CHANGE UP (ref 3.5–5.3)
POTASSIUM SERPL-SCNC: 4.1 MMOL/L — SIGNIFICANT CHANGE UP (ref 3.5–5.3)
PROT SERPL-MCNC: 6.8 G/DL — SIGNIFICANT CHANGE UP (ref 6–8.3)
PROT SERPL-MCNC: 6.8 G/DL — SIGNIFICANT CHANGE UP (ref 6–8.3)
RBC # BLD: 3.04 M/UL — LOW (ref 3.8–5.2)
RBC # BLD: 3.04 M/UL — LOW (ref 3.8–5.2)
RBC # FLD: 19 % — HIGH (ref 10.3–14.5)
RBC # FLD: 19 % — HIGH (ref 10.3–14.5)
SODIUM SERPL-SCNC: 139 MMOL/L — SIGNIFICANT CHANGE UP (ref 135–145)
SODIUM SERPL-SCNC: 139 MMOL/L — SIGNIFICANT CHANGE UP (ref 135–145)
WBC # BLD: 5.36 K/UL — SIGNIFICANT CHANGE UP (ref 3.8–10.5)
WBC # BLD: 5.36 K/UL — SIGNIFICANT CHANGE UP (ref 3.8–10.5)
WBC # FLD AUTO: 5.36 K/UL — SIGNIFICANT CHANGE UP (ref 3.8–10.5)
WBC # FLD AUTO: 5.36 K/UL — SIGNIFICANT CHANGE UP (ref 3.8–10.5)

## 2023-11-01 PROCEDURE — 99232 SBSQ HOSP IP/OBS MODERATE 35: CPT

## 2023-11-01 PROCEDURE — 93010 ELECTROCARDIOGRAM REPORT: CPT

## 2023-11-01 RX ORDER — NALOXONE HYDROCHLORIDE 4 MG/.1ML
4 SPRAY NASAL
Qty: 1 | Refills: 1
Start: 2023-11-01 | End: 2023-11-02

## 2023-11-01 RX ORDER — ELECTROLYTE SOLUTION,INJ
1 VIAL (ML) INTRAVENOUS
Refills: 0 | Status: DISCONTINUED | OUTPATIENT
Start: 2023-11-01 | End: 2023-11-01

## 2023-11-01 RX ORDER — SCOPALAMINE 1 MG/3D
1 PATCH, EXTENDED RELEASE TRANSDERMAL
Qty: 1 | Refills: 0
Start: 2023-11-01 | End: 2023-11-14

## 2023-11-01 RX ORDER — ONDANSETRON 8 MG/1
1 TABLET, FILM COATED ORAL
Qty: 42 | Refills: 0
Start: 2023-11-01 | End: 2023-11-14

## 2023-11-01 RX ORDER — FENTANYL CITRATE 50 UG/ML
1 INJECTION INTRAVENOUS
Qty: 1 | Refills: 0
Start: 2023-11-01 | End: 2023-11-07

## 2023-11-01 RX ORDER — I.V. FAT EMULSION 20 G/100ML
16.6 EMULSION INTRAVENOUS
Qty: 40 | Refills: 0 | Status: DISCONTINUED | OUTPATIENT
Start: 2023-11-01 | End: 2023-11-02

## 2023-11-01 RX ORDER — HYDROMORPHONE HYDROCHLORIDE 2 MG/ML
0.5 INJECTION INTRAMUSCULAR; INTRAVENOUS; SUBCUTANEOUS
Qty: 20 | Refills: 0
Start: 2023-11-01 | End: 2023-11-06

## 2023-11-01 RX ORDER — CIPROFLOXACIN LACTATE 400MG/40ML
1 VIAL (ML) INTRAVENOUS
Qty: 14 | Refills: 0
Start: 2023-11-01 | End: 2023-11-07

## 2023-11-01 RX ORDER — HYDROMORPHONE HYDROCHLORIDE 2 MG/ML
0.5 INJECTION INTRAMUSCULAR; INTRAVENOUS; SUBCUTANEOUS
Qty: 18 | Refills: 0
Start: 2023-11-01 | End: 2023-11-06

## 2023-11-01 RX ORDER — METOCLOPRAMIDE HCL 10 MG
1 TABLET ORAL
Refills: 0 | DISCHARGE

## 2023-11-01 RX ADMIN — IRON SUCROSE 110 MILLIGRAM(S): 20 INJECTION, SOLUTION INTRAVENOUS at 13:08

## 2023-11-01 RX ADMIN — HYDROMORPHONE HYDROCHLORIDE 1 MILLIGRAM(S): 2 INJECTION INTRAMUSCULAR; INTRAVENOUS; SUBCUTANEOUS at 02:35

## 2023-11-01 RX ADMIN — Medication 1 EACH: at 18:41

## 2023-11-01 RX ADMIN — CHLORHEXIDINE GLUCONATE 1 APPLICATION(S): 213 SOLUTION TOPICAL at 13:05

## 2023-11-01 RX ADMIN — HYDROMORPHONE HYDROCHLORIDE 1 MILLIGRAM(S): 2 INJECTION INTRAMUSCULAR; INTRAVENOUS; SUBCUTANEOUS at 02:05

## 2023-11-01 RX ADMIN — I.V. FAT EMULSION 16.6 ML/HR: 20 EMULSION INTRAVENOUS at 18:45

## 2023-11-01 RX ADMIN — Medication 500 MILLIGRAM(S): at 18:14

## 2023-11-01 RX ADMIN — FENTANYL CITRATE 1 PATCH: 50 INJECTION INTRAVENOUS at 08:04

## 2023-11-01 RX ADMIN — HYDROMORPHONE HYDROCHLORIDE 1 MILLIGRAM(S): 2 INJECTION INTRAMUSCULAR; INTRAVENOUS; SUBCUTANEOUS at 18:15

## 2023-11-01 RX ADMIN — PANTOPRAZOLE SODIUM 40 MILLIGRAM(S): 20 TABLET, DELAYED RELEASE ORAL at 05:01

## 2023-11-01 RX ADMIN — APIXABAN 5 MILLIGRAM(S): 2.5 TABLET, FILM COATED ORAL at 18:14

## 2023-11-01 RX ADMIN — APIXABAN 5 MILLIGRAM(S): 2.5 TABLET, FILM COATED ORAL at 05:01

## 2023-11-01 RX ADMIN — Medication 500 MILLIGRAM(S): at 05:02

## 2023-11-01 NOTE — PROGRESS NOTE ADULT - SUBJECTIVE AND OBJECTIVE BOX
Subjective: Patient seen and examined. No new events except as noted.     SUBJECTIVE/ROS:    MEDICATIONS:  MEDICATIONS  (STANDING):  apixaban 5 milliGRAM(s) Oral every 12 hours  chlorhexidine 2% Cloths 1 Application(s) Topical daily  ciprofloxacin     Tablet 500 milliGRAM(s) Oral every 12 hours  fentaNYL   Patch  12 MICROgram(s)/Hr 1 Patch Transdermal every 72 hours  influenza   Vaccine 0.5 milliLiter(s) IntraMuscular once  iron sucrose IVPB 200 milliGRAM(s) IV Intermittent every 24 hours  lipid, fat emulsion (Fish Oil and Plant Based) 20% Infusion 14.5 mL/Hr (14.5 mL/Hr) IV Continuous <Continuous>  pantoprazole    Tablet 40 milliGRAM(s) Oral before breakfast  Parenteral Nutrition - Adult 1 Each TPN Continuous <Continuous>  scopolamine 1 mG/72 Hr(s) Patch 1 Patch Transdermal every 72 hours      PHYSICAL EXAM:  T(C): 37.6 (11-01-23 @ 05:55), Max: 37.7 (11-01-23 @ 01:35)  HR: 118 (11-01-23 @ 05:55) (99 - 120)  BP: 123/68 (11-01-23 @ 05:55) (123/68 - 139/73)  RR: 17 (11-01-23 @ 05:55) (17 - 17)  SpO2: 96% (11-01-23 @ 05:55) (96% - 100%)  Wt(kg): --  I&O's Summary    31 Oct 2023 07:01  -  01 Nov 2023 07:00  --------------------------------------------------------  IN: 0 mL / OUT: 1460 mL / NET: -1460 mL            JVP: Normal  Neck: supple  Lung: clear   CV: S1 S2 , Murmur:  Abd: soft  Ext: No edema  neuro: Awake / alert  Psych: flat affect  Skin: normal``    LABS/DATA:    CARDIAC MARKERS:                                8.3    5.69  )-----------( 202      ( 31 Oct 2023 05:45 )             24.5     10-31    135  |  103  |  14  ----------------------------<  144<H>  3.8   |  19<L>  |  0.36<L>    Ca    8.3<L>      31 Oct 2023 05:45  Phos  2.8     10-31  Mg     2.10     10-31    TPro  6.6  /  Alb  2.6<L>  /  TBili  0.4  /  DBili  x   /  AST  34<H>  /  ALT  50<H>  /  AlkPhos  259<H>  10-31    proBNP:   Lipid Profile:   HgA1c:   TSH:     TELE:  EKG:

## 2023-11-01 NOTE — PROGRESS NOTE ADULT - SUBJECTIVE AND OBJECTIVE BOX
[facility] Geriatrics and Palliative Care  Coby Villela, Palliative Care Nurse Practitioner  Contact Info: Page [phone] (Including Nights/Weekends), Message on Microsoft Teams (Coby Villela), or leave VM at Palliative Office [phone] (non-urgent)    Date of Service 11-01-23 @ 11:29    SUBJECTIVE AND OBJECTIVE:  Indication for Geriatrics and Palliative Care Services/INTERVAL HPI: Pain  Pt seen this AM, shared pain remains the same despite fentanyl patch being in place. Explained that may take up to 24 hours to feel effects from Fentanyl patch and in the interm pt has PO Dilaudid 1mg that she can take. Last dose was taken ~2am, encouraged PRN use/    OVERNIGHT EVENTS: In addition to fentanyl patch 12mcg pt required PRN PO Dilaudid 1mg x 1 within 24 hours (8a-8a).    DNR on chart:  Allergies    penicillin (Rash)    Intolerances    MEDICATIONS  (STANDING):  apixaban 5 milliGRAM(s) Oral every 12 hours  chlorhexidine 2% Cloths 1 Application(s) Topical daily  ciprofloxacin     Tablet 500 milliGRAM(s) Oral every 12 hours  fentaNYL   Patch  12 MICROgram(s)/Hr 1 Patch Transdermal every 72 hours  influenza   Vaccine 0.5 milliLiter(s) IntraMuscular once  iron sucrose IVPB 200 milliGRAM(s) IV Intermittent every 24 hours  lipid, fat emulsion (Fish Oil and Plant Based) 20% Infusion 16.6 mL/Hr (16.6 mL/Hr) IV Continuous <Continuous>  pantoprazole    Tablet 40 milliGRAM(s) Oral before breakfast  Parenteral Nutrition - Adult 1 Each TPN Continuous <Continuous>  Parenteral Nutrition - Adult 1 Each TPN Continuous <Continuous>  scopolamine 1 mG/72 Hr(s) Patch 1 Patch Transdermal every 72 hours    MEDICATIONS  (PRN):  acetaminophen     Tablet .. 650 milliGRAM(s) Oral every 6 hours PRN Mild Pain (1 - 3)  HYDROmorphone   Tablet 1 milliGRAM(s) Oral every 4 hours PRN Moderate and Severe Pain  LORazepam     Tablet 0.25 milliGRAM(s) Oral every 6 hours PRN Nausea and/or Vomiting  naloxone Injectable 0.1 milliGRAM(s) IV Push every 3 minutes PRN For ANY of the following changes in patient status:  A. RR LESS THAN 10 breaths per minute, B. Oxygen saturation LESS THAN 90%, C. Sedation score of 6  ondansetron   Disintegrating Tablet 8 milliGRAM(s) Oral every 8 hours PRN Nausea  sodium chloride 0.9% lock flush 10 milliLiter(s) IV Push every 1 hour PRN Pre/post blood products, medications, blood draw, and to maintain line patency      -------------------------------------------------------------------------------------------------------  ITEMS UNCHECKED ARE NOT PRESENT    PRESENT SYMPTOMS: [ ]Unable to self-report - see [ ] CPOT [ ] PAINADS [ ] RDOS  Source if other than patient:  [ ]Family   [ ]Team       Pain:  [x ]yes [ ]no  QOL impact -   Location -  mid abdomen                  Aggravating factors -  Quality - aching  Radiation - none  Timing- intermittent  Severity (0-10 scale): 7/10  Minimal acceptable level (0-10 scale): 2-3/10    CPOT:    https://www.[email or web address]/getattachment/[email or web address]/Critical-Care-Pain-Observation-Tool-(CPOT)    PAINAD Score: See PAINAD tool and score below       RDOS: See RDOS tool and score below   0 to 2  minimal or no respiratory distress   3  mild distress  4 to 6 moderate distress  >7 severe distress    Dyspnea:                           [ ]Mild [ ]Moderate [ ]Severe  Anxiety:                             [ ]Mild [ ]Moderate [ ]Severe  Fatigue:                             [ ]Mild [ ]Moderate [ ]Severe  Nausea:                             [ ]Mild [ ]Moderate [ ]Severe  Loss of appetite:              [ ]Mild [ ]Moderate [ ]Severe  Constipation:                    [ ]Mild [ ]Moderate [ ]Severe  Other Symptoms:  [X ]All other review of systems negative     Home Medications for Symptoms if present:    I Stop Reference no:     -------------------------------------------------------------------------------------------------------  PCSSQ[Palliative Care Spiritual Screening Question]   Severity (0-10):  Score of 4 or > indicate consideration of Chaplaincy referral.  Chaplaincy Referral: [ ] yes [ ] refused [ ] following [X ] Deferred     Caregiver Sarita? : [ ] yes [ ] no [ ] Deferred [X ] Declined             Social work referral [ ] Patient & Family Centered Care Referral [ ]     Anticipatory Grief present?:  [ ] yes [ ] no  [ X] Deferred                  Social work referral [ ] Chaplaincy Referral [ ]    -------------------------------------------------------------------------------------------------------  PHYSICAL EXAM:  Vital Signs Last 24 Hrs  T(C): 37.3 (01 Nov 2023 08:57), Max: 37.7 (01 Nov 2023 01:35)  T(F): 99.1 (01 Nov 2023 08:57), Max: 99.9 (01 Nov 2023 01:35)  HR: 113 (01 Nov 2023 08:57) (99 - 120)  BP: 107/51 (01 Nov 2023 08:57) (107/51 - 139/73)  BP(mean): --  RR: 17 (01 Nov 2023 08:57) (17 - 17)  SpO2: 97% (01 Nov 2023 08:57) (96% - 100%)    Parameters below as of 01 Nov 2023 08:57  Patient On (Oxygen Delivery Method): room air     I&O's Summary    31 Oct 2023 07:01  -  01 Nov 2023 07:00  --------------------------------------------------------  IN: 0 mL / OUT: 1460 mL / NET: -1460 mL         GENERAL: [ ]Cachexia    [x ]Alert  [x ]Oriented x 3  [ ]Lethargic  [ ]Unarousable  [x ]Verbal  [ ]Non-Verbal  Behavioral:   [ ]Anxiety  [ ]Delirium [ ]Agitation [ ]Other  HEENT:  [x ]Normal   [x]Dry mouth   [ ]ET Tube/Trach  [ ]Oral lesions  PULMONARY:   [ ]Clear [ ]Tachypnea  [ ]Audible excessive secretions   [ x]Rhonchi        [ ]Right [ ]Left [ ]Bilateral  [ ]Crackles        [ ]Right [ ]Left [ ]Bilateral  [ ]Wheezing     [ ]Right [ ]Left [ ]Bilateral  [ ]Diminished BS [ ] Right [ ]Left [ ]Bilateral  CARDIOVASCULAR:    [x ]Regular [ ]Irregular [ ]Tachy  [ ]Bao [ ]Murmur [ ]Other  GASTROINTESTINAL: has ostomy  [ x]Soft  [ ]Distended   [x]+BS  [ ]Non tender [x ]Tender  [ ]Other [ ]PEG [ ]OGT/ NGT   Last BM:ostomy  GENITOURINARY:  [x ]Normal [ ]Incontinent   [ ]Oliguria/Anuria   [ ]Pierce  MUSCULOSKELETAL:   [ ]Normal   [ x]Weakness  [ ]Bed/Wheelchair bound [ ]Edema  NEUROLOGIC:   [x ]No focal deficits  [ ] Cognitive impairment  [ ] Dysphagia [ ]Dysarthria [ ] Paresis [ ]Other   SKIN:   [ x]Normal  [ ]Rash  [ ]Other  [ ]Pressure ulcer(s) [ ]y [ ]n present on admission    -------------------------------------------------------------------------------------------------------  CRITICAL CARE:  [ ]Shock Present  [ ]Septic [ ]Cardiogenic [ ]Neurologic [ ]Hypovolemic  [ ]Vasopressors [ ]Inotropes  [ ]Respiratory failure present [ ]Mechanical Ventilation [ ]Non-invasive ventilatory support [ ]High-Flow   [ ]Acute  [ ]Chronic [ ]Hypoxic  [ ]Hypercarbic [ ]Other  [ ]Other organ failure     -------------------------------------------------------------------------------------------------------  LABS:                        8.8    5.36  )-----------( 222      ( 01 Nov 2023 05:55 )             26.4   11-01    139  |  106  |  16  ----------------------------<  174<H>  4.1   |  21<L>  |  0.35<L>    Ca    8.7      01 Nov 2023 05:55  Phos  3.3     11-01  Mg     2.10     11-01    TPro  6.8  /  Alb  2.6<L>  /  TBili  0.3  /  DBili  x   /  AST  33<H>  /  ALT  42<H>  /  AlkPhos  254<H>  11-01      Urinalysis Basic - ( 01 Nov 2023 05:55 )    Color: x / Appearance: x / SG: x / pH: x  Gluc: 174 mg/dL / Ketone: x  / Bili: x / Urobili: x   Blood: x / Protein: x / Nitrite: x   Leuk Esterase: x / RBC: x / WBC x   Sq Epi: x / Non Sq Epi: x / Bacteria: x      -------------------------------------------------------------------------------------------------------  RADIOLOGY & ADDITIONAL STUDIES: < from: CT Abdomen and Pelvis w/ IV Cont (10.28.23 @ 12:54) >  IMPRESSION:  Increased size of right pleural effusion, now large.    Similar/decreased hepatic and perihepatichematomas. Similar right   hepatic lobe hypodensities, possibly representing infarct or small   collections.      --- End of Report ---    < end of copied text >    -------------------------------------------------------------------------------------------------------  Protein Calorie Malnutrition Present: [ ]mild [ ]moderate [ ]severe [ ]underweight [ ]morbid obesity  https://www.[email or web address]/vault/2440/web/files/ONC/Table_Clinical%20Characteristics%20to%20Document%20Malnutrition-White%20JV%20et%20al%202012.pdf    Height (cm): 154.9 (10-04-23 @ 07:27), 156.3 (09-05-23 @ 11:06), 154.9 (07-21-23 @ 08:27)  Weight (kg): 56.4 (10-26-23 @ 04:00), 71.5 (09-05-23 @ 11:06), 75.3 (07-21-23 @ 08:27)  BMI (kg/m2): 23.5 (10-26-23 @ 04:00), 29.8 (10-04-23 @ 07:27), 29.3 (09-05-23 @ 11:06)    Palliative Performance Status Version 2:   See PPSv2 tool and score below       [ ]PPSV2 < or = 30%  [ ]significant weight loss [ ]poor nutritional intake [ ]anasarca[ ]Artificial Nutrition    Other REFERRALS:  [ ]Hospice  [ ]Child Life  [ ]Social Work  [ ]Case management [ ]Holistic Therapy     -------------------------------------------------------------------------------------------------------

## 2023-11-01 NOTE — PROGRESS NOTE ADULT - ASSESSMENT
tachycardia  due to anemia and sepsis and large pleural effusion   consider drain by IR or thoracic surgery     on a/c as per primary team for brachial DVT    sepsis  uti  on abx  plan as per ID     HTN  stable     Monitor hemoglobin, transfuse as needed.

## 2023-11-01 NOTE — DISCHARGE NOTE NURSING/CASE MANAGEMENT/SOCIAL WORK - NSDCPEFALRISK_GEN_ALL_CORE
For information on Fall & Injury Prevention, visit: [email or web address] OR  [email or web address] OR  https://www.cdc.gov/steadi/patient.html

## 2023-11-01 NOTE — DISCHARGE NOTE NURSING/CASE MANAGEMENT/SOCIAL WORK - PATIENT PORTAL LINK FT
You can access the FollowMyHealth Patient Portal offered by [facility] by registering at the following website: [email or web address]. By joining [employer]’s FollowMyHealth portal, you will also be able to view your health information using other applications (apps) compatible with our system.

## 2023-11-01 NOTE — DISCHARGE NOTE NURSING/CASE MANAGEMENT/SOCIAL WORK - NSDCFUADDAPPT_GEN_ALL_CORE_FT
Supportive Oncology (Palliative Care)  [facility], [facility] Phone: [phone] [address] [id number]

## 2023-11-01 NOTE — PROGRESS NOTE ADULT - SUBJECTIVE AND OBJECTIVE BOX
NUTRITION NOTE  [id number] OWENSSTEWHILLARY  ===============================    Interval events - Patient was seen and examined at bedside, no acute events overnight. Patient denies chest pain, shortness of breath, nausea or vomiting at this time. Pt remains on TPN cycled over 12 hours.     ROS: Except as noted above, all other systems reviewed and are negative     Allergies  penicillin (Rash)    PAST MEDICAL & SURGICAL HISTORY:  History of sickle cell trait  Malignant neoplasm of appendix  History of ectopic pregnancy  H/O colonoscopy  H/O endoscopy    Vital Signs Last 24 Hrs  T(C): 37.3 (01 Nov 2023 11:40), Max: 37.7 (01 Nov 2023 01:35)  T(F): 99.2 (01 Nov 2023 11:40), Max: 99.9 (01 Nov 2023 01:35)  HR: 120 (01 Nov 2023 11:40) (99 - 120)  BP: 107/51 (01 Nov 2023 08:57) (107/51 - 139/73)  RR: 17 (01 Nov 2023 08:57) (17 - 17)  SpO2: 97% (01 Nov 2023 08:57) (96% - 100%)    MEDICATIONS  (STANDING):  apixaban 5 milliGRAM(s) Oral every 12 hours  chlorhexidine 2% Cloths 1 Application(s) Topical daily  ciprofloxacin     Tablet 500 milliGRAM(s) Oral every 12 hours  fentaNYL   Patch  12 MICROgram(s)/Hr 1 Patch Transdermal every 72 hours  influenza   Vaccine 0.5 milliLiter(s) IntraMuscular once  iron sucrose IVPB 200 milliGRAM(s) IV Intermittent every 24 hours  lipid, fat emulsion (Fish Oil and Plant Based) 20% Infusion 16.6 mL/Hr (16.6 mL/Hr) IV Continuous <Continuous>  pantoprazole    Tablet 40 milliGRAM(s) Oral before breakfast  Parenteral Nutrition - Adult 1 Each TPN Continuous <Continuous>  Parenteral Nutrition - Adult 1 Each TPN Continuous <Continuous>  scopolamine 1 mG/72 Hr(s) Patch 1 Patch Transdermal every 72 hours    MEDICATIONS  (PRN):  acetaminophen     Tablet .. 650 milliGRAM(s) Oral every 6 hours PRN Mild Pain (1 - 3)  HYDROmorphone   Tablet 1 milliGRAM(s) Oral every 4 hours PRN Moderate and Severe Pain  LORazepam     Tablet 0.25 milliGRAM(s) Oral every 6 hours PRN Nausea and/or Vomiting  naloxone Injectable 0.1 milliGRAM(s) IV Push every 3 minutes PRN For ANY of the following changes in patient status:  A. RR LESS THAN 10 breaths per minute, B. Oxygen saturation LESS THAN 90%, C. Sedation score of 6  ondansetron   Disintegrating Tablet 8 milliGRAM(s) Oral every 8 hours PRN Nausea  sodium chloride 0.9% lock flush 10 milliLiter(s) IV Push every 1 hour PRN Pre/post blood products, medications, blood draw, and to maintain line patency    I&O's Detail    31 Oct 2023 07:01  -  01 Nov 2023 07:00  --------------------------------------------------------  IN:  Total IN: 0 mL    OUT:    Ileostomy (mL): 410 mL    Oral Fluid: 0 mL    Voided (mL): 1050 mL  Total OUT: 1460 mL    Total NET: -1460 mL    POCT Blood Glucose.: 169 mg/dL (01 Nov 2023 05:54)  POCT Blood Glucose.: 151 mg/dL (01 Nov 2023 01:03)  POCT Blood Glucose.: 111 mg/dL (31 Oct 2023 12:09)    Drug Dosing Weight  Height (cm): 154.9 (04 Oct 2023 07:27)  Weight (kg): 68.9 (04 Oct 2023 07:27)  BMI (kg/m2): 28.7 (04 Oct 2023 07:27)  BSA (m2): 1.68 (04 Oct 2023 07:27)    PHYSICAL EXAM:  General: NAD, resting in bed comfortably.  Cardiac: regular rate, warm and well perfused  Respiratory: Nonlabored respirations, normal cw expansion.  Abdomen: soft, nontender, nondistended  Extremities: normal strength, FROM, no deformities  PICC Site: RUE PICC site clean and day (placed on 10/24/23)    Diet: NPO and TPN/lipids (started on 10/24/23)    LABORATORY                                          8.8    5.36  )-----------( 222      ( 01 Nov 2023 05:55 )             26.4   11-01    139  |  106  |  16  ----------------------------<  174<H>  4.1   |  21<L>  |  0.35<L>    Ca    8.7      01 Nov 2023 05:55  Phos  3.3     11-01  Mg     2.10     11-01    TPro  6.8  /  Alb  2.6<L>  /  TBili  0.3  /  DBili  x   /  AST  33<H>  /  ALT  42<H>  /  AlkPhos  254<H>  11-01    LIVER FUNCTIONS - ( 01 Nov 2023 05:55 )  Alb: 2.6 g/dL / Pro: 6.8 g/dL / ALK PHOS: 254 U/L / ALT: 42 U/L / AST: 33 U/L / GGT: x           10-25 Chol -- LDL -- HDL -- Trig 87    CT A/P 10/28 notable for large R pleural effusion, increased in size from previous imaging, decreased hepatic and perihepatic hematomas, similar R hepatic lobe hypodensities (unclear if infarct vs small collections).     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 Patient's course c/b continuous retching and intolerance to tube feeds. Nutrition support consult called for prolonged NPO and severe protein calorie malnutrition. PICC placed and TPN started on 10/24/23.    continue TPN with infusion volume of 1.5L, TPN will provide 1508 kcal/day    labs reviewed - electrolytes adjusted in TPN bag     monitor fingersticks, obtain daily weights     continue parenteral nutrition at this time, will follow up with primary team on plan, discharge planning in process with home TPN today; TPN formula verbally verified with outpatient pharmacy      - Outpatient TPN via [facility] and follow up with Dr Jeffery Sullivan:    [id number]  [place] [address]  Entrance on [facility]  Phone [phone]    Please instruct patient when calling to make the appointment to schedule for the first MONDAY after discharge from hospital. Please instruct patient to explain she is on TPN when making the appointment. Check labs, CMP, electrolytes, ionized Calcium, triglycerides and fingersticks- frequency to be determined by Dr. Sullivan as outpatient.    1.  Severe protein calorie malnutrition being optimized with TPN: CHO [220] gm.  AA [90] gm. SMOF Lipids [40] gm.  2.  Hyperglycemia managed with: [0] units of regular insulin    3.  Check fluid balance daily.  Strict I/O  [ ] [ ]   4.  Daily BMP, Ionized Calcium, Magnesium and Phosphorous   5.  Triglycerides at initiation of TPN and monthly 10-25 Chol -- LDL -- HDL -- Trig 87    Nutrition Support [id number]

## 2023-11-01 NOTE — DISCHARGE NOTE NURSING/CASE MANAGEMENT/SOCIAL WORK - NSSCTYPOFSERV_GEN_ALL_CORE
Nurse to call and schedule a visit. Nurse to call and schedule a visit the day after discharge for ostomy care.

## 2023-11-01 NOTE — PROGRESS NOTE ADULT - ASSESSMENT
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 Found to have L IJ and innominate vein acute non-occlusive thrombosis, therapeutic lovenox held i/s/o expanding subcapsular liver hematomas found on CT 10/20/23. Course complicated by post-operative nausea now resolved,     - Intermittent fevers and tachycardia, fever workup revealing for bacteriuria with cultures positive for >100,000 CFU GNR per hpf.   ID consulted, notes UTI less likely cause of systemic response in absence of urinary symptomatology.   CT A/P 10/28 notable for large R pleural effusion, increased in size from previous imaging, decreased hepatic and perihepatic hematomas, similar R hepatic lobe hypodensities (unclear if infarct vs small collections).   ABx: ceftriaxone changed to cipro to monitor if tolerates.  - Diet: NPO/TPN.    - Pain: APAP ATC, po dilaudid + fentanyl patch, awaiting palliative recommendations  - Continue scopolamine and lorazepam for nausea control, current regimen appears to be working well.   - Hematology consulted for CARMELA (likely anemia of inflammation), start Iron infusion 10/30 x 5 days. s/p 1u PRBC 10/30  - Wound care: Continue to evaluate ostomy daily.   - AC: Eliquis 5mg BID  - PT/OT consult appreciated, home with home PT.  - Dispo: Pending

## 2023-11-01 NOTE — PROGRESS NOTE ADULT - SUBJECTIVE AND OBJECTIVE BOX
TEAM [ D ] Surgery Daily Progress Note  =====================================================    SUBJECTIVE: Patient seen and examined at bedside on AM rounds. Patient reports vomiting is better.  +/+ ostomy function. OOB/Amublating as tolerated. Denies fever, chills.    ALLERGIES:  penicillin (Rash)    --------------------------------------------------------------------------------------    MEDICATIONS:    Neurologic Medications  acetaminophen     Tablet .. 650 milliGRAM(s) Oral every 6 hours PRN Mild Pain (1 - 3)  fentaNYL   Patch  12 MICROgram(s)/Hr 1 Patch Transdermal every 72 hours  HYDROmorphone   Tablet 1 milliGRAM(s) Oral every 4 hours PRN Moderate and Severe Pain  LORazepam   Injectable 0.25 milliGRAM(s) IV Push every 6 hours PRN Nausea and/or Vomiting  ondansetron   Disintegrating Tablet 8 milliGRAM(s) Oral every 8 hours PRN Nausea  scopolamine 1 mG/72 Hr(s) Patch 1 Patch Transdermal every 72 hours    Gastrointestinal Medications  iron sucrose IVPB 200 milliGRAM(s) IV Intermittent every 24 hours  lipid, fat emulsion (Fish Oil and Plant Based) 20% Infusion 14.5 mL/Hr IV Continuous <Continuous>  pantoprazole    Tablet 40 milliGRAM(s) Oral before breakfast  Parenteral Nutrition - Adult 1 Each TPN Continuous <Continuous>  sodium chloride 0.9% lock flush 10 milliLiter(s) IV Push every 1 hour PRN Pre/post blood products, medications, blood draw, and to maintain line patency    Hematologic/Oncologic Medications  apixaban 5 milliGRAM(s) Oral every 12 hours  influenza   Vaccine 0.5 milliLiter(s) IntraMuscular once    Antimicrobial/Immunologic Medications  ciprofloxacin     Tablet 500 milliGRAM(s) Oral every 12 hours    Topical/Other Medications  chlorhexidine 2% Cloths 1 Application(s) Topical daily  naloxone Injectable 0.1 milliGRAM(s) IV Push every 3 minutes PRN For ANY of the following changes in patient status:  A. RR LESS THAN 10 breaths per minute, B. Oxygen saturation LESS THAN 90%, C. Sedation score of 6    --------------------------------------------------------------------------------------    VITAL SIGNS:  T(C): 37.6 (11-01-23 @ 05:55), Max: 37.7 (11-01-23 @ 01:35)  HR: 118 (11-01-23 @ 05:55) (99 - 120)  BP: 123/68 (11-01-23 @ 05:55) (123/68 - 139/73)  RR: 17 (11-01-23 @ 05:55) (17 - 17)  SpO2: 96% (11-01-23 @ 05:55) (96% - 100%)  --------------------------------------------------------------------------------------    EXAM    General: NAD, resting in bed comfortably.  Cardiac: regular rate, warm and well perfused  Respiratory: Nonlabored respirations, normal cw expansion.  Abdomen: soft, nontender, nondistended. ostomy +/+, midline small hematoma improving no drainage.  Extremities: normal strength, FROM, no deformities      --------------------------------------------------------------------------------------    INS AND OUTS:    10-31-23 @ 07:01  -  11-01-23 @ 07:00  --------------------------------------------------------  IN: 0 mL / OUT: 1460 mL / NET: -1460 mL      --------------------------------------------------------------------------------------

## 2023-11-02 LAB
ALBUMIN SERPL ELPH-MCNC: 2.8 G/DL — LOW (ref 3.3–5)
ALBUMIN SERPL ELPH-MCNC: 2.8 G/DL — LOW (ref 3.3–5)
ALP SERPL-CCNC: 319 U/L — HIGH (ref 40–120)
ALP SERPL-CCNC: 319 U/L — HIGH (ref 40–120)
ALT FLD-CCNC: 47 U/L — HIGH (ref 4–33)
ALT FLD-CCNC: 47 U/L — HIGH (ref 4–33)
ANION GAP SERPL CALC-SCNC: 12 MMOL/L — SIGNIFICANT CHANGE UP (ref 7–14)
ANION GAP SERPL CALC-SCNC: 12 MMOL/L — SIGNIFICANT CHANGE UP (ref 7–14)
AST SERPL-CCNC: 44 U/L — HIGH (ref 4–32)
AST SERPL-CCNC: 44 U/L — HIGH (ref 4–32)
BASOPHILS # BLD AUTO: 0.01 K/UL — SIGNIFICANT CHANGE UP (ref 0–0.2)
BASOPHILS # BLD AUTO: 0.01 K/UL — SIGNIFICANT CHANGE UP (ref 0–0.2)
BASOPHILS NFR BLD AUTO: 0.3 % — SIGNIFICANT CHANGE UP (ref 0–2)
BASOPHILS NFR BLD AUTO: 0.3 % — SIGNIFICANT CHANGE UP (ref 0–2)
BILIRUB DIRECT SERPL-MCNC: <0.2 MG/DL — SIGNIFICANT CHANGE UP (ref 0–0.3)
BILIRUB DIRECT SERPL-MCNC: <0.2 MG/DL — SIGNIFICANT CHANGE UP (ref 0–0.3)
BILIRUB INDIRECT FLD-MCNC: >0.3 MG/DL — SIGNIFICANT CHANGE UP (ref 0–1)
BILIRUB INDIRECT FLD-MCNC: >0.3 MG/DL — SIGNIFICANT CHANGE UP (ref 0–1)
BILIRUB SERPL-MCNC: 0.5 MG/DL — SIGNIFICANT CHANGE UP (ref 0.2–1.2)
BILIRUB SERPL-MCNC: 0.5 MG/DL — SIGNIFICANT CHANGE UP (ref 0.2–1.2)
BUN SERPL-MCNC: 22 MG/DL — SIGNIFICANT CHANGE UP (ref 7–23)
BUN SERPL-MCNC: 22 MG/DL — SIGNIFICANT CHANGE UP (ref 7–23)
CA-I BLD-SCNC: 1.06 MMOL/L — LOW (ref 1.15–1.29)
CA-I BLD-SCNC: 1.06 MMOL/L — LOW (ref 1.15–1.29)
CA-I BLD-SCNC: 1.19 MMOL/L — SIGNIFICANT CHANGE UP (ref 1.15–1.29)
CA-I BLD-SCNC: 1.19 MMOL/L — SIGNIFICANT CHANGE UP (ref 1.15–1.29)
CALCIUM SERPL-MCNC: 9.4 MG/DL — SIGNIFICANT CHANGE UP (ref 8.4–10.5)
CALCIUM SERPL-MCNC: 9.4 MG/DL — SIGNIFICANT CHANGE UP (ref 8.4–10.5)
CHLORIDE SERPL-SCNC: 110 MMOL/L — HIGH (ref 98–107)
CHLORIDE SERPL-SCNC: 110 MMOL/L — HIGH (ref 98–107)
CO2 SERPL-SCNC: 22 MMOL/L — SIGNIFICANT CHANGE UP (ref 22–31)
CO2 SERPL-SCNC: 22 MMOL/L — SIGNIFICANT CHANGE UP (ref 22–31)
CREAT SERPL-MCNC: 0.41 MG/DL — LOW (ref 0.5–1.3)
CREAT SERPL-MCNC: 0.41 MG/DL — LOW (ref 0.5–1.3)
CULTURE RESULTS: SIGNIFICANT CHANGE UP
EGFR: 113 ML/MIN/1.73M2 — SIGNIFICANT CHANGE UP
EGFR: 113 ML/MIN/1.73M2 — SIGNIFICANT CHANGE UP
EOSINOPHIL # BLD AUTO: 0.02 K/UL — SIGNIFICANT CHANGE UP (ref 0–0.5)
EOSINOPHIL # BLD AUTO: 0.02 K/UL — SIGNIFICANT CHANGE UP (ref 0–0.5)
EOSINOPHIL NFR BLD AUTO: 0.6 % — SIGNIFICANT CHANGE UP (ref 0–6)
EOSINOPHIL NFR BLD AUTO: 0.6 % — SIGNIFICANT CHANGE UP (ref 0–6)
GLUCOSE BLDC GLUCOMTR-MCNC: 139 MG/DL — HIGH (ref 70–99)
GLUCOSE BLDC GLUCOMTR-MCNC: 139 MG/DL — HIGH (ref 70–99)
GLUCOSE BLDC GLUCOMTR-MCNC: 152 MG/DL — HIGH (ref 70–99)
GLUCOSE BLDC GLUCOMTR-MCNC: 152 MG/DL — HIGH (ref 70–99)
GLUCOSE SERPL-MCNC: 136 MG/DL — HIGH (ref 70–99)
GLUCOSE SERPL-MCNC: 136 MG/DL — HIGH (ref 70–99)
HCT VFR BLD CALC: 28.3 % — LOW (ref 34.5–45)
HCT VFR BLD CALC: 28.3 % — LOW (ref 34.5–45)
HCT VFR BLD CALC: 28.9 % — LOW (ref 34.5–45)
HCT VFR BLD CALC: 28.9 % — LOW (ref 34.5–45)
HGB BLD-MCNC: 8.1 G/DL — LOW (ref 11.5–15.5)
HGB BLD-MCNC: 8.1 G/DL — LOW (ref 11.5–15.5)
HGB BLD-MCNC: 9 G/DL — LOW (ref 11.5–15.5)
HGB BLD-MCNC: 9 G/DL — LOW (ref 11.5–15.5)
IANC: 2.55 K/UL — SIGNIFICANT CHANGE UP (ref 1.8–7.4)
IANC: 2.55 K/UL — SIGNIFICANT CHANGE UP (ref 1.8–7.4)
IMM GRANULOCYTES NFR BLD AUTO: 0.6 % — SIGNIFICANT CHANGE UP (ref 0–0.9)
IMM GRANULOCYTES NFR BLD AUTO: 0.6 % — SIGNIFICANT CHANGE UP (ref 0–0.9)
LYMPHOCYTES # BLD AUTO: 0.85 K/UL — LOW (ref 1–3.3)
LYMPHOCYTES # BLD AUTO: 0.85 K/UL — LOW (ref 1–3.3)
LYMPHOCYTES # BLD AUTO: 23.4 % — SIGNIFICANT CHANGE UP (ref 13–44)
LYMPHOCYTES # BLD AUTO: 23.4 % — SIGNIFICANT CHANGE UP (ref 13–44)
MAGNESIUM SERPL-MCNC: 2.2 MG/DL — SIGNIFICANT CHANGE UP (ref 1.6–2.6)
MAGNESIUM SERPL-MCNC: 2.2 MG/DL — SIGNIFICANT CHANGE UP (ref 1.6–2.6)
MAGNESIUM SERPL-MCNC: 3.6 MG/DL — HIGH (ref 1.6–2.6)
MAGNESIUM SERPL-MCNC: 3.6 MG/DL — HIGH (ref 1.6–2.6)
MCHC RBC-ENTMCNC: 28 GM/DL — LOW (ref 32–36)
MCHC RBC-ENTMCNC: 28 GM/DL — LOW (ref 32–36)
MCHC RBC-ENTMCNC: 28.4 PG — SIGNIFICANT CHANGE UP (ref 27–34)
MCHC RBC-ENTMCNC: 28.4 PG — SIGNIFICANT CHANGE UP (ref 27–34)
MCHC RBC-ENTMCNC: 31.8 GM/DL — LOW (ref 32–36)
MCHC RBC-ENTMCNC: 31.8 GM/DL — LOW (ref 32–36)
MCHC RBC-ENTMCNC: 34.6 PG — HIGH (ref 27–34)
MCHC RBC-ENTMCNC: 34.6 PG — HIGH (ref 27–34)
MCV RBC AUTO: 123.5 FL — HIGH (ref 80–100)
MCV RBC AUTO: 123.5 FL — HIGH (ref 80–100)
MCV RBC AUTO: 89.3 FL — SIGNIFICANT CHANGE UP (ref 80–100)
MCV RBC AUTO: 89.3 FL — SIGNIFICANT CHANGE UP (ref 80–100)
MONOCYTES # BLD AUTO: 0.18 K/UL — SIGNIFICANT CHANGE UP (ref 0–0.9)
MONOCYTES # BLD AUTO: 0.18 K/UL — SIGNIFICANT CHANGE UP (ref 0–0.9)
MONOCYTES NFR BLD AUTO: 5 % — SIGNIFICANT CHANGE UP (ref 2–14)
MONOCYTES NFR BLD AUTO: 5 % — SIGNIFICANT CHANGE UP (ref 2–14)
NEUTROPHILS # BLD AUTO: 2.55 K/UL — SIGNIFICANT CHANGE UP (ref 1.8–7.4)
NEUTROPHILS # BLD AUTO: 2.55 K/UL — SIGNIFICANT CHANGE UP (ref 1.8–7.4)
NEUTROPHILS NFR BLD AUTO: 70.1 % — SIGNIFICANT CHANGE UP (ref 43–77)
NEUTROPHILS NFR BLD AUTO: 70.1 % — SIGNIFICANT CHANGE UP (ref 43–77)
NRBC # BLD: 0 /100 WBCS — SIGNIFICANT CHANGE UP (ref 0–0)
NRBC # FLD: 0.02 K/UL — HIGH (ref 0–0)
NRBC # FLD: 0.02 K/UL — HIGH (ref 0–0)
NRBC # FLD: 0.03 K/UL — HIGH (ref 0–0)
NRBC # FLD: 0.03 K/UL — HIGH (ref 0–0)
PHOSPHATE SERPL-MCNC: 3.8 MG/DL — SIGNIFICANT CHANGE UP (ref 2.5–4.5)
PHOSPHATE SERPL-MCNC: 3.8 MG/DL — SIGNIFICANT CHANGE UP (ref 2.5–4.5)
PLATELET # BLD AUTO: 197 K/UL — SIGNIFICANT CHANGE UP (ref 150–400)
PLATELET # BLD AUTO: 197 K/UL — SIGNIFICANT CHANGE UP (ref 150–400)
PLATELET # BLD AUTO: 250 K/UL — SIGNIFICANT CHANGE UP (ref 150–400)
PLATELET # BLD AUTO: 250 K/UL — SIGNIFICANT CHANGE UP (ref 150–400)
POTASSIUM SERPL-MCNC: 4.3 MMOL/L — SIGNIFICANT CHANGE UP (ref 3.5–5.3)
POTASSIUM SERPL-MCNC: 4.3 MMOL/L — SIGNIFICANT CHANGE UP (ref 3.5–5.3)
POTASSIUM SERPL-SCNC: 4.3 MMOL/L — SIGNIFICANT CHANGE UP (ref 3.5–5.3)
POTASSIUM SERPL-SCNC: 4.3 MMOL/L — SIGNIFICANT CHANGE UP (ref 3.5–5.3)
PROT SERPL-MCNC: 7.2 G/DL — SIGNIFICANT CHANGE UP (ref 6–8.3)
PROT SERPL-MCNC: 7.2 G/DL — SIGNIFICANT CHANGE UP (ref 6–8.3)
RBC # BLD: 2.34 M/UL — LOW (ref 3.8–5.2)
RBC # BLD: 2.34 M/UL — LOW (ref 3.8–5.2)
RBC # BLD: 3.17 M/UL — LOW (ref 3.8–5.2)
RBC # BLD: 3.17 M/UL — LOW (ref 3.8–5.2)
RBC # FLD: 19 % — HIGH (ref 10.3–14.5)
RBC # FLD: 19 % — HIGH (ref 10.3–14.5)
RBC # FLD: 25.7 % — HIGH (ref 10.3–14.5)
RBC # FLD: 25.7 % — HIGH (ref 10.3–14.5)
SODIUM SERPL-SCNC: 144 MMOL/L — SIGNIFICANT CHANGE UP (ref 135–145)
SODIUM SERPL-SCNC: 144 MMOL/L — SIGNIFICANT CHANGE UP (ref 135–145)
SPECIMEN SOURCE: SIGNIFICANT CHANGE UP
WBC # BLD: 3.63 K/UL — LOW (ref 3.8–10.5)
WBC # BLD: 3.63 K/UL — LOW (ref 3.8–10.5)
WBC # BLD: 4.61 K/UL — SIGNIFICANT CHANGE UP (ref 3.8–10.5)
WBC # BLD: 4.61 K/UL — SIGNIFICANT CHANGE UP (ref 3.8–10.5)
WBC # FLD AUTO: 3.63 K/UL — LOW (ref 3.8–10.5)
WBC # FLD AUTO: 3.63 K/UL — LOW (ref 3.8–10.5)
WBC # FLD AUTO: 4.61 K/UL — SIGNIFICANT CHANGE UP (ref 3.8–10.5)
WBC # FLD AUTO: 4.61 K/UL — SIGNIFICANT CHANGE UP (ref 3.8–10.5)

## 2023-11-02 PROCEDURE — 99233 SBSQ HOSP IP/OBS HIGH 50: CPT

## 2023-11-02 PROCEDURE — 99223 1ST HOSP IP/OBS HIGH 75: CPT | Mod: GC

## 2023-11-02 PROCEDURE — 71045 X-RAY EXAM CHEST 1 VIEW: CPT | Mod: 26

## 2023-11-02 PROCEDURE — 99232 SBSQ HOSP IP/OBS MODERATE 35: CPT

## 2023-11-02 RX ORDER — I.V. FAT EMULSION 20 G/100ML
16.6 EMULSION INTRAVENOUS
Qty: 40 | Refills: 0 | Status: DISCONTINUED | OUTPATIENT
Start: 2023-11-02 | End: 2023-11-02

## 2023-11-02 RX ORDER — ELECTROLYTE SOLUTION,INJ
1 VIAL (ML) INTRAVENOUS
Refills: 0 | Status: DISCONTINUED | OUTPATIENT
Start: 2023-11-02 | End: 2023-11-02

## 2023-11-02 RX ORDER — CEFTRIAXONE 500 MG/1
1000 INJECTION, POWDER, FOR SOLUTION INTRAMUSCULAR; INTRAVENOUS EVERY 24 HOURS
Refills: 0 | Status: COMPLETED | OUTPATIENT
Start: 2023-11-02 | End: 2023-11-05

## 2023-11-02 RX ORDER — I.V. FAT EMULSION 20 G/100ML
16.6 EMULSION INTRAVENOUS
Qty: 40 | Refills: 0 | Status: DISCONTINUED | OUTPATIENT
Start: 2023-11-02 | End: 2023-11-03

## 2023-11-02 RX ADMIN — HYDROMORPHONE HYDROCHLORIDE 1 MILLIGRAM(S): 2 INJECTION INTRAMUSCULAR; INTRAVENOUS; SUBCUTANEOUS at 18:00

## 2023-11-02 RX ADMIN — Medication 500 MILLIGRAM(S): at 05:55

## 2023-11-02 RX ADMIN — HYDROMORPHONE HYDROCHLORIDE 1 MILLIGRAM(S): 2 INJECTION INTRAMUSCULAR; INTRAVENOUS; SUBCUTANEOUS at 21:44

## 2023-11-02 RX ADMIN — FENTANYL CITRATE 1 PATCH: 50 INJECTION INTRAVENOUS at 11:58

## 2023-11-02 RX ADMIN — FENTANYL CITRATE 1 PATCH: 50 INJECTION INTRAVENOUS at 06:12

## 2023-11-02 RX ADMIN — CHLORHEXIDINE GLUCONATE 1 APPLICATION(S): 213 SOLUTION TOPICAL at 12:40

## 2023-11-02 RX ADMIN — FENTANYL CITRATE 1 PATCH: 50 INJECTION INTRAVENOUS at 06:13

## 2023-11-02 RX ADMIN — IRON SUCROSE 110 MILLIGRAM(S): 20 INJECTION, SOLUTION INTRAVENOUS at 12:39

## 2023-11-02 RX ADMIN — Medication 1 EACH: at 18:09

## 2023-11-02 RX ADMIN — I.V. FAT EMULSION 16.6 ML/HR: 20 EMULSION INTRAVENOUS at 18:09

## 2023-11-02 RX ADMIN — PANTOPRAZOLE SODIUM 40 MILLIGRAM(S): 20 TABLET, DELAYED RELEASE ORAL at 05:54

## 2023-11-02 RX ADMIN — CEFTRIAXONE 100 MILLIGRAM(S): 500 INJECTION, POWDER, FOR SOLUTION INTRAMUSCULAR; INTRAVENOUS at 17:04

## 2023-11-02 RX ADMIN — HYDROMORPHONE HYDROCHLORIDE 1 MILLIGRAM(S): 2 INJECTION INTRAMUSCULAR; INTRAVENOUS; SUBCUTANEOUS at 17:04

## 2023-11-02 RX ADMIN — HYDROMORPHONE HYDROCHLORIDE 1 MILLIGRAM(S): 2 INJECTION INTRAMUSCULAR; INTRAVENOUS; SUBCUTANEOUS at 22:14

## 2023-11-02 RX ADMIN — APIXABAN 5 MILLIGRAM(S): 2.5 TABLET, FILM COATED ORAL at 05:55

## 2023-11-02 NOTE — PROGRESS NOTE ADULT - PROBLEM SELECTOR PLAN 3
Described more as retching and spitting up  Pt indicates that there  is no nausea   Would recommend:  - Can continue with Ativan 0.25mg-0.5mg PO q6hrs PRN for nausea/vomiting on discharge  - Can continue Zofran, monitor QTc as it's 487 on 10/19  - Page for uncontrolled symptoms.
Described more as retching and spitting up  Pt indicates that there  is no nausea   Would recommend:  - Can continue with Ativan 0.25mg-0.5mg PO q6hrs PRN for nausea/vomiting on discharge  - Can continue Zofran, monitor QTc as it's 487 on 10/19  - Page for uncontrolled symptoms.
Uncontrolled  Pt indicates that there  is no nausea she just starts retching and vomits clear/ mucous secretions  Would recommend:  - Ativan 0.25mg IV q6hrs PRN for nausea/vomiting, can consider increasing to 0.5mg IV q6hrs PRN  - Can continue Zofran, monitor QTc as it's 487 on 10/19  - If QTc is not an issue other medications that can be used for nausea/vomiting such as haldol 0.25mg IV q6hrs PRN  - Page for uncontrolled symptoms.
Described more as retching and spitting up  Pt indicates that there  is no nausea   Would recommend:  - Can continue with Ativan 0.25mg-0.5mg PO q6hrs PRN for nausea/vomiting on discharge  - Can continue Zofran, monitor QTc as it's 487 on 10/19  - Page for uncontrolled symptoms.

## 2023-11-02 NOTE — CONSULT NOTE ADULT - ATTENDING COMMENTS
I agree with the above history, physical examination, chief complaint/diagnosis, and plan, which I have reviewed and edited where appropriate.  I agree with notes/assessment and detailed interval history of health care providers on my service.  I have seen and examined the patient.  I reviewed the laboratory and available data and agree with the history, physical assessment and plan.  I reviewed and discussed with all consultants, house staff and PA's.  The Nutrition Support Team (NST) discusses on an ongoing basis with the primary team and all consultants, House staff and PA's to have a permanent risk benefit analyses on all decisions and coordinating care.  I was physically present for the key portions of the evaluation and management (E/M) service provided.  58F s/p appendectomy, cholecystectomy, hysterectomy, LAR with diverting loop ileostomy and bladder injury with primary repair on 10/4/23. Nutrition support consult called for evaluation for parenteral nutrition.  PHYSICAL EXAM:  General: NAD  Cardiac: regular rate  Respiratory: clear  Abdomen: soft, nontender, nondistended  Extremities; warm  Plan:  Parenteral nutrition is not indicated at this time since pt has a functional GI tract.
Patient s/p appendectomy, omentectomy, LAR, diverting loop for appendiceal neoplasm. Patient left intubated on pressors brought to sicu  N sedated on propofol and fentanyl  resp on mechanical ventilator, adequate gas exchange plan for sbt in am  cv on bari, likely hypovolemia with insensible fluid losses, pocus exam will fluid challenge, wean off bari maintain map above 65  gi npo, ngt  gu/renal monitor uop, cristina to remain with bladder injury  heme vte ppx  id no changes  endo no changes    The patient is a critical care patient with life threatening hemodynamic and metabolic instability in SICU.  I have personally interviewed when possible and examined the patient, reviewed data and laboratory tests/x-rays and all pertinent electronic images.  I was physically present for the key portions of the evaluation and management (E/M) service provided.   The SICU team has a constant risk benefit analyzes discussion with the primary team, all consultants, House Staff and PA's on all decisions.  These diagnoses are unrelated to the surgical procedure noted above.  I meet with family if needed to get further history, discuss the case and make care decisions for this patient who might not be able to participate.  Time involved in performance of separately billable procedures was not counted toward my critical care time. There is no overlap.  I spent 55-75 minutes ( 0800Hrs-0915Hrs in AM/ 1600Hrs-1715Hrs in PM, or other time indicated) of critical care time for the diagnoses, assessment, plan and interventions.  This time excludes time spent on separate procedures and teaching.
58F appendiceal adenocarcinoma s/p appendectomy, cholecystectomy, hysterectomy, omenectomy, peritenectomy, LAR with diverting loop ileostomy and bladder injury with primary repair on 10/4 who presents with retching productive of "spit up" since 6/2023, when she started chemo for her malignancy. GI is consulted for this "spit up" and resultant poor PO intake.    Reports retching (feels the motion starting in her diaphragm) and bringing up saliva and now green fluid. This is worsened with talking, strong scents, and eating. When she eats she will have "spitting up" episodes 30-60 minutes later. She never brings up the food she ate, but will bring up green fluid that looks like bile. This is worse with larger meals. She feels this is overall "worse" since June because now the fluid she brings up is green when previously it was clear, and it happens more frequently. She attributes the worsening to intraperitoneal chemo she believes she received during her operation. No dysphagia, nausea, early satiety or valente vomiting. Has a history of GERD but heartburn, not regurgitation. She had a normal endoscopy with Dr. Guerra in [place] when this symptom of "spit up" first began.     CBC with normocytic anemia which has been stable over several days. CMP with elevated alk phos and elevated transaminases. Abd soft, ttp over incisions but not otherwise. Brown stool in ostomy.    Very odd story. Suspect chemotherapy side effect vs functional vs possibly gastroparesis vs partial SBO. Do not suspect inflammatory luminal process given same symptoms as prior and has had upper endoscopy to evaluate that was unrevealing and has symptoms triggered by scents and talking which is highly atypical for a GI process.    - CTAP w PO and IV contrast (discussed that she should try her best with PO contrast)  - small frequent low fat meals  - continue reglan, zofran if QTc ok  - could consider baclofen or gabapentin for possible diaphragmatic spasm contributing to symptom generation (in the same vein as hiccups)  - daily PPI  - defer luminal eval for now
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 Found to have L IJ and innominate vein acute non-occlusive thrombosis. Consulted for increased right sided pleural effusion.   Currently patient is minimally symptomatic from a pulmonary standpoint, abdominal pain is her main issue, with significant splinting.  Last dose of Eliquis the morning of 11/2  POCUS with large right pleural effusion.  Plan:  Holding Eliquis  No indication for urgent thoracentesis at this time, will perform in the next few days  Pain control as patient is visibly splinting, unclear that she will be able to sit up for the procedure unless her pain is better controlled.
58 year old female with appendiceal cancer  S/p surgery with cholecystomy and hysterectomy on 10/4  Course complicated by left IJ thrombus  Anemia due to subscapular hematoma    on 10/26, she was febrile to 101  RVP was negative and chest x ray was clear  Blood cultures are without growth on 10/26    UA had pyuria  She denies urinary symptoms    PCN allergy with hives.     Possible UTI- but less likely in the absence of urinary symptoms  I would check a CT a/p  Change antibiotics to Ceftriaxone 1 gram iv daily with flagyl 500 q 12 pending imaging and culture data  Reasonable to trial of cephalosporin with pcn allergy
Assessment/recommendations as noted. Patient reports some improvement today-able to tolerate current meals. No vomiting today. Recommendations as noted-continue current regimen of anti-emetics. Encourage p.o. intake as tolerated with monitoring of calorie counts over next 3 days. Pantoprazole 40 mg b.i.d. Continue current regimen of Reglan.
58F with PMH of malignant neoplasm of appendix (s/p FOLFOX x 4 cycles), who presented for debulking surgery on 10/4/23. Hematology consulted for anemia.    Hematology consulted for anemia. Of note, Hgb normal in August. Labs today: WBC 7.2, Hgb 8.6, , nucleated RBC 0.02. Differential includes post-operative bleeding, AoCD, sepsis induced bone marrow suppression. Hgb drop occurred after surgery. Patient had dark stools, likely some element of bleeding. Iron studies c/w AoCD. Low retic index 1.6, hypoproliferative.    # Anemia: She had a normal Hgb in 8/2023. Hgb 7-8 on this admission. Iron studies consistent with chronic disease. Reticulocyte index is low, suggestive hypoproliferative. Likely multifactorial in the setting of myelosuppression from critical illness, post-operative bleeding, and recent chemotherapy. Hgb 8.6 today.   - Trend CBC with differential daily. Continue supportive transfusions as needed to maintain Hgb > 7 and plt > 10  (> 20 if febrile, > 50 if bleeding).   - Would consider giving IV iron if she continues to have anemia given patient's likely blood loss  - Oncology team will follow after today. We will help arrange follow up at [facility] for outpatient continuity of care when ready for discharge.

## 2023-11-02 NOTE — PROGRESS NOTE ADULT - SUBJECTIVE AND OBJECTIVE BOX
D TEAM SURGERY DAILY PROGRESS NOTE    S: Patient seen and examined at bedside. Continued with tachycardia, but sinus. Pain control OK. Ostomy functioning.     O: Vital Signs Last 24 Hrs  T(C): 36.7 (02 Nov 2023 05:55), Max: 37.3 (01 Nov 2023 08:57)  T(F): 98 (02 Nov 2023 05:55), Max: 99.2 (01 Nov 2023 11:40)  HR: 122 (02 Nov 2023 05:55) (108 - 122)  BP: 119/69 (02 Nov 2023 05:55) (107/51 - 125/72)  BP(mean): --  RR: 18 (02 Nov 2023 05:55) (17 - 18)  SpO2: 99% (02 Nov 2023 05:55) (97% - 99%)    Parameters below as of 02 Nov 2023 05:55  Patient On (Oxygen Delivery Method): room air      I&O's Detail    01 Nov 2023 07:01  -  02 Nov 2023 07:00  --------------------------------------------------------  IN:    Fat Emulsion (Fish Oil &amp; Plant Based) 20% Infusion: 199.2 mL    IV PiggyBack: 100 mL    TPN (Total Parenteral Nutrition): 1500 mL  Total IN: 1799.2 mL    OUT:    Ileostomy (mL): 750 mL    Oral Fluid: 0 mL    Voided (mL): 500 mL  Total OUT: 1250 mL    Total NET: 549.2 mL      EXAM  General: NAD, resting in bed comfortably.  Cardiac: regular rate, warm and well perfused  Respiratory: Nonlabored respirations, normal cw expansion.  Abdomen: soft, nontender, nondistended. ostomy +/+  Extremities: normal strength, FROM, no deformities    LABS                        8.8    5.36  )-----------( 222      ( 01 Nov 2023 05:55 )             26.4   11-01    139  |  106  |  16  ----------------------------<  174<H>  4.1   |  21<L>  |  0.35<L>    Ca    8.7      01 Nov 2023 05:55  Phos  3.3     11-01  Mg     3.60     11-02    TPro  6.8  /  Alb  2.6<L>  /  TBili  0.3  /  DBili  x   /  AST  33<H>  /  ALT  42<H>  /  AlkPhos  254<H>  11-01

## 2023-11-02 NOTE — CONSULT NOTE ADULT - ASSESSMENT
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 Found to have L IJ and innominate vein acute non-occlusive thrombosis. Patient was started on TPN. Pulmonary team consulted for pleural effusion as seen on CT a/p and CXR which has been expanding.     The patient is not tachypneic but is uncomfortable on exertion and has been tachycardic  Bedside pocus reveals right sided moderate - large pocket which is accessible even when patient is laying in left lateral recumbent     The Pleural effusion cause is likely multifactorial from atelectasis due to pain and immobility, potential mucous plugging from thick secretions, and hx of subcapsular hematoma with potential for bleeding is also a possibility.     Her last dose of eliquis was this morning    Recommendations:  - Please hold eliquis - need to be off for at least 48-72 hours prior to procedure. will plan for thoracentesis on monday. However, if the procedure is able to be performed over the weekend given scheduling would still hold anticoagulation.   - Airway clearance, can trial nebulizers  - Out of bed to chair if able with fall precautions  - Incentive spirometry, pain control  - Can trial low dose of lasix and monitor strict I/O but unlikely to give any benefit   - Long discussion on the risks and benefits of thoracentesis, All questions answered

## 2023-11-02 NOTE — CONSULT NOTE ADULT - SUBJECTIVE AND OBJECTIVE BOX
HPI:    58F with malignant neoplasm of appendix s/p appendectomy, cholecystectomy, hysterectomy, omentectomy, peritonectomy, LAR with diverting loop ileostomy, bladder injury with primary repair on 10/4, found to have L IJ and innominate vein acute non-occlusive thrombosis, placed on therapeutic lovenox, but developed expanding subcapsular liver hematomas found on CT 10/20/23, course complicated by fever 101F (10/27), started on Cipro/Flagyl for concerns of UTI.    Patient has been developing worsening pleural effusions seen on Ct a/p and CXR. Pulmonary team consulted for recomendations. Patient states she has had horrible pain since the surgery,. Has only been really able to lay down flat. She has not used the incentive spirometer due to pain. She notes that she has been bringing up mucous. Does not feel particularly short of breath however has not exerted herself         PAST MEDICAL & SURGICAL HISTORY:  History of sickle cell trait      Malignant neoplasm of appendix      History of ectopic pregnancy      H/O colonoscopy      H/O endoscopy          FAMILY HISTORY:      SOCIAL HISTORY:  Smoking:nnone  EtOH Use: none   Marital Status:  Occupation:  Exposures:  Recent Travel:    Allergies    penicillin (Rash)    Intolerances        HOME MEDICATIONS:    REVIEW OF SYSTEMS:  Constitutional: No fevers or chills. + weight loss. + fatigue   Eyes: No itching or discharge from the eyes  ENT: No ear pain. No ear discharge. No nasal congestion. No post nasal drip. No epistaxis. No throat pain. No sore throat. No difficulty swallowing.   CV: No chest pain. No palpitations. No lightheadedness or dizziness.   Resp: No dyspnea at rest. No dyspnea on exertion. No orthopnea. No wheezing. No cough. No       [ ] All other systems negative  [ ] Unable to assess ROS because ________    OBJECTIVE:  ICU Vital Signs Last 24 Hrs  T(C): 36.4 (02 Nov 2023 12:41), Max: 37.3 (02 Nov 2023 08:45)  T(F): 97.6 (02 Nov 2023 12:41), Max: 99.1 (02 Nov 2023 08:45)  HR: 120 (02 Nov 2023 12:41) (112 - 122)  BP: 122/68 (02 Nov 2023 12:41) (119/69 - 125/72)  BP(mean): --  ABP: --  ABP(mean): --  RR: 18 (02 Nov 2023 12:41) (18 - 20)  SpO2: 99% (02 Nov 2023 12:41) (97% - 99%)    O2 Parameters below as of 02 Nov 2023 12:41  Patient On (Oxygen Delivery Method): room air              11-01 @ 07:01  -  11-02 @ 07:00  --------------------------------------------------------  IN: 1799.2 mL / OUT: 1250 mL / NET: 549.2 mL    11-02 @ 07:01  -  11-02 @ 16:50  --------------------------------------------------------  IN: 0 mL / OUT: 400 mL / NET: -400 mL      CAPILLARY BLOOD GLUCOSE      POCT Blood Glucose.: 139 mg/dL (02 Nov 2023 12:03)      PHYSICAL EXAM:  General: NAD, resting in bed comfortably.  Cardiac: regular rate, warm and well perfused  Respiratory: decreased breath sounds on the right   Abdomen: soft, nontender, nondistended. ostom  Extremities: normal strength, FROM, no deformities    HOSPITAL MEDICATIONS:  MEDICATIONS  (STANDING):  cefTRIAXone   IVPB 1000 milliGRAM(s) IV Intermittent every 24 hours  chlorhexidine 2% Cloths 1 Application(s) Topical daily  influenza   Vaccine 0.5 milliLiter(s) IntraMuscular once  iron sucrose IVPB 200 milliGRAM(s) IV Intermittent every 24 hours  lipid, fat emulsion (Fish Oil and Plant Based) 20% Infusion 16.6 mL/Hr (16.6 mL/Hr) IV Continuous <Continuous>  pantoprazole    Tablet 40 milliGRAM(s) Oral before breakfast  Parenteral Nutrition - Adult 1 Each TPN Continuous <Continuous>  Parenteral Nutrition - Adult 1 Each TPN Continuous <Continuous>  scopolamine 1 mG/72 Hr(s) Patch 1 Patch Transdermal every 72 hours    MEDICATIONS  (PRN):  acetaminophen     Tablet .. 650 milliGRAM(s) Oral every 6 hours PRN Mild Pain (1 - 3)  HYDROmorphone   Tablet 1 milliGRAM(s) Oral every 4 hours PRN Moderate and Severe Pain  LORazepam     Tablet 0.25 milliGRAM(s) Oral every 6 hours PRN Nausea and/or Vomiting  naloxone Injectable 0.1 milliGRAM(s) IV Push every 3 minutes PRN For ANY of the following changes in patient status:  A. RR LESS THAN 10 breaths per minute, B. Oxygen saturation LESS THAN 90%, C. Sedation score of 6  ondansetron   Disintegrating Tablet 8 milliGRAM(s) Oral every 8 hours PRN Nausea  sodium chloride 0.9% lock flush 10 milliLiter(s) IV Push every 1 hour PRN Pre/post blood products, medications, blood draw, and to maintain line patency      LABS:                        9.0    4.61  )-----------( 250      ( 02 Nov 2023 10:48 )             28.3     11-02    144  |  110<H>  |  22  ----------------------------<  136<H>  4.3   |  22  |  0.41<L>    Ca    9.4      02 Nov 2023 10:48  Phos  3.8     11-02  Mg     2.20     11-02    TPro  7.2  /  Alb  2.8<L>  /  TBili  0.5  /  DBili  <0.2  /  AST  44<H>  /  ALT  47<H>  /  AlkPhos  319<H>  11-02      Urinalysis Basic - ( 02 Nov 2023 10:48 )    Color: x / Appearance: x / SG: x / pH: x  Gluc: 136 mg/dL / Ketone: x  / Bili: x / Urobili: x   Blood: x / Protein: x / Nitrite: x   Leuk Esterase: x / RBC: x / WBC x   Sq Epi: x / Non Sq Epi: x / Bacteria: x            MICROBIOLOGY:     RADIOLOGY:  [ x] Reviewed and interpreted by me    Point of Care Ultrasound Findings;    moderate to large pleural effusion on the right   PFT:    EKG:    < from: Xray Chest 1 View- PORTABLE-Urgent (Xray Chest 1 View- PORTABLE-Urgent .) (11.02.23 @ 14:44) >  FINDINGS:  Lines/Tubes/Devices: Left-sided power injectable port with tip at the   superior cavoatrial junction. Right sided PICC line with tip at the   superior cavoatrial junction. Cholecystectomy clips.    Lungs/Pleura: Progression of right-sided moderate to large pleural   effusion/adjacent compressive atelectasis. No left pleural effusion. No   focal consolidation. No pneumothorax.    Heart/Mediastinum: The cardiomedistinal silhouetteis normal, unchanged.    Visualized Abdomen: Unremarkable visualized upper abdomen.    Osseous Structures: No acute fracture. No aggressive osseous lesions.      IMPRESSION:  Progression of moderate to large right-sided pleural effusion/adjacent   compressive atelectasis..    --- End of Report ---    < end of copied text >  < from: CT Abdomen and Pelvis w/ IV Cont (10.28.23 @ 12:54) >  BLADDER: Air in the bladder. Correlate clinically for recent   instrumentation.  REPRODUCTIVE ORGANS: Hysterectomy.    BOWEL: Right lower quadrant ileostomy. Colorectal anastomosis. No bowel   obstruction. Appendectomy.  PERITONEUM: Small pelvic fluid. Hepatic and perihepatic collections as   above.  VESSELS: Within normal limits.  RETROPERITONEUM/LYMPH NODES: No lymphadenopathy.  ABDOMINAL WALL: Postsurgical changes. Similar supraumbilical hematoma.  BONES: Within normal limits.    IMPRESSION:  Increased size of right pleural effusion, now large.    Similar/decreased hepatic and perihepatichematomas. Similar right   hepatic lobe hypodensities, possibly representing infarct or small   collections.      < end of copied text >

## 2023-11-02 NOTE — CONSULT NOTE ADULT - CONSULT REQUESTED BY NAME
Surgery Team
Dr. Trinh
[place]
Dr Dong / Office pt
Dr. Dong
Dr. Silvio Dong
Dr. Dong
[place]
Surgery D team
primary team
Primary Team

## 2023-11-02 NOTE — PROGRESS NOTE ADULT - PROBLEM SELECTOR PLAN 5
Pt is full code  Goal is to continue following with oncology outpatient  Will continue to follow  Page for uncontrolled symptoms [id number]
Pt is full code  Goal is to continue following with oncology outpatient  Will continue to follow  Page for uncontrolled symptoms [id number]
Pt is full code  Goal is to continue following with oncology outpatient  At this time symptoms are controlled, goals are established  Will sign off, reconsult as needed [id number]
Pt is full code  Goal is to continue with DMT  Will continue to follow  Page for uncontrolled symptoms.

## 2023-11-02 NOTE — PROGRESS NOTE ADULT - SUBJECTIVE AND OBJECTIVE BOX
Subjective: Patient seen and examined. No new events except as noted.     SUBJECTIVE/ROS:  MEDICATIONS:  MEDICATIONS  (STANDING):  apixaban 5 milliGRAM(s) Oral every 12 hours  chlorhexidine 2% Cloths 1 Application(s) Topical daily  ciprofloxacin     Tablet 500 milliGRAM(s) Oral every 12 hours  fentaNYL   Patch  12 MICROgram(s)/Hr 1 Patch Transdermal every 72 hours  influenza   Vaccine 0.5 milliLiter(s) IntraMuscular once  iron sucrose IVPB 200 milliGRAM(s) IV Intermittent every 24 hours  lipid, fat emulsion (Fish Oil and Plant Based) 20% Infusion 16.6 mL/Hr (16.6 mL/Hr) IV Continuous <Continuous>  pantoprazole    Tablet 40 milliGRAM(s) Oral before breakfast  Parenteral Nutrition - Adult 1 Each TPN Continuous <Continuous>  scopolamine 1 mG/72 Hr(s) Patch 1 Patch Transdermal every 72 hours      PHYSICAL EXAM:  T(C): 36.7 (11-02-23 @ 05:55), Max: 37.3 (11-01-23 @ 08:57)  HR: 122 (11-02-23 @ 05:55) (108 - 122)  BP: 119/69 (11-02-23 @ 05:55) (107/51 - 125/72)  RR: 18 (11-02-23 @ 05:55) (17 - 18)  SpO2: 99% (11-02-23 @ 05:55) (97% - 99%)  Wt(kg): --  I&O's Summary    01 Nov 2023 07:01  -  02 Nov 2023 07:00  --------------------------------------------------------  IN: 1799.2 mL / OUT: 1250 mL / NET: 549.2 mL            JVP: Normal  Neck: supple  Lung: clear   CV: S1 S2 , Murmur:  Abd: soft  Ext: No edema  neuro: Awake / alert  Psych: flat affect  Skin: normal``    LABS/DATA:    CARDIAC MARKERS:                                8.8    5.36  )-----------( 222      ( 01 Nov 2023 05:55 )             26.4     11-01    139  |  106  |  16  ----------------------------<  174<H>  4.1   |  21<L>  |  0.35<L>    Ca    8.7      01 Nov 2023 05:55  Phos  3.3     11-01  Mg     2.10     11-01    TPro  6.8  /  Alb  2.6<L>  /  TBili  0.3  /  DBili  x   /  AST  33<H>  /  ALT  42<H>  /  AlkPhos  254<H>  11-01    proBNP:   Lipid Profile:   HgA1c:   TSH:     TELE:  EKG:

## 2023-11-02 NOTE — CONSULT NOTE ADULT - CONSULT REQUESTED DATE/TIME
04-Oct-2023 21:17
11-Oct-2023 10:56
17-Oct-2023 16:06
19-Oct-2023 09:00
28-Oct-2023 13:39
05-Oct-2023 07:11
12-Oct-2023 12:21
16-Oct-2023 15:26
02-Nov-2023 16:49
18-Oct-2023 09:04
20-Oct-2023 08:13

## 2023-11-02 NOTE — PROGRESS NOTE ADULT - SUBJECTIVE AND OBJECTIVE BOX
NUTRITION NOTE  [id number] OWENSSTEWART  ===============================    Interval events - Patient was seen and examined at bedside, no acute events overnight. Patient denies chest pain, shortness of breath, nausea or vomiting at this time. Pt remains on TPN cycled over 12 hours.     ROS: Except as noted above, all other systems reviewed and are negative     Allergies  penicillin (Rash)    PAST MEDICAL & SURGICAL HISTORY:  History of sickle cell trait  Malignant neoplasm of appendix  History of ectopic pregnancy  H/O colonoscopy  H/O endoscopy    Vital Signs Last 24 Hrs  T(C): 37.3 (2023 08:45), Max: 37.3 (2023 08:45)  T(F): 99.1 (2023 08:45), Max: 99.1 (2023 08:45)  HR: 116 (2023 08:45) (108 - 122)  BP: 125/62 (2023 08:45) (119/69 - 125/72)  RR: 20 (2023 08:45) (18 - 20)  SpO2: 97% (2023 08:45) (97% - 99%)    MEDICATIONS  (STANDING):  apixaban 5 milliGRAM(s) Oral every 12 hours  chlorhexidine 2% Cloths 1 Application(s) Topical daily  ciprofloxacin     Tablet 500 milliGRAM(s) Oral every 12 hours  influenza   Vaccine 0.5 milliLiter(s) IntraMuscular once  iron sucrose IVPB 200 milliGRAM(s) IV Intermittent every 24 hours  lipid, fat emulsion (Fish Oil and Plant Based) 20% Infusion 16.6 mL/Hr (16.6 mL/Hr) IV Continuous <Continuous>  pantoprazole    Tablet 40 milliGRAM(s) Oral before breakfast  Parenteral Nutrition - Adult 1 Each TPN Continuous <Continuous>  Parenteral Nutrition - Adult 1 Each TPN Continuous <Continuous>  scopolamine 1 mG/72 Hr(s) Patch 1 Patch Transdermal every 72 hours    MEDICATIONS  (PRN):  acetaminophen     Tablet .. 650 milliGRAM(s) Oral every 6 hours PRN Mild Pain (1 - 3)  HYDROmorphone   Tablet 1 milliGRAM(s) Oral every 4 hours PRN Moderate and Severe Pain  LORazepam     Tablet 0.25 milliGRAM(s) Oral every 6 hours PRN Nausea and/or Vomiting  naloxone Injectable 0.1 milliGRAM(s) IV Push every 3 minutes PRN For ANY of the following changes in patient status:  A. RR LESS THAN 10 breaths per minute, B. Oxygen saturation LESS THAN 90%, C. Sedation score of 6  ondansetron   Disintegrating Tablet 8 milliGRAM(s) Oral every 8 hours PRN Nausea  sodium chloride 0.9% lock flush 10 milliLiter(s) IV Push every 1 hour PRN Pre/post blood products, medications, blood draw, and to maintain line patency    I&O's Detail    2023 07:01  -  2023 07:00  --------------------------------------------------------  IN:    Fat Emulsion (Fish Oil &amp; Plant Based) 20% Infusion: 199.2 mL    IV PiggyBack: 100 mL    TPN (Total Parenteral Nutrition): 1500 mL  Total IN: 1799.2 mL    OUT:    Ileostomy (mL): 750 mL    Oral Fluid: 0 mL    Voided (mL): 500 mL  Total OUT: 1250 mL    Total NET: 549.2 mL    POCT Blood Glucose.: 139 mg/dL (2023 12:03)  POCT Blood Glucose.: 152 mg/dL (2023 01:22)  POCT Blood Glucose.: 106 mg/dL (2023 17:41)    Daily Weight in k.4 (2023 08:45)    Drug Dosing Weight  Height (cm): 154.9 (04 Oct 2023 07:27)  Weight (kg): 56.4 (26 Oct 2023 04:00)  BMI (kg/m2): 23.5 (26 Oct 2023 04:00)  BSA (m2): 1.54 (26 Oct 2023 04:00)    PHYSICAL EXAM:  General: NAD, resting in bed comfortably.  Cardiac: regular rate, warm and well perfused  Respiratory: Nonlabored respirations, normal cw expansion.  Abdomen: soft, nontender, nondistended  Extremities: normal strength, FROM, no deformities  PICC Site: RUE PICC site clean and day (placed on 10/24/23)    Diet: NPO and TPN/lipids (started on 10/24/23)    LABORATORY                                                 9.0    4.61  )-----------( 250      ( 2023 10:48 )             28.3       11-02    144  |  110<H>  |  22  ----------------------------<  136<H>  4.3   |  22  |  0.41<L>    Ca    9.4      2023 10:48  Phos  3.8     11-  Mg     2.20     11-    TPro  6.8  /  Alb  2.6<L>  /  TBili  0.3  /  DBili  x   /  AST  33<H>  /  ALT  42<H>  /  AlkPhos  254<H>      LIVER FUNCTIONS - ( 2023 05:55 )  Alb: 2.6 g/dL / Pro: 6.8 g/dL / ALK PHOS: 254 U/L / ALT: 42 U/L / AST: 33 U/L / GGT: x           10-25 Chol -- LDL -- HDL -- Trig 87    CT A/P 10/28 notable for large R pleural effusion, increased in size from previous imaging, decreased hepatic and perihepatic hematomas, similar R hepatic lobe hypodensities (unclear if infarct vs small collections).     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 Patient's course c/b continuous retching and intolerance to tube feeds. Nutrition support consult called for prolonged NPO and severe protein calorie malnutrition. PICC placed and TPN started on 10/24/23.    continue TPN with infusion volume of 1.5L, TPN will provide 1508 kcal/day; TPN cycled over 12 hours     labs reviewed - electrolytes adjusted in TPN bag     monitor fingersticks, obtain daily weights     continue parenteral nutrition at this time, will follow up with primary team on plan, discharge planning in process with home TPN (on hold due to persistent tachycardia); TPN formula verbally verified with outpatient pharmacy, will follow up with pharmacy once discharge plan is confirmed       - Outpatient TPN via [facility] and follow up with Dr Jeffery Sullivan:    [address]  [place] [id number]  Entrance on [facility]  Phone [phone]    Please instruct patient when calling to make the appointment to schedule for the first MONDAY after discharge from hospital. Please instruct patient to explain she is on TPN when making the appointment. Check labs, CMP, electrolytes, ionized Calcium, triglycerides and fingersticks- frequency to be determined by Dr. Sullivan as outpatient.    1.  Severe protein calorie malnutrition being optimized with TPN: CHO [220] gm.  AA [90] gm. SMOF Lipids [40] gm.  2.  Hyperglycemia managed with: [0] units of regular insulin    3.  Check fluid balance daily.  Strict I/O  [ ] [ ]   4.  Daily BMP, Ionized Calcium, Magnesium and Phosphorous   5.  Triglycerides at initiation of TPN and monthly 10-25 Chol -- LDL -- HDL -- Trig 87    Nutrition Support [id number]

## 2023-11-02 NOTE — PROGRESS NOTE ADULT - SUBJECTIVE AND OBJECTIVE BOX
SUBJECTIVE AND OBJECTIVE:  Pt indicates not that much of a difference in symptoms  Did use less PRNs    Indication for Geriatrics and Palliative Care Services/INTERVAL HPI: Complex symptom management in the setting of advanced illness    OVERNIGHT EVENTS:   Used 1 PRN in the past 24hrs    DNR on chart: full code  Allergies    penicillin (Rash)    Intolerances    MEDICATIONS  (STANDING):  cefTRIAXone   IVPB 1000 milliGRAM(s) IV Intermittent every 24 hours  chlorhexidine 2% Cloths 1 Application(s) Topical daily  influenza   Vaccine 0.5 milliLiter(s) IntraMuscular once  lipid, fat emulsion (Fish Oil and Plant Based) 20% Infusion 16.6 mL/Hr (16.6 mL/Hr) IV Continuous <Continuous>  pantoprazole    Tablet 40 milliGRAM(s) Oral before breakfast  Parenteral Nutrition - Adult 1 Each TPN Continuous <Continuous>  Parenteral Nutrition - Adult 1 Each TPN Continuous <Continuous>  scopolamine 1 mG/72 Hr(s) Patch 1 Patch Transdermal every 72 hours    MEDICATIONS  (PRN):  acetaminophen     Tablet .. 650 milliGRAM(s) Oral every 6 hours PRN Mild Pain (1 - 3)  HYDROmorphone   Tablet 1 milliGRAM(s) Oral every 3 hours PRN Moderate and Severe Pain  LORazepam     Tablet 0.25 milliGRAM(s) Oral every 6 hours PRN Nausea and/or Vomiting  naloxone Injectable 0.1 milliGRAM(s) IV Push every 3 minutes PRN For ANY of the following changes in patient status:  A. RR LESS THAN 10 breaths per minute, B. Oxygen saturation LESS THAN 90%, C. Sedation score of 6  ondansetron   Disintegrating Tablet 8 milliGRAM(s) Oral every 8 hours PRN Nausea  sodium chloride 0.9% lock flush 10 milliLiter(s) IV Push every 1 hour PRN Pre/post blood products, medications, blood draw, and to maintain line patency        ITEMS UNCHECKED ARE NOT PRESENT    PRESENT SYMPTOMS: [ ]Unable to self-report - see [ ] CPOT [ ] PAINADS [ ] RDOS  Source if other than patient:  [ ]Family   [ ]Team     Pain:  [x ]yes [ ]no  QOL impact -   Location -  mid abdomen                  Aggravating factors -  Quality - aching  Radiation - none  Timing- intermittent  Severity (0-10 scale): 10/10  Minimal acceptable level (0-10 scale): 2-3/10    CPOT:    https://www.sccm.org/getattachment/xpy20m55-4l9q-6s7r-3j7m-2274c5153u4k/Critical-Care-Pain-Observation-Tool-(CPOT)    PAIN AD Score:	  http://geriatrictoolkit.[facility]/cog/painad.pdf (Ctrl + left click to view)    Dyspnea:                           [ ]Mild [ ]Moderate [ ]Severe    RDOS:  0 to 2  minimal or no respiratory distress   3  mild distress  4 to 6 moderate distress  >7 severe distress  https://homecareinformation.net/handouts/hen/Respiratory_Distress_Observation_Scale.pdf (Ctrl +  left click to view)     Anxiety:                             [ ]Mild [ ]Moderate [ ]Severe  Fatigue:                             [ ]Mild [x ]Moderate [ ]Severe  Nausea:                             [ ]Mild [ ]Moderate [ ]Severe  Loss of appetite:              [ ]Mild [ x]Moderate [ ]Severe  Constipation:                    [ ]Mild [ ]Moderate [ ]Severe    PCSSQ[Palliative Care Spiritual Screening Question]   Severity (0-10): deferred  Score of 4 or > indicate consideration of Chaplaincy referral.    Chaplaincy Referral: [ ] yes [ ] refused [ ] following [x] deferred    Other Symptoms: +retching with vomiting  [x ]All other review of systems negative     Palliative Performance Status Version 2: 50 %      http://npcrc.org/files/news/palliative_performance_scale_ppsv2.pdf    T(C): 36.7 (02 Nov 2023 05:55), Max: 37.3 (01 Nov 2023 08:57)  T(F): 98 (02 Nov 2023 05:55), Max: 99.2 (01 Nov 2023 11:40)  HR: 122 (02 Nov 2023 05:55) (108 - 122)  BP: 119/69 (02 Nov 2023 05:55) (107/51 - 125/72)  BP(mean): --  RR: 18 (02 Nov 2023 05:55) (17 - 18)  SpO2: 99% (02 Nov 2023 05:55) (97% - 99%)    Parameters below as of 02 Nov 2023 05:55  Patient On (Oxygen Delivery Method): room air    GENERAL: [ ]Cachexia    [x ]Alert  [x ]Oriented x 3  [ ]Lethargic  [ ]Unarousable  [x ]Verbal  [ ]Non-Verbal  Behavioral:   [ ]Anxiety  [ ]Delirium [ ]Agitation [ ]Other  HEENT:  [x ]Normal   [x]Dry mouth   [ ]ET Tube/Trach  [ ]Oral lesions  PULMONARY:   [ ]Clear [ ]Tachypnea  [ ]Audible excessive secretions   [ x]Rhonchi        [ ]Right [ ]Left [ ]Bilateral  [ ]Crackles        [ ]Right [ ]Left [ ]Bilateral  [ ]Wheezing     [ ]Right [ ]Left [ ]Bilateral  [ ]Diminished BS [ ] Right [ ]Left [ ]Bilateral  CARDIOVASCULAR:    [x ]Regular [ ]Irregular [ ]Tachy  [ ]Bao [ ]Murmur [ ]Other  GASTROINTESTINAL: has ostomy  [ x]Soft  [ ]Distended   [x]+BS  [ ]Non tender [x ]Tender  [ ]Other [ ]PEG [ ]OGT/ NGT   Last BM: ostomy  GENITOURINARY:  [x ]Normal [ ]Incontinent   [ ]Oliguria/Anuria   [ ]Pierce  MUSCULOSKELETAL:   [ ]Normal   [ x]Weakness  [ ]Bed/Wheelchair bound [ ]Edema  NEUROLOGIC:   [x ]No focal deficits  [ ] Cognitive impairment  [ ] Dysphagia [ ]Dysarthria [ ] Paresis [ ]Other   SKIN:   [ x]Normal  [ ]Rash  [ ]Other  [ ]Pressure ulcer(s) [ ]y [ ]n present on admission    CRITICAL CARE:  [ ]Shock Present  [ ]Septic [ ]Cardiogenic [ ]Neurologic [ ]Hypovolemic  [ ]Vasopressors [ ]Inotropes  [ ]Respiratory failure present [ ]Mechanical Ventilation [ ]Non-invasive ventilatory support [ ]High-Flow   [ ]Acute  [ ]Chronic [ ]Hypoxic  [ ]Hypercarbic [ ]Other  [ ]Other organ failure       LABS:                        9.1    4.14  )-----------( 252      ( 03 Nov 2023 05:53 )             28.3     11-03    146<H>  |  112<H>  |  23  ----------------------------<  126<H>  4.3   |  23  |  0.40<L>    Ca    9.2      03 Nov 2023 05:53  Phos  3.4     11-03  Mg     2.30     11-03    TPro  7.2  /  Alb  2.8<L>  /  TBili  0.5  /  DBili  <0.2  /  AST  44<H>  /  ALT  47<H>  /  AlkPhos  319<H>  11-02    Urinalysis Basic - ( 03 Nov 2023 05:53 )    Color: x / Appearance: x / SG: x / pH: x  Gluc: 126 mg/dL / Ketone: x  / Bili: x / Urobili: x   Blood: x / Protein: x / Nitrite: x   Leuk Esterase: x / RBC: x / WBC x   Sq Epi: x / Non Sq Epi: x / Bacteria: x    RADIOLOGY & ADDITIONAL STUDIES: reviewed    Protein Calorie Malnutrition Present: [ ]mild [ ]moderate [ ]severe [ ]underweight [ ]morbid obesity  https://www.andeal.org/vault/2440/web/files/ONC/Table_Clinical%20Characteristics%20to%20Document%20Malnutrition-White%20JV%20et%20al%202012.pdf    Height (cm): 154.9 (10-04-23 @ 07:27), 156.3 (09-05-23 @ 11:06), 154.9 (07-21-23 @ 08:27)  Weight (kg): 68.9 (10-04-23 @ 07:27), 71.5 (09-05-23 @ 11:06), 75.3 (07-21-23 @ 08:27)  BMI (kg/m2): 28.7 (10-04-23 @ 07:27), 29.3 (09-05-23 @ 11:06), 31.4 (07-21-23 @ 08:27)    [ ]PPSV2 < or = 30%  [ ]significant weight loss [ ]poor nutritional intake [ ]anasarca[ ]Artificial Nutrition    Other REFERRALS:  [ ]Hospice  [ ]Child Life  [ ]Social Work  [ ]Case management [ ]Holistic Therapy     Goals of Care Document:

## 2023-11-02 NOTE — PROGRESS NOTE ADULT - PROBLEM SELECTOR PROBLEM 1
Malignant neoplasm of appendix

## 2023-11-02 NOTE — PROGRESS NOTE ADULT - ASSESSMENT
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 Found to have L IJ and innominate vein acute non-occlusive thrombosis, therapeutic lovenox held i/s/o expanding subcapsular liver hematomas found on CT 10/20/23. Course complicated by post-operative nausea now resolved.      - Follow-up cardiology recommendations regarding tachycardia  - Intermittent fevers and tachycardia, fever workup revealing for bacteriuria with cultures positive for >100,000 CFU GNR per hpf.   ID consulted, notes UTI less likely cause of systemic response in absence of urinary symptomatology.   CT A/P 10/28 notable for large R pleural effusion, increased in size from previous imaging, decreased hepatic and perihepatic hematomas, similar R hepatic lobe hypodensities (unclear if infarct vs small collections). ABx: ceftriaxone changed to cipro to monitor if tolerates.  - Diet: NPO/TPN.    - Pain: APAP ATC, po dilaudid + fentanyl patch, awaiting palliative recommendations  - Continue scopolamine and lorazepam for nausea control, current regimen appears to be working well.   - Hematology consulted for CARMELA (likely anemia of inflammation), start Iron infusion 10/30 x 5 days. s/p 1u PRBC 10/30  - Wound care: Continue to evaluate ostomy daily.   - AC: Eliquis 5mg BID  - PT/OT consult appreciated, home with home PT.  - Dispo: Pending   58F w/ PMH malignant neoplasm of appendix s/p appendectomy, cholecystectomy, hysterectomy, omentectomy, peritonectomy, LAR with diverting loop ileostomy, bladder injury with primary repair on 10/4. Found to have L IJ and innominate vein acute non-occlusive thrombosis, therapeutic lovenox held i/s/o expanding subcapsular liver hematomas found on CT 10/20/23. Course complicated by post-operative nausea now resolved.      - Follow-up cardiology recommendations regarding tachycardia  - Intermittent fevers and tachycardia, fever workup revealing for bacteriuria with cultures positive for >100,000 CFU GNR per hpf.   ID consulted, notes UTI less likely cause of systemic response in absence of urinary symptomatology.   CT A/P 10/28 notable for large R pleural effusion, increased in size from previous imaging, decreased hepatic and perihepatic hematomas, similar R hepatic lobe hypodensities (unclear if infarct vs small collections). ABx: ceftriaxone changed to cipro to monitor if tolerates.  - Diet: NPO/TPN.    - Pain: APAP ATC, palliative recs: c/w Fentanyl patch 12mcg/hr q72hrs & PRN Dilaudid 1mg PO q3-4hrs PRN for breakthrough pain  - Continue scopolamine and lorazepam for nausea control, current regimen appears to be working well.   - Hematology consulted for CARMELA (likely anemia of inflammation), start Iron infusion 10/30 x 5 days. s/p 1u PRBC 10/30  - Wound care: Continue to evaluate ostomy daily.   - AC: Eliquis 5mg BID  - PT/OT consult appreciated, home with home PT.  - Dispo: Pending

## 2023-11-02 NOTE — PROGRESS NOTE ADULT - PROBLEM SELECTOR PLAN 1
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   Plan from oncology is to follow up outpatient  Follows with Dr. Khan at [facility]
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   Plan from oncology is to follow up outpatient  Follows with Dr. Khan at [facility].
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   Plan from oncology is to follow up outpatient  Follows with Dr. Khan at [facility].
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   Plan from oncology is to follow up outpatient  Follows with Dr. Khan at [facility].

## 2023-11-02 NOTE — CONSULT NOTE ADULT - CONSULT REASON
intolerance of po
Pleural Effusion
GJ tube eval
Rehabilitation evaluation
Fever
PEG-J Tube Placement
persistent emesis, decreased PO intake
CV eval
anemia
Complex symptom management in the setting of advanced illness
intubated

## 2023-11-02 NOTE — PROGRESS NOTE ADULT - PROBLEM SELECTOR PLAN 2
Pt indicates that medication wears off within the 2-3hr narciso  Used 5PRNs of 1mg of PO Dilaudid  Discussed option of Fentanyl patch as pt has been having a hard time with pills  Pt raised concerns regarding side effects, risk of addiction. All questions answered, support and education provided.   Pt in agreement on starting Fentanyl patch at low dose, will likely need taper up given 24 hr usage   Would recommend:  - Start Fentanyl patch 12mcg/hr q72hrs  - Continue with Dilaudid 1mg PO q3-4hrs PRN for breakthrough pain  - Bowel regimen while on opioids to prevent opioid induced constipation   - Pt will need follow up with Supportive Oncology (Palliative Care), will make referral, please include in d/c summary   [facility] Advanced Medicine, [facility] Phone: [phone] [address] [place] [address]  - Primary team to prescribe discharge medications to cover patient’s needs between the date of discharge and the date of appointment with either the patient's PMD, the primary oncologist, or a palliative care provider.  Ideally medications are to be prescribed to [facility] pharmacy, so the patient can leave the hospital with those medications.  For pain being treated as a part of cancer care, hospice or other end-of-life care, or pain being treated as part of palliative care prescribing opioids beyond the 7-day and less than 28 days post-discharge window IS ACCEPTABLE as per [place] Department of Health (https://www.health.ny.gov/professionals/narcotic/laws_and_regulations/).  Primary team to  make sure that any pre-approvals are done before discharge.
Used 1 PRN in the past 24hrs  Tolerated Fentanyl patch well  Would recommend:  - Continue Fentanyl patch 12mcg/hr q72hrs  - Continue with Dilaudid 1mg PO q3-4hrs PRN for breakthrough pain  - Bowel regimen while on opioids to prevent opioid induced constipation   - Pt will need follow up with Supportive Oncology (Palliative Care), will make referral, please include in d/c summary   [facility] Medicine, [facility] Phone: [phone] [address] [place] [address] --> scheduled for 12/15/23 at 9AM  - Primary team to prescribe discharge medications to cover patient’s needs between the date of discharge and the date of appointment with either the patient's PMD, the primary oncologist, or a palliative care provider.  Ideally medications are to be prescribed to [facility] pharmacy, so the patient can leave the hospital with those medications.  For pain being treated as a part of cancer care, hospice or other end-of-life care, or pain being treated as part of palliative care prescribing opioids beyond the 7-day and less than 28 days post-discharge window IS ACCEPTABLE as per [place] Department of Health (https://www.health.ny.gov/professionals/narcotic/laws_and_regulations/).  Primary team to  make sure that any pre-approvals are done before discharge.
> 11/1: Pt required PO Dilaudid 1mg x 1 within 24 hours.   > 10/31: Discussed option of Fentanyl patch as pt has been having a hard time with pills  Pt raised concerns regarding side effects, risk of addiction. All questions answered, support and education provided.   Would recommend:  > c/w Fentanyl patch 12mcg/hr q72hrs & PRN Dilaudid 1mg PO q3-4hrs PRN for breakthrough pain  > Bowel regimen while on opioids to prevent opioid induced constipation   > On discharge, please prescribe Naloxone spray 4 mg/0.1 ml intranasal, Spray 0.1 mL into one nostril. Repeat with second device into other nostril after 2-3 minutes if no or minimal response (http://prescribetoprevent.org/prescribers/palliative/). Please be sure the patient's pharmacy has the medication, otherwise, be sure there is a pharmacy were the patient can get the Naloxone inhaled.  >  Pt will need follow up with Supportive Oncology (Palliative Care), will make referral, please include in d/c summary   [facility] Advanced Medicine, [facility] Phone: [phone] [address] [place] [address]  - Primary team to prescribe discharge medications to cover patient’s needs between the date of discharge and the date of appointment with either the patient's PMD, the primary oncologist, or a palliative care provider.  Ideally medications are to be prescribed to [facility] pharmacy, so the patient can leave the hospital with those medications.  For pain being treated as a part of cancer care, hospice or other end-of-life care, or pain being treated as part of palliative care prescribing opioids beyond the 7-day and less than 28 days post-discharge window IS ACCEPTABLE as per [place] Department of Health (https://www.health.ny.gov/professionals/narcotic/laws_and_regulations/).  Primary team to  make sure that any pre-approvals are done before discharge.
Controlled  Would recommend:  - Continue Dilaudid 0.5mg IV can increase frequency to q4hrs PRN for breakthrough pain  - Bowel regimen while on opioids.

## 2023-11-02 NOTE — CONSULT NOTE ADULT - PROVIDER SPECIALTY LIST ADULT
Cardiology
Nutrition Support
Pulmonology
SICU
Gastroenterology
Gastroenterology
Rehab Medicine
Gastroenterology
Infectious Disease
Heme/Onc
Palliative Care

## 2023-11-02 NOTE — PROGRESS NOTE ADULT - PROBLEM SELECTOR PLAN 4
Discussed with pt options such as Remeron and medical cannabis.
Pt with ongoing medical conditions  Including significant rise in LFTs sp drainage of liver hematoma  Discussed with pt options such as Remeron and medical cannabis.

## 2023-11-03 LAB
ANION GAP SERPL CALC-SCNC: 11 MMOL/L — SIGNIFICANT CHANGE UP (ref 7–14)
ANION GAP SERPL CALC-SCNC: 11 MMOL/L — SIGNIFICANT CHANGE UP (ref 7–14)
BUN SERPL-MCNC: 23 MG/DL — SIGNIFICANT CHANGE UP (ref 7–23)
BUN SERPL-MCNC: 23 MG/DL — SIGNIFICANT CHANGE UP (ref 7–23)
CA-I BLD-SCNC: 1.15 MMOL/L — SIGNIFICANT CHANGE UP (ref 1.15–1.29)
CA-I BLD-SCNC: 1.15 MMOL/L — SIGNIFICANT CHANGE UP (ref 1.15–1.29)
CALCIUM SERPL-MCNC: 9.2 MG/DL — SIGNIFICANT CHANGE UP (ref 8.4–10.5)
CALCIUM SERPL-MCNC: 9.2 MG/DL — SIGNIFICANT CHANGE UP (ref 8.4–10.5)
CHLORIDE SERPL-SCNC: 112 MMOL/L — HIGH (ref 98–107)
CHLORIDE SERPL-SCNC: 112 MMOL/L — HIGH (ref 98–107)
CO2 SERPL-SCNC: 23 MMOL/L — SIGNIFICANT CHANGE UP (ref 22–31)
CO2 SERPL-SCNC: 23 MMOL/L — SIGNIFICANT CHANGE UP (ref 22–31)
CREAT SERPL-MCNC: 0.4 MG/DL — LOW (ref 0.5–1.3)
CREAT SERPL-MCNC: 0.4 MG/DL — LOW (ref 0.5–1.3)
EGFR: 114 ML/MIN/1.73M2 — SIGNIFICANT CHANGE UP
EGFR: 114 ML/MIN/1.73M2 — SIGNIFICANT CHANGE UP
GLUCOSE BLDC GLUCOMTR-MCNC: 124 MG/DL — HIGH (ref 70–99)
GLUCOSE BLDC GLUCOMTR-MCNC: 124 MG/DL — HIGH (ref 70–99)
GLUCOSE BLDC GLUCOMTR-MCNC: 133 MG/DL — HIGH (ref 70–99)
GLUCOSE BLDC GLUCOMTR-MCNC: 133 MG/DL — HIGH (ref 70–99)
GLUCOSE SERPL-MCNC: 126 MG/DL — HIGH (ref 70–99)
GLUCOSE SERPL-MCNC: 126 MG/DL — HIGH (ref 70–99)
HCT VFR BLD CALC: 28.3 % — LOW (ref 34.5–45)
HCT VFR BLD CALC: 28.3 % — LOW (ref 34.5–45)
HGB BLD-MCNC: 9.1 G/DL — LOW (ref 11.5–15.5)
HGB BLD-MCNC: 9.1 G/DL — LOW (ref 11.5–15.5)
MAGNESIUM SERPL-MCNC: 2.3 MG/DL — SIGNIFICANT CHANGE UP (ref 1.6–2.6)
MAGNESIUM SERPL-MCNC: 2.3 MG/DL — SIGNIFICANT CHANGE UP (ref 1.6–2.6)
MCHC RBC-ENTMCNC: 29.3 PG — SIGNIFICANT CHANGE UP (ref 27–34)
MCHC RBC-ENTMCNC: 29.3 PG — SIGNIFICANT CHANGE UP (ref 27–34)
MCHC RBC-ENTMCNC: 32.2 GM/DL — SIGNIFICANT CHANGE UP (ref 32–36)
MCHC RBC-ENTMCNC: 32.2 GM/DL — SIGNIFICANT CHANGE UP (ref 32–36)
MCV RBC AUTO: 91 FL — SIGNIFICANT CHANGE UP (ref 80–100)
MCV RBC AUTO: 91 FL — SIGNIFICANT CHANGE UP (ref 80–100)
NRBC # BLD: 1 /100 WBCS — HIGH (ref 0–0)
NRBC # BLD: 1 /100 WBCS — HIGH (ref 0–0)
NRBC # FLD: 0.05 K/UL — HIGH (ref 0–0)
NRBC # FLD: 0.05 K/UL — HIGH (ref 0–0)
NT-PROBNP SERPL-SCNC: 42 PG/ML — SIGNIFICANT CHANGE UP
NT-PROBNP SERPL-SCNC: 42 PG/ML — SIGNIFICANT CHANGE UP
PHOSPHATE SERPL-MCNC: 3.4 MG/DL — SIGNIFICANT CHANGE UP (ref 2.5–4.5)
PHOSPHATE SERPL-MCNC: 3.4 MG/DL — SIGNIFICANT CHANGE UP (ref 2.5–4.5)
PLATELET # BLD AUTO: 252 K/UL — SIGNIFICANT CHANGE UP (ref 150–400)
PLATELET # BLD AUTO: 252 K/UL — SIGNIFICANT CHANGE UP (ref 150–400)
POTASSIUM SERPL-MCNC: 4.3 MMOL/L — SIGNIFICANT CHANGE UP (ref 3.5–5.3)
POTASSIUM SERPL-MCNC: 4.3 MMOL/L — SIGNIFICANT CHANGE UP (ref 3.5–5.3)
POTASSIUM SERPL-SCNC: 4.3 MMOL/L — SIGNIFICANT CHANGE UP (ref 3.5–5.3)
POTASSIUM SERPL-SCNC: 4.3 MMOL/L — SIGNIFICANT CHANGE UP (ref 3.5–5.3)
RBC # BLD: 3.11 M/UL — LOW (ref 3.8–5.2)
RBC # BLD: 3.11 M/UL — LOW (ref 3.8–5.2)
RBC # FLD: 19.7 % — HIGH (ref 10.3–14.5)
RBC # FLD: 19.7 % — HIGH (ref 10.3–14.5)
SODIUM SERPL-SCNC: 146 MMOL/L — HIGH (ref 135–145)
SODIUM SERPL-SCNC: 146 MMOL/L — HIGH (ref 135–145)
WBC # BLD: 4.14 K/UL — SIGNIFICANT CHANGE UP (ref 3.8–10.5)
WBC # BLD: 4.14 K/UL — SIGNIFICANT CHANGE UP (ref 3.8–10.5)
WBC # FLD AUTO: 4.14 K/UL — SIGNIFICANT CHANGE UP (ref 3.8–10.5)
WBC # FLD AUTO: 4.14 K/UL — SIGNIFICANT CHANGE UP (ref 3.8–10.5)

## 2023-11-03 PROCEDURE — 99232 SBSQ HOSP IP/OBS MODERATE 35: CPT

## 2023-11-03 RX ORDER — HYDROMORPHONE HYDROCHLORIDE 2 MG/ML
1 INJECTION INTRAMUSCULAR; INTRAVENOUS; SUBCUTANEOUS
Refills: 0 | Status: DISCONTINUED | OUTPATIENT
Start: 2023-11-03 | End: 2023-11-08

## 2023-11-03 RX ORDER — I.V. FAT EMULSION 20 G/100ML
16.6 EMULSION INTRAVENOUS
Qty: 40 | Refills: 0 | Status: DISCONTINUED | OUTPATIENT
Start: 2023-11-03 | End: 2023-11-04

## 2023-11-03 RX ORDER — ELECTROLYTE SOLUTION,INJ
1 VIAL (ML) INTRAVENOUS
Refills: 0 | Status: DISCONTINUED | OUTPATIENT
Start: 2023-11-03 | End: 2023-11-03

## 2023-11-03 RX ADMIN — HYDROMORPHONE HYDROCHLORIDE 1 MILLIGRAM(S): 2 INJECTION INTRAMUSCULAR; INTRAVENOUS; SUBCUTANEOUS at 18:13

## 2023-11-03 RX ADMIN — CEFTRIAXONE 100 MILLIGRAM(S): 500 INJECTION, POWDER, FOR SOLUTION INTRAMUSCULAR; INTRAVENOUS at 17:32

## 2023-11-03 RX ADMIN — IRON SUCROSE 110 MILLIGRAM(S): 20 INJECTION, SOLUTION INTRAVENOUS at 12:26

## 2023-11-03 RX ADMIN — I.V. FAT EMULSION 16.6 ML/HR: 20 EMULSION INTRAVENOUS at 17:32

## 2023-11-03 RX ADMIN — HYDROMORPHONE HYDROCHLORIDE 1 MILLIGRAM(S): 2 INJECTION INTRAMUSCULAR; INTRAVENOUS; SUBCUTANEOUS at 13:27

## 2023-11-03 RX ADMIN — HYDROMORPHONE HYDROCHLORIDE 1 MILLIGRAM(S): 2 INJECTION INTRAMUSCULAR; INTRAVENOUS; SUBCUTANEOUS at 12:27

## 2023-11-03 RX ADMIN — Medication 1 EACH: at 17:31

## 2023-11-03 RX ADMIN — HYDROMORPHONE HYDROCHLORIDE 1 MILLIGRAM(S): 2 INJECTION INTRAMUSCULAR; INTRAVENOUS; SUBCUTANEOUS at 05:46

## 2023-11-03 RX ADMIN — HYDROMORPHONE HYDROCHLORIDE 1 MILLIGRAM(S): 2 INJECTION INTRAMUSCULAR; INTRAVENOUS; SUBCUTANEOUS at 21:48

## 2023-11-03 RX ADMIN — CHLORHEXIDINE GLUCONATE 1 APPLICATION(S): 213 SOLUTION TOPICAL at 12:30

## 2023-11-03 RX ADMIN — HYDROMORPHONE HYDROCHLORIDE 1 MILLIGRAM(S): 2 INJECTION INTRAMUSCULAR; INTRAVENOUS; SUBCUTANEOUS at 22:18

## 2023-11-03 RX ADMIN — HYDROMORPHONE HYDROCHLORIDE 1 MILLIGRAM(S): 2 INJECTION INTRAMUSCULAR; INTRAVENOUS; SUBCUTANEOUS at 05:16

## 2023-11-03 NOTE — PROGRESS NOTE ADULT - ASSESSMENT
58F PMH appendiceal ca s/p appendectomy, cholecystectomy, hysterectomy, omentectomy, peritonectomy, LAR/DLI, and bladder injury with primary repair, all 10/4/23. HC c/b L IJ & innominate vein non-occlusive thrombosis, tLVX previously held i/s/o expanding subcapsular liver hematomas on CT 10/20, reinstated 10/26, switched to apixaban 10/30. Pt has also persistently c/o continuous retching, PO intolerance. Became febrile and tachycardic 10/26, fever workup revealing for bacteriuria with cultures positive for >100,000 CFU GNR per hpf. ID consulted, stated UTI unlikely in absence of urinary symptomatology, patient switched from cipro/metronidazole to ceftriaxone/flagyl then ceftriaxone only per ID recommendations. Fever resolved on ceftriaxone, however hospitalization now ongoing for persistent tachycardia 110-120 presumed to be 2/2 anemia with possible component of insufficient pain control. CTAP 10/28 notable for large R pleural effusion, increased in size from previous imaging, decreased hepatic and perihepatic hematomas, similar R hepatic lobe hypodensities (unclear if infarct vs small collections). Hemoglobin hovering in 7s, transfused 10/30 for hgb 7.0 with appropriate response (8.3 10/31 AM, 9.1 11/3 AM). CXR 11/2 with continued worsening of R pleural effusion and compressive atelectasis, pulmonology consulted, plan for thoracentesis 11/4-11/6, exact date pending availability. Cardiology following, per note 11/3 tachycardia attributable to anemia and pleural effusion +/- sepsis.     - AC/DVT/Antiplatelet: Apixaban 5mg BID, being held starting 11/2 for thoracentesis.   - Antimicrobials: Ceftriaxone 1000mg qd, 10 day course started 10/28 (completes 11/7).   - Diet: Sips/Chips + TPN.   - Pain: APAP, hydromorphone PRN.   - Continue scopolamine and lorazepam for nausea control, current regimen appears to be working well.   - Hematology consulted for CARMELA (likely anemia of inflammation), per recs pt on iron infusion 200mg qd x5 days started 10/30. Received 1u pRBC 10/30 with appropriate response (hgb 7.0 pre-transfusion -> 8.3 post-transfusion).  - Wound care: Continue to evaluate ostomy daily.   - PT/OT consult appreciated, home with home PT.  - Dispo: Pending response to thoracentesis.      Durga Worley, PGY-1  D team surgery ([id number])

## 2023-11-03 NOTE — PROGRESS NOTE ADULT - NS ATTEND AMEND GEN_ALL_CORE FT
I agree with the above history, physical examination, chief complaint/diagnosis, and plan, which I have reviewed and edited where appropriate.  I agree with notes/assessment and detailed interval history of health care providers on my service.  I have seen and examined the patient.  I reviewed the laboratory and available data and agree with the history, physical assessment and plan.  I reviewed and discussed with all consultants, house staff and PA's.  The Nutrition Support Team (NST) discusses on an ongoing basis with the primary team and all consultants, House staff and PA's to have a permanent risk benefit analyses on all decisions and coordinating care.  I was physically present for the key portions of the evaluation and management (E/M) service provided.  58F s/p appendectomy, cholecystectomy, hysterectomy, omentectomy, peritonectomy, LAR with diverting loop ileostomy, bladder injury with primary repair with severe protein calorie malnutrition receiving TPN.  PHYSICAL EXAM:  General: NAD  Cardiac: regular rate  Respiratory: clear  Abdomen: soft, nontender  Extremities; warm  labs reviewed - electrolytes adjusted  TPN will provide 1584 kcal/day
I agree with the above history, physical examination, chief complaint/diagnosis, and plan, which I have reviewed and edited where appropriate.  I agree with notes/assessment and detailed interval history of health care providers on my service.  I have seen and examined the patient.  I reviewed the laboratory and available data and agree with the history, physical assessment and plan.  I reviewed and discussed with all consultants, house staff and PA's.  The Nutrition Support Team (NST) discusses on an ongoing basis with the primary team and all consultants, House staff and PA's to have a permanent risk benefit analyses on all decisions and coordinating care.  I was physically present for the key portions of the evaluation and management (E/M) service provided.  58F s/p appendectomy, cholecystectomy, hysterectomy, omentectomy, peritonectomy, LAR receiving severe protein calorie malnutrition.  PHYSICAL EXAM:  General: NAD  Cardiac: regular rate  Respiratory: clear  Abdomen: soft, nontender  Extremities: warm  labs reviewed - electrolytes adjusted  TPN; 584 kcal/day/18hrs
I agree with the above history, physical examination, chief complaint/diagnosis, and plan, which I have reviewed and edited where appropriate.  I agree with notes/assessment and detailed interval history of health care providers on my service.  I have seen and examined the patient.  I reviewed the laboratory and available data and agree with the history, physical assessment and plan.  I reviewed and discussed with all consultants, house staff and PA's.  The Nutrition Support Team (NST) discusses on an ongoing basis with the primary team and all consultants, House staff and PA's to have a permanent risk benefit analyses on all decisions and coordinating care.  I was physically present for the key portions of the evaluation and management (E/M) service provided.  58F s/p appendectomy, cholecystectomy, hysterectomy, omentectomy, peritonectomy, LAR with diverting loop ileostomy, bladder injury with primary repair with prolonged NPO and severe protein calorie malnutrition receiving TPN.  PHYSICAL EXAM:  General: NAD  Cardiac: regular rate  Respiratory: clear  Abdomen: soft, nontender, nondistended  Extremities: warm  labs reviewed - electrolytes adjusted  TPN; 1.5L/1450 kcal/day
I agree with the above history, physical examination, chief complaint/diagnosis, and plan, which I have reviewed and edited where appropriate.  I agree with notes/assessment and detailed interval history of health care providers on my service.  I have seen and examined the patient.  I reviewed the laboratory and available data and agree with the history, physical assessment and plan.  I reviewed and discussed with all consultants, house staff and PA's.  The Nutrition Support Team (NST) discusses on an ongoing basis with the primary team and all consultants, House staff and PA's to have a permanent risk benefit analyses on all decisions and coordinating care.  I was physically present for the key portions of the evaluation and management (E/M) service provided.  58F s/p appendectomy, cholecystectomy, hysterectomy, omentectomy, peritonectomy, LAR with diverting loop ileostomy receiving nutrition support for prolonged NPO and severe protein calorie malnutrition.  PHYSICAL EXAM:  General: NAD  Cardiac: regular rate  Respiratory: clear  Abdomen: soft, nontender, nondistended  Extremities: warm  labs reviewed - electrolytes adjusted   TPN; 1584 kcal/day/12hrs
I agree with the above history, physical examination, chief complaint/diagnosis, and plan, which I have reviewed and edited where appropriate.  I agree with notes/assessment and detailed interval history of health care providers on my service.  I have seen and examined the patient.  I reviewed the laboratory and available data and agree with the history, physical assessment and plan.  I reviewed and discussed with all consultants, house staff and PA's.  The Nutrition Support Team (NST) discusses on an ongoing basis with the primary team and all consultants, House staff and PA's to have a permanent risk benefit analyses on all decisions and coordinating care.  I was physically present for the key portions of the evaluation and management (E/M) service provided.  58F s/p appendectomy, cholecystectomy, hysterectomy, omentectomy, peritonectomy, LAR with diverting loop ileostomy, bladder injury with primary repair with severe protein calorie malnutrition receiving TPN  NAD  Lung clear  Heart RR  Abd soft  labs reviewed - electrolytes adjusted   continue TPN with infusion volume of 1.5L/1508 kcal/day/12hrs
I agree with the above history, physical examination, chief complaint/diagnosis, and plan, which I have reviewed and edited where appropriate.  I agree with notes/assessment and detailed interval history of health care providers on my service.  I have seen and examined the patient.  I reviewed the laboratory and available data and agree with the history, physical assessment and plan.  I reviewed and discussed with all consultants, house staff and PA's.  The Nutrition Support Team (NST) discusses on an ongoing basis with the primary team and all consultants, House staff and PA's to have a permanent risk benefit analyses on all decisions and coordinating care.  I was physically present for the key portions of the evaluation and management (E/M) service provided.  58F sp LAR with diverting loop ileostomy, bladder injury with primary repair with prolonged NPO and severe protein calorie malnutrition receiving TPN.  PHYSICAL EXAM:  General: NAD  Cardiac: regular rate  Respiratory: clear  Abdomen: soft, nontender  Extremities: warm  labs reviewed - electrolytes adjusted   TPN; volume of 1.5L/1508 kcal/day
I agree with the above history, physical examination, chief complaint/diagnosis, and plan, which I have reviewed and edited where appropriate.  I agree with notes/assessment and detailed interval history of health care providers on my service.  I have seen and examined the patient.  I reviewed the laboratory and available data and agree with the history, physical assessment and plan.  I reviewed and discussed with all consultants, house staff and PA's.  The Nutrition Support Team (NST) discusses on an ongoing basis with the primary team and all consultants, House staff and PA's to have a permanent risk benefit analyses on all decisions and coordinating care.  I was physically present for the key portions of the evaluation and management (E/M) service provided.  58F  s/p appendectomy, cholecystectomy, hysterectomy, omentectomy, peritonectomy, LAR with diverting loop ileostomy, bladder injury with primary repair. Nutrition support consult called for prolonged NPO and severe protein calorie malnutrition.  PHYSICAL EXAM:  General: NAD  Cardiac: regular rate  Lung clear  Abdomen: soft  Plan for PICC placement today.  initiate TPN
I agree with the above history, physical examination, chief complaint/diagnosis, and plan, which I have reviewed and edited where appropriate.  I agree with notes/assessment and detailed interval history of health care providers on my service.  I have seen and examined the patient.  I reviewed the laboratory and available data and agree with the history, physical assessment and plan.  I reviewed and discussed with all consultants, house staff and PA's.  The Nutrition Support Team (NST) discusses on an ongoing basis with the primary team and all consultants, House staff and PA's to have a permanent risk benefit analyses on all decisions and coordinating care.  I was physically present for the key portions of the evaluation and management (E/M) service provided.  58F s/p appendectomy, cholecystectomy, hysterectomy, omentectomy, peritonectomy receiving TPN secondary to prolonged NPO and severe protein calorie malnutrition.   PHYSICAL EXAM:  General: NAD  Cardiac: regular rate  Respiratory: clear  Abdomen: soft, nontender  Extremities; warm  labs reviewed - electrolytes adjusted  TPN; 1.5L/ 1508 kcal/day/12hrs
I agree with the above history, physical examination, chief complaint/diagnosis, and plan, which I have reviewed and edited where appropriate.  I agree with notes/assessment and detailed interval history of health care providers on my service.  I have seen and examined the patient.  I reviewed the laboratory and available data and agree with the history, physical assessment and plan.  I reviewed and discussed with all consultants, house staff and PA's.  The Nutrition Support Team (NST) discusses on an ongoing basis with the primary team and all consultants, House staff and PA's to have a permanent risk benefit analyses on all decisions and coordinating care.  I was physically present for the key portions of the evaluation and management (E/M) service provided.  58F s/p appendectomy, cholecystectomy, hysterectomy, omentectomy, peritonectomy, LAR with diverting loop ileostomy, bladder injury with primary repair with prolonged NPO and severe protein calorie malnutrition receiving TPN.  PHYSICAL EXAM:  General: NAD  Cardiac: regular rate  Respiratory: clear  Abdomen: soft, nontender, nondistended  Extremities: warm  labs reviewed - electrolytes adjusted  TPN; 1.5L/1450 kcal/day

## 2023-11-03 NOTE — PROGRESS NOTE ADULT - ASSESSMENT
tachycardia  due to anemia and sepsis and large pleural effusion   appreciate pulm consult  plan for thoracentesis     on a/c as per primary team for brachial DVT    sepsis  uti  on abx  plan as per ID     HTN  stable     Monitor hemoglobin, transfuse as needed.

## 2023-11-03 NOTE — PROGRESS NOTE ADULT - SUBJECTIVE AND OBJECTIVE BOX
General Surgery Daily Resident Progress Note    Subjective and interval  Seen and examined at bedside. Overnight remains tachycardic (110-120 bpm), other VSS. Complains of tiredness.   ___________________________________________________  Vital Signs  T(C): 37.4 (05:18), Max: 37.4 (00:32)  HR: 112 (05:18) (112 - 120)  BP: 110/60 (05:18) (110/60 - 122/68)  RR: 18 (05:18) (18 - 19)  SpO2: 100% (05:18) (99% - 100%)    In/Out  11-02  -  11-03  --------------------------------------------------------  IN: 1624.2 mL / OUT: 1950 mL / NET: -325.8 mL    11-02 @ 07:01  -  11-03 @ 07:00  --------------------------------------------------------  OUT:    Ileostomy (mL): 850 mL  Total OUT: 850 mL  ___________________________________________________  Physical Exam  General: Patient more lethargic, withdrawn compared with previous exams.   Cardiac: WWP.  Respiratory: Equal chest wall expansion bilaterally, shallow respirations without grossly abnormal breath sounds.   Abdomen: Nondistended.   ___________________________________________________  Labs  CBC Basic (11-03 @ 05:53)  WBC: 4.14 Hgb: 9.1 Hct: 28.3 Plt: 252    Metabolic Panel (11-03 @ 05:53)  146  |  112  |  23  ----------------------------<  126  4.3   |  23  |  0.40  Ca: 9.2/Phos: 3.4/Magnesium: 2.30    LFTs (11-02 @ 10:48)  Total protein 7.2 | Albumin 2.8  TBili 0.5 | DBili <0.2  AST 44 | ALT 47 | AlkPhos 319    Urinalysis (11-03 @ 05:53)  Physical: Color x, Appearance x, Mucous x, Gross blood x  Analytic: SG x, pH x  Cells: RBC x, WBC x  UTI markers: Bacteria x, Leukocyte esterase x, Nitrites x  Chemical: Glucose 126, Ketones x, Protein x, Urobilinogen x, Bilirubin x  ___________________________________________________  Microbiology  Culture - Urine (collected 10-27 @ 09:12)  Final Report: >100,000 CFU/ml Klebsiella pneumoniae    Culture - Blood (collected 10-28 @ 10:00)  Final Report: No growth at 5 days    Culture - Blood (collected 10-28 @ 10:10)  Final Report: No growth at 5 days  ___________________________________________________  Medications  Antimicrobial and Immunologic  ·	cefTRIAXone   IVPB 1000 milliGRAM(s) every 24 hours    Neuro, Psych and Analgesia  ·	acetaminophen     Tablet .. 650 milliGRAM(s) every 6 hours PRN  ·	HYDROmorphone   Tablet 1 milliGRAM(s) every 4 hours PRN  ·	LORazepam     Tablet 0.25 milliGRAM(s) every 6 hours PRN  ·	scopolamine 1 mG/72 Hr(s) Patch 1 Patch every 72 hours    Cardiovascular and Hematologic (includes DVT ppx/AC/Antiplatelet agents)  ·	iron sucrose IVPB 200 milliGRAM(s) every 24 hours    GI, Endocrine and Nutrition  ·	lipid, fat emulsion (Fish Oil and Plant Based) 20% Infusion 16.6 mL/Hr <Continuous>  ·	pantoprazole    Tablet 40 milliGRAM(s) before breakfast

## 2023-11-03 NOTE — PROGRESS NOTE ADULT - SUBJECTIVE AND OBJECTIVE BOX
Subjective: Patient seen and examined. No new events except as noted.     SUBJECTIVE/ROS:  nad      MEDICATIONS:  MEDICATIONS  (STANDING):  cefTRIAXone   IVPB 1000 milliGRAM(s) IV Intermittent every 24 hours  chlorhexidine 2% Cloths 1 Application(s) Topical daily  influenza   Vaccine 0.5 milliLiter(s) IntraMuscular once  iron sucrose IVPB 200 milliGRAM(s) IV Intermittent every 24 hours  lipid, fat emulsion (Fish Oil and Plant Based) 20% Infusion 16.6 mL/Hr (16.6 mL/Hr) IV Continuous <Continuous>  pantoprazole    Tablet 40 milliGRAM(s) Oral before breakfast  Parenteral Nutrition - Adult 1 Each TPN Continuous <Continuous>  scopolamine 1 mG/72 Hr(s) Patch 1 Patch Transdermal every 72 hours      PHYSICAL EXAM:  T(C): 37.4 (11-03-23 @ 05:18), Max: 37.4 (11-03-23 @ 00:32)  HR: 112 (11-03-23 @ 05:18) (112 - 120)  BP: 110/60 (11-03-23 @ 05:18) (110/60 - 125/62)  RR: 18 (11-03-23 @ 05:18) (18 - 20)  SpO2: 100% (11-03-23 @ 05:18) (97% - 100%)  Wt(kg): --  I&O's Summary    02 Nov 2023 07:01  -  03 Nov 2023 07:00  --------------------------------------------------------  IN: 1624.2 mL / OUT: 1950 mL / NET: -325.8 mL        Weight (kg): 54.5 (11-03 @ 06:34)      JVP: Normal  Neck: supple  Lung: clear   CV: S1 S2 , Murmur:  Abd: soft  Ext: No edema  neuro: Awake / alert  Psych: flat affect  Skin: normal``    LABS/DATA:    CARDIAC MARKERS:                                9.0    4.61  )-----------( 250      ( 02 Nov 2023 10:48 )             28.3     11-02    144  |  110<H>  |  22  ----------------------------<  136<H>  4.3   |  22  |  0.41<L>    Ca    9.4      02 Nov 2023 10:48  Phos  3.8     11-02  Mg     2.20     11-02    TPro  7.2  /  Alb  2.8<L>  /  TBili  0.5  /  DBili  <0.2  /  AST  44<H>  /  ALT  47<H>  /  AlkPhos  319<H>  11-02    proBNP:   Lipid Profile:   HgA1c:   TSH:     TELE:  EKG:

## 2023-11-04 LAB
ALBUMIN SERPL ELPH-MCNC: 2.8 G/DL — LOW (ref 3.3–5)
ALBUMIN SERPL ELPH-MCNC: 2.8 G/DL — LOW (ref 3.3–5)
ALP SERPL-CCNC: 293 U/L — HIGH (ref 40–120)
ALP SERPL-CCNC: 293 U/L — HIGH (ref 40–120)
ALT FLD-CCNC: 75 U/L — HIGH (ref 4–33)
ALT FLD-CCNC: 75 U/L — HIGH (ref 4–33)
ANION GAP SERPL CALC-SCNC: 11 MMOL/L — SIGNIFICANT CHANGE UP (ref 7–14)
ANION GAP SERPL CALC-SCNC: 11 MMOL/L — SIGNIFICANT CHANGE UP (ref 7–14)
AST SERPL-CCNC: 68 U/L — HIGH (ref 4–32)
AST SERPL-CCNC: 68 U/L — HIGH (ref 4–32)
BASOPHILS # BLD AUTO: 0.01 K/UL — SIGNIFICANT CHANGE UP (ref 0–0.2)
BASOPHILS # BLD AUTO: 0.01 K/UL — SIGNIFICANT CHANGE UP (ref 0–0.2)
BASOPHILS NFR BLD AUTO: 0.3 % — SIGNIFICANT CHANGE UP (ref 0–2)
BASOPHILS NFR BLD AUTO: 0.3 % — SIGNIFICANT CHANGE UP (ref 0–2)
BILIRUB SERPL-MCNC: 0.4 MG/DL — SIGNIFICANT CHANGE UP (ref 0.2–1.2)
BILIRUB SERPL-MCNC: 0.4 MG/DL — SIGNIFICANT CHANGE UP (ref 0.2–1.2)
BUN SERPL-MCNC: 26 MG/DL — HIGH (ref 7–23)
BUN SERPL-MCNC: 26 MG/DL — HIGH (ref 7–23)
CA-I BLD-SCNC: 1.13 MMOL/L — LOW (ref 1.15–1.29)
CA-I BLD-SCNC: 1.13 MMOL/L — LOW (ref 1.15–1.29)
CALCIUM SERPL-MCNC: 9 MG/DL — SIGNIFICANT CHANGE UP (ref 8.4–10.5)
CALCIUM SERPL-MCNC: 9 MG/DL — SIGNIFICANT CHANGE UP (ref 8.4–10.5)
CHLORIDE SERPL-SCNC: 111 MMOL/L — HIGH (ref 98–107)
CHLORIDE SERPL-SCNC: 111 MMOL/L — HIGH (ref 98–107)
CO2 SERPL-SCNC: 22 MMOL/L — SIGNIFICANT CHANGE UP (ref 22–31)
CO2 SERPL-SCNC: 22 MMOL/L — SIGNIFICANT CHANGE UP (ref 22–31)
CREAT SERPL-MCNC: 0.42 MG/DL — LOW (ref 0.5–1.3)
CREAT SERPL-MCNC: 0.42 MG/DL — LOW (ref 0.5–1.3)
EGFR: 113 ML/MIN/1.73M2 — SIGNIFICANT CHANGE UP
EGFR: 113 ML/MIN/1.73M2 — SIGNIFICANT CHANGE UP
EOSINOPHIL # BLD AUTO: 0.06 K/UL — SIGNIFICANT CHANGE UP (ref 0–0.5)
EOSINOPHIL # BLD AUTO: 0.06 K/UL — SIGNIFICANT CHANGE UP (ref 0–0.5)
EOSINOPHIL NFR BLD AUTO: 1.5 % — SIGNIFICANT CHANGE UP (ref 0–6)
EOSINOPHIL NFR BLD AUTO: 1.5 % — SIGNIFICANT CHANGE UP (ref 0–6)
GLUCOSE BLDC GLUCOMTR-MCNC: 129 MG/DL — HIGH (ref 70–99)
GLUCOSE BLDC GLUCOMTR-MCNC: 129 MG/DL — HIGH (ref 70–99)
GLUCOSE BLDC GLUCOMTR-MCNC: 158 MG/DL — HIGH (ref 70–99)
GLUCOSE BLDC GLUCOMTR-MCNC: 158 MG/DL — HIGH (ref 70–99)
GLUCOSE SERPL-MCNC: 119 MG/DL — HIGH (ref 70–99)
GLUCOSE SERPL-MCNC: 119 MG/DL — HIGH (ref 70–99)
HCT VFR BLD CALC: 28.2 % — LOW (ref 34.5–45)
HCT VFR BLD CALC: 28.2 % — LOW (ref 34.5–45)
HGB BLD-MCNC: 9.1 G/DL — LOW (ref 11.5–15.5)
HGB BLD-MCNC: 9.1 G/DL — LOW (ref 11.5–15.5)
IANC: 2.18 K/UL — SIGNIFICANT CHANGE UP (ref 1.8–7.4)
IANC: 2.18 K/UL — SIGNIFICANT CHANGE UP (ref 1.8–7.4)
IMM GRANULOCYTES NFR BLD AUTO: 0.3 % — SIGNIFICANT CHANGE UP (ref 0–0.9)
IMM GRANULOCYTES NFR BLD AUTO: 0.3 % — SIGNIFICANT CHANGE UP (ref 0–0.9)
LYMPHOCYTES # BLD AUTO: 1.3 K/UL — SIGNIFICANT CHANGE UP (ref 1–3.3)
LYMPHOCYTES # BLD AUTO: 1.3 K/UL — SIGNIFICANT CHANGE UP (ref 1–3.3)
LYMPHOCYTES # BLD AUTO: 33.1 % — SIGNIFICANT CHANGE UP (ref 13–44)
LYMPHOCYTES # BLD AUTO: 33.1 % — SIGNIFICANT CHANGE UP (ref 13–44)
MAGNESIUM SERPL-MCNC: 2.4 MG/DL — SIGNIFICANT CHANGE UP (ref 1.6–2.6)
MAGNESIUM SERPL-MCNC: 2.4 MG/DL — SIGNIFICANT CHANGE UP (ref 1.6–2.6)
MCHC RBC-ENTMCNC: 29.4 PG — SIGNIFICANT CHANGE UP (ref 27–34)
MCHC RBC-ENTMCNC: 29.4 PG — SIGNIFICANT CHANGE UP (ref 27–34)
MCHC RBC-ENTMCNC: 32.3 GM/DL — SIGNIFICANT CHANGE UP (ref 32–36)
MCHC RBC-ENTMCNC: 32.3 GM/DL — SIGNIFICANT CHANGE UP (ref 32–36)
MCV RBC AUTO: 91 FL — SIGNIFICANT CHANGE UP (ref 80–100)
MCV RBC AUTO: 91 FL — SIGNIFICANT CHANGE UP (ref 80–100)
MONOCYTES # BLD AUTO: 0.37 K/UL — SIGNIFICANT CHANGE UP (ref 0–0.9)
MONOCYTES # BLD AUTO: 0.37 K/UL — SIGNIFICANT CHANGE UP (ref 0–0.9)
MONOCYTES NFR BLD AUTO: 9.4 % — SIGNIFICANT CHANGE UP (ref 2–14)
MONOCYTES NFR BLD AUTO: 9.4 % — SIGNIFICANT CHANGE UP (ref 2–14)
NEUTROPHILS # BLD AUTO: 2.18 K/UL — SIGNIFICANT CHANGE UP (ref 1.8–7.4)
NEUTROPHILS # BLD AUTO: 2.18 K/UL — SIGNIFICANT CHANGE UP (ref 1.8–7.4)
NEUTROPHILS NFR BLD AUTO: 55.4 % — SIGNIFICANT CHANGE UP (ref 43–77)
NEUTROPHILS NFR BLD AUTO: 55.4 % — SIGNIFICANT CHANGE UP (ref 43–77)
NRBC # BLD: 1 /100 WBCS — HIGH (ref 0–0)
NRBC # BLD: 1 /100 WBCS — HIGH (ref 0–0)
NRBC # FLD: 0.04 K/UL — HIGH (ref 0–0)
NRBC # FLD: 0.04 K/UL — HIGH (ref 0–0)
PHOSPHATE SERPL-MCNC: 3.6 MG/DL — SIGNIFICANT CHANGE UP (ref 2.5–4.5)
PHOSPHATE SERPL-MCNC: 3.6 MG/DL — SIGNIFICANT CHANGE UP (ref 2.5–4.5)
PLATELET # BLD AUTO: 274 K/UL — SIGNIFICANT CHANGE UP (ref 150–400)
PLATELET # BLD AUTO: 274 K/UL — SIGNIFICANT CHANGE UP (ref 150–400)
POTASSIUM SERPL-MCNC: 4.3 MMOL/L — SIGNIFICANT CHANGE UP (ref 3.5–5.3)
POTASSIUM SERPL-MCNC: 4.3 MMOL/L — SIGNIFICANT CHANGE UP (ref 3.5–5.3)
POTASSIUM SERPL-SCNC: 4.3 MMOL/L — SIGNIFICANT CHANGE UP (ref 3.5–5.3)
POTASSIUM SERPL-SCNC: 4.3 MMOL/L — SIGNIFICANT CHANGE UP (ref 3.5–5.3)
PROT SERPL-MCNC: 7.1 G/DL — SIGNIFICANT CHANGE UP (ref 6–8.3)
PROT SERPL-MCNC: 7.1 G/DL — SIGNIFICANT CHANGE UP (ref 6–8.3)
RBC # BLD: 3.1 M/UL — LOW (ref 3.8–5.2)
RBC # BLD: 3.1 M/UL — LOW (ref 3.8–5.2)
RBC # FLD: 19.8 % — HIGH (ref 10.3–14.5)
RBC # FLD: 19.8 % — HIGH (ref 10.3–14.5)
SODIUM SERPL-SCNC: 144 MMOL/L — SIGNIFICANT CHANGE UP (ref 135–145)
SODIUM SERPL-SCNC: 144 MMOL/L — SIGNIFICANT CHANGE UP (ref 135–145)
WBC # BLD: 3.93 K/UL — SIGNIFICANT CHANGE UP (ref 3.8–10.5)
WBC # BLD: 3.93 K/UL — SIGNIFICANT CHANGE UP (ref 3.8–10.5)
WBC # FLD AUTO: 3.93 K/UL — SIGNIFICANT CHANGE UP (ref 3.8–10.5)
WBC # FLD AUTO: 3.93 K/UL — SIGNIFICANT CHANGE UP (ref 3.8–10.5)

## 2023-11-04 PROCEDURE — 99232 SBSQ HOSP IP/OBS MODERATE 35: CPT

## 2023-11-04 RX ORDER — ELECTROLYTE SOLUTION,INJ
1 VIAL (ML) INTRAVENOUS
Refills: 0 | Status: DISCONTINUED | OUTPATIENT
Start: 2023-11-04 | End: 2023-11-04

## 2023-11-04 RX ORDER — I.V. FAT EMULSION 20 G/100ML
12.5 EMULSION INTRAVENOUS
Qty: 30 | Refills: 0 | Status: DISCONTINUED | OUTPATIENT
Start: 2023-11-04 | End: 2023-11-05

## 2023-11-04 RX ADMIN — CEFTRIAXONE 100 MILLIGRAM(S): 500 INJECTION, POWDER, FOR SOLUTION INTRAMUSCULAR; INTRAVENOUS at 18:33

## 2023-11-04 RX ADMIN — HYDROMORPHONE HYDROCHLORIDE 1 MILLIGRAM(S): 2 INJECTION INTRAMUSCULAR; INTRAVENOUS; SUBCUTANEOUS at 05:13

## 2023-11-04 RX ADMIN — HYDROMORPHONE HYDROCHLORIDE 1 MILLIGRAM(S): 2 INJECTION INTRAMUSCULAR; INTRAVENOUS; SUBCUTANEOUS at 10:50

## 2023-11-04 RX ADMIN — HYDROMORPHONE HYDROCHLORIDE 1 MILLIGRAM(S): 2 INJECTION INTRAMUSCULAR; INTRAVENOUS; SUBCUTANEOUS at 21:34

## 2023-11-04 RX ADMIN — HYDROMORPHONE HYDROCHLORIDE 1 MILLIGRAM(S): 2 INJECTION INTRAMUSCULAR; INTRAVENOUS; SUBCUTANEOUS at 11:20

## 2023-11-04 RX ADMIN — HYDROMORPHONE HYDROCHLORIDE 1 MILLIGRAM(S): 2 INJECTION INTRAMUSCULAR; INTRAVENOUS; SUBCUTANEOUS at 19:00

## 2023-11-04 RX ADMIN — Medication 1 EACH: at 18:34

## 2023-11-04 RX ADMIN — I.V. FAT EMULSION 12.5 ML/HR: 20 EMULSION INTRAVENOUS at 18:36

## 2023-11-04 RX ADMIN — CHLORHEXIDINE GLUCONATE 1 APPLICATION(S): 213 SOLUTION TOPICAL at 12:06

## 2023-11-04 RX ADMIN — HYDROMORPHONE HYDROCHLORIDE 1 MILLIGRAM(S): 2 INJECTION INTRAMUSCULAR; INTRAVENOUS; SUBCUTANEOUS at 01:01

## 2023-11-04 RX ADMIN — HYDROMORPHONE HYDROCHLORIDE 1 MILLIGRAM(S): 2 INJECTION INTRAMUSCULAR; INTRAVENOUS; SUBCUTANEOUS at 18:33

## 2023-11-04 RX ADMIN — HYDROMORPHONE HYDROCHLORIDE 1 MILLIGRAM(S): 2 INJECTION INTRAMUSCULAR; INTRAVENOUS; SUBCUTANEOUS at 04:43

## 2023-11-04 RX ADMIN — HYDROMORPHONE HYDROCHLORIDE 1 MILLIGRAM(S): 2 INJECTION INTRAMUSCULAR; INTRAVENOUS; SUBCUTANEOUS at 22:04

## 2023-11-04 RX ADMIN — HYDROMORPHONE HYDROCHLORIDE 1 MILLIGRAM(S): 2 INJECTION INTRAMUSCULAR; INTRAVENOUS; SUBCUTANEOUS at 01:31

## 2023-11-04 NOTE — PROGRESS NOTE ADULT - SUBJECTIVE AND OBJECTIVE BOX
General Surgery Daily Resident Progress Note    Subjective and interval  Seen and examined at bedside. No acute events overnight.  ___________________________________________________  Vital Signs  T(C): 37.5 (20:21), Max: 37.8 (00:24)  HR: 110 (20:21) (106 - 120)  BP: 127/68 (20:21) (108/75 - 127/68)  ABP: --  ABP(mean): --  RR: 18 (20:21) (17 - 18)  SpO2: 99% (20:21) (97% - 100%)    In/Out    11-03 - 11-04  --------------------------------------------------------  IN: 1364.4 mL / OUT: 2350 mL / NET: -985.6 mL    11-04 - 11-04  --------------------------------------------------------  IN: 457.5 mL / OUT: 500 mL / NET: -42.5 mL      ___________________________________________________  Physical Exam  General: NAD, resting comfortably in bed.   Cardiac: WWP.  Respiratory: Equal chest wall expansion bilaterally, nonlabored respirations.  Abdomen:   Extremities:  If ICU/acutely ill - blank template   General:  Cardiac:  Respiratory:  Back:  Extremities:  ___________________________________________________  Labs  CBC Basic (11-04 @ 05:28)  WBC: 3.93 Hgb: 9.1 Hct: 28.2 Plt: 274    Metabolic Panel (11-04 @ 05:28)  144  |  111  |  26  ----------------------------<  119  4.3   |  22  |  0.42  Ca: 9.0/Phos: 3.6/Magnesium: 2.40    LFTs (11-04 @ 05:28)  Total protein 7.1 | Albumin 2.8  TBili 0.4 | DBili x  AST 68 | ALT 75 | AlkPhos 293      Urinalysis (11-04 @ 05:28)  Physical: Color x, Appearance x, Mucous x, Gross blood x  Analytic: SG x, pH x  Cells: RBC x, WBC x  UTI markers: Bacteria x, Leukocyte esterase x, Nitrites x  Chemical: Glucose 119, Ketones x, Protein x, Urobilinogen x, Bilirubin x    ___________________________________________________  Microbiology  ___________________________________________________  Medications  Antimicrobial and Immunologic  cefTRIAXone   IVPB 1000 milliGRAM(s) every 24 hours  influenza   Vaccine 0.5 milliLiter(s) once    Neuro, Psych and Analgesia  acetaminophen     Tablet .. 650 milliGRAM(s) every 6 hours PRN  HYDROmorphone   Tablet 1 milliGRAM(s) every 3 hours PRN  LORazepam     Tablet 0.25 milliGRAM(s) every 6 hours PRN  scopolamine 1 mG/72 Hr(s) Patch 1 Patch every 72 hours    acetaminophen     Tablet .. 650 milliGRAM(s) every 6 hours PRN  HYDROmorphone   Tablet 1 milliGRAM(s) every 3 hours PRN  LORazepam     Tablet 0.25 milliGRAM(s) every 6 hours PRN  ondansetron   Disintegrating Tablet 8 milliGRAM(s) every 8 hours PRN  scopolamine 1 mG/72 Hr(s) Patch 1 Patch every 72 hours    Cardiovascular and Hematologic (includes DVT ppx/AC/Antiplatelet agents)      Pulmonary    Oncologic    GI, Endocrine and Nutrition  lipid, fat emulsion (Fish Oil and Plant Based) 20% Infusion 12.5 mL/Hr <Continuous>  pantoprazole    Tablet 40 milliGRAM(s) before breakfast  Parenteral Nutrition - Adult 1 Each <Continuous>  sodium chloride 0.9% lock flush 10 milliLiter(s) every 1 hour PRN           General Surgery Daily Resident Progress Note    Subjective and interval  Seen and examined at bedside. No acute events overnight. Patient remains stably tachycardic, in sinus 110-120, now on continuous telemetry. Continues to deny chest pain, difficulty breathing, nausea/vomiting, continues to complain of retching and associated discomfort.   ___________________________________________________  Vital Signs  T(C): 37.5 (20:21), Max: 37.8 (00:24)  HR: 110 (20:21) (106 - 120)  BP: 127/68 (20:21) (108/75 - 127/68)  RR: 18 (20:21) (17 - 18)  SpO2: 99% (20:21) (97% - 100%)    In/Out  11-03  -  11-04  --------------------------------------------------------  IN: 1364.4 mL / OUT: 2350 mL / NET: -985.6 mL  OUT:    Ileostomy (mL): 600 mL  Total OUT: 600 mL  ___________________________________________________  Physical Exam  General: NAD, resting comfortably in bed.   Cardiac: WWP.  Respiratory: Equal chest wall expansion bilaterally, nonlabored respirations.  Abdomen: Soft, mildly tender consistent with previous exams, nondistended.   ___________________________________________________  Labs  CBC Basic (11-04 @ 05:28)  WBC: 3.93 Hgb: 9.1 Hct: 28.2 Plt: 274    Metabolic Panel (11-04 @ 05:28)  144  |  111  |  26  ----------------------------<  119  4.3   |  22  |  0.42  Ca: 9.0/Phos: 3.6/Magnesium: 2.40    LFTs (11-04 @ 05:28)  Total protein 7.1 | Albumin 2.8  TBili 0.4 | DBili x  AST 68 | ALT 75 | AlkPhos 293    Urinalysis (11-04 @ 05:28)  Physical: Color x, Appearance x, Mucous x, Gross blood x  Analytic: SG x, pH x  Cells: RBC x, WBC x  UTI markers: Bacteria x, Leukocyte esterase x, Nitrites x  Chemical: Glucose 119, Ketones x, Protein x, Urobilinogen x, Bilirubin x  ___________________________________________________  Medications  Antimicrobial and Immunologic  cefTRIAXone   IVPB 1000 milliGRAM(s) every 24 hours  influenza   Vaccine 0.5 milliLiter(s) once    Neuro, Psych and Analgesia  acetaminophen     Tablet .. 650 milliGRAM(s) every 6 hours PRN  HYDROmorphone   Tablet 1 milliGRAM(s) every 3 hours PRN  LORazepam     Tablet 0.25 milliGRAM(s) every 6 hours PRN    GI, Endocrine and Nutrition  lipid, fat emulsion (Fish Oil and Plant Based) 20% Infusion 12.5 mL/Hr <Continuous>  pantoprazole    Tablet 40 milliGRAM(s) before breakfast  ondansetron   Disintegrating Tablet 8 milliGRAM(s) every 8 hours PRN  scopolamine 1 mG/72 Hr(s) Patch 1 Patch every 72 hours

## 2023-11-04 NOTE — PROGRESS NOTE ADULT - SUBJECTIVE AND OBJECTIVE BOX
Subjective: Patient seen and examined. No new events except as noted.     SUBJECTIVE/ROS:  nad      MEDICATIONS:  MEDICATIONS  (STANDING):  cefTRIAXone   IVPB 1000 milliGRAM(s) IV Intermittent every 24 hours  chlorhexidine 2% Cloths 1 Application(s) Topical daily  influenza   Vaccine 0.5 milliLiter(s) IntraMuscular once  lipid, fat emulsion (Fish Oil and Plant Based) 20% Infusion 12.5 mL/Hr (12.5 mL/Hr) IV Continuous <Continuous>  pantoprazole    Tablet 40 milliGRAM(s) Oral before breakfast  Parenteral Nutrition - Adult 1 Each TPN Continuous <Continuous>  Parenteral Nutrition - Adult 1 Each TPN Continuous <Continuous>  scopolamine 1 mG/72 Hr(s) Patch 1 Patch Transdermal every 72 hours      PHYSICAL EXAM:  T(C): 37.1 (11-04-23 @ 12:10), Max: 37.8 (11-04-23 @ 00:24)  HR: 106 (11-04-23 @ 12:10) (105 - 120)  BP: 123/66 (11-04-23 @ 12:10) (108/75 - 123/72)  RR: 18 (11-04-23 @ 12:10) (17 - 18)  SpO2: 99% (11-04-23 @ 12:10) (95% - 99%)  Wt(kg): --  I&O's Summary    03 Nov 2023 07:01  -  04 Nov 2023 07:00  --------------------------------------------------------  IN: 1364.4 mL / OUT: 2350 mL / NET: -985.6 mL    04 Nov 2023 08:01  -  04 Nov 2023 14:34  --------------------------------------------------------  IN: 100 mL / OUT: 400 mL / NET: -300 mL            JVP: Normal  Neck: supple  Lung: clear   CV: S1 S2 , Murmur:  Abd: soft  Ext: No edema  neuro: Awake / alert  Psych: flat affect  Skin: normal``    LABS/DATA:    CARDIAC MARKERS:                                9.1    3.93  )-----------( 274      ( 04 Nov 2023 05:28 )             28.2     11-04    144  |  111<H>  |  26<H>  ----------------------------<  119<H>  4.3   |  22  |  0.42<L>    Ca    9.0      04 Nov 2023 05:28  Phos  3.6     11-04  Mg     2.40     11-04    TPro  7.1  /  Alb  2.8<L>  /  TBili  0.4  /  DBili  x   /  AST  68<H>  /  ALT  75<H>  /  AlkPhos  293<H>  11-04    proBNP:   Lipid Profile:   HgA1c:   TSH:     TELE:  EKG:

## 2023-11-04 NOTE — PROGRESS NOTE ADULT - ASSESSMENT
58F PMH appendiceal ca s/p appendectomy, cholecystectomy, hysterectomy, omentectomy, peritonectomy, LAR/DLI, and bladder injury with primary repair, all 10/4/23. HC c/b L IJ & innominate vein non-occlusive thrombosis, tLVX previously held i/s/o expanding subcapsular liver hematomas on CT 10/20, reinstated 10/26, switched to apixaban 10/30. Pt has also persistently c/o continuous retching, PO intolerance. Became febrile and tachycardic 10/26, fever workup revealing for bacteriuria with cultures positive for >100,000 CFU GNR per hpf. ID consulted, stated UTI unlikely in absence of urinary symptomatology, patient switched from cipro/metronidazole to ceftriaxone/flagyl then ceftriaxone only per ID recommendations. Fever resolved on ceftriaxone, however hospitalization now ongoing for persistent tachycardia 110-120 presumed to be 2/2 anemia with possible component of insufficient pain control. CTAP 10/28 notable for large R pleural effusion, increased in size from previous imaging, decreased hepatic and perihepatic hematomas, similar R hepatic lobe hypodensities (unclear if infarct vs small collections). Hemoglobin hovering in 7s, transfused 10/30 for hgb 7.0 with appropriate response (8.3 10/31 AM, 9.1 11/3 AM). CXR 11/2 with continued worsening of R pleural effusion and compressive atelectasis, pulmonology consulted, plan for thoracentesis 11/4-11/6, exact date pending availability. Cardiology following, per note 11/3 tachycardia attributable to anemia and pleural effusion +/- sepsis.     - AC/DVT/Antiplatelet: Apixaban 5mg BID, being held starting 11/2 for thoracentesis.   - Antimicrobials: Ceftriaxone 1000mg qd, 10 day course started 10/28 (completes 11/7).   - Diet: Sips/Chips + TPN.   - Pain: APAP, hydromorphone PRN.   - Continue scopolamine and lorazepam for nausea control, current regimen appears to be working well.   - Hematology consulted for CARMELA (likely anemia of inflammation), per recs pt on iron infusion 200mg qd x5 days started 10/30. Received 1u pRBC 10/30 with appropriate response (hgb 7.0 pre-transfusion -> 8.3 post-transfusion).  - Wound care: Continue to evaluate ostomy daily.   - PT/OT consult appreciated, home with home PT.  - Dispo: Pending response to thoracentesis.      Durga Worley, PGY-1  D team surgery ([id number])

## 2023-11-04 NOTE — PROGRESS NOTE ADULT - SUBJECTIVE AND OBJECTIVE BOX
NUTRITION NOTE  [id number] OWENSSTEWART  ===============================    Interval events - Patient was seen and examined at bedside, no acute events overnight. Patient denies chest pain, shortness of breath, nausea or vomiting at this time. Pt remains on TPN cycled over 12 hours.     ROS: Except as noted above, all other systems reviewed and are negative     Allergies  penicillin (Rash)    PAST MEDICAL & SURGICAL HISTORY:  History of sickle cell trait  Malignant neoplasm of appendix  History of ectopic pregnancy  H/O colonoscopy  H/O endoscopy    Vital Signs Last 24 Hrs  T(C): 37.2 (2023 09:23), Max: 37.8 (2023 00:24)  T(F): 98.9 (2023 09:23), Max: 100 (2023 00:24)  HR: 120 (2023 09:23) (105 - 120)  BP: 122/72 (2023 09:23) (108/75 - 123/72)  RR: 18 (2023 09:23) (17 - 18)  SpO2: 99% (2023 09:23) (95% - 99%)    MEDICATIONS  (STANDING):  cefTRIAXone   IVPB 1000 milliGRAM(s) IV Intermittent every 24 hours  chlorhexidine 2% Cloths 1 Application(s) Topical daily  influenza   Vaccine 0.5 milliLiter(s) IntraMuscular once  lipid, fat emulsion (Fish Oil and Plant Based) 20% Infusion 12.5 mL/Hr (12.5 mL/Hr) IV Continuous <Continuous>  pantoprazole    Tablet 40 milliGRAM(s) Oral before breakfast  Parenteral Nutrition - Adult 1 Each TPN Continuous <Continuous>  Parenteral Nutrition - Adult 1 Each TPN Continuous <Continuous>  scopolamine 1 mG/72 Hr(s) Patch 1 Patch Transdermal every 72 hours    MEDICATIONS  (PRN):  acetaminophen     Tablet .. 650 milliGRAM(s) Oral every 6 hours PRN Mild Pain (1 - 3)  HYDROmorphone   Tablet 1 milliGRAM(s) Oral every 3 hours PRN Moderate and Severe Pain  LORazepam     Tablet 0.25 milliGRAM(s) Oral every 6 hours PRN Nausea and/or Vomiting  naloxone Injectable 0.1 milliGRAM(s) IV Push every 3 minutes PRN For ANY of the following changes in patient status:  A. RR LESS THAN 10 breaths per minute, B. Oxygen saturation LESS THAN 90%, C. Sedation score of 6  ondansetron   Disintegrating Tablet 8 milliGRAM(s) Oral every 8 hours PRN Nausea  sodium chloride 0.9% lock flush 10 milliLiter(s) IV Push every 1 hour PRN Pre/post blood products, medications, blood draw, and to maintain line patency    I&O's Detail    2023 07:01  -  2023 07:00  --------------------------------------------------------  IN:    Fat Emulsion (Fish Oil &amp; Plant Based) 20% Infusion: 149.4 mL    TPN (Total Parenteral Nutrition): 1215 mL  Total IN: 1364.4 mL    OUT:    Ileostomy (mL): 600 mL    Oral Fluid: 0 mL    Voided (mL): 1750 mL  Total OUT: 2350 mL    Total NET: -985.6 mL      2023 08:01  -  2023 11:21  --------------------------------------------------------  IN:    Oral Fluid: 100 mL  Total IN: 100 mL    OUT:    Ileostomy (mL): 100 mL    Voided (mL): 300 mL  Total OUT: 400 mL    Total NET: -300 mL    POCT Blood Glucose.: 158 mg/dL (2023 00:06)  POCT Blood Glucose.: 133 mg/dL (2023 11:55)    Daily Weight in k.5 (2023 06:34)    Drug Dosing Weight  Height (cm): 154.9 (04 Oct 2023 07:27)  Weight (kg): 54.5 (2023 06:34)  BMI (kg/m2): 22.7 (2023 06:34)  BSA (m2): 1.52 (2023 06:34)    PHYSICAL EXAM:  General: NAD, resting in bed comfortably.  Cardiac: regular rate, warm and well perfused  Respiratory: Nonlabored respirations, normal cw expansion.  Abdomen: soft, nontender, nondistended  Extremities: normal strength, FROM, no deformities  PICC Site: E PICC site clean and day (placed on 10/24/23)    Diet: NPO and TPN/lipids (started on 10/24/23)    LABORATORY                      9.1    3.93  )-----------( 274      ( 2023 05:28 )             28.2   11-    144  |  111<H>  |  26<H>  ----------------------------<  119<H>  4.3   |  22  |  0.42<L>    Ca    9.0      2023 05:28  Phos  3.6     11-  Mg     2.40     11-    TPro  7.1  /  Alb  2.8<L>  /  TBili  0.4  /  DBili  x   /  AST  68<H>  /  ALT  75<H>  /  AlkPhos  293<H>  -    LIVER FUNCTIONS - ( 2023 05:28 )  Alb: 2.8 g/dL / Pro: 7.1 g/dL / ALK PHOS: 293 U/L / ALT: 75 U/L / AST: 68 U/L / GGT: x           10-25 Chol -- LDL -- HDL -- Trig 87    CT A/P 10/28 notable for large R pleural effusion, increased in size from previous imaging, decreased hepatic and perihepatic hematomas, similar R hepatic lobe hypodensities (unclear if infarct vs small collections).     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 Patient's course c/b continuous retching and intolerance to tube feeds. Nutrition support consult called for prolonged NPO and severe protein calorie malnutrition. PICC placed and TPN started on 10/24/23.    continue TPN with infusion volume of 1.5L, TPN will provide 1448al/day; TPN cycled over 12 hours; lipid calories decreased in view of elevated LFTs    labs reviewed - electrolytes adjusted in TPN bag     monitor fingersticks, obtain daily weights     continue parenteral nutrition at this time, will follow up with primary team on plan, discharge planning in process with home TPN (on hold due to persistent tachycardia); TPN formula verbally verified with outpatient pharmacy, will follow up with pharmacy once discharge plan is confirmed       - Outpatient TPN via [facility] and follow up with Dr Jeffery Sullivan    [id number]  [place] [id number]  Entrance on [facility]  Phone [phone]    Please instruct patient when calling to make the appointment to schedule for the first MONDAY after discharge from hospital. Please instruct patient to explain she is on TPN when making the appointment. Check labs, CMP, electrolytes, ionized Calcium, triglycerides and fingersticks- frequency to be determined by Dr. Sullivan as outpatient.    1.  Severe protein calorie malnutrition being optimized with TPN: CHO [220] gm.  AA [90] gm. SMOF Lipids [30] gm.  2.  Hyperglycemia managed with: [0] units of regular insulin    3.  Check fluid balance daily.  Strict I/O  [ ] [ ]   4.  Daily BMP, Ionized Calcium, Magnesium and Phosphorous   5.  Triglycerides at initiation of TPN and monthly 10-25 Chol -- LDL -- HDL -- Trig 87    Nutrition Support [id number]

## 2023-11-05 LAB
ALBUMIN SERPL ELPH-MCNC: 2.7 G/DL — LOW (ref 3.3–5)
ALBUMIN SERPL ELPH-MCNC: 2.7 G/DL — LOW (ref 3.3–5)
ALP SERPL-CCNC: 272 U/L — HIGH (ref 40–120)
ALP SERPL-CCNC: 272 U/L — HIGH (ref 40–120)
ALT FLD-CCNC: 81 U/L — HIGH (ref 4–33)
ALT FLD-CCNC: 81 U/L — HIGH (ref 4–33)
ANION GAP SERPL CALC-SCNC: 12 MMOL/L — SIGNIFICANT CHANGE UP (ref 7–14)
ANION GAP SERPL CALC-SCNC: 12 MMOL/L — SIGNIFICANT CHANGE UP (ref 7–14)
AST SERPL-CCNC: 61 U/L — HIGH (ref 4–32)
AST SERPL-CCNC: 61 U/L — HIGH (ref 4–32)
BASOPHILS # BLD AUTO: 0 K/UL — SIGNIFICANT CHANGE UP (ref 0–0.2)
BASOPHILS # BLD AUTO: 0 K/UL — SIGNIFICANT CHANGE UP (ref 0–0.2)
BASOPHILS NFR BLD AUTO: 0 % — SIGNIFICANT CHANGE UP (ref 0–2)
BASOPHILS NFR BLD AUTO: 0 % — SIGNIFICANT CHANGE UP (ref 0–2)
BILIRUB SERPL-MCNC: 0.4 MG/DL — SIGNIFICANT CHANGE UP (ref 0.2–1.2)
BILIRUB SERPL-MCNC: 0.4 MG/DL — SIGNIFICANT CHANGE UP (ref 0.2–1.2)
BUN SERPL-MCNC: 29 MG/DL — HIGH (ref 7–23)
BUN SERPL-MCNC: 29 MG/DL — HIGH (ref 7–23)
CA-I BLD-SCNC: 1.11 MMOL/L — LOW (ref 1.15–1.29)
CA-I BLD-SCNC: 1.11 MMOL/L — LOW (ref 1.15–1.29)
CALCIUM SERPL-MCNC: 9 MG/DL — SIGNIFICANT CHANGE UP (ref 8.4–10.5)
CALCIUM SERPL-MCNC: 9 MG/DL — SIGNIFICANT CHANGE UP (ref 8.4–10.5)
CHLORIDE SERPL-SCNC: 112 MMOL/L — HIGH (ref 98–107)
CHLORIDE SERPL-SCNC: 112 MMOL/L — HIGH (ref 98–107)
CO2 SERPL-SCNC: 23 MMOL/L — SIGNIFICANT CHANGE UP (ref 22–31)
CO2 SERPL-SCNC: 23 MMOL/L — SIGNIFICANT CHANGE UP (ref 22–31)
CREAT SERPL-MCNC: 0.41 MG/DL — LOW (ref 0.5–1.3)
CREAT SERPL-MCNC: 0.41 MG/DL — LOW (ref 0.5–1.3)
EGFR: 113 ML/MIN/1.73M2 — SIGNIFICANT CHANGE UP
EGFR: 113 ML/MIN/1.73M2 — SIGNIFICANT CHANGE UP
EOSINOPHIL # BLD AUTO: 0.06 K/UL — SIGNIFICANT CHANGE UP (ref 0–0.5)
EOSINOPHIL # BLD AUTO: 0.06 K/UL — SIGNIFICANT CHANGE UP (ref 0–0.5)
EOSINOPHIL NFR BLD AUTO: 1.6 % — SIGNIFICANT CHANGE UP (ref 0–6)
EOSINOPHIL NFR BLD AUTO: 1.6 % — SIGNIFICANT CHANGE UP (ref 0–6)
GLUCOSE BLDC GLUCOMTR-MCNC: 103 MG/DL — HIGH (ref 70–99)
GLUCOSE BLDC GLUCOMTR-MCNC: 103 MG/DL — HIGH (ref 70–99)
GLUCOSE BLDC GLUCOMTR-MCNC: 149 MG/DL — HIGH (ref 70–99)
GLUCOSE BLDC GLUCOMTR-MCNC: 149 MG/DL — HIGH (ref 70–99)
GLUCOSE SERPL-MCNC: 160 MG/DL — HIGH (ref 70–99)
GLUCOSE SERPL-MCNC: 160 MG/DL — HIGH (ref 70–99)
HCT VFR BLD CALC: 28 % — LOW (ref 34.5–45)
HCT VFR BLD CALC: 28 % — LOW (ref 34.5–45)
HGB BLD-MCNC: 8.9 G/DL — LOW (ref 11.5–15.5)
HGB BLD-MCNC: 8.9 G/DL — LOW (ref 11.5–15.5)
IANC: 2.16 K/UL — SIGNIFICANT CHANGE UP (ref 1.8–7.4)
IANC: 2.16 K/UL — SIGNIFICANT CHANGE UP (ref 1.8–7.4)
IMM GRANULOCYTES NFR BLD AUTO: 0.3 % — SIGNIFICANT CHANGE UP (ref 0–0.9)
IMM GRANULOCYTES NFR BLD AUTO: 0.3 % — SIGNIFICANT CHANGE UP (ref 0–0.9)
LYMPHOCYTES # BLD AUTO: 1.21 K/UL — SIGNIFICANT CHANGE UP (ref 1–3.3)
LYMPHOCYTES # BLD AUTO: 1.21 K/UL — SIGNIFICANT CHANGE UP (ref 1–3.3)
LYMPHOCYTES # BLD AUTO: 31.8 % — SIGNIFICANT CHANGE UP (ref 13–44)
LYMPHOCYTES # BLD AUTO: 31.8 % — SIGNIFICANT CHANGE UP (ref 13–44)
MAGNESIUM SERPL-MCNC: 2.3 MG/DL — SIGNIFICANT CHANGE UP (ref 1.6–2.6)
MAGNESIUM SERPL-MCNC: 2.3 MG/DL — SIGNIFICANT CHANGE UP (ref 1.6–2.6)
MCHC RBC-ENTMCNC: 29.3 PG — SIGNIFICANT CHANGE UP (ref 27–34)
MCHC RBC-ENTMCNC: 29.3 PG — SIGNIFICANT CHANGE UP (ref 27–34)
MCHC RBC-ENTMCNC: 31.8 GM/DL — LOW (ref 32–36)
MCHC RBC-ENTMCNC: 31.8 GM/DL — LOW (ref 32–36)
MCV RBC AUTO: 92.1 FL — SIGNIFICANT CHANGE UP (ref 80–100)
MCV RBC AUTO: 92.1 FL — SIGNIFICANT CHANGE UP (ref 80–100)
MONOCYTES # BLD AUTO: 0.37 K/UL — SIGNIFICANT CHANGE UP (ref 0–0.9)
MONOCYTES # BLD AUTO: 0.37 K/UL — SIGNIFICANT CHANGE UP (ref 0–0.9)
MONOCYTES NFR BLD AUTO: 9.7 % — SIGNIFICANT CHANGE UP (ref 2–14)
MONOCYTES NFR BLD AUTO: 9.7 % — SIGNIFICANT CHANGE UP (ref 2–14)
NEUTROPHILS # BLD AUTO: 2.16 K/UL — SIGNIFICANT CHANGE UP (ref 1.8–7.4)
NEUTROPHILS # BLD AUTO: 2.16 K/UL — SIGNIFICANT CHANGE UP (ref 1.8–7.4)
NEUTROPHILS NFR BLD AUTO: 56.6 % — SIGNIFICANT CHANGE UP (ref 43–77)
NEUTROPHILS NFR BLD AUTO: 56.6 % — SIGNIFICANT CHANGE UP (ref 43–77)
NRBC # BLD: 1 /100 WBCS — HIGH (ref 0–0)
NRBC # BLD: 1 /100 WBCS — HIGH (ref 0–0)
NRBC # FLD: 0.05 K/UL — HIGH (ref 0–0)
NRBC # FLD: 0.05 K/UL — HIGH (ref 0–0)
PHOSPHATE SERPL-MCNC: 3.4 MG/DL — SIGNIFICANT CHANGE UP (ref 2.5–4.5)
PHOSPHATE SERPL-MCNC: 3.4 MG/DL — SIGNIFICANT CHANGE UP (ref 2.5–4.5)
PLATELET # BLD AUTO: 268 K/UL — SIGNIFICANT CHANGE UP (ref 150–400)
PLATELET # BLD AUTO: 268 K/UL — SIGNIFICANT CHANGE UP (ref 150–400)
POTASSIUM SERPL-MCNC: 4.3 MMOL/L — SIGNIFICANT CHANGE UP (ref 3.5–5.3)
POTASSIUM SERPL-MCNC: 4.3 MMOL/L — SIGNIFICANT CHANGE UP (ref 3.5–5.3)
POTASSIUM SERPL-SCNC: 4.3 MMOL/L — SIGNIFICANT CHANGE UP (ref 3.5–5.3)
POTASSIUM SERPL-SCNC: 4.3 MMOL/L — SIGNIFICANT CHANGE UP (ref 3.5–5.3)
PROT SERPL-MCNC: 6.9 G/DL — SIGNIFICANT CHANGE UP (ref 6–8.3)
PROT SERPL-MCNC: 6.9 G/DL — SIGNIFICANT CHANGE UP (ref 6–8.3)
RBC # BLD: 3.04 M/UL — LOW (ref 3.8–5.2)
RBC # BLD: 3.04 M/UL — LOW (ref 3.8–5.2)
RBC # FLD: 19.6 % — HIGH (ref 10.3–14.5)
RBC # FLD: 19.6 % — HIGH (ref 10.3–14.5)
SODIUM SERPL-SCNC: 147 MMOL/L — HIGH (ref 135–145)
SODIUM SERPL-SCNC: 147 MMOL/L — HIGH (ref 135–145)
WBC # BLD: 3.81 K/UL — SIGNIFICANT CHANGE UP (ref 3.8–10.5)
WBC # BLD: 3.81 K/UL — SIGNIFICANT CHANGE UP (ref 3.8–10.5)
WBC # FLD AUTO: 3.81 K/UL — SIGNIFICANT CHANGE UP (ref 3.8–10.5)
WBC # FLD AUTO: 3.81 K/UL — SIGNIFICANT CHANGE UP (ref 3.8–10.5)

## 2023-11-05 PROCEDURE — 99232 SBSQ HOSP IP/OBS MODERATE 35: CPT

## 2023-11-05 RX ORDER — I.V. FAT EMULSION 20 G/100ML
12.5 EMULSION INTRAVENOUS
Qty: 30 | Refills: 0 | Status: DISCONTINUED | OUTPATIENT
Start: 2023-11-05 | End: 2023-11-06

## 2023-11-05 RX ORDER — ELECTROLYTE SOLUTION,INJ
1 VIAL (ML) INTRAVENOUS
Refills: 0 | Status: DISCONTINUED | OUTPATIENT
Start: 2023-11-05 | End: 2023-11-05

## 2023-11-05 RX ADMIN — CEFTRIAXONE 100 MILLIGRAM(S): 500 INJECTION, POWDER, FOR SOLUTION INTRAMUSCULAR; INTRAVENOUS at 17:34

## 2023-11-05 RX ADMIN — HYDROMORPHONE HYDROCHLORIDE 1 MILLIGRAM(S): 2 INJECTION INTRAMUSCULAR; INTRAVENOUS; SUBCUTANEOUS at 20:35

## 2023-11-05 RX ADMIN — HYDROMORPHONE HYDROCHLORIDE 1 MILLIGRAM(S): 2 INJECTION INTRAMUSCULAR; INTRAVENOUS; SUBCUTANEOUS at 21:05

## 2023-11-05 RX ADMIN — HYDROMORPHONE HYDROCHLORIDE 1 MILLIGRAM(S): 2 INJECTION INTRAMUSCULAR; INTRAVENOUS; SUBCUTANEOUS at 12:26

## 2023-11-05 RX ADMIN — HYDROMORPHONE HYDROCHLORIDE 1 MILLIGRAM(S): 2 INJECTION INTRAMUSCULAR; INTRAVENOUS; SUBCUTANEOUS at 12:43

## 2023-11-05 RX ADMIN — HYDROMORPHONE HYDROCHLORIDE 1 MILLIGRAM(S): 2 INJECTION INTRAMUSCULAR; INTRAVENOUS; SUBCUTANEOUS at 17:34

## 2023-11-05 RX ADMIN — HYDROMORPHONE HYDROCHLORIDE 1 MILLIGRAM(S): 2 INJECTION INTRAMUSCULAR; INTRAVENOUS; SUBCUTANEOUS at 04:12

## 2023-11-05 RX ADMIN — HYDROMORPHONE HYDROCHLORIDE 1 MILLIGRAM(S): 2 INJECTION INTRAMUSCULAR; INTRAVENOUS; SUBCUTANEOUS at 17:50

## 2023-11-05 RX ADMIN — Medication 1 EACH: at 18:17

## 2023-11-05 RX ADMIN — CHLORHEXIDINE GLUCONATE 1 APPLICATION(S): 213 SOLUTION TOPICAL at 12:26

## 2023-11-05 RX ADMIN — HYDROMORPHONE HYDROCHLORIDE 1 MILLIGRAM(S): 2 INJECTION INTRAMUSCULAR; INTRAVENOUS; SUBCUTANEOUS at 03:42

## 2023-11-05 RX ADMIN — I.V. FAT EMULSION 12.5 ML/HR: 20 EMULSION INTRAVENOUS at 18:17

## 2023-11-05 NOTE — PROGRESS NOTE ADULT - SUBJECTIVE AND OBJECTIVE BOX
Subjective: Patient seen and examined. No new events except as noted.     SUBJECTIVE/ROS:  nad      MEDICATIONS:  MEDICATIONS  (STANDING):  cefTRIAXone   IVPB 1000 milliGRAM(s) IV Intermittent every 24 hours  chlorhexidine 2% Cloths 1 Application(s) Topical daily  influenza   Vaccine 0.5 milliLiter(s) IntraMuscular once  lipid, fat emulsion (Fish Oil and Plant Based) 20% Infusion 12.5 mL/Hr (12.5 mL/Hr) IV Continuous <Continuous>  pantoprazole    Tablet 40 milliGRAM(s) Oral before breakfast  Parenteral Nutrition - Adult 1 Each TPN Continuous <Continuous>  scopolamine 1 mG/72 Hr(s) Patch 1 Patch Transdermal every 72 hours      PHYSICAL EXAM:  T(C): 37.2 (11-05-23 @ 04:35), Max: 37.6 (11-05-23 @ 00:20)  HR: 115 (11-05-23 @ 04:35) (106 - 120)  BP: 118/69 (11-05-23 @ 04:35) (112/70 - 127/68)  RR: 18 (11-05-23 @ 04:35) (18 - 18)  SpO2: 99% (11-05-23 @ 04:35) (95% - 100%)  Wt(kg): --  I&O's Summary    03 Nov 2023 07:01  -  04 Nov 2023 07:00  --------------------------------------------------------  IN: 1364.4 mL / OUT: 2350 mL / NET: -985.6 mL    04 Nov 2023 08:01  -  05 Nov 2023 06:39  --------------------------------------------------------  IN: 457.5 mL / OUT: 500 mL / NET: -42.5 mL            JVP: Normal  Neck: supple  Lung: clear   CV: S1 S2 , Murmur:  Abd: soft  Ext: No edema  neuro: Awake / alert  Psych: flat affect  Skin: normal``    LABS/DATA:    CARDIAC MARKERS:                                9.1    3.93  )-----------( 274      ( 04 Nov 2023 05:28 )             28.2     11-04    144  |  111<H>  |  26<H>  ----------------------------<  119<H>  4.3   |  22  |  0.42<L>    Ca    9.0      04 Nov 2023 05:28  Phos  3.6     11-04  Mg     2.40     11-04    TPro  7.1  /  Alb  2.8<L>  /  TBili  0.4  /  DBili  x   /  AST  68<H>  /  ALT  75<H>  /  AlkPhos  293<H>  11-04    proBNP:   Lipid Profile:   HgA1c:   TSH:     TELE:  EKG:

## 2023-11-05 NOTE — PROGRESS NOTE ADULT - SUBJECTIVE AND OBJECTIVE BOX
SURGICAL ONCOLOGY PROGRESS NOTE:    ========================================  D TEAM SURGERY PAGER [id number]  ========================================    Subjective:  Pt seen and examined at bedside. Reports stable, but continued upper abdominal pain. Continues to have retching. Denies CP, SoB, palpitations.      Objective:    PE:  Gen: NAD  Resp: airway patent, respirations unlabored, no increased WoB  CVS: Regular rhythm, tachycardic  Abd: soft, ND, TTP upper abdomen, no rebound or guarding, ostomy p/p/p  Ext: no edema, WWP  Neuro: AAOx3, no focal deficits    Vital Signs Last 24 Hrs  T(C): 37.2 (05 Nov 2023 04:35), Max: 37.6 (05 Nov 2023 00:20)  T(F): 98.9 (05 Nov 2023 04:35), Max: 99.6 (05 Nov 2023 00:20)  HR: 115 (05 Nov 2023 04:35) (106 - 120)  BP: 118/69 (05 Nov 2023 04:35) (112/70 - 127/68)  BP(mean): --  RR: 18 (05 Nov 2023 04:35) (18 - 18)  SpO2: 99% (05 Nov 2023 04:35) (95% - 100%)    Parameters below as of 05 Nov 2023 04:35  Patient On (Oxygen Delivery Method): room air        I&O's Detail    04 Nov 2023 08:01  -  05 Nov 2023 07:00  --------------------------------------------------------  IN:    Fat Emulsion (Fish Oil &amp; Plant Based) 20% Infusion: 137.5 mL    Oral Fluid: 220 mL    TPN (Total Parenteral Nutrition): 1425 mL  Total IN: 1782.5 mL    OUT:    Ileostomy (mL): 250 mL    Voided (mL): 1150 mL  Total OUT: 1400 mL    Total NET: 382.5 mL          Daily     Daily     MEDICATIONS  (STANDING):  cefTRIAXone   IVPB 1000 milliGRAM(s) IV Intermittent every 24 hours  chlorhexidine 2% Cloths 1 Application(s) Topical daily  influenza   Vaccine 0.5 milliLiter(s) IntraMuscular once  lipid, fat emulsion (Fish Oil and Plant Based) 20% Infusion 12.5 mL/Hr (12.5 mL/Hr) IV Continuous <Continuous>  pantoprazole    Tablet 40 milliGRAM(s) Oral before breakfast  Parenteral Nutrition - Adult 1 Each TPN Continuous <Continuous>  scopolamine 1 mG/72 Hr(s) Patch 1 Patch Transdermal every 72 hours    MEDICATIONS  (PRN):  acetaminophen     Tablet .. 650 milliGRAM(s) Oral every 6 hours PRN Mild Pain (1 - 3)  HYDROmorphone   Tablet 1 milliGRAM(s) Oral every 3 hours PRN Moderate and Severe Pain  LORazepam     Tablet 0.25 milliGRAM(s) Oral every 6 hours PRN Nausea and/or Vomiting  naloxone Injectable 0.1 milliGRAM(s) IV Push every 3 minutes PRN For ANY of the following changes in patient status:  A. RR LESS THAN 10 breaths per minute, B. Oxygen saturation LESS THAN 90%, C. Sedation score of 6  ondansetron   Disintegrating Tablet 8 milliGRAM(s) Oral every 8 hours PRN Nausea  sodium chloride 0.9% lock flush 10 milliLiter(s) IV Push every 1 hour PRN Pre/post blood products, medications, blood draw, and to maintain line patency      LABS:                        8.9    3.81  )-----------( 268      ( 05 Nov 2023 05:49 )             28.0     11-05    147<H>  |  112<H>  |  29<H>  ----------------------------<  160<H>  4.3   |  23  |  0.41<L>    Ca    9.0      05 Nov 2023 05:49  Phos  3.4     11-05  Mg     2.30     11-05    TPro  6.9  /  Alb  2.7<L>  /  TBili  0.4  /  DBili  x   /  AST  61<H>  /  ALT  81<H>  /  AlkPhos  272<H>  11-05      Urinalysis Basic - ( 05 Nov 2023 05:49 )    Color: x / Appearance: x / SG: x / pH: x  Gluc: 160 mg/dL / Ketone: x  / Bili: x / Urobili: x   Blood: x / Protein: x / Nitrite: x   Leuk Esterase: x / RBC: x / WBC x   Sq Epi: x / Non Sq Epi: x / Bacteria: x        RADIOLOGY & ADDITIONAL STUDIES:

## 2023-11-05 NOTE — PROGRESS NOTE ADULT - SUBJECTIVE AND OBJECTIVE BOX
NUTRITION NOTE  [id number] OWENSSTEWART  ===============================    Interval events - Patient was seen and examined at bedside, no acute events overnight. Patient denies chest pain, shortness of breath, nausea or vomiting at this time. Pt remains on TPN cycled over 12 hours.     ROS: Except as noted above, all other systems reviewed and are negative     Allergies  penicillin (Rash)    PAST MEDICAL & SURGICAL HISTORY:  History of sickle cell trait  Malignant neoplasm of appendix  History of ectopic pregnancy  H/O colonoscopy  H/O endoscopy    Vital Signs Last 24 Hrs  T(C): 37.3 (2023 09:33), Max: 37.6 (2023 00:20)  T(F): 99.1 (2023 09:33), Max: 99.6 (2023 00:20)  HR: 108 (2023 09:33) (106 - 116)  BP: 109/65 (2023 09:33) (109/65 - 127/68)  RR: 18 (2023 09:33) (18 - 18)  SpO2: 99% (2023 09:33) (95% - 100%)    MEDICATIONS  (STANDING):  cefTRIAXone   IVPB 1000 milliGRAM(s) IV Intermittent every 24 hours  chlorhexidine 2% Cloths 1 Application(s) Topical daily  influenza   Vaccine 0.5 milliLiter(s) IntraMuscular once  lipid, fat emulsion (Fish Oil and Plant Based) 20% Infusion 12.5 mL/Hr (12.5 mL/Hr) IV Continuous <Continuous>  pantoprazole    Tablet 40 milliGRAM(s) Oral before breakfast  Parenteral Nutrition - Adult 1 Each TPN Continuous <Continuous>  Parenteral Nutrition - Adult 1 Each TPN Continuous <Continuous>  scopolamine 1 mG/72 Hr(s) Patch 1 Patch Transdermal every 72 hours    MEDICATIONS  (PRN):  acetaminophen     Tablet .. 650 milliGRAM(s) Oral every 6 hours PRN Mild Pain (1 - 3)  HYDROmorphone   Tablet 1 milliGRAM(s) Oral every 3 hours PRN Moderate and Severe Pain  LORazepam     Tablet 0.25 milliGRAM(s) Oral every 6 hours PRN Nausea and/or Vomiting  naloxone Injectable 0.1 milliGRAM(s) IV Push every 3 minutes PRN For ANY of the following changes in patient status:  A. RR LESS THAN 10 breaths per minute, B. Oxygen saturation LESS THAN 90%, C. Sedation score of 6  ondansetron   Disintegrating Tablet 8 milliGRAM(s) Oral every 8 hours PRN Nausea  sodium chloride 0.9% lock flush 10 milliLiter(s) IV Push every 1 hour PRN Pre/post blood products, medications, blood draw, and to maintain line patency    I&O's Detail    2023 08:01  -  2023 07:00  --------------------------------------------------------  IN:    Fat Emulsion (Fish Oil &amp; Plant Based) 20% Infusion: 137.5 mL    Oral Fluid: 220 mL    TPN (Total Parenteral Nutrition): 1425 mL  Total IN: 1782.5 mL    OUT:    Ileostomy (mL): 250 mL    Voided (mL): 1150 mL  Total OUT: 1400 mL    Total NET: 382.5 mL      2023 07:01  -  2023 10:03  --------------------------------------------------------  IN:  Total IN: 0 mL    OUT:    Oral Fluid: 0 mL    Voided (mL): 300 mL  Total OUT: 300 mL    Total NET: -300 mL    POCT Blood Glucose.: 149 mg/dL (2023 00:32)  POCT Blood Glucose.: 129 mg/dL (2023 11:56)    Daily Weight in k.5 (2023 06:34)    Drug Dosing Weight  Height (cm): 154.9 (04 Oct 2023 07:27)  Weight (kg): 54.5 (2023 06:34)  BMI (kg/m2): 22.7 (2023 06:34)  BSA (m2): 1.52 (2023 06:34)    PHYSICAL EXAM:  General: NAD, resting in bed comfortably.  Cardiac: regular rate, warm and well perfused  Respiratory: Nonlabored respirations, normal cw expansion.  Abdomen: soft, nontender, nondistended  Extremities: normal strength, FROM, no deformities  PICC Site: [facility] PICC site clean and day (placed on 10/24/23)    Diet: NPO and TPN/lipids (started on 10/24/23)    LABORATORY                                 8.9    3.81  )-----------( 268      ( 2023 05:49 )             28.0   11-05    147<H>  |  112<H>  |  29<H>  ----------------------------<  160<H>  4.3   |  23  |  0.41<L>    Ca    9.0      2023 05:49  Phos  3.4     11-  Mg     2.30     11-    TPro  6.9  /  Alb  2.7<L>  /  TBili  0.4  /  DBili  x   /  AST  61<H>  /  ALT  81<H>  /  AlkPhos  272<H>  11-05    LIVER FUNCTIONS - ( 2023 05:49 )  Alb: 2.7 g/dL / Pro: 6.9 g/dL / ALK PHOS: 272 U/L / ALT: 81 U/L / AST: 61 U/L / GGT: x           10-25 Chol -- LDL -- HDL -- Trig 87    CT A/P 10/28 notable for large R pleural effusion, increased in size from previous imaging, decreased hepatic and perihepatic hematomas, similar R hepatic lobe hypodensities (unclear if infarct vs small collections).     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 Patient's course c/b continuous retching and intolerance to tube feeds. Nutrition support consult called for prolonged NPO and severe protein calorie malnutrition. PICC placed and TPN started on 10/24/23.    continue TPN with infusion volume of 1.5L, TPN will provide 1448 kcal/day; TPN cycled over 12 hours; lipid calories decreased in view of elevated LFTs    labs reviewed - electrolytes adjusted in TPN bag     monitor fingersticks, obtain daily weights     continue parenteral nutrition at this time, will follow up with primary team on plan, discharge planning in process with home TPN (on hold due to persistent tachycardia); TPN formula verbally verified with outpatient pharmacy, will follow up with pharmacy once discharge plan is confirmed       - Outpatient TPN via [facility], located within [facility] and follow up with Dr Jeffery Sullivan    [id number] [facility] [address]  [place] [address]  Entrance on [facility]  Phone [phone]    Please instruct patient when calling to make the appointment to schedule for the first MONDAY after discharge from hospital. Please instruct patient to explain she is on TPN when making the appointment. Check labs, CMP, electrolytes, ionized Calcium, triglycerides and fingersticks- frequency to be determined by Dr. Sullivan as outpatient.    1.  Severe protein calorie malnutrition being optimized with TPN: CHO [220] gm.  AA [90] gm. SMOF Lipids [30] gm.  2.  Hyperglycemia managed with: [0] units of regular insulin    3.  Check fluid balance daily.  Strict I/O  [ ] [ ]   4.  Daily BMP, Ionized Calcium, Magnesium and Phosphorous   5.  Triglycerides at initiation of TPN and monthly 10-25 Chol -- LDL -- HDL -- Trig 87    Nutrition Support [id number]

## 2023-11-05 NOTE — PROGRESS NOTE ADULT - ASSESSMENT
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 HC c/b L IJ & innominate vein non-occlusive thrombosis, tLVX previously held i/s/o expanding subcapsular liver hematomas on CT 10/20, reinstated 10/26, switched to apixaban 10/30. Pt has also persistently c/o continuous retching, PO intolerance. Became febrile and tachycardic 10/26, fever workup revealing for bacteriuria with cultures positive for >100,000 CFU Klebsiella per hpf. ID consulted, stated UTI unlikely in absence of urinary symptomatology, patient switched from cipro/metronidazole to ceftriaxone/flagyl then ceftriaxone only per ID recommendations (CTX stop date 11/6). Fever resolved on ceftriaxone, however hospitalization now ongoing for persistent tachycardia 110-120 presumed to be 2/2 anemia with possible component of insufficient pain control. CTAP 10/28 notable for large R pleural effusion, increased in size from previous imaging, decreased hepatic and perihepatic hematomas, similar R hepatic lobe hypodensities (unclear if infarct vs small collections). Hemoglobin hovering in 7s, transfused 10/30 for hgb 7.0 with appropriate response (8.3 10/31 AM, 9.1 11/3 AM). CXR 11/2 with continued worsening of R pleural effusion and compressive atelectasis, pulmonology consulted, plan for thoracentesis 11/4-11/6, exact date pending availability. Cardiology following, per note 11/3 tachycardia attributable to anemia and pleural effusion +/- sepsis.     - AC/DVT/Antiplatelet: Apixaban 5mg BID, being held starting 11/2 for thoracentesis.   - Antimicrobials: Ceftriaxone 1000mg qd, 10 day course started 10/28 (completes 11/6).   - Diet: Sips/Chips + TPN.   - Pain: APAP, hydromorphone PRN.   - Continue scopolamine and lorazepam for nausea control, current regimen appears to be working well.   - Hematology consulted for CARMELA (likely anemia of inflammation), per recs pt on iron infusion 200mg qd x5 days started 10/30. Received 1u pRBC 10/30 with appropriate response (hgb 7.0 pre-transfusion -> 8.3 post-transfusion).  - Wound care: Continue to evaluate ostomy daily.   - PT/OT consult appreciated, home with home PT.  - Dispo: Pending response to thoracentesis.  - Please page [phone] w/ any questions    YUDITH Rodgers  Chief Resident  General Surgery

## 2023-11-06 ENCOUNTER — RESULT REVIEW (OUTPATIENT)
Age: 58
End: 2023-11-06

## 2023-11-06 ENCOUNTER — APPOINTMENT (OUTPATIENT)
Dept: HEMATOLOGY ONCOLOGY | Facility: CLINIC | Age: 58
End: 2023-11-06

## 2023-11-06 LAB
ALBUMIN FLD-MCNC: 2.5 G/DL — SIGNIFICANT CHANGE UP
ALBUMIN FLD-MCNC: 2.5 G/DL — SIGNIFICANT CHANGE UP
ALBUMIN SERPL ELPH-MCNC: 2.9 G/DL — LOW (ref 3.3–5)
ALBUMIN SERPL ELPH-MCNC: 2.9 G/DL — LOW (ref 3.3–5)
ALP SERPL-CCNC: 275 U/L — HIGH (ref 40–120)
ALP SERPL-CCNC: 275 U/L — HIGH (ref 40–120)
ALT FLD-CCNC: 110 U/L — HIGH (ref 4–33)
ALT FLD-CCNC: 110 U/L — HIGH (ref 4–33)
ANION GAP SERPL CALC-SCNC: 11 MMOL/L — SIGNIFICANT CHANGE UP (ref 7–14)
ANION GAP SERPL CALC-SCNC: 11 MMOL/L — SIGNIFICANT CHANGE UP (ref 7–14)
APTT BLD: 28.6 SEC — SIGNIFICANT CHANGE UP (ref 24.5–35.6)
APTT BLD: 28.6 SEC — SIGNIFICANT CHANGE UP (ref 24.5–35.6)
AST SERPL-CCNC: 81 U/L — HIGH (ref 4–32)
AST SERPL-CCNC: 81 U/L — HIGH (ref 4–32)
B PERT IGG+IGM PNL SER: ABNORMAL
B PERT IGG+IGM PNL SER: ABNORMAL
BASOPHILS # BLD AUTO: 0 K/UL — SIGNIFICANT CHANGE UP (ref 0–0.2)
BASOPHILS # BLD AUTO: 0 K/UL — SIGNIFICANT CHANGE UP (ref 0–0.2)
BASOPHILS NFR BLD AUTO: 0 % — SIGNIFICANT CHANGE UP (ref 0–2)
BASOPHILS NFR BLD AUTO: 0 % — SIGNIFICANT CHANGE UP (ref 0–2)
BILIRUB SERPL-MCNC: 0.4 MG/DL — SIGNIFICANT CHANGE UP (ref 0.2–1.2)
BILIRUB SERPL-MCNC: 0.4 MG/DL — SIGNIFICANT CHANGE UP (ref 0.2–1.2)
BUN SERPL-MCNC: 32 MG/DL — HIGH (ref 7–23)
BUN SERPL-MCNC: 32 MG/DL — HIGH (ref 7–23)
CA-I BLD-SCNC: 1.13 MMOL/L — LOW (ref 1.15–1.29)
CA-I BLD-SCNC: 1.13 MMOL/L — LOW (ref 1.15–1.29)
CALCIUM SERPL-MCNC: 8.8 MG/DL — SIGNIFICANT CHANGE UP (ref 8.4–10.5)
CALCIUM SERPL-MCNC: 8.8 MG/DL — SIGNIFICANT CHANGE UP (ref 8.4–10.5)
CHLORIDE SERPL-SCNC: 110 MMOL/L — HIGH (ref 98–107)
CHLORIDE SERPL-SCNC: 110 MMOL/L — HIGH (ref 98–107)
CO2 SERPL-SCNC: 22 MMOL/L — SIGNIFICANT CHANGE UP (ref 22–31)
CO2 SERPL-SCNC: 22 MMOL/L — SIGNIFICANT CHANGE UP (ref 22–31)
COLOR FLD: ABNORMAL
COLOR FLD: ABNORMAL
CREAT SERPL-MCNC: 0.4 MG/DL — LOW (ref 0.5–1.3)
CREAT SERPL-MCNC: 0.4 MG/DL — LOW (ref 0.5–1.3)
EGFR: 114 ML/MIN/1.73M2 — SIGNIFICANT CHANGE UP
EGFR: 114 ML/MIN/1.73M2 — SIGNIFICANT CHANGE UP
EOSINOPHIL # BLD AUTO: 0.06 K/UL — SIGNIFICANT CHANGE UP (ref 0–0.5)
EOSINOPHIL # BLD AUTO: 0.06 K/UL — SIGNIFICANT CHANGE UP (ref 0–0.5)
EOSINOPHIL NFR BLD AUTO: 1.5 % — SIGNIFICANT CHANGE UP (ref 0–6)
EOSINOPHIL NFR BLD AUTO: 1.5 % — SIGNIFICANT CHANGE UP (ref 0–6)
FLUID INTAKE SUBSTANCE CLASS: SIGNIFICANT CHANGE UP
FLUID INTAKE SUBSTANCE CLASS: SIGNIFICANT CHANGE UP
GLUCOSE BLDC GLUCOMTR-MCNC: 113 MG/DL — HIGH (ref 70–99)
GLUCOSE BLDC GLUCOMTR-MCNC: 113 MG/DL — HIGH (ref 70–99)
GLUCOSE BLDC GLUCOMTR-MCNC: 162 MG/DL — HIGH (ref 70–99)
GLUCOSE BLDC GLUCOMTR-MCNC: 162 MG/DL — HIGH (ref 70–99)
GLUCOSE BLDC GLUCOMTR-MCNC: 89 MG/DL — SIGNIFICANT CHANGE UP (ref 70–99)
GLUCOSE BLDC GLUCOMTR-MCNC: 89 MG/DL — SIGNIFICANT CHANGE UP (ref 70–99)
GLUCOSE FLD-MCNC: 108 MG/DL — SIGNIFICANT CHANGE UP
GLUCOSE FLD-MCNC: 108 MG/DL — SIGNIFICANT CHANGE UP
GLUCOSE SERPL-MCNC: 164 MG/DL — HIGH (ref 70–99)
GLUCOSE SERPL-MCNC: 164 MG/DL — HIGH (ref 70–99)
GRAM STN FLD: SIGNIFICANT CHANGE UP
GRAM STN FLD: SIGNIFICANT CHANGE UP
HCT VFR BLD CALC: 28.7 % — LOW (ref 34.5–45)
HCT VFR BLD CALC: 28.7 % — LOW (ref 34.5–45)
HGB BLD-MCNC: 9.2 G/DL — LOW (ref 11.5–15.5)
HGB BLD-MCNC: 9.2 G/DL — LOW (ref 11.5–15.5)
IANC: 2.25 K/UL — SIGNIFICANT CHANGE UP (ref 1.8–7.4)
IANC: 2.25 K/UL — SIGNIFICANT CHANGE UP (ref 1.8–7.4)
IMM GRANULOCYTES NFR BLD AUTO: 0.5 % — SIGNIFICANT CHANGE UP (ref 0–0.9)
IMM GRANULOCYTES NFR BLD AUTO: 0.5 % — SIGNIFICANT CHANGE UP (ref 0–0.9)
INR BLD: 1.31 RATIO — HIGH (ref 0.85–1.18)
INR BLD: 1.31 RATIO — HIGH (ref 0.85–1.18)
LDH SERPL L TO P-CCNC: 742 U/L — SIGNIFICANT CHANGE UP
LDH SERPL L TO P-CCNC: 742 U/L — SIGNIFICANT CHANGE UP
LYMPHOCYTES # BLD AUTO: 1.24 K/UL — SIGNIFICANT CHANGE UP (ref 1–3.3)
LYMPHOCYTES # BLD AUTO: 1.24 K/UL — SIGNIFICANT CHANGE UP (ref 1–3.3)
LYMPHOCYTES # BLD AUTO: 32 % — SIGNIFICANT CHANGE UP (ref 13–44)
LYMPHOCYTES # BLD AUTO: 32 % — SIGNIFICANT CHANGE UP (ref 13–44)
LYMPHOCYTES # FLD: 3 % — SIGNIFICANT CHANGE UP
LYMPHOCYTES # FLD: 3 % — SIGNIFICANT CHANGE UP
MAGNESIUM SERPL-MCNC: 2.1 MG/DL — SIGNIFICANT CHANGE UP (ref 1.6–2.6)
MAGNESIUM SERPL-MCNC: 2.1 MG/DL — SIGNIFICANT CHANGE UP (ref 1.6–2.6)
MCHC RBC-ENTMCNC: 29.9 PG — SIGNIFICANT CHANGE UP (ref 27–34)
MCHC RBC-ENTMCNC: 29.9 PG — SIGNIFICANT CHANGE UP (ref 27–34)
MCHC RBC-ENTMCNC: 32.1 GM/DL — SIGNIFICANT CHANGE UP (ref 32–36)
MCHC RBC-ENTMCNC: 32.1 GM/DL — SIGNIFICANT CHANGE UP (ref 32–36)
MCV RBC AUTO: 93.2 FL — SIGNIFICANT CHANGE UP (ref 80–100)
MCV RBC AUTO: 93.2 FL — SIGNIFICANT CHANGE UP (ref 80–100)
MONOCYTES # BLD AUTO: 0.31 K/UL — SIGNIFICANT CHANGE UP (ref 0–0.9)
MONOCYTES # BLD AUTO: 0.31 K/UL — SIGNIFICANT CHANGE UP (ref 0–0.9)
MONOCYTES NFR BLD AUTO: 8 % — SIGNIFICANT CHANGE UP (ref 2–14)
MONOCYTES NFR BLD AUTO: 8 % — SIGNIFICANT CHANGE UP (ref 2–14)
MONOS+MACROS # FLD: 33 % — SIGNIFICANT CHANGE UP
MONOS+MACROS # FLD: 33 % — SIGNIFICANT CHANGE UP
NEUTROPHILS # BLD AUTO: 2.25 K/UL — SIGNIFICANT CHANGE UP (ref 1.8–7.4)
NEUTROPHILS # BLD AUTO: 2.25 K/UL — SIGNIFICANT CHANGE UP (ref 1.8–7.4)
NEUTROPHILS NFR BLD AUTO: 58 % — SIGNIFICANT CHANGE UP (ref 43–77)
NEUTROPHILS NFR BLD AUTO: 58 % — SIGNIFICANT CHANGE UP (ref 43–77)
NEUTROPHILS-BODY FLUID: 64 % — SIGNIFICANT CHANGE UP
NEUTROPHILS-BODY FLUID: 64 % — SIGNIFICANT CHANGE UP
NRBC # BLD: 1 /100 WBCS — HIGH (ref 0–0)
NRBC # BLD: 1 /100 WBCS — HIGH (ref 0–0)
NRBC # FLD: 0.05 K/UL — HIGH (ref 0–0)
NRBC # FLD: 0.05 K/UL — HIGH (ref 0–0)
PH FLD: 8 — SIGNIFICANT CHANGE UP
PH FLD: 8 — SIGNIFICANT CHANGE UP
PHOSPHATE SERPL-MCNC: 3.1 MG/DL — SIGNIFICANT CHANGE UP (ref 2.5–4.5)
PHOSPHATE SERPL-MCNC: 3.1 MG/DL — SIGNIFICANT CHANGE UP (ref 2.5–4.5)
PLATELET # BLD AUTO: 290 K/UL — SIGNIFICANT CHANGE UP (ref 150–400)
PLATELET # BLD AUTO: 290 K/UL — SIGNIFICANT CHANGE UP (ref 150–400)
POTASSIUM SERPL-MCNC: 4.1 MMOL/L — SIGNIFICANT CHANGE UP (ref 3.5–5.3)
POTASSIUM SERPL-MCNC: 4.1 MMOL/L — SIGNIFICANT CHANGE UP (ref 3.5–5.3)
POTASSIUM SERPL-SCNC: 4.1 MMOL/L — SIGNIFICANT CHANGE UP (ref 3.5–5.3)
POTASSIUM SERPL-SCNC: 4.1 MMOL/L — SIGNIFICANT CHANGE UP (ref 3.5–5.3)
PROT FLD-MCNC: 5.1 G/DL — SIGNIFICANT CHANGE UP
PROT FLD-MCNC: 5.1 G/DL — SIGNIFICANT CHANGE UP
PROT SERPL-MCNC: 7 G/DL — SIGNIFICANT CHANGE UP (ref 6–8.3)
PROT SERPL-MCNC: 7 G/DL — SIGNIFICANT CHANGE UP (ref 6–8.3)
PROTHROM AB SERPL-ACNC: 14.7 SEC — HIGH (ref 9.5–13)
PROTHROM AB SERPL-ACNC: 14.7 SEC — HIGH (ref 9.5–13)
RBC # BLD: 3.08 M/UL — LOW (ref 3.8–5.2)
RBC # BLD: 3.08 M/UL — LOW (ref 3.8–5.2)
RBC # FLD: 19.4 % — HIGH (ref 10.3–14.5)
RBC # FLD: 19.4 % — HIGH (ref 10.3–14.5)
RCV VOL RI: 4000 CELLS/UL — HIGH (ref 0–5)
RCV VOL RI: 4000 CELLS/UL — HIGH (ref 0–5)
SODIUM SERPL-SCNC: 143 MMOL/L — SIGNIFICANT CHANGE UP (ref 135–145)
SODIUM SERPL-SCNC: 143 MMOL/L — SIGNIFICANT CHANGE UP (ref 135–145)
SPECIMEN SOURCE: SIGNIFICANT CHANGE UP
SPECIMEN SOURCE: SIGNIFICANT CHANGE UP
TOTAL CELLS COUNTED, BODY FLUID: 100 CELLS — SIGNIFICANT CHANGE UP
TOTAL CELLS COUNTED, BODY FLUID: 100 CELLS — SIGNIFICANT CHANGE UP
TOTAL NUCLEATED CELL COUNT, BODY FLUID: 553 CELLS/UL — HIGH (ref 0–5)
TOTAL NUCLEATED CELL COUNT, BODY FLUID: 553 CELLS/UL — HIGH (ref 0–5)
TUBE TYPE: SIGNIFICANT CHANGE UP
TUBE TYPE: SIGNIFICANT CHANGE UP
WBC # BLD: 3.88 K/UL — SIGNIFICANT CHANGE UP (ref 3.8–10.5)
WBC # BLD: 3.88 K/UL — SIGNIFICANT CHANGE UP (ref 3.8–10.5)
WBC # FLD AUTO: 3.88 K/UL — SIGNIFICANT CHANGE UP (ref 3.8–10.5)
WBC # FLD AUTO: 3.88 K/UL — SIGNIFICANT CHANGE UP (ref 3.8–10.5)

## 2023-11-06 PROCEDURE — 99232 SBSQ HOSP IP/OBS MODERATE 35: CPT

## 2023-11-06 PROCEDURE — 88305 TISSUE EXAM BY PATHOLOGIST: CPT | Mod: 26

## 2023-11-06 PROCEDURE — 88112 CYTOPATH CELL ENHANCE TECH: CPT | Mod: 26

## 2023-11-06 PROCEDURE — 71045 X-RAY EXAM CHEST 1 VIEW: CPT | Mod: 26

## 2023-11-06 PROCEDURE — 76604 US EXAM CHEST: CPT | Mod: 26

## 2023-11-06 PROCEDURE — 32555 ASPIRATE PLEURA W/ IMAGING: CPT | Mod: RT

## 2023-11-06 PROCEDURE — 99233 SBSQ HOSP IP/OBS HIGH 50: CPT | Mod: GC,25

## 2023-11-06 RX ORDER — I.V. FAT EMULSION 20 G/100ML
12.5 EMULSION INTRAVENOUS
Qty: 30 | Refills: 0 | Status: DISCONTINUED | OUTPATIENT
Start: 2023-11-06 | End: 2023-11-08

## 2023-11-06 RX ORDER — ELECTROLYTE SOLUTION,INJ
1 VIAL (ML) INTRAVENOUS
Refills: 0 | Status: DISCONTINUED | OUTPATIENT
Start: 2023-11-06 | End: 2023-11-06

## 2023-11-06 RX ORDER — HYDROMORPHONE HYDROCHLORIDE 2 MG/ML
0.5 INJECTION INTRAMUSCULAR; INTRAVENOUS; SUBCUTANEOUS ONCE
Refills: 0 | Status: DISCONTINUED | OUTPATIENT
Start: 2023-11-06 | End: 2023-11-06

## 2023-11-06 RX ADMIN — HYDROMORPHONE HYDROCHLORIDE 1 MILLIGRAM(S): 2 INJECTION INTRAMUSCULAR; INTRAVENOUS; SUBCUTANEOUS at 05:45

## 2023-11-06 RX ADMIN — HYDROMORPHONE HYDROCHLORIDE 1 MILLIGRAM(S): 2 INJECTION INTRAMUSCULAR; INTRAVENOUS; SUBCUTANEOUS at 01:40

## 2023-11-06 RX ADMIN — HYDROMORPHONE HYDROCHLORIDE 1 MILLIGRAM(S): 2 INJECTION INTRAMUSCULAR; INTRAVENOUS; SUBCUTANEOUS at 09:41

## 2023-11-06 RX ADMIN — HYDROMORPHONE HYDROCHLORIDE 1 MILLIGRAM(S): 2 INJECTION INTRAMUSCULAR; INTRAVENOUS; SUBCUTANEOUS at 16:20

## 2023-11-06 RX ADMIN — I.V. FAT EMULSION 12.5 ML/HR: 20 EMULSION INTRAVENOUS at 18:33

## 2023-11-06 RX ADMIN — HYDROMORPHONE HYDROCHLORIDE 1 MILLIGRAM(S): 2 INJECTION INTRAMUSCULAR; INTRAVENOUS; SUBCUTANEOUS at 23:22

## 2023-11-06 RX ADMIN — HYDROMORPHONE HYDROCHLORIDE 1 MILLIGRAM(S): 2 INJECTION INTRAMUSCULAR; INTRAVENOUS; SUBCUTANEOUS at 05:15

## 2023-11-06 RX ADMIN — HYDROMORPHONE HYDROCHLORIDE 1 MILLIGRAM(S): 2 INJECTION INTRAMUSCULAR; INTRAVENOUS; SUBCUTANEOUS at 01:10

## 2023-11-06 RX ADMIN — HYDROMORPHONE HYDROCHLORIDE 1 MILLIGRAM(S): 2 INJECTION INTRAMUSCULAR; INTRAVENOUS; SUBCUTANEOUS at 12:47

## 2023-11-06 RX ADMIN — HYDROMORPHONE HYDROCHLORIDE 1 MILLIGRAM(S): 2 INJECTION INTRAMUSCULAR; INTRAVENOUS; SUBCUTANEOUS at 20:17

## 2023-11-06 RX ADMIN — PANTOPRAZOLE SODIUM 40 MILLIGRAM(S): 20 TABLET, DELAYED RELEASE ORAL at 05:18

## 2023-11-06 RX ADMIN — Medication 1 EACH: at 18:31

## 2023-11-06 RX ADMIN — HYDROMORPHONE HYDROCHLORIDE 1 MILLIGRAM(S): 2 INJECTION INTRAMUSCULAR; INTRAVENOUS; SUBCUTANEOUS at 19:32

## 2023-11-06 RX ADMIN — HYDROMORPHONE HYDROCHLORIDE 1 MILLIGRAM(S): 2 INJECTION INTRAMUSCULAR; INTRAVENOUS; SUBCUTANEOUS at 13:00

## 2023-11-06 RX ADMIN — HYDROMORPHONE HYDROCHLORIDE 1 MILLIGRAM(S): 2 INJECTION INTRAMUSCULAR; INTRAVENOUS; SUBCUTANEOUS at 22:37

## 2023-11-06 RX ADMIN — HYDROMORPHONE HYDROCHLORIDE 1 MILLIGRAM(S): 2 INJECTION INTRAMUSCULAR; INTRAVENOUS; SUBCUTANEOUS at 10:00

## 2023-11-06 RX ADMIN — CHLORHEXIDINE GLUCONATE 1 APPLICATION(S): 213 SOLUTION TOPICAL at 12:01

## 2023-11-06 RX ADMIN — HYDROMORPHONE HYDROCHLORIDE 0.5 MILLIGRAM(S): 2 INJECTION INTRAMUSCULAR; INTRAVENOUS; SUBCUTANEOUS at 21:28

## 2023-11-06 RX ADMIN — HYDROMORPHONE HYDROCHLORIDE 0.5 MILLIGRAM(S): 2 INJECTION INTRAMUSCULAR; INTRAVENOUS; SUBCUTANEOUS at 21:43

## 2023-11-06 RX ADMIN — HYDROMORPHONE HYDROCHLORIDE 1 MILLIGRAM(S): 2 INJECTION INTRAMUSCULAR; INTRAVENOUS; SUBCUTANEOUS at 16:48

## 2023-11-06 NOTE — PROGRESS NOTE ADULT - ASSESSMENT
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 HC c/b L IJ & innominate vein non-occlusive thrombosis, tLVX previously held i/s/o expanding subcapsular liver hematomas on CT 10/20, reinstated 10/26, switched to apixaban 10/30. Pt has also persistently c/o continuous retching, PO intolerance. Became febrile and tachycardic 10/26, fever workup revealing for bacteriuria with cultures positive for >100,000 CFU Klebsiella per hpf. ID consulted, stated UTI unlikely in absence of urinary symptomatology, patient switched from cipro/metronidazole to ceftriaxone/flagyl then ceftriaxone only per ID recommendations (CTX stop date 11/6). Fever resolved on ceftriaxone, however hospitalization now ongoing for persistent tachycardia 110-120 presumed to be 2/2 anemia with possible component of insufficient pain control. CTAP 10/28 notable for large R pleural effusion, increased in size from previous imaging, decreased hepatic and perihepatic hematomas, similar R hepatic lobe hypodensities (unclear if infarct vs small collections). Hemoglobin hovering in 7s, transfused 10/30 for hgb 7.0 with appropriate response (8.3 10/31 AM, 9.1 11/3 AM). CXR 11/2 with continued worsening of R pleural effusion and compressive atelectasis, pulmonology consulted, plan for thoracentesis 11/4-11/6, exact date pending availability. Cardiology following, per note 11/3 tachycardia attributable to anemia and pleural effusion +/- sepsis.     Plan  - Thoracentesis today with pulm  - AC/DVT/Antiplatelet: Apixaban 5mg BID, being held starting 11/2 for thoracentesis.   - Antimicrobials: Ceftriaxone 1000mg qd, 10 day course started 10/28 (completes 11/6).   - Diet: Sips/Chips + TPN.   - Pain: APAP, hydromorphone PRN.   - Continue scopolamine and lorazepam for nausea control, current regimen appears to be working well.   - Hematology consulted for CARMELA (likely anemia of inflammation), per recs pt on iron infusion 200mg qd x5 days started 10/30. Received 1u pRBC 10/30 with appropriate response (hgb 7.0 pre-transfusion -> 8.3 post-transfusion).  - Wound care: Continue to evaluate ostomy daily.   - PT/OT consult appreciated, home with home PT.  - Dispo: Pending response to thoracentesis.  - Please page [phone] w/ any questions

## 2023-11-06 NOTE — PROGRESS NOTE ADULT - SUBJECTIVE AND OBJECTIVE BOX
Subjective: Patient seen and examined. No new events except as noted.     SUBJECTIVE/ROS:  nad      MEDICATIONS:  MEDICATIONS  (STANDING):  chlorhexidine 2% Cloths 1 Application(s) Topical daily  influenza   Vaccine 0.5 milliLiter(s) IntraMuscular once  lipid, fat emulsion (Fish Oil and Plant Based) 20% Infusion 12.5 mL/Hr (12.5 mL/Hr) IV Continuous <Continuous>  pantoprazole    Tablet 40 milliGRAM(s) Oral before breakfast  Parenteral Nutrition - Adult 1 Each TPN Continuous <Continuous>  scopolamine 1 mG/72 Hr(s) Patch 1 Patch Transdermal every 72 hours      PHYSICAL EXAM:  T(C): 37.1 (11-06-23 @ 05:10), Max: 37.3 (11-05-23 @ 09:33)  HR: 110 (11-06-23 @ 05:10) (101 - 110)  BP: 117/66 (11-06-23 @ 05:10) (104/61 - 140/64)  RR: 18 (11-06-23 @ 05:10) (18 - 18)  SpO2: 99% (11-06-23 @ 05:10) (97% - 100%)  Wt(kg): --  I&O's Summary    05 Nov 2023 07:01  -  06 Nov 2023 07:00  --------------------------------------------------------  IN: 1825 mL / OUT: 2050 mL / NET: -225 mL            JVP: Normal  Neck: supple  Lung: clear   CV: S1 S2 , Murmur:  Abd: soft  Ext: No edema  neuro: Awake / alert  Psych: flat affect  Skin: normal``    LABS/DATA:    CARDIAC MARKERS:                                9.2    3.88  )-----------( 290      ( 06 Nov 2023 05:44 )             28.7     11-05    147<H>  |  112<H>  |  29<H>  ----------------------------<  160<H>  4.3   |  23  |  0.41<L>    Ca    9.0      05 Nov 2023 05:49  Phos  3.4     11-05  Mg     2.30     11-05    TPro  6.9  /  Alb  2.7<L>  /  TBili  0.4  /  DBili  x   /  AST  61<H>  /  ALT  81<H>  /  AlkPhos  272<H>  11-05    proBNP:   Lipid Profile:   HgA1c:   TSH:     TELE:  EKG:

## 2023-11-06 NOTE — PROGRESS NOTE ADULT - SUBJECTIVE AND OBJECTIVE BOX
TEAM [ D ] Surgery Daily Progress Note  =====================================================    SUBJECTIVE: Patient seen and examined at bedside on AM rounds. Patient reports that they're feeling well. Denies fever, chills, N/V, chest pain, SOB    ALLERGIES:  penicillin (Rash)      --------------------------------------------------------------------------------------    MEDICATIONS:    Neurologic Medications  acetaminophen     Tablet .. 650 milliGRAM(s) Oral every 6 hours PRN Mild Pain (1 - 3)  HYDROmorphone   Tablet 1 milliGRAM(s) Oral every 3 hours PRN Moderate and Severe Pain  LORazepam     Tablet 0.25 milliGRAM(s) Oral every 6 hours PRN Nausea and/or Vomiting  ondansetron   Disintegrating Tablet 8 milliGRAM(s) Oral every 8 hours PRN Nausea  scopolamine 1 mG/72 Hr(s) Patch 1 Patch Transdermal every 72 hours    Respiratory Medications    Cardiovascular Medications    Gastrointestinal Medications  lipid, fat emulsion (Fish Oil and Plant Based) 20% Infusion 12.5 mL/Hr IV Continuous <Continuous>  pantoprazole    Tablet 40 milliGRAM(s) Oral before breakfast  Parenteral Nutrition - Adult 1 Each TPN Continuous <Continuous>  sodium chloride 0.9% lock flush 10 milliLiter(s) IV Push every 1 hour PRN Pre/post blood products, medications, blood draw, and to maintain line patency    Genitourinary Medications    Hematologic/Oncologic Medications  influenza   Vaccine 0.5 milliLiter(s) IntraMuscular once    Antimicrobial/Immunologic Medications    Endocrine/Metabolic Medications    Topical/Other Medications  chlorhexidine 2% Cloths 1 Application(s) Topical daily  naloxone Injectable 0.1 milliGRAM(s) IV Push every 3 minutes PRN For ANY of the following changes in patient status:  A. RR LESS THAN 10 breaths per minute, B. Oxygen saturation LESS THAN 90%, C. Sedation score of 6    --------------------------------------------------------------------------------------    VITAL SIGNS:  T(C): 37.1 (11-06-23 @ 05:10), Max: 37.3 (11-05-23 @ 09:33)  HR: 110 (11-06-23 @ 05:10) (101 - 110)  BP: 117/66 (11-06-23 @ 05:10) (104/61 - 140/64)  RR: 18 (11-06-23 @ 05:10) (18 - 18)  SpO2: 99% (11-06-23 @ 05:10) (97% - 100%)  --------------------------------------------------------------------------------------    INS AND OUTS:    11-05-23 @ 07:01  -  11-06-23 @ 07:00  --------------------------------------------------------  IN: 1825 mL / OUT: 2050 mL / NET: -225 mL      --------------------------------------------------------------------------------------      EXAM    General: NAD, resting in bed comfortably.  Cardiac: regular rate, warm and well perfused  Respiratory: Nonlabored respirations, normal cw expansion.  Abdomen: soft, nontender, nondistended, midline incision c/d/i, ileostomy with stool and gas  Extremities: normal strength, FROM, no deformities    --------------------------------------------------------------------------------------    LABS                        9.2    3.88  )-----------( 290      ( 06 Nov 2023 05:44 )             28.7   11-05    147<H>  |  112<H>  |  29<H>  ----------------------------<  160<H>  4.3   |  23  |  0.41<L>    Ca    9.0      05 Nov 2023 05:49  Phos  3.4     11-05  Mg     2.30     11-05    TPro  6.9  /  Alb  2.7<L>  /  TBili  0.4  /  DBili  x   /  AST  61<H>  /  ALT  81<H>  /  AlkPhos  272<H>  11-05

## 2023-11-06 NOTE — CHART NOTE - NSCHARTNOTESELECT_GEN_ALL_CORE
Event Note
Midline removal
Nutrition Follow Up/Nutrition Services
Nutrition Follow Up/Nutrition Services
Nutrition Services
Palliative Care/Event Note
Postop check
Pre-IR Note
SICU downgrade
Nutrition Services
POCUS
post op check/Event Note

## 2023-11-06 NOTE — PROGRESS NOTE ADULT - SUBJECTIVE AND OBJECTIVE BOX
NUTRITION NOTE  [id number] OWENSSTEWART  ===============================    Interval events - Patient was seen and examined at bedside, no acute events overnight. Patient denies chest pain, shortness of breath, nausea or vomiting at this time. Pt remains on TPN cycled over 12 hours. Plan for thoracentesis today.     ROS: Except as noted above, all other systems reviewed and are negative     Allergies  penicillin (Rash)    PAST MEDICAL & SURGICAL HISTORY:  History of sickle cell trait  Malignant neoplasm of appendix  History of ectopic pregnancy  H/O colonoscopy  H/O endoscopy    Vital Signs Last 24 Hrs  T(C): 37 (2023 09:18), Max: 37.3 (2023 00:53)  T(F): 98.6 (2023 09:18), Max: 99.1 (2023 00:53)  HR: 111 (2023 09:18) (101 - 111)  BP: 119/89 (2023 09:18) (104/61 - 140/64)  RR: 18 (2023 05:10) (18 - 18)  SpO2: 98% (2023 09:18) (97% - 100%)    MEDICATIONS  (STANDING):  chlorhexidine 2% Cloths 1 Application(s) Topical daily  influenza   Vaccine 0.5 milliLiter(s) IntraMuscular once  lipid, fat emulsion (Fish Oil and Plant Based) 20% Infusion 12.5 mL/Hr (12.5 mL/Hr) IV Continuous <Continuous>  pantoprazole    Tablet 40 milliGRAM(s) Oral before breakfast  Parenteral Nutrition - Adult 1 Each TPN Continuous <Continuous>  Parenteral Nutrition - Adult 1 Each TPN Continuous <Continuous>  scopolamine 1 mG/72 Hr(s) Patch 1 Patch Transdermal every 72 hours    MEDICATIONS  (PRN):  acetaminophen     Tablet .. 650 milliGRAM(s) Oral every 6 hours PRN Mild Pain (1 - 3)  HYDROmorphone   Tablet 1 milliGRAM(s) Oral every 3 hours PRN Moderate and Severe Pain  LORazepam     Tablet 0.25 milliGRAM(s) Oral every 6 hours PRN Nausea and/or Vomiting  naloxone Injectable 0.1 milliGRAM(s) IV Push every 3 minutes PRN For ANY of the following changes in patient status:  A. RR LESS THAN 10 breaths per minute, B. Oxygen saturation LESS THAN 90%, C. Sedation score of 6  ondansetron   Disintegrating Tablet 8 milliGRAM(s) Oral every 8 hours PRN Nausea  sodium chloride 0.9% lock flush 10 milliLiter(s) IV Push every 1 hour PRN Pre/post blood products, medications, blood draw, and to maintain line patency    I&O's Detail    2023 07:01  -  2023 07:00  --------------------------------------------------------  IN:    Fat Emulsion (Fish Oil &amp; Plant Based) 20% Infusion: 125 mL    Oral Fluid: 200 mL    TPN (Total Parenteral Nutrition): 1500 mL  Total IN: 1825 mL    OUT:    Ileostomy (mL): 250 mL    Voided (mL): 1800 mL  Total OUT: 2050 mL    Total NET: -225 mL    POCT Blood Glucose.: 162 mg/dL (2023 05:32)  POCT Blood Glucose.: 103 mg/dL (2023 15:54)    Daily Weight in k.5 (2023 06:34)    Drug Dosing Weight  Height (cm): 154.9 (04 Oct 2023 07:27)  Weight (kg): 54.5 (2023 06:34)  BMI (kg/m2): 22.7 (2023 06:34)  BSA (m2): 1.52 (2023 06:34)    PHYSICAL EXAM:  General: NAD, resting comfortably in bed   Respiratory: Nonlabored respirations, normal cw expansion.  Abdomen: soft, nontender, nondistended  Extremities: normal strength, FROM, no deformities  PICC Site: RUE PICC site clean and day (placed on 10/24/23)    Diet: NPO and TPN/lipids (started on 10/24/23)    LABORATORY                                          9.2    3.88  )-----------( 290      ( 2023 05:44 )             28.7   11-06    143  |  110<H>  |  32<H>  ----------------------------<  164<H>  4.1   |  22  |  0.40<L>    Ca    8.8      2023 05:44  Phos  3.1       Mg     2.10         TPro  7.0  /  Alb  2.9<L>  /  TBili  0.4  /  DBili  x   /  AST  81<H>  /  ALT  110<H>  /  AlkPhos  275<H>      LIVER FUNCTIONS - ( 2023 05:44 )  Alb: 2.9 g/dL / Pro: 7.0 g/dL / ALK PHOS: 275 U/L / ALT: 110 U/L / AST: 81 U/L / GGT: x           10-25 Chol -- LDL -- HDL -- Trig 87    CT A/P 10/28 notable for large R pleural effusion, increased in size from previous imaging, decreased hepatic and perihepatic hematomas, similar R hepatic lobe hypodensities (unclear if infarct vs small collections).     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 Patient's course c/b continuous retching and intolerance to tube feeds. Nutrition support consult called for prolonged NPO and severe protein calorie malnutrition. PICC placed and TPN started on 10/24/23.    continue TPN with infusion volume of 1.5L, TPN will provide 1448 kcal/day; TPN cycled over 12 hours; lipid calories decreased in view of elevated LFTs    labs reviewed - electrolytes adjusted in TPN bag     monitor fingersticks, obtain daily weights     continue parenteral nutrition at this time, will follow up with primary team on plan, discharge planning in process with home TPN (on hold due to persistent tachycardia); TPN formula verbally verified with outpatient pharmacy, will follow up with pharmacy once discharge plan is confirmed       - Outpatient TPN via [facility] and follow up with Dr Jeffery Sullivan    [id number]  [place] [id number]  Entrance on [facility]  Phone [phone]    Please instruct patient when calling to make the appointment to schedule for the first MONDAY after discharge from hospital. Please instruct patient to explain she is on TPN when making the appointment. Check labs, CMP, electrolytes, ionized Calcium, triglycerides and fingersticks- frequency to be determined by Dr. Sullivan as outpatient.    1.  Severe protein calorie malnutrition being optimized with TPN: CHO [220] gm.  AA [100] gm. SMOF Lipids [30] gm.  2.  Hyperglycemia managed with: [0] units of regular insulin    3.  Check fluid balance daily.  Strict I/O  [ ] [ ]   4.  Daily BMP, Ionized Calcium, Magnesium and Phosphorous   5.  Triglycerides at initiation of TPN and monthly 10-25 Chol -- LDL -- HDL -- Trig 87    Nutrition Support [id number]

## 2023-11-06 NOTE — CHART NOTE - NSCHARTNOTEFT_GEN_A_CORE
Post Operative Note  Patient: DAQUAN CHRISTENSEN 58y (1965) Female   MRN: [id number]  Location: [place] [id number] A  Visit: 10-04-23 Inpatient  Date: 11-06-23 @ 21:41    Procedure: S/P ***    Subjective: Patient seen and examined post operatively. Reports pain as controlled. Denies nausea, vomiting, fever, chills, chest pain, SOB, cough.      Objective:  Vitals: T(F): 99.2 (11-06-23 @ 20:13), Max: 99.2 (11-06-23 @ 20:13)  HR: 114 (11-06-23 @ 20:13)  BP: 114/61 (11-06-23 @ 20:13) (105/66 - 119/89)  RR: 18 (11-06-23 @ 20:13)  SpO2: 98% (11-06-23 @ 20:13)  Vent Settings:     In:   11-05-23 @ 07:01  -  11-06-23 @ 07:00  --------------------------------------------------------  IN: 1825 mL    11-06-23 @ 07:01  -  11-06-23 @ 21:41  --------------------------------------------------------  IN: 307.5 mL      IV Fluids: lipid, fat emulsion (Fish Oil and Plant Based) 20% Infusion 12.5 mL/Hr (12.5 mL/Hr) IV Continuous <Continuous>  Parenteral Nutrition - Adult 1 Each TPN Continuous <Continuous>  Parenteral Nutrition - Adult 1 Each TPN Continuous <Continuous>      Out:   11-05-23 @ 07:01  -  11-06-23 @ 07:00  --------------------------------------------------------  OUT: 2050 mL    11-06-23 @ 07:01  -  11-06-23 @ 21:41  --------------------------------------------------------  OUT: 100 mL      EBL:     Voided Urine:   11-05-23 @ 07:01  -  11-06-23 @ 07:00  --------------------------------------------------------  OUT: 2050 mL    11-06-23 @ 07:01  -  11-06-23 @ 21:41  --------------------------------------------------------  OUT: 100 mL      Pierce Catheter: yes no   Drains:   JUAQUIN:    ,   Chest Tube:      NG Tube:         Physical Examination:  General: NAD, resting comfortably in bed  HEENT: Normocephalic atraumatic  Respiratory: Nonlabored respirations, normal CW expansion.  Cardio: S1S2, regular rate and rhythm.  Abdomen: softly distended, appropriately tender, surgical incisions are c/d/i. NGT/OGT*  Vascular: extremities are warm and well perfused.     Imaging:  No post-op imaging studies    Assessment:  58yFemale patient S/P *** for ***    Plan:  - IV Abx:   - Pain control PRN  - Diet:  - IVF  - SHAHANA Pierce  - Activity:  - DVT ppx: SCD's &     Date/Time: 11-06-23 @ 21:41
Pt seen for severe malnutrition follow up     Medical Course: Per chart 58 year old female w/ PMHx of malignant neoplasm of appendix is s/p appendectomy, cholecystectomy, hysterectomy, omenectomy, peritenectomy, LAR with diverting loop ileostomy and bladder injury with primary repair on 10/4. found to have LIJ And inominate vein acute non-occlusive thrombosis now on T lovenox.    Nutrition Course: Pt A&Ox4. Not feeling well this morning therefore declining full interview at this time. Appetite continues to be poor since before the surgery 2/2 chemotherapy. Pt complaining of nausea, receives metoclopramide and odansetron prn. Had applesauce and tea this morning for breakfast. Encouraged intake of small amounts of meals, bland items 2/2 nausea.   Pt with new ileostomy and diet advanced to low fiber with glucerna supplement on 10/10. Recommend changing supplement to Ensure Clear 2x daily (360 john and 16 gm protein) in setting of nausea. Encouraged intake of supplement. POCT 80-100mg/dL, A1c 6.2. Education regarding new ileostomy declined at this time 2/2 nausea and not feeling well. Recommend to continue low fiber diet.     Diet Prescription: Diet, Low Fiber:   Consistent Carbohydrate {No Snacks} (CSTCHO)  Supplement Feeding Modality:  Oral  Glucerna Shake Cans or Servings Per Day:  1       Frequency:  Three Times a day (10-10-23 @ 07:21)    Pertinent Medications: MEDICATIONS  (STANDING):  enoxaparin Injectable 70 milliGRAM(s) SubCutaneous every 12 hours  influenza   Vaccine 0.5 milliLiter(s) IntraMuscular once  magnesium sulfate  IVPB 1 Gram(s) IV Intermittent once  metoclopramide Injectable 10 milliGRAM(s) IV Push every 8 hours  pantoprazole    Tablet 40 milliGRAM(s) Oral before breakfast  potassium chloride    Tablet ER 20 milliEquivalent(s) Oral every 2 hours    MEDICATIONS  (PRN):  acetaminophen     Tablet .. 1000 milliGRAM(s) Oral every 6 hours PRN Mild Pain (1 - 3)  HYDROmorphone  Injectable 0.5 milliGRAM(s) IV Push every 4 hours PRN Severe Pain (7 - 10)  HYDROmorphone  Injectable 0.2 milliGRAM(s) IV Push every 4 hours PRN Moderate Pain (4 - 6)  naloxone Injectable 0.1 milliGRAM(s) IV Push every 3 minutes PRN For ANY of the following changes in patient status:  A. RR LESS THAN 10 breaths per minute, B. Oxygen saturation LESS THAN 90%, C. Sedation score of 6    Pertinent Labs: 10-12 Na140 mmol/L Glu 83 mg/dL K+ 3.5 mmol/L Cr  0.46 mg/dL<L> BUN 8 mg/dL 10-12 Phos 2.9 mg/dL 10-12 Alb 2.5 g/dL<L>    Weight: Height (cm): 154.9 (10-04 @ 07:27), 156.3 (09-05 @ 11:06), 154.9 (07-21 @ 08:27)  Weight (kg): 68.9 (10-04 @ 07:27), 71.5 (09-05 @ 11:06), 75.3 (07-21 @ 08:27)  BMI (kg/m2): 28.7 (10-04 @ 07:27), 29.3 (09-05 @ 11:06), 31.4 (07-21 @ 08:27)  Weight Assessment: 10/9: 66.5kg, 9/21: 68kg. (2% weight loss ~2 weeks. Likely due to poor PO intake)      Physical Assessment, per flowsheets:  Edema: +2 edema left arm  Skin: Intact w/ no pressure injuries noted per RN flowsheets       Estimated Needs:   [X] No change since previous assessment, based on IBW 113lb/ 51.6kg  28-32kcal/kg (5928-8172 kcal)  1.4-1.7 g/kg (72-87 g pro)    Previous Nutrition Diagnosis: [x] severe protein calorie malnutrition, [x ]Inadequate Oral Intake, [ x] Unintended Weight Loss  Nutrition Diagnosis is [x ] ongoing     Education:  [  x] Pt refusal of education offered: Pt not feeling well this morning.    Interventions:   1) Continue low fiber diet   2) Recommend Ensure Clear 2x daily (360 john and 16 gm protein) 2/2 nausea. d/c Glucerna supplement until nausea resolves   3) Obtain weekly weights   4) Encourage PO intake and honor food preferences as able.     Monitor & Evaluate:  PO intake, tolerance to diet/supplement, nutrition related lab values, weight trends, BMs/GI distress, hydration status, skin integrity.    Gabbie [place] RD| [id number] (Also available on TEAMS)
SICU Point of Care Ultrasound    : Gasper Benson, MS4    Indication: fluid status    Able to obtain adequate Apical 4 chamber  Left Ventricle: Left ventricular cavity dimensions and wall thickness appear of normal size.  LVOT diameter:  cm at Aortic valve insertion points  Heart Rate:  bpm    Right Ventricle: Normal right ventricular size with preserved function.    IVC:  1.54cm  IVC collapsibility: <50% collapsibility by visual estimation    Lung ultrasound: Demonstrated good, smooth pleural sliding, without irregularity throughout bilateral lung fields.  Demonstrated predominant A- Line pathology throughout bilateral lung fields.  Demonstrated 1 B-line in the L apical lung field, no other B-lines visualized in the R apex or b/l lung bases  Pleural Effusion: not appreciated
MD notified by RN that patient  and that dressing surrounding LLQ JUAQUIN was saturated with blood with blood leaking around JUAQUIN insertion site down the LLE as well as new sanguinous output in the JUAQUIN bulb. Patient examined with Dr. Lr. Patient reports bleeding started when she was vomiting earlier. She denies abdominal pain, dizziness, weakness, headache. On exam, abdomen is soft, mildly tender with sanguinous JUAQUIN output, liquid stool in ostomy. During the exam, patient had prolonged episode of dry-heaving/vomiting with clear spit up which caused further bleeding from the JUAQUIN insertion site. Dressing placed over JUAQUIN. STAT labs, ECG, T&S ordered. 500 mL LR bolus given. Will continue to monitor closely.     [facility]   Surgery D Team [id number]
Notified by RN Radha, midline difficult to flush and causing patient discomfort.  Powerglide Midline removed by myself with tip intact.  Hemostatic pressure held for 5 minutes.  Site dressed with dsd and paper tape.    D Team Surgery  [id number]
POST-OPERATIVE CHECK    SUBJECTIVE  Patient is s/p cytoreduction, HIPEC, TAHBSO, bladder repair, LAR, DLI, cholecystectomy, appendectomy, omentectomy. In OR, pt rec'd, EBL 1000 mL. IV Infusions - Crystalloids (mL) 4300, IV Infusions - Colloids (mL) 1500, 4 PRBC, 2 FFP. Pt brought to PACU intubated on ventilator. Pt seen and examined in PACU.     Vital Signs Last 24 Hrs  T(C): 36.7 (05 Oct 2023 00:00), Max: 36.9 (04 Oct 2023 23:30)  T(F): 98.1 (05 Oct 2023 00:00), Max: 98.4 (04 Oct 2023 23:30)  HR: 89 (05 Oct 2023 00:45) (62 - 96)  BP: 96/67 (05 Oct 2023 00:15) (93/67 - 132/85)  BP(mean): 74 (05 Oct 2023 00:15) (72 - 90)  RR: 16 (05 Oct 2023 00:45) (10 - 16)  SpO2: 100% (05 Oct 2023 00:45) (100% - 100%)    Parameters below as of 04 Oct 2023 20:20  Patient On (Oxygen Delivery Method): ventilator    O2 Concentration (%): 50  I&O's Detail    04 Oct 2023 07:01  -  05 Oct 2023 01:14  --------------------------------------------------------  IN:    Phenylephrine: 40.8 mL    Propofol: 95.9 mL  Total IN: 136.7 mL    OUT:    Bulb (mL): 245 mL    Ileostomy (mL): 0 mL    Indwelling Catheter - Urethral (mL): 575 mL  Total OUT: 820 mL    Total NET: -683.3 mL        ciprofloxacin   IVPB 400  metroNIDAZOLE  IVPB 500  ciprofloxacin   IVPB 400  heparin   Injectable 5000  metroNIDAZOLE  IVPB 500  phenylephrine    Infusion 0.701    PAST MEDICAL & SURGICAL HISTORY:  History of sickle cell trait      Malignant neoplasm of appendix      History of ectopic pregnancy      H/O colonoscopy      H/O endoscopy          PHYSICAL EXAM  General: NAD, Pt intubated at time of exam. On propofol 50   Pulmonary: Vent settings: AC RR 12, , PEEP 5 FiO2 50%.   Cardiovascular: NSR, Levophed 0.2.  Abdominal: soft, NT/ND, Midline provena c/d/i. DLI pink and healthy appearing with red rubber sewn in. JUAQUIN site c/d/i. JUAQUIN bulb with ssf. NGT with minimal bilious output in cannister.   Extremities: WWP  : Cristina in place    LABS                        10.0   8.52  )-----------( 168      ( 05 Oct 2023 00:37 )             27.7     10-04    143  |  117<H>  |  4<L>  ----------------------------<  165<H>  4.2   |  19<L>  |  0.50    Ca    7.9<L>      04 Oct 2023 22:00    TPro  4.5<L>  /  Alb  2.8<L>  /  TBili  1.2  /  DBili  x   /  AST  398<H>  /  ALT  192<H>  /  AlkPhos  27<L>  10-04    PT/INR - ( 04 Oct 2023 22:00 )   PT: 15.3 sec;   INR: 1.38 ratio         PTT - ( 04 Oct 2023 22:00 )  PTT:27.4 sec  CAPILLARY BLOOD GLUCOSE      POCT Blood Glucose.: 124 mg/dL (04 Oct 2023 23:40)  POCT Blood Glucose.: 95 mg/dL (04 Oct 2023 07:33)      ASSESSMENT  58 F PMH malignant neoplasm of appendix now, s/p Patient is s/p cytoreduction, HIPEC, TAHBSO, bladder repair, LAR, DLI, cholecystectomy, appendectomy, omentectomy 10-05. In PACU on ventilator under SICU care. UOP appropriate, requiring levophed, VSS.     PLAN  - Diet - NPO, NGT, IVF.   - Keeps cristina for 2 weeks i/s/o bladder repair  - Sedation and pain control PRN  - Please continue excellent care per SICU    Pino Mclaughlin  Surgery D Team [id number]
PRE-INTERVENTIONAL RADIOLOGY PROCEDURE NOTE      Patient Age: 58    Patient Gender: F     Procedure: double lumen picc line placement    Diagnosis/Indication: tpn    Interventional Radiology Attending Physician: dorita    Ordering Attending Physician: amirah    Pertinent Medical History: appendiceal CA, cancer related nausea/vomiting    Pertinent labs:                      7.8    7.58  )-----------( 235      ( 24 Oct 2023 06:00 )             23.9       10-24    138  |  106  |  8   ----------------------------<  121<H>  3.9   |  22  |  0.41<L>    Ca    8.3<L>      24 Oct 2023 06:00  Phos  2.4     10-24  Mg     2.00     10-24    TPro  5.8<L>  /  Alb  2.5<L>  /  TBili  0.4  /  DBili  x   /  AST  64<H>  /  ALT  175<H>  /  AlkPhos  319<H>  10-24      PT/INR - ( 24 Oct 2023 06:00 )   PT: 13.7 sec;   INR: 1.23 ratio         PTT - ( 24 Oct 2023 06:00 )  PTT:27.8 sec        Patient and Family Aware ? Yes
Patient being seen for malnutrition follow up. Spoke with pt and obtained subjective information from extensive chart review.     Current Diet : Diet, NPO with Tube Feed:   Tube Feeding Modality: Nasoduodenal  TwoCal HN (TWOCALHNRTH)  Total Volume for 24 Hours (mL): 720  Continuous  Starting Tube Feed Rate {mL per Hour}: 10  Increase Tube Feed Rate by (mL): 20     Every 2 hours  Until Goal Tube Feed Rate (mL per Hour): 30  Tube Feed Duration (in Hours): 24  Tube Feed Start Time: 10:00 (10-23-23 @ 09:22)    Parenteral Nutrition: TPN 2L infusing over 24 hrs (85 gm amino acids, 220 gm dextrose, 25 gm SMOF lipids) to provide 1338 kcal/day (19 kcal/kg and 1.2 gm protein/kg per dosing wt 68.9 kg)    Dextrose Infusion Rate: 2.21 mg/kg/min  Lipid Infusion Rate: 0.36 gm/kg/day; 0.03 gm/kg/hr     Current Weight: No current wt available  Height (cm): 154.9   Admit Weight (kg): 68.9   BMI (kg/m2): 28.7   IBW (kg): 51.6    Nutrition Interval Events: Enteral feeding was attempted with pt experiencing persistent vomiting. TF stopped with TPN initiated by Nutrition Support Team. Please d/c TF order. Request daily wts taken to assess trend and fluids shifts while pt is receiving PN; no edema noted at present. Ileostomy output 350 mL over the past 24 hrs. Pt remains at severe risk for malnutrition based on previously identified weight loss associated with suboptimal nutrition intake PTA which is ongoing. Recommend increasing TPN to meet pt's estimated calorie need. RDN services to remain available as needed.     __________________ Pertinent Medications__________________   MEDICATIONS  (STANDING):  chlorhexidine 4% Liquid 1 Application(s) Topical <User Schedule>  influenza   Vaccine 0.5 milliLiter(s) IntraMuscular once  lipid, fat emulsion (Fish Oil and Plant Based) 20% Infusion 10.4 mL/Hr (10.4 mL/Hr) IV Continuous <Continuous>  pantoprazole  Injectable 40 milliGRAM(s) IV Push two times a day  Parenteral Nutrition - Adult 1 Each (83 mL/Hr) TPN Continuous <Continuous>  Parenteral Nutrition - Adult 1 Each (42 mL/Hr) TPN Continuous <Continuous>  scopolamine 1 mG/72 Hr(s) Patch 1 Patch Transdermal every 72 hours    __________________ Pertinent Labs__________________   10-25 Na139 mmol/L Glu 101 mg/dL<H> K+ 4.4 mmol/L Cr  0.42 mg/dL<L> BUN 7 mg/dL 10-25 Phos 3.3 mg/dL 10-25 Alb 2.3 g/dL<L> 10-25 Chol --    LDL --    HDL --    Trig 87 mg/dL        Skin: Intact    Estimated Needs:     1444 - 1651 kcals/day (28-32 kcals/kg of IBW)  72 - 88 gms protein/day (1.4-1.7 gms protein/kg of IBW)      Nutrition Diagnosis: Severe malnutrition  [x] ongoing    Goal(s):  1. Patient to meet > 75% estimated energy needs    Recommendations:   1. Please increase TPN to meet pt's estimated calorie need.  2. Please d/c TF order.    Monitoring and Evaluation:   1. Monitor weights, labs, BMs, skin integrity, PN tolerance.  2. RD services to remain available. Request obtaining new weight to assess trend and fluid shifts.    Education:    [x] Not warranted at present    Ana Medina, MS, RDN, CDN, CNSC on MS TEAMS or Pager #[phone]
Patient being seen for malnutrition follow up. Spoke with pt and obtained subjective information from extensive chart review.     Diet, NPO:   Except Medications  With Ice Chips/Sips of Water (10-26-23 @ 09:14)    Parenteral Nutrition: TPN 1.5 L infusing over 12 hrs (90 gm amino acids, 220 gm dextrose, 40 gm SMOF lipids) to provide 1508 kcal/day (27 kcal/kg and 1.6 gm protein/kg per current wt 54. kg)    Dextrose Infusion Rate: 2.80 mg/kg/min  Lipid Infusion Rate: 0.73 gm/kg/day; 0.06 gm/kg/hr     Current Weight Trend: 54.4 kg (11/20, 56.4 kg (10/26)  Height (cm): 154.9   Admit Weight (kg): 68.9   BMI (kg/m2): 28.7   IBW (kg): 51.6    Nutrition Interval Events: Pt continues with PN and tolerating. Weight trend shows significant 14.5 kg decrease since admission - no edema noted at present. Ileostomy output 410 mL over the past 24 hrs. Pt remains at severe risk for malnutrition based on previously identified weight loss associated with suboptimal nutrition intake PTA which is ongoing. Recommend again increasing TPN to achieve weight stabilization as pt continues to demonstrate weight loss at this time. RDN services to remain available as needed.     __________________ Pertinent Medications__________________   MEDICATIONS  (STANDING):  apixaban 5 milliGRAM(s) Oral every 12 hours  chlorhexidine 2% Cloths 1 Application(s) Topical daily  ciprofloxacin     Tablet 500 milliGRAM(s) Oral every 12 hours  influenza   Vaccine 0.5 milliLiter(s) IntraMuscular once  iron sucrose IVPB 200 milliGRAM(s) IV Intermittent every 24 hours  lipid, fat emulsion (Fish Oil and Plant Based) 20% Infusion 16.6 mL/Hr (16.6 mL/Hr) IV Continuous <Continuous>  pantoprazole    Tablet 40 milliGRAM(s) Oral before breakfast  Parenteral Nutrition - Adult 1 Each TPN Continuous <Continuous>  Parenteral Nutrition - Adult 1 Each TPN Continuous <Continuous>  scopolamine 1 mG/72 Hr(s) Patch 1 Patch Transdermal every 72 hours    __________________ Pertinent Labs__________________   11-02 Na 144 mmol/L Glu 136 mg/dL<H> K+ 4.3 mmol/L Cr 0.41 mg/dL<L> BUN 22 mg/dL Phos 3.8 mg/dL  11-02 @ 12:03 POCT 139 mg/dL  11-02 @ 01:22 POCT 152 mg/dL  11-01 @ 17:41 POCT 106 mg/dL      Skin: Intact    Estimated Needs:     1444 - 1651 kcals/day (28-32 kcals/kg of IBW)  72 - 88 gms protein/day (1.4-1.7 gms protein/kg of IBW)      Nutrition Diagnosis: Severe malnutrition  [x] ongoing    Goal(s):  1. Patient to meet > 75% estimated energy needs    Recommendations:   1. Suggest increasing TPN to achieve wt stabilization.    Monitoring and Evaluation:   1. Monitor weights, labs, BMs, skin integrity, PN tolerance.  2. RD services to remain available.     Education:    [x] Not warranted at present    Ana Medina, MS, RDN, CDN, CNSC on MS TEAMS or Pager #[phone].
Patient will be downgraded from SICU and transferred to 805T, signed out to D team resident Manda. LUE duplex demonstrated acute, non-occlusive deep vein thromboses in the left innominate and internal jugular veins. Patient was started on T lovenox 70 mg BID.     SICU [id number]
Per chart------58F w/ PMHx of malignant neoplasm of appendix is s/p appendectomy, cholecystectomy, hysterectomy, omentectomy, peritonectomy, LAR with diverting loop ileostomy and bladder injury with primary repair on 10/4/23.    Nutrition follow up for length of stay, severe malnutrition. Pt received medication prior to RD visit and did not want any visitors. Per chart review, with inability to take in adequate nutrients 2/2 vomiting post PO. Nursing notes 0-25% of meal intakes most of the time. Pt was seen by GI, and Nutrition Support team. Pt is declining G-J Tube and PN not indicated 2/2 functioning GI tract. Pt with extended LOS, severe malnutrition, not meeting nutrient needs. Noted weight trends. Admit weight 68.9 kg, last weight (10/16) 55.7 kg, significant change. Some weight loss likely related to current status and previously had 2+ edema. Discussed with Nursing to obtain re weight.     DIET : Diet, Regular:   Low Fat (LOWFAT)  No Carbonated Beverages  Supplement Feeding Modality:  Oral  Ensure Enlive Cans or Servings Per Day:  1       Frequency:  Three Times a day (10-18-23 @ 10:27)    WEIGHT:  kg: admit: 68.9     PERTINENT MEDICATIONS: MEDICATIONS  (STANDING):  acetaminophen   IVPB .. 1000 milliGRAM(s) IV Intermittent every 6 hours  baclofen 5 milliGRAM(s) Oral every 8 hours  enoxaparin Injectable 70 milliGRAM(s) SubCutaneous every 12 hours  influenza   Vaccine 0.5 milliLiter(s) IntraMuscular once  pantoprazole  Injectable 40 milliGRAM(s) IV Push two times a day    LABS:  10-19 Na139 mmol/L Glu 95 mg/dL K+ 3.9 mmol/L Cr  0.45 mg/dL<L> BUN 9 mg/dL 10-19 Phos 3.3 mg/dL    SKIN: no pressure injury.    EDEMA: no edema.    Nutrient Needs:  [X] No Change from Initial Assessment.      Previous Nutrition Diagnosis:   Severe Malnutrition.  Nutrition Dx is ongoing.    Diet Education:  [X] Not applicable due to current medical status / prognosis                                      ~~~~~RECOMMEND~~~~~  1.  Trial with Clear Liquids diet wit Ensure Clear as tolerated.   2.  Please obtain re weight.  3.  Please document % of meals completed.  4.  Alternate means of nutrition as per Pt's wishes.         DARNELL Dailey RD, CDN, [facility]
Pt off the floor, unable to see  Page for uncontrolled symptoms [phone]

## 2023-11-06 NOTE — PROGRESS NOTE ADULT - SUBJECTIVE AND OBJECTIVE BOX
Interval Events: patient continues to be in pain and SOB    REVIEW OF SYSTEMS:  Constitutional: [x] negative [ ] fevers [ ] chills [ ] weight loss [ ] weight gain  HEENT: [x] negative [ ] dry eyes [ ] eye irritation [ ] postnasal drip [ ] nasal congestion  CV: [x] negative  [ ] chest pain [ ] orthopnea [ ] palpitations [ ] murmur  Resp: [x] negative [ ] cough [ ] shortness of breath [ ] dyspnea [ ] wheezing [ ] sputum [ ] hemoptysis  GI: [x] negative [ ] nausea [ ] vomiting [ ] diarrhea [ ] constipation [ ] abd pain [ ] dysphagia   : [x] negative [ ] dysuria [ ] nocturia [ ] hematuria [ ] increased urinary frequency  Musculoskeletal: [x] negative [ ] back pain [ ] myalgias [ ] arthralgias [ ] fracture  Skin: [x] negative [ ] rash [ ] itch  Neurological: [x] negative [ ] headache [ ] dizziness [ ] syncope [ ] weakness [ ] numbness  Psychiatric: [x] negative [ ] anxiety [ ] depression  Endocrine: [x] negative [ ] diabetes [ ] thyroid problem  Hematologic/Lymphatic: [x] negative [ ] anemia [ ] bleeding problem  Allergic/Immunologic: [x] negative [ ] itchy eyes [ ] nasal discharge [ ] hives [ ] angioedema  [x] All other systems negative  [ ] Unable to assess ROS because ________    OBJECTIVE:  ICU Vital Signs Last 24 Hrs  T(C): 37.3 (06 Nov 2023 20:13), Max: 37.3 (06 Nov 2023 00:53)  T(F): 99.2 (06 Nov 2023 20:13), Max: 99.2 (06 Nov 2023 20:13)  HR: 114 (06 Nov 2023 20:13) (110 - 114)  BP: 114/61 (06 Nov 2023 20:13) (105/66 - 119/89)  BP(mean): --  ABP: --  ABP(mean): --  RR: 18 (06 Nov 2023 20:13) (18 - 18)  SpO2: 98% (06 Nov 2023 20:13) (97% - 99%)    O2 Parameters below as of 06 Nov 2023 20:13  Patient On (Oxygen Delivery Method): room air              11-05 @ 07:01 - 11-06 @ 07:00  --------------------------------------------------------  IN: 1825 mL / OUT: 2050 mL / NET: -225 mL    11-06 @ 07:01 - 11-06 @ 21:55  --------------------------------------------------------  IN: 307.5 mL / OUT: 100 mL / NET: 207.5 mL      CAPILLARY BLOOD GLUCOSE      POCT Blood Glucose.: 89 mg/dL (06 Nov 2023 18:01)      PHYSICAL EXAM:  General: appears in mod distress, uncomfortable laying in bed  HEENT: MMM, PERRLA, no oropharyngeal or perioral lesions  Neck: supple, no LAD  Respiratory: ctab  Cardiovascular: RRR, no MRG  Abdomen: NTND  Extremities: no LE edema, warm  Neurological: AOx3, no gross deficits    HOSPITAL MEDICATIONS:            acetaminophen     Tablet .. 650 milliGRAM(s) Oral every 6 hours PRN  HYDROmorphone   Tablet 1 milliGRAM(s) Oral every 3 hours PRN  LORazepam     Tablet 0.25 milliGRAM(s) Oral every 6 hours PRN  ondansetron   Disintegrating Tablet 8 milliGRAM(s) Oral every 8 hours PRN  scopolamine 1 mG/72 Hr(s) Patch 1 Patch Transdermal every 72 hours    pantoprazole    Tablet 40 milliGRAM(s) Oral before breakfast        lipid, fat emulsion (Fish Oil and Plant Based) 20% Infusion 12.5 mL/Hr IV Continuous <Continuous>  Parenteral Nutrition - Adult 1 Each TPN Continuous <Continuous>  Parenteral Nutrition - Adult 1 Each TPN Continuous <Continuous>  sodium chloride 0.9% lock flush 10 milliLiter(s) IV Push every 1 hour PRN    influenza   Vaccine 0.5 milliLiter(s) IntraMuscular once    chlorhexidine 2% Cloths 1 Application(s) Topical daily    naloxone Injectable 0.1 milliGRAM(s) IV Push every 3 minutes PRN      LABS:                        9.2    3.88  )-----------( 290      ( 06 Nov 2023 05:44 )             28.7     Hgb Trend: 9.2<--, 8.9<--, 9.1<--, 9.1<--, 9.0<--  11-06    143  |  110<H>  |  32<H>  ----------------------------<  164<H>  4.1   |  22  |  0.40<L>    Ca    8.8      06 Nov 2023 05:44  Phos  3.1     11-06  Mg     2.10     11-06    TPro  7.0  /  Alb  2.9<L>  /  TBili  0.4  /  DBili  x   /  AST  81<H>  /  ALT  110<H>  /  AlkPhos  275<H>  11-06    Creatinine Trend: 0.40<--, 0.41<--, 0.42<--, 0.40<--, 0.41<--, 0.35<--  PT/INR - ( 06 Nov 2023 05:44 )   PT: 14.7 sec;   INR: 1.31 ratio         PTT - ( 06 Nov 2023 05:44 )  PTT:28.6 sec  Urinalysis Basic - ( 06 Nov 2023 05:44 )    Color: x / Appearance: x / SG: x / pH: x  Gluc: 164 mg/dL / Ketone: x  / Bili: x / Urobili: x   Blood: x / Protein: x / Nitrite: x   Leuk Esterase: x / RBC: x / WBC x   Sq Epi: x / Non Sq Epi: x / Bacteria: x            MICROBIOLOGY:     RADIOLOGY:  [x] Reviewed and interpreted by me

## 2023-11-06 NOTE — PROGRESS NOTE ADULT - ASSESSMENT
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 Found to have L IJ and innominate vein acute non-occlusive thrombosis. Patient was started on TPN. Pulmonary team consulted for pleural effusion as seen on CT a/p and CXR which has been expanding.    #R pleural effusion  #SOB    - Thoracentesis performed today with 600cc dark serosang fluid removed (stopped due to coughing)' patient tolerated procedure well  - can restart AC per primary/surgery  - f/up pleural fluid studies and cytopath  - CXR post-thora showing persistent R pleff  - please c/w adequate maryanne ncontrol, mobility as tolerated, PT, and incentive spirometer    D/w Dr Florian Olmedo MD  Fellow, Pulmonary-Critical Care

## 2023-11-07 LAB
ALBUMIN SERPL ELPH-MCNC: 3 G/DL — LOW (ref 3.3–5)
ALBUMIN SERPL ELPH-MCNC: 3 G/DL — LOW (ref 3.3–5)
ALP SERPL-CCNC: 305 U/L — HIGH (ref 40–120)
ALP SERPL-CCNC: 305 U/L — HIGH (ref 40–120)
ALT FLD-CCNC: 118 U/L — HIGH (ref 4–33)
ALT FLD-CCNC: 118 U/L — HIGH (ref 4–33)
ANION GAP SERPL CALC-SCNC: 10 MMOL/L — SIGNIFICANT CHANGE UP (ref 7–14)
ANION GAP SERPL CALC-SCNC: 10 MMOL/L — SIGNIFICANT CHANGE UP (ref 7–14)
AST SERPL-CCNC: 80 U/L — HIGH (ref 4–32)
AST SERPL-CCNC: 80 U/L — HIGH (ref 4–32)
BASOPHILS # BLD AUTO: 0.01 K/UL — SIGNIFICANT CHANGE UP (ref 0–0.2)
BASOPHILS # BLD AUTO: 0.01 K/UL — SIGNIFICANT CHANGE UP (ref 0–0.2)
BASOPHILS NFR BLD AUTO: 0.2 % — SIGNIFICANT CHANGE UP (ref 0–2)
BASOPHILS NFR BLD AUTO: 0.2 % — SIGNIFICANT CHANGE UP (ref 0–2)
BILIRUB SERPL-MCNC: 0.5 MG/DL — SIGNIFICANT CHANGE UP (ref 0.2–1.2)
BILIRUB SERPL-MCNC: 0.5 MG/DL — SIGNIFICANT CHANGE UP (ref 0.2–1.2)
BUN SERPL-MCNC: 27 MG/DL — HIGH (ref 7–23)
BUN SERPL-MCNC: 27 MG/DL — HIGH (ref 7–23)
CA-I BLD-SCNC: 1.19 MMOL/L — SIGNIFICANT CHANGE UP (ref 1.15–1.29)
CA-I BLD-SCNC: 1.19 MMOL/L — SIGNIFICANT CHANGE UP (ref 1.15–1.29)
CALCIUM SERPL-MCNC: 9.1 MG/DL — SIGNIFICANT CHANGE UP (ref 8.4–10.5)
CALCIUM SERPL-MCNC: 9.1 MG/DL — SIGNIFICANT CHANGE UP (ref 8.4–10.5)
CHLORIDE SERPL-SCNC: 105 MMOL/L — SIGNIFICANT CHANGE UP (ref 98–107)
CHLORIDE SERPL-SCNC: 105 MMOL/L — SIGNIFICANT CHANGE UP (ref 98–107)
CO2 SERPL-SCNC: 24 MMOL/L — SIGNIFICANT CHANGE UP (ref 22–31)
CO2 SERPL-SCNC: 24 MMOL/L — SIGNIFICANT CHANGE UP (ref 22–31)
CREAT SERPL-MCNC: 0.45 MG/DL — LOW (ref 0.5–1.3)
CREAT SERPL-MCNC: 0.45 MG/DL — LOW (ref 0.5–1.3)
EGFR: 111 ML/MIN/1.73M2 — SIGNIFICANT CHANGE UP
EGFR: 111 ML/MIN/1.73M2 — SIGNIFICANT CHANGE UP
EOSINOPHIL # BLD AUTO: 0.06 K/UL — SIGNIFICANT CHANGE UP (ref 0–0.5)
EOSINOPHIL # BLD AUTO: 0.06 K/UL — SIGNIFICANT CHANGE UP (ref 0–0.5)
EOSINOPHIL NFR BLD AUTO: 1.4 % — SIGNIFICANT CHANGE UP (ref 0–6)
EOSINOPHIL NFR BLD AUTO: 1.4 % — SIGNIFICANT CHANGE UP (ref 0–6)
GLUCOSE BLDC GLUCOMTR-MCNC: 133 MG/DL — HIGH (ref 70–99)
GLUCOSE BLDC GLUCOMTR-MCNC: 133 MG/DL — HIGH (ref 70–99)
GLUCOSE BLDC GLUCOMTR-MCNC: 90 MG/DL — SIGNIFICANT CHANGE UP (ref 70–99)
GLUCOSE BLDC GLUCOMTR-MCNC: 90 MG/DL — SIGNIFICANT CHANGE UP (ref 70–99)
GLUCOSE SERPL-MCNC: 123 MG/DL — HIGH (ref 70–99)
GLUCOSE SERPL-MCNC: 123 MG/DL — HIGH (ref 70–99)
HCT VFR BLD CALC: 30.1 % — LOW (ref 34.5–45)
HCT VFR BLD CALC: 30.1 % — LOW (ref 34.5–45)
HGB BLD-MCNC: 9.4 G/DL — LOW (ref 11.5–15.5)
HGB BLD-MCNC: 9.4 G/DL — LOW (ref 11.5–15.5)
IANC: 2.51 K/UL — SIGNIFICANT CHANGE UP (ref 1.8–7.4)
IANC: 2.51 K/UL — SIGNIFICANT CHANGE UP (ref 1.8–7.4)
IMM GRANULOCYTES NFR BLD AUTO: 0.5 % — SIGNIFICANT CHANGE UP (ref 0–0.9)
IMM GRANULOCYTES NFR BLD AUTO: 0.5 % — SIGNIFICANT CHANGE UP (ref 0–0.9)
LYMPHOCYTES # BLD AUTO: 1.34 K/UL — SIGNIFICANT CHANGE UP (ref 1–3.3)
LYMPHOCYTES # BLD AUTO: 1.34 K/UL — SIGNIFICANT CHANGE UP (ref 1–3.3)
LYMPHOCYTES # BLD AUTO: 31.1 % — SIGNIFICANT CHANGE UP (ref 13–44)
LYMPHOCYTES # BLD AUTO: 31.1 % — SIGNIFICANT CHANGE UP (ref 13–44)
MAGNESIUM SERPL-MCNC: 2.1 MG/DL — SIGNIFICANT CHANGE UP (ref 1.6–2.6)
MAGNESIUM SERPL-MCNC: 2.1 MG/DL — SIGNIFICANT CHANGE UP (ref 1.6–2.6)
MCHC RBC-ENTMCNC: 28.8 PG — SIGNIFICANT CHANGE UP (ref 27–34)
MCHC RBC-ENTMCNC: 28.8 PG — SIGNIFICANT CHANGE UP (ref 27–34)
MCHC RBC-ENTMCNC: 31.2 GM/DL — LOW (ref 32–36)
MCHC RBC-ENTMCNC: 31.2 GM/DL — LOW (ref 32–36)
MCV RBC AUTO: 92.3 FL — SIGNIFICANT CHANGE UP (ref 80–100)
MCV RBC AUTO: 92.3 FL — SIGNIFICANT CHANGE UP (ref 80–100)
MONOCYTES # BLD AUTO: 0.37 K/UL — SIGNIFICANT CHANGE UP (ref 0–0.9)
MONOCYTES # BLD AUTO: 0.37 K/UL — SIGNIFICANT CHANGE UP (ref 0–0.9)
MONOCYTES NFR BLD AUTO: 8.6 % — SIGNIFICANT CHANGE UP (ref 2–14)
MONOCYTES NFR BLD AUTO: 8.6 % — SIGNIFICANT CHANGE UP (ref 2–14)
NEUTROPHILS # BLD AUTO: 2.51 K/UL — SIGNIFICANT CHANGE UP (ref 1.8–7.4)
NEUTROPHILS # BLD AUTO: 2.51 K/UL — SIGNIFICANT CHANGE UP (ref 1.8–7.4)
NEUTROPHILS NFR BLD AUTO: 58.2 % — SIGNIFICANT CHANGE UP (ref 43–77)
NEUTROPHILS NFR BLD AUTO: 58.2 % — SIGNIFICANT CHANGE UP (ref 43–77)
NRBC # BLD: 0 /100 WBCS — SIGNIFICANT CHANGE UP (ref 0–0)
NRBC # BLD: 0 /100 WBCS — SIGNIFICANT CHANGE UP (ref 0–0)
NRBC # FLD: 0.04 K/UL — HIGH (ref 0–0)
NRBC # FLD: 0.04 K/UL — HIGH (ref 0–0)
PHOSPHATE SERPL-MCNC: 3.2 MG/DL — SIGNIFICANT CHANGE UP (ref 2.5–4.5)
PHOSPHATE SERPL-MCNC: 3.2 MG/DL — SIGNIFICANT CHANGE UP (ref 2.5–4.5)
PLATELET # BLD AUTO: 282 K/UL — SIGNIFICANT CHANGE UP (ref 150–400)
PLATELET # BLD AUTO: 282 K/UL — SIGNIFICANT CHANGE UP (ref 150–400)
POTASSIUM SERPL-MCNC: 4 MMOL/L — SIGNIFICANT CHANGE UP (ref 3.5–5.3)
POTASSIUM SERPL-MCNC: 4 MMOL/L — SIGNIFICANT CHANGE UP (ref 3.5–5.3)
POTASSIUM SERPL-SCNC: 4 MMOL/L — SIGNIFICANT CHANGE UP (ref 3.5–5.3)
POTASSIUM SERPL-SCNC: 4 MMOL/L — SIGNIFICANT CHANGE UP (ref 3.5–5.3)
PROT SERPL-MCNC: 7.3 G/DL — SIGNIFICANT CHANGE UP (ref 6–8.3)
PROT SERPL-MCNC: 7.3 G/DL — SIGNIFICANT CHANGE UP (ref 6–8.3)
RBC # BLD: 3.26 M/UL — LOW (ref 3.8–5.2)
RBC # BLD: 3.26 M/UL — LOW (ref 3.8–5.2)
RBC # FLD: 19.2 % — HIGH (ref 10.3–14.5)
RBC # FLD: 19.2 % — HIGH (ref 10.3–14.5)
SODIUM SERPL-SCNC: 139 MMOL/L — SIGNIFICANT CHANGE UP (ref 135–145)
SODIUM SERPL-SCNC: 139 MMOL/L — SIGNIFICANT CHANGE UP (ref 135–145)
WBC # BLD: 4.31 K/UL — SIGNIFICANT CHANGE UP (ref 3.8–10.5)
WBC # BLD: 4.31 K/UL — SIGNIFICANT CHANGE UP (ref 3.8–10.5)
WBC # FLD AUTO: 4.31 K/UL — SIGNIFICANT CHANGE UP (ref 3.8–10.5)
WBC # FLD AUTO: 4.31 K/UL — SIGNIFICANT CHANGE UP (ref 3.8–10.5)

## 2023-11-07 PROCEDURE — 99232 SBSQ HOSP IP/OBS MODERATE 35: CPT

## 2023-11-07 PROCEDURE — 90792 PSYCH DIAG EVAL W/MED SRVCS: CPT

## 2023-11-07 PROCEDURE — 99233 SBSQ HOSP IP/OBS HIGH 50: CPT | Mod: GC

## 2023-11-07 RX ORDER — APIXABAN 2.5 MG/1
5 TABLET, FILM COATED ORAL EVERY 12 HOURS
Refills: 0 | Status: DISCONTINUED | OUTPATIENT
Start: 2023-11-07 | End: 2023-11-08

## 2023-11-07 RX ORDER — ELECTROLYTE SOLUTION,INJ
1 VIAL (ML) INTRAVENOUS
Refills: 0 | Status: DISCONTINUED | OUTPATIENT
Start: 2023-11-07 | End: 2023-11-07

## 2023-11-07 RX ORDER — I.V. FAT EMULSION 20 G/100ML
12.5 EMULSION INTRAVENOUS
Qty: 30 | Refills: 0 | Status: DISCONTINUED | OUTPATIENT
Start: 2023-11-07 | End: 2023-11-08

## 2023-11-07 RX ADMIN — APIXABAN 5 MILLIGRAM(S): 2.5 TABLET, FILM COATED ORAL at 19:03

## 2023-11-07 RX ADMIN — HYDROMORPHONE HYDROCHLORIDE 1 MILLIGRAM(S): 2 INJECTION INTRAMUSCULAR; INTRAVENOUS; SUBCUTANEOUS at 05:50

## 2023-11-07 RX ADMIN — HYDROMORPHONE HYDROCHLORIDE 1 MILLIGRAM(S): 2 INJECTION INTRAMUSCULAR; INTRAVENOUS; SUBCUTANEOUS at 21:00

## 2023-11-07 RX ADMIN — HYDROMORPHONE HYDROCHLORIDE 1 MILLIGRAM(S): 2 INJECTION INTRAMUSCULAR; INTRAVENOUS; SUBCUTANEOUS at 02:33

## 2023-11-07 RX ADMIN — HYDROMORPHONE HYDROCHLORIDE 1 MILLIGRAM(S): 2 INJECTION INTRAMUSCULAR; INTRAVENOUS; SUBCUTANEOUS at 17:05

## 2023-11-07 RX ADMIN — PANTOPRAZOLE SODIUM 40 MILLIGRAM(S): 20 TABLET, DELAYED RELEASE ORAL at 05:06

## 2023-11-07 RX ADMIN — HYDROMORPHONE HYDROCHLORIDE 1 MILLIGRAM(S): 2 INJECTION INTRAMUSCULAR; INTRAVENOUS; SUBCUTANEOUS at 10:01

## 2023-11-07 RX ADMIN — I.V. FAT EMULSION 12.5 ML/HR: 20 EMULSION INTRAVENOUS at 19:01

## 2023-11-07 RX ADMIN — HYDROMORPHONE HYDROCHLORIDE 1 MILLIGRAM(S): 2 INJECTION INTRAMUSCULAR; INTRAVENOUS; SUBCUTANEOUS at 05:05

## 2023-11-07 RX ADMIN — HYDROMORPHONE HYDROCHLORIDE 1 MILLIGRAM(S): 2 INJECTION INTRAMUSCULAR; INTRAVENOUS; SUBCUTANEOUS at 13:28

## 2023-11-07 RX ADMIN — Medication 1 EACH: at 18:58

## 2023-11-07 RX ADMIN — HYDROMORPHONE HYDROCHLORIDE 1 MILLIGRAM(S): 2 INJECTION INTRAMUSCULAR; INTRAVENOUS; SUBCUTANEOUS at 09:01

## 2023-11-07 RX ADMIN — HYDROMORPHONE HYDROCHLORIDE 1 MILLIGRAM(S): 2 INJECTION INTRAMUSCULAR; INTRAVENOUS; SUBCUTANEOUS at 01:48

## 2023-11-07 RX ADMIN — HYDROMORPHONE HYDROCHLORIDE 1 MILLIGRAM(S): 2 INJECTION INTRAMUSCULAR; INTRAVENOUS; SUBCUTANEOUS at 13:58

## 2023-11-07 RX ADMIN — CHLORHEXIDINE GLUCONATE 1 APPLICATION(S): 213 SOLUTION TOPICAL at 19:02

## 2023-11-07 RX ADMIN — HYDROMORPHONE HYDROCHLORIDE 1 MILLIGRAM(S): 2 INJECTION INTRAMUSCULAR; INTRAVENOUS; SUBCUTANEOUS at 20:22

## 2023-11-07 NOTE — PROGRESS NOTE ADULT - SUBJECTIVE AND OBJECTIVE BOX
Interval Events: pt feels slightly improved post-drain    REVIEW OF SYSTEMS:  Constitutional: [x] negative [ ] fevers [ ] chills [ ] weight loss [ ] weight gain  HEENT: [x] negative [ ] dry eyes [ ] eye irritation [ ] postnasal drip [ ] nasal congestion  CV: [x] negative  [ ] chest pain [ ] orthopnea [ ] palpitations [ ] murmur  Resp: [x] negative [ ] cough [ ] shortness of breath [ ] dyspnea [ ] wheezing [ ] sputum [ ] hemoptysis  GI: [x] negative [ ] nausea [ ] vomiting [ ] diarrhea [ ] constipation [ ] abd pain [ ] dysphagia   : [x] negative [ ] dysuria [ ] nocturia [ ] hematuria [ ] increased urinary frequency  Musculoskeletal: [x] negative [ ] back pain [ ] myalgias [ ] arthralgias [ ] fracture  Skin: [x] negative [ ] rash [ ] itch  Neurological: [x] negative [ ] headache [ ] dizziness [ ] syncope [ ] weakness [ ] numbness  Psychiatric: [x] negative [ ] anxiety [ ] depression  Endocrine: [x] negative [ ] diabetes [ ] thyroid problem  Hematologic/Lymphatic: [x] negative [ ] anemia [ ] bleeding problem  Allergic/Immunologic: [x] negative [ ] itchy eyes [ ] nasal discharge [ ] hives [ ] angioedema  [x] All other systems negative  [ ] Unable to assess ROS because ________    OBJECTIVE:  ICU Vital Signs Last 24 Hrs  T(C): 36.7 (08 Nov 2023 00:00), Max: 37.2 (07 Nov 2023 21:50)  T(F): 98.1 (08 Nov 2023 00:00), Max: 99 (07 Nov 2023 21:50)  HR: 110 (08 Nov 2023 00:00) (98 - 110)  BP: 134/77 (08 Nov 2023 00:00) (112/64 - 134/77)  BP(mean): --  ABP: --  ABP(mean): --  RR: 18 (08 Nov 2023 00:00) (18 - 18)  SpO2: 100% (08 Nov 2023 00:00) (99% - 100%)    O2 Parameters below as of 08 Nov 2023 00:00  Patient On (Oxygen Delivery Method): room air              11-06 @ 07:01 - 11-07 @ 07:00  --------------------------------------------------------  IN: 1955 mL / OUT: 450 mL / NET: 1505 mL    11-07 @ 07:01 - 11-08 @ 00:57  --------------------------------------------------------  IN: 1310 mL / OUT: 1200 mL / NET: 110 mL      CAPILLARY BLOOD GLUCOSE      POCT Blood Glucose.: 90 mg/dL (07 Nov 2023 18:16)      PHYSICAL EXAM:  General: slight distress but improved  HEENT: MMM, PERRLA, no oropharyngeal or perioral lesions  Neck: supple, no LAD  Respiratory: ctab  Cardiovascular: RRR, no MRG  Abdomen: NTND  Extremities: no LE edema, warm  Neurological: AOx3, no gross deficits    HOSPITAL MEDICATIONS:  apixaban 5 milliGRAM(s) Oral every 12 hours            acetaminophen     Tablet .. 650 milliGRAM(s) Oral every 6 hours PRN  HYDROmorphone   Tablet 1 milliGRAM(s) Oral every 3 hours PRN  LORazepam     Tablet 0.25 milliGRAM(s) Oral every 6 hours PRN  ondansetron   Disintegrating Tablet 8 milliGRAM(s) Oral every 8 hours PRN  scopolamine 1 mG/72 Hr(s) Patch 1 Patch Transdermal every 72 hours    pantoprazole    Tablet 40 milliGRAM(s) Oral before breakfast        lipid, fat emulsion (Fish Oil and Plant Based) 20% Infusion 12.5 mL/Hr IV Continuous <Continuous>  lipid, fat emulsion (Fish Oil and Plant Based) 20% Infusion 12.5 mL/Hr IV Continuous <Continuous>  Parenteral Nutrition - Adult 1 Each TPN Continuous <Continuous>  sodium chloride 0.9% lock flush 10 milliLiter(s) IV Push every 1 hour PRN    influenza   Vaccine 0.5 milliLiter(s) IntraMuscular once    chlorhexidine 2% Cloths 1 Application(s) Topical daily    naloxone Injectable 0.1 milliGRAM(s) IV Push every 3 minutes PRN      LABS:                        9.4    4.31  )-----------( 282      ( 07 Nov 2023 05:13 )             30.1     Hgb Trend: 9.4<--, 9.2<--, 8.9<--, 9.1<--, 9.1<--  11-07    139  |  105  |  27<H>  ----------------------------<  123<H>  4.0   |  24  |  0.45<L>    Ca    9.1      07 Nov 2023 09:53  Phos  3.2     11-07  Mg     2.10     11-07    TPro  7.3  /  Alb  3.0<L>  /  TBili  0.5  /  DBili  x   /  AST  80<H>  /  ALT  118<H>  /  AlkPhos  305<H>  11-07    Creatinine Trend: 0.45<--, 0.40<--, 0.41<--, 0.42<--, 0.40<--, 0.41<--  PT/INR - ( 06 Nov 2023 05:44 )   PT: 14.7 sec;   INR: 1.31 ratio         PTT - ( 06 Nov 2023 05:44 )  PTT:28.6 sec  Urinalysis Basic - ( 07 Nov 2023 09:53 )    Color: x / Appearance: x / SG: x / pH: x  Gluc: 123 mg/dL / Ketone: x  / Bili: x / Urobili: x   Blood: x / Protein: x / Nitrite: x   Leuk Esterase: x / RBC: x / WBC x   Sq Epi: x / Non Sq Epi: x / Bacteria: x            MICROBIOLOGY:     RADIOLOGY:  [x] Reviewed and interpreted by me

## 2023-11-07 NOTE — PROGRESS NOTE ADULT - SUBJECTIVE AND OBJECTIVE BOX
NUTRITION NOTE  [id number] OWENSSTEWART  ===============================    Interval events    VITAL SIGNS:  T(C): 36.4 (23 @ 05:36), Max: 37.4 (23 @ 00:32)  HR: 106 (23 @ 05:36) (106 - 114)  BP: 119/71 (23 @ 05:36) (105/66 - 124/72)  ABP: --  ABP(mean): --  RR: 18 (23 @ 05:36) (18 - 18)  SpO2: 99% (23 @ 05:36) (97% - 99%)  CVP(mm Hg): --   @ 07:01  -   @ 07:00  --------------------------------------------------------  IN: 1825 mL / OUT: 2050 mL / NET: -225 mL     @ 07:01  -   @ 06:27  --------------------------------------------------------  IN: 1955 mL / OUT: 450 mL / NET: 1505 mL      Glucose     PHYSICAL EXAM:  General: NAD, resting comfortably in bed   Respiratory: Nonlabored respirations, normal cw expansion.  Abdomen: soft, nontender, nondistended  Extremities: normal strength, FROM, no deformities  PICC Site: E PICC site clean and day (placed on 10/24/23)    Diet: NPO and TPN/lipids (started on 10/24/23)    Metabolic/FLUIDS/ELECTROLYTES/NUTRITION:  Gastrointestinal Medications:  lipid, fat emulsion (Fish Oil and Plant Based) 20% Infusion 12.5 mL/Hr IV Continuous <Continuous>  pantoprazole    Tablet 40 milliGRAM(s) Oral before breakfast  Parenteral Nutrition - Adult 1 Each TPN Continuous <Continuous>  sodium chloride 0.9% lock flush 10 milliLiter(s) IV Push every 1 hour PRN    I&O's Detail  58y  Daily Weight in k.7 (2023 04:00)      143  |  110<H>  |  32<H>  ----------------------------<  164<H>  4.1   |  22  |  0.40<L>    Ca    8.8      2023 05:44  Phos  3.1       Mg     2.10         TPro  7.0  /  Alb  2.9<L>  /  TBili  0.4  /  DBili  x   /  AST  81<H>  /  ALT  110<H>  /  AlkPhos  275<H>      INFECTIOUS DISEASE:  Antimicrobials/Immunologic Medications:  influenza   Vaccine 0.5 milliLiter(s) IntraMuscular once    RECENT CULTURES:   @ 18:28 .Body Fluid Thoracentesis Fluid       No polymorphonuclear cells seen per low power field  No organisms seen per oil power field    OTHER MEDICATIONS:  Endocrine/Metabolic Medications:    Genitourinary Medications:    Topical/Other Medications:  chlorhexidine 2% Cloths 1 Application(s) Topical daily  naloxone Injectable 0.1 milliGRAM(s) IV Push every 3 minutes PRN    ASSESSMENT/PLAN:  58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 Patient's course c/b continuous retching and intolerance to tube feeds. Nutrition support consult called for prolonged NPO and severe protein calorie malnutrition. PICC placed and TPN started on 10/24/23.    continue TPN with infusion volume of 1.5L, TPN will provide 1448 kcal/day; TPN cycled over 12 hours; lipid calories decreased in view of elevated LFTs    labs reviewed - electrolytes adjusted in TPN bag     monitor fingersticks, obtain daily weights     continue parenteral nutrition at this time, will follow up with primary team on plan, discharge planning in process with home TPN (on hold due to persistent tachycardia); TPN formula verbally verified with outpatient pharmacy, will follow up with pharmacy once discharge plan is confirmed       - Outpatient TPN via [facility] and follow up with Dr Jeffery Sullivan    [id number]  [place] [id number]  Entrance on [facility]  Phone [phone]    Please instruct patient when calling to make the appointment to schedule for the first MONDAY after discharge from hospital. Please instruct patient to explain she is on TPN when making the appointment. Check labs, CMP, electrolytes, ionized Calcium, triglycerides and fingersticks- frequency to be determined by Dr. Sullivan as outpatient.    1.  Severe protein calorie malnutrition being optimized with TPN: CHO [220] gm.  AA [100] gm. SMOF Lipids [30] gm.  2.  Hyperglycemia managed with: [0] units of regular insulin    3.  Check fluid balance daily.  Strict I/O  [ ] [ ]   4.  Daily BMP, Ionized Calcium, Magnesium and Phosphorous   5.  Triglycerides at initiation of TPN and monthly 10-25 Chol -- LDL -- HDL -- Trig 87    Nutrition Support [id number]         1. Protein calorie malnutrition being optimized with TPN: CHO [ ] gm.  AA [ ] gm. Lipids [ ] gm.  2.  Hyperglycemia managed with: [ ] units of regular insulin    3.  Check fluid balance daily.  Strict I/O  [ ] [ ]   4.  Daily BMP, Ionized Calcium, Magnesium and Phosphorous   5.  Triglycerides at initiation of TPN and monthly [ ] [ ]   6.  Pepcid in TPN for Gi prophylaxis  [ ]  NUTRITION NOTE  [id number] OWENSSTEWART  ===============================    Interval events; no acute issues overnight. Thoracentesis was done . Plan for d/c home with TPN .      VITAL SIGNS:  T(C): 36.4 (23 @ 05:36), Max: 37.4 (23 @ 00:32)  HR: 106 (23 @ 05:36) (106 - 114)  BP: 119/71 (23 @ 05:36) (105/66 - 124/72)  ABP: --  ABP(mean): --  RR: 18 (23 @ 05:36) (18 - 18)  SpO2: 99% (23 @ 05:36) (97% - 99%)  CVP(mm Hg): --   @ 07:01  -   @ 07:00  --------------------------------------------------------  IN: 1825 mL / OUT: 2050 mL / NET: -225 mL     @ 07:01  -   @ 06:27  --------------------------------------------------------  IN: 1955 mL / OUT: 450 mL / NET: 1505 mL      Glucose     PHYSICAL EXAM:  General: NAD, resting comfortably in bed   Respiratory: Nonlabored respirations, normal cw expansion.  Abdomen: soft, nontender, nondistended  Extremities: normal strength, FROM, no deformities  PICC Site: RUE PICC site clean and day (placed on 10/24/23)    Diet: NPO and TPN/lipids (started on 10/24/23)    Metabolic/FLUIDS/ELECTROLYTES/NUTRITION:  Gastrointestinal Medications:  lipid, fat emulsion (Fish Oil and Plant Based) 20% Infusion 12.5 mL/Hr IV Continuous <Continuous>  pantoprazole    Tablet 40 milliGRAM(s) Oral before breakfast  Parenteral Nutrition - Adult 1 Each TPN Continuous <Continuous>  sodium chloride 0.9% lock flush 10 milliLiter(s) IV Push every 1 hour PRN    I&O's Detail  58y  Daily Weight in k.7 (2023 04:00)      143  |  110<H>  |  32<H>  ----------------------------<  164<H>  4.1   |  22  |  0.40<L>    Ca    8.8      2023 05:44  Phos  3.1       Mg     2.10         TPro  7.0  /  Alb  2.9<L>  /  TBili  0.4  /  DBili  x   /  AST  81<H>  /  ALT  110<H>  /  AlkPhos  275<H>      INFECTIOUS DISEASE:  Antimicrobials/Immunologic Medications:  influenza   Vaccine 0.5 milliLiter(s) IntraMuscular once    RECENT CULTURES:   @ 18:28 .Body Fluid Thoracentesis Fluid       No polymorphonuclear cells seen per low power field  No organisms seen per oil power field    OTHER MEDICATIONS:  Endocrine/Metabolic Medications:    Genitourinary Medications:    Topical/Other Medications:  chlorhexidine 2% Cloths 1 Application(s) Topical daily  naloxone Injectable 0.1 milliGRAM(s) IV Push every 3 minutes PRN    ASSESSMENT/PLAN:  58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 Patient's course c/b continuous retching and intolerance to tube feeds. Nutrition support consult called for prolonged NPO and severe protein calorie malnutrition. PICC placed and TPN started on 10/24/23.    continue TPN with infusion volume of 1.5L, TPN will provide 1448 kcal/day; TPN cycled over 12 hours; lipid calories decreased in view of elevated LFTs    labs reviewed - electrolytes adjusted in TPN bag     monitor fingersticks, obtain daily weights     continue parenteral nutrition at this time, will follow up with primary team on plan, discharge planning in process with home TPN (on hold due to persistent tachycardia); TPN formula verbally verified with outpatient pharmacy, will follow up with pharmacy once discharge plan is confirmed       - Outpatient TPN via [facility] and follow up with Dr Jeffery Sullivan    [id number]  [place] [id number]  Entrance on [facility]  Phone [phone]    Please instruct patient when calling to make the appointment to schedule for the first MONDAY after discharge from hospital. Please instruct patient to explain she is on TPN when making the appointment. Check labs, CMP, electrolytes, ionized Calcium, triglycerides and fingersticks- frequency to be determined by Dr. Sullivan as outpatient.    1.  Severe protein calorie malnutrition being optimized with TPN: CHO [220] gm.  AA [100] gm. SMOF Lipids [30] gm.  2.  Hyperglycemia managed with: [0] units of regular insulin    3.  Check fluid balance daily.  Strict I/O  [ ] [ ]   4.  Daily BMP, Ionized Calcium, Magnesium and Phosphorous   5.  Triglycerides at initiation of TPN and monthly 10-25 Chol -- LDL -- HDL -- Trig 87    Nutrition Support [id number]         1. Protein calorie malnutrition being optimized with TPN: CHO [ ] gm.  AA [ ] gm. Lipids [ ] gm.  2.  Hyperglycemia managed with: [ ] units of regular insulin    3.  Check fluid balance daily.  Strict I/O  [ ] [ ]   4.  Daily BMP, Ionized Calcium, Magnesium and Phosphorous   5.  Triglycerides at initiation of TPN and monthly [ ] [ ]   6.  Pepcid in TPN for Gi prophylaxis  [ ]

## 2023-11-07 NOTE — BH CONSULTATION LIAISON ASSESSMENT NOTE - HPI (INCLUDE ILLNESS QUALITY, SEVERITY, DURATION, TIMING, CONTEXT, MODIFYING FACTORS, ASSOCIATED SIGNS AND SYMPTOMS)
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    Patient calm, cooperative, Aox3 on exam - states she often gets frustrated but denies overall anxiety, consistant depression, denies anhedonia still enjoys time with family which is very supportive. Prays regularly with her cousin who is a reverend - feels she attends to her spiritual needs well. Denies AH/VH, delusions or paranoia. Future oriented wishes to improve. State that primary symptoms that cause suffering is pain and some sleep issues but patient not amenable to trying medications, not interested in anxiolytics etc. Offered chaplaincy but patient refused.

## 2023-11-07 NOTE — BH CONSULTATION LIAISON ASSESSMENT NOTE - MSE UNSTRUCTURED FT
Mental Status Exam:  Rhona: well groomed, fair hygiene     Behavior: calm, cooperative, no psychomotor retardation/agitation  Motor: no tremors, EPS, or rigidity  Gait: did not assess, pt in bed  Speech: normal rate, rhythm, prosedy and volume   Mood: "okay"  Affect: euthymic, congruent, smiling at times   Thought process: clear, goal directed   Thought Content: denies paranoia, delusions   Perception: denies AH/VH  SI: denies  HI: denies  Insight: fair  Judgment: fair    Cognitive Exam:  Orientation: AOx3

## 2023-11-07 NOTE — PROGRESS NOTE ADULT - ASSESSMENT
tachycardia  due to anemia and sepsis and large pleural effusion   appreciate pulm consult  plan for thoracentesis     on a/c as per primary team for brachial DVT    sepsis  uti  on abx  plan as per ID     HTN  stable     Monitor hemoglobin, transfuse as needed.           tachycardia  due to anemia and sepsis and large pleural effusion   appreciate pulm consult  s/p  thoracentesis     resume a/c for brachial DVT    HTN  stable     Monitor hemoglobin, transfuse as needed.

## 2023-11-07 NOTE — PROGRESS NOTE ADULT - ASSESSMENT
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 Found to have L IJ and innominate vein acute non-occlusive thrombosis. Patient was started on TPN. Pulmonary team consulted for pleural effusion as seen on CT a/p and CXR which has been expanding.    #R pleural effusion  #SOB    - Thoracentesis 11/6 with 600cc dark serosang fluid removed (stopped due to coughing)' patient tolerated procedure well  -  pleural fluid studies showing exudative effusion, please f/up cytopath and cultures  - please c/w adequate maryanne ncontrol, mobility as tolerated, PT, and incentive spirometer    D/w Dr Florian Olmedo MD  Fellow, Pulmonary-Critical Care

## 2023-11-07 NOTE — PROGRESS NOTE ADULT - SUBJECTIVE AND OBJECTIVE BOX
Subjective: Patient seen and examined. No new events except as noted.     SUBJECTIVE/ROS:  nad      MEDICATIONS:  MEDICATIONS  (STANDING):  chlorhexidine 2% Cloths 1 Application(s) Topical daily  influenza   Vaccine 0.5 milliLiter(s) IntraMuscular once  lipid, fat emulsion (Fish Oil and Plant Based) 20% Infusion 12.5 mL/Hr (12.5 mL/Hr) IV Continuous <Continuous>  pantoprazole    Tablet 40 milliGRAM(s) Oral before breakfast  Parenteral Nutrition - Adult 1 Each TPN Continuous <Continuous>  scopolamine 1 mG/72 Hr(s) Patch 1 Patch Transdermal every 72 hours      PHYSICAL EXAM:  T(C): 36.4 (11-07-23 @ 05:36), Max: 37.4 (11-07-23 @ 00:32)  HR: 106 (11-07-23 @ 05:36) (106 - 114)  BP: 119/71 (11-07-23 @ 05:36) (105/66 - 124/72)  RR: 18 (11-07-23 @ 05:36) (18 - 18)  SpO2: 99% (11-07-23 @ 05:36) (97% - 99%)  Wt(kg): --  I&O's Summary    06 Nov 2023 07:01  -  07 Nov 2023 07:00  --------------------------------------------------------  IN: 1955 mL / OUT: 450 mL / NET: 1505 mL            JVP: Normal  Neck: supple  Lung: clear   CV: S1 S2 , Murmur:  Abd: soft  Ext: No edema  neuro: Awake / alert  Psych: flat affect  Skin: normal``    LABS/DATA:    CARDIAC MARKERS:                                9.2    3.88  )-----------( 290      ( 06 Nov 2023 05:44 )             28.7     11-06    143  |  110<H>  |  32<H>  ----------------------------<  164<H>  4.1   |  22  |  0.40<L>    Ca    8.8      06 Nov 2023 05:44  Phos  3.1     11-06  Mg     2.10     11-06    TPro  7.0  /  Alb  2.9<L>  /  TBili  0.4  /  DBili  x   /  AST  81<H>  /  ALT  110<H>  /  AlkPhos  275<H>  11-06    proBNP:   Lipid Profile:   HgA1c:   TSH:     TELE:  EKG:             Subjective: Patient seen and examined. No new events except as noted.     SUBJECTIVE/ROS:  s/p thoracentesis       MEDICATIONS:  MEDICATIONS  (STANDING):  chlorhexidine 2% Cloths 1 Application(s) Topical daily  influenza   Vaccine 0.5 milliLiter(s) IntraMuscular once  lipid, fat emulsion (Fish Oil and Plant Based) 20% Infusion 12.5 mL/Hr (12.5 mL/Hr) IV Continuous <Continuous>  pantoprazole    Tablet 40 milliGRAM(s) Oral before breakfast  Parenteral Nutrition - Adult 1 Each TPN Continuous <Continuous>  scopolamine 1 mG/72 Hr(s) Patch 1 Patch Transdermal every 72 hours      PHYSICAL EXAM:  T(C): 36.4 (11-07-23 @ 05:36), Max: 37.4 (11-07-23 @ 00:32)  HR: 106 (11-07-23 @ 05:36) (106 - 114)  BP: 119/71 (11-07-23 @ 05:36) (105/66 - 124/72)  RR: 18 (11-07-23 @ 05:36) (18 - 18)  SpO2: 99% (11-07-23 @ 05:36) (97% - 99%)  Wt(kg): --  I&O's Summary    06 Nov 2023 07:01  -  07 Nov 2023 07:00  --------------------------------------------------------  IN: 1955 mL / OUT: 450 mL / NET: 1505 mL            JVP: Normal  Neck: supple  Lung: clear   CV: S1 S2 , Murmur:  Abd: soft  Ext: No edema  neuro: Awake / alert  Psych: flat affect  Skin: normal``    LABS/DATA:    CARDIAC MARKERS:                                9.2    3.88  )-----------( 290      ( 06 Nov 2023 05:44 )             28.7     11-06    143  |  110<H>  |  32<H>  ----------------------------<  164<H>  4.1   |  22  |  0.40<L>    Ca    8.8      06 Nov 2023 05:44  Phos  3.1     11-06  Mg     2.10     11-06    TPro  7.0  /  Alb  2.9<L>  /  TBili  0.4  /  DBili  x   /  AST  81<H>  /  ALT  110<H>  /  AlkPhos  275<H>  11-06    proBNP:   Lipid Profile:   HgA1c:   TSH:     TELE:  EKG:

## 2023-11-07 NOTE — BH CONSULTATION LIAISON ASSESSMENT NOTE - SUMMARY
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    Patient does not meet criterion for major depressive d/o or MAVIS. Could have some symptomatic benefit from sleep meds/anxiolytics etc but patient not interested in this, wishes to rely on lisa and family for her emotional and spiritual needs. No SI/HI or safety concerns. Sleep is poor would attempt to reduce qHS interruptions so as to ensure good sleep.    Recs:  - minimal indication for standing psych meds  - patient not interested in PRNs  - consider sleep plan with nursing re: reducing qHS interruptions where possible so as to ensure good sleep.  - no psych c/i to discharge, psych can follow for support as needed

## 2023-11-07 NOTE — PROGRESS NOTE ADULT - ASSESSMENT
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 HC c/b L IJ & innominate vein non-occlusive thrombosis, tLVX previously held i/s/o expanding subcapsular liver hematomas on CT 10/20, reinstated 10/26, switched to apixaban 10/30. Pt has also persistently c/o continuous retching, PO intolerance. Became febrile and tachycardic 10/26, fever workup revealing for bacteriuria with cultures positive for >100,000 CFU Klebsiella per hpf. ID consulted, stated UTI unlikely in absence of urinary symptomatology, patient switched from cipro/metronidazole to ceftriaxone/flagyl then ceftriaxone only per ID recommendations (CTX stop date 11/6). Fever resolved on ceftriaxone, however hospitalization now ongoing for persistent tachycardia 110-120 presumed to be 2/2 anemia with possible component of insufficient pain control. CTAP 10/28 notable for large R pleural effusion, increased in size from previous imaging, decreased hepatic and perihepatic hematomas, similar R hepatic lobe hypodensities (unclear if infarct vs small collections). Hemoglobin hovering in 7s, transfused 10/30 for hgb 7.0 with appropriate response (8.3 10/31 AM, 9.1 11/3 AM). CXR 11/2 with continued worsening of R pleural effusion and compressive atelectasis, pulmonology consulted, plan for thoracentesis 11/4-11/6, exact date pending availability. Cardiology following, per note 11/3 tachycardia attributable to anemia and pleural effusion +/- sepsis. S/p R thoracentesis 11/6     Plan  - AC/DVT/Antiplatelet: Apixaban 5mg BID  - Diet: Sips/Chips + TPN.   - Pain: APAP, hydromorphone PRN.   - Continue scopolamine and lorazepam for nausea control, current regimen appears to be working well.   - Hematology consulted for CARMELA   - Wound care: Continue to evaluate ostomy daily.   - PT/OT consult appreciated, home with home PT.  - Dispo planning, tachycardia workup  - Please page [phone] w/ any questions

## 2023-11-07 NOTE — BH CONSULTATION LIAISON ASSESSMENT NOTE - CURRENT MEDICATION
MEDICATIONS  (STANDING):  apixaban 5 milliGRAM(s) Oral every 12 hours  chlorhexidine 2% Cloths 1 Application(s) Topical daily  influenza   Vaccine 0.5 milliLiter(s) IntraMuscular once  lipid, fat emulsion (Fish Oil and Plant Based) 20% Infusion 12.5 mL/Hr (12.5 mL/Hr) IV Continuous <Continuous>  lipid, fat emulsion (Fish Oil and Plant Based) 20% Infusion 12.5 mL/Hr (12.5 mL/Hr) IV Continuous <Continuous>  pantoprazole    Tablet 40 milliGRAM(s) Oral before breakfast  Parenteral Nutrition - Adult 1 Each TPN Continuous <Continuous>  Parenteral Nutrition - Adult 1 Each TPN Continuous <Continuous>  scopolamine 1 mG/72 Hr(s) Patch 1 Patch Transdermal every 72 hours    MEDICATIONS  (PRN):  acetaminophen     Tablet .. 650 milliGRAM(s) Oral every 6 hours PRN Mild Pain (1 - 3)  HYDROmorphone   Tablet 1 milliGRAM(s) Oral every 3 hours PRN Moderate and Severe Pain  LORazepam     Tablet 0.25 milliGRAM(s) Oral every 6 hours PRN Nausea and/or Vomiting  naloxone Injectable 0.1 milliGRAM(s) IV Push every 3 minutes PRN For ANY of the following changes in patient status:  A. RR LESS THAN 10 breaths per minute, B. Oxygen saturation LESS THAN 90%, C. Sedation score of 6  ondansetron   Disintegrating Tablet 8 milliGRAM(s) Oral every 8 hours PRN Nausea  sodium chloride 0.9% lock flush 10 milliLiter(s) IV Push every 1 hour PRN Pre/post blood products, medications, blood draw, and to maintain line patency

## 2023-11-07 NOTE — BH CONSULTATION LIAISON ASSESSMENT NOTE - NSBHCONSULTSUBST_PSY_A_CORE
SUMMARY (A/P):    76-year-old lady with pancreatic head mass which is FDG avid on PET scan, measures 2.7 cm, associated with pancreatic duct dilation, and highly suspicious for malignancy.  I have ordered CA-19-9 level for further work-up.  She is scheduled for endoscopic ultrasound tomorrow.  I discussed with her and her  at length the nature of the findings and treatment options should malignancy be confirmed.  She is realistic about the diagnosis and prognosis associated with this and feels that she will likely not pursue any further treatment if we confirm malignancy.      CC:    Abnormal CT scan    HPI:    76-year-old lady presented with several week history of dry hacking cough that led to chest x-ray on 8/21/2019 which showed a subtle nodule at the left lung base leading to a CT scan of the chest on 8/22/2019.  This demonstrated small pleural-based nodule left upper lobe for which six-month follow-up CT recommended.  There was prominence of the pancreatic duct leading to recommendation for further imaging.  PET scan was then performed on 9/3/2019 which demonstrated an FDG avid masslike enlargement of the pancreatic head with dilation of the main pancreatic duct most concerning for primary pancreatic malignancy.  Subsequent CT abdomen pelvis demonstrated 2.7 cm pancreatic head mass with significant pancreatic ductal dilation.  No pathologically enlarged nodes or evidence for liver metastasis.  No symptoms associated with the pancreatic findings, specifically no abdominal pain, nausea, vomiting, or unexpected weight loss.    PSH:    -Laparoscopic ventral hernia repair 5/22/2018  -Colonoscopy 2017  -Left total knee replacement 2016  -Left carotid endarterectomy 2006  -Left oophorectomy at age 6 through lower midline incision    PMH:    -Adenomatous colon polyps  -COPD with home oxygen at night  -Gastroesophageal reflux disease  -Hypertension  -Rheumatoid arthritis    FAMILY HISTORY:    Negative for  colorectal cancer  Negative for pancreatic cancer    SOCIAL HISTORY:   Quit tobacco use 1993  Denies alcohol use    ALLERGIES: reviewed, in Epic    MEDICATIONS: reviewed, in Epic    ROS:  No chest pain or shortness of air.  All other systems reviewed and negative other than presenting complaints.    RADIOLOGY/ENDOSCOPY:    See above.  I reviewed the images from the CT abdomen and pelvis and concur with the high probability of this representing adenocarcinoma of the pancreatic head    LABS:    -CMP 8/5/2019 was basically normal with normal LFTs  -CBC 8/5/2019 basically normal    PHYSICAL EXAM:   Constitutional: Well-developed well-nourished, no acute distress  Vital signs: HR 73, weight 127 pounds, height 64 inches, BMI 21.8  Eyes: Conjunctiva normal, sclera nonicteric  ENMT: Hearing grossly normal, oral mucosa moist  Neck: Supple, no palpable mass, trachea midline  Respiratory: Clear to auscultation, normal inspiratory effort  Cardiovascular: Regular rate, no murmur, no carotid bruit, no peripheral edema, no jugular venous distention  Gastrointestinal: Soft, nontender, no palpable mass, no hepatosplenomegaly, negative for hernia, bowel sounds normal.  Lower midline scar from removal of ovary at age 6 and scars from ventral hernia in that region  Lymphatics (palpable nodes):  cervical-negative, axillary-negative  Skin:  Warm, dry, no rash on visualized skin surfaces  Musculoskeletal: Symmetric strength, normal gait  Psychiatric: Alert and oriented ×3, normal affect     DIANE BREWER M.D.       no

## 2023-11-07 NOTE — PROGRESS NOTE ADULT - TIME BILLING
Medical management as above, review of results/records, discussion with patient and primary team.  Encounter time excludes time spent teaching resident/fellow.
Medical management as above, review of results/records, discussion with patient and primary team.  Encounter time excludes time spent teaching resident/fellow.
Time spent for extensive review of the physical chart, electronic medical record, and documentation to obtain collateral information including but not limited to:  [ x] Inpatient records (ED, H&P, primary team, and consultants if applicable, care coordination)  [x ] Inpatient values/results (biomarkers, immunoassays, imaging, and microbiology results)  [ x] Current or proposed treatment plans  [x ] Discussion with the primary team  [ x] Discussion with the patient, surrogate decision maker, or family    Time spent: >52min

## 2023-11-07 NOTE — PROGRESS NOTE ADULT - SUBJECTIVE AND OBJECTIVE BOX
TEAM [ D ] Surgery Daily Progress Note  =====================================================  INTERVAL EVENTS: S/p R thoracentesis with 600 cc SS output 11/6    SUBJECTIVE: Patient seen and examined at bedside on AM rounds. Patient reports that they're feeling well with mild pain at thoracentesis site. Denies fever, chills, N/V, chest pain, SOB    ALLERGIES:  penicillin (Rash)      --------------------------------------------------------------------------------------    MEDICATIONS:    Neurologic Medications  acetaminophen     Tablet .. 650 milliGRAM(s) Oral every 6 hours PRN Mild Pain (1 - 3)  HYDROmorphone   Tablet 1 milliGRAM(s) Oral every 3 hours PRN Moderate and Severe Pain  LORazepam     Tablet 0.25 milliGRAM(s) Oral every 6 hours PRN Nausea and/or Vomiting  ondansetron   Disintegrating Tablet 8 milliGRAM(s) Oral every 8 hours PRN Nausea  scopolamine 1 mG/72 Hr(s) Patch 1 Patch Transdermal every 72 hours    Respiratory Medications    Cardiovascular Medications    Gastrointestinal Medications  lipid, fat emulsion (Fish Oil and Plant Based) 20% Infusion 12.5 mL/Hr IV Continuous <Continuous>  pantoprazole    Tablet 40 milliGRAM(s) Oral before breakfast  Parenteral Nutrition - Adult 1 Each TPN Continuous <Continuous>  sodium chloride 0.9% lock flush 10 milliLiter(s) IV Push every 1 hour PRN Pre/post blood products, medications, blood draw, and to maintain line patency    Genitourinary Medications    Hematologic/Oncologic Medications  influenza   Vaccine 0.5 milliLiter(s) IntraMuscular once    Antimicrobial/Immunologic Medications    Endocrine/Metabolic Medications    Topical/Other Medications  chlorhexidine 2% Cloths 1 Application(s) Topical daily  naloxone Injectable 0.1 milliGRAM(s) IV Push every 3 minutes PRN For ANY of the following changes in patient status:  A. RR LESS THAN 10 breaths per minute, B. Oxygen saturation LESS THAN 90%, C. Sedation score of 6    --------------------------------------------------------------------------------------    VITAL SIGNS:  T(C): 36.4 (11-07-23 @ 05:36), Max: 37.4 (11-07-23 @ 00:32)  HR: 106 (11-07-23 @ 05:36) (106 - 114)  BP: 119/71 (11-07-23 @ 05:36) (105/66 - 124/72)  RR: 18 (11-07-23 @ 05:36) (18 - 18)  SpO2: 99% (11-07-23 @ 05:36) (97% - 99%)  --------------------------------------------------------------------------------------    INS AND OUTS:    11-06-23 @ 07:01  -  11-07-23 @ 07:00  --------------------------------------------------------  IN: 1955 mL / OUT: 450 mL / NET: 1505 mL      --------------------------------------------------------------------------------------      EXAM    General: NAD, resting in bed comfortably.  Cardiac: regular rate, warm and well perfused  Respiratory: Nonlabored respirations, normal cw expansion.  Abdomen: soft, nontender, nondistended, surgical incision c/d/i, ileostomy with stool and gas  Extremities: normal strength, FROM, no deformities    --------------------------------------------------------------------------------------    LABS      Culture - Fungal, Body Fluid (collected 11-06-23 @ 18:28)  Source: Pleural Fl Pleural Fluid  Preliminary Report (11-07-23 @ 06:54):    Testing in progress    Culture - Body Fluid with Gram Stain (collected 11-06-23 @ 18:28)  Source: .Body Fluid Thoracentesis Fluid  Gram Stain (11-06-23 @ 22:22):    No polymorphonuclear cells seen per low power field    No organisms seen per oil power field                        9.2    3.88  )-----------( 290      ( 06 Nov 2023 05:44 )             28.7       11-06    143  |  110<H>  |  32<H>  ----------------------------<  164<H>  4.1   |  22  |  0.40<L>    Ca    8.8      06 Nov 2023 05:44  Phos  3.1     11-06  Mg     2.10     11-06    TPro  7.0  /  Alb  2.9<L>  /  TBili  0.4  /  DBili  x   /  AST  81<H>  /  ALT  110<H>  /  AlkPhos  275<H>  11-06

## 2023-11-07 NOTE — PROGRESS NOTE ADULT - ATTENDING COMMENTS
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 Found to have L IJ and innominate vein acute non-occlusive thrombosis. Consulted for increased right sided pleural effusion.     Now s/p thoracentesis with 600cc of fluid removed. Fluid studies show exudative effusion. Will need to follow up on cytology to determine if this is malignant. No further intervention from a pulmonary stand point until cytology returns.
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 Found to have L IJ and innominate vein acute non-occlusive thrombosis. Consulted for increased right sided pleural effusion.   Currently patient is minimally symptomatic from a pulmonary standpoint, abdominal pain is her main issue, with significant splinting.  Last dose of Eliquis the morning of 11/2  POCUS with large right pleural effusion.  Plan:  - Undergoing thoracentesis today.
58F appendiceal adenocarcinoma s/p appendectomy, cholecystectomy, hysterectomy, omenectomy, peritenectomy, LAR with diverting loop ileostomy and bladder injury with primary repair on 10/4 who presents with retching productive of "spit up" since 6/2023, when she started chemo for her malignancy. GI is consulted for this "spit up" and resultant poor PO intake.    Reports retching (feels the motion starting in her diaphragm) and bringing up saliva and now green fluid. This is worsened with talking, strong scents, and eating. When she eats she will have "spitting up" episodes 30-60 minutes later. She never brings up the food she ate, but will bring up green fluid that looks like bile. This is worse with larger meals. She feels this is overall "worse" since June because now the fluid she brings up is green when previously it was clear, and it happens more frequently. She attributes the worsening to intraperitoneal chemo she believes she received during her operation. No dysphagia, nausea, early satiety or valente vomiting. Has a history of GERD but heartburn, not regurgitation. She had a normal endoscopy with Dr. Guerra in [place] when this symptom of "spit up" first began.     CBC/CMP stable, VSS. Abd soft, ttp over incisions but not otherwise. Brown stool in ostomy.    Very odd story. Suspect chemotherapy side effect vs functional vs possibly gastroparesis vs partial SBO. Do not suspect inflammatory luminal process given same symptoms as prior and has had upper endoscopy to evaluate that was unrevealing and has symptoms triggered by scents and talking which is highly atypical for a GI process. CTAP without obstruction.    - small frequent low fat meals  - continue reglan, zofran if QTc ok  - baclofen prn, discussed with patient  - daily PPI  - defer luminal eval for now, please obtain outside records
59yo F w/ h/o mucinous appendiceal adenocarcinoma s/p appendectomy, cholecystectomy, hysterectomy, omentectomy peritonectomy LAR with diverting loop ileostomy and bladder injury with primary repair on 10/4. Primary surgery was October 4th.   Her hospital cause has been complicated by a L IJ and innominate vein acute non-occlusive thrombosis, therapeutic lovenox held i/s/o expanding subcapsular liver hematomas found on CT 10/20/23. Course c/b continuous retching.   Hematology reconsulted regarding anemia management. Pt was iron deficient, can give IV iron while inpatient
Continues to experience nonbloody emesis. No preceding nausea. Requested by surgical oncology team for PEG with JT extension for nutritional support. Option of PEG/JT extension was discussed with patient. This may be technically limited because of extensive recent abdominal surgery which may preclude lack of "safe window" for PEG insertion. In addition, JT extension through GT often of limited benefit as the JT frequently gets clogged or fall back into the stomach limiting durability of this option as an optimal solution for nutritional support.
IMP:  #malignant neoplasm of appendix (dx 06/2023 s/p FOLFOX x 4 cycles- last dose 10/4) s/p appendectomy, cholecystectomy, hysterectomy, omenectomy, peritenectomy, LAR with diverting loop ileostomy and bladder injury with primary repair on 10/4  #left IJ thrombus and inominate vein acute non-occlusive thrombosis now on therapeutic lovenox  #chemo (FOLFOX) induced vomiting- improving    Recs:  - agree with plan for TPN with encouraged PO intake  - no plan for PEG-J at this time as pt is declining  - c/w IV PPI 40 mg daily and reglan 10 mg IV q8h for now  - currently on PRN Zofran but could try alternate PRN antiemetic such as compazine
58 year old female with appendiceal cancer  S/p surgery with cholecystomy and hysterectomy on 10/4  Course complicated by left IJ thrombus  Anemia due to subscapular hematoma    on 10/26, she was febrile to 101  RVP was negative and chest x ray was clear  Blood cultures are without growth on 10/26    UA had pyuria  She denies urinary symptoms    PCN allergy with hives.     Possible UTI- but less likely in the absence of urinary symptoms  I would check a CT a/p  Change antibiotics to Ceftriaxone 1 gram iv daily with flagyl 500 q 12 pending imaging and culture data  Reasonable to trial of cephalosporin with pcn allergy
I agree with the detailed interval history, physical, and plan, which I have reviewed and edited where appropriate'; also agree with notes/assessment with my team on service.  I have personally examined the patient.  I was physically present for the key portions of the evaluation and management (E/M) service provided.  I reviewed all the pertinent data.  The patient is a critical care patient with life threatening hemodynamic and metabolic instability in SICU.  The SICU team has a constant risk benefit analyzes discussion and coordinating care with the primary team and all consultants.   The patient is in SICU with the chief complaint and diagnosis mentioned in the note.   The plan will be specified in the note.  57 yo F s/p appendectomy, cholecystectomy, hysterectomy, LAR with diverting loop ileostomy in SICU for hemodynamic monitoring post-op.   General/Neuro  Exam: awake  Respiratory  Exam: clear  Cardiovascular  Exam: Regular rate   GI  Exam: Abdomen soft, Non-tender, Non-distended.   Extremities  Exam: Extremities warm  PLAN  NEUROLOGIC   - tylenol and dilaudid PRN  RESPIRATORY   - RA  -CARDIOVASCULAR   -MAP>65  GASTROINTESTINAL   - Diet: NPO  - NGT to LCWS  - Protonix  /RENAL   -LR @125cc/hr  HEMATOLOGIC  -SCDs and SQH  INFECTIOUS DISEASE  - Cipro/Flagyl  ENDOCRINE  - Monitor glucose  - ISS  Disposition: SICU  Code: FULL
Respiratory insufficiency  a.  Remains MV dependent  b.  With adequate gas exchange  c.  On propofol gtt for sedation  d.  Plan to SBT in pm    Postprocedural hypotension  a.  Remains Nash-Synephrine gtt  b.  Obtain POCUS  c.  Continue IVF resuscitation   d.  Trend lactate    Acute posthemorrhage anemia  a.  Trend Hgb  b.  Transfuse prn    At risk for malnutrition  a.  NPO at this time
I agree with the detailed interval history, physical, and plan, which I have reviewed and edited where appropriate'; also agree with notes/assessment with my team on service.  I have personally examined the patient.  I was physically present for the key portions of the evaluation and management (E/M) service provided.  I reviewed all the pertinent data.  The patient is a critical care patient with life threatening hemodynamic and metabolic instability in SICU.  The SICU team has a constant risk benefit analyzes discussion and coordinating care with the primary team and all consultants.   The patient is in SICU with the chief complaint and diagnosis mentioned in the note.   The plan will be specified in the note.  57 yo F w/ PMHx of malignant neoplasm of appendix is s/p appendectomy, cholecystectomy, hysterectomy, LAR with diverting loop ileostomy in SICU for ventilator management and hemodynamic monitoring post-op. Patient receiving vasopressor support.  General/Neuro  Exam: awake  Respiratory  Exam: clear  Cardiovascular  Exam: Regular rate   GI  Exam: Abdomen soft, Non-tender, Non-distended.   Extremities  Exam: Extremities warm  PLAN  NEUROLOGIC   - tylenol and dilaudid PRN  RESPIRATORY   - RA  -CARDIOVASCULAR   - wean-off bari   GASTROINTESTINAL   - Diet: NPO  - NGT to LCWS  - Protonix  /RENAL   -LR @125cc/hr  HEMATOLOGIC  -SCDs and SQH  INFECTIOUS DISEASE  - Cipro/Flagyl  ENDOCRINE  - Monitor glucose  - ISS  Disposition: SICU  Code: FULL
I agree with the detailed interval history, physical, and plan, which I have reviewed and edited where appropriate'; also agree with notes/assessment with my team on service.  I have personally examined the patient.  I was physically present for the key portions of the evaluation and management (E/M) service provided.  I reviewed all the pertinent data.  The patient is a critical care patient with life threatening hemodynamic and metabolic instability in SICU.  The SICU team has a constant risk benefit analyzes discussion and coordinating care with the primary team and all consultants.   The patient is in SICU with the chief complaint and diagnosis mentioned in the note.   The plan will be specified in the note.  59 yo F w/ PMHx of malignant neoplasm of appendix is s/p appendectomy, cholecystectomy, hysterectomy, LAR with diverting loop ileostomy in SICU for ventilator management and hemodynamic monitoring post-op. Patient receiving vasopressor support.  General/Neuro  Exam: awake  Respiratory  Exam: clear  Cardiovascular  Exam: Regular rate   GI  Exam: Abdomen soft, Non-tender, Non-distended.   Extremities  Exam: Extremities warm  PLAN  NEUROLOGIC   - tylenol and dilaudid PRN  RESPIRATORY   - RA  -CARDIOVASCULAR   - wean bari   GASTROINTESTINAL   - Diet: NPO  - NGT to LCWS  - Protonix  /RENAL   - IV fluids: LR @125cc/hr  HEMATOLOGIC  - Monitor H/H   -SCDs and SQH  INFECTIOUS DISEASE  - Cipro/Flagyl  ENDOCRINE  - Monitor glucose  - ISS  Disposition: SICU  Code: FULL
Patient overnight no issues. Patient s/p appendectomy, bladder repair, LAR ileostomy. Patient tolerating cld. Advance diet, early ambulation, incentive spirometer. Floor eligible. Monitor ileostomy.    I have personally interviewed when possible and examined the patient, reviewed data and laboratory tests/x-rays and all pertinent electronic images.  I was physically present for the key portions of the evaluation and management (E/M) service provided.   The SICU team has a constant risk benefit analyzes discussion with the primary team, all consultants, House Staff and PA's on all decisions.  These diagnoses are unrelated to the surgical procedure noted above.  I meet with family if needed to get further history, discuss the case and make care decisions for this patient who might not be able to participate.  Time involved in performance of separately billable procedures was not counted toward my critical care time. There is no overlap.  I spent 30 minutes ( 0800Hrs-0915Hrs in AM/ 1600Hrs-1715Hrs in PM, or other time indicated) of critical care time for the diagnoses, assessment, plan and interventions.  This time excludes time spent on separate procedures and teaching.

## 2023-11-07 NOTE — BH CONSULTATION LIAISON ASSESSMENT NOTE - NSBHCONSULTFOLLOWAFTERCARE_PSY_A_CORE FT
[facility]  [phone] [place] [address], [facility], First Floor, [place] [address]  [phone]  [email or web address]: [phone]

## 2023-11-08 ENCOUNTER — APPOINTMENT (OUTPATIENT)
Dept: CT IMAGING | Facility: IMAGING CENTER | Age: 58
End: 2023-11-08

## 2023-11-08 ENCOUNTER — OUTPATIENT (OUTPATIENT)
Dept: OUTPATIENT SERVICES | Facility: HOSPITAL | Age: 58
LOS: 1 days | Discharge: HOME | End: 2023-11-08
Payer: COMMERCIAL

## 2023-11-08 VITALS
SYSTOLIC BLOOD PRESSURE: 115 MMHG | TEMPERATURE: 99 F | RESPIRATION RATE: 18 BRPM | DIASTOLIC BLOOD PRESSURE: 70 MMHG | OXYGEN SATURATION: 99 % | HEART RATE: 101 BPM

## 2023-11-08 DIAGNOSIS — C18.1 MALIGNANT NEOPLASM OF APPENDIX: ICD-10-CM

## 2023-11-08 DIAGNOSIS — Z87.59 PERSONAL HISTORY OF OTHER COMPLICATIONS OF PREGNANCY, CHILDBIRTH AND THE PUERPERIUM: Chronic | ICD-10-CM

## 2023-11-08 DIAGNOSIS — Z98.890 OTHER SPECIFIED POSTPROCEDURAL STATES: Chronic | ICD-10-CM

## 2023-11-08 LAB
ALBUMIN SERPL ELPH-MCNC: 2.6 G/DL — LOW (ref 3.3–5)
ALBUMIN SERPL ELPH-MCNC: 2.6 G/DL — LOW (ref 3.3–5)
ALP SERPL-CCNC: 273 U/L — HIGH (ref 40–120)
ALP SERPL-CCNC: 273 U/L — HIGH (ref 40–120)
ALT FLD-CCNC: 97 U/L — HIGH (ref 4–33)
ALT FLD-CCNC: 97 U/L — HIGH (ref 4–33)
ANION GAP SERPL CALC-SCNC: 15 MMOL/L — HIGH (ref 7–14)
ANION GAP SERPL CALC-SCNC: 15 MMOL/L — HIGH (ref 7–14)
AST SERPL-CCNC: 59 U/L — HIGH (ref 4–32)
AST SERPL-CCNC: 59 U/L — HIGH (ref 4–32)
BASOPHILS # BLD AUTO: 0 K/UL — SIGNIFICANT CHANGE UP (ref 0–0.2)
BASOPHILS # BLD AUTO: 0 K/UL — SIGNIFICANT CHANGE UP (ref 0–0.2)
BASOPHILS NFR BLD AUTO: 0 % — SIGNIFICANT CHANGE UP (ref 0–2)
BASOPHILS NFR BLD AUTO: 0 % — SIGNIFICANT CHANGE UP (ref 0–2)
BILIRUB SERPL-MCNC: 0.4 MG/DL — SIGNIFICANT CHANGE UP (ref 0.2–1.2)
BILIRUB SERPL-MCNC: 0.4 MG/DL — SIGNIFICANT CHANGE UP (ref 0.2–1.2)
BUN SERPL-MCNC: 28 MG/DL — HIGH (ref 7–23)
BUN SERPL-MCNC: 28 MG/DL — HIGH (ref 7–23)
CA-I BLD-SCNC: 1.12 MMOL/L — LOW (ref 1.15–1.29)
CA-I BLD-SCNC: 1.12 MMOL/L — LOW (ref 1.15–1.29)
CALCIUM SERPL-MCNC: 9 MG/DL — SIGNIFICANT CHANGE UP (ref 8.4–10.5)
CALCIUM SERPL-MCNC: 9 MG/DL — SIGNIFICANT CHANGE UP (ref 8.4–10.5)
CHLORIDE SERPL-SCNC: 102 MMOL/L — SIGNIFICANT CHANGE UP (ref 98–107)
CHLORIDE SERPL-SCNC: 102 MMOL/L — SIGNIFICANT CHANGE UP (ref 98–107)
CO2 SERPL-SCNC: 21 MMOL/L — LOW (ref 22–31)
CO2 SERPL-SCNC: 21 MMOL/L — LOW (ref 22–31)
CREAT SERPL-MCNC: 0.37 MG/DL — LOW (ref 0.5–1.3)
CREAT SERPL-MCNC: 0.37 MG/DL — LOW (ref 0.5–1.3)
EGFR: 116 ML/MIN/1.73M2 — SIGNIFICANT CHANGE UP
EGFR: 116 ML/MIN/1.73M2 — SIGNIFICANT CHANGE UP
EOSINOPHIL # BLD AUTO: 0.04 K/UL — SIGNIFICANT CHANGE UP (ref 0–0.5)
EOSINOPHIL # BLD AUTO: 0.04 K/UL — SIGNIFICANT CHANGE UP (ref 0–0.5)
EOSINOPHIL NFR BLD AUTO: 0.9 % — SIGNIFICANT CHANGE UP (ref 0–6)
EOSINOPHIL NFR BLD AUTO: 0.9 % — SIGNIFICANT CHANGE UP (ref 0–6)
GLUCOSE BLDC GLUCOMTR-MCNC: 167 MG/DL — HIGH (ref 70–99)
GLUCOSE BLDC GLUCOMTR-MCNC: 167 MG/DL — HIGH (ref 70–99)
GLUCOSE SERPL-MCNC: 150 MG/DL — HIGH (ref 70–99)
GLUCOSE SERPL-MCNC: 150 MG/DL — HIGH (ref 70–99)
HCT VFR BLD CALC: 28.1 % — LOW (ref 34.5–45)
HCT VFR BLD CALC: 28.1 % — LOW (ref 34.5–45)
HGB BLD-MCNC: 9.2 G/DL — LOW (ref 11.5–15.5)
HGB BLD-MCNC: 9.2 G/DL — LOW (ref 11.5–15.5)
IANC: 2.56 K/UL — SIGNIFICANT CHANGE UP (ref 1.8–7.4)
IANC: 2.56 K/UL — SIGNIFICANT CHANGE UP (ref 1.8–7.4)
IMM GRANULOCYTES NFR BLD AUTO: 0.9 % — SIGNIFICANT CHANGE UP (ref 0–0.9)
IMM GRANULOCYTES NFR BLD AUTO: 0.9 % — SIGNIFICANT CHANGE UP (ref 0–0.9)
LYMPHOCYTES # BLD AUTO: 1.27 K/UL — SIGNIFICANT CHANGE UP (ref 1–3.3)
LYMPHOCYTES # BLD AUTO: 1.27 K/UL — SIGNIFICANT CHANGE UP (ref 1–3.3)
LYMPHOCYTES # BLD AUTO: 29.5 % — SIGNIFICANT CHANGE UP (ref 13–44)
LYMPHOCYTES # BLD AUTO: 29.5 % — SIGNIFICANT CHANGE UP (ref 13–44)
MAGNESIUM SERPL-MCNC: 2 MG/DL — SIGNIFICANT CHANGE UP (ref 1.6–2.6)
MAGNESIUM SERPL-MCNC: 2 MG/DL — SIGNIFICANT CHANGE UP (ref 1.6–2.6)
MCHC RBC-ENTMCNC: 29.2 PG — SIGNIFICANT CHANGE UP (ref 27–34)
MCHC RBC-ENTMCNC: 29.2 PG — SIGNIFICANT CHANGE UP (ref 27–34)
MCHC RBC-ENTMCNC: 32.7 GM/DL — SIGNIFICANT CHANGE UP (ref 32–36)
MCHC RBC-ENTMCNC: 32.7 GM/DL — SIGNIFICANT CHANGE UP (ref 32–36)
MCV RBC AUTO: 89.2 FL — SIGNIFICANT CHANGE UP (ref 80–100)
MCV RBC AUTO: 89.2 FL — SIGNIFICANT CHANGE UP (ref 80–100)
MONOCYTES # BLD AUTO: 0.4 K/UL — SIGNIFICANT CHANGE UP (ref 0–0.9)
MONOCYTES # BLD AUTO: 0.4 K/UL — SIGNIFICANT CHANGE UP (ref 0–0.9)
MONOCYTES NFR BLD AUTO: 9.3 % — SIGNIFICANT CHANGE UP (ref 2–14)
MONOCYTES NFR BLD AUTO: 9.3 % — SIGNIFICANT CHANGE UP (ref 2–14)
NEUTROPHILS # BLD AUTO: 2.56 K/UL — SIGNIFICANT CHANGE UP (ref 1.8–7.4)
NEUTROPHILS # BLD AUTO: 2.56 K/UL — SIGNIFICANT CHANGE UP (ref 1.8–7.4)
NEUTROPHILS NFR BLD AUTO: 59.4 % — SIGNIFICANT CHANGE UP (ref 43–77)
NEUTROPHILS NFR BLD AUTO: 59.4 % — SIGNIFICANT CHANGE UP (ref 43–77)
NON-GYNECOLOGICAL CYTOLOGY STUDY: SIGNIFICANT CHANGE UP
NON-GYNECOLOGICAL CYTOLOGY STUDY: SIGNIFICANT CHANGE UP
NRBC # BLD: 0 /100 WBCS — SIGNIFICANT CHANGE UP (ref 0–0)
NRBC # BLD: 0 /100 WBCS — SIGNIFICANT CHANGE UP (ref 0–0)
NRBC # FLD: 0.03 K/UL — HIGH (ref 0–0)
NRBC # FLD: 0.03 K/UL — HIGH (ref 0–0)
PHOSPHATE SERPL-MCNC: 3.4 MG/DL — SIGNIFICANT CHANGE UP (ref 2.5–4.5)
PHOSPHATE SERPL-MCNC: 3.4 MG/DL — SIGNIFICANT CHANGE UP (ref 2.5–4.5)
PLATELET # BLD AUTO: 287 K/UL — SIGNIFICANT CHANGE UP (ref 150–400)
PLATELET # BLD AUTO: 287 K/UL — SIGNIFICANT CHANGE UP (ref 150–400)
POTASSIUM SERPL-MCNC: 4 MMOL/L — SIGNIFICANT CHANGE UP (ref 3.5–5.3)
POTASSIUM SERPL-MCNC: 4 MMOL/L — SIGNIFICANT CHANGE UP (ref 3.5–5.3)
POTASSIUM SERPL-SCNC: 4 MMOL/L — SIGNIFICANT CHANGE UP (ref 3.5–5.3)
POTASSIUM SERPL-SCNC: 4 MMOL/L — SIGNIFICANT CHANGE UP (ref 3.5–5.3)
PROT SERPL-MCNC: 7 G/DL — SIGNIFICANT CHANGE UP (ref 6–8.3)
PROT SERPL-MCNC: 7 G/DL — SIGNIFICANT CHANGE UP (ref 6–8.3)
RBC # BLD: 3.15 M/UL — LOW (ref 3.8–5.2)
RBC # BLD: 3.15 M/UL — LOW (ref 3.8–5.2)
RBC # FLD: 18.4 % — HIGH (ref 10.3–14.5)
RBC # FLD: 18.4 % — HIGH (ref 10.3–14.5)
SODIUM SERPL-SCNC: 138 MMOL/L — SIGNIFICANT CHANGE UP (ref 135–145)
SODIUM SERPL-SCNC: 138 MMOL/L — SIGNIFICANT CHANGE UP (ref 135–145)
WBC # BLD: 4.31 K/UL — SIGNIFICANT CHANGE UP (ref 3.8–10.5)
WBC # BLD: 4.31 K/UL — SIGNIFICANT CHANGE UP (ref 3.8–10.5)
WBC # FLD AUTO: 4.31 K/UL — SIGNIFICANT CHANGE UP (ref 3.8–10.5)
WBC # FLD AUTO: 4.31 K/UL — SIGNIFICANT CHANGE UP (ref 3.8–10.5)

## 2023-11-08 PROCEDURE — 99232 SBSQ HOSP IP/OBS MODERATE 35: CPT

## 2023-11-08 RX ORDER — ELECTROLYTE SOLUTION,INJ
1 VIAL (ML) INTRAVENOUS
Refills: 0 | Status: DISCONTINUED | OUTPATIENT
Start: 2023-11-08 | End: 2023-11-08

## 2023-11-08 RX ORDER — I.V. FAT EMULSION 20 G/100ML
12.5 EMULSION INTRAVENOUS
Qty: 30 | Refills: 0 | Status: DISCONTINUED | OUTPATIENT
Start: 2023-11-08 | End: 2023-11-08

## 2023-11-08 RX ORDER — CALCIUM GLUCONATE 100 MG/ML
1 VIAL (ML) INTRAVENOUS ONCE
Refills: 0 | Status: COMPLETED | OUTPATIENT
Start: 2023-11-08 | End: 2023-11-08

## 2023-11-08 RX ADMIN — HYDROMORPHONE HYDROCHLORIDE 1 MILLIGRAM(S): 2 INJECTION INTRAMUSCULAR; INTRAVENOUS; SUBCUTANEOUS at 08:05

## 2023-11-08 RX ADMIN — HYDROMORPHONE HYDROCHLORIDE 1 MILLIGRAM(S): 2 INJECTION INTRAMUSCULAR; INTRAVENOUS; SUBCUTANEOUS at 08:35

## 2023-11-08 RX ADMIN — APIXABAN 5 MILLIGRAM(S): 2.5 TABLET, FILM COATED ORAL at 05:43

## 2023-11-08 RX ADMIN — HYDROMORPHONE HYDROCHLORIDE 1 MILLIGRAM(S): 2 INJECTION INTRAMUSCULAR; INTRAVENOUS; SUBCUTANEOUS at 05:08

## 2023-11-08 RX ADMIN — HYDROMORPHONE HYDROCHLORIDE 1 MILLIGRAM(S): 2 INJECTION INTRAMUSCULAR; INTRAVENOUS; SUBCUTANEOUS at 04:37

## 2023-11-08 RX ADMIN — HYDROMORPHONE HYDROCHLORIDE 1 MILLIGRAM(S): 2 INJECTION INTRAMUSCULAR; INTRAVENOUS; SUBCUTANEOUS at 12:44

## 2023-11-08 RX ADMIN — HYDROMORPHONE HYDROCHLORIDE 1 MILLIGRAM(S): 2 INJECTION INTRAMUSCULAR; INTRAVENOUS; SUBCUTANEOUS at 12:14

## 2023-11-08 RX ADMIN — HYDROMORPHONE HYDROCHLORIDE 1 MILLIGRAM(S): 2 INJECTION INTRAMUSCULAR; INTRAVENOUS; SUBCUTANEOUS at 00:38

## 2023-11-08 RX ADMIN — HYDROMORPHONE HYDROCHLORIDE 1 MILLIGRAM(S): 2 INJECTION INTRAMUSCULAR; INTRAVENOUS; SUBCUTANEOUS at 16:11

## 2023-11-08 RX ADMIN — CHLORHEXIDINE GLUCONATE 1 APPLICATION(S): 213 SOLUTION TOPICAL at 12:15

## 2023-11-08 RX ADMIN — Medication 100 GRAM(S): at 12:17

## 2023-11-08 RX ADMIN — HYDROMORPHONE HYDROCHLORIDE 1 MILLIGRAM(S): 2 INJECTION INTRAMUSCULAR; INTRAVENOUS; SUBCUTANEOUS at 15:41

## 2023-11-08 RX ADMIN — HYDROMORPHONE HYDROCHLORIDE 1 MILLIGRAM(S): 2 INJECTION INTRAMUSCULAR; INTRAVENOUS; SUBCUTANEOUS at 01:20

## 2023-11-08 RX ADMIN — PANTOPRAZOLE SODIUM 40 MILLIGRAM(S): 20 TABLET, DELAYED RELEASE ORAL at 05:43

## 2023-11-08 NOTE — PROGRESS NOTE ADULT - ASSESSMENT
tachycardia  due to anemia and sepsis and large pleural effusion   appreciate pulm consult  s/p  thoracentesis     DVT  on a/c    HTN  stable     Monitor hemoglobin, transfuse as needed.

## 2023-11-08 NOTE — PROGRESS NOTE ADULT - SUBJECTIVE AND OBJECTIVE BOX
NUTRITION NOTE  [id number] OWENSSTEWART  ===============================    Interval events - Patient was seen and examined at bedside, no acute events overnight. Patient denies chest pain, shortness of breath, nausea or vomiting at this time. Pt remains on TPN cycled over 12 hours. Plan for discharge home today with TPN.     ROS: Except as noted above, all other systems reviewed and are negative     Allergies  penicillin (Rash)    PAST MEDICAL & SURGICAL HISTORY:  History of sickle cell trait  Malignant neoplasm of appendix  History of ectopic pregnancy  H/O colonoscopy  H/O endoscopy    Vital Signs Last 24 Hrs  T(C): 37 (2023 08:00), Max: 37.2 (2023 21:50)  T(F): 98.6 (2023 08:00), Max: 99 (2023 21:50)  HR: 101 (2023 08:00) (98 - 110)  BP: 115/70 (2023 08:00) (111/66 - 134/77)  RR: 18 (2023 08:00) (18 - 18)  SpO2: 99% (2023 08:00) (99% - 100%)    MEDICATIONS  (STANDING):  apixaban 5 milliGRAM(s) Oral every 12 hours  chlorhexidine 2% Cloths 1 Application(s) Topical daily  influenza   Vaccine 0.5 milliLiter(s) IntraMuscular once  lipid, fat emulsion (Fish Oil and Plant Based) 20% Infusion 12.5 mL/Hr (12.5 mL/Hr) IV Continuous <Continuous>  pantoprazole    Tablet 40 milliGRAM(s) Oral before breakfast  Parenteral Nutrition - Adult 1 Each TPN Continuous <Continuous>  Parenteral Nutrition - Adult 1 Each TPN Continuous <Continuous>  scopolamine 1 mG/72 Hr(s) Patch 1 Patch Transdermal every 72 hours    MEDICATIONS  (PRN):  acetaminophen     Tablet .. 650 milliGRAM(s) Oral every 6 hours PRN Mild Pain (1 - 3)  HYDROmorphone   Tablet 1 milliGRAM(s) Oral every 3 hours PRN Moderate and Severe Pain  LORazepam     Tablet 0.25 milliGRAM(s) Oral every 6 hours PRN Nausea and/or Vomiting  naloxone Injectable 0.1 milliGRAM(s) IV Push every 3 minutes PRN For ANY of the following changes in patient status:  A. RR LESS THAN 10 breaths per minute, B. Oxygen saturation LESS THAN 90%, C. Sedation score of 6  ondansetron   Disintegrating Tablet 8 milliGRAM(s) Oral every 8 hours PRN Nausea  sodium chloride 0.9% lock flush 10 milliLiter(s) IV Push every 1 hour PRN Pre/post blood products, medications, blood draw, and to maintain line patency    I&O's Detail    2023 07:01  -  2023 07:00  --------------------------------------------------------  IN:    Fat Emulsion (Fish Oil &amp; Plant Based) 20% Infusion: 150 mL    Oral Fluid: 400 mL    TPN (Total Parenteral Nutrition): 1635 mL  Total IN: 2185 mL    OUT:    Blood Loss (mL): 0 mL    Ileostomy (mL): 300 mL    IV PiggyBack: 0 mL    Voided (mL): 1350 mL  Total OUT: 1650 mL    Total NET: 535 mL    POCT Blood Glucose.: 167 mg/dL (2023 05:40)  POCT Blood Glucose.: 90 mg/dL (2023 18:16)    Daily Weight in k.5 (2023 06:34)    Drug Dosing Weight  Height (cm): 154.9 (04 Oct 2023 07:27)  Weight (kg): 54.5 (2023 06:34)  BMI (kg/m2): 22.7 (2023 06:34)  BSA (m2): 1.52 (2023 06:34)    PHYSICAL EXAM:  General: NAD, resting comfortably in bed   Respiratory: Nonlabored respirations, normal cw expansion.  Abdomen: soft, nontender, nondistended  Extremities: normal strength, FROM, no deformities  PICC Site: RUE PICC site clean and day (placed on 10/24/23)    Diet: NPO and TPN/lipids (started on 10/24/23)    LABORATORY                                                   9.2    4.31  )-----------( 287      ( 2023 05:52 )             28.1       138  |  102  |  28<H>  ----------------------------<  150<H>  4.0   |  21<L>  |  0.37<L>    Ca    9.0      2023 05:52  Phos  3.4       Mg     2.00         TPro  7.0  /  Alb  2.6<L>  /  TBili  0.4  /  DBili  x   /  AST  59<H>  /  ALT  97<H>  /  AlkPhos  273<H>      LIVER FUNCTIONS - ( 2023 05:52 )  Alb: 2.6 g/dL / Pro: 7.0 g/dL / ALK PHOS: 273 U/L / ALT: 97 U/L / AST: 59 U/L / GGT: x           10-25 Chol -- LDL -- HDL -- Trig 87    CT A/P 10/28 notable for large R pleural effusion, increased in size from previous imaging, decreased hepatic and perihepatic hematomas, similar R hepatic lobe hypodensities (unclear if infarct vs small collections).     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 Patient's course c/b continuous retching and intolerance to tube feeds. Nutrition support consult called for prolonged NPO and severe protein calorie malnutrition. PICC placed and TPN started on 10/24/23. Pt is s/p R thoracentesis on 23.    continue TPN with infusion volume of 1.5L, TPN will provide 1516 kcal/day; TPN calories increased to meet nutritional needs, TPN cycled over 12 hours; lipid calories decreased in view of elevated LFTs    labs reviewed - electrolytes adjusted in TPN bag     monitor fingersticks, obtain daily weights     continue parenteral nutrition at this time, will follow up with primary team on plan, discharge plan for home with TPN today; TPN formula verbally verified with outpatient pharmacy    - Outpatient TPN via [facility] and follow up with Dr Jeffery Sullivan    [id number]  [facility] [address]  Entrance on [facility]  Phone [phone]    Please instruct patient when calling to make the appointment to schedule for the first MONDAY after discharge from hospital. Please instruct patient to explain she is on TPN when making the appointment. Check labs, CMP, electrolytes, ionized Calcium, triglycerides and fingersticks- frequency to be determined by Dr. Sullivan as outpatient.    1.  Severe protein calorie malnutrition being optimized with TPN: CHO [240] gm.  AA [100] gm. SMOF Lipids [30] gm.  2.  Hyperglycemia managed with: [0] units of regular insulin    3.  Check fluid balance daily.  Strict I/O  [ ] [ ]   4.  Daily BMP, Ionized Calcium, Magnesium and Phosphorous   5.  Triglycerides at initiation of TPN and monthly 10-25 Chol -- LDL -- HDL -- Trig 87    Nutrition Support [id number]

## 2023-11-08 NOTE — PROGRESS NOTE ADULT - NUTRITIONAL ASSESSMENT
Severe protein calorie malnutrition in acute illness/ injury; secondary to poor appetite, weight loss >5% in 1 month and/or >7.5% in 3 months, caloric Intake <50% of nutrition needs >= 5 days, temporal wasting, severe loss of muscle mass/atrophy and loss of body fat stores.     Diet, NPO:   Except Medications  With Ice Chips/Sips of Water (10-26-23 @ 09:14) [Active]      Parenteral Nutrition - Adult 1 Each TPN Continuous <Continuous>, 10-29-23 @ 17:00, , Stop order after: 1 Days  Parenteral Nutrition - Adult 1 Each TPN Continuous <Continuous>, 10-28-23 @ 17:00, , Stop order after: 1 Days        Greater than 50% of the encounter was spent counseling and/coordination of care on parenteral nutrition. 25 minutes were spent face to face with the patient.
Severe protein calorie malnutrition in acute illness/ injury; secondary to poor appetite, weight loss >5% in 1 month and/or >7.5% in 3 months, caloric Intake <50% of nutrition needs >= 5 days, temporal wasting, severe loss of muscle mass/atrophy and loss of body fat stores.    Diet, NPO:   Except Medications  With Ice Chips/Sips of Water (10-26-23 @ 09:14) [Active]    lipid, fat emulsion (Fish Oil and Plant Based) 20% Infusion 12.5 mL/Hr (12.5 mL/Hr) IV Continuous <Continuous>  Parenteral Nutrition - Adult 1 Each TPN Continuous <Continuous>, 11-08-23 @ 17:00, 17:00, Stop order after: 1 Days    **Greater than 50% of the encounter was spent counseling and/coordination of care on parenteral nutrition. 25 minutes were spent face to face with the patient.
Severe protein calorie malnutrition in acute illness/ injury; secondary to poor appetite, weight loss >5% in 1 month and/or >7.5% in 3 months, caloric Intake <50% of nutrition needs >= 5 days, temporal wasting, severe loss of muscle mass/atrophy and loss of body fat stores.    Diet, NPO:   Except Medications  With Ice Chips/Sips of Water (10-26-23 @ 09:14) [Active]    lipid, fat emulsion (Fish Oil and Plant Based) 20% Infusion 16.6 mL/Hr (16.6 mL/Hr) IV Continuous <Continuous>  Parenteral Nutrition - Adult 1 Each TPN Continuous <Continuous>, 11-03-23 @ 17:00, 17:00, Stop order after: 1 Days    **Greater than 50% of the encounter was spent counseling and/coordination of care on parenteral nutrition. 25 minutes were spent face to face with the patient.
This patient has been assessed with a concern for Malnutrition and has been determined to have a diagnosis/diagnoses of Severe protein-calorie malnutrition.    This patient is being managed with:   Diet Low Fiber-  Consistent Carbohydrate {No Snacks} (CSTCHO)  Low Fat (LOWFAT)  Supplement Feeding Modality:  Oral  Glucerna Shake Cans or Servings Per Day:  1       Frequency:  Three Times a day  Entered: Oct 13 2023  8:12AM  
This patient has been assessed with a concern for Malnutrition and has been determined to have a diagnosis/diagnoses of Severe protein-calorie malnutrition.    This patient is being managed with:   Diet Low Fiber-  Consistent Carbohydrate {No Snacks} (CSTCHO)  Supplement Feeding Modality:  Oral  Glucerna Shake Cans or Servings Per Day:  1       Frequency:  Three Times a day  Entered: Oct 10 2023  7:21AM  
This patient has been assessed with a concern for Malnutrition and has been determined to have a diagnosis/diagnoses of Severe protein-calorie malnutrition.    This patient is being managed with:   Diet NPO with Tube Feed-  Tube Feeding Modality: Nasoduodenal  Jevity 1.2 Morgan (JEVITY1.2RTH)  Total Volume for 24 Hours (mL): 240  Continuous  Starting Tube Feed Rate {mL per Hour}: 10  Until Goal Tube Feed Rate (mL per Hour): 10  Tube Feed Duration (in Hours): 24  Tube Feed Start Time: 21:00  Entered: Oct 20 2023  8:53PM  
This patient has been assessed with a concern for Malnutrition and has been determined to have a diagnosis/diagnoses of Severe protein-calorie malnutrition.    This patient is being managed with:   Diet NPO-  Except Medications  With Ice Chips/Sips of Water  Entered: Oct 20 2023  7:39AM  
This patient has been assessed with a concern for Malnutrition and has been determined to have a diagnosis/diagnoses of Severe protein-calorie malnutrition.    This patient is being managed with:   Diet NPO-  With Ice Chips/Sips of Water  Entered: Oct  6 2023 11:25AM  
This patient has been assessed with a concern for Malnutrition and has been determined to have a diagnosis/diagnoses of Severe protein-calorie malnutrition.    This patient is being managed with:   Diet Regular-  Low Fat (LOWFAT)  No Carbonated Beverages  Supplement Feeding Modality:  Oral  Ensure Enlive Cans or Servings Per Day:  1       Frequency:  Three Times a day  Entered: Oct 18 2023 10:26AM  
This patient has been assessed with a concern for Malnutrition and has been determined to have a diagnosis/diagnoses of Severe protein-calorie malnutrition.    This patient is being managed with:   Parenteral Nutrition - Adult-  Entered: Nov 2 2023  5:00PM    lipid fat emulsion (Fish Oil and Plant Based) 20% Infusion-[Known as SMOFLIPID 20% Infusion]  40 Gram(s) in IV Solution 200 milliLiter(s) infuse at 16.6 mL/Hr  Dose Rate: 16.6 mL/Hr Infuse Over: 12 Hours  Administration Instructions: Use 1.2 micron in-line filter  Entered: Nov 2 2023  5:00PM    Diet NPO-  Except Medications  With Ice Chips/Sips of Water  Entered: Oct 26 2023  9:14AM  
This patient has been assessed with a concern for Malnutrition and has been determined to have a diagnosis/diagnoses of Severe protein-calorie malnutrition.    This patient is being managed with:   Parenteral Nutrition - Adult-  Entered: Oct 26 2023  5:00PM    Diet NPO-  Except Medications  With Ice Chips/Sips of Water  Entered: Oct 26 2023  9:14AM    lipid fat emulsion (Fish Oil and Plant Based) 20% Infusion-[Known as SMOFLIPID 20% Infusion]  25 Gram(s) in IV Solution 125 milliLiter(s) infuse at 10.4 mL/Hr  Dose Rate: 10.4 mL/Hr Infuse Over: 12 Hours  Administration Instructions: Use 1.2 micron in-line filter  Entered: Oct 26 2023  8:59AM    Parenteral Nutrition - Adult-  Entered: Oct 25 2023  5:00PM  
This patient has been assessed with a concern for Malnutrition and has been determined to have a diagnosis/diagnoses of Severe protein-calorie malnutrition.    This patient is being managed with:   Parenteral Nutrition - Adult-  Entered: Oct 31 2023  5:00PM    lipid fat emulsion (Fish Oil and Plant Based) 20% Infusion-[Known as SMOFLIPID 20% Infusion]  35 Gram(s) in IV Solution 175 milliLiter(s) infuse at 14.5 mL/Hr  Dose Rate: 14.5 mL/Hr Infuse Over: 12 Hours  Administration Instructions: Use 1.2 micron in-line filter  Entered: Oct 31 2023  5:00PM    Diet NPO-  Except Medications  With Ice Chips/Sips of Water  Entered: Oct 26 2023  9:14AM  
Severe protein calorie malnutrition in acute illness/ injury; secondary to poor appetite, weight loss >5% in 1 month and/or >7.5% in 3 months, caloric Intake <50% of nutrition needs >= 5 days, temporal wasting, severe loss of muscle mass/atrophy and loss of body fat stores.    Diet, NPO:   Except Medications  With Ice Chips/Sips of Water (10-26-23 @ 09:14) [Active]    lipid, fat emulsion (Fish Oil and Plant Based) 20% Infusion 12.5 mL/Hr (12.5 mL/Hr) IV Continuous <Continuous>  Parenteral Nutrition - Adult 1 Each TPN Continuous <Continuous>, 11-05-23 @ 17:00, 17:00, Stop order after: 1 Days    **Greater than 50% of the encounter was spent counseling and/coordination of care on parenteral nutrition. 25 minutes were spent face to face with the patient.
Severe protein calorie malnutrition in acute illness/ injury; secondary to poor appetite, weight loss >5% in 1 month and/or >7.5% in 3 months, caloric Intake <50% of nutrition needs >= 5 days, temporal wasting, severe loss of muscle mass/atrophy and loss of body fat stores.    Diet, NPO:   Except Medications  With Ice Chips/Sips of Water (10-26-23 @ 09:14) [Active]    lipid, fat emulsion (Fish Oil and Plant Based) 20% Infusion 16.6 mL/Hr (16.6 mL/Hr) IV Continuous <Continuous>  Parenteral Nutrition - Adult 1 Each TPN Continuous <Continuous>, 11-02-23 @ 17:00, 17:00, Stop order after: 1 Days    **Greater than 50% of the encounter was spent counseling and/coordination of care on parenteral nutrition. 25 minutes were spent face to face with the patient.
This patient has been assessed with a concern for Malnutrition and has been determined to have a diagnosis/diagnoses of Severe protein-calorie malnutrition.    This patient is being managed with:   Parenteral Nutrition - Adult-  Entered: Nov 1 2023  5:00PM    lipid fat emulsion (Fish Oil and Plant Based) 20% Infusion-[Known as SMOFLIPID 20% Infusion]  40 Gram(s) in IV Solution 200 milliLiter(s) infuse at 16.6 mL/Hr  Dose Rate: 16.6 mL/Hr Infuse Over: 12 Hours  Administration Instructions: Use 1.2 micron in-line filter  Entered: Nov 1 2023  5:00PM    Diet NPO-  Except Medications  With Ice Chips/Sips of Water  Entered: Oct 26 2023  9:14AM  
This patient has been assessed with a concern for Malnutrition and has been determined to have a diagnosis/diagnoses of Severe protein-calorie malnutrition.    This patient is being managed with:   Parenteral Nutrition - Adult-  Entered: Nov 8 2023  5:00PM    lipid fat emulsion (Fish Oil and Plant Based) 20% Infusion-[Known as SMOFLIPID 20% Infusion]  30 Gram(s) in IV Solution 150 milliLiter(s) infuse at 12.5 mL/Hr  Dose Rate: 12.5 mL/Hr Infuse Over: 12 Hours  Administration Instructions: Use 1.2 micron in-line filter  Entered: Nov 8 2023  5:00PM    Parenteral Nutrition - Adult-  Entered: Nov 7 2023  5:00PM    Diet NPO-  Except Medications  With Ice Chips/Sips of Water  Entered: Oct 26 2023  9:14AM  
This patient has been assessed with a concern for Malnutrition and has been determined to have a diagnosis/diagnoses of Severe protein-calorie malnutrition.    This patient is being managed with:   Parenteral Nutrition - Adult-  Entered: Oct 27 2023  5:00PM    lipid fat emulsion (Fish Oil and Plant Based) 20% Infusion-[Known as SMOFLIPID 20% Infusion]  25 Gram(s) in IV Solution 125 milliLiter(s) infuse at 10.4 mL/Hr  Dose Rate: 10.4 mL/Hr Infuse Over: 12 Hours  Administration Instructions: Use 1.2 micron in-line filter  Entered: Oct 27 2023  9:54AM    Parenteral Nutrition - Adult-  Entered: Oct 26 2023  5:00PM    Diet NPO-  Except Medications  With Ice Chips/Sips of Water  Entered: Oct 26 2023  9:14AM  
Severe protein calorie malnutrition in acute illness/ injury; secondary to poor appetite, weight loss >5% in 1 month and/or >7.5% in 3 months, caloric Intake <50% of nutrition needs >= 5 days, temporal wasting, severe loss of muscle mass/atrophy and loss of body fat stores.     Diet, NPO:   Except Medications  With Ice Chips/Sips of Water (10-26-23 @ 09:14) [Active]      Parenteral Nutrition - Adult 1 Each TPN Continuous <Continuous>, 10-28-23 @ 17:00, , Stop order after: 1 Days  Parenteral Nutrition - Adult 1 Each TPN Continuous <Continuous>, 10-27-23 @ 17:00, , Stop order after: 1 Days      Greater than 50% of the encounter was spent counseling and/coordination of care on parenteral nutrition. 25 minutes were spent face to face with the patient.
Severe protein calorie malnutrition in acute illness/ injury; secondary to poor appetite, weight loss >5% in 1 month and/or >7.5% in 3 months, caloric Intake <50% of nutrition needs >= 5 days, temporal wasting, severe loss of muscle mass/atrophy and loss of body fat stores.    Diet, NPO     Parenteral Nutrition - Adult 1 Each (42 mL/Hr) TPN Continuous <Continuous>, 10-24-23 @ 17:00, 17:00, Stop order after: 1 Days    **Greater than 50% of the encounter was spent counseling and/coordination of care on parenteral nutrition. 25 minutes were spent face to face with the patient.
Severe protein calorie malnutrition in acute illness/ injury; secondary to poor appetite, weight loss >5% in 1 month and/or >7.5% in 3 months, caloric Intake <50% of nutrition needs >= 5 days, temporal wasting, severe loss of muscle mass/atrophy and loss of body fat stores.    Diet, NPO:   Except Medications  With Ice Chips/Sips of Water (10-26-23 @ 09:14) [Active]    lipid, fat emulsion (Fish Oil and Plant Based) 20% Infusion 12.5 mL/Hr (12.5 mL/Hr) IV Continuous <Continuous>  Parenteral Nutrition - Adult 1 Each TPN Continuous <Continuous>, 11-04-23 @ 17:00, 17:00, Stop order after: 1 Days    **Greater than 50% of the encounter was spent counseling and/coordination of care on parenteral nutrition. 25 minutes were spent face to face with the patient.
Severe protein calorie malnutrition in acute illness/ injury; secondary to poor appetite, weight loss >5% in 1 month and/or >7.5% in 3 months, caloric Intake <50% of nutrition needs >= 5 days, temporal wasting, severe loss of muscle mass/atrophy and loss of body fat stores.    Diet, NPO:   Except Medications  With Ice Chips/Sips of Water (10-26-23 @ 09:14) [Active]    lipid, fat emulsion (Fish Oil and Plant Based) 20% Infusion 12.5 mL/Hr (12.5 mL/Hr) IV Continuous <Continuous>  Parenteral Nutrition - Adult 1 Each TPN Continuous <Continuous>, 11-05-23 @ 17:00, 17:00, Stop order after: 1 Days    **Greater than 50% of the encounter was spent counseling and/coordination of care on parenteral nutrition. 25 minutes were spent face to face with the patient.
Severe protein calorie malnutrition in acute illness/ injury; secondary to poor appetite, weight loss >5% in 1 month and/or >7.5% in 3 months, caloric Intake <50% of nutrition needs >= 5 days, temporal wasting, severe loss of muscle mass/atrophy and loss of body fat stores.    Diet, NPO:   Except Medications  With Ice Chips/Sips of Water (10-26-23 @ 09:14) [Active]    lipid, fat emulsion (Fish Oil and Plant Based) 20% Infusion 14.5 mL/Hr (14.5 mL/Hr) IV Continuous <Continuous>  Parenteral Nutrition - Adult 1 Each TPN Continuous <Continuous>, 10-31-23 @ 17:00, 17:00, Stop order after: 1 Days    **Greater than 50% of the encounter was spent counseling and/coordination of care on parenteral nutrition. 25 minutes were spent face to face with the patient.
Severe protein calorie malnutrition in acute illness/ injury; secondary to poor appetite, weight loss >5% in 1 month and/or >7.5% in 3 months, caloric Intake <50% of nutrition needs >= 5 days, temporal wasting, severe loss of muscle mass/atrophy and loss of body fat stores.    Diet, NPO:   Except Medications  With Ice Chips/Sips of Water (10-26-23 @ 09:14) [Active]    lipid, fat emulsion (Fish Oil and Plant Based) 20% Infusion 16.6 mL/Hr (16.6 mL/Hr) IV Continuous <Continuous>  Parenteral Nutrition - Adult 1 Each TPN Continuous <Continuous>, 11-01-23 @ 17:00, 17:00, Stop order after: 1 Days    **Greater than 50% of the encounter was spent counseling and/coordination of care on parenteral nutrition. 25 minutes were spent face to face with the patient.
This patient has been assessed with a concern for Malnutrition and has been determined to have a diagnosis/diagnoses of Severe protein-calorie malnutrition.    This patient is being managed with:   Diet Low Fiber-  Consistent Carbohydrate {No Snacks} (CSTCHO)  Low Fat (LOWFAT)  Supplement Feeding Modality:  Oral  Glucerna Shake Cans or Servings Per Day:  1       Frequency:  Three Times a day  Entered: Oct 13 2023  8:12AM  
This patient has been assessed with a concern for Malnutrition and has been determined to have a diagnosis/diagnoses of Severe protein-calorie malnutrition.    This patient is being managed with:   Diet Low Fiber-  Consistent Carbohydrate {No Snacks} (CSTCHO)  Supplement Feeding Modality:  Oral  Glucerna Shake Cans or Servings Per Day:  1       Frequency:  Three Times a day  Entered: Oct 10 2023  7:21AM  
This patient has been assessed with a concern for Malnutrition and has been determined to have a diagnosis/diagnoses of Severe protein-calorie malnutrition.    This patient is being managed with:   Diet NPO with Tube Feed-  Tube Feeding Modality: Nasoduodenal  Jevity 1.2 Morgan (JEVITY1.2RTH)  Total Volume for 24 Hours (mL): 240  Continuous  Starting Tube Feed Rate {mL per Hour}: 10  Until Goal Tube Feed Rate (mL per Hour): 10  Tube Feed Duration (in Hours): 24  Tube Feed Start Time: 21:00  Entered: Oct 20 2023  8:53PM  
This patient has been assessed with a concern for Malnutrition and has been determined to have a diagnosis/diagnoses of Severe protein-calorie malnutrition.    This patient is being managed with:   Diet NPO with Tube Feed-  Tube Feeding Modality: Nasoduodenal  Jevity 1.2 Morgan (JEVITY1.2RTH)  Total Volume for 24 Hours (mL): 240  Continuous  Starting Tube Feed Rate {mL per Hour}: 10  Until Goal Tube Feed Rate (mL per Hour): 10  Tube Feed Duration (in Hours): 24  Tube Feed Start Time: 21:00  Entered: Oct 20 2023  8:53PM  
This patient has been assessed with a concern for Malnutrition and has been determined to have a diagnosis/diagnoses of Severe protein-calorie malnutrition.    This patient is being managed with:   Diet NPO with Tube Feed-  Tube Feeding Modality: Nasoduodenal  TwoCal HN (TWOCALHNRTH)  Total Volume for 24 Hours (mL): 720  Continuous  Starting Tube Feed Rate {mL per Hour}: 10  Increase Tube Feed Rate by (mL): 20     Every 2 hours  Until Goal Tube Feed Rate (mL per Hour): 30  Tube Feed Duration (in Hours): 24  Tube Feed Start Time: 10:00  Entered: Oct 23 2023  9:22AM  
This patient has been assessed with a concern for Malnutrition and has been determined to have a diagnosis/diagnoses of Severe protein-calorie malnutrition.    This patient is being managed with:   Diet NPO-  Except Medications  Entered: Oct 18 2023  5:31AM  
This patient has been assessed with a concern for Malnutrition and has been determined to have a diagnosis/diagnoses of Severe protein-calorie malnutrition.    This patient is being managed with:   Diet NPO-  With Ice Chips/Sips of Water  Entered: Oct  6 2023 11:25AM  
This patient has been assessed with a concern for Malnutrition and has been determined to have a diagnosis/diagnoses of Severe protein-calorie malnutrition.    This patient is being managed with:   Diet NPO-  With Ice Chips/Sips of Water  Entered: Oct  6 2023 11:25AM  
This patient has been assessed with a concern for Malnutrition and has been determined to have a diagnosis/diagnoses of Severe protein-calorie malnutrition.    This patient is being managed with:   Parenteral Nutrition - Adult-  Entered: Nov 4 2023  5:00PM    lipid fat emulsion (Fish Oil and Plant Based) 20% Infusion-[Known as SMOFLIPID 20% Infusion]  30 Gram(s) in IV Solution 150 milliLiter(s) infuse at 12.5 mL/Hr  Dose Rate: 12.5 mL/Hr Infuse Over: 12 Hours  Administration Instructions: Use 1.2 micron in-line filter  Entered: Nov 4 2023  5:00PM    Diet NPO-  Except Medications  With Ice Chips/Sips of Water  Entered: Oct 26 2023  9:14AM  
This patient has been assessed with a concern for Malnutrition and has been determined to have a diagnosis/diagnoses of Severe protein-calorie malnutrition.    This patient is being managed with:   Parenteral Nutrition - Adult-  Entered: Nov 5 2023  5:00PM    lipid fat emulsion (Fish Oil and Plant Based) 20% Infusion-[Known as SMOFLIPID 20% Infusion]  30 Gram(s) in IV Solution 150 milliLiter(s) infuse at 12.5 mL/Hr  Dose Rate: 12.5 mL/Hr Infuse Over: 12 Hours  Administration Instructions: Use 1.2 micron in-line filter  Entered: Nov 5 2023  5:00PM    Diet NPO-  Except Medications  With Ice Chips/Sips of Water  Entered: Oct 26 2023  9:14AM  
This patient has been assessed with a concern for Malnutrition and has been determined to have a diagnosis/diagnoses of Severe protein-calorie malnutrition.    This patient is being managed with:   Parenteral Nutrition - Adult-  Entered: Nov 6 2023  5:00PM    lipid fat emulsion (Fish Oil and Plant Based) 20% Infusion-[Known as SMOFLIPID 20% Infusion]  30 Gram(s) in IV Solution 150 milliLiter(s) infuse at 12.5 mL/Hr  Dose Rate: 12.5 mL/Hr Infuse Over: 12 Hours  Administration Instructions: Use 1.2 micron in-line filter  Entered: Nov 6 2023  5:00PM    Diet NPO-  Except Medications  With Ice Chips/Sips of Water  Entered: Oct 26 2023  9:14AM  
Severe protein calorie malnutrition in acute illness/ injury; secondary to poor appetite, weight loss >5% in 1 month and/or >7.5% in 3 months, caloric Intake <50% of nutrition needs >= 5 days, temporal wasting, severe loss of muscle mass/atrophy and loss of body fat stores.     Diet, NPO:   Except Medications  With Ice Chips/Sips of Water (10-26-23 @ 09:14) [Active]      Parenteral Nutrition - Adult 1 Each TPN Continuous <Continuous>, 10-29-23 @ 17:00, , Stop order after: 1 Days    Greater than 50% of the encounter was spent counseling and/coordination of care on parenteral nutrition. 25 minutes were spent face to face with the patient.
This patient has been assessed with a concern for Malnutrition and has been determined to have a diagnosis/diagnoses of Severe protein-calorie malnutrition.    This patient is being managed with:   Diet Low Fiber-  Consistent Carbohydrate {No Snacks} (CSTCHO)  Low Fat (LOWFAT)  Supplement Feeding Modality:  Oral  Glucerna Shake Cans or Servings Per Day:  1       Frequency:  Three Times a day  Entered: Oct 13 2023  8:12AM  
This patient has been assessed with a concern for Malnutrition and has been determined to have a diagnosis/diagnoses of Severe protein-calorie malnutrition.    This patient is being managed with:   Diet Low Fiber-  Consistent Carbohydrate {No Snacks} (CSTCHO)  Low Fat (LOWFAT)  Supplement Feeding Modality:  Oral  Glucerna Shake Cans or Servings Per Day:  1       Frequency:  Three Times a day  Entered: Oct 13 2023  8:12AM  
This patient has been assessed with a concern for Malnutrition and has been determined to have a diagnosis/diagnoses of Severe protein-calorie malnutrition.    This patient is being managed with:   Diet NPO with Tube Feed-  Tube Feeding Modality: Nasoduodenal  Jevity 1.2 Morgan (JEVITY1.2RTH)  Total Volume for 24 Hours (mL): 240  Continuous  Starting Tube Feed Rate {mL per Hour}: 10  Until Goal Tube Feed Rate (mL per Hour): 10  Tube Feed Duration (in Hours): 24  Tube Feed Start Time: 21:00  Entered: Oct 20 2023  8:53PM  
This patient has been assessed with a concern for Malnutrition and has been determined to have a diagnosis/diagnoses of Severe protein-calorie malnutrition.    This patient is being managed with:   Parenteral Nutrition - Adult-  Entered: Oct 28 2023  5:00PM    lipid fat emulsion (Fish Oil and Plant Based) 20% Infusion-[Known as SMOFLIPID 20% Infusion]  25 Gram(s) in IV Solution 125 milliLiter(s) infuse at 10.4 mL/Hr  Dose Rate: 10.4 mL/Hr Infuse Over: 12 Hours  Administration Instructions: Use 1.2 micron in-line filter  Entered: Oct 28 2023  9:41AM    Parenteral Nutrition - Adult-  Entered: Oct 27 2023  5:00PM    Diet NPO-  Except Medications  With Ice Chips/Sips of Water  Entered: Oct 26 2023  9:14AM  
This patient has been assessed with a concern for Malnutrition and has been determined to have a diagnosis/diagnoses of Severe protein-calorie malnutrition.    This patient is being managed with:   Parenteral Nutrition - Adult-  Entered: Oct 29 2023  5:00PM    lipid fat emulsion (Fish Oil and Plant Based) 20% Infusion-[Known as SMOFLIPID 20% Infusion]  25 Gram(s) in IV Solution 125 milliLiter(s) infuse at 10.4 mL/Hr  Dose Rate: 10.4 mL/Hr Infuse Over: 12 Hours  Administration Instructions: Use 1.2 micron in-line filter  Entered: Oct 29 2023  8:56AM    Parenteral Nutrition - Adult-  Entered: Oct 28 2023  5:00PM    Diet NPO-  Except Medications  With Ice Chips/Sips of Water  Entered: Oct 26 2023  9:14AM

## 2023-11-08 NOTE — PROGRESS NOTE ADULT - SUBJECTIVE AND OBJECTIVE BOX
Subjective: Patient seen and examined. No new events except as noted.     SUBJECTIVE/ROS:  nad      MEDICATIONS:  MEDICATIONS  (STANDING):  apixaban 5 milliGRAM(s) Oral every 12 hours  chlorhexidine 2% Cloths 1 Application(s) Topical daily  influenza   Vaccine 0.5 milliLiter(s) IntraMuscular once  lipid, fat emulsion (Fish Oil and Plant Based) 20% Infusion 12.5 mL/Hr (12.5 mL/Hr) IV Continuous <Continuous>  lipid, fat emulsion (Fish Oil and Plant Based) 20% Infusion 12.5 mL/Hr (12.5 mL/Hr) IV Continuous <Continuous>  pantoprazole    Tablet 40 milliGRAM(s) Oral before breakfast  Parenteral Nutrition - Adult 1 Each TPN Continuous <Continuous>  scopolamine 1 mG/72 Hr(s) Patch 1 Patch Transdermal every 72 hours      PHYSICAL EXAM:  T(C): 36.4 (11-08-23 @ 04:30), Max: 37.2 (11-07-23 @ 21:50)  HR: 104 (11-08-23 @ 04:30) (98 - 110)  BP: 111/66 (11-08-23 @ 04:30) (111/66 - 134/77)  RR: 18 (11-08-23 @ 04:30) (18 - 18)  SpO2: 100% (11-08-23 @ 04:30) (99% - 100%)  Wt(kg): --  I&O's Summary    06 Nov 2023 07:01  -  07 Nov 2023 07:00  --------------------------------------------------------  IN: 1955 mL / OUT: 450 mL / NET: 1505 mL    07 Nov 2023 07:01  -  08 Nov 2023 06:52  --------------------------------------------------------  IN: 1950 mL / OUT: 1500 mL / NET: 450 mL            JVP: Normal  Neck: supple  Lung: clear   CV: S1 S2 , Murmur:  Abd: soft  Ext: No edema  neuro: Awake / alert  Psych: flat affect  Skin: normal``    LABS/DATA:    CARDIAC MARKERS:                                9.4    4.31  )-----------( 282      ( 07 Nov 2023 05:13 )             30.1     11-07    139  |  105  |  27<H>  ----------------------------<  123<H>  4.0   |  24  |  0.45<L>    Ca    9.1      07 Nov 2023 09:53  Phos  3.2     11-07  Mg     2.10     11-07    TPro  7.3  /  Alb  3.0<L>  /  TBili  0.5  /  DBili  x   /  AST  80<H>  /  ALT  118<H>  /  AlkPhos  305<H>  11-07    proBNP:   Lipid Profile:   HgA1c:   TSH:     TELE:  EKG:

## 2023-11-08 NOTE — PROGRESS NOTE ADULT - ASSESSMENT
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 HC c/b L IJ & innominate vein non-occlusive thrombosis, tLVX previously held i/s/o expanding subcapsular liver hematomas on CT 10/20, reinstated 10/26, switched to apixaban 10/30. Pt has also persistently c/o continuous retching, PO intolerance. Became febrile and tachycardic 10/26, fever workup revealing for bacteriuria with cultures positive for >100,000 CFU Klebsiella per hpf. ID consulted, stated UTI unlikely in absence of urinary symptomatology, patient switched from cipro/metronidazole to ceftriaxone/flagyl then ceftriaxone only per ID recommendations (CTX stop date 11/6). Fever resolved on ceftriaxone, however hospitalization now ongoing for persistent tachycardia 110-120 presumed to be 2/2 anemia with possible component of insufficient pain control. CTAP 10/28 notable for large R pleural effusion, increased in size from previous imaging, decreased hepatic and perihepatic hematomas, similar R hepatic lobe hypodensities (unclear if infarct vs small collections). Hemoglobin hovering in 7s, transfused 10/30 for hgb 7.0 with appropriate response (8.3 10/31 AM, 9.1 11/3 AM). CXR 11/2 with continued worsening of R pleural effusion and compressive atelectasis, pulmonology consulted, plan for thoracentesis 11/4-11/6, exact date pending availability. Cardiology following, per note 11/3 tachycardia attributable to anemia and pleural effusion +/- sepsis. S/p R thoracentesis 11/6     Plan  - AC/DVT/Antiplatelet: Apixaban 5mg BID  - Diet: Sips/Chips + TPN.   - Pain: APAP, hydromorphone PRN.   - Continue scopolamine and lorazepam for nausea control, current regimen appears to be working well.   - Appreciate Pulm/Cardio/Psych input  - Wound care: Continue to evaluate ostomy daily.   - PT/OT consult appreciated, home with home PT.  - D/c Home today with TPN.    D Team Surgery  [phone]

## 2023-11-08 NOTE — PROGRESS NOTE ADULT - PROVIDER SPECIALTY LIST ADULT
Cardiology
Infectious Disease
Infectious Disease
Nutrition Support
Pain Medicine
Pulmonology
Pulmonology
SICU
SICU
Surgery
Cardiology
Gastroenterology
Heme/Onc
Nutrition Support
SICU
Surgery
Cardiology
Nutrition Support
SICU
SICU
Surgery
Nutrition Support
Surgery
Palliative Care

## 2023-11-08 NOTE — PROGRESS NOTE ADULT - SUBJECTIVE AND OBJECTIVE BOX
D Team Surgery Progress Note    S: Pt seen and examined with team on morning rounds. Patient feels well. Pain and nausea improving. No SOB/CP/Palpitations. Awaiting discharge home today with TPN.       Vital Signs Last 24 Hrs  T(C): 36.4 (08 Nov 2023 04:30), Max: 37.2 (07 Nov 2023 21:50)  T(F): 97.6 (08 Nov 2023 04:30), Max: 99 (07 Nov 2023 21:50)  HR: 104 (08 Nov 2023 04:30) (98 - 110)  BP: 111/66 (08 Nov 2023 04:30) (111/66 - 134/77)  BP(mean): --  RR: 18 (08 Nov 2023 04:30) (18 - 18)  SpO2: 100% (08 Nov 2023 04:30) (99% - 100%)    Parameters below as of 08 Nov 2023 04:30  Patient On (Oxygen Delivery Method): room air        I&O's Summary    07 Nov 2023 07:01  -  08 Nov 2023 07:00  --------------------------------------------------------  IN: 2185 mL / OUT: 1650 mL / NET: 535 mL      I&O's Detail    07 Nov 2023 07:01  -  08 Nov 2023 07:00  --------------------------------------------------------  IN:    Fat Emulsion (Fish Oil &amp; Plant Based) 20% Infusion: 150 mL    Oral Fluid: 400 mL    TPN (Total Parenteral Nutrition): 1635 mL  Total IN: 2185 mL    OUT:    Blood Loss (mL): 0 mL    Ileostomy (mL): 300 mL    IV PiggyBack: 0 mL    Voided (mL): 1350 mL  Total OUT: 1650 mL    Total NET: 535 mL          General: NAD, resting in bed comfortably.  Cardiac: regular rate, warm and well perfused  Respiratory: Nonlabored respirations, normal cw expansion.  Abdomen: soft, nontender, nondistended, surgical incision c/d/i, ileostomy with stool and gas  Extremities: normal strength, FROM, no deformities    MEDICATIONS  (STANDING):  apixaban 5 milliGRAM(s) Oral every 12 hours  calcium gluconate IVPB 1 Gram(s) IV Intermittent once  chlorhexidine 2% Cloths 1 Application(s) Topical daily  influenza   Vaccine 0.5 milliLiter(s) IntraMuscular once  lipid, fat emulsion (Fish Oil and Plant Based) 20% Infusion 12.5 mL/Hr (12.5 mL/Hr) IV Continuous <Continuous>  pantoprazole    Tablet 40 milliGRAM(s) Oral before breakfast  Parenteral Nutrition - Adult 1 Each TPN Continuous <Continuous>  Parenteral Nutrition - Adult 1 Each TPN Continuous <Continuous>  scopolamine 1 mG/72 Hr(s) Patch 1 Patch Transdermal every 72 hours    MEDICATIONS  (PRN):  acetaminophen     Tablet .. 650 milliGRAM(s) Oral every 6 hours PRN Mild Pain (1 - 3)  HYDROmorphone   Tablet 1 milliGRAM(s) Oral every 3 hours PRN Moderate and Severe Pain  LORazepam     Tablet 0.25 milliGRAM(s) Oral every 6 hours PRN Nausea and/or Vomiting  naloxone Injectable 0.1 milliGRAM(s) IV Push every 3 minutes PRN For ANY of the following changes in patient status:  A. RR LESS THAN 10 breaths per minute, B. Oxygen saturation LESS THAN 90%, C. Sedation score of 6  ondansetron   Disintegrating Tablet 8 milliGRAM(s) Oral every 8 hours PRN Nausea  sodium chloride 0.9% lock flush 10 milliLiter(s) IV Push every 1 hour PRN Pre/post blood products, medications, blood draw, and to maintain line patency      LABS:                        9.2    4.31  )-----------( 287      ( 08 Nov 2023 05:52 )             28.1     11-08    138  |  102  |  28<H>  ----------------------------<  150<H>  4.0   |  21<L>  |  0.37<L>    Ca    9.0      08 Nov 2023 05:52  Phos  3.4     11-08  Mg     2.00     11-08    TPro  7.0  /  Alb  2.6<L>  /  TBili  0.4  /  DBili  x   /  AST  59<H>  /  ALT  97<H>  /  AlkPhos  273<H>  11-08      Urinalysis Basic - ( 08 Nov 2023 05:52 )    Color: x / Appearance: x / SG: x / pH: x  Gluc: 150 mg/dL / Ketone: x  / Bili: x / Urobili: x   Blood: x / Protein: x / Nitrite: x   Leuk Esterase: x / RBC: x / WBC x   Sq Epi: x / Non Sq Epi: x / Bacteria: x

## 2023-11-11 LAB
CULTURE RESULTS: NO GROWTH — SIGNIFICANT CHANGE UP
CULTURE RESULTS: NO GROWTH — SIGNIFICANT CHANGE UP
SPECIMEN SOURCE: SIGNIFICANT CHANGE UP
SPECIMEN SOURCE: SIGNIFICANT CHANGE UP

## 2023-11-13 ENCOUNTER — APPOINTMENT (OUTPATIENT)
Dept: HEMATOLOGY ONCOLOGY | Facility: CLINIC | Age: 58
End: 2023-11-13
Payer: COMMERCIAL

## 2023-11-13 VITALS
RESPIRATION RATE: 21 BRPM | DIASTOLIC BLOOD PRESSURE: 70 MMHG | OXYGEN SATURATION: 96 % | TEMPERATURE: 97.6 F | WEIGHT: 119.05 LBS | HEART RATE: 118 BPM | SYSTOLIC BLOOD PRESSURE: 93 MMHG | BODY MASS INDEX: 22.1 KG/M2

## 2023-11-13 PROCEDURE — 99215 OFFICE O/P EST HI 40 MIN: CPT

## 2023-11-13 PROCEDURE — 93010 ELECTROCARDIOGRAM REPORT: CPT

## 2023-11-13 RX ORDER — LEVOFLOXACIN 500 MG/1
500 TABLET, FILM COATED ORAL DAILY
Qty: 7 | Refills: 0 | Status: DISCONTINUED | COMMUNITY
Start: 2023-07-28 | End: 2023-11-13

## 2023-11-26 RX ORDER — METRONIDAZOLE 500 MG/1
500 TABLET ORAL
Qty: 2 | Refills: 0 | Status: DISCONTINUED | COMMUNITY
Start: 2023-10-02 | End: 2023-11-26

## 2023-11-27 ENCOUNTER — APPOINTMENT (OUTPATIENT)
Dept: SURGICAL ONCOLOGY | Facility: CLINIC | Age: 58
End: 2023-11-27
Payer: COMMERCIAL

## 2023-11-27 PROCEDURE — 99024 POSTOP FOLLOW-UP VISIT: CPT

## 2023-12-01 ENCOUNTER — APPOINTMENT (OUTPATIENT)
Dept: GERIATRICS | Facility: CLINIC | Age: 58
End: 2023-12-01
Payer: COMMERCIAL

## 2023-12-01 ENCOUNTER — RESULT REVIEW (OUTPATIENT)
Age: 58
End: 2023-12-01

## 2023-12-01 VITALS
OXYGEN SATURATION: 97 % | DIASTOLIC BLOOD PRESSURE: 72 MMHG | WEIGHT: 114.42 LBS | RESPIRATION RATE: 17 BRPM | HEIGHT: 61.5 IN | BODY MASS INDEX: 21.33 KG/M2 | TEMPERATURE: 98.2 F | HEART RATE: 125 BPM | SYSTOLIC BLOOD PRESSURE: 116 MMHG

## 2023-12-01 PROCEDURE — 99205 OFFICE O/P NEW HI 60 MIN: CPT

## 2023-12-01 RX ORDER — DEXAMETHASONE 4 MG/1
4 TABLET ORAL
Qty: 15 | Refills: 0 | Status: DISCONTINUED | COMMUNITY
Start: 2023-11-15 | End: 2023-12-01

## 2023-12-06 ENCOUNTER — OUTPATIENT (OUTPATIENT)
Dept: OUTPATIENT SERVICES | Facility: HOSPITAL | Age: 58
LOS: 1 days | End: 2023-12-06
Payer: COMMERCIAL

## 2023-12-06 ENCOUNTER — APPOINTMENT (OUTPATIENT)
Dept: CT IMAGING | Facility: IMAGING CENTER | Age: 58
End: 2023-12-06
Payer: COMMERCIAL

## 2023-12-06 ENCOUNTER — NON-APPOINTMENT (OUTPATIENT)
Age: 58
End: 2023-12-06

## 2023-12-06 DIAGNOSIS — C18.1 MALIGNANT NEOPLASM OF APPENDIX: ICD-10-CM

## 2023-12-06 DIAGNOSIS — Z98.890 OTHER SPECIFIED POSTPROCEDURAL STATES: Chronic | ICD-10-CM

## 2023-12-06 DIAGNOSIS — Z87.59 PERSONAL HISTORY OF OTHER COMPLICATIONS OF PREGNANCY, CHILDBIRTH AND THE PUERPERIUM: Chronic | ICD-10-CM

## 2023-12-06 LAB
CULTURE RESULTS: SIGNIFICANT CHANGE UP
CULTURE RESULTS: SIGNIFICANT CHANGE UP
SPECIMEN SOURCE: SIGNIFICANT CHANGE UP
SPECIMEN SOURCE: SIGNIFICANT CHANGE UP

## 2023-12-06 PROCEDURE — 74177 CT ABD & PELVIS W/CONTRAST: CPT

## 2023-12-06 PROCEDURE — 74177 CT ABD & PELVIS W/CONTRAST: CPT | Mod: 26

## 2023-12-07 ENCOUNTER — OUTPATIENT (OUTPATIENT)
Dept: OUTPATIENT SERVICES | Facility: HOSPITAL | Age: 58
LOS: 1 days | End: 2023-12-07
Payer: COMMERCIAL

## 2023-12-07 VITALS
OXYGEN SATURATION: 100 % | DIASTOLIC BLOOD PRESSURE: 81 MMHG | WEIGHT: 111.11 LBS | HEIGHT: 61 IN | RESPIRATION RATE: 16 BRPM | TEMPERATURE: 98 F | HEART RATE: 80 BPM | SYSTOLIC BLOOD PRESSURE: 108 MMHG

## 2023-12-07 DIAGNOSIS — Z87.59 PERSONAL HISTORY OF OTHER COMPLICATIONS OF PREGNANCY, CHILDBIRTH AND THE PUERPERIUM: Chronic | ICD-10-CM

## 2023-12-07 DIAGNOSIS — Z90.49 ACQUIRED ABSENCE OF OTHER SPECIFIED PARTS OF DIGESTIVE TRACT: Chronic | ICD-10-CM

## 2023-12-07 DIAGNOSIS — Z01.818 ENCOUNTER FOR OTHER PREPROCEDURAL EXAMINATION: ICD-10-CM

## 2023-12-07 DIAGNOSIS — Z98.890 OTHER SPECIFIED POSTPROCEDURAL STATES: Chronic | ICD-10-CM

## 2023-12-07 DIAGNOSIS — Z95.828 PRESENCE OF OTHER VASCULAR IMPLANTS AND GRAFTS: Chronic | ICD-10-CM

## 2023-12-07 DIAGNOSIS — Z90.710 ACQUIRED ABSENCE OF BOTH CERVIX AND UTERUS: Chronic | ICD-10-CM

## 2023-12-07 DIAGNOSIS — C18.1 MALIGNANT NEOPLASM OF APPENDIX: ICD-10-CM

## 2023-12-07 LAB
ABO RH CONFIRMATION: SIGNIFICANT CHANGE UP
ABO RH CONFIRMATION: SIGNIFICANT CHANGE UP
ALBUMIN SERPL ELPH-MCNC: 3.2 G/DL — LOW (ref 3.3–5)
ALBUMIN SERPL ELPH-MCNC: 3.2 G/DL — LOW (ref 3.3–5)
ALP SERPL-CCNC: 230 U/L — HIGH (ref 40–120)
ALP SERPL-CCNC: 230 U/L — HIGH (ref 40–120)
ALT FLD-CCNC: 166 U/L — HIGH (ref 12–78)
ALT FLD-CCNC: 166 U/L — HIGH (ref 12–78)
ANION GAP SERPL CALC-SCNC: 9 MMOL/L — SIGNIFICANT CHANGE UP (ref 5–17)
ANION GAP SERPL CALC-SCNC: 9 MMOL/L — SIGNIFICANT CHANGE UP (ref 5–17)
APTT BLD: 33.3 SEC — SIGNIFICANT CHANGE UP (ref 24.5–35.6)
APTT BLD: 33.3 SEC — SIGNIFICANT CHANGE UP (ref 24.5–35.6)
AST SERPL-CCNC: 77 U/L — HIGH (ref 15–37)
AST SERPL-CCNC: 77 U/L — HIGH (ref 15–37)
BASOPHILS # BLD AUTO: 0.06 K/UL — SIGNIFICANT CHANGE UP (ref 0–0.2)
BASOPHILS # BLD AUTO: 0.06 K/UL — SIGNIFICANT CHANGE UP (ref 0–0.2)
BASOPHILS NFR BLD AUTO: 0.5 % — SIGNIFICANT CHANGE UP (ref 0–2)
BASOPHILS NFR BLD AUTO: 0.5 % — SIGNIFICANT CHANGE UP (ref 0–2)
BILIRUB SERPL-MCNC: 0.7 MG/DL — SIGNIFICANT CHANGE UP (ref 0.2–1.2)
BILIRUB SERPL-MCNC: 0.7 MG/DL — SIGNIFICANT CHANGE UP (ref 0.2–1.2)
BLD GP AB SCN SERPL QL: SIGNIFICANT CHANGE UP
BLD GP AB SCN SERPL QL: SIGNIFICANT CHANGE UP
BUN SERPL-MCNC: 46 MG/DL — HIGH (ref 7–23)
BUN SERPL-MCNC: 46 MG/DL — HIGH (ref 7–23)
CALCIUM SERPL-MCNC: 9.8 MG/DL — SIGNIFICANT CHANGE UP (ref 8.5–10.1)
CALCIUM SERPL-MCNC: 9.8 MG/DL — SIGNIFICANT CHANGE UP (ref 8.5–10.1)
CHLORIDE SERPL-SCNC: 108 MMOL/L — SIGNIFICANT CHANGE UP (ref 96–108)
CHLORIDE SERPL-SCNC: 108 MMOL/L — SIGNIFICANT CHANGE UP (ref 96–108)
CO2 SERPL-SCNC: 24 MMOL/L — SIGNIFICANT CHANGE UP (ref 22–31)
CO2 SERPL-SCNC: 24 MMOL/L — SIGNIFICANT CHANGE UP (ref 22–31)
CREAT SERPL-MCNC: 0.89 MG/DL — SIGNIFICANT CHANGE UP (ref 0.5–1.3)
CREAT SERPL-MCNC: 0.89 MG/DL — SIGNIFICANT CHANGE UP (ref 0.5–1.3)
EGFR: 75 ML/MIN/1.73M2 — SIGNIFICANT CHANGE UP
EGFR: 75 ML/MIN/1.73M2 — SIGNIFICANT CHANGE UP
EOSINOPHIL # BLD AUTO: 0.12 K/UL — SIGNIFICANT CHANGE UP (ref 0–0.5)
EOSINOPHIL # BLD AUTO: 0.12 K/UL — SIGNIFICANT CHANGE UP (ref 0–0.5)
EOSINOPHIL NFR BLD AUTO: 1 % — SIGNIFICANT CHANGE UP (ref 0–6)
EOSINOPHIL NFR BLD AUTO: 1 % — SIGNIFICANT CHANGE UP (ref 0–6)
GLUCOSE SERPL-MCNC: 123 MG/DL — HIGH (ref 70–99)
GLUCOSE SERPL-MCNC: 123 MG/DL — HIGH (ref 70–99)
HCT VFR BLD CALC: 36.9 % — SIGNIFICANT CHANGE UP (ref 34.5–45)
HCT VFR BLD CALC: 36.9 % — SIGNIFICANT CHANGE UP (ref 34.5–45)
HGB BLD-MCNC: 12.2 G/DL — SIGNIFICANT CHANGE UP (ref 11.5–15.5)
HGB BLD-MCNC: 12.2 G/DL — SIGNIFICANT CHANGE UP (ref 11.5–15.5)
IMM GRANULOCYTES NFR BLD AUTO: 0.2 % — SIGNIFICANT CHANGE UP (ref 0–0.9)
IMM GRANULOCYTES NFR BLD AUTO: 0.2 % — SIGNIFICANT CHANGE UP (ref 0–0.9)
INR BLD: 1.23 RATIO — HIGH (ref 0.85–1.18)
INR BLD: 1.23 RATIO — HIGH (ref 0.85–1.18)
LYMPHOCYTES # BLD AUTO: 1.72 K/UL — SIGNIFICANT CHANGE UP (ref 1–3.3)
LYMPHOCYTES # BLD AUTO: 1.72 K/UL — SIGNIFICANT CHANGE UP (ref 1–3.3)
LYMPHOCYTES # BLD AUTO: 14.4 % — SIGNIFICANT CHANGE UP (ref 13–44)
LYMPHOCYTES # BLD AUTO: 14.4 % — SIGNIFICANT CHANGE UP (ref 13–44)
MCHC RBC-ENTMCNC: 29.4 PG — SIGNIFICANT CHANGE UP (ref 27–34)
MCHC RBC-ENTMCNC: 29.4 PG — SIGNIFICANT CHANGE UP (ref 27–34)
MCHC RBC-ENTMCNC: 33.1 GM/DL — SIGNIFICANT CHANGE UP (ref 32–36)
MCHC RBC-ENTMCNC: 33.1 GM/DL — SIGNIFICANT CHANGE UP (ref 32–36)
MCV RBC AUTO: 88.9 FL — SIGNIFICANT CHANGE UP (ref 80–100)
MCV RBC AUTO: 88.9 FL — SIGNIFICANT CHANGE UP (ref 80–100)
MONOCYTES # BLD AUTO: 0.89 K/UL — SIGNIFICANT CHANGE UP (ref 0–0.9)
MONOCYTES # BLD AUTO: 0.89 K/UL — SIGNIFICANT CHANGE UP (ref 0–0.9)
MONOCYTES NFR BLD AUTO: 7.5 % — SIGNIFICANT CHANGE UP (ref 2–14)
MONOCYTES NFR BLD AUTO: 7.5 % — SIGNIFICANT CHANGE UP (ref 2–14)
NEUTROPHILS # BLD AUTO: 9.11 K/UL — HIGH (ref 1.8–7.4)
NEUTROPHILS # BLD AUTO: 9.11 K/UL — HIGH (ref 1.8–7.4)
NEUTROPHILS NFR BLD AUTO: 76.4 % — SIGNIFICANT CHANGE UP (ref 43–77)
NEUTROPHILS NFR BLD AUTO: 76.4 % — SIGNIFICANT CHANGE UP (ref 43–77)
PLATELET # BLD AUTO: 349 K/UL — SIGNIFICANT CHANGE UP (ref 150–400)
PLATELET # BLD AUTO: 349 K/UL — SIGNIFICANT CHANGE UP (ref 150–400)
POTASSIUM SERPL-MCNC: 3.5 MMOL/L — SIGNIFICANT CHANGE UP (ref 3.5–5.3)
POTASSIUM SERPL-MCNC: 3.5 MMOL/L — SIGNIFICANT CHANGE UP (ref 3.5–5.3)
POTASSIUM SERPL-SCNC: 3.5 MMOL/L — SIGNIFICANT CHANGE UP (ref 3.5–5.3)
POTASSIUM SERPL-SCNC: 3.5 MMOL/L — SIGNIFICANT CHANGE UP (ref 3.5–5.3)
PROT SERPL-MCNC: 8.8 GM/DL — HIGH (ref 6–8.3)
PROT SERPL-MCNC: 8.8 GM/DL — HIGH (ref 6–8.3)
PROTHROM AB SERPL-ACNC: 13.8 SEC — HIGH (ref 9.5–13)
PROTHROM AB SERPL-ACNC: 13.8 SEC — HIGH (ref 9.5–13)
RBC # BLD: 4.15 M/UL — SIGNIFICANT CHANGE UP (ref 3.8–5.2)
RBC # BLD: 4.15 M/UL — SIGNIFICANT CHANGE UP (ref 3.8–5.2)
RBC # FLD: 16.3 % — HIGH (ref 10.3–14.5)
RBC # FLD: 16.3 % — HIGH (ref 10.3–14.5)
SODIUM SERPL-SCNC: 141 MMOL/L — SIGNIFICANT CHANGE UP (ref 135–145)
SODIUM SERPL-SCNC: 141 MMOL/L — SIGNIFICANT CHANGE UP (ref 135–145)
WBC # BLD: 11.92 K/UL — HIGH (ref 3.8–10.5)
WBC # BLD: 11.92 K/UL — HIGH (ref 3.8–10.5)
WBC # FLD AUTO: 11.92 K/UL — HIGH (ref 3.8–10.5)
WBC # FLD AUTO: 11.92 K/UL — HIGH (ref 3.8–10.5)

## 2023-12-07 PROCEDURE — 86850 RBC ANTIBODY SCREEN: CPT

## 2023-12-07 PROCEDURE — 80053 COMPREHEN METABOLIC PANEL: CPT

## 2023-12-07 PROCEDURE — 36415 COLL VENOUS BLD VENIPUNCTURE: CPT

## 2023-12-07 PROCEDURE — 85730 THROMBOPLASTIN TIME PARTIAL: CPT

## 2023-12-07 PROCEDURE — 85025 COMPLETE CBC W/AUTO DIFF WBC: CPT

## 2023-12-07 PROCEDURE — 85610 PROTHROMBIN TIME: CPT

## 2023-12-07 PROCEDURE — 99214 OFFICE O/P EST MOD 30 MIN: CPT | Mod: 25

## 2023-12-07 PROCEDURE — 83036 HEMOGLOBIN GLYCOSYLATED A1C: CPT

## 2023-12-07 PROCEDURE — 86900 BLOOD TYPING SEROLOGIC ABO: CPT

## 2023-12-07 PROCEDURE — 86901 BLOOD TYPING SEROLOGIC RH(D): CPT

## 2023-12-07 RX ORDER — ACETAMINOPHEN 500 MG
1 TABLET ORAL
Qty: 0 | Refills: 0 | DISCHARGE

## 2023-12-07 RX ORDER — APIXABAN 2.5 MG/1
1 TABLET, FILM COATED ORAL
Refills: 0 | DISCHARGE

## 2023-12-07 NOTE — H&P PST ADULT - HISTORY OF PRESENT ILLNESS
58 year old female with appendiceal cancer s/p appendectomy and creation of ileostomy; c/o abdominal ; takes hydromorphone for pain; she presents to PST for planned closure of ileostomy with Dr Dong and EGD with Dr Velazco   Patient developed DVT October 2023 on Eliquis ; On TPN via right PICC line

## 2023-12-07 NOTE — H&P PST ADULT - NSICDXPASTSURGICALHX_GEN_ALL_CORE_FT
PAST SURGICAL HISTORY:  H/O colectomy     H/O colonoscopy     H/O endoscopy     H/O ileostomy     H/O: hysterectomy     History of appendectomy     History of ectopic pregnancy      PAST SURGICAL HISTORY:  H/O colonoscopy     H/O endoscopy     H/O ileostomy     H/O: hysterectomy     History of appendectomy     History of ectopic pregnancy     Port-A-Cath in place

## 2023-12-07 NOTE — H&P PST ADULT - NSICDXPROCEDURE_GEN_ALL_CORE_FT
PROCEDURES:  Reversal of colostomy or ileostomy 07-Dec-2023 17:08:10  Radha Mendoza  Upper gi endoscopy 07-Dec-2023 17:08:39  Radha Mendoza

## 2023-12-07 NOTE — H&P PST ADULT - ASSESSMENT
58 year old female with appendiceal cancer s/p appendectomy and creation of ileostomy; c/o abdominal ; takes hydromorphone for pain; she presents to PST for planned closure of ileostomy with Dr Dong and EGD with Dr Velazco       Plan:  1. PST instructions given ; NPO status/  instructions to be given by ASU   2. Pt instructed to take following meds on day of surgery: omeprazole; metoclopromide ; hydromorphone ( Instructed patient to notify anesthesia if taking on day of surgery )   3. Pt instructed to take routine evening medications unless indicated   4. Stop NSAIDS ( Aspirin Alev Motrin Mobic Diclofenac), herbal supplements , MVI , Vitamin fish oil 7 days prior to surgery  unless   directed by surgeon or cardiologist;   5. Medical Optimization  with Dr Khan oncmitra  6. EZ wash instructions given   7. Labs EKG  as per surgeon request   8. Pt denies covid symptoms shortness of breath fever cough     CAPRINI SCORE [CLOT]    AGE RELATED RISK FACTORS                                                       MOBILITY RELATED FACTORS  [ x Age 41-60 years                                            (1 Point)                  [ ] Bed rest                                                        (1 Point)  [ ] Age: 61-74 years                                           (2 Points)                 [ ] Plaster cast                                                   (2 Points)  [ ] Age= 75 years                                              (3 Points)                 [ ] Bed bound for more than 72 hours                 (2 Points)    DISEASE RELATED RISK FACTORS                                               GENDER SPECIFIC FACTORS  [ ] Edema in the lower extremities                       (1 Point)                  [ ] Pregnancy                                                     (1 Point)  [ ] Varicose veins                                               (1 Point)                  [ ] Post-partum < 6 weeks                                   (1 Point)             [ ] BMI > 25 Kg/m2                                            (1 Point)                  [ ] Hormonal therapy  or oral contraception          (1 Point)                 [ ] Sepsis (in the previous month)                        (1 Point)                  [ ] History of pregnancy complications                 (1 point)  [ ] Pneumonia or serious lung disease                                               [ ] Unexplained or recurrent                     (1 Point)           (in the previous month)                               (1 Point)  [ ] Abnormal pulmonary function test                     (1 Point)                 SURGERY RELATED RISK FACTORS  [ ] Acute myocardial infarction                              (1 Point)                 [ ]  Section                                             (1 Point)  [ ] Congestive heart failure (in the previous month)  (1 Point)               [ ] Minor surgery                                                  (1 Point)   [ ] Inflammatory bowel disease                             (1 Point)                 [ ] Arthroscopic surgery                                        (2 Points)  [ ] Central venous access                                      (2 Points)                [ x General surgery lasting more than 45 minutes   (2 Points)       [ ] Stroke (in the previous month)                          (5 Points)               [ ] Elective arthroplasty                                         (5 Points)            (x) presents and past malignancy                           (2 points)                                                                                                         HEMATOLOGY RELATED FACTORS                                                 TRAUMA RELATED RISK FACTORS  [ x Prior episodes of VTE                                     (3 Points)                 [ ] Fracture of the hip, pelvis, or leg                       (5 Points)  [ ] Positive family history for VTE                         (3 Points)                 [ ] Acute spinal cord injury (in the previous month)  (5 Points)  [ ] Prothrombin 42620 A                                     (3 Points)                 [ ] Paralysis  (less than 1 month)                             (5 Points)  [ ] Factor V Leiden                                             (3 Points)                  [ ] Multiple Trauma within 1 month                        (5 Points)  [ ] Lupus anticoagulants                                     (3 Points)                                                           [ ] Anticardiolipin antibodies                               (3 Points)                                                       [ ] High homocysteine in the blood                      (3 Points)                                             [ ] Other congenital or acquired thrombophilia      (3 Points)                                                [ ] Heparin induced thrombocytopenia                  (3 Points)                                          Total Score [       8  ]    The Caprini score indicates that this patient is at high risk for a VTE event (score 6 or greater). Surgical patients in this group will benefit from both pharmacologic prophylaxis and intermittent compression devices.  The surgical team will determine the balance between VTE risk and bleeding risk, and other clinical considerations          58 year old female with appendiceal cancer s/p appendectomy and creation of ileostomy; c/o abdominal ; takes hydromorphone for pain; she presents to PST for planned closure of ileostomy with Dr Dong and EGD with Dr Velazco       Plan:  1. PST instructions given ; NPO status/  instructions to be given by ASU   2. Pt instructed to take following meds on day of surgery: omeprazole; metoclopromide ; hydromorphone ( Instructed patient to notify anesthesia if taking on day of surgery )   3. Pt instructed to take routine evening medications unless indicated   4. Stop NSAIDS ( Aspirin Alev Motrin Mobic Diclofenac), herbal supplements , MVI , Vitamin fish oil 7 days prior to surgery  unless   directed by surgeon or cardiologist;   5. Medical Optimization  with Dr Khan oncmitra  6. EZ wash instructions given   7. Labs EKG  as per surgeon request   8. Pt denies covid symptoms shortness of breath fever cough     CAPRINI SCORE [CLOT]    AGE RELATED RISK FACTORS                                                       MOBILITY RELATED FACTORS  [ x Age 41-60 years                                            (1 Point)                  [ ] Bed rest                                                        (1 Point)  [ ] Age: 61-74 years                                           (2 Points)                 [ ] Plaster cast                                                   (2 Points)  [ ] Age= 75 years                                              (3 Points)                 [ ] Bed bound for more than 72 hours                 (2 Points)    DISEASE RELATED RISK FACTORS                                               GENDER SPECIFIC FACTORS  [ ] Edema in the lower extremities                       (1 Point)                  [ ] Pregnancy                                                     (1 Point)  [ ] Varicose veins                                               (1 Point)                  [ ] Post-partum < 6 weeks                                   (1 Point)             [ ] BMI > 25 Kg/m2                                            (1 Point)                  [ ] Hormonal therapy  or oral contraception          (1 Point)                 [ ] Sepsis (in the previous month)                        (1 Point)                  [ ] History of pregnancy complications                 (1 point)  [ ] Pneumonia or serious lung disease                                               [ ] Unexplained or recurrent                     (1 Point)           (in the previous month)                               (1 Point)  [ ] Abnormal pulmonary function test                     (1 Point)                 SURGERY RELATED RISK FACTORS  [ ] Acute myocardial infarction                              (1 Point)                 [ ]  Section                                             (1 Point)  [ ] Congestive heart failure (in the previous month)  (1 Point)               [ ] Minor surgery                                                  (1 Point)   [ ] Inflammatory bowel disease                             (1 Point)                 [ ] Arthroscopic surgery                                        (2 Points)  [ ] Central venous access                                      (2 Points)                [ x General surgery lasting more than 45 minutes   (2 Points)       [ ] Stroke (in the previous month)                          (5 Points)               [ ] Elective arthroplasty                                         (5 Points)            (x) presents and past malignancy                           (2 points)                                                                                                         HEMATOLOGY RELATED FACTORS                                                 TRAUMA RELATED RISK FACTORS  [ x Prior episodes of VTE                                     (3 Points)                 [ ] Fracture of the hip, pelvis, or leg                       (5 Points)  [ ] Positive family history for VTE                         (3 Points)                 [ ] Acute spinal cord injury (in the previous month)  (5 Points)  [ ] Prothrombin 15046 A                                     (3 Points)                 [ ] Paralysis  (less than 1 month)                             (5 Points)  [ ] Factor V Leiden                                             (3 Points)                  [ ] Multiple Trauma within 1 month                        (5 Points)  [ ] Lupus anticoagulants                                     (3 Points)                                                           [ ] Anticardiolipin antibodies                               (3 Points)                                                       [ ] High homocysteine in the blood                      (3 Points)                                             [ ] Other congenital or acquired thrombophilia      (3 Points)                                                [ ] Heparin induced thrombocytopenia                  (3 Points)                                          Total Score [       8  ]    The Caprini score indicates that this patient is at high risk for a VTE event (score 6 or greater). Surgical patients in this group will benefit from both pharmacologic prophylaxis and intermittent compression devices.  The surgical team will determine the balance between VTE risk and bleeding risk, and other clinical considerations

## 2023-12-07 NOTE — H&P PST ADULT - NSICDXFAMILYHX_GEN_ALL_CORE_FT
FAMILY HISTORY:  Mother  Still living? Unknown  FH: heart disease, Age at diagnosis: Age Unknown  FH: sickle cell anemia, Age at diagnosis: Age Unknown

## 2023-12-08 DIAGNOSIS — Z01.818 ENCOUNTER FOR OTHER PREPROCEDURAL EXAMINATION: ICD-10-CM

## 2023-12-08 DIAGNOSIS — C18.1 MALIGNANT NEOPLASM OF APPENDIX: ICD-10-CM

## 2023-12-08 LAB
A1C WITH ESTIMATED AVERAGE GLUCOSE RESULT: 5.7 % — HIGH (ref 4–5.6)
A1C WITH ESTIMATED AVERAGE GLUCOSE RESULT: 5.7 % — HIGH (ref 4–5.6)
ESTIMATED AVERAGE GLUCOSE: 117 MG/DL — HIGH (ref 68–114)
ESTIMATED AVERAGE GLUCOSE: 117 MG/DL — HIGH (ref 68–114)

## 2023-12-11 ENCOUNTER — RESULT REVIEW (OUTPATIENT)
Age: 58
End: 2023-12-11

## 2023-12-11 ENCOUNTER — APPOINTMENT (OUTPATIENT)
Dept: SURGICAL ONCOLOGY | Facility: HOSPITAL | Age: 58
End: 2023-12-11

## 2023-12-11 ENCOUNTER — TRANSCRIPTION ENCOUNTER (OUTPATIENT)
Age: 58
End: 2023-12-11

## 2023-12-11 ENCOUNTER — INPATIENT (INPATIENT)
Facility: HOSPITAL | Age: 58
LOS: 2 days | Discharge: HOME HEALTH | DRG: 329 | End: 2023-12-14
Attending: SURGERY | Admitting: SURGERY
Payer: COMMERCIAL

## 2023-12-11 VITALS
DIASTOLIC BLOOD PRESSURE: 78 MMHG | RESPIRATION RATE: 16 BRPM | HEART RATE: 100 BPM | WEIGHT: 110.01 LBS | TEMPERATURE: 98 F | SYSTOLIC BLOOD PRESSURE: 133 MMHG | HEIGHT: 61 IN | OXYGEN SATURATION: 100 %

## 2023-12-11 DIAGNOSIS — C18.1 MALIGNANT NEOPLASM OF APPENDIX: ICD-10-CM

## 2023-12-11 DIAGNOSIS — Z98.890 OTHER SPECIFIED POSTPROCEDURAL STATES: Chronic | ICD-10-CM

## 2023-12-11 DIAGNOSIS — Z87.59 PERSONAL HISTORY OF OTHER COMPLICATIONS OF PREGNANCY, CHILDBIRTH AND THE PUERPERIUM: Chronic | ICD-10-CM

## 2023-12-11 DIAGNOSIS — Z90.49 ACQUIRED ABSENCE OF OTHER SPECIFIED PARTS OF DIGESTIVE TRACT: Chronic | ICD-10-CM

## 2023-12-11 DIAGNOSIS — Z90.710 ACQUIRED ABSENCE OF BOTH CERVIX AND UTERUS: Chronic | ICD-10-CM

## 2023-12-11 DIAGNOSIS — Z95.828 PRESENCE OF OTHER VASCULAR IMPLANTS AND GRAFTS: Chronic | ICD-10-CM

## 2023-12-11 PROCEDURE — 71045 X-RAY EXAM CHEST 1 VIEW: CPT | Mod: 26

## 2023-12-11 RX ORDER — APIXABAN 2.5 MG/1
1 TABLET, FILM COATED ORAL
Refills: 0 | DISCHARGE

## 2023-12-11 RX ORDER — SODIUM CHLORIDE 9 MG/ML
1000 INJECTION, SOLUTION INTRAVENOUS
Refills: 0 | Status: DISCONTINUED | OUTPATIENT
Start: 2023-12-11 | End: 2023-12-12

## 2023-12-11 RX ORDER — ACETAMINOPHEN 500 MG
750 TABLET ORAL ONCE
Refills: 0 | Status: DISCONTINUED | OUTPATIENT
Start: 2023-12-11 | End: 2023-12-14

## 2023-12-11 RX ORDER — ENOXAPARIN SODIUM 100 MG/ML
40 INJECTION SUBCUTANEOUS EVERY 24 HOURS
Refills: 0 | Status: DISCONTINUED | OUTPATIENT
Start: 2023-12-11 | End: 2023-12-14

## 2023-12-11 RX ORDER — METOCLOPRAMIDE HCL 10 MG
10 TABLET ORAL EVERY 6 HOURS
Refills: 0 | Status: DISCONTINUED | OUTPATIENT
Start: 2023-12-11 | End: 2023-12-14

## 2023-12-11 RX ORDER — METOCLOPRAMIDE HCL 10 MG
1 TABLET ORAL
Refills: 0 | DISCHARGE

## 2023-12-11 RX ORDER — HYDROMORPHONE HYDROCHLORIDE 2 MG/ML
1 INJECTION INTRAMUSCULAR; INTRAVENOUS; SUBCUTANEOUS
Refills: 0 | DISCHARGE

## 2023-12-11 RX ORDER — KETOROLAC TROMETHAMINE 30 MG/ML
15 SYRINGE (ML) INJECTION EVERY 6 HOURS
Refills: 0 | Status: DISCONTINUED | OUTPATIENT
Start: 2023-12-11 | End: 2023-12-14

## 2023-12-11 RX ORDER — ONDANSETRON 8 MG/1
4 TABLET, FILM COATED ORAL ONCE
Refills: 0 | Status: DISCONTINUED | OUTPATIENT
Start: 2023-12-11 | End: 2023-12-11

## 2023-12-11 RX ORDER — HYDROMORPHONE HYDROCHLORIDE 2 MG/ML
0.5 INJECTION INTRAMUSCULAR; INTRAVENOUS; SUBCUTANEOUS
Refills: 0 | Status: DISCONTINUED | OUTPATIENT
Start: 2023-12-11 | End: 2023-12-11

## 2023-12-11 RX ORDER — OMEPRAZOLE 10 MG/1
1 CAPSULE, DELAYED RELEASE ORAL
Qty: 0 | Refills: 0 | DISCHARGE

## 2023-12-11 RX ORDER — HYDROMORPHONE HYDROCHLORIDE 2 MG/ML
1 INJECTION INTRAMUSCULAR; INTRAVENOUS; SUBCUTANEOUS EVERY 4 HOURS
Refills: 0 | Status: DISCONTINUED | OUTPATIENT
Start: 2023-12-11 | End: 2023-12-14

## 2023-12-11 RX ORDER — PANTOPRAZOLE SODIUM 20 MG/1
40 TABLET, DELAYED RELEASE ORAL
Refills: 0 | Status: DISCONTINUED | OUTPATIENT
Start: 2023-12-11 | End: 2023-12-14

## 2023-12-11 RX ORDER — SODIUM CHLORIDE 9 MG/ML
1000 INJECTION, SOLUTION INTRAVENOUS
Refills: 0 | Status: DISCONTINUED | OUTPATIENT
Start: 2023-12-11 | End: 2023-12-11

## 2023-12-11 RX ADMIN — HYDROMORPHONE HYDROCHLORIDE 1 MILLIGRAM(S): 2 INJECTION INTRAMUSCULAR; INTRAVENOUS; SUBCUTANEOUS at 22:00

## 2023-12-11 RX ADMIN — Medication 10 MILLIGRAM(S): at 21:57

## 2023-12-11 RX ADMIN — Medication 15 MILLIGRAM(S): at 19:40

## 2023-12-11 RX ADMIN — SODIUM CHLORIDE 125 MILLILITER(S): 9 INJECTION, SOLUTION INTRAVENOUS at 16:17

## 2023-12-11 RX ADMIN — HYDROMORPHONE HYDROCHLORIDE 1 MILLIGRAM(S): 2 INJECTION INTRAMUSCULAR; INTRAVENOUS; SUBCUTANEOUS at 22:15

## 2023-12-11 RX ADMIN — SODIUM CHLORIDE 75 MILLILITER(S): 9 INJECTION, SOLUTION INTRAVENOUS at 21:59

## 2023-12-11 RX ADMIN — Medication 15 MILLIGRAM(S): at 19:26

## 2023-12-11 RX ADMIN — HYDROMORPHONE HYDROCHLORIDE 0.5 MILLIGRAM(S): 2 INJECTION INTRAMUSCULAR; INTRAVENOUS; SUBCUTANEOUS at 16:30

## 2023-12-11 RX ADMIN — ENOXAPARIN SODIUM 40 MILLIGRAM(S): 100 INJECTION SUBCUTANEOUS at 21:59

## 2023-12-11 RX ADMIN — HYDROMORPHONE HYDROCHLORIDE 0.5 MILLIGRAM(S): 2 INJECTION INTRAMUSCULAR; INTRAVENOUS; SUBCUTANEOUS at 16:16

## 2023-12-11 NOTE — PATIENT PROFILE ADULT - FALL HARM RISK - HARM RISK INTERVENTIONS
Assistance with ambulation/Assistance OOB with selected safe patient handling equipment/Communicate Risk of Fall with Harm to all staff/Discuss with provider need for PT consult/Monitor gait and stability/Provide patient with walking aids - walker, cane, crutches/Reinforce activity limits and safety measures with patient and family/Tailored Fall Risk Interventions/Visual Cue: Yellow wristband and red socks/Bed in lowest position, wheels locked, appropriate side rails in place/Call bell, personal items and telephone in reach/Instruct patient to call for assistance before getting out of bed or chair/Non-slip footwear when patient is out of bed/[place] to call system/Physically safe environment - no spills, clutter or unnecessary equipment/Purposeful Proactive Rounding/Room/bathroom lighting operational, light cord in reach Assistance with ambulation/Assistance OOB with selected safe patient handling equipment/Communicate Risk of Fall with Harm to all staff/Discuss with provider need for PT consult/Monitor gait and stability/Provide patient with walking aids - walker, cane, crutches/Reinforce activity limits and safety measures with patient and family/Tailored Fall Risk Interventions/Visual Cue: Yellow wristband and red socks/Bed in lowest position, wheels locked, appropriate side rails in place/Call bell, personal items and telephone in reach/Instruct patient to call for assistance before getting out of bed or chair/Non-slip footwear when patient is out of bed/[place] to call system/Physically safe environment - no spills, clutter or unnecessary equipment/Purposeful Proactive Rounding/Room/bathroom lighting operational, light cord in reach

## 2023-12-11 NOTE — H&P ADULT - NSHPPHYSICALEXAM_GEN_ALL_CORE
General: AOx3, appears weak, NAD  HEENT: NC/AT, normal pinnae and tragi  Chest: Normal respiratory effort, equal chest rise  Heart: RRR  Abdomen: Osotomy pink and patent, gas and liquid stool in bag. Soft, NTND  Neuro/Psych: No localized deficits. Normal speech, normal tone, normal affect  Skin: Normal, warm, no rashes, no lesions noted  Extremities: Warm, well perfused, no edema    Vitals:  T(C): 36.8 (12-11 @ 12:34), Max: 36.8 (12-11 @ 12:34)  HR: 100 (12-11 @ 12:34) (100 - 100)  BP: 133/78 (12-11 @ 12:34) (133/78 - 133/78)  RR: 16 (12-11 @ 12:34) (16 - 16)  SpO2: 100% (12-11 @ 12:34) (100% - 100%)

## 2023-12-11 NOTE — BRIEF OPERATIVE NOTE - ESTIMATED BLOOD LOSS
20
0
medical huistory obtained via telephone as per COVID protocol . physical exam to be done on DOS/patient

## 2023-12-11 NOTE — H&P ADULT - ASSESSMENT
58 year old female with hx of metastatic mucinous adenocarcinoma of the appendix, s/p HIPEC, appendectomy, cholecystectomy, hysterectomy, omentectomy, peritonectomy, LAR/DLI, and bladder injury with primary repair 10/4, presents with her diverting loop ileostomy. She underwent successful reversal. EGD performed today as well was unremarkable.    Plan:  1N admission  Pain and nausea control PRN  Routine vitals  Incentive spirometry  CLD  IV fluids  Protonix as per home med  Dietician consult for TPN reinstatement  Daily labs; replete electrolytes PRN  DVT ppx  Hold Eliquis for now for L UE DVT  Will remove wick in ileostomy site on DC  OOBC, ambulation, physical therapy as needed  Case management for dispo.    Discussed with attending. 58 year old female with hx of metastatic mucinous adenocarcinoma of the appendix, s/p HIPEC, appendectomy, cholecystectomy, hysterectomy, omentectomy, peritonectomy, LAR/DLI, and bladder injury with primary repair 10/4, presents with her diverting loop ileostomy. She underwent successful reversal. EGD performed today as well was unremarkable.    Plan:  1N admission  Pain and nausea control PRN  Routine vitals  Incentive spirometry  CLD  IV fluids  Protonix as per home med  Dietician consult for TPN reinstatement  Daily labs; replete electrolytes PRN  DVT ppx  Hold Eliquis for now for L UE DVT  Will remove wick in ileostomy site on DC  OOBC, ambulation, physical therapy as needed  Daily labs    Discussed with attending.

## 2023-12-11 NOTE — BRIEF OPERATIVE NOTE - OPERATION/FINDINGS
Unremarkable upper endoscopy.
Friable subcutaneous and fibrous tissue around ileostomy. Both small bowel loops intact and patent. Serosal tears closed with 3-0 Silk. Small bowel anastomosis with endo-EDDIE.

## 2023-12-11 NOTE — H&P ADULT - HISTORY OF PRESENT ILLNESS
58 year old female with hx of metastatic mucinous adenocarcinoma of the appendix, s/p HIPEC, appendectomy, cholecystectomy, hysterectomy, omentectomy, peritonectomy, LAR/DLI, and bladder injury with primary repair 10/4, presents with her diverting loop ileostomy. Patient here for reversal. She is on home TPN. He has been complaining of coughing and regurgitation.

## 2023-12-12 LAB
ALBUMIN SERPL ELPH-MCNC: 2.3 G/DL — LOW (ref 3.3–5)
ALP SERPL-CCNC: 153 U/L — HIGH (ref 40–120)
ALT FLD-CCNC: 114 U/L — HIGH (ref 12–78)
ANION GAP SERPL CALC-SCNC: 5 MMOL/L — SIGNIFICANT CHANGE UP (ref 5–17)
AST SERPL-CCNC: 56 U/L — HIGH (ref 15–37)
BASOPHILS # BLD AUTO: 0.02 K/UL — SIGNIFICANT CHANGE UP (ref 0–0.2)
BASOPHILS NFR BLD AUTO: 0.2 % — SIGNIFICANT CHANGE UP (ref 0–2)
BILIRUB SERPL-MCNC: 0.8 MG/DL — SIGNIFICANT CHANGE UP (ref 0.2–1.2)
BUN SERPL-MCNC: 46 MG/DL — HIGH (ref 7–23)
CALCIUM SERPL-MCNC: 8.5 MG/DL — SIGNIFICANT CHANGE UP (ref 8.5–10.1)
CHLORIDE SERPL-SCNC: 113 MMOL/L — HIGH (ref 96–108)
CO2 SERPL-SCNC: 27 MMOL/L — SIGNIFICANT CHANGE UP (ref 22–31)
CREAT SERPL-MCNC: 0.98 MG/DL — SIGNIFICANT CHANGE UP (ref 0.5–1.3)
EGFR: 66 ML/MIN/1.73M2 — SIGNIFICANT CHANGE UP
EOSINOPHIL # BLD AUTO: 0 K/UL — SIGNIFICANT CHANGE UP (ref 0–0.5)
EOSINOPHIL NFR BLD AUTO: 0 % — SIGNIFICANT CHANGE UP (ref 0–6)
GLUCOSE SERPL-MCNC: 101 MG/DL — HIGH (ref 70–99)
HCT VFR BLD CALC: 29.6 % — LOW (ref 34.5–45)
HCV AB S/CO SERPL IA: 0.08 S/CO — SIGNIFICANT CHANGE UP (ref 0–0.99)
HCV AB SERPL-IMP: SIGNIFICANT CHANGE UP
HGB BLD-MCNC: 9.8 G/DL — LOW (ref 11.5–15.5)
IMM GRANULOCYTES NFR BLD AUTO: 0.4 % — SIGNIFICANT CHANGE UP (ref 0–0.9)
LYMPHOCYTES # BLD AUTO: 1.46 K/UL — SIGNIFICANT CHANGE UP (ref 1–3.3)
LYMPHOCYTES # BLD AUTO: 12.5 % — LOW (ref 13–44)
MAGNESIUM SERPL-MCNC: 2.3 MG/DL — SIGNIFICANT CHANGE UP (ref 1.6–2.6)
MCHC RBC-ENTMCNC: 30.2 PG — SIGNIFICANT CHANGE UP (ref 27–34)
MCHC RBC-ENTMCNC: 33.1 GM/DL — SIGNIFICANT CHANGE UP (ref 32–36)
MCV RBC AUTO: 91.1 FL — SIGNIFICANT CHANGE UP (ref 80–100)
MONOCYTES # BLD AUTO: 0.9 K/UL — SIGNIFICANT CHANGE UP (ref 0–0.9)
MONOCYTES NFR BLD AUTO: 7.7 % — SIGNIFICANT CHANGE UP (ref 2–14)
NEUTROPHILS # BLD AUTO: 9.23 K/UL — HIGH (ref 1.8–7.4)
NEUTROPHILS NFR BLD AUTO: 79.2 % — HIGH (ref 43–77)
PHOSPHATE SERPL-MCNC: 3.6 MG/DL — SIGNIFICANT CHANGE UP (ref 2.5–4.5)
PLATELET # BLD AUTO: 194 K/UL — SIGNIFICANT CHANGE UP (ref 150–400)
POTASSIUM SERPL-MCNC: 3.6 MMOL/L — SIGNIFICANT CHANGE UP (ref 3.5–5.3)
POTASSIUM SERPL-SCNC: 3.6 MMOL/L — SIGNIFICANT CHANGE UP (ref 3.5–5.3)
PROT SERPL-MCNC: 6.5 GM/DL — SIGNIFICANT CHANGE UP (ref 6–8.3)
RBC # BLD: 3.25 M/UL — LOW (ref 3.8–5.2)
RBC # FLD: 16 % — HIGH (ref 10.3–14.5)
SODIUM SERPL-SCNC: 145 MMOL/L — SIGNIFICANT CHANGE UP (ref 135–145)
WBC # BLD: 11.66 K/UL — HIGH (ref 3.8–10.5)
WBC # FLD AUTO: 11.66 K/UL — HIGH (ref 3.8–10.5)

## 2023-12-12 RX ORDER — DEXTROSE MONOHYDRATE, SODIUM CHLORIDE, AND POTASSIUM CHLORIDE 50; .745; 4.5 G/1000ML; G/1000ML; G/1000ML
1000 INJECTION, SOLUTION INTRAVENOUS
Refills: 0 | Status: COMPLETED | OUTPATIENT
Start: 2023-12-12 | End: 2023-12-12

## 2023-12-12 RX ORDER — I.V. FAT EMULSION 20 G/100ML
0.9 EMULSION INTRAVENOUS
Qty: 45 | Refills: 0 | Status: DISCONTINUED | OUTPATIENT
Start: 2023-12-12 | End: 2023-12-13

## 2023-12-12 RX ORDER — ELECTROLYTE SOLUTION,INJ
1 VIAL (ML) INTRAVENOUS
Refills: 0 | Status: DISCONTINUED | OUTPATIENT
Start: 2023-12-12 | End: 2023-12-13

## 2023-12-12 RX ADMIN — Medication 15 MILLIGRAM(S): at 06:27

## 2023-12-12 RX ADMIN — HYDROMORPHONE HYDROCHLORIDE 1 MILLIGRAM(S): 2 INJECTION INTRAMUSCULAR; INTRAVENOUS; SUBCUTANEOUS at 10:02

## 2023-12-12 RX ADMIN — PANTOPRAZOLE SODIUM 40 MILLIGRAM(S): 20 TABLET, DELAYED RELEASE ORAL at 06:27

## 2023-12-12 RX ADMIN — HYDROMORPHONE HYDROCHLORIDE 1 MILLIGRAM(S): 2 INJECTION INTRAMUSCULAR; INTRAVENOUS; SUBCUTANEOUS at 20:09

## 2023-12-12 RX ADMIN — HYDROMORPHONE HYDROCHLORIDE 1 MILLIGRAM(S): 2 INJECTION INTRAMUSCULAR; INTRAVENOUS; SUBCUTANEOUS at 05:29

## 2023-12-12 RX ADMIN — Medication 15 MILLIGRAM(S): at 18:09

## 2023-12-12 RX ADMIN — Medication 15 MILLIGRAM(S): at 01:48

## 2023-12-12 RX ADMIN — Medication 15 MILLIGRAM(S): at 12:34

## 2023-12-12 RX ADMIN — DEXTROSE MONOHYDRATE, SODIUM CHLORIDE, AND POTASSIUM CHLORIDE 75 MILLILITER(S): 50; .745; 4.5 INJECTION, SOLUTION INTRAVENOUS at 12:54

## 2023-12-12 RX ADMIN — Medication 15 MILLIGRAM(S): at 13:04

## 2023-12-12 RX ADMIN — ENOXAPARIN SODIUM 40 MILLIGRAM(S): 100 INJECTION SUBCUTANEOUS at 21:39

## 2023-12-12 RX ADMIN — Medication 10 MILLIGRAM(S): at 18:09

## 2023-12-12 RX ADMIN — Medication 10 MILLIGRAM(S): at 05:29

## 2023-12-12 RX ADMIN — HYDROMORPHONE HYDROCHLORIDE 1 MILLIGRAM(S): 2 INJECTION INTRAMUSCULAR; INTRAVENOUS; SUBCUTANEOUS at 15:27

## 2023-12-12 RX ADMIN — Medication 15 MILLIGRAM(S): at 02:10

## 2023-12-12 RX ADMIN — HYDROMORPHONE HYDROCHLORIDE 1 MILLIGRAM(S): 2 INJECTION INTRAMUSCULAR; INTRAVENOUS; SUBCUTANEOUS at 05:45

## 2023-12-12 RX ADMIN — HYDROMORPHONE HYDROCHLORIDE 1 MILLIGRAM(S): 2 INJECTION INTRAMUSCULAR; INTRAVENOUS; SUBCUTANEOUS at 09:32

## 2023-12-12 RX ADMIN — HYDROMORPHONE HYDROCHLORIDE 1 MILLIGRAM(S): 2 INJECTION INTRAMUSCULAR; INTRAVENOUS; SUBCUTANEOUS at 20:39

## 2023-12-12 RX ADMIN — Medication 15 MILLIGRAM(S): at 18:39

## 2023-12-12 RX ADMIN — Medication 10 MILLIGRAM(S): at 12:34

## 2023-12-12 RX ADMIN — Medication 15 MILLIGRAM(S): at 06:47

## 2023-12-12 RX ADMIN — HYDROMORPHONE HYDROCHLORIDE 1 MILLIGRAM(S): 2 INJECTION INTRAMUSCULAR; INTRAVENOUS; SUBCUTANEOUS at 15:57

## 2023-12-12 NOTE — DIETITIAN INITIAL EVALUATION ADULT - NSFNSGIIOFT_GEN_A_CORE
I&O's Detail    11 Dec 2023 07:01  -  12 Dec 2023 07:00  --------------------------------------------------------  IN:    Lactated Ringers: 290 mL    Other (mL): 1600 mL  Total IN: 1890 mL    OUT:  Total OUT: 0 mL    Total NET: 1890 mL

## 2023-12-12 NOTE — DIETITIAN INITIAL EVALUATION ADULT - ALTERNATE MEANS OF NUTRITION
TPN Recommendations: via PICC line  Total Volume: 1600 mL x 12 hrs  100 g Amino Acids  200 g Dextrose (titrate 50-75g daily; goal rate 250g)  45 g Lipids 20%  76 mEq Sodium Chloride  20 mEq Sodium Acetate  20 mmol Sodium Phosphate  35 mEq Potassium Chloride  15 mEq Potassium Acetate  0 mmol Potassium Phosphate  0 mEq Calcium Gluconate (CAPS out of PN solution)  8 mEq Magnesium Sulfate  200 mg Thiamine  1 ml Trace Elements  10 ml MVI    Total Calories  1530  (Meets 100% of low end Estimated Energy needs and 100% Protein needs)/TPN

## 2023-12-12 NOTE — DIETITIAN INITIAL EVALUATION ADULT - LAB (SPECIFY)
daily lytes/min; consider checking B6, B12, thiamine, folate, carnitine, and copper levels as malnutrition in cause these to be deficient

## 2023-12-12 NOTE — DIETITIAN INITIAL EVALUATION ADULT - PERTINENT LABORATORY DATA
12-12    145  |  113<H>  |  46<H>  ----------------------------<  101<H>  3.6   |  27  |  0.98    Ca    8.5      12 Dec 2023 07:57  Phos  3.6     12-12  Mg     2.3     12-12    TPro  6.5  /  Alb  2.3<L>  /  TBili  0.8  /  DBili  x   /  AST  56<H>  /  ALT  114<H>  /  AlkPhos  153<H>  12-12  A1C with Estimated Average Glucose Result: 5.7 % (12-07-23 @ 16:42)  A1C with Estimated Average Glucose Result: 6.2 % (09-21-23 @ 17:30)

## 2023-12-12 NOTE — DIETITIAN INITIAL EVALUATION ADULT - PERTINENT MEDS FT
MEDICATIONS  (STANDING):  enoxaparin Injectable 40 milliGRAM(s) SubCutaneous every 24 hours  ketorolac   Injectable 15 milliGRAM(s) IV Push every 6 hours  lactated ringers. 1000 milliLiter(s) (75 mL/Hr) IV Continuous <Continuous>  metoclopramide 10 milliGRAM(s) Oral every 6 hours  pantoprazole    Tablet 40 milliGRAM(s) Oral before breakfast    MEDICATIONS  (PRN):  acetaminophen   IVPB .. 750 milliGRAM(s) IV Intermittent once PRN Mild Pain (1 - 3)  HYDROmorphone  Injectable 1 milliGRAM(s) IV Push every 4 hours PRN Severe Pain (7 - 10)

## 2023-12-12 NOTE — DIETITIAN INITIAL EVALUATION ADULT - ORAL INTAKE PTA/DIET HISTORY
Lives at home. On home TPN, formerly [facility] is home company for order. Consumes minimal PO, per pt only consumes oranges and grapefruits.  Lives at home. On home TPN, [facility] is home company for order. Consumes minimal PO, per pt only consumes oranges and grapefruits.

## 2023-12-12 NOTE — DIETITIAN INITIAL EVALUATION ADULT - ADD RECOMMEND
1) Daily weights  2) Strict I & O's  3) Daily lyte checks including magnesium and phos  4) Weekly triglycerides/LFT checks  5) POCT q6hrs; maintain 140-180mg/dL  6) ADAT as per surgery from Clear Liquids to Full Liquids to Low Fiber, when medically feasible and as tolerated

## 2023-12-12 NOTE — DIETITIAN INITIAL EVALUATION ADULT - NS FNS WEIGHT CHANGE REASON
unintentional -will clarify home meds with daughter -will clarify home meds with daughter, though patient normotensive here -will clarify home meds with daughter, though patient normotensive here  -defer any home anti-HTN given SIRS state and normotension here

## 2023-12-12 NOTE — DIETITIAN INITIAL EVALUATION ADULT - OTHER INFO
59 y/o female with hx of metastatic mucinous adenocarcinoma of the appendix, s/p HIPEC, appendectomy, cholecystectomy, hysterectomy, omentectomy, peritonectomy, LAR/DLI, and bladder injury with primary repair 10/4, presents with her diverting loop ileostomy. Patient here for reversal. She is on home TPN. She has been complaining of coughing and regurgitation.     Pt known to [facility] services, met criteria for PCM on previous admit. S/p ileostomy reversal, endorses uncomfortably s/p procedure. Currently on CLD and tolerating thus far. Receives TPN at home however did not have home order present at . [facility] stated at 166# on 10/4/23 however endorses significant wt loss since, stating current wt at 110#; 56# wt loss/ 33.73% x 2 months - severe and clinically significant. NFPE reveals moderate to severe muscle/fat wasting - appeared very thin. D/w surgical resident, plan to c/w TPN during admission. Will start lytes on low end and will adjust tomorrow based on labs. Will cycle TPN x 12 hrs as pt cycles at home. Dextrose goal @ 250g (GIR 3.5); will initiate 200g today and will titrate to goal dextrose tomorrow. See below for TPN recs.  57 y/o female with hx of metastatic mucinous adenocarcinoma of the appendix, s/p HIPEC, appendectomy, cholecystectomy, hysterectomy, omentectomy, peritonectomy, LAR/DLI, and bladder injury with primary repair 10/4, presents with her diverting loop ileostomy. Patient here for reversal. She is on home TPN. She has been complaining of coughing and regurgitation.     Pt known to [facility] services, met criteria for PCM on previous admit. S/p ileostomy reversal, endorses uncomfortably s/p procedure. Currently on CLD and tolerating thus far. Receives TPN at home however did not have home order present at . [facility] stated at 166# on 10/4/23 however endorses significant wt loss since, stating current wt at 110#; 56# wt loss/ 33.73% x 2 months - severe and clinically significant. NFPE reveals moderate to severe muscle/fat wasting - appeared very thin. D/w surgical resident, plan to c/w TPN during admission. Will start lytes on low end and will adjust tomorrow based on labs. Will cycle TPN x 12 hrs as pt cycles at home. Dextrose goal @ 250g (GIR 3.5); will initiate 200g today and will titrate to goal dextrose tomorrow. See below for TPN recs.  Home

## 2023-12-13 LAB
ALBUMIN SERPL ELPH-MCNC: 1.9 G/DL — LOW (ref 3.3–5)
ALP SERPL-CCNC: 117 U/L — SIGNIFICANT CHANGE UP (ref 40–120)
ALT FLD-CCNC: 79 U/L — HIGH (ref 12–78)
ANION GAP SERPL CALC-SCNC: 5 MMOL/L — SIGNIFICANT CHANGE UP (ref 5–17)
AST SERPL-CCNC: 38 U/L — HIGH (ref 15–37)
BASOPHILS # BLD AUTO: 0.02 K/UL — SIGNIFICANT CHANGE UP (ref 0–0.2)
BASOPHILS NFR BLD AUTO: 0.2 % — SIGNIFICANT CHANGE UP (ref 0–2)
BILIRUB SERPL-MCNC: 0.5 MG/DL — SIGNIFICANT CHANGE UP (ref 0.2–1.2)
BUN SERPL-MCNC: 31 MG/DL — HIGH (ref 7–23)
CALCIUM SERPL-MCNC: 7.6 MG/DL — LOW (ref 8.5–10.1)
CHLORIDE SERPL-SCNC: 113 MMOL/L — HIGH (ref 96–108)
CO2 SERPL-SCNC: 27 MMOL/L — SIGNIFICANT CHANGE UP (ref 22–31)
CREAT SERPL-MCNC: 0.69 MG/DL — SIGNIFICANT CHANGE UP (ref 0.5–1.3)
EGFR: 100 ML/MIN/1.73M2 — SIGNIFICANT CHANGE UP
EOSINOPHIL # BLD AUTO: 0.18 K/UL — SIGNIFICANT CHANGE UP (ref 0–0.5)
EOSINOPHIL NFR BLD AUTO: 1.9 % — SIGNIFICANT CHANGE UP (ref 0–6)
GLUCOSE BLDC GLUCOMTR-MCNC: 108 MG/DL — HIGH (ref 70–99)
GLUCOSE BLDC GLUCOMTR-MCNC: 108 MG/DL — HIGH (ref 70–99)
GLUCOSE BLDC GLUCOMTR-MCNC: 138 MG/DL — HIGH (ref 70–99)
GLUCOSE BLDC GLUCOMTR-MCNC: 153 MG/DL — HIGH (ref 70–99)
GLUCOSE BLDC GLUCOMTR-MCNC: 99 MG/DL — SIGNIFICANT CHANGE UP (ref 70–99)
GLUCOSE SERPL-MCNC: 144 MG/DL — HIGH (ref 70–99)
HCT VFR BLD CALC: 25.7 % — LOW (ref 34.5–45)
HGB BLD-MCNC: 8.5 G/DL — LOW (ref 11.5–15.5)
IMM GRANULOCYTES NFR BLD AUTO: 0.2 % — SIGNIFICANT CHANGE UP (ref 0–0.9)
LYMPHOCYTES # BLD AUTO: 1.32 K/UL — SIGNIFICANT CHANGE UP (ref 1–3.3)
LYMPHOCYTES # BLD AUTO: 14 % — SIGNIFICANT CHANGE UP (ref 13–44)
MAGNESIUM SERPL-MCNC: 2.1 MG/DL — SIGNIFICANT CHANGE UP (ref 1.6–2.6)
MCHC RBC-ENTMCNC: 29.8 PG — SIGNIFICANT CHANGE UP (ref 27–34)
MCHC RBC-ENTMCNC: 33.1 GM/DL — SIGNIFICANT CHANGE UP (ref 32–36)
MCV RBC AUTO: 90.2 FL — SIGNIFICANT CHANGE UP (ref 80–100)
MONOCYTES # BLD AUTO: 0.53 K/UL — SIGNIFICANT CHANGE UP (ref 0–0.9)
MONOCYTES NFR BLD AUTO: 5.6 % — SIGNIFICANT CHANGE UP (ref 2–14)
NEUTROPHILS # BLD AUTO: 7.33 K/UL — SIGNIFICANT CHANGE UP (ref 1.8–7.4)
NEUTROPHILS NFR BLD AUTO: 78.1 % — HIGH (ref 43–77)
PHOSPHATE SERPL-MCNC: 1.8 MG/DL — LOW (ref 2.5–4.5)
PLATELET # BLD AUTO: 201 K/UL — SIGNIFICANT CHANGE UP (ref 150–400)
POTASSIUM SERPL-MCNC: 3.5 MMOL/L — SIGNIFICANT CHANGE UP (ref 3.5–5.3)
POTASSIUM SERPL-SCNC: 3.5 MMOL/L — SIGNIFICANT CHANGE UP (ref 3.5–5.3)
PROT SERPL-MCNC: 5.7 GM/DL — LOW (ref 6–8.3)
RBC # BLD: 2.85 M/UL — LOW (ref 3.8–5.2)
RBC # FLD: 15.8 % — HIGH (ref 10.3–14.5)
SODIUM SERPL-SCNC: 145 MMOL/L — SIGNIFICANT CHANGE UP (ref 135–145)
TRIGL SERPL-MCNC: 106 MG/DL — SIGNIFICANT CHANGE UP
WBC # BLD: 9.4 K/UL — SIGNIFICANT CHANGE UP (ref 3.8–10.5)
WBC # FLD AUTO: 9.4 K/UL — SIGNIFICANT CHANGE UP (ref 3.8–10.5)

## 2023-12-13 RX ORDER — ACETAMINOPHEN WITH CODEINE 300MG-30MG
15 TABLET ORAL EVERY 6 HOURS
Refills: 0 | Status: DISCONTINUED | OUTPATIENT
Start: 2023-12-13 | End: 2023-12-13

## 2023-12-13 RX ORDER — I.V. FAT EMULSION 20 G/100ML
0.9 EMULSION INTRAVENOUS
Qty: 45 | Refills: 0 | Status: DISCONTINUED | OUTPATIENT
Start: 2023-12-13 | End: 2023-12-13

## 2023-12-13 RX ORDER — ACETAMINOPHEN WITH CODEINE 300MG-30MG
10 TABLET ORAL EVERY 6 HOURS
Refills: 0 | Status: DISCONTINUED | OUTPATIENT
Start: 2023-12-13 | End: 2023-12-13

## 2023-12-13 RX ORDER — ELECTROLYTE SOLUTION,INJ
1 VIAL (ML) INTRAVENOUS
Refills: 0 | Status: DISCONTINUED | OUTPATIENT
Start: 2023-12-13 | End: 2023-12-14

## 2023-12-13 RX ORDER — MORPHINE SULFATE 50 MG/1
10 CAPSULE, EXTENDED RELEASE ORAL EVERY 6 HOURS
Refills: 0 | Status: DISCONTINUED | OUTPATIENT
Start: 2023-12-13 | End: 2023-12-14

## 2023-12-13 RX ORDER — MORPHINE SULFATE 50 MG/1
5 CAPSULE, EXTENDED RELEASE ORAL EVERY 6 HOURS
Refills: 0 | Status: DISCONTINUED | OUTPATIENT
Start: 2023-12-13 | End: 2023-12-14

## 2023-12-13 RX ADMIN — I.V. FAT EMULSION 18.75 GM/KG/DAY: 20 EMULSION INTRAVENOUS at 00:11

## 2023-12-13 RX ADMIN — I.V. FAT EMULSION 18.75 GM/KG/DAY: 20 EMULSION INTRAVENOUS at 23:37

## 2023-12-13 RX ADMIN — ENOXAPARIN SODIUM 40 MILLIGRAM(S): 100 INJECTION SUBCUTANEOUS at 22:48

## 2023-12-13 RX ADMIN — HYDROMORPHONE HYDROCHLORIDE 1 MILLIGRAM(S): 2 INJECTION INTRAMUSCULAR; INTRAVENOUS; SUBCUTANEOUS at 18:04

## 2023-12-13 RX ADMIN — Medication 1 EACH: at 23:35

## 2023-12-13 RX ADMIN — DEXTROSE MONOHYDRATE, SODIUM CHLORIDE, AND POTASSIUM CHLORIDE 75 MILLILITER(S): 50; .745; 4.5 INJECTION, SOLUTION INTRAVENOUS at 03:57

## 2023-12-13 RX ADMIN — Medication 1 EACH: at 00:10

## 2023-12-13 RX ADMIN — Medication 15 MILLIGRAM(S): at 23:17

## 2023-12-13 RX ADMIN — Medication 15 MILLIGRAM(S): at 00:18

## 2023-12-13 RX ADMIN — Medication 10 MILLIGRAM(S): at 22:47

## 2023-12-13 RX ADMIN — Medication 10 MILLIGRAM(S): at 05:40

## 2023-12-13 RX ADMIN — Medication 15 MILLIGRAM(S): at 17:37

## 2023-12-13 RX ADMIN — HYDROMORPHONE HYDROCHLORIDE 1 MILLIGRAM(S): 2 INJECTION INTRAMUSCULAR; INTRAVENOUS; SUBCUTANEOUS at 09:05

## 2023-12-13 RX ADMIN — Medication 10 MILLIGRAM(S): at 11:42

## 2023-12-13 RX ADMIN — Medication 15 MILLIGRAM(S): at 12:12

## 2023-12-13 RX ADMIN — Medication 15 MILLIGRAM(S): at 06:11

## 2023-12-13 RX ADMIN — Medication 15 MILLIGRAM(S): at 18:07

## 2023-12-13 RX ADMIN — HYDROMORPHONE HYDROCHLORIDE 1 MILLIGRAM(S): 2 INJECTION INTRAMUSCULAR; INTRAVENOUS; SUBCUTANEOUS at 01:22

## 2023-12-13 RX ADMIN — HYDROMORPHONE HYDROCHLORIDE 1 MILLIGRAM(S): 2 INJECTION INTRAMUSCULAR; INTRAVENOUS; SUBCUTANEOUS at 00:52

## 2023-12-13 RX ADMIN — HYDROMORPHONE HYDROCHLORIDE 1 MILLIGRAM(S): 2 INJECTION INTRAMUSCULAR; INTRAVENOUS; SUBCUTANEOUS at 23:17

## 2023-12-13 RX ADMIN — Medication 10 MILLIGRAM(S): at 17:37

## 2023-12-13 RX ADMIN — PANTOPRAZOLE SODIUM 40 MILLIGRAM(S): 20 TABLET, DELAYED RELEASE ORAL at 05:40

## 2023-12-13 RX ADMIN — HYDROMORPHONE HYDROCHLORIDE 1 MILLIGRAM(S): 2 INJECTION INTRAMUSCULAR; INTRAVENOUS; SUBCUTANEOUS at 13:43

## 2023-12-13 RX ADMIN — Medication 15 MILLIGRAM(S): at 05:41

## 2023-12-13 RX ADMIN — HYDROMORPHONE HYDROCHLORIDE 1 MILLIGRAM(S): 2 INJECTION INTRAMUSCULAR; INTRAVENOUS; SUBCUTANEOUS at 05:28

## 2023-12-13 RX ADMIN — Medication 15 MILLIGRAM(S): at 00:48

## 2023-12-13 RX ADMIN — HYDROMORPHONE HYDROCHLORIDE 1 MILLIGRAM(S): 2 INJECTION INTRAMUSCULAR; INTRAVENOUS; SUBCUTANEOUS at 04:58

## 2023-12-13 RX ADMIN — Medication 15 MILLIGRAM(S): at 22:47

## 2023-12-13 RX ADMIN — Medication 15 MILLIGRAM(S): at 11:42

## 2023-12-13 RX ADMIN — HYDROMORPHONE HYDROCHLORIDE 1 MILLIGRAM(S): 2 INJECTION INTRAMUSCULAR; INTRAVENOUS; SUBCUTANEOUS at 22:46

## 2023-12-13 RX ADMIN — Medication 10 MILLIGRAM(S): at 00:18

## 2023-12-13 NOTE — CHART NOTE - NSCHARTNOTEFT_GEN_A_CORE
Clinical Nutrition PN Follow Up Note    * 59 y/o female with hx of metastatic mucinous adenocarcinoma of the appendix, s/p HIPEC, appendectomy, cholecystectomy, hysterectomy, omentectomy, peritonectomy, LAR/DLI, and bladder injury with primary repair 10/4, presents with her diverting loop ileostomy. Patient here for reversal. She is on home TPN. She has been complaining of coughing and regurgitation.     *12/12/23: S/p ileostomy reversal, endorses uncomfortably s/p procedure. Currently on CLD and tolerating thus far. Receives TPN at home however did not have home order present at . D/w surgical resident, plan to c/w TPN during admission. Will start lytes on low end and will adjust tomorrow based on labs. Will cycle TPN x 12 hrs as pt cycles at home. Dextrose goal @ 250g (GIR 3.5); will initiate 200g today and will titrate to goal dextrose tomorrow.     *current status: Per surgery note: tolerating CLD, +BM and passing flatus; likely will remove wick today. ADAT as per surgery to FLD to LRD when medically feasible. C/w TPN.    *new pertinent meds: Lovenox, Toradol, Reglan, Protonix, dextrose 5% + Na Cl 0.45% w/ KCl 20 mEq/L, Dilaudid prn    *labs reviewed; Corrected Na for hyperglycemia indicates hypernatremia, on IVF currently - will c/w current total PN volume (1600 mL) however if Na does not downtrend will increase total PN volume tomorrow. K+ WNL however on lowest end of normal, will increase in PN bag and will monitor/adjust as per labs. Hypophosphatemia noted, will increase in PN bag and will monitor/adjust prn as per labs. Mg WNL. corrected Ca WNL, rec'd add 10mEq of Calcium Gluconate outside of PN bag as CAPS is out. T Bili WNL for trace elements. POCT x 24 hrs WNL however only 2 levels obtained; as per PN protocol, POCT required q6 hours to monitor glycemic control; should be maintained between 140- 180 mg/dL. Will titrate dextrose to goal today (250g; GIR 3.5). Pending updated TG level however will c/w 45g lipids daily.     12-13    145  |  113<H>  |  31<H>  ----------------------------<  144<H>  3.5   |  27  |  0.69    Ca    7.6<L>      13 Dec 2023 04:50  Phos  1.8     12-13  Mg     2.1     12-13    TPro  5.7<L>  /  Alb  1.9<L>  /  TBili  0.5  /  DBili  x   /  AST  38<H>  /  ALT  79<H>  /  AlkPhos  117  12-13      BMI: BMI (kg/m2): 20.8 (12-11-23 @ 12:34)  HbA1c: A1C with Estimated Average Glucose Result: 5.7 % (12-07-23 @ 16:42)    Glucose: POCT Blood Glucose.: 153 mg/dL (12-13-23 @ 05:39)    BP: 112/62 (12-12-23 @ 21:51) (91/63 - 133/78)Vital Signs Last 24 Hrs  T(C): 37.1 (12-12-23 @ 21:51), Max: 37.1 (12-12-23 @ 21:51)  T(F): 98.7 (12-12-23 @ 21:51), Max: 98.7 (12-12-23 @ 21:51)  HR: 89 (12-12-23 @ 21:51) (80 - 89)  BP: 112/62 (12-12-23 @ 21:51) (104/52 - 112/62)  RR: 18 (12-12-23 @ 21:51) (18 - 18)  SpO2: 99% (12-12-23 @ 21:51) (97% - 99%)    Lipid Panel: Date/Time: 10-25-23 @ 06:00  Triglycerides, Serum: 87 (10/25/23)    POCT Blood Glucose.: 153 mg/dL (12-13-23 @ 05:39)  POCT Blood Glucose.: 99 mg/dL (12-13-23 @ 00:13)    *I&O's Detail - NOT DOC'D: Strict I&O's are rec'd when pt is on PN as per protocol     *BM (+) on 12/12 - small soft brown stool.  pt not on bowel regimen.    *malnutrition: Pt meets criteria for severe protein-calorie malnutrition in context of chronic disease r/t decreased ability to meet increased nutrient needs 2/2 CA AEB moderate to severe muscle/fat wasting, 33.73% wt loss x 2 months    Estimated Needs: based on 50 Kg (wt from EMR on 12/12)  Calories: 7877-6117 Kcal (30-35 Kcal/Kg)  Protein:  g (1.5-2.0 g/Kg)  Fluids: 8491-3436 mL (30-35 mL/Kg)    Diet, Clear Liquid (12-11-23 @ 15:39) [Active]    Weight History:  Height (cm): 154.9 (12-11-23 @ 12:34), 154.9 (12-07-23 @ 17:08)  Weight (kg): 49.895 (12-11-23 @ 12:34), 50.4 (12-07-23 @ 17:08), 54 (11-13-23 @ 14:00)  BMI (kg/m2): 20.8 (12-11-23 @ 12:34), 21 (12-07-23 @ 17:08)  BSA (m2): 1.46 (12-11-23 @ 12:34), 1.47 (12-07-23 @ 17:08)  IBW: 105#   IBW %: 104.7%    TPN Recommendations: via PICC line  Total Volume: 1600 mL x 12 hrs  100 g Amino Acids  250 g Dextrose (at dextrose goal; GIR 3.5)  45 g Lipids 20%  96 mEq Sodium Chloride  0 mEq Sodium Acetate  20 mmol Sodium Phosphate  45 mEq Potassium Chloride  0 mEq Potassium Acetate  10 mmol Potassium Phosphate  0 mEq Calcium Gluconate (CAPS out of PN solution)  8 mEq Magnesium Sulfate  200 mg Thiamine  1 ml Trace Elements  10 ml MVI    Total Calories   1700    (Meets  100% of Estimated Energy needs  and  100%  Protein needs)    Additional Recommendations:    1) Daily weights  2) Strict I & O's - required as per PN protocol   3) Daily lyte checks including magnesium and phos  4) Weekly triglycerides/LFT checks  5) POCT q6hrs; maintain 140-180mg/dL  6) ADAT as per surgery from Clear Liquids to Full Liquids to Low Fiber, when medically feasible and as tolerated    *will continue to monitor and adjust PN prn*  Valentine Mejia, LISAN, CDN [phone] Clinical Nutrition PN Follow Up Note    * 57 y/o female with hx of metastatic mucinous adenocarcinoma of the appendix, s/p HIPEC, appendectomy, cholecystectomy, hysterectomy, omentectomy, peritonectomy, LAR/DLI, and bladder injury with primary repair 10/4, presents with her diverting loop ileostomy. Patient here for reversal. She is on home TPN. She has been complaining of coughing and regurgitation.     *12/12/23: S/p ileostomy reversal, endorses uncomfortably s/p procedure. Currently on CLD and tolerating thus far. Receives TPN at home however did not have home order present at . D/w surgical resident, plan to c/w TPN during admission. Will start lytes on low end and will adjust tomorrow based on labs. Will cycle TPN x 12 hrs as pt cycles at home. Dextrose goal @ 250g (GIR 3.5); will initiate 200g today and will titrate to goal dextrose tomorrow.     *current status: Per surgery note: tolerating CLD, +BM and passing flatus; likely will remove wick today. ADAT as per surgery to FLD to LRD when medically feasible. C/w TPN.    *new pertinent meds: Lovenox, Toradol, Reglan, Protonix, dextrose 5% + Na Cl 0.45% w/ KCl 20 mEq/L, Dilaudid prn    *labs reviewed; Corrected Na for hyperglycemia indicates hypernatremia, on IVF currently - will c/w current total PN volume (1600 mL) however if Na does not downtrend will increase total PN volume tomorrow. K+ WNL however on lowest end of normal, will increase in PN bag and will monitor/adjust as per labs. Hypophosphatemia noted, will increase in PN bag and will monitor/adjust prn as per labs. Mg WNL. corrected Ca WNL, rec'd add 10mEq of Calcium Gluconate outside of PN bag as CAPS is out. T Bili WNL for trace elements. POCT x 24 hrs WNL however only 2 levels obtained; as per PN protocol, POCT required q6 hours to monitor glycemic control; should be maintained between 140- 180 mg/dL. Will titrate dextrose to goal today (250g; GIR 3.5). Pending updated TG level however will c/w 45g lipids daily.     12-13    145  |  113<H>  |  31<H>  ----------------------------<  144<H>  3.5   |  27  |  0.69    Ca    7.6<L>      13 Dec 2023 04:50  Phos  1.8     12-13  Mg     2.1     12-13    TPro  5.7<L>  /  Alb  1.9<L>  /  TBili  0.5  /  DBili  x   /  AST  38<H>  /  ALT  79<H>  /  AlkPhos  117  12-13      BMI: BMI (kg/m2): 20.8 (12-11-23 @ 12:34)  HbA1c: A1C with Estimated Average Glucose Result: 5.7 % (12-07-23 @ 16:42)    Glucose: POCT Blood Glucose.: 153 mg/dL (12-13-23 @ 05:39)    BP: 112/62 (12-12-23 @ 21:51) (91/63 - 133/78)Vital Signs Last 24 Hrs  T(C): 37.1 (12-12-23 @ 21:51), Max: 37.1 (12-12-23 @ 21:51)  T(F): 98.7 (12-12-23 @ 21:51), Max: 98.7 (12-12-23 @ 21:51)  HR: 89 (12-12-23 @ 21:51) (80 - 89)  BP: 112/62 (12-12-23 @ 21:51) (104/52 - 112/62)  RR: 18 (12-12-23 @ 21:51) (18 - 18)  SpO2: 99% (12-12-23 @ 21:51) (97% - 99%)    Lipid Panel: Date/Time: 10-25-23 @ 06:00  Triglycerides, Serum: 87 (10/25/23)    POCT Blood Glucose.: 153 mg/dL (12-13-23 @ 05:39)  POCT Blood Glucose.: 99 mg/dL (12-13-23 @ 00:13)    *I&O's Detail - NOT DOC'D: Strict I&O's are rec'd when pt is on PN as per protocol     *BM (+) on 12/12 - small soft brown stool.  pt not on bowel regimen.    *malnutrition: Pt meets criteria for severe protein-calorie malnutrition in context of chronic disease r/t decreased ability to meet increased nutrient needs 2/2 CA AEB moderate to severe muscle/fat wasting, 33.73% wt loss x 2 months    Estimated Needs: based on 50 Kg (wt from EMR on 12/12)  Calories: 1283-4948 Kcal (30-35 Kcal/Kg)  Protein:  g (1.5-2.0 g/Kg)  Fluids: 4147-6039 mL (30-35 mL/Kg)    Diet, Clear Liquid (12-11-23 @ 15:39) [Active]    Weight History:  Height (cm): 154.9 (12-11-23 @ 12:34), 154.9 (12-07-23 @ 17:08)  Weight (kg): 49.895 (12-11-23 @ 12:34), 50.4 (12-07-23 @ 17:08), 54 (11-13-23 @ 14:00)  BMI (kg/m2): 20.8 (12-11-23 @ 12:34), 21 (12-07-23 @ 17:08)  BSA (m2): 1.46 (12-11-23 @ 12:34), 1.47 (12-07-23 @ 17:08)  IBW: 105#   IBW %: 104.7%    TPN Recommendations: via PICC line  Total Volume: 1600 mL x 12 hrs  100 g Amino Acids  250 g Dextrose (at dextrose goal; GIR 3.5)  45 g Lipids 20%  96 mEq Sodium Chloride  0 mEq Sodium Acetate  20 mmol Sodium Phosphate  45 mEq Potassium Chloride  0 mEq Potassium Acetate  10 mmol Potassium Phosphate  0 mEq Calcium Gluconate (CAPS out of PN solution)  8 mEq Magnesium Sulfate  200 mg Thiamine  1 ml Trace Elements  10 ml MVI    Total Calories   1700    (Meets  100% of Estimated Energy needs  and  100%  Protein needs)    Additional Recommendations:    1) Daily weights  2) Strict I & O's - required as per PN protocol   3) Daily lyte checks including magnesium and phos  4) Weekly triglycerides/LFT checks  5) POCT q6hrs; maintain 140-180mg/dL  6) ADAT as per surgery from Clear Liquids to Full Liquids to Low Fiber, when medically feasible and as tolerated    *will continue to monitor and adjust PN prn*  Valentine Mejia, LISAN, CDN [phone] Clinical Nutrition PN Follow Up Note    * 57 y/o female with hx of metastatic mucinous adenocarcinoma of the appendix, s/p HIPEC, appendectomy, cholecystectomy, hysterectomy, omentectomy, peritonectomy, LAR/DLI, and bladder injury with primary repair 10/4, presents with her diverting loop ileostomy. Patient here for reversal. She is on home TPN. She has been complaining of coughing and regurgitation.     *12/12/23: S/p ileostomy reversal, endorses uncomfortably s/p procedure. Currently on CLD and tolerating thus far. Receives TPN at home however did not have home order present at . D/w surgical resident, plan to c/w TPN during admission. Will start lytes on low end and will adjust tomorrow based on labs. Will cycle TPN x 12 hrs as pt cycles at home. Dextrose goal @ 250g (GIR 3.5); will initiate 200g today and will titrate to goal dextrose tomorrow.     *current status: Per surgery note: tolerating CLD, +BM and passing flatus; likely will remove wick today. ADAT as per surgery to FLD to LRD when medically feasible. C/w TPN.    *new pertinent meds: Lovenox, Toradol, Reglan, Protonix, dextrose 5% + Na Cl 0.45% w/ KCl 20 mEq/L, Dilaudid prn    *labs reviewed; Corrected Na for hyperglycemia indicates hypernatremia, on IVF currently - will c/w current total PN volume (1600 mL) however if Na does not downtrend will increase total PN volume tomorrow. K+ WNL however on lowest end of normal, will increase in PN bag and will monitor/adjust as per labs. Hypophosphatemia noted, will increase in PN bag and will monitor/adjust prn as per labs. Mg WNL. corrected Ca WNL, rec'd add 10mEq of Calcium Gluconate outside of PN bag as CAPS is out. T Bili WNL for trace elements. POCT x 24 hrs WNL however only 2 levels obtained; as per PN protocol, POCT required q6 hours to monitor glycemic control; should be maintained between 140- 180 mg/dL. Will titrate dextrose to goal today (250g; GIR 3.5). New TG level WNL (105), will c/w 45g lipids daily.     12-13    145  |  113<H>  |  31<H>  ----------------------------<  144<H>  3.5   |  27  |  0.69    Ca    7.6<L>      13 Dec 2023 04:50  Phos  1.8     12-13  Mg     2.1     12-13    TPro  5.7<L>  /  Alb  1.9<L>  /  TBili  0.5  /  DBili  x   /  AST  38<H>  /  ALT  79<H>  /  AlkPhos  117  12-13      BMI: BMI (kg/m2): 20.8 (12-11-23 @ 12:34)  HbA1c: A1C with Estimated Average Glucose Result: 5.7 % (12-07-23 @ 16:42)    Glucose: POCT Blood Glucose.: 153 mg/dL (12-13-23 @ 05:39)    BP: 112/62 (12-12-23 @ 21:51) (91/63 - 133/78)Vital Signs Last 24 Hrs  T(C): 37.1 (12-12-23 @ 21:51), Max: 37.1 (12-12-23 @ 21:51)  T(F): 98.7 (12-12-23 @ 21:51), Max: 98.7 (12-12-23 @ 21:51)  HR: 89 (12-12-23 @ 21:51) (80 - 89)  BP: 112/62 (12-12-23 @ 21:51) (104/52 - 112/62)  RR: 18 (12-12-23 @ 21:51) (18 - 18)  SpO2: 99% (12-12-23 @ 21:51) (97% - 99%)    Lipid Panel: Date/Time: 10-25-23 @ 06:00  Triglycerides, Serum: 106 (12/13/23)    POCT Blood Glucose.: 153 mg/dL (12-13-23 @ 05:39)  POCT Blood Glucose.: 99 mg/dL (12-13-23 @ 00:13)    *I&O's Detail - NOT DOC'D: Strict I&O's are rec'd when pt is on PN as per protocol     *BM (+) on 12/12 - small soft brown stool.  pt not on bowel regimen.    *malnutrition: Pt meets criteria for severe protein-calorie malnutrition in context of chronic disease r/t decreased ability to meet increased nutrient needs 2/2 CA AEB moderate to severe muscle/fat wasting, 33.73% wt loss x 2 months    Estimated Needs: based on 50 Kg (wt from EMR on 12/12)  Calories: 0059-1814 Kcal (30-35 Kcal/Kg)  Protein:  g (1.5-2.0 g/Kg)  Fluids: 2916-4345 mL (30-35 mL/Kg)    Diet, Clear Liquid (12-11-23 @ 15:39) [Active]    Weight History:  Height (cm): 154.9 (12-11-23 @ 12:34), 154.9 (12-07-23 @ 17:08)  Weight (kg): 49.895 (12-11-23 @ 12:34), 50.4 (12-07-23 @ 17:08), 54 (11-13-23 @ 14:00)  BMI (kg/m2): 20.8 (12-11-23 @ 12:34), 21 (12-07-23 @ 17:08)  BSA (m2): 1.46 (12-11-23 @ 12:34), 1.47 (12-07-23 @ 17:08)  IBW: 105#   IBW %: 104.7%    TPN Recommendations: via PICC line  Total Volume: 1600 mL x 12 hrs  100 g Amino Acids  250 g Dextrose (at dextrose goal; GIR 3.5)  45 g Lipids 20%  96 mEq Sodium Chloride  0 mEq Sodium Acetate  20 mmol Sodium Phosphate  45 mEq Potassium Chloride  0 mEq Potassium Acetate  10 mmol Potassium Phosphate  0 mEq Calcium Gluconate (CAPS out of PN solution)  8 mEq Magnesium Sulfate  200 mg Thiamine  1 ml Trace Elements  10 ml MVI    Total Calories   1700    (Meets  100% of Estimated Energy needs  and  100%  Protein needs)    Additional Recommendations:    1) Daily weights  2) Strict I & O's - required as per PN protocol   3) Daily lyte checks including magnesium and phos  4) Weekly triglycerides/LFT checks  5) POCT q6hrs; maintain 140-180mg/dL  6) ADAT as per surgery from Clear Liquids to Full Liquids to Low Fiber, when medically feasible and as tolerated    *will continue to monitor and adjust PN prn*  Valentine Mejia, RDN, CDN [phone] Clinical Nutrition PN Follow Up Note    * 57 y/o female with hx of metastatic mucinous adenocarcinoma of the appendix, s/p HIPEC, appendectomy, cholecystectomy, hysterectomy, omentectomy, peritonectomy, LAR/DLI, and bladder injury with primary repair 10/4, presents with her diverting loop ileostomy. Patient here for reversal. She is on home TPN. She has been complaining of coughing and regurgitation.     *12/12/23: S/p ileostomy reversal, endorses uncomfortably s/p procedure. Currently on CLD and tolerating thus far. Receives TPN at home however did not have home order present at . D/w surgical resident, plan to c/w TPN during admission. Will start lytes on low end and will adjust tomorrow based on labs. Will cycle TPN x 12 hrs as pt cycles at home. Dextrose goal @ 250g (GIR 3.5); will initiate 200g today and will titrate to goal dextrose tomorrow.     *current status: Per surgery note: tolerating CLD, +BM and passing flatus; likely will remove wick today. ADAT as per surgery to FLD to LRD when medically feasible. C/w TPN.    *new pertinent meds: Lovenox, Toradol, Reglan, Protonix, dextrose 5% + Na Cl 0.45% w/ KCl 20 mEq/L, Dilaudid prn    *labs reviewed; Corrected Na for hyperglycemia indicates hypernatremia, on IVF currently - will c/w current total PN volume (1600 mL) however if Na does not downtrend will increase total PN volume tomorrow. K+ WNL however on lowest end of normal, will increase in PN bag and will monitor/adjust as per labs. Hypophosphatemia noted, will increase in PN bag and will monitor/adjust prn as per labs. Mg WNL. corrected Ca WNL, rec'd add 10mEq of Calcium Gluconate outside of PN bag as CAPS is out. T Bili WNL for trace elements. POCT x 24 hrs WNL however only 2 levels obtained; as per PN protocol, POCT required q6 hours to monitor glycemic control; should be maintained between 140- 180 mg/dL. Will titrate dextrose to goal today (250g; GIR 3.5). New TG level WNL (105), will c/w 45g lipids daily.     12-13    145  |  113<H>  |  31<H>  ----------------------------<  144<H>  3.5   |  27  |  0.69    Ca    7.6<L>      13 Dec 2023 04:50  Phos  1.8     12-13  Mg     2.1     12-13    TPro  5.7<L>  /  Alb  1.9<L>  /  TBili  0.5  /  DBili  x   /  AST  38<H>  /  ALT  79<H>  /  AlkPhos  117  12-13      BMI: BMI (kg/m2): 20.8 (12-11-23 @ 12:34)  HbA1c: A1C with Estimated Average Glucose Result: 5.7 % (12-07-23 @ 16:42)    Glucose: POCT Blood Glucose.: 153 mg/dL (12-13-23 @ 05:39)    BP: 112/62 (12-12-23 @ 21:51) (91/63 - 133/78)Vital Signs Last 24 Hrs  T(C): 37.1 (12-12-23 @ 21:51), Max: 37.1 (12-12-23 @ 21:51)  T(F): 98.7 (12-12-23 @ 21:51), Max: 98.7 (12-12-23 @ 21:51)  HR: 89 (12-12-23 @ 21:51) (80 - 89)  BP: 112/62 (12-12-23 @ 21:51) (104/52 - 112/62)  RR: 18 (12-12-23 @ 21:51) (18 - 18)  SpO2: 99% (12-12-23 @ 21:51) (97% - 99%)    Lipid Panel: Date/Time: 10-25-23 @ 06:00  Triglycerides, Serum: 106 (12/13/23)    POCT Blood Glucose.: 153 mg/dL (12-13-23 @ 05:39)  POCT Blood Glucose.: 99 mg/dL (12-13-23 @ 00:13)    *I&O's Detail - NOT DOC'D: Strict I&O's are rec'd when pt is on PN as per protocol     *BM (+) on 12/12 - small soft brown stool.  pt not on bowel regimen.    *malnutrition: Pt meets criteria for severe protein-calorie malnutrition in context of chronic disease r/t decreased ability to meet increased nutrient needs 2/2 CA AEB moderate to severe muscle/fat wasting, 33.73% wt loss x 2 months    Estimated Needs: based on 50 Kg (wt from EMR on 12/12)  Calories: 9904-6028 Kcal (30-35 Kcal/Kg)  Protein:  g (1.5-2.0 g/Kg)  Fluids: 6541-2847 mL (30-35 mL/Kg)    Diet, Clear Liquid (12-11-23 @ 15:39) [Active]    Weight History:  Height (cm): 154.9 (12-11-23 @ 12:34), 154.9 (12-07-23 @ 17:08)  Weight (kg): 49.895 (12-11-23 @ 12:34), 50.4 (12-07-23 @ 17:08), 54 (11-13-23 @ 14:00)  BMI (kg/m2): 20.8 (12-11-23 @ 12:34), 21 (12-07-23 @ 17:08)  BSA (m2): 1.46 (12-11-23 @ 12:34), 1.47 (12-07-23 @ 17:08)  IBW: 105#   IBW %: 104.7%    TPN Recommendations: via PICC line  Total Volume: 1600 mL x 12 hrs  100 g Amino Acids  250 g Dextrose (at dextrose goal; GIR 3.5)  45 g Lipids 20%  96 mEq Sodium Chloride  0 mEq Sodium Acetate  20 mmol Sodium Phosphate  45 mEq Potassium Chloride  0 mEq Potassium Acetate  10 mmol Potassium Phosphate  0 mEq Calcium Gluconate (CAPS out of PN solution)  8 mEq Magnesium Sulfate  200 mg Thiamine  1 ml Trace Elements  10 ml MVI    Total Calories   1700    (Meets  100% of Estimated Energy needs  and  100%  Protein needs)    Additional Recommendations:    1) Daily weights  2) Strict I & O's - required as per PN protocol   3) Daily lyte checks including magnesium and phos  4) Weekly triglycerides/LFT checks  5) POCT q6hrs; maintain 140-180mg/dL  6) ADAT as per surgery from Clear Liquids to Full Liquids to Low Fiber, when medically feasible and as tolerated    *will continue to monitor and adjust PN prn*  Valentine Mejia, RDN, CDN [phone] No

## 2023-12-14 ENCOUNTER — TRANSCRIPTION ENCOUNTER (OUTPATIENT)
Age: 58
End: 2023-12-14

## 2023-12-14 VITALS
SYSTOLIC BLOOD PRESSURE: 105 MMHG | DIASTOLIC BLOOD PRESSURE: 52 MMHG | HEART RATE: 92 BPM | RESPIRATION RATE: 17 BRPM | OXYGEN SATURATION: 100 % | TEMPERATURE: 99 F

## 2023-12-14 LAB
ANION GAP SERPL CALC-SCNC: 5 MMOL/L — SIGNIFICANT CHANGE UP (ref 5–17)
ANION GAP SERPL CALC-SCNC: 5 MMOL/L — SIGNIFICANT CHANGE UP (ref 5–17)
BUN SERPL-MCNC: 25 MG/DL — HIGH (ref 7–23)
BUN SERPL-MCNC: 25 MG/DL — HIGH (ref 7–23)
CALCIUM SERPL-MCNC: 7.3 MG/DL — LOW (ref 8.5–10.1)
CALCIUM SERPL-MCNC: 7.3 MG/DL — LOW (ref 8.5–10.1)
CHLORIDE SERPL-SCNC: 113 MMOL/L — HIGH (ref 96–108)
CHLORIDE SERPL-SCNC: 113 MMOL/L — HIGH (ref 96–108)
CO2 SERPL-SCNC: 25 MMOL/L — SIGNIFICANT CHANGE UP (ref 22–31)
CO2 SERPL-SCNC: 25 MMOL/L — SIGNIFICANT CHANGE UP (ref 22–31)
CREAT SERPL-MCNC: 0.47 MG/DL — LOW (ref 0.5–1.3)
CREAT SERPL-MCNC: 0.47 MG/DL — LOW (ref 0.5–1.3)
EGFR: 110 ML/MIN/1.73M2 — SIGNIFICANT CHANGE UP
EGFR: 110 ML/MIN/1.73M2 — SIGNIFICANT CHANGE UP
GLUCOSE BLDC GLUCOMTR-MCNC: 143 MG/DL — HIGH (ref 70–99)
GLUCOSE BLDC GLUCOMTR-MCNC: 143 MG/DL — HIGH (ref 70–99)
GLUCOSE SERPL-MCNC: 139 MG/DL — HIGH (ref 70–99)
GLUCOSE SERPL-MCNC: 139 MG/DL — HIGH (ref 70–99)
HCT VFR BLD CALC: 25.3 % — LOW (ref 34.5–45)
HCT VFR BLD CALC: 25.3 % — LOW (ref 34.5–45)
HGB BLD-MCNC: 8.4 G/DL — LOW (ref 11.5–15.5)
HGB BLD-MCNC: 8.4 G/DL — LOW (ref 11.5–15.5)
MAGNESIUM SERPL-MCNC: 2 MG/DL — SIGNIFICANT CHANGE UP (ref 1.6–2.6)
MAGNESIUM SERPL-MCNC: 2 MG/DL — SIGNIFICANT CHANGE UP (ref 1.6–2.6)
MCHC RBC-ENTMCNC: 29.8 PG — SIGNIFICANT CHANGE UP (ref 27–34)
MCHC RBC-ENTMCNC: 29.8 PG — SIGNIFICANT CHANGE UP (ref 27–34)
MCHC RBC-ENTMCNC: 33.2 GM/DL — SIGNIFICANT CHANGE UP (ref 32–36)
MCHC RBC-ENTMCNC: 33.2 GM/DL — SIGNIFICANT CHANGE UP (ref 32–36)
MCV RBC AUTO: 89.7 FL — SIGNIFICANT CHANGE UP (ref 80–100)
MCV RBC AUTO: 89.7 FL — SIGNIFICANT CHANGE UP (ref 80–100)
PHOSPHATE SERPL-MCNC: 2.2 MG/DL — LOW (ref 2.5–4.5)
PHOSPHATE SERPL-MCNC: 2.2 MG/DL — LOW (ref 2.5–4.5)
PLATELET # BLD AUTO: 194 K/UL — SIGNIFICANT CHANGE UP (ref 150–400)
PLATELET # BLD AUTO: 194 K/UL — SIGNIFICANT CHANGE UP (ref 150–400)
POTASSIUM SERPL-MCNC: 3.3 MMOL/L — LOW (ref 3.5–5.3)
POTASSIUM SERPL-MCNC: 3.3 MMOL/L — LOW (ref 3.5–5.3)
POTASSIUM SERPL-SCNC: 3.3 MMOL/L — LOW (ref 3.5–5.3)
POTASSIUM SERPL-SCNC: 3.3 MMOL/L — LOW (ref 3.5–5.3)
RBC # BLD: 2.82 M/UL — LOW (ref 3.8–5.2)
RBC # BLD: 2.82 M/UL — LOW (ref 3.8–5.2)
RBC # FLD: 15.5 % — HIGH (ref 10.3–14.5)
RBC # FLD: 15.5 % — HIGH (ref 10.3–14.5)
SODIUM SERPL-SCNC: 143 MMOL/L — SIGNIFICANT CHANGE UP (ref 135–145)
SODIUM SERPL-SCNC: 143 MMOL/L — SIGNIFICANT CHANGE UP (ref 135–145)
WBC # BLD: 8.63 K/UL — SIGNIFICANT CHANGE UP (ref 3.8–10.5)
WBC # BLD: 8.63 K/UL — SIGNIFICANT CHANGE UP (ref 3.8–10.5)
WBC # FLD AUTO: 8.63 K/UL — SIGNIFICANT CHANGE UP (ref 3.8–10.5)
WBC # FLD AUTO: 8.63 K/UL — SIGNIFICANT CHANGE UP (ref 3.8–10.5)

## 2023-12-14 PROCEDURE — 88305 TISSUE EXAM BY PATHOLOGIST: CPT | Mod: 26

## 2023-12-14 RX ORDER — I.V. FAT EMULSION 20 G/100ML
0.9 EMULSION INTRAVENOUS
Qty: 45 | Refills: 0 | Status: CANCELLED | OUTPATIENT
Start: 2023-12-14 | End: 2023-12-14

## 2023-12-14 RX ORDER — METOCLOPRAMIDE 10 MG/1
10 TABLET ORAL EVERY 6 HOURS
Qty: 40 | Refills: 0 | Status: ACTIVE | COMMUNITY
Start: 2023-11-27 | End: 1900-01-01

## 2023-12-14 RX ORDER — HYDROMORPHONE HYDROCHLORIDE 2 MG/ML
4 INJECTION INTRAMUSCULAR; INTRAVENOUS; SUBCUTANEOUS EVERY 6 HOURS
Refills: 0 | Status: DISCONTINUED | OUTPATIENT
Start: 2023-12-14 | End: 2023-12-14

## 2023-12-14 RX ORDER — ELECTROLYTE SOLUTION,INJ
1 VIAL (ML) INTRAVENOUS
Refills: 0 | Status: CANCELLED | OUTPATIENT
Start: 2023-12-14 | End: 2023-12-14

## 2023-12-14 RX ORDER — ELECTROLYTE SOLUTION,INJ
0 VIAL (ML) INTRAVENOUS
Qty: 0 | Refills: 0 | DISCHARGE

## 2023-12-14 RX ADMIN — HYDROMORPHONE HYDROCHLORIDE 4 MILLIGRAM(S): 2 INJECTION INTRAMUSCULAR; INTRAVENOUS; SUBCUTANEOUS at 11:18

## 2023-12-14 RX ADMIN — Medication 15 MILLIGRAM(S): at 11:33

## 2023-12-14 RX ADMIN — Medication 15 MILLIGRAM(S): at 06:00

## 2023-12-14 RX ADMIN — PANTOPRAZOLE SODIUM 40 MILLIGRAM(S): 20 TABLET, DELAYED RELEASE ORAL at 05:27

## 2023-12-14 RX ADMIN — Medication 10 MILLIGRAM(S): at 05:27

## 2023-12-14 RX ADMIN — HYDROMORPHONE HYDROCHLORIDE 1 MILLIGRAM(S): 2 INJECTION INTRAMUSCULAR; INTRAVENOUS; SUBCUTANEOUS at 03:32

## 2023-12-14 RX ADMIN — HYDROMORPHONE HYDROCHLORIDE 1 MILLIGRAM(S): 2 INJECTION INTRAMUSCULAR; INTRAVENOUS; SUBCUTANEOUS at 03:02

## 2023-12-14 RX ADMIN — HYDROMORPHONE HYDROCHLORIDE 4 MILLIGRAM(S): 2 INJECTION INTRAMUSCULAR; INTRAVENOUS; SUBCUTANEOUS at 12:15

## 2023-12-14 RX ADMIN — HYDROMORPHONE HYDROCHLORIDE 1 MILLIGRAM(S): 2 INJECTION INTRAMUSCULAR; INTRAVENOUS; SUBCUTANEOUS at 07:34

## 2023-12-14 RX ADMIN — Medication 15 MILLIGRAM(S): at 11:18

## 2023-12-14 RX ADMIN — Medication 15 MILLIGRAM(S): at 06:30

## 2023-12-14 RX ADMIN — Medication 10 MILLIGRAM(S): at 11:18

## 2023-12-14 RX ADMIN — HYDROMORPHONE HYDROCHLORIDE 1 MILLIGRAM(S): 2 INJECTION INTRAMUSCULAR; INTRAVENOUS; SUBCUTANEOUS at 07:49

## 2023-12-14 NOTE — PROGRESS NOTE ADULT - NUTRITIONAL ASSESSMENT
This patient has been assessed with a concern for Malnutrition and has been determined to have a diagnosis/diagnoses of Severe protein-calorie malnutrition.    This patient is being managed with:   Parenteral Nutrition - Adult-  Entered: Dec 14 2023  5:00PM    lipid fat emulsion (Fish Oil and Plant Based) 20% Infusion-[Known as SMOFLIPID 20% Infusion]  45 Gram(s) in IV Solution 225 milliLiter(s) infuse at 18.8 mL/Hr  Dose Rate: 0.902 Gm/kG/Day Infuse Over: 12 Hours; Stop After 12 Hours  Administration Instructions: Use 1.2 micron in-line filter  Entered: Dec 14 2023  5:00PM    Parenteral Nutrition - Adult-  Entered: Dec 13 2023 10:00PM    lipid fat emulsion (Fish Oil and Plant Based) 20% Infusion-[Known as SMOFLIPID 20% Infusion]  45 Gram(s) in IV Solution 225 milliLiter(s) infuse at 18.75 mL/Hr  Dose Rate: 0.901 Gm/kG/Day Infuse Over: 12 Hours; Stop After 12 Hours  Administration Instructions: Use 1.2 micron in-line filter  Entered: Dec 13 2023 10:00PM    Diet Full Liquid-  Entered: Dec 13 2023  5:56PM    The following pending diet order is being considered for treatment of Severe protein-calorie malnutrition:null

## 2023-12-14 NOTE — DISCHARGE NOTE PROVIDER - NSDCCPCAREPLAN_GEN_ALL_CORE_FT
PRINCIPAL DISCHARGE DIAGNOSIS  Diagnosis: Status post reversal of ileostomy  Assessment and Plan of Treatment:

## 2023-12-14 NOTE — DISCHARGE NOTE PROVIDER - HOSPITAL COURSE
Patient presented with a diverting loop ileostomy. She underwent open reversal 12/12/23. Hospital course unremarkable, she continues to be on TPN. Clear for discharge.

## 2023-12-14 NOTE — DISCHARGE NOTE PROVIDER - NSDCACTIVITY_GEN_ALL_CORE
Sex allowed/Stairs allowed/Walking - Indoors allowed/No heavy lifting/straining/Walking - Outdoors allowed

## 2023-12-14 NOTE — PROGRESS NOTE ADULT - SUBJECTIVE AND OBJECTIVE BOX
SURGERY DAILY PROGRESS NOTE:     Subjective:  Patient seen and examined this AM at bedside. No acute events overnight and patient resting comfortably. Pain controlled. +BM & flatus. Tolerating PO like baseline. Denies fever/chills, shortness of breath, chest pain. VS reviewed    Objective:    MEDICATIONS  (STANDING):  enoxaparin Injectable 40 milliGRAM(s) SubCutaneous every 24 hours  ketorolac   Injectable 15 milliGRAM(s) IV Push every 6 hours  lipid, fat emulsion (Fish Oil and Plant Based) 20% Infusion 0.9018 Gm/kG/Day (18.75 mL/Hr) IV Continuous <Continuous>  metoclopramide 10 milliGRAM(s) Oral every 6 hours  pantoprazole    Tablet 40 milliGRAM(s) Oral before breakfast  Parenteral Nutrition - Adult 1 Each TPN Continuous <Continuous>    MEDICATIONS  (PRN):  acetaminophen   IVPB .. 750 milliGRAM(s) IV Intermittent once PRN Mild Pain (1 - 3)  HYDROmorphone  Injectable 1 milliGRAM(s) IV Push every 4 hours PRN Severe Pain (7 - 10)      Vital Signs Last 24 Hrs  T(C): 37.1 (12 Dec 2023 21:51), Max: 37.1 (12 Dec 2023 21:51)  T(F): 98.7 (12 Dec 2023 21:51), Max: 98.7 (12 Dec 2023 21:51)  HR: 89 (12 Dec 2023 21:51) (80 - 89)  BP: 112/62 (12 Dec 2023 21:51) (104/52 - 112/62)  BP(mean): --  RR: 18 (12 Dec 2023 21:51) (18 - 18)  SpO2: 99% (12 Dec 2023 21:51) (97% - 99%)    Parameters below as of 12 Dec 2023 21:51  Patient On (Oxygen Delivery Method): room air      PHYSICAL EXAM   GENERAL: NAD, well developed, thin  HEAD: Atraumatic, normocephalic  EYES: EOMI, PERRLA, conjunctiva and sclera clear  ENT: moist mucous membrane  NECK: supple, No JVD, midline trachea  CHEST/LUNG: No increased WOB, symmetric excursions  Heart: RRR ppp, no peripheral edema  ABDOMEN: Round, nondistended, soft, appropriately TTP, incisions c/d/i, former stoma dressing w/ minimal strikethrough  EXTREMITIES: Brisk cap refill. no clubbing or cyanosis  NERVOUS SYSTEM: AOx4, speech clear, no neuro-deficits  MSK: full ROM, no deformities  SKIN: warm to touch, no rash or lesions        I&O's Detail      Daily     Daily     LABS:                        8.5    9.40  )-----------( 201      ( 13 Dec 2023 04:50 )             25.7     12-13    145  |  113<H>  |  31<H>  ----------------------------<  144<H>  3.5   |  27  |  0.69    Ca    7.6<L>      13 Dec 2023 04:50  Phos  1.8     12-13  Mg     2.1     12-13    TPro  5.7<L>  /  Alb  1.9<L>  /  TBili  0.5  /  DBili  x   /  AST  38<H>  /  ALT  79<H>  /  AlkPhos  117  12-13      Urinalysis Basic - ( 13 Dec 2023 04:50 )    Color: x / Appearance: x / SG: x / pH: x  Gluc: 144 mg/dL / Ketone: x  / Bili: x / Urobili: x   Blood: x / Protein: x / Nitrite: x   Leuk Esterase: x / RBC: x / WBC x   Sq Epi: x / Non Sq Epi: x / Bacteria: x  
SURGERY DAILY PROGRESS NOTE:     Subjective:  Patient seen and examined this AM at bedside. No acute events overnight and patient resting comfortably. Voiding. Passing flatus. Tolerated clears. Denies fever/chills, shortness of breath, chest pain. VS reviewed    Objective:    MEDICATIONS  (STANDING):  enoxaparin Injectable 40 milliGRAM(s) SubCutaneous every 24 hours  ketorolac   Injectable 15 milliGRAM(s) IV Push every 6 hours  lactated ringers. 1000 milliLiter(s) (75 mL/Hr) IV Continuous <Continuous>  metoclopramide 10 milliGRAM(s) Oral every 6 hours  pantoprazole    Tablet 40 milliGRAM(s) Oral before breakfast    MEDICATIONS  (PRN):  acetaminophen   IVPB .. 750 milliGRAM(s) IV Intermittent once PRN Mild Pain (1 - 3)  HYDROmorphone  Injectable 1 milliGRAM(s) IV Push every 4 hours PRN Severe Pain (7 - 10)      Vital Signs Last 24 Hrs  T(C): 37.1 (12 Dec 2023 04:00), Max: 37.4 (11 Dec 2023 21:46)  T(F): 98.8 (12 Dec 2023 04:00), Max: 99.4 (11 Dec 2023 21:46)  HR: 91 (12 Dec 2023 04:00) (89 - 102)  BP: 118/52 (12 Dec 2023 04:00) (91/63 - 133/78)  BP(mean): --  RR: 18 (12 Dec 2023 04:00) (14 - 21)  SpO2: 97% (12 Dec 2023 04:00) (97% - 100%)    Parameters below as of 12 Dec 2023 04:00  Patient On (Oxygen Delivery Method): room air          PHYSICAL EXAM   GENERAL: NAD, well developed, thin  HEAD: Atraumatic, normocephalic  EYES: EOMI, PERRLA, conjunctiva and sclera clear  ENT: moist mucous membrane  NECK: supple, No JVD, midline trachea  CHEST/LUNG: No increased WOB, symmetric excursions  Heart: RRR ppp, no peripheral edema  ABDOMEN: Round, nondistended, soft, appropriately TTP, incisions c/d/i, former stoma dressing w/ minimal strikethrough  EXTREMITIES: Brisk cap refill. no clubbing or cyanosis  NERVOUS SYSTEM: AOx4, speech clear, no neuro-deficits  MSK: full ROM, no deformities  SKIN: warm to touch, no rash or lesions      I&O's Detail    11 Dec 2023 07:01  -  12 Dec 2023 07:00  --------------------------------------------------------  IN:    Lactated Ringers: 290 mL    Other (mL): 1600 mL  Total IN: 1890 mL    OUT:  Total OUT: 0 mL    Total NET: 1890 mL          Daily Height in cm: 154.94 (11 Dec 2023 12:34)    Daily     LABS: AM pending  
SURGERY DAILY PROGRESS NOTE:     Subjective:  Patient seen and examined this AM at bedside. No acute events overnight and patient resting comfortably. Tolerated butternut squash soup. Ambulating. +Bowel function. Nausea controlled. Denies fever/chills, shortness of breath, chest pain. VS reviewed    Objective:    MEDICATIONS  (STANDING):  enoxaparin Injectable 40 milliGRAM(s) SubCutaneous every 24 hours  ketorolac   Injectable 15 milliGRAM(s) IV Push every 6 hours  lipid, fat emulsion (Fish Oil and Plant Based) 20% Infusion 0.901 Gm/kG/Day (18.75 mL/Hr) IV Continuous <Continuous>  metoclopramide 10 milliGRAM(s) Oral every 6 hours  pantoprazole    Tablet 40 milliGRAM(s) Oral before breakfast  Parenteral Nutrition - Adult 1 Each TPN Continuous <Continuous>    MEDICATIONS  (PRN):  acetaminophen   IVPB .. 750 milliGRAM(s) IV Intermittent once PRN Mild Pain (1 - 3)  HYDROmorphone  Injectable 1 milliGRAM(s) IV Push every 4 hours PRN Severe Pain (7 - 10)  morphine   Solution 10 milliGRAM(s) Oral every 6 hours PRN Severe Pain (7 - 10)  morphine   Solution 5 milliGRAM(s) Oral every 6 hours PRN Moderate Pain (4 - 6)      Vital Signs Last 24 Hrs  T(C): 36.8 (13 Dec 2023 22:24), Max: 36.8 (13 Dec 2023 16:00)  T(F): 98.3 (13 Dec 2023 22:24), Max: 98.3 (13 Dec 2023 16:00)  HR: 93 (13 Dec 2023 22:24) (93 - 95)  BP: 116/56 (13 Dec 2023 22:24) (100/59 - 116/56)  BP(mean): 71 (13 Dec 2023 22:24) (71 - 71)  RR: 18 (13 Dec 2023 22:24) (18 - 18)  SpO2: 97% (13 Dec 2023 22:24) (97% - 100%)    Parameters below as of 13 Dec 2023 22:24  Patient On (Oxygen Delivery Method): room air          PHYSICAL EXAM   GENERAL: NAD, well developed, thin  HEAD: Atraumatic, normocephalic  EYES: EOMI, PERRLA, conjunctiva and sclera clear  ENT: moist mucous membrane  NECK: supple, No JVD, midline trachea  CHEST/LUNG: No increased WOB, symmetric excursions  Heart: RRR ppp, no peripheral edema  ABDOMEN: Round, nondistended, soft, appropriately TTP, incisions c/d/i, former stoma dressing w/ minimal strikethrough  EXTREMITIES: Brisk cap refill. no clubbing or cyanosis  NERVOUS SYSTEM: AOx4, speech clear, no neuro-deficits  MSK: full ROM, no deformities  SKIN: warm to touch, no rash or lesions      I&O's Detail    13 Dec 2023 07:01  -  14 Dec 2023 07:00  --------------------------------------------------------  IN:    Oral Fluid: 360 mL  Total IN: 360 mL    OUT:  Total OUT: 0 mL    Total NET: 360 mL          Daily     Daily     LABS:                        8.4    8.63  )-----------( 194      ( 14 Dec 2023 06:12 )             25.3     12-14    143  |  113<H>  |  25<H>  ----------------------------<  139<H>  3.3<L>   |  25  |  0.47<L>    Ca    7.3<L>      14 Dec 2023 06:12  Phos  2.2     12-14  Mg     2.0     12-14    TPro  5.7<L>  /  Alb  1.9<L>  /  TBili  0.5  /  DBili  x   /  AST  38<H>  /  ALT  79<H>  /  AlkPhos  117  12-13      Urinalysis Basic - ( 14 Dec 2023 06:12 )    Color: x / Appearance: x / SG: x / pH: x  Gluc: 139 mg/dL / Ketone: x  / Bili: x / Urobili: x   Blood: x / Protein: x / Nitrite: x   Leuk Esterase: x / RBC: x / WBC x   Sq Epi: x / Non Sq Epi: x / Bacteria: x

## 2023-12-14 NOTE — PROGRESS NOTE ADULT - ASSESSMENT
58F w/ h/o mucinous adenoCa s/p appendectomy, LAR & DLI, now POD 3 s/p ileostomy reversal  Recovering as expected  With passage of flatus & stool  Tolerating fulls    -Advance diet as tolerated  -Continue TPN  -Likely remove wick today  -Pain & nausea PRN-  -IS  -OOB/A  -VTE ppx until resume home AC later today    Discussed w/ Surgical Oncology team
58F w/ h/o mucinous adenoCa s/p appendectomy, LAR & DLI, now POD 1 s/p ileostomy reversal  Recovering as expected  With passage of flatus    -Advance diet as tolerated  -Continue TPN  -Likely remove wick within 24 hours  -Pain & nausea PRN  -HL IV w/ adequate PO  -VTE ppx until resume home AC 48 hr after surgery    Discussed w/ Surgical Oncology team
58F w/ h/o mucinous adenoCa s/p appendectomy, LAR & DLI, now POD 2 s/p ileostomy reversal  Recovering as expected  With passage of flatus & stool    -Advance diet as tolerated  -Continue TPN  -Likely remove wick today  -Pain & nausea PRN-  -IS  -OOB/A  -VTE ppx until resume home AC later today (48 hr after surgery)    Discussed w/ Surgical Oncology team

## 2023-12-14 NOTE — DISCHARGE NOTE PROVIDER - NSDCMRMEDTOKEN_GEN_ALL_CORE_FT
Eliquis 5 mg oral tablet: 1 tab(s) orally 2 times a day pt was instructed by surgeon to stop 12/8/2023  HYDROmorphone 4 mg oral tablet: 1 tab(s) orally every 6 hours  metoclopramide 10 mg oral tablet: 1 tab(s) orally 4 times a day  omeprazole 40 mg oral delayed release capsule: 1 cap(s) orally once a day (before a meal)  TPN Electrolytes intravenous solution: intravenous once a day via right PICC line

## 2023-12-14 NOTE — DISCHARGE NOTE PROVIDER - PROVIDER TOKENS
PROVIDER:[TOKEN:[[id number]],FOLLOWUP:[2 weeks]] PROVIDER:[TOKEN:[[id number]],FOLLOWUP:[2 weeks]]

## 2023-12-14 NOTE — CHART NOTE - NSCHARTNOTEFT_GEN_A_CORE
Clinical Nutrition PN Follow Up Note    * 59 y/o female with hx of metastatic mucinous adenocarcinoma of the appendix, s/p HIPEC, appendectomy, cholecystectomy, hysterectomy, omentectomy, peritonectomy, LAR/DLI, and bladder injury with primary repair 10/4, presents with her diverting loop ileostomy. Patient here for reversal. She is on home TPN. She has been complaining of coughing and regurgitation.     *12/12: S/p ileostomy reversal, endorses uncomfortably s/p procedure. Currently on CLD and tolerating thus far. Receives TPN at home however did not have home order present at . D/w surgical resident, plan to c/w TPN during admission. Will start lytes on low end and will adjust tomorrow based on labs. Will cycle TPN x 12 hrs as pt cycles at home. Dextrose goal @ 250g (GIR 3.5); will initiate 200g today and will titrate to goal dextrose tomorrow.   *12/13: Per surgery note: tolerating CLD, +BM and passing flatus; likely will remove wick today. ADAT as per surgery to FLD to LRD when medically feasible. C/w TPN.    *current status: Advanced to FLD yesterday. Per RN documentation pt tolerating diet however per surgical resident pt c/o abdominal pain. ADAT as per surgery, will c/w TPN today.    *new pertinent meds: Lovenox, Toradol, Reglan, Protonix, dextrose 5% + Na Cl 0.45% w/ KCl 20 mEq/L, Dilaudid prn    *labs reviewed; Corrected Na for hyperglycemia WNL, remains on IVF currently - will c/w current total PN volume (1600 mL) however if Na does not downtrend will increase total PN volume. Hypokalemia noted, will increase in PN bag and will monitor/adjust as per labs, consider to replete outside PN bag. Hypophosphatemia still noted, will increase in PN bag and will monitor/adjust prn as per labs. Mg WNL. corrected Ca WNL, rec'd add 10mEq of Calcium Gluconate outside of PN bag as CAPS is out. T Bili WNL for trace elements. POCT x 24 hrs WNL; dextrose at goal today (250g; GIR 3.5). New TG level WNL (105), will c/w 45g lipids daily.     12-14    143  |  113<H>  |  25<H>  ----------------------------<  139<H>  3.3<L>   |  25  |  0.47<L>    Ca    7.3<L>      14 Dec 2023 06:12  Phos  2.2     12-14  Mg     2.0     12-14    TPro  5.7<L>  /  Alb  1.9<L>  /  TBili  0.5  /  DBili  x   /  AST  38<H>  /  ALT  79<H>  /  AlkPhos  117  12-13    BMI: BMI (kg/m2): 20.8 (12-11-23 @ 12:34)  HbA1c: A1C with Estimated Average Glucose Result: 5.7 % (12-07-23 @ 16:42)    Glucose: POCT Blood Glucose.: 143 mg/dL (14 Dec 2023 05:26)    BP: 112/62 (12-12-23 @ 21:51) (91/63 - 133/78)Vital Signs Last 24 Hrs  T(C): 37.1 (12-12-23 @ 21:51), Max: 37.1 (12-12-23 @ 21:51)  T(F): 98.7 (12-12-23 @ 21:51), Max: 98.7 (12-12-23 @ 21:51)  HR: 89 (12-12-23 @ 21:51) (80 - 89)  BP: 112/62 (12-12-23 @ 21:51) (104/52 - 112/62)  RR: 18 (12-12-23 @ 21:51) (18 - 18)  SpO2: 99% (12-12-23 @ 21:51) (97% - 99%)    Lipid Panel: Date/Time: 10-25-23 @ 06:00  Triglycerides, Serum: 106 (12/13/23)    POCT Blood Glucose.: 143 mg/dL (14 Dec 2023 05:26)  POCT Blood Glucose.: 99 mg/dL (13 Dec 2023 23:36)  POCT Blood Glucose.: 108 mg/dL (13 Dec 2023 18:22)  POCT Blood Glucose.: 138 mg/dL (13 Dec 2023 12:37)    *I&O's Detail    13 Dec 2023 07:01  -  14 Dec 2023 07:00  --------------------------------------------------------  IN:    Oral Fluid: 360 mL  Total IN: 360 mL    OUT:  Total OUT: 0 mL    Total NET: 360 mL  *fluid status: TPN and output not doc'd; Strict I&O's are rec'd when pt is on PN as per protocol   *BM (+) on 12/12 - small soft brown stool.  pt not on bowel regimen.    *malnutrition: Pt meets criteria for severe protein-calorie malnutrition in context of chronic disease r/t decreased ability to meet increased nutrient needs 2/2 CA AEB moderate to severe muscle/fat wasting, 33.73% wt loss x 2 months    Estimated Needs: based on 50 Kg (wt from EMR on 12/12)  Calories: 5184-2327 Kcal (30-35 Kcal/Kg)  Protein:  g (1.5-2.0 g/Kg)  Fluids: 9232-7552 mL (30-35 mL/Kg)    Diet, Full Liquid (12-13-23) [Active]  Parenteral Nutrition - Adult 1 Each TPN Continuous <Continuous>, 12-13-23 @ 22:00, 22:00, Stop order after: 1 Days    Weight History:  Height (cm): 154.9 (12-11-23 @ 12:34), 154.9 (12-07-23 @ 17:08)  Weight (kg): 49.895 (12-11-23 @ 12:34), 50.4 (12-07-23 @ 17:08), 54 (11-13-23 @ 14:00)  BMI (kg/m2): 20.8 (12-11-23 @ 12:34), 21 (12-07-23 @ 17:08)  BSA (m2): 1.46 (12-11-23 @ 12:34), 1.47 (12-07-23 @ 17:08)  IBW: 105#   IBW %: 104.7%    TPN Recommendations: via PICC line  Total Volume: 1600 mL x 12 hrs  100 g Amino Acids  250 g Dextrose (at dextrose goal; GIR 3.5)  45 g Lipids 20%  96 mEq Sodium Chloride  0 mEq Sodium Acetate  20 mmol Sodium Phosphate  36 mEq Potassium Chloride  0 mEq Potassium Acetate  20 mmol Potassium Phosphate  0 mEq Calcium Gluconate (CAPS out of PN solution)  8 mEq Magnesium Sulfate  200 mg Thiamine  1 ml Trace Elements  10 ml MVI    Total Calories   1700    (Meets  100% of Estimated Energy needs  and  100%  Protein needs)    Additional Recommendations:    1) Daily weights  2) Strict I & O's - required as per PN protocol   3) Daily lyte checks including magnesium and phos  4) Weekly triglycerides/LFT checks  5) POCT q6hrs; maintain 140-180mg/dL  6) ADAT as per surgery from Full Liquids to Low Fiber, when medically feasible and as tolerated    *will continue to monitor and adjust PN prn*  Valentine Mejia, LISAN, CDN [phone] Clinical Nutrition PN Follow Up Note    * 59 y/o female with hx of metastatic mucinous adenocarcinoma of the appendix, s/p HIPEC, appendectomy, cholecystectomy, hysterectomy, omentectomy, peritonectomy, LAR/DLI, and bladder injury with primary repair 10/4, presents with her diverting loop ileostomy. Patient here for reversal. She is on home TPN. She has been complaining of coughing and regurgitation.     *12/12: S/p ileostomy reversal, endorses uncomfortably s/p procedure. Currently on CLD and tolerating thus far. Receives TPN at home however did not have home order present at . D/w surgical resident, plan to c/w TPN during admission. Will start lytes on low end and will adjust tomorrow based on labs. Will cycle TPN x 12 hrs as pt cycles at home. Dextrose goal @ 250g (GIR 3.5); will initiate 200g today and will titrate to goal dextrose tomorrow.   *12/13: Per surgery note: tolerating CLD, +BM and passing flatus; likely will remove wick today. ADAT as per surgery to FLD to LRD when medically feasible. C/w TPN.    *current status: Advanced to FLD yesterday. Per RN documentation pt tolerating diet however per surgical resident pt c/o abdominal pain. ADAT as per surgery, will c/w TPN today.    *new pertinent meds: Lovenox, Toradol, Reglan, Protonix, dextrose 5% + Na Cl 0.45% w/ KCl 20 mEq/L, Dilaudid prn    *labs reviewed; Corrected Na for hyperglycemia WNL, remains on IVF currently - will c/w current total PN volume (1600 mL) however if Na does not downtrend will increase total PN volume. Hypokalemia noted, will increase in PN bag and will monitor/adjust as per labs, consider to replete outside PN bag. Hypophosphatemia still noted, will increase in PN bag and will monitor/adjust prn as per labs. Mg WNL. corrected Ca WNL, rec'd add 10mEq of Calcium Gluconate outside of PN bag as CAPS is out. T Bili WNL for trace elements. POCT x 24 hrs WNL; dextrose at goal today (250g; GIR 3.5). New TG level WNL (105), will c/w 45g lipids daily.     12-14    143  |  113<H>  |  25<H>  ----------------------------<  139<H>  3.3<L>   |  25  |  0.47<L>    Ca    7.3<L>      14 Dec 2023 06:12  Phos  2.2     12-14  Mg     2.0     12-14    TPro  5.7<L>  /  Alb  1.9<L>  /  TBili  0.5  /  DBili  x   /  AST  38<H>  /  ALT  79<H>  /  AlkPhos  117  12-13    BMI: BMI (kg/m2): 20.8 (12-11-23 @ 12:34)  HbA1c: A1C with Estimated Average Glucose Result: 5.7 % (12-07-23 @ 16:42)    Glucose: POCT Blood Glucose.: 143 mg/dL (14 Dec 2023 05:26)    BP: 112/62 (12-12-23 @ 21:51) (91/63 - 133/78)Vital Signs Last 24 Hrs  T(C): 37.1 (12-12-23 @ 21:51), Max: 37.1 (12-12-23 @ 21:51)  T(F): 98.7 (12-12-23 @ 21:51), Max: 98.7 (12-12-23 @ 21:51)  HR: 89 (12-12-23 @ 21:51) (80 - 89)  BP: 112/62 (12-12-23 @ 21:51) (104/52 - 112/62)  RR: 18 (12-12-23 @ 21:51) (18 - 18)  SpO2: 99% (12-12-23 @ 21:51) (97% - 99%)    Lipid Panel: Date/Time: 10-25-23 @ 06:00  Triglycerides, Serum: 106 (12/13/23)    POCT Blood Glucose.: 143 mg/dL (14 Dec 2023 05:26)  POCT Blood Glucose.: 99 mg/dL (13 Dec 2023 23:36)  POCT Blood Glucose.: 108 mg/dL (13 Dec 2023 18:22)  POCT Blood Glucose.: 138 mg/dL (13 Dec 2023 12:37)    *I&O's Detail    13 Dec 2023 07:01  -  14 Dec 2023 07:00  --------------------------------------------------------  IN:    Oral Fluid: 360 mL  Total IN: 360 mL    OUT:  Total OUT: 0 mL    Total NET: 360 mL  *fluid status: TPN and output not doc'd; Strict I&O's are rec'd when pt is on PN as per protocol   *BM (+) on 12/12 - small soft brown stool.  pt not on bowel regimen.    *malnutrition: Pt meets criteria for severe protein-calorie malnutrition in context of chronic disease r/t decreased ability to meet increased nutrient needs 2/2 CA AEB moderate to severe muscle/fat wasting, 33.73% wt loss x 2 months    Estimated Needs: based on 50 Kg (wt from EMR on 12/12)  Calories: 9923-7598 Kcal (30-35 Kcal/Kg)  Protein:  g (1.5-2.0 g/Kg)  Fluids: 5290-4979 mL (30-35 mL/Kg)    Diet, Full Liquid (12-13-23) [Active]  Parenteral Nutrition - Adult 1 Each TPN Continuous <Continuous>, 12-13-23 @ 22:00, 22:00, Stop order after: 1 Days    Weight History:  Height (cm): 154.9 (12-11-23 @ 12:34), 154.9 (12-07-23 @ 17:08)  Weight (kg): 49.895 (12-11-23 @ 12:34), 50.4 (12-07-23 @ 17:08), 54 (11-13-23 @ 14:00)  BMI (kg/m2): 20.8 (12-11-23 @ 12:34), 21 (12-07-23 @ 17:08)  BSA (m2): 1.46 (12-11-23 @ 12:34), 1.47 (12-07-23 @ 17:08)  IBW: 105#   IBW %: 104.7%    TPN Recommendations: via PICC line  Total Volume: 1600 mL x 12 hrs  100 g Amino Acids  250 g Dextrose (at dextrose goal; GIR 3.5)  45 g Lipids 20%  96 mEq Sodium Chloride  0 mEq Sodium Acetate  20 mmol Sodium Phosphate  36 mEq Potassium Chloride  0 mEq Potassium Acetate  20 mmol Potassium Phosphate  0 mEq Calcium Gluconate (CAPS out of PN solution)  8 mEq Magnesium Sulfate  200 mg Thiamine  1 ml Trace Elements  10 ml MVI    Total Calories   1700    (Meets  100% of Estimated Energy needs  and  100%  Protein needs)    Additional Recommendations:    1) Daily weights  2) Strict I & O's - required as per PN protocol   3) Daily lyte checks including magnesium and phos  4) Weekly triglycerides/LFT checks  5) POCT q6hrs; maintain 140-180mg/dL  6) ADAT as per surgery from Full Liquids to Low Fiber, when medically feasible and as tolerated    *will continue to monitor and adjust PN prn*  Valentine Mejia, LISAN, CDN [phone]

## 2023-12-14 NOTE — DISCHARGE NOTE PROVIDER - CARE PROVIDER_API CALL
Silvio Dong  Surgery  [address] [place] [address], [address]  [place] [address]  Phone: [phone]  Fax: [phone]  Follow Up Time: 2 weeks   Silvio Dong  Surgery  [address] [place] [address], [address]  [place] [address]  Phone: [phone]  Fax: [phone]  Follow Up Time: 2 weeks

## 2023-12-14 NOTE — DISCHARGE NOTE PROVIDER - DETAILS OF MALNUTRITION DIAGNOSIS/DIAGNOSES
This patient has been assessed with a concern for Malnutrition and was treated during this hospitalization for the following Nutrition diagnosis/diagnoses:     -  12/12/2023: Severe protein-calorie malnutrition

## 2023-12-14 NOTE — DISCHARGE NOTE PROVIDER - NSDCHHATTENDCERT_GEN_ALL_CORE
My signature below certifies that the above stated patient is homebound and upon completion of the Face-To-Face encounter, has the need for intermittent skilled nursing, physical therapy and/or speech or occupational therapy services in their home for their current diagnosis as outlined in their initial plan of care. These services will continue to be monitored by myself or another physician. Picato Counseling:  I discussed with the patient the risks of Picato including but not limited to erythema, scaling, itching, weeping, crusting, and pain.

## 2023-12-14 NOTE — DISCHARGE NOTE PROVIDER - NSDCFUSCHEDAPPT_GEN_ALL_CORE_FT
Ora Khan Physician [facility]  [facility]  Scheduled Appointment: 12/18/2023    [facility]  [facility]  Scheduled Appointment: 12/18/2023     Ora Khan Physician [facility]  [facility]  Scheduled Appointment: 12/18/2023    [facility]  [facility]  Scheduled Appointment: 12/18/2023

## 2023-12-14 NOTE — DISCHARGE NOTE NURSING/CASE MANAGEMENT/SOCIAL WORK - NSDCPEFALRISK_GEN_ALL_CORE
For information on Fall & Injury Prevention, visit: [email or web address] OR  [email or web address] OR  https://www.cdc.gov/steadi/patient.html For information on Fall & Injury Prevention, visit: [email or web address] OR  [email or web address] OR  https://www.cdc.gov/steadi/patient.html

## 2023-12-14 NOTE — DISCHARGE NOTE PROVIDER - CARE PROVIDERS DIRECT ADDRESSES
[email or web address]

## 2023-12-14 NOTE — DISCHARGE NOTE NURSING/CASE MANAGEMENT/SOCIAL WORK - PATIENT PORTAL LINK FT
You can access the FollowMyHealth Patient Portal offered by [facility] by registering at the following website: [email or web address]. By joining [employer]’s FollowMyHealth portal, you will also be able to view your health information using other applications (apps) compatible with our system. You can access the FollowMyHealth Patient Portal offered by [facility] by registering at the following website: [email or web address]. By joining [employer]’s FollowMyHealth portal, you will also be able to view your health information using other applications (apps) compatible with our system.

## 2023-12-18 ENCOUNTER — APPOINTMENT (OUTPATIENT)
Dept: HEMATOLOGY ONCOLOGY | Facility: CLINIC | Age: 58
End: 2023-12-18
Payer: COMMERCIAL

## 2023-12-18 ENCOUNTER — APPOINTMENT (OUTPATIENT)
Dept: INFUSION THERAPY | Facility: HOSPITAL | Age: 58
End: 2023-12-18

## 2023-12-18 VITALS
SYSTOLIC BLOOD PRESSURE: 113 MMHG | RESPIRATION RATE: 17 BRPM | OXYGEN SATURATION: 99 % | TEMPERATURE: 97.2 F | DIASTOLIC BLOOD PRESSURE: 78 MMHG | BODY MASS INDEX: 22.42 KG/M2 | WEIGHT: 120.59 LBS | HEART RATE: 100 BPM

## 2023-12-18 DIAGNOSIS — K21.9 GASTRO-ESOPHAGEAL REFLUX DISEASE W/OUT ESOPHAGITIS: ICD-10-CM

## 2023-12-18 PROBLEM — F11.20 OPIOID DEPENDENCE, UNCOMPLICATED: Chronic | Status: ACTIVE | Noted: 2023-12-07

## 2023-12-18 PROBLEM — D64.9 ANEMIA, UNSPECIFIED: Chronic | Status: ACTIVE | Noted: 2023-12-07

## 2023-12-18 PROBLEM — Z86.19 PERSONAL HISTORY OF OTHER INFECTIOUS AND PARASITIC DISEASES: Chronic | Status: ACTIVE | Noted: 2023-12-07

## 2023-12-18 PROBLEM — Z86.718 PERSONAL HISTORY OF OTHER VENOUS THROMBOSIS AND EMBOLISM: Chronic | Status: ACTIVE | Noted: 2023-12-07

## 2023-12-18 PROBLEM — Z86.718 PERSONAL HISTORY OF OTHER VENOUS THROMBOSIS AND EMBOLISM: Chronic | Status: ACTIVE | Noted: 2023-12-11

## 2023-12-18 PROCEDURE — 99215 OFFICE O/P EST HI 40 MIN: CPT

## 2023-12-19 LAB
SURGICAL PATHOLOGY STUDY: SIGNIFICANT CHANGE UP
SURGICAL PATHOLOGY STUDY: SIGNIFICANT CHANGE UP

## 2023-12-26 RX ORDER — METOCLOPRAMIDE HCL 10 MG
1 TABLET ORAL
Qty: 120 | Refills: 0
Start: 2023-12-26 | End: 2024-01-24

## 2023-12-27 DIAGNOSIS — Z85.89 PERSONAL HISTORY OF MALIGNANT NEOPLASM OF OTHER ORGANS AND SYSTEMS: ICD-10-CM

## 2023-12-27 DIAGNOSIS — Z91.09 OTHER ALLERGY STATUS, OTHER THAN TO DRUGS AND BIOLOGICAL SUBSTANCES: ICD-10-CM

## 2023-12-27 DIAGNOSIS — Z43.2 ENCOUNTER FOR ATTENTION TO ILEOSTOMY: ICD-10-CM

## 2023-12-27 DIAGNOSIS — Z88.0 ALLERGY STATUS TO PENICILLIN: ICD-10-CM

## 2023-12-27 DIAGNOSIS — Z79.01 LONG TERM (CURRENT) USE OF ANTICOAGULANTS: ICD-10-CM

## 2023-12-27 DIAGNOSIS — E43 UNSPECIFIED SEVERE PROTEIN-CALORIE MALNUTRITION: ICD-10-CM

## 2024-01-05 ENCOUNTER — APPOINTMENT (OUTPATIENT)
Dept: GERIATRICS | Facility: CLINIC | Age: 59
End: 2024-01-05

## 2024-01-05 NOTE — DATA REVIEWED
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 Evaluate rectal anastomosis.  COMPARISON: CT 10/28/2023, 7/27/2023 FINDINGS: LOWER CHEST: Bibasilar atelectasis.  LIVER: Small ill-defined hypodense foci in the right lobe are too small to characterize but overall appear stable dating back to 7/27/2023. Subcapsular implants are lower in density, in keeping with evolving hemorrhagic collections, measuring 4.6 cm in the left lobe (4, 37), previously 6.8 cm and 5.8 cm on the right, previously 8.7 cm. BILE DUCTS: Normal caliber. GALLBLADDER: Cholecystectomy. SPLEEN: Within normal limits. PANCREAS: Within normal limits. ADRENALS: Within normal limits. KIDNEYS/URETERS: Bilateral renal cysts. Focal left lower pole renal cortical scarring. No hydronephrosis.  BLADDER: Within normal limits. REPRODUCTIVE ORGANS: Hysterectomy. No adnexal mass.  BOWEL: Rectal anastomosis and right lower quadrant ileostomy. Rectal contrast flows through the anastomosis without evidence of leak. No bowel obstruction. Appendectomy. PERITONEUM: No ascites. VESSELS: Within normal limits. RETROPERITONEUM/LYMPH NODES: No lymphadenopathy. ABDOMINAL WALL: Within normal limits. BONES: Degenerative changes.  IMPRESSION: Subcapsular/perihepatic hemorrhagic collections are decreased in size since 10/28/2023.  Patent rectal anastomosis.   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 Evaluate rectal anastomosis.  COMPARISON: CT 10/28/2023, 7/27/2023  CONTRAST/COMPLICATIONS: IV Contrast: Omnipaque 350  90 cc administered   10 cc discarded Oral Contrast: Omnipaque 300. Rectal contrast was administered as requested. Complications: None reported at time of study completion  PROCEDURE: CT of the Abdomen and Pelvis was performed. Pre and post contrast imaging was performed. Sagittal and coronal reformats were performed.  FINDINGS: LOWER CHEST: Bibasilar atelectasis.  LIVER: Small ill-defined hypodense foci in the right lobe are too small to characterize but overall appear stable dating back to 7/27/2023. Subcapsular implants are lower in density, in keeping with evolving hemorrhagic collections, measuring 4.6 cm in the left lobe (4, 37), previously 6.8 cm and 5.8 cm on the right, previously 8.7 cm. BILE DUCTS: Normal caliber. GALLBLADDER: Cholecystectomy. SPLEEN: Within normal limits. PANCREAS: Within normal limits. ADRENALS: Within normal limits. KIDNEYS/URETERS: Bilateral renal cysts. Focal left lower pole renal cortical scarring. No hydronephrosis.  BLADDER: Within normal limits. REPRODUCTIVE ORGANS: Hysterectomy. No adnexal mass.  BOWEL: Rectal anastomosis and right lower quadrant ileostomy. Rectal contrast flows through the anastomosis without evidence of leak. No bowel obstruction. Appendectomy. PERITONEUM: No ascites. VESSELS: Within normal limits. RETROPERITONEUM/LYMPH NODES: No lymphadenopathy. ABDOMINAL WALL: Within normal limits. BONES: Degenerative changes.  IMPRESSION: Subcapsular/perihepatic hemorrhagic collections are decreased in size since 10/28/2023.  Patent rectal anastomosis.   --- End of Report ---

## 2024-01-05 NOTE — HISTORY OF PRESENT ILLNESS
[FreeTextEntry1] : 58yoF with appendiceal cancer presents for follow up palliative care visit, referred by Dr. Khan. PMH significant for pre-diabetes, h/o ectopic pregnancy.   Pt began experiencing weight gain and lower abdominal pelvic pressure in April/May 2023. Saw her gynecologist who palpated a mass. US Abdomen on 5/24/23 noted fluid within the endometrium, 12.9 cm complex cystic R adnexal mass suspicious for ovarian neoplasm. CEA and CA 19-9 noted to be elevated by AFP, CA  were normal. She was then referred to Gyn Onc Dr. Sanchez and ultimately to Surg Onc, Dr. Dong.   6/20/23: CT CAP large bilateral complex adnexal masses. Extensive soft tissue infiltration of the mesentery and nodularity of the omentum as well as a moderate amount of ascites. 6/30/23: Omental bx: mucinous adenocarcinoma 7/20/23: Case reviewed at , likely appendiceal primary given abnormal appendix and IHC staining suggesting GI, elevated CEA, normal , plan for neoadjuvant FOLFOX followed by cytoreduction, HIPEC and adjuvant chemo 7/24/23 - 9/5/23: C1-C4 FOLFOX 10/4-11/8/23: admitted for planned appendectomy, cholecystectomy, hysterectomy, omenectomy, peritenectomy, LAR with divertingloop ileostomy and bladder injury with primary repair on 10/4. Course c/b n/v, inability to tolerate PO, severe malnutrition. Started on TPN via PICC.  Main reason for which patient is referred is symptom management.   She is dealing with a multitude of symptoms since the time of her surgery (10/4/23). She is now on TPN via PICC line. She suffers with chronic vomiting since then. Does not deal with much nausea but rather feels the urge to retch. She brings back up anything she puts into her stomach, about 15 minutes afterwards.  Is written for various medications: Metoclopramide 10mg four times per day Dexamethasone 4mg QD (took it for about 5 days, did not help) Dronabinol was prescribed but she did not try it Omeprazole 40mg is prescribed but she does not use it.  Lorazepam 0.5mg (not currently using it)   She experiences pain in the epigastric/substernal region, worsened when she increased abdominal pressure - coughing, sneezing, retching. She finds that the pain rises up every 6 hours, which has prompted her to take the hydromorphone on a Q6h schedule. Pain gets as bad as 7/10 at its worst.  She is using hydromorphone 4mg every 6 hours on a schedule. It helps subside the pain and she can sleep for about an hour after taking it.  She doesn't sleep well at night, wakes up about every 2 hours.     ROS: +fatigue  +weakness +low mood +trouble sleeping She has a colostomy that empties regularly.   Patient is single, lives with her long-time partner, Daniel. She has one son.  She lives in a two family house, on the second floor. She does not come down much. She is able to get bathed and dressed on her own.  She ambulates independently.  She is not employed. Is not driving.  Her support system includes her boyfriend, sister and cousin.   I-STOP Ref#: [id number]

## 2024-01-05 NOTE — ASSESSMENT
[FreeTextEntry1] : 58yoF with:  # Appendiceal cancer - s/p neoadjuvant therapy and extensive surgery 10/4. Surg Onc, Med onc follow up.   # Nausea/vomiting, intractable - Reviewed patient's chart in detail and corroborated with patient's account of her symptoms.  Multiple agents have been utilized with little to no effect.  There appears to be some spasmodic effect causing the retching. At this time would c/w metoclopramide 10mg 6hr, this is max dose. Patient and her boyfriend are interested in what benefit could be garnered from the incorporation of medicinal cannabis.   Medical cannabis certification completed today. Provided cannabis education, overview of state program, and discussed adverse effects in detail. Counseled that vaporized cannabis is not the preferred route of administration due to the fact that both short-term and long-term risks associated with vaporizing oils are not yet fully understood. Recommend starting with 1:1 THC:CBD formulation at low dose of THC (~2mg/dose). Will monitor how she does with this before discussing additional measures to be added.    Patient on TPN until the point where she can reliably tolerate PO.  # Abdominal pain - c/w Hydromorphone 4mg PRN. She may benefit from a long-acting opioid to avoid the symptoms she develops once the medication has worn off. Will re-address at follow up.   # Depressed mood - related to her poor physical condition.  Empathetic listening and validation of emotions provided.   # Encounter for palliative care-  Explained the role of palliative care in enhancing quality of life in the setting of serious illness.  Follow up in 1 month, call sooner with questions or issues.

## 2024-01-07 NOTE — HISTORY OF PRESENT ILLNESS
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   Yojana started experiencing weight gain and lower abdominal pelvic pressure in April/May 2023. She went to her GYN who palpated a mass on exam. US Abdomen on 5/24/23 noted fluid within the endometrium, 12.9 cm complex cystic R adnexal mass suspicious for ovarian neoplasm. CEA and CA 19-9 noted to be elevated by AFP, CA  were normal. She was then referred to GYN/ onc. Dr. Sanchez and ultimately to Dr. Dong, surg onc.    6/20/23: CT CAP large bilateral complex adnexal masses. Extensive soft tissue infiltration of the mesentery and nodularity of the omentum as well as a moderate amount of ascites.  6/30/23: Omental bx: mucinous adenocarcinoma, CK7, CDX2, CK19+, neg for PAX8, TTF1, MMR intact.  7/20/23: Case reviewed at , likely appendiceal primary given abnormal appendix and IHC staining suggesting GI, elevated CEA, normal , plan for neoadjuvant FOLFOX followed by cytoreduction, HIPEC and adjuvant chemo   Referred to medical oncology for systemic therapy.   7/24/23 - 9/5/23: C1-C4 FOLFOX  10/4-11/8/23: admitted to [facility] for planned appendectomy, cholecystectomy, hysterectomy, omenectomy, peritenectomy, LAR with divertingloop ileostomy and bladder injury with primary repair on 10/4. Course c/b n/v, inability to tolerate PO, severe malnutrition. Started on TPN via PICC. 12/11-12/14/23 Admitted to  for loop ileostomy reversal  [de-identified] : pre treatment CEA 63 [de-identified] : mucinous adenocarcinoma [FreeTextEntry1] : s/p resection  [de-identified] : Here for clinical follow up after loop ileostomy reversal at  12/11 (admitted until 12/14). She is extremely fatigued. She was doing well during the first 5 days after surgery. Then, around the time of discharge, she began to retch again. She has had uncontrollable vomiting with retching, not so much nausea. She states it is definitely worse when she does not take the Reglan. She is taking metoclopramide and Dilaudid (for epigastric pain) Q6H but is not sure if they are helping. She has been having BMs about every 2-3 days, no diarrhea, and no blood in her stool. She denies fevers but gets chills with vomiting. She denies cough, Cp, or SOB. She remains on TPN given via PICC. She saw palliative a couple weeks ago who recommended medical marijuana, but when it was brought to her, there were no labels marking how much to take and did not get a clear response upon asking, so she has not taken it yet.

## 2024-01-07 NOTE — PHYSICAL EXAM
[Ambulatory and capable of all self care but unable to carry out any work activities] : Status 2- Ambulatory and capable of all self care but unable to carry out any work activities. Up and about more than 50% of waking hours [Thin] : thin [Normal] : full range of motion and no deformities appreciated [de-identified] : chronically ill appearing, in wheelchair [de-identified] : dry mucous membranes [de-identified] : no edema in LE [de-identified] : no respiratory distress, decrease BS at the bases  [de-identified] : + postsurgical ileostomy site, no purulence or erythema at the side but she is TTP [de-identified] : flat affect

## 2024-01-07 NOTE — REVIEW OF SYSTEMS
[Fatigue] : fatigue [Recent Change In Weight] : ~T recent weight change [Dry Eyes] : dryness of the eyes [SOB on Exertion] : shortness of breath during exertion [Abdominal Pain] : abdominal pain [Vomiting] : vomiting [Diarrhea: Grade 0] : Diarrhea: Grade 0 [Dizziness] : dizziness [Difficulty Walking] : difficulty walking [Negative] : Genitourinary [Chest Pain] : no chest pain [Lower Ext Edema] : no lower extremity edema [Constipation] : no constipation

## 2024-01-07 NOTE — END OF VISIT
[Time Spent: ___ minutes] : I have spent [unfilled] minutes of time on the encounter. [>50% of the face to face encounter time was spent on counseling and/or coordination of care for ___] : Greater than 50% of the face to face encounter time was spent on counseling and/or coordination of care for [unfilled] [] : Fellow [FreeTextEntry3] : 58 F w/ Stage IV appendiceal mucinous adenocarcinoma s/p 4 cycles of neoadjuvant mFOLFOX 7/24-9/19/23 with minimal response--> HIPEC+ optimal debulking on 10/4/23 with prolonged hospital stay c/b poor nutrition, FTT, persistent vomiting/retching post-op  - Reviewed results of surgical path. 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 Given that the disease was optimally debulked, there is no indication that further chemotherapy at this time will be helpful. Furthermore, given the complications post op, pt would not tolerate DMT at this time - Most recent CT A/P 12/2023 showed MARCELL - recent EGD unrevealing. Its unclear why pt remains intolerant of PO and requires ongoing TPN support. She will try medical cannabis and cont close f/u with Dr. Hall - Will need port flush Q 3 mos with labs  - cont all supportive care medications as described above  - Continued f/u with surg onc - RTO in 1 month

## 2024-01-07 NOTE — ASSESSMENT
[Future Reassessment of Pain Scale] : Future reassessment of pain scale    [Medication(s)] : Medication(s) [Referred to Palliative/Pain management] : Referred to Palliative/Pain management [Palliative] : Goals of care discussed with patient: Palliative [Palliative Care Plan] : not applicable at this time [FreeTextEntry1] :  Patient seen with and plan discussed with Dr. Khan. Aryles Hedjar, MD, PGY-6 Hematology/Oncology Fellow [facility]

## 2024-01-07 NOTE — REASON FOR VISIT
[Follow-Up Visit] : a follow-up [Other: _____] : [unfilled] [FreeTextEntry2] : Stage IV appendiceal ca s/p HIPEC and optimal debulking

## 2024-01-10 ENCOUNTER — APPOINTMENT (OUTPATIENT)
Dept: GERIATRICS | Facility: CLINIC | Age: 59
End: 2024-01-10
Payer: COMMERCIAL

## 2024-01-10 PROCEDURE — 99213 OFFICE O/P EST LOW 20 MIN: CPT | Mod: 95

## 2024-01-10 NOTE — REASON FOR VISIT
[Home] : at home, [unfilled] , at the time of the visit. [Medical Office: ([facility])___] : at the medical office located in  [Follow-Up] : a follow-up visit [Spouse] : spouse

## 2024-01-10 NOTE — DATA REVIEWED
[FreeTextEntry1] : CT C/A/P  (09/11/2023):   COMPARISON: CT 6/20/2023 and 7/27/2023.  FINDINGS: CHEST: LUNGS AND LARGE AIRWAYS: Patent central airways. Unchanged 4 mm upper lobe pulmonary nodule (6, 23). PLEURA: No pleural effusion. VESSELS: Left chest wall port with catheter tip terminating at the cavoatrial junction. Filling defect along the course of the catheter within the left brachiocephalic vein and adjacent left internal jugular vein (2, 6-16), consistent with line related thrombus. HEART: Heart size is normal. No pericardial effusion. MEDIASTINUM AND PATRICK: No lymphadenopathy. CHEST WALL AND LOWER NECK: Within normal limits.  ABDOMEN AND PELVIS: LIVER: Stable subcentimeter hypodense foci of the liver that are too small to characterize. BILE DUCTS: Normal caliber. GALLBLADDER: Within normal limits. SPLEEN: Within normal limits. PANCREAS: Within normal limits. ADRENALS: Within normal limits. KIDNEYS/URETERS: Within normal limits.  BLADDER: Within normal limits. REPRODUCTIVE ORGANS: Interval increase in size of bilateral adnexal masses. Right adnexal mass measures 22.1 x 14.6 cm, previously 20.1 x 12.5 cm. Left adnexal mass measuring 14.8 x 12.1 cm, previously 12.7 x 10.4 cm.  BOWEL: No bowel obstruction. Mucinous appendiceal mass is without significant change. PERITONEUM: Mild increase in peritoneal carcinomatosis compared to 7/27/2023. For reference, area of nodularity within the left upper quadrant measures 2.8 x 1.5 cm (2, 50), previously 2.6 x 1.1 cm. Additional left upper quadrant nodule measures 1.7 x 1.0 cm, previously 1.2 x 0.7 cm. Omental caking within the lower abdomen is without significant change. Small volume ascites is increased. VESSELS: Within normal limits. RETROPERITONEUM/LYMPH NODES: No lymphadenopathy. ABDOMINAL WALL: Within normal limits. BONES: Within normal limits.  IMPRESSION: 1.  Progression of disease compared to 7/27/2023 with interval increase in size of large bilateral adnexal masses and mild increase in peritoneal carcinomatosis. Small volume ascites is increased. 2.  Line-related thrombus along the Mediport catheter within the left brachiocephalic vein and adjacent internal jugular vein.

## 2024-01-10 NOTE — PHYSICAL EXAM
[Sclera] : the sclera and conjunctiva were normal [Normal Oral Mucosa] : normal oral mucosa [Neck Appearance] : the appearance of the neck was normal [] : no respiratory distress [Auscultation Breath Sounds / Voice Sounds] : lungs were clear to auscultation bilaterally [Heart Sounds] : normal S1 and S2 [Edema] : there was no peripheral edema [Skin Color & Pigmentation] : normal skin color and pigmentation [No Focal Deficits] : no focal deficits [Oriented To Time, Place, And Person] : oriented to person, place, and time [FreeTextEntry1] : depressed affect

## 2024-01-10 NOTE — HISTORY OF PRESENT ILLNESS
[FreeTextEntry1] : 58yoF with appendiceal cancer presents for initial palliative care visit, referred by Dr. Khan. PMH significant for pre-diabetes, h/o ectopic pregnancy.   Pt began experiencing weight gain and lower abdominal pelvic pressure in April/May 2023. Saw her gynecologist who palpated a mass. US Abdomen on 5/24/23 noted fluid within the endometrium, 12.9 cm complex cystic R adnexal mass suspicious for ovarian neoplasm. CEA and CA 19-9 noted to be elevated by AFP, CA  were normal. She was then referred to Gyn Onc Dr. Sanchez and ultimately to Surg Onc, Dr. Dong.   6/20/23: CT CAP large bilateral complex adnexal masses. Extensive soft tissue infiltration of the mesentery and nodularity of the omentum as well as a moderate amount of ascites. 6/30/23: Omental bx: mucinous adenocarcinoma 7/20/23: Case reviewed at , likely appendiceal primary given abnormal appendix and IHC staining suggesting GI, elevated CEA, normal , plan for neoadjuvant FOLFOX followed by cytoreduction, HIPEC and adjuvant chemo 7/24/23 - 9/5/23: C1-C4 FOLFOX 10/4-11/8/23: admitted for planned appendectomy, cholecystectomy, hysterectomy, omenectomy, peritenectomy, LAR with divertingloop ileostomy and bladder injury with primary repair on 10/4. Course c/b n/v, inability to tolerate PO, severe malnutrition. Started on TPN via PICC.  Main reason for which patient is referred is symptom management.   She is dealing with a multitude of symptoms since the time of her surgery (10/4/23). She is now on TPN via PICC line. She suffers with chronic vomiting since then. Does not deal with much nausea but rather feels the urge to retch. She brings back up anything she puts into her stomach, about 15 minutes afterwards.  Is written for various medications: Metoclopramide 10mg four times per day Dexamethasone 4mg QD (took it for about 5 days, did not help) Dronabinol was prescribed but she did not try it Omeprazole 40mg is prescribed but she does not use it.  Lorazepam 0.5mg (not currently using it)   She experiences pain in the epigastric/substernal region, worsened when she increased abdominal pressure - coughing, sneezing, retching. She finds that the pain rises up every 6 hours, which has prompted her to take the hydromorphone on a Q6h schedule. Pain gets as bad as 7/10 at its worst.  She is using hydromorphone 4mg every 6 hours on a schedule. It helps subside the pain and she can sleep for about an hour after taking it.  She doesn't sleep well at night, wakes up about every 2 hours.     ROS: +fatigue  +weakness +low mood +trouble sleeping Remainder of ROS non-contributory  Patient is single, lives with her long-time partner, Daniel. She has one son.  She lives in a two family house, on the second floor. She does not come down much. She is able to get bathed and dressed on her own.  She ambulates independently.  She is not employed. Is not driving.  Her support system includes her boyfriend, sister and cousin.   I-STOP Ref#: [id number]

## 2024-01-12 ENCOUNTER — APPOINTMENT (OUTPATIENT)
Dept: HEMATOLOGY ONCOLOGY | Facility: CLINIC | Age: 59
End: 2024-01-12

## 2024-01-12 ENCOUNTER — APPOINTMENT (OUTPATIENT)
Dept: SURGICAL ONCOLOGY | Facility: CLINIC | Age: 59
End: 2024-01-12
Payer: COMMERCIAL

## 2024-01-12 VITALS
HEART RATE: 96 BPM | HEIGHT: 61.5 IN | DIASTOLIC BLOOD PRESSURE: 83 MMHG | OXYGEN SATURATION: 98 % | BODY MASS INDEX: 21.81 KG/M2 | SYSTOLIC BLOOD PRESSURE: 116 MMHG | RESPIRATION RATE: 17 BRPM | WEIGHT: 117 LBS

## 2024-01-12 LAB — CEA SERPL-MCNC: 11.3 NG/ML

## 2024-01-12 PROCEDURE — 99203 OFFICE O/P NEW LOW 30 MIN: CPT | Mod: 25

## 2024-01-12 PROCEDURE — 99213 OFFICE O/P EST LOW 20 MIN: CPT | Mod: 25

## 2024-01-12 NOTE — DATA REVIEWED
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 Evaluate rectal anastomosis.  COMPARISON: CT 10/28/2023, 7/27/2023 PROCEDURE: CT of the Abdomen and Pelvis was performed. Pre and post contrast imaging was performed. Sagittal and coronal reformats were performed.  FINDINGS: LOWER CHEST: Bibasilar atelectasis.  LIVER: Small ill-defined hypodense foci in the right lobe are too small to characterize but overall appear stable dating back to 7/27/2023. Subcapsular implants are lower in density, in keeping with evolving hemorrhagic collections, measuring 4.6 cm in the left lobe (4, 37), previously 6.8 cm and 5.8 cm on the right, previously 8.7 cm. BILE DUCTS: Normal caliber. GALLBLADDER: Cholecystectomy. SPLEEN: Within normal limits. PANCREAS: Within normal limits. ADRENALS: Within normal limits. KIDNEYS/URETERS: Bilateral renal cysts. Focal left lower pole renal cortical scarring. No hydronephrosis.  BLADDER: Within normal limits. REPRODUCTIVE ORGANS: Hysterectomy. No adnexal mass.  BOWEL: Rectal anastomosis and right lower quadrant ileostomy. Rectal contrast flows through the anastomosis without evidence of leak. No bowel obstruction. Appendectomy. PERITONEUM: No ascites. VESSELS: Within normal limits. RETROPERITONEUM/LYMPH NODES: No lymphadenopathy. ABDOMINAL WALL: Within normal limits. BONES: Degenerative changes.  IMPRESSION: Subcapsular/perihepatic hemorrhagic collections are decreased in size since 10/28/2023.  Patent rectal anastomosis.   --- End of Report ---

## 2024-01-12 NOTE — HISTORY OF PRESENT ILLNESS
[FreeTextEntry1] : 58yoF with appendiceal cancer presents for follow up palliative care visit, referred by Dr. Khan. PMH significant for pre-diabetes, h/o ectopic pregnancy.   Pt began experiencing weight gain and lower abdominal pelvic pressure in April/May 2023. Saw her gynecologist who palpated a mass. US Abdomen on 5/24/23 noted fluid within the endometrium, 12.9 cm complex cystic R adnexal mass suspicious for ovarian neoplasm. CEA and CA 19-9 noted to be elevated by AFP, CA  were normal. She was then referred to Gyn Onc Dr. Sanchez and ultimately to Surg Onc, Dr. Dong.   6/20/23: CT CAP large bilateral complex adnexal masses. Extensive soft tissue infiltration of the mesentery and nodularity of the omentum as well as a moderate amount of ascites. 6/30/23: Omental bx: mucinous adenocarcinoma 7/20/23: Case reviewed at , likely appendiceal primary given abnormal appendix and IHC staining suggesting GI, elevated CEA, normal , plan for neoadjuvant FOLFOX followed by cytoreduction, HIPEC and adjuvant chemo 7/24/23 - 9/5/23: C1-C4 FOLFOX 10/4-11/8/23: admitted for planned appendectomy, cholecystectomy, hysterectomy, omenectomy, peritenectomy, LAR with divertingloop ileostomy and bladder injury with primary repair on 10/4. Course c/b n/v, inability to tolerate PO, severe malnutrition. Started on TPN via PICC.  Main reason for which patient is referred is symptom management.   She is dealing with a multitude of symptoms since the time of her surgery (10/4/23). She is now on TPN via PICC line. She suffers with chronic vomiting since then. Does not deal with much nausea but rather feels the urge to retch. She brings back up anything she puts into her stomach, about 15 minutes afterwards.  Is written for various medications: Metoclopramide 10mg four times per day Dexamethasone 4mg QD (took it for about 5 days, did not help) Dronabinol was prescribed but she did not try it Omeprazole 40mg is prescribed but she does not use it.  Lorazepam 0.5mg (not currently using it)   She experiences pain in the epigastric/substernal region, worsened when she increased abdominal pressure - coughing, sneezing, retching. She finds that the pain rises up every 6 hours, which has prompted her to take the hydromorphone on a Q6h schedule. Pain gets as bad as 7/10 at its worst.  She is using hydromorphone 4mg every 6 hours on a schedule. It helps subside the pain and she can sleep for about an hour after taking it.  She doesn't sleep well at night, wakes up about every 2 hours.     Interval Hx (1/10/24): Patient seen via telemedicine for palliative medicine follow up. Since our last visit she was started on baclofen 10mg TID.  She continues to use hydromorphone 4mg Q6h, metoclopramide 10mg Q6h.  States that the baclofen "relaxes" her pain a bit, which has resulted in a bit less intensity of her abdominal pain and retching. Has not yet tried the medicinal cannabis, she has been hesitant to try it, she is concerned about AEs.  Wants her boyfriend to be by her side when she does end up using it.   ROS: +fatigue  +weakness +low mood +trouble sleeping Denies constipation - moves bowels regularly.   Patient is single, lives with her long-time partner, Daniel. She has one son.  She lives in a two family house, on the second floor. She does not come down much. She is able to get bathed and dressed on her own.  She ambulates independently.  She is not employed. Is not driving.  Her support system includes her boyfriend, sister and cousin.   I-STOP Ref#: [id number]

## 2024-01-12 NOTE — PHYSICAL EXAM
[No Focal Deficits] : no focal deficits [Oriented To Time, Place, And Person] : oriented to person, place, and time [FreeTextEntry1] : depressed affect

## 2024-01-12 NOTE — ASSESSMENT
[FreeTextEntry1] : 58yoF with:  # Appendiceal cancer - s/p neoadjuvant therapy and extensive surgery 10/4. Surg Onc, Med onc follow up.   # Nausea/vomiting, intractable - c/w current regimen of metoclopramide Q6h, Baclofen TID, hydromorphone Q6h. This regimen seems to have been helping lessen her symptoms more than anything else has thus far.  She is still interested in incorporating medicinal cannabis tincture. We discussed dosing and implementation.    Patient on TPN until the point where she can reliably tolerate PO.  # Abdominal pain - c/w Hydromorphone 4mg PRN. She may benefit from a long-acting opioid to avoid the symptoms she develops once the medication has worn off. Will continue to monitor.  # Depressed mood - related to her poor physical condition.  Empathetic listening and validation of emotions provided.   # Encounter for palliative care- Emotional support provided  Follow up in 1 month, call sooner with questions or issues.

## 2024-01-16 ENCOUNTER — OUTPATIENT (OUTPATIENT)
Dept: OUTPATIENT SERVICES | Facility: HOSPITAL | Age: 59
LOS: 1 days | Discharge: HOME | End: 2024-01-16

## 2024-01-16 DIAGNOSIS — Z98.890 OTHER SPECIFIED POSTPROCEDURAL STATES: Chronic | ICD-10-CM

## 2024-01-16 DIAGNOSIS — Z95.828 PRESENCE OF OTHER VASCULAR IMPLANTS AND GRAFTS: Chronic | ICD-10-CM

## 2024-01-16 DIAGNOSIS — Z90.710 ACQUIRED ABSENCE OF BOTH CERVIX AND UTERUS: Chronic | ICD-10-CM

## 2024-01-16 DIAGNOSIS — Z87.59 PERSONAL HISTORY OF OTHER COMPLICATIONS OF PREGNANCY, CHILDBIRTH AND THE PUERPERIUM: Chronic | ICD-10-CM

## 2024-01-16 DIAGNOSIS — Z90.49 ACQUIRED ABSENCE OF OTHER SPECIFIED PARTS OF DIGESTIVE TRACT: Chronic | ICD-10-CM

## 2024-01-16 DIAGNOSIS — C18.1 MALIGNANT NEOPLASM OF APPENDIX: ICD-10-CM

## 2024-01-18 NOTE — ASSESSMENT
[FreeTextEntry1] : 3 months s/p extensive CRS-HIPEC and 1 month s/p reversal of loop ileostomy. Healing appropriately. Symptoms of PO intolerance have improved minimally though she is still TPN dependent due to her retching and inability to keep down much PO. We discussed a careful diet regimen of frequent small meals and continuing Reglan, +/- medical cannabis. We also discussed the patient undergoing an UGI series to evaluate her gastric emptying. Then we will get a CT in 6-8 weeks to see if her liver hematomas have resolved or not.

## 2024-01-18 NOTE — HISTORY OF PRESENT ILLNESS
[de-identified] : Ms. Zaldivar presents for post-op visit now 3+ months s/p extensive CRS-HIPEC for appendiceal pseudomyxoma and 1 month s/p ileostomy reversal. Her symptoms of retching/vomiting and PO intolerance persist though she reports that they have marginally improved. She has begun taking Reglan regularly and reports being able to tolerate minimal PO, though she still is TPN dependent. She denies abdominal pain and reports normal daily bowel movements.

## 2024-01-18 NOTE — REASON FOR VISIT
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 She also has significant flatfoot deformity.     Patient notified.  She would like more information regarding plan moving forward.    Per Dr. He plan moving forward:        Wait for EMG. Can discuss steroid shot in the big toe joint if needed. Will call her for followup after her EMG.         [Follow-Up Visit] : a follow-up visit for [FreeTextEntry2] : appendiceal cancer

## 2024-01-18 NOTE — PHYSICAL EXAM
[Normal] : supple, no neck mass and thyroid not enlarged [Normal Neck Lymph Nodes] : normal neck lymph nodes  [Normal Supraclavicular Lymph Nodes] : normal supraclavicular lymph nodes [Normal Groin Lymph Nodes] : normal groin lymph nodes [Normal Axillary Lymph Nodes] : normal axillary lymph nodes [Normal] : oriented to person, place and time, with appropriate affect [de-identified] : midline laparotomy healed. RLQ ostomy site healed.

## 2024-01-19 NOTE — ASSESSMENT
[Future Reassessment of Pain Scale] : Future reassessment of pain scale    [Medication(s)] : Medication(s) [Referred to Palliative/Pain management] : Referred to Palliative/Pain management [Curative] : Goals of care discussed with patient: Curative [Palliative Care Plan] : not applicable at this time

## 2024-01-19 NOTE — END OF VISIT
[] : Fellow [>50% of the face to face encounter time was spent on counseling and/or coordination of care for ___] : Greater than 50% of the face to face encounter time was spent on counseling and/or coordination of care for [unfilled] spt change

## 2024-01-22 ENCOUNTER — APPOINTMENT (OUTPATIENT)
Dept: HEMATOLOGY ONCOLOGY | Facility: CLINIC | Age: 59
End: 2024-01-22
Payer: COMMERCIAL

## 2024-01-22 PROCEDURE — 99214 OFFICE O/P EST MOD 30 MIN: CPT

## 2024-02-09 NOTE — PHYSICAL EXAM
[Ambulatory and capable of all self care but unable to carry out any work activities] : Status 2- Ambulatory and capable of all self care but unable to carry out any work activities. Up and about more than 50% of waking hours [Capable of only limited self care, confined to bed or chair more than 50% of waking hours] : Status 3- Capable of only limited self care, confined to bed or chair more than 50% of waking hours [Thin] : thin [Normal] : affect appropriate [de-identified] : normal respiratory pattern

## 2024-02-09 NOTE — REVIEW OF SYSTEMS
[Diarrhea: Grade 0] : Diarrhea: Grade 0 [Difficulty Walking] : difficulty walking [Fatigue] : fatigue [Abdominal Pain] : abdominal pain [Vomiting] : vomiting [Negative] : Allergic/Immunologic [SOB on Exertion] : no shortness of breath during exertion

## 2024-02-09 NOTE — HISTORY OF PRESENT ILLNESS
[Disease: _____________________] : Disease: [unfilled] [AJCC Stage: ____] : AJCC Stage: [unfilled] [Home] : at home, [unfilled] , at the time of the visit. [Medical Office: ([facility])___] : at the medical office located in  [Verbal consent obtained from patient] : the patient, [unfilled] [de-identified] : 58 F w/ pMMR, stage IV mucinous appendiceal adenocarcinoma presents for further management.   Yojana started experiencing weight gain and lower abdominal pelvic pressure in April/May 2023. She went to her GYN who palpated a mass on exam. US Abdomen on 5/24/23 noted fluid within the endometrium, 12.9 cm complex cystic R adnexal mass suspicious for ovarian neoplasm. CEA and CA 19-9 noted to be elevated by AFP, CA  were normal. She was then referred to GYN/ onc. Dr. Sanchez and ultimately to Dr. Dong, surg onc.    6/20/23: CT CAP large bilateral complex adnexal masses. Extensive soft tissue infiltration of the mesentery and nodularity of the omentum as well as a moderate amount of ascites.  6/30/23: Omental bx: mucinous adenocarcinoma, CK7, CDX2, CK19+, neg for PAX8, TTF1, MMR intact.  7/20/23: Case reviewed at , likely appendiceal primary given abnormal appendix and IHC staining suggesting GI, elevated CEA, normal , plan for neoadjuvant FOLFOX followed by cytoreduction, HIPEC and adjuvant chemo   Referred to medical oncology for systemic therapy.   7/24/23 - 9/5/23: C1-C4 FOLFOX  10/4-11/8/23: admitted to [facility] for planned appendectomy, cholecystectomy, hysterectomy, omenectomy, peritenectomy, LAR with divertingloop ileostomy and bladder injury with primary repair on 10/4. Course c/b n/v, inability to tolerate PO, severe malnutrition. Started on TPN via PICC. 12/11-12/14/23 Admitted to  for loop ileostomy reversal  [de-identified] : mucinous adenocarcinoma [de-identified] : pre treatment CEA 63 [FreeTextEntry1] : s/p resection  [de-identified] : remains on TPN. still with post prandial vomiting, some days are better than others. can tolerate soups.  pending UGI series ordered by surg onc pain only with vomiting has not used medical cannabis.  needs refill of Hydromorphone. Uses about 4 tabs per day

## 2024-02-13 ENCOUNTER — RESULT REVIEW (OUTPATIENT)
Age: 59
End: 2024-02-13

## 2024-02-14 ENCOUNTER — APPOINTMENT (OUTPATIENT)
Dept: GERIATRICS | Facility: CLINIC | Age: 59
End: 2024-02-14
Payer: COMMERCIAL

## 2024-02-14 PROCEDURE — 99213 OFFICE O/P EST LOW 20 MIN: CPT

## 2024-02-14 NOTE — HISTORY OF PRESENT ILLNESS
[FreeTextEntry1] : 58yoF with appendiceal cancer presents for follow up palliative care visit, referred by Dr. Khan. PMH significant for pre-diabetes, h/o ectopic pregnancy.   Pt began experiencing weight gain and lower abdominal pelvic pressure in April/May 2023. Saw her gynecologist who palpated a mass. US Abdomen on 5/24/23 noted fluid within the endometrium, 12.9 cm complex cystic R adnexal mass suspicious for ovarian neoplasm. CEA and CA 19-9 noted to be elevated by AFP, CA  were normal. She was then referred to Gyn Onc Dr. Sanchez and ultimately to Surg Onc, Dr. Dong.   6/20/23: CT CAP large bilateral complex adnexal masses. Extensive soft tissue infiltration of the mesentery and nodularity of the omentum as well as a moderate amount of ascites. 6/30/23: Omental bx: mucinous adenocarcinoma 7/20/23: Case reviewed at , likely appendiceal primary given abnormal appendix and IHC staining suggesting GI, elevated CEA, normal , plan for neoadjuvant FOLFOX followed by cytoreduction, HIPEC and adjuvant chemo 7/24/23 - 9/5/23: C1-C4 FOLFOX 10/4-11/8/23: admitted for planned appendectomy, cholecystectomy, hysterectomy, omenectomy, peritenectomy, LAR with divertingloop ileostomy and bladder injury with primary repair on 10/4. Course c/b n/v, inability to tolerate PO, severe malnutrition. Started on TPN via PICC.  Main reason for which patient is referred is symptom management.   She is dealing with a multitude of symptoms since the time of her surgery (10/4/23). She is now on TPN via PICC line. She suffers with chronic vomiting since then. Does not deal with much nausea but rather feels the urge to retch. She brings back up anything she puts into her stomach, about 15 minutes afterwards.  Is written for various medications: Metoclopramide 10mg four times per day Dexamethasone 4mg QD (took it for about 5 days, did not help) Dronabinol was prescribed but she did not try it Omeprazole 40mg is prescribed but she does not use it.  Lorazepam 0.5mg (not currently using it)   She experiences pain in the epigastric/substernal region, worsened when she increased abdominal pressure - coughing, sneezing, retching. She finds that the pain rises up every 6 hours, which has prompted her to take the hydromorphone on a Q6h schedule. Pain gets as bad as 7/10 at its worst.  She is using hydromorphone 4mg every 6 hours on a schedule. It helps subside the pain and she can sleep for about an hour after taking it.  She doesn't sleep well at night, wakes up about every 2 hours.     Interval Hx (2/14/24): Patient seen via telemedicine for palliative medicine follow up. Pt reports that she has been doing a bit better lately.  She continues to use hydromorphone 4mg at midnight and 6am, metoclopramide 10mg Q6h, baclofen 5mg TID Has tried using the medicinal cannabis twice, tolerated fine, not sure if it helped much.  ROS: +fatigue  +trouble sleeping - finds that the pain at her surgical site is amplified at night, is squeezing in quality.  The pain is also exacerbated when she coughs or sneezes.  Denies constipation - moves bowels regularly.   Patient is single, lives with her long-time partner, Daniel. She has one son.  She lives in a two family house, on the second floor. She does not come down much. She is able to get bathed and dressed on her own.  She ambulates independently.  She is not employed. Is not driving.  Her support system includes her boyfriend, sister and cousin.   I-STOP Ref#: [id number]

## 2024-02-14 NOTE — REASON FOR VISIT
[Home] : at home, [unfilled] , at the time of the visit. [Medical Office: ([facility])___] : at the medical office located in  [Follow-Up] : a follow-up visit [Spouse] : spouse

## 2024-02-14 NOTE — DATA REVIEWED
[FreeTextEntry1] : CT Abd/Pelvis (12/06/2023)  INTERPRETATION:  CLINICAL INFORMATION: Mucinous adenocarcinoma of the appendix status post CRS -HIPEC 10/4/2023, low rectal anastomosis. Evaluate rectal anastomosis.  COMPARISON: CT 10/28/2023, 7/27/2023 PROCEDURE: CT of the Abdomen and Pelvis was performed. Pre and post contrast imaging was performed. Sagittal and coronal reformats were performed.  FINDINGS: LOWER CHEST: Bibasilar atelectasis.  LIVER: Small ill-defined hypodense foci in the right lobe are too small to characterize but overall appear stable dating back to 7/27/2023. Subcapsular implants are lower in density, in keeping with evolving hemorrhagic collections, measuring 4.6 cm in the left lobe (4, 37), previously 6.8 cm and 5.8 cm on the right, previously 8.7 cm. BILE DUCTS: Normal caliber. GALLBLADDER: Cholecystectomy. SPLEEN: Within normal limits. PANCREAS: Within normal limits. ADRENALS: Within normal limits. KIDNEYS/URETERS: Bilateral renal cysts. Focal left lower pole renal cortical scarring. No hydronephrosis.  BLADDER: Within normal limits. REPRODUCTIVE ORGANS: Hysterectomy. No adnexal mass.  BOWEL: Rectal anastomosis and right lower quadrant ileostomy. Rectal contrast flows through the anastomosis without evidence of leak. No bowel obstruction. Appendectomy. PERITONEUM: No ascites. VESSELS: Within normal limits. RETROPERITONEUM/LYMPH NODES: No lymphadenopathy. ABDOMINAL WALL: Within normal limits. BONES: Degenerative changes.  IMPRESSION: Subcapsular/perihepatic hemorrhagic collections are decreased in size since 10/28/2023.  Patent rectal anastomosis.   --- End of Report ---

## 2024-02-14 NOTE — ASSESSMENT
[FreeTextEntry1] : 58yoF with:  # Appendiceal cancer - s/p neoadjuvant therapy and extensive surgery 10/4. Surg Onc, Med onc follow up.   # Nausea/vomiting - Has been improving. c/w current regimen of metoclopramide Q6h, Baclofen TID, hydromorphone PRN. This regimen seems to have been helping lessen her symptoms more than anything else has thus far. She may try to utilize the medicinal cannabis should she have heightened n/v or pain but not necessary at present given the improvement she has been showing.    Patient on TPN until the point where she can reliably tolerate PO.  # Abdominal pain - c/w Hydromorphone 4mg PRN. She may benefit from a long-acting opioid to avoid the symptoms she develops once the medication has worn off. Will continue to monitor.  # Depressed mood - related to her poor physical condition.  Empathetic listening and validation of emotions provided.   # Encounter for palliative care- Emotional support provided  Follow up in 2 months, call sooner with questions or issues.

## 2024-02-20 ENCOUNTER — OUTPATIENT (OUTPATIENT)
Dept: OUTPATIENT SERVICES | Facility: HOSPITAL | Age: 59
LOS: 1 days | End: 2024-02-20
Payer: COMMERCIAL

## 2024-02-20 ENCOUNTER — APPOINTMENT (OUTPATIENT)
Dept: CT IMAGING | Facility: IMAGING CENTER | Age: 59
End: 2024-02-20
Payer: COMMERCIAL

## 2024-02-20 DIAGNOSIS — Z98.890 OTHER SPECIFIED POSTPROCEDURAL STATES: Chronic | ICD-10-CM

## 2024-02-20 DIAGNOSIS — Z90.710 ACQUIRED ABSENCE OF BOTH CERVIX AND UTERUS: Chronic | ICD-10-CM

## 2024-02-20 DIAGNOSIS — Z87.59 PERSONAL HISTORY OF OTHER COMPLICATIONS OF PREGNANCY, CHILDBIRTH AND THE PUERPERIUM: Chronic | ICD-10-CM

## 2024-02-20 DIAGNOSIS — C18.1 MALIGNANT NEOPLASM OF APPENDIX: ICD-10-CM

## 2024-02-20 DIAGNOSIS — C78.6 SECONDARY MALIGNANT NEOPLASM OF RETROPERITONEUM AND PERITONEUM: ICD-10-CM

## 2024-02-20 DIAGNOSIS — Z95.828 PRESENCE OF OTHER VASCULAR IMPLANTS AND GRAFTS: Chronic | ICD-10-CM

## 2024-02-20 DIAGNOSIS — Z90.49 ACQUIRED ABSENCE OF OTHER SPECIFIED PARTS OF DIGESTIVE TRACT: Chronic | ICD-10-CM

## 2024-02-20 PROCEDURE — 71260 CT THORAX DX C+: CPT | Mod: 26

## 2024-02-20 PROCEDURE — 74177 CT ABD & PELVIS W/CONTRAST: CPT | Mod: 26

## 2024-02-20 PROCEDURE — 71260 CT THORAX DX C+: CPT

## 2024-02-20 PROCEDURE — 74177 CT ABD & PELVIS W/CONTRAST: CPT

## 2024-02-22 NOTE — H&P PST ADULT - NSICDXPASTMEDICALHX_GEN_ALL_CORE_FT
noninvasive blood pressure monitor PAST MEDICAL HISTORY:  GERD (gastroesophageal reflux disease)     History of sickle cell trait     Malignant neoplasm of appendix      PAST MEDICAL HISTORY:  Anemia     GERD (gastroesophageal reflux disease)     H/O sepsis     History of deep vein thrombosis     History of sickle cell trait     Malignant neoplasm of appendix     Opiate dependence

## 2024-02-27 ENCOUNTER — EMERGENCY (EMERGENCY)
Facility: HOSPITAL | Age: 59
LOS: 1 days | Discharge: HOME | End: 2024-02-27
Attending: STUDENT IN AN ORGANIZED HEALTH CARE EDUCATION/TRAINING PROGRAM | Admitting: STUDENT IN AN ORGANIZED HEALTH CARE EDUCATION/TRAINING PROGRAM
Payer: COMMERCIAL

## 2024-02-27 ENCOUNTER — NON-APPOINTMENT (OUTPATIENT)
Age: 59
End: 2024-02-27

## 2024-02-27 VITALS
TEMPERATURE: 99 F | RESPIRATION RATE: 16 BRPM | HEART RATE: 85 BPM | DIASTOLIC BLOOD PRESSURE: 89 MMHG | SYSTOLIC BLOOD PRESSURE: 119 MMHG | OXYGEN SATURATION: 100 %

## 2024-02-27 VITALS
OXYGEN SATURATION: 100 % | SYSTOLIC BLOOD PRESSURE: 116 MMHG | TEMPERATURE: 99 F | HEART RATE: 94 BPM | RESPIRATION RATE: 20 BRPM | DIASTOLIC BLOOD PRESSURE: 75 MMHG

## 2024-02-27 DIAGNOSIS — Z87.59 PERSONAL HISTORY OF OTHER COMPLICATIONS OF PREGNANCY, CHILDBIRTH AND THE PUERPERIUM: Chronic | ICD-10-CM

## 2024-02-27 DIAGNOSIS — Z90.710 ACQUIRED ABSENCE OF BOTH CERVIX AND UTERUS: Chronic | ICD-10-CM

## 2024-02-27 DIAGNOSIS — Z90.49 ACQUIRED ABSENCE OF OTHER SPECIFIED PARTS OF DIGESTIVE TRACT: Chronic | ICD-10-CM

## 2024-02-27 DIAGNOSIS — Z98.890 OTHER SPECIFIED POSTPROCEDURAL STATES: Chronic | ICD-10-CM

## 2024-02-27 DIAGNOSIS — Z95.828 PRESENCE OF OTHER VASCULAR IMPLANTS AND GRAFTS: Chronic | ICD-10-CM

## 2024-02-27 LAB
ALBUMIN SERPL ELPH-MCNC: 4 G/DL — SIGNIFICANT CHANGE UP (ref 3.3–5)
ALP SERPL-CCNC: 81 U/L — SIGNIFICANT CHANGE UP (ref 40–120)
ALT FLD-CCNC: 29 U/L — SIGNIFICANT CHANGE UP (ref 4–33)
ANION GAP SERPL CALC-SCNC: 13 MMOL/L — SIGNIFICANT CHANGE UP (ref 7–14)
APTT BLD: 32.5 SEC — SIGNIFICANT CHANGE UP (ref 24.5–35.6)
AST SERPL-CCNC: 21 U/L — SIGNIFICANT CHANGE UP (ref 4–32)
BASE EXCESS BLDV CALC-SCNC: 4.4 MMOL/L — HIGH (ref -2–3)
BASOPHILS # BLD AUTO: 0.01 K/UL — SIGNIFICANT CHANGE UP (ref 0–0.2)
BASOPHILS NFR BLD AUTO: 0.1 % — SIGNIFICANT CHANGE UP (ref 0–2)
BILIRUB SERPL-MCNC: 0.8 MG/DL — SIGNIFICANT CHANGE UP (ref 0.2–1.2)
BLOOD GAS VENOUS COMPREHENSIVE RESULT: SIGNIFICANT CHANGE UP
BUN SERPL-MCNC: 19 MG/DL — SIGNIFICANT CHANGE UP (ref 7–23)
CALCIUM SERPL-MCNC: 10.1 MG/DL — SIGNIFICANT CHANGE UP (ref 8.4–10.5)
CHLORIDE BLDV-SCNC: 106 MMOL/L — SIGNIFICANT CHANGE UP (ref 96–108)
CHLORIDE SERPL-SCNC: 106 MMOL/L — SIGNIFICANT CHANGE UP (ref 98–107)
CO2 BLDV-SCNC: 29.6 MMOL/L — HIGH (ref 22–26)
CO2 SERPL-SCNC: 28 MMOL/L — SIGNIFICANT CHANGE UP (ref 22–31)
CREAT SERPL-MCNC: 0.66 MG/DL — SIGNIFICANT CHANGE UP (ref 0.5–1.3)
EGFR: 100 ML/MIN/1.73M2 — SIGNIFICANT CHANGE UP
EOSINOPHIL # BLD AUTO: 0 K/UL — SIGNIFICANT CHANGE UP (ref 0–0.5)
EOSINOPHIL NFR BLD AUTO: 0 % — SIGNIFICANT CHANGE UP (ref 0–6)
GAS PNL BLDV: 144 MMOL/L — SIGNIFICANT CHANGE UP (ref 136–145)
GLUCOSE BLDV-MCNC: 111 MG/DL — HIGH (ref 70–99)
GLUCOSE SERPL-MCNC: 114 MG/DL — HIGH (ref 70–99)
HCO3 BLDV-SCNC: 28 MMOL/L — SIGNIFICANT CHANGE UP (ref 22–29)
HCT VFR BLD CALC: 31.9 % — LOW (ref 34.5–45)
HCT VFR BLDA CALC: 35 % — SIGNIFICANT CHANGE UP (ref 34.5–46.5)
HGB BLD CALC-MCNC: 11.6 G/DL — LOW (ref 11.7–16.1)
HGB BLD-MCNC: 11 G/DL — LOW (ref 11.5–15.5)
IANC: 8.81 K/UL — HIGH (ref 1.8–7.4)
IMM GRANULOCYTES NFR BLD AUTO: 0.3 % — SIGNIFICANT CHANGE UP (ref 0–0.9)
INR BLD: 1.28 RATIO — HIGH (ref 0.85–1.18)
LACTATE BLDV-MCNC: 1.1 MMOL/L — SIGNIFICANT CHANGE UP (ref 0.5–2)
LIDOCAIN IGE QN: 9 U/L — SIGNIFICANT CHANGE UP (ref 7–60)
LYMPHOCYTES # BLD AUTO: 1.27 K/UL — SIGNIFICANT CHANGE UP (ref 1–3.3)
LYMPHOCYTES # BLD AUTO: 11.5 % — LOW (ref 13–44)
MCHC RBC-ENTMCNC: 27.9 PG — SIGNIFICANT CHANGE UP (ref 27–34)
MCHC RBC-ENTMCNC: 34.5 GM/DL — SIGNIFICANT CHANGE UP (ref 32–36)
MCV RBC AUTO: 81 FL — SIGNIFICANT CHANGE UP (ref 80–100)
MONOCYTES # BLD AUTO: 0.92 K/UL — HIGH (ref 0–0.9)
MONOCYTES NFR BLD AUTO: 8.3 % — SIGNIFICANT CHANGE UP (ref 2–14)
NEUTROPHILS # BLD AUTO: 8.81 K/UL — HIGH (ref 1.8–7.4)
NEUTROPHILS NFR BLD AUTO: 79.8 % — HIGH (ref 43–77)
NRBC # BLD: 0 /100 WBCS — SIGNIFICANT CHANGE UP (ref 0–0)
NRBC # FLD: 0 K/UL — SIGNIFICANT CHANGE UP (ref 0–0)
PCO2 BLDV: 39 MMHG — SIGNIFICANT CHANGE UP (ref 39–52)
PH BLDV: 7.47 — HIGH (ref 7.32–7.43)
PLATELET # BLD AUTO: 295 K/UL — SIGNIFICANT CHANGE UP (ref 150–400)
PO2 BLDV: 51 MMHG — HIGH (ref 25–45)
POTASSIUM BLDV-SCNC: 3.4 MMOL/L — LOW (ref 3.5–5.1)
POTASSIUM SERPL-MCNC: 3.6 MMOL/L — SIGNIFICANT CHANGE UP (ref 3.5–5.3)
POTASSIUM SERPL-SCNC: 3.6 MMOL/L — SIGNIFICANT CHANGE UP (ref 3.5–5.3)
PROT SERPL-MCNC: 8 G/DL — SIGNIFICANT CHANGE UP (ref 6–8.3)
PROTHROM AB SERPL-ACNC: 14.4 SEC — HIGH (ref 9.5–13)
RBC # BLD: 3.94 M/UL — SIGNIFICANT CHANGE UP (ref 3.8–5.2)
RBC # FLD: 14.6 % — HIGH (ref 10.3–14.5)
SAO2 % BLDV: 83.8 % — SIGNIFICANT CHANGE UP (ref 67–88)
SODIUM SERPL-SCNC: 147 MMOL/L — HIGH (ref 135–145)
WBC # BLD: 11.04 K/UL — HIGH (ref 3.8–10.5)
WBC # FLD AUTO: 11.04 K/UL — HIGH (ref 3.8–10.5)

## 2024-02-27 PROCEDURE — 99285 EMERGENCY DEPT VISIT HI MDM: CPT

## 2024-02-27 PROCEDURE — 74177 CT ABD & PELVIS W/CONTRAST: CPT | Mod: 26,MC

## 2024-02-27 PROCEDURE — 71045 X-RAY EXAM CHEST 1 VIEW: CPT | Mod: 26

## 2024-02-27 RX ORDER — ACETAMINOPHEN 500 MG
1000 TABLET ORAL ONCE
Refills: 0 | Status: DISCONTINUED | OUTPATIENT
Start: 2024-02-27 | End: 2024-02-27

## 2024-02-27 RX ORDER — SODIUM CHLORIDE 9 MG/ML
1000 INJECTION INTRAMUSCULAR; INTRAVENOUS; SUBCUTANEOUS ONCE
Refills: 0 | Status: COMPLETED | OUTPATIENT
Start: 2024-02-27 | End: 2024-02-27

## 2024-02-27 RX ORDER — HYDROMORPHONE HYDROCHLORIDE 2 MG/ML
2 INJECTION INTRAMUSCULAR; INTRAVENOUS; SUBCUTANEOUS ONCE
Refills: 0 | Status: DISCONTINUED | OUTPATIENT
Start: 2024-02-27 | End: 2024-02-27

## 2024-02-27 RX ORDER — METOCLOPRAMIDE HCL 10 MG
10 TABLET ORAL ONCE
Refills: 0 | Status: COMPLETED | OUTPATIENT
Start: 2024-02-27 | End: 2024-02-27

## 2024-02-27 RX ORDER — HYDROMORPHONE HYDROCHLORIDE 2 MG/ML
1 INJECTION INTRAMUSCULAR; INTRAVENOUS; SUBCUTANEOUS ONCE
Refills: 0 | Status: DISCONTINUED | OUTPATIENT
Start: 2024-02-27 | End: 2024-02-27

## 2024-02-27 RX ADMIN — SODIUM CHLORIDE 1000 MILLILITER(S): 9 INJECTION INTRAMUSCULAR; INTRAVENOUS; SUBCUTANEOUS at 17:20

## 2024-02-27 RX ADMIN — HYDROMORPHONE HYDROCHLORIDE 2 MILLIGRAM(S): 2 INJECTION INTRAMUSCULAR; INTRAVENOUS; SUBCUTANEOUS at 17:32

## 2024-02-27 RX ADMIN — Medication 10 MILLIGRAM(S): at 17:20

## 2024-02-27 NOTE — ED ADULT TRIAGE NOTE - PATIENT'S PREFERRED PRONOUN
"Peds Surgery     D: Pt with quite significant ileostomy output in last day or so, increasingly watery, pouch leaking more. 271, 100 + since midnight. UOP low. Difficulty with PIV overnight, no current access.  Decrease oral intake, NG replaced. Less interest in oral feeds per mom. Oral feeds are fortified to 24 kcal BM. Currently with NG infusion of pedialyte due to no access and PO ad brittni.   abd Xray obtained early this AM - with gaseous distension of bowel loops, no pneumatosis or portal venous gas.   BMP - wnl.    Exam:   /63  Pulse 125  Temp 98.4  F (36.9  C) (Axillary)  Resp 28  Ht 1' 8.08\" (51 cm)  Wt 9 lb 0.3 oz (4.09 kg)  HC 39 cm (15.35\")  SpO2 100%  BMI 17.69 kg/m2    Tired, weak appearing, fussy then calms   Skin generally mottled (per mom this is common for him)  HEENT: anterior fontanelle sunken.   Breathing easily on RA  Abd: soft, nontender. Loose, watery, seedy yellow stool from ileostomy, pouch leaking. Stoma pink, moist, intact. Kylah stomal skin reddened.    Extr: CALI, cool in periphery    Ostomy pouch replaced with mom. 2 layers of no sting barrier applied to reddened skin. Eaken bridge applied across incision area. Very small opening present, granulating in, not packable, this is healing nicely.     A: Cliff Bradley is a 3 month old male with a past medical history of duodenal atresia as well as right inguinal hernia and left hydrocele s/p repair on 1/20 who is admitted for bilious vomiting that started 3/7. Now s/p exploratory laparotomy due to ischemic bowel, SBR, end-ileostomy and mucus fistula creation (end ileostomy to the left; mucus fistula to the right) 3/12. Treatment for influenza complete. Opioid withdrawal treated with methadone. Continues with admission on ped team working on feeds and weight gain.   Significantly increased stool output with associated drop in UOP, concern for dumping    P: recommend PIV placement and aggressive IV hydration, pt at high risk for " Her/She dehydration as well as electrolyte derangement with high ileostomy output.   Step down on fortification of feeds, back to breastmilk only  Monitor stool output/ fluid status closely     Please call with any questions /concerns.      Discussed with Dr Craig who agreed with recommendations.      Maribell PATHAK, FARHANNP  Pediatric Nurse Practitioner  Pediatric Surgery and Trauma  [facility]  pager

## 2024-02-27 NOTE — ED PROVIDER NOTE - PHYSICAL EXAMINATION
GENERAL: Uncomfortable appearing, vommitting intermittently, shivering, rhinorrhea  HEENT:  Atraumatic  CHEST/LUNG: Chest rise equal bilaterally, No w/r/r  HEART: Regular rate and rhythm no m/r/g  ABDOMEN: +diffuse tenderness, well healed surgical scars, Soft, Nondistended  EXTREMITIES:  Extremities warm  PSYCH: A&Ox3  SKIN: No obvious rashes or lesions  MSK: No cervical spine TTP, able to range neck to the left and right/ No midline spinal TTP/ No swelling, redness, pain or discharge from   NEUROLOGY: strength and sensation intact in all extremities. CN 2 - 12 intact. Finger to nose test intact. No pronator drift. Ambulatory without difficulty.

## 2024-02-27 NOTE — ED PROVIDER NOTE - PROGRESS NOTE DETAILS
mart moralez: pt ct resulted, no acute findings, pt states that she feels better and would like to be discharged

## 2024-02-27 NOTE — ED PROVIDER NOTE - PATIENT PORTAL LINK FT
You can access the FollowMyHealth Patient Portal offered by [facility] by registering at the following website: [email or web address]. By joining [employer]’s FollowMyHealth portal, you will also be able to view your health information using other applications (apps) compatible with our system.

## 2024-02-27 NOTE — ED ADULT NURSE NOTE - CHIEF COMPLAINT QUOTE
pt from home for N/V/D started on Sunday, pt taking Dilaudid 4mg /day last med. taking, this AM run out meds.   Zofran 4mg IM given by EMS prior to arriving.   Hx. Neoplasm of the appendix, R arm PICC line, L side chest med. port. on Eliquis.

## 2024-02-27 NOTE — ED PROVIDER NOTE - CLINICAL SUMMARY MEDICAL DECISION MAKING FREE TEXT BOX
59 year old female with pmhx metastatic mucinous adenocarcinoma of the appendix, s/p HIPEC, appendectomy, cholecystectomy, hysterectomy, omentectomy, peritonectomy, LAR/DLI, and bladder injury with primary repair 10/4, s/p diverting loop ileostomy with reversal on 12/11/23 presents c/o n/v/d, generalized abdominal pain, myalgias since last night. She states she ran out of her home hydromorphone (4mg Q6 per patient, the same dose was sent after her last admission) 3 days ago and had a refill sent to her pharmacy "but they did not have any". States she was able to refill and took a dose last night and this morning. Takes TPN at home.   Denies CP, SOB, fevers, palpitations.  VSS, afebrile  On exam patient is uncomfortable, vomiting. Mild diffuse abdominal tenderness.  DDx- Most likely opiate withdrawal iso not taking opiates. Will obtain labs to look for electrolyte abnormalities iso vomiting and TPN. will evaluate for acute surgical pathology iso recent/extensive intraabdominal surgeries and diffuse abdominal pain. 59 year old female with pmhx metastatic mucinous adenocarcinoma of the appendix, s/p HIPEC, appendectomy, cholecystectomy, hysterectomy, omentectomy, peritonectomy, LAR/DLI, and bladder injury with primary repair 10/4, s/p diverting loop ileostomy with reversal on 12/11/23 presents c/o n/v/d, generalized abdominal pain, myalgias since last night. She states she ran out of her home hydromorphone (4mg Q6 per patient, the same dose was sent after her last admission) 3 days ago and had a refill sent to her pharmacy "but they did not have any". States she was able to refill and took a dose last night and this morning. Takes TPN at home.   Denies CP, SOB, fevers, palpitations.  VSS, afebrile  On exam patient is uncomfortable, vomiting. Mild diffuse abdominal tenderness.  DDx- Most likely opiate withdrawal iso not taking opiates. Will obtain labs to look for electrolyte abnormalities iso vomiting and TPN. will evaluate for acute surgical pathology iso recent/extensive intraabdominal surgeries and diffuse abdominal pain.    Humphrey, Attending  See Attestation

## 2024-02-27 NOTE — ED ADULT NURSE NOTE - NSFALLUNIVINTERV_ED_ALL_ED
Bed/Stretcher in lowest position, wheels locked, appropriate side rails in place/Call bell, personal items and telephone in reach/Instruct patient to call for assistance before getting out of bed/chair/stretcher/Non-slip footwear applied when patient is off stretcher/[place] to call system/Physically safe environment - no spills, clutter or unnecessary equipment/Purposeful proactive rounding/Room/bathroom lighting operational, light cord in reach

## 2024-02-27 NOTE — ED PROVIDER NOTE - NSICDXPASTSURGICALHX_GEN_ALL_CORE_FT
PAST SURGICAL HISTORY:  H/O colonoscopy     H/O endoscopy     H/O ileostomy     H/O: hysterectomy     History of appendectomy     History of ectopic pregnancy     Port-A-Cath in place

## 2024-02-27 NOTE — ED ADULT TRIAGE NOTE - CHIEF COMPLAINT QUOTE
pt from home for N/V/D started on Sunday, pt taking Dilaudid 4mg /day last med. taking, this AM run out meds.   Hx. Neoplasm of the appendix, R arm PICC line, L side chest med. port. on Eliquis. pt from home for N/V/D started on Sunday, pt taking Dilaudid 4mg /day last med. taking, this AM run out meds.   Zofran 4mg IM given by EMS prior to arriving.   Hx. Neoplasm of the appendix, R arm PICC line, L side chest med. port. on Eliquis.

## 2024-02-27 NOTE — ED ADULT NURSE NOTE - OBJECTIVE STATEMENT
pt received to room 23. a&Ox4, amb at baseline. pmh mets adenocarcinoma, appendectomy, cholecystectomy, hysterectomy. pt c/o generalized abd pain associated with nausea, vomiting, diarrhea and generalized weakness. pt endorsing pain due to running out of prescribed dilaudid x3 days ago. states called pharmacy but had no refills, was able to call presciber and get 1x dose, but had to come to ER due to worsening pain. Able to use PICC per provider to RN. No vomiting present, but pt endorisng nausea. labs sent, provided IVF and meds. safety maintained. awaiting order

## 2024-02-27 NOTE — ED PROVIDER NOTE - ATTENDING CONTRIBUTION TO CARE
I performed a history and physical exam of the patient and discussed their management with the resident/fellow/ACP/student. I have reviewed the resident/fellow/ACP/student note and agree with the documented findings and plan of care, except as noted. I have personally performed a substantive portion of the visit including all aspects of the medical decision making. My medical decision making and observations are found above. Please refer to any progress notes for updates on clinical course.    59 year old female with pmhx of metastatic mucinous adenocarcinoma of the appendix, s/p HIPEC, appendectomy, cholecystectomy, hysterectomy, omentectomy, peritonectomy, LAR/DLI, and bladder injury with primary repair 10/4, s/p diverting loop ileostomy with reversal on 12/11/23 presenting with abdominal pain and N/V/D.  Patient reports symptoms started last night.  Reports she recently ran out of her home hydromorphone 3 days ago and was only able to refill yesterday with last dose last night.  Patient believes she might be withdrawing from her daily opioids.  Describes diffuse abdominal pain with associated myalgias with no associated fevers, cough, rashes, dysuria, hematuria, back pain.  Reports multiple episodes of nonbloody emesis, unable to tolerate p.o., intermittent loose stools.    Gen: WDWN, uncomfortable appearing, shivering/vomiting   HEENT: Rhinorrhea, mucous membranes dry    CV: RRR, +S1/S2, no M/R/G, equal b/l radial pulses 2+  Resp: CTAB, no W/R/R, SPO2 >95% on RA, no increased WOB   GI: Abdomen soft non-distended, diffuse TTP with no rebound/guarding, no masses/organomegaly, well healed surgical scars  MSK/Skin: No CVA tenderness, no open wounds, no bruising, no LE edema  Neuro: A&Ox4, moving all 4 extremities spontaneously, gross sensation intact in UE and LE BL  Psych: appropriate mood    MDM:  59 year old female with pmhx of metastatic mucinous adenocarcinoma of the appendix, s/p HIPEC, appendectomy, cholecystectomy, hysterectomy, omentectomy, peritonectomy, LAR/DLI, and bladder injury with primary repair 10/4, s/p diverting loop ileostomy with reversal on 12/11/23 presenting with abdominal pain and N/V/D, Has been unable to take opiates for past 3 days she ran out, afebrile, shivering/vomiting, diffuse abdominal TTP with no rebound/guarding.  Exam/history concerning for but not limited to small bowel obstruction versus intra-abdominal infectious etiology versus opioid withdrawal.  Will evaluate with basic labs, CT abdomen pelvis, pain control, fluid bolus/antiemetics, and continue to reassess

## 2024-02-27 NOTE — ED PROVIDER NOTE - OBJECTIVE STATEMENT
59 year old female with pmhx metastatic mucinous adenocarcinoma of the appendix, s/p HIPEC, appendectomy, cholecystectomy, hysterectomy, omentectomy, peritonectomy, LAR/DLI, and bladder injury with primary repair 10/4, s/p diverting loop ileostomy with reversal on 12/11/23 presents c/o n/v/d, generalized abdominal pain, myalgias since last night. She states she ran out of her home hydromorphone (4mg Q6 per patient, the same dose was sent after her last admission) 3 days ago and had a refill sent to her pharmacy "but they did not have any". States she was able to refill and took a dose last night and this morning.   Denies CP, SOB, fevers, palpitations.

## 2024-02-27 NOTE — ED PROVIDER NOTE - NSICDXPASTMEDICALHX_GEN_ALL_CORE_FT
PAST MEDICAL HISTORY:  Anemia     GERD (gastroesophageal reflux disease)     H/O sepsis     History of deep vein thrombosis LUE    History of DVT (deep vein thrombosis)     History of sickle cell trait     Malignant neoplasm of appendix     Opiate dependence

## 2024-03-05 NOTE — ASSESSMENT
[Future Reassessment of Pain Scale] : Future reassessment of pain scale    [Medication(s)] : Medication(s) [Curative] : Goals of care discussed with patient: Curative [Referred to Palliative/Pain management] : Referred to Palliative/Pain management [Palliative Care Plan] : not applicable at this time

## 2024-03-06 ENCOUNTER — APPOINTMENT (OUTPATIENT)
Dept: HEMATOLOGY ONCOLOGY | Facility: CLINIC | Age: 59
End: 2024-03-06
Payer: COMMERCIAL

## 2024-03-06 VITALS
SYSTOLIC BLOOD PRESSURE: 114 MMHG | RESPIRATION RATE: 17 BRPM | TEMPERATURE: 97.1 F | OXYGEN SATURATION: 94 % | DIASTOLIC BLOOD PRESSURE: 79 MMHG | WEIGHT: 123.46 LBS | BODY MASS INDEX: 22.95 KG/M2 | HEART RATE: 67 BPM

## 2024-03-06 PROCEDURE — 99214 OFFICE O/P EST MOD 30 MIN: CPT

## 2024-03-06 RX ORDER — NEOMYCIN SULFATE 500 MG/1
500 TABLET ORAL
Qty: 4 | Refills: 0 | Status: DISCONTINUED | COMMUNITY
Start: 2023-10-02 | End: 2024-03-06

## 2024-03-06 RX ORDER — LIDOCAINE AND PRILOCAINE 25; 25 MG/G; MG/G
2.5-2.5 CREAM TOPICAL
Qty: 1 | Refills: 1 | Status: DISCONTINUED | COMMUNITY
Start: 2023-07-20 | End: 2024-03-06

## 2024-03-08 ENCOUNTER — NON-APPOINTMENT (OUTPATIENT)
Age: 59
End: 2024-03-08

## 2024-03-08 ENCOUNTER — APPOINTMENT (OUTPATIENT)
Dept: SURGICAL ONCOLOGY | Facility: CLINIC | Age: 59
End: 2024-03-08
Payer: COMMERCIAL

## 2024-03-08 VITALS
DIASTOLIC BLOOD PRESSURE: 77 MMHG | HEIGHT: 61.5 IN | OXYGEN SATURATION: 98 % | HEART RATE: 68 BPM | RESPIRATION RATE: 17 BRPM | BODY MASS INDEX: 23.32 KG/M2 | SYSTOLIC BLOOD PRESSURE: 123 MMHG | WEIGHT: 125.1 LBS

## 2024-03-08 PROCEDURE — 99203 OFFICE O/P NEW LOW 30 MIN: CPT

## 2024-03-11 NOTE — ASSESSMENT
[FreeTextEntry1] : 59 year old woman now 5 months out from extensive CRS-HIPEC for LAMN with pseudomyxoma. After several months of difficult post-op recovery defined by inability to take PO, she appears to have turned a corner and is eating now. She is getting supplemented with 1800kcal day with TPN, and has gained weight quite nicely. She has healed up well and has normal BMs. Denies abdominal pain and is attempted to wean off of opioid pain medications. We discussed cutting her dosage by half for 2 weeks, followed by spreading out her dosage timing once she knows she is not going into withdrawals again. We discussed eating 4-5 meals a day the size of her fist, with small snacks in between, focusing on protein and hydration. I told her that if she can do that for 2 weeks we can ween off the TPN. Return in 3-4 months.

## 2024-03-11 NOTE — PHYSICAL EXAM
[Normal] : supple, no neck mass and thyroid not enlarged [Normal Neck Lymph Nodes] : normal neck lymph nodes  [Normal Supraclavicular Lymph Nodes] : normal supraclavicular lymph nodes [Normal Groin Lymph Nodes] : normal groin lymph nodes [Normal Axillary Lymph Nodes] : normal axillary lymph nodes [Normal] : oriented to person, place and time, with appropriate affect [de-identified] : clear weight gain and improved demeanor

## 2024-03-11 NOTE — REASON FOR VISIT
[Follow-Up Visit] : a follow-up visit for [Friend] : friend [FreeTextEntry2] : appendiceal tumor with peritoneal dissemination

## 2024-03-13 ENCOUNTER — EMERGENCY (EMERGENCY)
Facility: HOSPITAL | Age: 59
LOS: 1 days | Discharge: HOME | End: 2024-03-13
Attending: EMERGENCY MEDICINE | Admitting: EMERGENCY MEDICINE
Payer: COMMERCIAL

## 2024-03-13 VITALS
SYSTOLIC BLOOD PRESSURE: 127 MMHG | OXYGEN SATURATION: 99 % | DIASTOLIC BLOOD PRESSURE: 65 MMHG | HEART RATE: 75 BPM | RESPIRATION RATE: 18 BRPM

## 2024-03-13 VITALS
OXYGEN SATURATION: 98 % | DIASTOLIC BLOOD PRESSURE: 81 MMHG | RESPIRATION RATE: 18 BRPM | SYSTOLIC BLOOD PRESSURE: 146 MMHG | TEMPERATURE: 99 F | HEART RATE: 69 BPM

## 2024-03-13 DIAGNOSIS — Z87.59 PERSONAL HISTORY OF OTHER COMPLICATIONS OF PREGNANCY, CHILDBIRTH AND THE PUERPERIUM: Chronic | ICD-10-CM

## 2024-03-13 DIAGNOSIS — Z90.710 ACQUIRED ABSENCE OF BOTH CERVIX AND UTERUS: Chronic | ICD-10-CM

## 2024-03-13 DIAGNOSIS — Z98.890 OTHER SPECIFIED POSTPROCEDURAL STATES: Chronic | ICD-10-CM

## 2024-03-13 DIAGNOSIS — Z95.828 PRESENCE OF OTHER VASCULAR IMPLANTS AND GRAFTS: Chronic | ICD-10-CM

## 2024-03-13 DIAGNOSIS — Z90.49 ACQUIRED ABSENCE OF OTHER SPECIFIED PARTS OF DIGESTIVE TRACT: Chronic | ICD-10-CM

## 2024-03-13 LAB
ALBUMIN SERPL ELPH-MCNC: 3.6 G/DL — SIGNIFICANT CHANGE UP (ref 3.3–5)
ALP SERPL-CCNC: 71 U/L — SIGNIFICANT CHANGE UP (ref 40–120)
ALT FLD-CCNC: 25 U/L — SIGNIFICANT CHANGE UP (ref 4–33)
ANION GAP SERPL CALC-SCNC: 11 MMOL/L — SIGNIFICANT CHANGE UP (ref 7–14)
AST SERPL-CCNC: 20 U/L — SIGNIFICANT CHANGE UP (ref 4–32)
BASOPHILS # BLD AUTO: 0.03 K/UL — SIGNIFICANT CHANGE UP (ref 0–0.2)
BASOPHILS NFR BLD AUTO: 0.5 % — SIGNIFICANT CHANGE UP (ref 0–2)
BILIRUB SERPL-MCNC: 0.5 MG/DL — SIGNIFICANT CHANGE UP (ref 0.2–1.2)
BUN SERPL-MCNC: 17 MG/DL — SIGNIFICANT CHANGE UP (ref 7–23)
CALCIUM SERPL-MCNC: 9.5 MG/DL — SIGNIFICANT CHANGE UP (ref 8.4–10.5)
CHLORIDE SERPL-SCNC: 107 MMOL/L — SIGNIFICANT CHANGE UP (ref 98–107)
CO2 SERPL-SCNC: 23 MMOL/L — SIGNIFICANT CHANGE UP (ref 22–31)
CREAT SERPL-MCNC: 0.61 MG/DL — SIGNIFICANT CHANGE UP (ref 0.5–1.3)
EGFR: 102 ML/MIN/1.73M2 — SIGNIFICANT CHANGE UP
EOSINOPHIL # BLD AUTO: 0.06 K/UL — SIGNIFICANT CHANGE UP (ref 0–0.5)
EOSINOPHIL NFR BLD AUTO: 1.1 % — SIGNIFICANT CHANGE UP (ref 0–6)
GLUCOSE SERPL-MCNC: 103 MG/DL — HIGH (ref 70–99)
HCT VFR BLD CALC: 29.9 % — LOW (ref 34.5–45)
HGB BLD-MCNC: 10.1 G/DL — LOW (ref 11.5–15.5)
IANC: 3.27 K/UL — SIGNIFICANT CHANGE UP (ref 1.8–7.4)
IMM GRANULOCYTES NFR BLD AUTO: 0.4 % — SIGNIFICANT CHANGE UP (ref 0–0.9)
LYMPHOCYTES # BLD AUTO: 1.74 K/UL — SIGNIFICANT CHANGE UP (ref 1–3.3)
LYMPHOCYTES # BLD AUTO: 30.7 % — SIGNIFICANT CHANGE UP (ref 13–44)
MCHC RBC-ENTMCNC: 28 PG — SIGNIFICANT CHANGE UP (ref 27–34)
MCHC RBC-ENTMCNC: 33.8 GM/DL — SIGNIFICANT CHANGE UP (ref 32–36)
MCV RBC AUTO: 82.8 FL — SIGNIFICANT CHANGE UP (ref 80–100)
MONOCYTES # BLD AUTO: 0.55 K/UL — SIGNIFICANT CHANGE UP (ref 0–0.9)
MONOCYTES NFR BLD AUTO: 9.7 % — SIGNIFICANT CHANGE UP (ref 2–14)
NEUTROPHILS # BLD AUTO: 3.27 K/UL — SIGNIFICANT CHANGE UP (ref 1.8–7.4)
NEUTROPHILS NFR BLD AUTO: 57.6 % — SIGNIFICANT CHANGE UP (ref 43–77)
NRBC # BLD: 0 /100 WBCS — SIGNIFICANT CHANGE UP (ref 0–0)
NRBC # FLD: 0 K/UL — SIGNIFICANT CHANGE UP (ref 0–0)
PLATELET # BLD AUTO: 249 K/UL — SIGNIFICANT CHANGE UP (ref 150–400)
POTASSIUM SERPL-MCNC: 4.1 MMOL/L — SIGNIFICANT CHANGE UP (ref 3.5–5.3)
POTASSIUM SERPL-SCNC: 4.1 MMOL/L — SIGNIFICANT CHANGE UP (ref 3.5–5.3)
PROT SERPL-MCNC: 7.4 G/DL — SIGNIFICANT CHANGE UP (ref 6–8.3)
RBC # BLD: 3.61 M/UL — LOW (ref 3.8–5.2)
RBC # FLD: 15.4 % — HIGH (ref 10.3–14.5)
SODIUM SERPL-SCNC: 141 MMOL/L — SIGNIFICANT CHANGE UP (ref 135–145)
WBC # BLD: 5.67 K/UL — SIGNIFICANT CHANGE UP (ref 3.8–10.5)
WBC # FLD AUTO: 5.67 K/UL — SIGNIFICANT CHANGE UP (ref 3.8–10.5)

## 2024-03-13 PROCEDURE — 70450 CT HEAD/BRAIN W/O DYE: CPT | Mod: 26,MC

## 2024-03-13 PROCEDURE — 99284 EMERGENCY DEPT VISIT MOD MDM: CPT

## 2024-03-13 PROCEDURE — 70486 CT MAXILLOFACIAL W/O DYE: CPT | Mod: 26,MC

## 2024-03-13 NOTE — ED ADULT NURSE NOTE - OBJECTIVE STATEMENT
pt is a 59y old female received awake and responsive, c/o of numbness to rt sided face for few days, pt denies any headaches, deniers chest pain, labs drawn and sent as per MD order, nad noted will continue to monitor

## 2024-03-13 NOTE — ED PROVIDER NOTE - PATIENT PORTAL LINK FT
You can access the FollowMyHealth Patient Portal offered by [facility] by registering at the following website: [email or web address]. By joining [employer]’s FollowMyHealth portal, you will also be able to view your health information using other applications (apps) compatible with our system.

## 2024-03-13 NOTE — ED ADULT TRIAGE NOTE - TEMPERATURE IN FAHRENHEIT (DEGREES F)
99.1 SUBJECTIVE:  Kj Metzger Jr. is an 27 year old male who presents for evaluation of sudden nausea with vomiting and upper abdominal pain onset 3pm today.  He says after he vomited he had BM without blood or black stools. He reports feeling hot and cold flashes. He was concerned he took too much Tylenol today.  He took 6 extra strength Tylenol since 0300.  He has taken Tylenol in past without issue.   He denies any liver issues.      Characteristics of the abdominal pain are as follows:    Location: upper abdominal pain without radiation  Quality: cramping and dull  Quantity: 2/10 in intensity, currently he denies any abdominal pain  Chronicity: comes and goes, he can't describe timing  Aggravating factors: None  Alleviating factors: None  Associated symptoms: nausea, vomiting, chills and sweats  Family history: negative    No past medical history on file.    No past surgical history on file.    Current Outpatient Medications   Medication Sig Dispense Refill   • ondansetron (ZOFRAN ODT) 4 MG disintegrating tablet Place 1 tablet onto the tongue every 8 hours as needed for Nausea. 15 tablet 0     No current facility-administered medications for this visit.     ALLERGIES:  No Known Allergies    Social History     Tobacco Use   • Smoking status: Every Day     Types: Cigarettes   • Smokeless tobacco: Not on file   Substance Use Topics   • Alcohol use: Yes       Review Of Systems  Respiratory: negative  Cardiovascular: negative  Gastrointestinal: abdominal pain, nausea, poor appetite and vomiting  Genitourinary: negative    OBJECTIVE:  Visit Vitals  BP (!) 141/78 (BP Location: RUE - Right upper extremity, Patient Position: Sitting, Cuff Size: Regular)   Pulse 60   Temp 98.7 °F (37.1 °C) (Oral)   Resp 18   SpO2 98%     General appearance: Healthy, alert, in moderate distress, cooperative  Eyes:  PERRL, EOMI, no crusts no erythema or edema.   ENT:  EACs clear and TMs wihtout erythema or edema. Nares patent without  drainage, no congestion. OP patent, uvula midline, no erythema, no exudates, no mass.  Lungs: clear to auscultation  Heart: regular rate and rhythm and no murmurs, clicks, or gallops  Abdomen: soft, normal bowel sounds.   Tenderness: none  Masses: none  Organomegaly: none   MSK:  FAROM and 5/5 strength.      ASSESSMENT: Nausea and vomiting in adult  (primary encounter diagnosis)  Plan: SARS-COV-2/INFLUENZA BY PCR, ondansetron         (ZOFRAN ODT) disintegrating tablet 8 mg  He denies any abdominal pain during exam.  Provided 8mg Zofran ODT with some improvement.      Covid and influenza negative.     Recommend Zofran use PRN, rest, fluids and if symptoms worsen then return to clinic.     Patient and spouse indicated understanding and agreed to plan.     Jono Mehta PA-C  Collaborating physician:   Glynn Humphrey MD

## 2024-03-13 NOTE — ED PROVIDER NOTE - CARE PLAN
Patient seen and examined   Pathology report c/w NSCLC   no events   78 y/o/ M with EtOH use disorder presented with R sided weakness/numbness x 2 day and AMS. MRI brain shows multiple enhancing masses within the brain, with moderate surrounding edema which demonstrates mass effect on the posterior horn of the LEFT lateral ventricle.   on  Decadron along with lymph node biopsy (mediastinal and hilar adenopathy).   pathology report with Non small cell lung ca   c/w metastatic NSCLC with brain lesion   oncology note appreciated   will consult Rad onc and if needed will need inpatient Rad onc   consider taper steroid Principal Discharge DX:	Paresthesia   1

## 2024-03-13 NOTE — ED PROVIDER NOTE - CLINICAL SUMMARY MEDICAL DECISION MAKING FREE TEXT BOX
58 y/o female w/ above PMH presenting to ER c/o right sided lip/facial tingling  -unlcear etiology, low suspicion but will c/o CVA  vs mass vs sinusitis  -likely superficial in nature, will also r/o electrolyte dysfunction  -labs   -ct head and maxfacial  -reassess

## 2024-03-13 NOTE — ED PROVIDER NOTE - OBJECTIVE STATEMENT
59 year old female with pmhx metastatic mucinous adenocarcinoma of the appendix, s/p HIPEC, appendectomy, cholecystectomy, hysterectomy, omentectomy, peritonectomy, LAR/DLI, and bladder injury with primary repair 10/4, s/p diverting loop ileostomy with reversal on 12/11/23 w/ PICC for TPN presenting to ER for right sided facial numbness - patient states for the past ~36 hours has been experiencing superficial right sided lip and cheek numbness that she describes as tingling sensation and decreased sensation to touch. Pt. states usually gets this when she is congested but usually goes away after a few hours. Deines facial droop, slurred speech, extremity weakness headache nausea vomit fever chils.

## 2024-03-13 NOTE — ED PROVIDER NOTE - NSFOLLOWUPINSTRUCTIONS_ED_ALL_ED_FT
Paresthesia    WHAT YOU NEED TO KNOW:    Paresthesia is numbness, tingling, or burning. It can happen in any part of your body, but usually occurs in your legs, feet, arms, or hands.    DISCHARGE INSTRUCTIONS:    Return to the emergency department if:    You have severe pain along with numbness and tingling.    Your legs suddenly become cold. You have trouble moving your legs, and they ache.    You have increased weakness in a part of your body.    You have uncontrolled movements.  Contact your healthcare provider or neurologist if:    Your symptoms do not improve.    You have symptoms in more than one part of your body.    You have questions or concerns about your condition or care.  Manage paresthesia:    Protect the area from injury. You may injure or burn yourself if you lose feeling in the area. Be careful when you touch anything that could be hot. Wear sturdy shoes to protect your feet. Ask about other ways to protect yourself.    Go to physical or occupational therapy if directed. Your provider may recommend therapy if you have a condition such as carpal tunnel syndrome. A physical therapist can teach you exercises to help strengthen the area or increase your ability to move it. An occupational therapist can help you find new ways to do your daily activities.    Manage health conditions that can cause paresthesia. Work with your diabetes specialist if you have uncontrolled diabetes. A dietitian or your healthcare provider can help you create a meal plan if you have low vitamin B levels. Your provider can help you manage your health if you have multiple sclerosis or you had a stroke. It is important to manage health conditions to stop paresthesia or prevent it from getting worse.  Follow up with your healthcare provider or neurologist as directed: Your healthcare provider may refer you to a specialist. Write down your questions so you remember to ask them during your visits.    Parestesia    LO QUE NECESITA SABER:    La parestesia es javon sensación de adormecimiento, hormigueo o ardor. Puede ocurrir en cualquier parte de gao cuerpo, hannah generalmente se produce en las piernas, pies, brazos o charity.    INSTRUCCIONES SOBRE EL EDI HOSPITALARIA:    Regrese a la juan francisco de emergencias si:    Tiene dolor grave junto con entumecimiento y hormigueo.    Maxime piernas se ponen frías súbitamente. Tiene dificultad para  las piernas y éstas le duelen.    Tiene mayor debilidad en javon parte de gao cuerpo.    Tiene movimientos descontrolados.  Comuníquese con gao médico o neurólogo si:    Maxime síntomas no mejoran.    Tiene síntomas en más de javon parte del cuerpo.    Usted tiene preguntas o inquietudes acerca de gao condición o cuidado.  Manejo de la parestesia:    Proteja el área de lesiones.Puede lesionarse o quemarse si pierde la sensibilidad en la kyler. Tenga cuidado al tocar cualquier cosa que podría estar caliente. Use zapatos resistentes para protegerse los pies. Consulte sobre otras formas de protegerse.    Acuda a fisioterapia o terapia ocupacional si se lo indican.Gao médico puede recomendar tratamiento si usted tiene javon afección, mario alberto el síndrome del túnel carpiano. Un fisioterapeuta puede enseñarle ejercicios para ayudar a fortalecer la kyler o aumentar gao capacidad para moverla. Un terapeuta ocupacional puede ayudarlo a encontrar nuevas formas de hacer maxime actividades diarias.    Mantenga bajo control las afecciones que pueden causar la parestesia.Trabaje con gao especialista en diabetes si tiene diabetes no controlada. Un dietista o gao médico puede ayudarlo a crear un plan de alimentación si maxime niveles de vitamina B son bajos. Goa médico puede ayudarlo a mantener gao montrell bajo control si tiene esclerosis múltiple o tuvo un accidente cerebrovascular. Es importante mantener las afecciones bajo control para detener la parestesia o evitar que empeore.  Programe javon darrin con gao médico o gao neurólogo mario alberto se le indique:Gao médico también podría derivarlo a un especialista. Anote maxime preguntas para que se acuerde de hacerlas aldo maxime visitas.

## 2024-03-13 NOTE — ED PROVIDER NOTE - ATTENDING APP SHARED VISIT CONTRIBUTION OF CARE
Patient is a 59-year-old female with a history of appendiceal CA, s/p chemo, s/p HIPEC, appendectomy, cholecystectomy, hysterectomy, omentectomy, peritonectomy, LAR/DLI, and bladder injury with primary repair 10/4, s/p diverting loop ileostomy with reversal on 12/11/23 w/ PICC for TPN  here for evaluation of right facial numbness x 36 hours.  Patient states she has numbness on the right side of her face between her eye and her upper lip.  She states that this is occurred in the past when she has sinus congestion.  She denies any numbness in her arms or legs.  She denies any vision changes, focal weakness.  No change in gait or speech.  Patient states that she is concerned because it has not been 36 hours without any improvement.  She denies any fevers, chills, cough.  No chest pain or shortness of breath.  No nausea or vomiting.  Denies any urinary symptoms.    VS noted  Gen. no acute distress, Non toxic   HEENT: EOMI, mmm  Lungs: CTAB/L no C/ W /R   CVS: RRR   Abd; Soft non tender, non distended   Ext: no edema  Skin: no rash  Neuro AAOx3, face symmetrical, tongue midline, can raise eyebrows, puff cheeks, raise shoulders, decreased sensation in right face compared to left, left and right forehead abnormal sensation, bilateral normal sensation to upper extremities, lower extremities, strength 5/5 in UE/LE, gait normal, fingerfinger-to-nose normal, clear speech  a/p:  right facial numbness–etiology is not consistent with stroke, however patient has a history of CVA.  On exam, patient reports numbness to her right face.  Plan for CT head, CT max face, labs.  Will rule out an obvious mass.  Will rule out metabolic derangement.  - Subhash VELAZQUEZ

## 2024-03-13 NOTE — ED PROVIDER NOTE - IV ALTEPLASE DOOR HIDDEN
----- Message from Kristie Zavaleta sent at 4/10/2019  4:39 PM CDT -----  Contact: [phone]  Pt is calling to request an appt for severe back pain.  Pt is requesting a later time for appt next week.      Thank you!   show

## 2024-03-13 NOTE — ED ADULT TRIAGE NOTE - CHIEF COMPLAINT QUOTE
Pt. c/o numbness to right side of face between eye and lip since Monday. States she has a appendix cancer and has had this numbness intermittently. Denies headache, dizziness, blurry vision or weakness/numbness to extremities. PICC line to right upper arm and DVT to left upper arm.

## 2024-03-31 NOTE — REVIEW OF SYSTEMS
[Fatigue] : fatigue [Abdominal Pain] : abdominal pain [Diarrhea: Grade 0] : Diarrhea: Grade 0 [Vomiting] : vomiting [Difficulty Walking] : difficulty walking [Negative] : Allergic/Immunologic [Recent Change In Weight] : ~T recent weight change [SOB on Exertion] : no shortness of breath during exertion

## 2024-03-31 NOTE — HISTORY OF PRESENT ILLNESS
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   Yojana started experiencing weight gain and lower abdominal pelvic pressure in April/May 2023. She went to her GYN who palpated a mass on exam. US Abdomen on 5/24/23 noted fluid within the endometrium, 12.9 cm complex cystic R adnexal mass suspicious for ovarian neoplasm. CEA and CA 19-9 noted to be elevated by AFP, CA  were normal. She was then referred to GYN/ onc. Dr. Sanchez and ultimately to Dr. Dong, surg onc.    6/20/23: CT CAP large bilateral complex adnexal masses. Extensive soft tissue infiltration of the mesentery and nodularity of the omentum as well as a moderate amount of ascites.  6/30/23: Omental bx: mucinous adenocarcinoma, CK7, CDX2, CK19+, neg for PAX8, TTF1, MMR intact.  7/20/23: Case reviewed at , likely appendiceal primary given abnormal appendix and IHC staining suggesting GI, elevated CEA, normal , plan for neoadjuvant FOLFOX followed by cytoreduction, HIPEC and adjuvant chemo   Referred to medical oncology for systemic therapy.   7/24/23 - 9/5/23: C1-C4 FOLFOX  10/4-11/8/23: admitted to [facility] for planned appendectomy, cholecystectomy, hysterectomy, omenectomy, peritenectomy, LAR with divertingloop ileostomy and bladder injury with primary repair on 10/4. Course c/b n/v, inability to tolerate PO, severe malnutrition. Started on TPN via PICC. 12/11-12/14/23 Admitted to  for loop ileostomy reversal 2/20/24: CT CAP:  s/p hysterectomy & surgical excision of b/l adnexal masses. Overall decreased ascites & carcinomatosis. Unchanged pulm nodule.   [de-identified] : mucinous adenocarcinoma [de-identified] : pre treatment CEA 63 [FreeTextEntry1] : s/p resection  [de-identified] : eating by mouth, feels hungry. Vomiting has resolved, still nausea at times. Cont with TPN. Gained 8 lbs since last visit. No wheelchair today. More ambulatory and independent. Pain well controlled. Cont to take Dilaudid ATC but does not know if she needs to take it that often.

## 2024-03-31 NOTE — PHYSICAL EXAM
[Capable of only limited self care, confined to bed or chair more than 50% of waking hours] : Status 3- Capable of only limited self care, confined to bed or chair more than 50% of waking hours [Thin] : thin [Normal] : grossly intact [de-identified] : normal respiratory pattern

## 2024-04-02 ENCOUNTER — NON-APPOINTMENT (OUTPATIENT)
Age: 59
End: 2024-04-02

## 2024-04-02 RX ORDER — HYDROMORPHONE HYDROCHLORIDE 4 MG/1
4 TABLET ORAL 3 TIMES DAILY
Qty: 90 | Refills: 0 | Status: DISCONTINUED | COMMUNITY
Start: 2023-11-13 | End: 2024-04-02

## 2024-04-14 NOTE — REASON FOR VISIT
[Home] : at home, [unfilled] , at the time of the visit. [Medical Office: ([facility])___] : at the medical office located in  [Initial Evaluation] : an initial evaluation [Spouse] : spouse

## 2024-04-15 ENCOUNTER — APPOINTMENT (OUTPATIENT)
Dept: GERIATRICS | Facility: CLINIC | Age: 59
End: 2024-04-15
Payer: COMMERCIAL

## 2024-04-15 PROCEDURE — 99213 OFFICE O/P EST LOW 20 MIN: CPT

## 2024-04-15 NOTE — DATA REVIEWED
[FreeTextEntry1] : CT C/A/P  (02/20/2024):   FINDINGS: CHEST: LUNGS AND LARGE AIRWAYS: Patent central airways. A 4 mm right upper lobe pulmonary nodule (2:13) is unchanged. Linear atelectasis is noted in bilateral lower lobes. No new pulmonary nodules noted. PLEURA: No pleural effusion. VESSELS: Within normal limits. HEART: Heart size is normal. No pericardial effusion. MEDIASTINUM AND PATRICK: No lymphadenopathy. CHEST WALL AND LOWER NECK: There is a left chest wall chemotherapy infusion catheter with catheter tip at the cavoatrial junction. There has been interval resolution of the previously noted filling defect along the length of the catheter.  ABDOMEN AND PELVIS: LIVER: There are unchanged subcentimeter hypodense lesions that are too small to characterize throughout the liver. There is a new discrete hepatic serosal implant measuring 1.3 x 2.3 cm (2:68). BILE DUCTS: Normal caliber. GALLBLADDER: Cholecystectomy. SPLEEN: Within normal limits. PANCREAS: Within normal limits. ADRENALS: Within normal limits. KIDNEYS/URETERS: The kidneys enhance symmetrically without evidence for hydronephrosis. There are bilateral renal cysts and unchanged indeterminate hypodense renal lesions.  BLADDER: Within normal limits. REPRODUCTIVE ORGANS: Status post hysterectomy.Status post interval surgical excision of previously noted bilateral large adnexal masses. There is asymmetric soft tissue extending from the right vaginal cuff in the region of surgical clips.  BOWEL: No bowel obstruction. Status post post right hemicolectomy and ileocolic anastomosis. Rectosigmoid anastomosis. PERITONEUM: Trace ascites, decreased compared with the prior exam. Overall interval improvement in previously noted peritoneal carcinomatosis. The previously referenced left upper quadrant implant is markedly smaller on the current exam measuring 0.6 cm (2:69), previously measuring 2.8 x 1.5 cm. An implant along the inferior right hepatic lobe/loculated fluid measuring 3.5 x 2.0 cm is not significantly changed. Implants along the anterior left hepatic lobe surface measures 2.3 x 1.1 cm on the current study, not discretely measurable on the prior exam as it was within an area of larger ascites, now improved. VESSELS: Within normal limits. RETROPERITONEUM/LYMPH NODES: No lymphadenopathy. ABDOMINAL WALL: Postsurgical changes. BONES: Degenerative changes.  IMPRESSION: Status post interval hysterectomy and surgical excision of bilateral adnexal masses. Asymmetric soft tissue extending from the right vaginal cuff in the region of surgical clips. Attention on follow-up imaging is recommended. Overall decreased ascites and carcinomatosis compared with the prior study. Interval resolution of the previously noted filling defect along the left chest wall central venous catheter. Unchanged 4 mm right upper lobe pulmonary nodule. No new pulmonary nodules.   --- End of Report ---

## 2024-04-15 NOTE — HISTORY OF PRESENT ILLNESS
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 Khan. PMH significant for pre-diabetes, h/o ectopic pregnancy.   Pt began experiencing weight gain and lower abdominal pelvic pressure in April/May 2023. Saw her gynecologist who palpated a mass. US Abdomen on 5/24/23 noted fluid within the endometrium, 12.9 cm complex cystic R adnexal mass suspicious for ovarian neoplasm. CEA and CA 19-9 noted to be elevated by AFP, CA  were normal. She was then referred to Gyn Onc Dr. Sanchez and ultimately to Surg Onc, Dr. Dong.   6/20/23: CT CAP large bilateral complex adnexal masses. Extensive soft tissue infiltration of the mesentery and nodularity of the omentum as well as a moderate amount of ascites. 6/30/23: Omental bx: mucinous adenocarcinoma 7/20/23: Case reviewed at , likely appendiceal primary given abnormal appendix and IHC staining suggesting GI, elevated CEA, normal , plan for neoadjuvant FOLFOX followed by cytoreduction, HIPEC and adjuvant chemo 7/24/23 - 9/5/23: C1-C4 FOLFOX 10/4-11/8/23: admitted for planned appendectomy, cholecystectomy, hysterectomy, omenectomy, peritenectomy, LAR with divertingloop ileostomy and bladder injury with primary repair on 10/4. Course c/b n/v, inability to tolerate PO, severe malnutrition. Started on TPN via PICC.  Main reason for which patient is referred is symptom management.   She is dealing with a multitude of symptoms since the time of her surgery (10/4/23). She is now on TPN via PICC line. She suffers with chronic vomiting since then. Does not deal with much nausea but rather feels the urge to retch. She brings back up anything she puts into her stomach, about 15 minutes afterwards.  Is written for various medications: Metoclopramide 10mg four times per day Dexamethasone 4mg QD (took it for about 5 days, did not help) Dronabinol was prescribed but she did not try it Omeprazole 40mg is prescribed but she does not use it.  Lorazepam 0.5mg (not currently using it)   She experiences pain in the epigastric/substernal region, worsened when she increased abdominal pressure - coughing, sneezing, retching. She finds that the pain rises up every 6 hours, which has prompted her to take the hydromorphone on a Q6h schedule. Pain gets as bad as 7/10 at its worst.  She is using hydromorphone 4mg every 6 hours on a schedule. It helps subside the pain and she can sleep for about an hour after taking it.  She doesn't sleep well at night, wakes up about every 2 hours.       Interval Hx (4/15/24): Patient seen via telemedicine for palliative medicine follow up. Patient is overall doing better, she is eating, TPN volume is slowly being decreased.  She met with a neurologist, was started on gabapentin 100mg BID which was uptitrated to 200mg BID. She continues to use hydromorphone 4mg every 8 hours, Is now OFF of metoclopramide and baclofen.  Not needing the medicinal cannabis.  ROS: +fatigue +trouble sleeping - finds that the pain at her surgical site is amplified at night, is squeezing in quality. The pain is also exacerbated when she coughs or sneezes. Denies constipation - moves bowels regularly.  Patient is single, lives with her long-time partner, Daniel. She has one son. She lives in a two family house, on the second floor. She does not come down much. She is able to get bathed and dressed on her own. She ambulates independently. She is not employed. Is not driving. Her support system includes her boyfriend, sister and cousin.  Neuro: Dr. Irma Medel ([place])  I-STOP Ref#: [id number]

## 2024-04-15 NOTE — ASSESSMENT
[FreeTextEntry1] : 59yoF with:  # Appendiceal cancer - s/p neoadjuvant therapy and extensive surgery 10/4. Surg Onc, Med onc follow up.  # Nausea/vomiting - No longer an active issue. Patient being weaned down off of TPN as she increases her PO intake.   # Abdominal pain - c/w Hydromorphone taper. Advised patient to lower her 4mg to BID x 1 week, then to 2mg/4mg x 1 week followed by 2mg BID x 1 week and to continue until off.   # Encounter for palliative care- Emotional support provided  Follow up as needed, call with questions/issues

## 2024-04-15 NOTE — PHYSICAL EXAM
[Oriented To Time, Place, And Person] : oriented to person, place, and time [FreeTextEntry1] : depressed affect

## 2024-04-18 ENCOUNTER — EMERGENCY (EMERGENCY)
Facility: HOSPITAL | Age: 59
LOS: 1 days | Discharge: HOME | End: 2024-04-18
Attending: STUDENT IN AN ORGANIZED HEALTH CARE EDUCATION/TRAINING PROGRAM
Payer: COMMERCIAL

## 2024-04-18 VITALS
OXYGEN SATURATION: 98 % | SYSTOLIC BLOOD PRESSURE: 102 MMHG | HEART RATE: 62 BPM | DIASTOLIC BLOOD PRESSURE: 61 MMHG | RESPIRATION RATE: 15 BRPM | TEMPERATURE: 98 F

## 2024-04-18 VITALS
RESPIRATION RATE: 20 BRPM | DIASTOLIC BLOOD PRESSURE: 58 MMHG | OXYGEN SATURATION: 98 % | HEIGHT: 61 IN | WEIGHT: 130.07 LBS | SYSTOLIC BLOOD PRESSURE: 112 MMHG | TEMPERATURE: 98 F | HEART RATE: 73 BPM

## 2024-04-18 DIAGNOSIS — Z87.59 PERSONAL HISTORY OF OTHER COMPLICATIONS OF PREGNANCY, CHILDBIRTH AND THE PUERPERIUM: Chronic | ICD-10-CM

## 2024-04-18 DIAGNOSIS — Z90.710 ACQUIRED ABSENCE OF BOTH CERVIX AND UTERUS: Chronic | ICD-10-CM

## 2024-04-18 DIAGNOSIS — Z98.890 OTHER SPECIFIED POSTPROCEDURAL STATES: Chronic | ICD-10-CM

## 2024-04-18 DIAGNOSIS — Z95.828 PRESENCE OF OTHER VASCULAR IMPLANTS AND GRAFTS: Chronic | ICD-10-CM

## 2024-04-18 DIAGNOSIS — Z90.49 ACQUIRED ABSENCE OF OTHER SPECIFIED PARTS OF DIGESTIVE TRACT: Chronic | ICD-10-CM

## 2024-04-18 LAB
ADD ON TEST-SPECIMEN IN LAB: SIGNIFICANT CHANGE UP
ADD ON TEST-SPECIMEN IN LAB: SIGNIFICANT CHANGE UP
ALBUMIN SERPL ELPH-MCNC: 4.1 G/DL — SIGNIFICANT CHANGE UP (ref 3.3–5)
ALP SERPL-CCNC: 78 U/L — SIGNIFICANT CHANGE UP (ref 40–120)
ALT FLD-CCNC: 40 U/L — SIGNIFICANT CHANGE UP (ref 10–45)
ANION GAP SERPL CALC-SCNC: 17 MMOL/L — SIGNIFICANT CHANGE UP (ref 5–17)
APPEARANCE UR: CLEAR — SIGNIFICANT CHANGE UP
AST SERPL-CCNC: 29 U/L — SIGNIFICANT CHANGE UP (ref 10–40)
BACTERIA # UR AUTO: ABNORMAL /HPF
BASE EXCESS BLDV CALC-SCNC: -0.7 MMOL/L — SIGNIFICANT CHANGE UP (ref -2–3)
BASOPHILS # BLD AUTO: 0.02 K/UL — SIGNIFICANT CHANGE UP (ref 0–0.2)
BASOPHILS NFR BLD AUTO: 0.3 % — SIGNIFICANT CHANGE UP (ref 0–2)
BILIRUB SERPL-MCNC: 0.5 MG/DL — SIGNIFICANT CHANGE UP (ref 0.2–1.2)
BILIRUB UR-MCNC: NEGATIVE — SIGNIFICANT CHANGE UP
BUN SERPL-MCNC: 23 MG/DL — SIGNIFICANT CHANGE UP (ref 7–23)
CALCIUM SERPL-MCNC: 10.7 MG/DL — HIGH (ref 8.4–10.5)
CAST: 0 /LPF — SIGNIFICANT CHANGE UP (ref 0–4)
CHLORIDE SERPL-SCNC: 108 MMOL/L — SIGNIFICANT CHANGE UP (ref 96–108)
CO2 BLDV-SCNC: 25 MMOL/L — SIGNIFICANT CHANGE UP (ref 22–26)
CO2 SERPL-SCNC: 20 MMOL/L — LOW (ref 22–31)
COLOR SPEC: YELLOW — SIGNIFICANT CHANGE UP
CREAT SERPL-MCNC: 0.61 MG/DL — SIGNIFICANT CHANGE UP (ref 0.5–1.3)
DIFF PNL FLD: NEGATIVE — SIGNIFICANT CHANGE UP
EGFR: 102 ML/MIN/1.73M2 — SIGNIFICANT CHANGE UP
EOSINOPHIL # BLD AUTO: 0 K/UL — SIGNIFICANT CHANGE UP (ref 0–0.5)
EOSINOPHIL NFR BLD AUTO: 0 % — SIGNIFICANT CHANGE UP (ref 0–6)
GLUCOSE SERPL-MCNC: 126 MG/DL — HIGH (ref 70–99)
GLUCOSE UR QL: NEGATIVE MG/DL — SIGNIFICANT CHANGE UP
HCG SERPL-ACNC: <2 MIU/ML — SIGNIFICANT CHANGE UP
HCO3 BLDV-SCNC: 24 MMOL/L — SIGNIFICANT CHANGE UP (ref 22–29)
HCT VFR BLD CALC: 32.8 % — LOW (ref 34.5–45)
HGB BLD-MCNC: 11.3 G/DL — LOW (ref 11.5–15.5)
IMM GRANULOCYTES NFR BLD AUTO: 0.1 % — SIGNIFICANT CHANGE UP (ref 0–0.9)
KETONES UR-MCNC: NEGATIVE MG/DL — SIGNIFICANT CHANGE UP
LEUKOCYTE ESTERASE UR-ACNC: ABNORMAL
LIDOCAIN IGE QN: 13 U/L — SIGNIFICANT CHANGE UP (ref 7–60)
LYMPHOCYTES # BLD AUTO: 0.8 K/UL — LOW (ref 1–3.3)
LYMPHOCYTES # BLD AUTO: 11.4 % — LOW (ref 13–44)
MCHC RBC-ENTMCNC: 28 PG — SIGNIFICANT CHANGE UP (ref 27–34)
MCHC RBC-ENTMCNC: 34.5 GM/DL — SIGNIFICANT CHANGE UP (ref 32–36)
MCV RBC AUTO: 81.4 FL — SIGNIFICANT CHANGE UP (ref 80–100)
MONOCYTES # BLD AUTO: 0.21 K/UL — SIGNIFICANT CHANGE UP (ref 0–0.9)
MONOCYTES NFR BLD AUTO: 3 % — SIGNIFICANT CHANGE UP (ref 2–14)
NEUTROPHILS # BLD AUTO: 5.97 K/UL — SIGNIFICANT CHANGE UP (ref 1.8–7.4)
NEUTROPHILS NFR BLD AUTO: 85.2 % — HIGH (ref 43–77)
NITRITE UR-MCNC: NEGATIVE — SIGNIFICANT CHANGE UP
NRBC # BLD: 0 /100 WBCS — SIGNIFICANT CHANGE UP (ref 0–0)
PCO2 BLDV: 40 MMHG — SIGNIFICANT CHANGE UP (ref 39–42)
PH BLDV: 7.39 — SIGNIFICANT CHANGE UP (ref 7.32–7.43)
PH UR: 6 — SIGNIFICANT CHANGE UP (ref 5–8)
PLATELET # BLD AUTO: 253 K/UL — SIGNIFICANT CHANGE UP (ref 150–400)
PO2 BLDV: 38 MMHG — SIGNIFICANT CHANGE UP (ref 25–45)
POTASSIUM SERPL-MCNC: 3.6 MMOL/L — SIGNIFICANT CHANGE UP (ref 3.5–5.3)
POTASSIUM SERPL-SCNC: 3.6 MMOL/L — SIGNIFICANT CHANGE UP (ref 3.5–5.3)
PROT SERPL-MCNC: 7.9 G/DL — SIGNIFICANT CHANGE UP (ref 6–8.3)
PROT UR-MCNC: NEGATIVE MG/DL — SIGNIFICANT CHANGE UP
RBC # BLD: 4.03 M/UL — SIGNIFICANT CHANGE UP (ref 3.8–5.2)
RBC # FLD: 14.6 % — HIGH (ref 10.3–14.5)
RBC CASTS # UR COMP ASSIST: 0 /HPF — SIGNIFICANT CHANGE UP (ref 0–4)
SAO2 % BLDV: 57 % — LOW (ref 67–88)
SODIUM SERPL-SCNC: 145 MMOL/L — SIGNIFICANT CHANGE UP (ref 135–145)
SP GR SPEC: >1.03 — HIGH (ref 1–1.03)
SQUAMOUS # UR AUTO: 6 /HPF — HIGH (ref 0–5)
TROPONIN T, HIGH SENSITIVITY RESULT: 6 NG/L — SIGNIFICANT CHANGE UP (ref 0–51)
UROBILINOGEN FLD QL: 0.2 MG/DL — SIGNIFICANT CHANGE UP (ref 0.2–1)
WBC # BLD: 7.01 K/UL — SIGNIFICANT CHANGE UP (ref 3.8–10.5)
WBC # FLD AUTO: 7.01 K/UL — SIGNIFICANT CHANGE UP (ref 3.8–10.5)
WBC UR QL: 39 /HPF — HIGH (ref 0–5)

## 2024-04-18 PROCEDURE — 83690 ASSAY OF LIPASE: CPT

## 2024-04-18 PROCEDURE — 84702 CHORIONIC GONADOTROPIN TEST: CPT

## 2024-04-18 PROCEDURE — 71045 X-RAY EXAM CHEST 1 VIEW: CPT | Mod: 26

## 2024-04-18 PROCEDURE — 86900 BLOOD TYPING SEROLOGIC ABO: CPT

## 2024-04-18 PROCEDURE — 71045 X-RAY EXAM CHEST 1 VIEW: CPT

## 2024-04-18 PROCEDURE — 93005 ELECTROCARDIOGRAM TRACING: CPT

## 2024-04-18 PROCEDURE — 82010 KETONE BODYS QUAN: CPT

## 2024-04-18 PROCEDURE — 84100 ASSAY OF PHOSPHORUS: CPT

## 2024-04-18 PROCEDURE — 93971 EXTREMITY STUDY: CPT

## 2024-04-18 PROCEDURE — 81001 URINALYSIS AUTO W/SCOPE: CPT

## 2024-04-18 PROCEDURE — 83735 ASSAY OF MAGNESIUM: CPT

## 2024-04-18 PROCEDURE — 99285 EMERGENCY DEPT VISIT HI MDM: CPT

## 2024-04-18 PROCEDURE — 82803 BLOOD GASES ANY COMBINATION: CPT

## 2024-04-18 PROCEDURE — 86901 BLOOD TYPING SEROLOGIC RH(D): CPT

## 2024-04-18 PROCEDURE — 96376 TX/PRO/DX INJ SAME DRUG ADON: CPT

## 2024-04-18 PROCEDURE — 84484 ASSAY OF TROPONIN QUANT: CPT

## 2024-04-18 PROCEDURE — 74177 CT ABD & PELVIS W/CONTRAST: CPT | Mod: 26,MC

## 2024-04-18 PROCEDURE — 85025 COMPLETE CBC W/AUTO DIFF WBC: CPT

## 2024-04-18 PROCEDURE — 96375 TX/PRO/DX INJ NEW DRUG ADDON: CPT

## 2024-04-18 PROCEDURE — 74177 CT ABD & PELVIS W/CONTRAST: CPT | Mod: MC

## 2024-04-18 PROCEDURE — 99285 EMERGENCY DEPT VISIT HI MDM: CPT | Mod: 25

## 2024-04-18 PROCEDURE — 96374 THER/PROPH/DIAG INJ IV PUSH: CPT | Mod: XU

## 2024-04-18 PROCEDURE — 80053 COMPREHEN METABOLIC PANEL: CPT

## 2024-04-18 PROCEDURE — 86850 RBC ANTIBODY SCREEN: CPT

## 2024-04-18 RX ORDER — HYDROMORPHONE HYDROCHLORIDE 2 MG/ML
2 INJECTION INTRAMUSCULAR; INTRAVENOUS; SUBCUTANEOUS ONCE
Refills: 0 | Status: DISCONTINUED | OUTPATIENT
Start: 2024-04-18 | End: 2024-04-18

## 2024-04-18 RX ORDER — CEFTRIAXONE 500 MG/1
1000 INJECTION, POWDER, FOR SOLUTION INTRAMUSCULAR; INTRAVENOUS ONCE
Refills: 0 | Status: COMPLETED | OUTPATIENT
Start: 2024-04-18 | End: 2024-04-18

## 2024-04-18 RX ORDER — SODIUM CHLORIDE 9 MG/ML
1000 INJECTION INTRAMUSCULAR; INTRAVENOUS; SUBCUTANEOUS ONCE
Refills: 0 | Status: COMPLETED | OUTPATIENT
Start: 2024-04-18 | End: 2024-04-18

## 2024-04-18 RX ORDER — CEFDINIR 250 MG/5ML
1 POWDER, FOR SUSPENSION ORAL
Qty: 14 | Refills: 0
Start: 2024-04-18 | End: 2024-04-24

## 2024-04-18 RX ORDER — ONDANSETRON 8 MG/1
4 TABLET, FILM COATED ORAL ONCE
Refills: 0 | Status: COMPLETED | OUTPATIENT
Start: 2024-04-18 | End: 2024-04-18

## 2024-04-18 RX ORDER — HYDROMORPHONE HYDROCHLORIDE 2 MG/ML
1 INJECTION INTRAMUSCULAR; INTRAVENOUS; SUBCUTANEOUS ONCE
Refills: 0 | Status: DISCONTINUED | OUTPATIENT
Start: 2024-04-18 | End: 2024-04-18

## 2024-04-18 RX ADMIN — CEFTRIAXONE 100 MILLIGRAM(S): 500 INJECTION, POWDER, FOR SOLUTION INTRAMUSCULAR; INTRAVENOUS at 18:31

## 2024-04-18 RX ADMIN — SODIUM CHLORIDE 1000 MILLILITER(S): 9 INJECTION INTRAMUSCULAR; INTRAVENOUS; SUBCUTANEOUS at 13:18

## 2024-04-18 RX ADMIN — ONDANSETRON 4 MILLIGRAM(S): 8 TABLET, FILM COATED ORAL at 13:18

## 2024-04-18 RX ADMIN — HYDROMORPHONE HYDROCHLORIDE 1 MILLIGRAM(S): 2 INJECTION INTRAMUSCULAR; INTRAVENOUS; SUBCUTANEOUS at 13:17

## 2024-04-18 RX ADMIN — HYDROMORPHONE HYDROCHLORIDE 2 MILLIGRAM(S): 2 INJECTION INTRAMUSCULAR; INTRAVENOUS; SUBCUTANEOUS at 16:13

## 2024-04-18 RX ADMIN — SODIUM CHLORIDE 1000 MILLILITER(S): 9 INJECTION INTRAMUSCULAR; INTRAVENOUS; SUBCUTANEOUS at 17:00

## 2024-04-18 NOTE — ED PROVIDER NOTE - PROGRESS NOTE DETAILS
PGY1/Jean, DO: pt visibly appears less tremulous and uncomfortable after dilaudid given, she is requesting more for discomfort per her home med regimen. ordered additional 2 mg Dilaudid and will reassess Janet Mccoy M.D. (Resident Physician): Surg says pt ok to go home. Will have her f/u with her surgical and medical oncologists tmrw.

## 2024-04-18 NOTE — ED PROVIDER NOTE - PATIENT PORTAL LINK FT
You can access the FollowMyHealth Patient Portal offered by [facility] by registering at the following website: [email or web address]. By joining [facility]’s FollowMyHealth portal, you will also be able to view your health information using other applications (apps) compatible with our system.

## 2024-04-18 NOTE — ED PROVIDER NOTE - ATTENDING CONTRIBUTION TO CARE
Hx of numerous abdominal surgeries, intra-abdominal collections, on chronic opiates presenting with acute on chronic abdominal pain with associated nausea/vomiting. Overall exam benign - however, patient is uncomfortable, abdomen non-peritonitic, vitals wnl. Possibly has opiate withdrawal iso reduction in opiate dose recently on outpatient setting. Will check labs, repeat CT imaging, symptom management, disposition pending work-up and response to treatment.

## 2024-04-18 NOTE — ED PROVIDER NOTE - OBJECTIVE STATEMENT
PGY1/Jean, DO: 59 year old female with pmhx metastatic mucinous adenocarcinoma of the appendix, s/p HIPEC, appendectomy, cholecystectomy, hysterectomy, omentectomy, peritonectomy, LAR/DLI, and bladder injury with primary repair 10/4, s/p diverting loop ileostomy with reversal on 12/11/23 w/ PICC for TPN; p/w N/V, abd pain, tremors x1 day. Pt sts she had her regular Dilaudid regimen changed on Monday from 4mg PO TID to 4 mg BID. She sts she began vomiting (NB/NB) at 2 am, tried to take her dilaudid around 6 am but vomiting subsequently, additionally took at 10 am but vomited subsequently. Last BM reported to be yesterday normal in color/caliber for her. Pt lionel was here for opiate withdrawal ~1 month ago and thinks she is having withdrawal sx again as it feels similar. She does endorse the abd pain began after the vomiting and describes it as a burning sensation. She denies any fevers, chest pain, SOB, lightheadedness, dizziness, numbness/tingling, dysuria/hematuria, visual changes, known sick exposures. No other complaints.

## 2024-04-18 NOTE — ED PROVIDER NOTE - CLINICAL SUMMARY MEDICAL DECISION MAKING FREE TEXT BOX
PGY1/Jean, DO: 59 year old female with pmhx metastatic mucinous adenocarcinoma of the appendix, s/p HIPEC, appendectomy, cholecystectomy, hysterectomy, omentectomy, peritonectomy, LAR/DLI, and bladder injury with primary repair 10/4, s/p diverting loop ileostomy with reversal on 12/11/23 w/ PICC for TPN; p/w N/V, abd pain, tremors x1 day. Pt sts she had her regular Dilaudid regimen changed on Monday from 4mg PO TID to 4 mg BID. She sts she began vomiting (NB/NB) at 2 am, tried to take her dilaudid around 6 am but vomiting subsequently, additionally took at 10 am but vomited subsequently. Last BM reported to be yesterday normal in color/caliber for her. Pt sts was here for opiate withdrawal ~1 month ago and thinks she is having withdrawal sx again as it feels similar. She does endorse the abd pain began after the vomiting and describes it as a burning sensation. She denies any fevers, chest pain, SOB, lightheadedness, dizziness, numbness/tingling, dysuria/hematuria, visual changes, known sick exposures. No other complaints. VSS. On exam pt appears uncomfortable, +tremors, moderate distress, nontoxic appearing, head NC/AT, EOMI. Conjunctiva/sclera clear. Neck supple with full ROM, heart and lungs RRR and CTAB, abd obese, multiple well-healed scars from prior surgeries, soft, nontender, no rebound/guarding, nondistended, neuro exam with no gross focal sensory/motor deficits, moving all 4 extremities, conversational, moving all extremities, no visible deformities or swelling. Appears dehydrated, diaphoretic. No visible lesions or bruising. DDx worsening abd malignancy, SBO, opioid withdrawal, metabolic/infectious disturbances. Plan for labs, IVF, home dose Dilaudid ~1mg IV, CTAP, XR to evaluate for free air in chest from vomiting. Dispo pending results.

## 2024-04-18 NOTE — ED ADULT NURSE NOTE - OBJECTIVE STATEMENT
Pt with hx:  metastatic mucinous adenocarcinoma of the appendix, s/p HIPEC, appendectomy, cholecystectomy, hysterectomy, omentectomy, peritonectomy,  s/p diverting loop ileostomy with reversal on 12/11/23 w/ PICC for TPN; came in with  Nausea and vomiting , abd pain, tremors x1 day. Pt reports she had her regular Dilaudid regimen changed on Monday from 4mg PO TID to 4 mg BID. Pt was hospitalized with opioid withdrawal last month. Pt states her symptoms feel like a withdrawal. Pt is in no distress. Breathing easy and non labored. Pt is able to move all extremities. Unable to flush the PICC line. Dr. Barragan placed a sono guided peripheral IV.

## 2024-04-18 NOTE — ED PROVIDER NOTE - NSFOLLOWUPINSTRUCTIONS_ED_ALL_ED_FT
You have been evaluated in the Emergency Department today for abdominal pain and vomiting. Your results are attached.    Please call Dr. Khan & Dr. Man's offices tomorrow morning.    Return to the Emergency Department if you experience worsening or uncontrolled pain, fevers 100.4°F or greater, recurrent vomiting, inability to tolerate food or fluids by mouth, bloody stools or vomit, black or tarry stools, or any other concerning symptoms.    Thank you for choosing us for your care. You have been evaluated in the Emergency Department today for abdominal pain and vomiting. Your results are attached.    Please call Dr. Khan & Dr. Man's offices tomorrow morning.    Please  and take the antibiotic for UTI.    Return to the Emergency Department if you experience worsening or uncontrolled pain, fevers 100.4°F or greater, recurrent vomiting, inability to tolerate food or fluids by mouth, bloody stools or vomit, black or tarry stools, or any other concerning symptoms.    Thank you for choosing us for your care.

## 2024-04-18 NOTE — ED PROVIDER NOTE - NOTES
initially paged [phone]  paged again [phone] initially paged [phone]  paged again [phone]  call returned [phone] - will see pt

## 2024-04-18 NOTE — ED PROVIDER NOTE - PHYSICAL EXAMINATION
General: Uncomfortable, +tremors, moderate distress, nontoxic appearing  Head: NC/AT.   Eyes: EOMI. Conjunctiva/sclera clear  Neck: Supple. Full passive ROM, no meningitic signs   Cardiac: RRR, no MGR  Pulmonary: CTAB. No increased WOB  Abdominal: Obese, multiple well-healed scars from prior surgeries, soft, nontender, no rebound/guarding, nondistended  Neurologic: No gross focal sensory or motor deficits. Moves all 4 extremities during encounter.  Musculoskeletal: Strength appropriate in all 4 extremities for age with no limited ROM. No visible deformities or extremity swelling.  Skin: Color appropriate for race. Appears dehydrated, diaphoretic. No visible lesions or bruising.

## 2024-04-18 NOTE — CONSULT NOTE ADULT - SUBJECTIVE AND OBJECTIVE BOX
GENERAL SURGERY CONSULT NOTE  --------------------------------------------------------------------------------------------    HPI:         ROS: 10-system review is otherwise negative except HPI above.      PAST MEDICAL & SURGICAL HISTORY:  History of sickle cell trait  Malignant neoplasm of appendix  GERD (gastroesophageal reflux disease)  H/O sepsis  Anemia  History of deep vein thrombosis  LUE  Opiate dependence  History of DVT (deep vein thrombosis)  History of ectopic pregnancy  H/O colonoscopy  H/O endoscopy  H/O: hysterectomy  History of appendectomy  H/O ileostomy  Port-A-Cath in place    FAMILY HISTORY:  FH: sickle cell anemia (Mother)    FH: heart disease (Mother)    [] Family history not pertinent as reviewed with the patient and family    HOME MEDICATIONS: ***    ALLERGIES: adhesives (Other)  penicillin (Rash)      --------------------------------------------------------------------------------------------    Vitals:   T(C): 36.8 (04-18-24 @ 14:10), Max: 36.9 (04-18-24 @ 11:15)  HR: 76 (04-18-24 @ 14:10) (64 - 76)  BP: 105/66 (04-18-24 @ 14:10) (105/66 - 126/66)  RR: 17 (04-18-24 @ 14:10) (17 - 22)  SpO2: 100% (04-18-24 @ 14:10) (98% - 100%)  CAPILLARY BLOOD GLUCOSE          Height (cm): 154.9 (04-18 @ 11:15)  Weight (kg): 59 (04-18 @ 11:15)  BMI (kg/m2): 24.6 (04-18 @ 11:15)  BSA (m2): 1.57 (04-18 @ 11:15)    Physical Examination:  GEN: NAD, resting quietly  NEURO: AAOx3, CN II-XII grossly intact, no focal deficits  PULM: symmetric chest rise bilaterally, no increased WOB  ABD: soft, nondistended, minimally tender to palpation, no rebound, no guarding  EXTR: no lower extremity edema, moving all extremities  --------------------------------------------------------------------------------------------    LABS  CBC (04-18 @ 14:13)                              11.3<L>                         7.01    )----------------(  253        85.2<H>% Neutrophils, 11.4<L>% Lymphocytes, ANC: 5.97                                32.8<L>    BMP (04-18 @ 14:13)             145     |  108     |  23    		Ca++ --      Ca 10.7<H>             ---------------------------------( 126<H>		Mg 2.1                3.6     |  20<L>   |  0.61  			Ph 2.8       LFTs (04-18 @ 14:13)      TPro 7.9 / Alb 4.1 / TBili 0.5 / DBili -- / AST 29 / ALT 40 / AlkPhos 78          VBG (04-18 @ 14:13)     7.39 / 40 / 38 / 24 / -0.7 / 57.0<L>%     Lactate: --    --------------------------------------------------------------------------------------------      IMAGING  < from: CT Abdomen and Pelvis w/ IV Cont (04.18.24 @ 14:35) >  PROCEDURE:  CT of the Abdomen and Pelvis was performed.  Sagittal and coronal reformats were performed.    FINDINGS:  LOWER CHEST: A partially imaged central line.    LIVER: Several subcentimeter hypodensities that are too small to   characterize, stable. Mild continued decrease in size of higher than   fluid attenuation subcapsular collections, previously described as   hematomas, at the posterior right lobe measuring 3.2 x 1.8 cm (301:34),   previously 3.7 x 1.9 cm and measuring 2 x 1.1 cm at the anterior margin   of thelateral left lobe (301:33), previously 2.3 x 1.1 cm.  BILE DUCTS: Normal caliber.  GALLBLADDER: Cholecystectomy.  SPLEEN: Within normal limits.  PANCREAS: Within normal limits.  ADRENALS: Within normal limits.  KIDNEYS/URETERS: Bilateral cysts. At the lateral right interpole, there   is a stable 2.2 cm higher than fluid attenuation lesion, remains   indeterminate.    BLADDER: Within normal limits.  REPRODUCTIVE ORGANS: Hysterectomy.    BOWEL: Rectosigmoid anastomosis. Small bowel anastomosis in the right   lower quadrant.  No bowel obstruction.  PERITONEUM:  A 2.9 x 2.7 cm higher than fluid attenuation structure to   the left of the aorta (301:44), previously 2.4 x 1.6 cm. Also, a higher   than fluid attenuation structure/collection just inferior to the distal   stomach measuring 5.7 x 2.2 cm (301:47), previously 4.1 x 1.3 cm.  VESSELS: Within normal limits.  RETROPERITONEUM/LYMPH NODES: No lymphadenopathy.  ABDOMINAL WALL: Postsurgical changes.  BONES: Within normal limits.    IMPRESSION:  Mild continued interval decrease in the hepatic subcapsular structures   since 2/27/2024, previously characterized as hematomas.    However, mild increase in the lobulated higher than fluid attenuation   structures/collections to the left of the aorta and inferior to the   distal stomach since 2/27/2024.    No bowel obstruction.    A stable 2.2 cm right renal interpole higher than fluid attenuation   structure remains indeterminate. Recommend targeted ultrasound.      < end of copied text >      --------------------------------------------------------------------------------------------    ASSESSMENT:   58yF s/p appendectomy, cholecystectomy, hysterectomy, omentectomy, peritonectomy, LAR/DLI, and bladder injury with primary repair 10/4 for appendiceal malignancy.     PLAN:   -   -    SURGICAL ONCOLOGY CONSULT NOTE  --------------------------------------------------------------------------------------------    HPI:   Ms. Romero is  a59 year old woman with a PMHx of metastatic mucinous adenocarcinoma of the appendix, s/p 4 cycles VAMSI FOLFOX, s/p HIPEC, appendectomy, cholecystectomy, hysterectomy, omentectomy, peritonectomy, LAR/DLI, and bladder injury with primary repair 10/4, reversal of DLI 12/11/24 who follows with Dr. Khan & Dr. Dong. She presents today for abdominal pain, cramping, nausea, and vomiting in the setting of recent reduction in hydromorphone dosing, similar to a prior constellation of symtoms last month when she was without hydromorphone for 72hrs, she feels this might be an element of withdrawal. Last night at 9pm she noted that she hadnt had a bowel movement in a short time so she took a laxative. Later that night she awoke with abdominal cramping and diarrhea associated with one episode of yellow emesis. She returned to bed but awoke a few hours later with more cramping, nausea, diarrhea, and nonbloody, nonbilious emesis so she presented to the ED. She did endorse subjective chills without fever. Her Hydropmorphone dose was dropped from TID to BID on sunday and she thinks it might be withdrawal, but does report her baseline pain has been well managed on the new dose. Here in the ED the patient has been afrebrile and HD stable. Labs were unremarkable except a possible UTI, CT showed interval decrease in known hepatic hematomas but small increase in perigastric and dania-aortic loculated collections.     ROS: 10-system review is otherwise negative except HPI above.      PAST MEDICAL & SURGICAL HISTORY:  History of sickle cell trait  Malignant neoplasm of appendix  GERD (gastroesophageal reflux disease)  H/O sepsis  Anemia  History of deep vein thrombosis  LUE  Opiate dependence  History of DVT (deep vein thrombosis)  History of ectopic pregnancy  H/O colonoscopy  H/O endoscopy  H/O: hysterectomy  History of appendectomy  H/O ileostomy  Port-A-Cath in place    FAMILY HISTORY:  FH: sickle cell anemia (Mother)    FH: heart disease (Mother)    [] Family history not pertinent as reviewed with the patient and family    HOME MEDICATIONS: ***    ALLERGIES: adhesives (Other)  penicillin (Rash)      --------------------------------------------------------------------------------------------    Vitals:   T(C): 36.8 (04-18-24 @ 14:10), Max: 36.9 (04-18-24 @ 11:15)  HR: 76 (04-18-24 @ 14:10) (64 - 76)  BP: 105/66 (04-18-24 @ 14:10) (105/66 - 126/66)  RR: 17 (04-18-24 @ 14:10) (17 - 22)  SpO2: 100% (04-18-24 @ 14:10) (98% - 100%)  CAPILLARY BLOOD GLUCOSE          Height (cm): 154.9 (04-18 @ 11:15)  Weight (kg): 59 (04-18 @ 11:15)  BMI (kg/m2): 24.6 (04-18 @ 11:15)  BSA (m2): 1.57 (04-18 @ 11:15)    Physical Examination:  GEN: NAD, resting quietly  NEURO: AAOx3, CN II-XII grossly intact, no focal deficits  PULM: symmetric chest rise bilaterally, no increased WOB  ABD: soft, nondistended, minimally tender to palpation, no rebound, no guarding  EXTR: no lower extremity edema, moving all extremities  --------------------------------------------------------------------------------------------    LABS  CBC (04-18 @ 14:13)                              11.3<L>                         7.01    )----------------(  253        85.2<H>% Neutrophils, 11.4<L>% Lymphocytes, ANC: 5.97                                32.8<L>    BMP (04-18 @ 14:13)             145     |  108     |  23    		Ca++ --      Ca 10.7<H>             ---------------------------------( 126<H>		Mg 2.1                3.6     |  20<L>   |  0.61  			Ph 2.8       LFTs (04-18 @ 14:13)      TPro 7.9 / Alb 4.1 / TBili 0.5 / DBili -- / AST 29 / ALT 40 / AlkPhos 78          VBG (04-18 @ 14:13)     7.39 / 40 / 38 / 24 / -0.7 / 57.0<L>%     Lactate: --    --------------------------------------------------------------------------------------------      IMAGING  < from: CT Abdomen and Pelvis w/ IV Cont (04.18.24 @ 14:35) >  PROCEDURE:  CT of the Abdomen and Pelvis was performed.  Sagittal and coronal reformats were performed.    FINDINGS:  LOWER CHEST: A partially imaged central line.    LIVER: Several subcentimeter hypodensities that are too small to   characterize, stable. Mild continued decrease in size of higher than   fluid attenuation subcapsular collections, previously described as   hematomas, at the posterior right lobe measuring 3.2 x 1.8 cm (301:34),   previously 3.7 x 1.9 cm and measuring 2 x 1.1 cm at the anterior margin   of thelateral left lobe (301:33), previously 2.3 x 1.1 cm.  BILE DUCTS: Normal caliber.  GALLBLADDER: Cholecystectomy.  SPLEEN: Within normal limits.  PANCREAS: Within normal limits.  ADRENALS: Within normal limits.  KIDNEYS/URETERS: Bilateral cysts. At the lateral right interpole, there   is a stable 2.2 cm higher than fluid attenuation lesion, remains   indeterminate.    BLADDER: Within normal limits.  REPRODUCTIVE ORGANS: Hysterectomy.    BOWEL: Rectosigmoid anastomosis. Small bowel anastomosis in the right   lower quadrant.  No bowel obstruction.  PERITONEUM:  A 2.9 x 2.7 cm higher than fluid attenuation structure to   the left of the aorta (301:44), previously 2.4 x 1.6 cm. Also, a higher   than fluid attenuation structure/collection just inferior to the distal   stomach measuring 5.7 x 2.2 cm (301:47), previously 4.1 x 1.3 cm.  VESSELS: Within normal limits.  RETROPERITONEUM/LYMPH NODES: No lymphadenopathy.  ABDOMINAL WALL: Postsurgical changes.  BONES: Within normal limits.    IMPRESSION:  Mild continued interval decrease in the hepatic subcapsular structures   since 2/27/2024, previously characterized as hematomas.    However, mild increase in the lobulated higher than fluid attenuation   structures/collections to the left of the aorta and inferior to the   distal stomach since 2/27/2024.    No bowel obstruction.    A stable 2.2 cm right renal interpole higher than fluid attenuation   structure remains indeterminate. Recommend targeted ultrasound.      < end of copied text >      --------------------------------------------------------------------------------------------    ASSESSMENT:   Ms. Romero is a 59 year old woman with a PMHx of metastatic mucinous adenocarcinoma of the appendix, s/p 4 cycles VAMSI FOLFOX, s/p HIPEC, appendectomy, cholecystectomy, hysterectomy, omentectomy, peritonectomy, LAR/DLI, and bladder injury with primary repair 10/4, reversal of DLI 12/11/24 who follows with Dr. Khan & Dr. Dong. She presents today for abdominal pain, cramping, nausea, and vomiting in the setting of recent reduction in hydromorphone dosing, CT showed interval decrease in known hepatic hematomas but small increase in perigastric and dania-aortic loculated collections.       PLAN:   - patient with known collections, some improvement in perihepatic collections but mild increase in perigastric & periaortic collecitons  - she remains afebrile, HD stable, normal WBC, and feels much better. Her pain and nausea/vomiting have improved and she would like to go home   - recomnend PO challenge prior to DC, patient already been drinking fluids  - advised the patient she should call Dr. Khan & Dr. Man's offices tomorrow to let them know there is a new CT scan with the new findings so that they may review and determine if she needs further workup or to visit sooner than June as is scheduled  - no contraindication to discharge.    To be discussed shortly with Dr. Molina prior to d/c     Venkata Christian, PGY3  Red Team Surgery   [id number]     SURGICAL ONCOLOGY CONSULT NOTE  --------------------------------------------------------------------------------------------    HPI:   Ms. Romero is  a59 year old woman with a PMHx of metastatic mucinous adenocarcinoma of the appendix, s/p 4 cycles VAMSI FOLFOX, s/p HIPEC, appendectomy, cholecystectomy, hysterectomy, omentectomy, peritonectomy, LAR/DLI, and bladder injury with primary repair 10/4, reversal of DLI 12/11/24 who follows with Dr. Khan & Dr. Dong. She presents today for abdominal pain, cramping, nausea, and vomiting in the setting of recent reduction in hydromorphone dosing, similar to a prior constellation of symtoms last month when she was without hydromorphone for 72hrs, she feels this might be an element of withdrawal. Last night at 9pm she noted that she hadnt had a bowel movement in a short time so she took a laxative. Later that night she awoke with abdominal cramping and diarrhea associated with one episode of yellow emesis. She returned to bed but awoke a few hours later with more cramping, nausea, diarrhea, and nonbloody, nonbilious emesis so she presented to the ED. She did endorse subjective chills without fever. Her Hydropmorphone dose was dropped from TID to BID on sunday and she thinks it might be withdrawal, but does report her baseline pain has been well managed on the new dose. Here in the ED the patient has been afrebrile and HD stable. Labs were unremarkable except a possible UTI, CT showed interval decrease in known hepatic hematomas but small increase in perigastric and dania-aortic loculated collections.     ROS: 10-system review is otherwise negative except HPI above.      PAST MEDICAL & SURGICAL HISTORY:  History of sickle cell trait  Malignant neoplasm of appendix  GERD (gastroesophageal reflux disease)  H/O sepsis  Anemia  History of deep vein thrombosis  LUE  Opiate dependence  History of DVT (deep vein thrombosis)  History of ectopic pregnancy  H/O colonoscopy  H/O endoscopy  H/O: hysterectomy  History of appendectomy  H/O ileostomy  Port-A-Cath in place    FAMILY HISTORY:  FH: sickle cell anemia (Mother)    FH: heart disease (Mother)    [] Family history not pertinent as reviewed with the patient and family      ALLERGIES: adhesives (Other)  penicillin (Rash)      --------------------------------------------------------------------------------------------    Vitals:   T(C): 36.8 (04-18-24 @ 14:10), Max: 36.9 (04-18-24 @ 11:15)  HR: 76 (04-18-24 @ 14:10) (64 - 76)  BP: 105/66 (04-18-24 @ 14:10) (105/66 - 126/66)  RR: 17 (04-18-24 @ 14:10) (17 - 22)  SpO2: 100% (04-18-24 @ 14:10) (98% - 100%)  CAPILLARY BLOOD GLUCOSE          Height (cm): 154.9 (04-18 @ 11:15)  Weight (kg): 59 (04-18 @ 11:15)  BMI (kg/m2): 24.6 (04-18 @ 11:15)  BSA (m2): 1.57 (04-18 @ 11:15)    Physical Examination:  GEN: NAD, resting quietly  NEURO: AAOx3, CN II-XII grossly intact, no focal deficits  PULM: symmetric chest rise bilaterally, no increased WOB  ABD: soft, nondistended, minimally tender to palpation, no rebound, no guarding  EXTR: no lower extremity edema, moving all extremities  --------------------------------------------------------------------------------------------    LABS  CBC (04-18 @ 14:13)                              11.3<L>                         7.01    )----------------(  253        85.2<H>% Neutrophils, 11.4<L>% Lymphocytes, ANC: 5.97                                32.8<L>    BMP (04-18 @ 14:13)             145     |  108     |  23    		Ca++ --      Ca 10.7<H>             ---------------------------------( 126<H>		Mg 2.1                3.6     |  20<L>   |  0.61  			Ph 2.8       LFTs (04-18 @ 14:13)      TPro 7.9 / Alb 4.1 / TBili 0.5 / DBili -- / AST 29 / ALT 40 / AlkPhos 78          VBG (04-18 @ 14:13)     7.39 / 40 / 38 / 24 / -0.7 / 57.0<L>%     Lactate: --    --------------------------------------------------------------------------------------------      IMAGING  < from: CT Abdomen and Pelvis w/ IV Cont (04.18.24 @ 14:35) >  PROCEDURE:  CT of the Abdomen and Pelvis was performed.  Sagittal and coronal reformats were performed.    FINDINGS:  LOWER CHEST: A partially imaged central line.    LIVER: Several subcentimeter hypodensities that are too small to   characterize, stable. Mild continued decrease in size of higher than   fluid attenuation subcapsular collections, previously described as   hematomas, at the posterior right lobe measuring 3.2 x 1.8 cm (301:34),   previously 3.7 x 1.9 cm and measuring 2 x 1.1 cm at the anterior margin   of thelateral left lobe (301:33), previously 2.3 x 1.1 cm.  BILE DUCTS: Normal caliber.  GALLBLADDER: Cholecystectomy.  SPLEEN: Within normal limits.  PANCREAS: Within normal limits.  ADRENALS: Within normal limits.  KIDNEYS/URETERS: Bilateral cysts. At the lateral right interpole, there   is a stable 2.2 cm higher than fluid attenuation lesion, remains   indeterminate.    BLADDER: Within normal limits.  REPRODUCTIVE ORGANS: Hysterectomy.    BOWEL: Rectosigmoid anastomosis. Small bowel anastomosis in the right   lower quadrant.  No bowel obstruction.  PERITONEUM:  A 2.9 x 2.7 cm higher than fluid attenuation structure to   the left of the aorta (301:44), previously 2.4 x 1.6 cm. Also, a higher   than fluid attenuation structure/collection just inferior to the distal   stomach measuring 5.7 x 2.2 cm (301:47), previously 4.1 x 1.3 cm.  VESSELS: Within normal limits.  RETROPERITONEUM/LYMPH NODES: No lymphadenopathy.  ABDOMINAL WALL: Postsurgical changes.  BONES: Within normal limits.    IMPRESSION:  Mild continued interval decrease in the hepatic subcapsular structures   since 2/27/2024, previously characterized as hematomas.    However, mild increase in the lobulated higher than fluid attenuation   structures/collections to the left of the aorta and inferior to the   distal stomach since 2/27/2024.    No bowel obstruction.    A stable 2.2 cm right renal interpole higher than fluid attenuation   structure remains indeterminate. Recommend targeted ultrasound.      < end of copied text >      --------------------------------------------------------------------------------------------    ASSESSMENT:   Ms. Romero is a 59 year old woman with a PMHx of metastatic mucinous adenocarcinoma of the appendix, s/p 4 cycles VAMSI FOLFOX, s/p HIPEC, appendectomy, cholecystectomy, hysterectomy, omentectomy, peritonectomy, LAR/DLI, and bladder injury with primary repair 10/4, reversal of DLI 12/11/24 who follows with Dr. Khan & Dr. Dong. She presents today for abdominal pain, cramping, nausea, and vomiting in the setting of recent reduction in hydromorphone dosing, CT showed interval decrease in known hepatic hematomas but small increase in perigastric and dnaia-aortic loculated collections.       PLAN:   - patient with known collections, some improvement in perihepatic collections but mild increase in perigastric & periaortic collecitons  - she remains afebrile, HD stable, normal WBC, and feels much better. Her pain and nausea/vomiting have improved and she would like to go home   - recommend PO challenge prior to DC, patient already been drinking fluids  - advised the patient she should call Dr. Khan & Dr. Man's offices tomorrow to let them know there is a new CT scan with the new findings so that they may review and determine if she needs further workup or to visit sooner than June as is scheduled  - no contraindication to discharge.    Venkata Christian, PGY3  Red Team Surgery   [id number]

## 2024-04-18 NOTE — ED ADULT TRIAGE NOTE - CHIEF COMPLAINT QUOTE
pain medication changed on Monday, pt presents with increased pain to abdomen and vomiting   hx cancer

## 2024-04-25 ENCOUNTER — APPOINTMENT (OUTPATIENT)
Dept: GERIATRICS | Facility: CLINIC | Age: 59
End: 2024-04-25
Payer: COMMERCIAL

## 2024-04-25 PROCEDURE — 99213 OFFICE O/P EST LOW 20 MIN: CPT

## 2024-04-25 RX ORDER — DRONABINOL 5 MG/1
5 CAPSULE ORAL
Qty: 28 | Refills: 0 | Status: DISCONTINUED | COMMUNITY
Start: 2023-11-15 | End: 2024-04-25

## 2024-04-25 NOTE — REASON FOR VISIT
[Home] : at home, [unfilled] , at the time of the visit. [Medical Office: ([facility])___] : at the medical office located in  [Initial Evaluation] : an initial evaluation [Spouse] : spouse

## 2024-04-25 NOTE — HISTORY OF PRESENT ILLNESS
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 Khan. PMH significant for pre-diabetes, h/o ectopic pregnancy.   Pt began experiencing weight gain and lower abdominal pelvic pressure in April/May 2023. Saw her gynecologist who palpated a mass. US Abdomen on 5/24/23 noted fluid within the endometrium, 12.9 cm complex cystic R adnexal mass suspicious for ovarian neoplasm. CEA and CA 19-9 noted to be elevated by AFP, CA  were normal. She was then referred to Gyn Onc Dr. Sanchez and ultimately to Surg Onc, Dr. Dong.   6/20/23: CT CAP large bilateral complex adnexal masses. Extensive soft tissue infiltration of the mesentery and nodularity of the omentum as well as a moderate amount of ascites. 6/30/23: Omental bx: mucinous adenocarcinoma 7/20/23: Case reviewed at , likely appendiceal primary given abnormal appendix and IHC staining suggesting GI, elevated CEA, normal , plan for neoadjuvant FOLFOX followed by cytoreduction, HIPEC and adjuvant chemo 7/24/23 - 9/5/23: C1-C4 FOLFOX 10/4-11/8/23: admitted for planned appendectomy, cholecystectomy, hysterectomy, omenectomy, peritenectomy, LAR with divertingloop ileostomy and bladder injury with primary repair on 10/4. Course c/b n/v, inability to tolerate PO, severe malnutrition. Started on TPN via PICC.  Main reason for which patient is referred is symptom management.   She is dealing with a multitude of symptoms since the time of her surgery (10/4/23). She is now on TPN via PICC line. She suffers with chronic vomiting since then. Does not deal with much nausea but rather feels the urge to retch. She brings back up anything she puts into her stomach, about 15 minutes afterwards.  Is written for various medications: Metoclopramide 10mg four times per day Dexamethasone 4mg QD (took it for about 5 days, did not help) Dronabinol was prescribed but she did not try it Omeprazole 40mg is prescribed but she does not use it.  Lorazepam 0.5mg (not currently using it)   She experiences pain in the epigastric/substernal region, worsened when she increased abdominal pressure - coughing, sneezing, retching. She finds that the pain rises up every 6 hours, which has prompted her to take the hydromorphone on a Q6h schedule. Pain gets as bad as 7/10 at its worst.  She is using hydromorphone 4mg every 6 hours on a schedule. It helps subside the pain and she can sleep for about an hour after taking it.  She doesn't sleep well at night, wakes up about every 2 hours.      Interval Hx (4/25/24): Patient seen via telemedicine for palliative medicine follow up.  She went to [facility] ED on 4/18 with n/v, diarrhea and abdominal pain. Reports that she had lowered her hydromorphone 4mg to twice daily (from TID) in line with the tapering process. Due to bloating and feeling constipated she took a dulcolax pill on the evening of 4/17. She woke up with diarrhea on the morning of 4/18, along with n/v.  Went to the ED where no acute pathology was found, although she was treated for a UTI. She continues to use hydromorphone 4mg every 8 hours, is concerned about tapering and getting sick.  Is on TPN, has not been eating by mouth this past week.  Not using medicinal cannabis.  ROS: +fatigue +trouble sleeping - finds that the pain at her surgical site is amplified at night, is squeezing in quality. The pain is also exacerbated when she coughs or sneezes. Denies constipation - moves bowels regularly.  Patient is single, lives with her long-time partner, Daniel. She has one son. She lives in a two family house, on the second floor. She does not come down much. She is able to get bathed and dressed on her own. She ambulates independently. She is not employed. Is not driving. Her support system includes her boyfriend, sister and cousin.  Neuro: Dr. Irma Medel ([place])  I-STOP Ref#: [id number]

## 2024-04-25 NOTE — ASSESSMENT
[FreeTextEntry1] : 59yoF with:  # Appendiceal cancer - s/p neoadjuvant therapy and extensive surgery 10/4. Surg Onc, Med onc follow up.  # Abdominal pain - c/w Hydromorphone taper but at slower rate.  Will have patient lower to 3mg/4mg/4mg and step down dose more slowly.  # Encounter for palliative care- Emotional support provided  follow up in 2 weeks

## 2024-04-29 ENCOUNTER — RESULT REVIEW (OUTPATIENT)
Age: 59
End: 2024-04-29

## 2024-05-10 ENCOUNTER — APPOINTMENT (OUTPATIENT)
Dept: GERIATRICS | Facility: CLINIC | Age: 59
End: 2024-05-10
Payer: COMMERCIAL

## 2024-05-10 PROCEDURE — 99212 OFFICE O/P EST SF 10 MIN: CPT

## 2024-05-10 NOTE — REASON FOR VISIT
[Home] : at home, [unfilled] , at the time of the visit. [Medical Office: ([facility])___] : at the medical office located in  [Initial Evaluation] : an initial evaluation [Spouse] : spouse

## 2024-05-16 NOTE — HISTORY OF PRESENT ILLNESS
[FreeTextEntry1] : 59yoF with appendiceal cancer presents for follow up palliative care visit, referred by Dr. Khan. PMH significant for pre-diabetes, h/o ectopic pregnancy.   Pt began experiencing weight gain and lower abdominal pelvic pressure in April/May 2023. Saw her gynecologist who palpated a mass. US Abdomen on 5/24/23 noted fluid within the endometrium, 12.9 cm complex cystic R adnexal mass suspicious for ovarian neoplasm. CEA and CA 19-9 noted to be elevated by AFP, CA  were normal. She was then referred to Gyn Onc Dr. Sanchez and ultimately to Surg Onc, Dr. Dong.   6/20/23: CT CAP large bilateral complex adnexal masses. Extensive soft tissue infiltration of the mesentery and nodularity of the omentum as well as a moderate amount of ascites. 6/30/23: Omental bx: mucinous adenocarcinoma 7/20/23: Case reviewed at , likely appendiceal primary given abnormal appendix and IHC staining suggesting GI, elevated CEA, normal , plan for neoadjuvant FOLFOX followed by cytoreduction, HIPEC and adjuvant chemo 7/24/23 - 9/5/23: C1-C4 FOLFOX 10/4-11/8/23: admitted for planned appendectomy, cholecystectomy, hysterectomy, omenectomy, peritenectomy, LAR with divertingloop ileostomy and bladder injury with primary repair on 10/4. Course c/b n/v, inability to tolerate PO, severe malnutrition. Started on TPN via PICC.  Main reason for which patient is referred is symptom management.   She is dealing with a multitude of symptoms since the time of her surgery (10/4/23). She is now on TPN via PICC line. She suffers with chronic vomiting since then. Does not deal with much nausea but rather feels the urge to retch. She brings back up anything she puts into her stomach, about 15 minutes afterwards.  Is written for various medications: Metoclopramide 10mg four times per day Dexamethasone 4mg QD (took it for about 5 days, did not help) Dronabinol was prescribed but she did not try it Omeprazole 40mg is prescribed but she does not use it.  Lorazepam 0.5mg (not currently using it)   She experiences pain in the epigastric/substernal region, worsened when she increased abdominal pressure - coughing, sneezing, retching. She finds that the pain rises up every 6 hours, which has prompted her to take the hydromorphone on a Q6h schedule. Pain gets as bad as 7/10 at its worst.  She is using hydromorphone 4mg every 6 hours on a schedule. It helps subside the pain and she can sleep for about an hour after taking it.  She doesn't sleep well at night, wakes up about every 2 hours.      Interval Hx (5/10/24): Patient seen via telemedicine for palliative medicine follow up.  She has been doing well with her slow opioid taper. She is down to 3mg/3mg/4mg and will be stepping down to 3mg TID in two days. where no acute pathology was found, although she was treated for a UTI. She continues to use hydromorphone 4mg every 8 hours, is concerned about tapering and getting sick.  Is on TPN, has not been eating by mouth this past week.  Not using medicinal cannabis.  ROS: +fatigue +trouble sleeping - finds that the pain at her surgical site is amplified at night, is squeezing in quality.  +constipation - is drinking a lot of water  Patient is single, lives with her long-time partner, Daniel. She has one son. She lives in a two family house, on the second floor. She does not come down much. She is able to get bathed and dressed on her own. She ambulates independently. She is not employed. Is not driving. Her support system includes her boyfriend, sister and cousin.  Neuro: Dr. Irma Medel ([place])  I-STOP Ref# [id number]

## 2024-05-21 ENCOUNTER — OUTPATIENT (OUTPATIENT)
Dept: OUTPATIENT SERVICES | Facility: HOSPITAL | Age: 59
LOS: 1 days | End: 2024-05-21
Payer: COMMERCIAL

## 2024-05-21 ENCOUNTER — APPOINTMENT (OUTPATIENT)
Dept: CT IMAGING | Facility: IMAGING CENTER | Age: 59
End: 2024-05-21
Payer: COMMERCIAL

## 2024-05-21 DIAGNOSIS — Z98.890 OTHER SPECIFIED POSTPROCEDURAL STATES: Chronic | ICD-10-CM

## 2024-05-21 DIAGNOSIS — Z95.828 PRESENCE OF OTHER VASCULAR IMPLANTS AND GRAFTS: Chronic | ICD-10-CM

## 2024-05-21 DIAGNOSIS — Z90.49 ACQUIRED ABSENCE OF OTHER SPECIFIED PARTS OF DIGESTIVE TRACT: Chronic | ICD-10-CM

## 2024-05-21 DIAGNOSIS — Z87.59 PERSONAL HISTORY OF OTHER COMPLICATIONS OF PREGNANCY, CHILDBIRTH AND THE PUERPERIUM: Chronic | ICD-10-CM

## 2024-05-21 DIAGNOSIS — Z00.8 ENCOUNTER FOR OTHER GENERAL EXAMINATION: ICD-10-CM

## 2024-05-21 DIAGNOSIS — C18.1 MALIGNANT NEOPLASM OF APPENDIX: ICD-10-CM

## 2024-05-21 DIAGNOSIS — Z90.710 ACQUIRED ABSENCE OF BOTH CERVIX AND UTERUS: Chronic | ICD-10-CM

## 2024-05-21 PROCEDURE — 74177 CT ABD & PELVIS W/CONTRAST: CPT

## 2024-05-21 PROCEDURE — 74177 CT ABD & PELVIS W/CONTRAST: CPT | Mod: 26

## 2024-05-21 PROCEDURE — 71260 CT THORAX DX C+: CPT

## 2024-05-21 PROCEDURE — 71260 CT THORAX DX C+: CPT | Mod: 26

## 2024-05-23 ENCOUNTER — APPOINTMENT (OUTPATIENT)
Dept: GERIATRICS | Facility: CLINIC | Age: 59
End: 2024-05-23
Payer: COMMERCIAL

## 2024-05-23 PROCEDURE — 99213 OFFICE O/P EST LOW 20 MIN: CPT

## 2024-05-23 NOTE — HISTORY OF PRESENT ILLNESS
[FreeTextEntry1] : 59yoF with appendiceal cancer presents for follow up palliative care visit, referred by Dr. Khan. PMH significant for pre-diabetes, h/o ectopic pregnancy.   Pt began experiencing weight gain and lower abdominal pelvic pressure in April/May 2023. Saw her gynecologist who palpated a mass. US Abdomen on 5/24/23 noted fluid within the endometrium, 12.9 cm complex cystic R adnexal mass suspicious for ovarian neoplasm. CEA and CA 19-9 noted to be elevated by AFP, CA  were normal. She was then referred to Gyn Onc Dr. Sanchez and ultimately to Surg Onc, Dr. Dong.   6/20/23: CT CAP large bilateral complex adnexal masses. Extensive soft tissue infiltration of the mesentery and nodularity of the omentum as well as a moderate amount of ascites. 6/30/23: Omental bx: mucinous adenocarcinoma 7/20/23: Case reviewed at , likely appendiceal primary given abnormal appendix and IHC staining suggesting GI, elevated CEA, normal , plan for neoadjuvant FOLFOX followed by cytoreduction, HIPEC and adjuvant chemo 7/24/23 - 9/5/23: C1-C4 FOLFOX 10/4-11/8/23: admitted for planned appendectomy, cholecystectomy, hysterectomy, omenectomy, peritenectomy, LAR with divertingloop ileostomy and bladder injury with primary repair on 10/4. Course c/b n/v, inability to tolerate PO, severe malnutrition. Started on TPN via PICC.  Main reason for which patient is referred is symptom management.   She is dealing with a multitude of symptoms since the time of her surgery (10/4/23). She is now on TPN via PICC line. She suffers with chronic vomiting since then. Does not deal with much nausea but rather feels the urge to retch. She brings back up anything she puts into her stomach, about 15 minutes afterwards.  Is written for various medications: Metoclopramide 10mg four times per day Dexamethasone 4mg QD (took it for about 5 days, did not help) Dronabinol was prescribed but she did not try it Omeprazole 40mg is prescribed but she does not use it.  Lorazepam 0.5mg (not currently using it)   She experiences pain in the epigastric/substernal region, worsened when she increased abdominal pressure - coughing, sneezing, retching. She finds that the pain rises up every 6 hours, which has prompted her to take the hydromorphone on a Q6h schedule. Pain gets as bad as 7/10 at its worst.  She is using hydromorphone 4mg every 6 hours on a schedule. It helps subside the pain and she can sleep for about an hour after taking it.  She doesn't sleep well at night, wakes up about every 2 hours.      Interval Hx (5/23/24): Patient seen via telemedicine for palliative medicine follow up.  She has been doing well with her slow opioid taper. She is down to 3mg/2mg/3mg and feels comfortable continuing to slow taper. She is feeling some pain under her ribcage /epigastrium for which she uses Tylenol 650mg PRN. Is on TPN, eating progressively more by mouth. Not using medicinal cannabis.  ROS: +fatigue +constipation - is drinking a lot of water, using colace +nausea comes on intermittently, typically passes on its own Denies trouble sleeping  Patient is single, lives with her long-time partner, Daniel. She has one son. She lives in a two family house, on the second floor. She does not come down much. She is able to get bathed and dressed on her own. She ambulates independently. She is not employed. Is not driving. Her support system includes her boyfriend, sister and cousin.  Neuro: Dr. Irma Medel ([place])  I-STOP Ref# [id number]

## 2024-05-23 NOTE — REASON FOR VISIT
[Home] : at home, [unfilled] , at the time of the visit. [Medical Office: ([facility])___] : at the medical office located in  [Initial Evaluation] : an initial evaluation [Spouse] : spouse

## 2024-05-23 NOTE — ASSESSMENT
[FreeTextEntry1] : 59yoF with:  # Appendiceal cancer - s/p neoadjuvant therapy and extensive surgery 10/4. Surg Onc, Med onc follow up.  # Abdominal pain - c/w slow Hydromorphone taper  # Encounter for palliative care- Emotional support provided  Follow up in 1 month, call sooner with questions or issues.

## 2024-05-30 ENCOUNTER — OUTPATIENT (OUTPATIENT)
Dept: OUTPATIENT SERVICES | Facility: HOSPITAL | Age: 59
LOS: 1 days | Discharge: HOME | End: 2024-05-30

## 2024-05-30 DIAGNOSIS — C18.1 MALIGNANT NEOPLASM OF APPENDIX: ICD-10-CM

## 2024-05-30 DIAGNOSIS — Z98.890 OTHER SPECIFIED POSTPROCEDURAL STATES: Chronic | ICD-10-CM

## 2024-05-30 DIAGNOSIS — Z90.710 ACQUIRED ABSENCE OF BOTH CERVIX AND UTERUS: Chronic | ICD-10-CM

## 2024-05-30 DIAGNOSIS — Z87.59 PERSONAL HISTORY OF OTHER COMPLICATIONS OF PREGNANCY, CHILDBIRTH AND THE PUERPERIUM: Chronic | ICD-10-CM

## 2024-05-30 DIAGNOSIS — Z95.828 PRESENCE OF OTHER VASCULAR IMPLANTS AND GRAFTS: Chronic | ICD-10-CM

## 2024-05-30 DIAGNOSIS — Z90.49 ACQUIRED ABSENCE OF OTHER SPECIFIED PARTS OF DIGESTIVE TRACT: Chronic | ICD-10-CM

## 2024-06-05 ENCOUNTER — APPOINTMENT (OUTPATIENT)
Dept: HEMATOLOGY ONCOLOGY | Facility: CLINIC | Age: 59
End: 2024-06-05
Payer: COMMERCIAL

## 2024-06-05 VITALS
HEART RATE: 81 BPM | OXYGEN SATURATION: 100 % | WEIGHT: 131 LBS | SYSTOLIC BLOOD PRESSURE: 149 MMHG | RESPIRATION RATE: 16 BRPM | BODY MASS INDEX: 24.42 KG/M2 | DIASTOLIC BLOOD PRESSURE: 80 MMHG | TEMPERATURE: 98.2 F | HEIGHT: 61.5 IN

## 2024-06-05 DIAGNOSIS — G51.9 DISORDER OF FACIAL NERVE, UNSPECIFIED: ICD-10-CM

## 2024-06-05 DIAGNOSIS — R11.2 NAUSEA WITH VOMITING, UNSPECIFIED: ICD-10-CM

## 2024-06-05 DIAGNOSIS — C78.6 SECONDARY MALIGNANT NEOPLASM OF RETROPERITONEUM AND PERITONEUM: ICD-10-CM

## 2024-06-05 DIAGNOSIS — I82.90 ACUTE EMBOLISM AND THROMBOSIS OF UNSPECIFIED VEIN: ICD-10-CM

## 2024-06-05 DIAGNOSIS — R10.9 UNSPECIFIED ABDOMINAL PAIN: ICD-10-CM

## 2024-06-05 PROCEDURE — 99214 OFFICE O/P EST MOD 30 MIN: CPT

## 2024-06-05 PROCEDURE — G2211 COMPLEX E/M VISIT ADD ON: CPT | Mod: NC

## 2024-06-05 RX ORDER — DULOXETINE HYDROCHLORIDE 30 MG/1
30 CAPSULE, DELAYED RELEASE PELLETS ORAL TWICE DAILY
Refills: 0 | Status: ACTIVE | COMMUNITY
Start: 2024-06-05

## 2024-06-05 RX ORDER — BACLOFEN 5 MG/1
5 TABLET ORAL
Qty: 90 | Refills: 1 | Status: DISCONTINUED | COMMUNITY
Start: 2023-12-18 | End: 2024-06-05

## 2024-06-05 RX ORDER — METOCLOPRAMIDE 10 MG/1
10 TABLET ORAL
Qty: 60 | Refills: 3 | Status: DISCONTINUED | COMMUNITY
Start: 2023-07-20 | End: 2024-06-05

## 2024-06-05 RX ORDER — SUCRALFATE 1 G/1
1 TABLET ORAL
Qty: 90 | Refills: 1 | Status: ACTIVE | COMMUNITY
Start: 2024-06-05 | End: 1900-01-01

## 2024-06-15 PROBLEM — R11.2 NAUSEA & VOMITING: Status: ACTIVE | Noted: 2023-11-25

## 2024-06-15 NOTE — ASSESSMENT
[Future Reassessment of Pain Scale] : Future reassessment of pain scale    [Medication(s)] : Medication(s) [Referred to Palliative/Pain management] : Referred to Palliative/Pain management [Palliative] : Goals of care discussed with patient: Palliative [Palliative Care Plan] : not applicable at this time [FreeTextEntry1] :  Patient seen with and plan discussed with Dr. Khan. Aryles Hedjar, MD, PGY-6 Hematology/Oncology Fellow [facility]

## 2024-06-15 NOTE — PHYSICAL EXAM
[Restricted in physically strenuous activity but ambulatory and able to carry out work of a light or sedentary nature] : Status 1- Restricted in physically strenuous activity but ambulatory and able to carry out work of a light or sedentary nature, e.g., light house work, office work [Normal] : affect appropriate [de-identified] : mild epigastric TTP

## 2024-06-15 NOTE — REVIEW OF SYSTEMS
[Abdominal Pain] : abdominal pain [Constipation] : constipation [Diarrhea: Grade 0] : Diarrhea: Grade 0 [Negative] : Allergic/Immunologic [Vomiting] : no vomiting [FreeTextEntry2] : appetite improving [FreeTextEntry7] : belching

## 2024-06-15 NOTE — END OF VISIT
[] : Fellow [Time Spent: ___ minutes] : I have spent [unfilled] minutes of time on the encounter. 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 Given that the disease was optimally debulked, there is no indication that further chemotherapy at this time will be helpful. Furthermore, given the complications post op, pt would not tolerate DMT at this time - recent CTCAP w/co reviewed with pt. Stable without evidence of disease progression.  - Will need port flush today and Q 3 mos - No need for labwork today  - CT CAP w/co ordered in 3 mos  - Continued f/u with surg onc - TPN to be tapered off  - Carafate prescribed for belching  - RTO in 3 mos

## 2024-06-15 NOTE — HISTORY OF PRESENT ILLNESS
[Disease: _____________________] : Disease: [unfilled] [AJCC Stage: ____] : AJCC Stage: [unfilled] [de-identified] : 59 F w/ pMMR, stage IV mucinous appendiceal adenocarcinoma presents for further management.   Yojana started experiencing weight gain and lower abdominal pelvic pressure in April/May 2023. She went to her GYN who palpated a mass on exam. US Abdomen on 5/24/23 noted fluid within the endometrium, 12.9 cm complex cystic R adnexal mass suspicious for ovarian neoplasm. CEA and CA 19-9 noted to be elevated by AFP, CA  were normal. She was then referred to GYN/ onc. Dr. Sanchez and ultimately to Dr. Dong, surg onc.    6/20/23: CT CAP large bilateral complex adnexal masses. Extensive soft tissue infiltration of the mesentery and nodularity of the omentum as well as a moderate amount of ascites.  6/30/23: Omental bx: mucinous adenocarcinoma, CK7, CDX2, CK19+, neg for PAX8, TTF1, MMR intact.  7/20/23: Case reviewed at , likely appendiceal primary given abnormal appendix and IHC staining suggesting GI, elevated CEA, normal , plan for neoadjuvant FOLFOX followed by cytoreduction, HIPEC and adjuvant chemo   Referred to medical oncology for systemic therapy.   7/24/23 - 9/5/23: C1-C4 FOLFOX  10/4-11/8/23: admitted to [facility] for planned appendectomy, cholecystectomy, hysterectomy, omenectomy, peritenectomy, LAR with divertingloop ileostomy and bladder injury with primary repair on 10/4. Course c/b n/v, inability to tolerate PO, severe malnutrition. Started on TPN via PICC. 12/11-12/14/23 Admitted to  for loop ileostomy reversal 2/20/24 CT C/A/P:  5/21/24 CT C/A/P: stable hepatic and pulmonary collections along with stable collections along the RP and gastric antrum [de-identified] : mucinous adenocarcinoma [de-identified] : pre treatment CEA 63 [Day: ___] : Day: [unfilled] [FreeTextEntry1] : surveillance  [de-identified] : Patient is overall feeling better. She is still on TPN but does still take PO. She was recommended to taper TPN to every other day but hesitated given she had an opioid withdrawal retching episode a couple months ago. She belches a bit after eating and drinking. She has some mild nausea but has only taken one Reglan in the past week. She is off baclofen. She takes Dulcolax twice a day for constipation, which helps, although she still strains at times. Appetite and weight are improving. She is tapering her opioids to 2 mg / 2 mg / 2 mg and will decrease her afternoon dose to 1 mg next week. She also notes some tingling above her R lip a couple months ago, is following Neurology and on duloxetine 30 mg BID.

## 2024-06-17 RX ORDER — HYDROMORPHONE HYDROCHLORIDE 2 MG/1
2 TABLET ORAL 3 TIMES DAILY
Qty: 60 | Refills: 0 | Status: ACTIVE | COMMUNITY
Start: 2024-04-02 | End: 1900-01-01

## 2024-06-27 ENCOUNTER — APPOINTMENT (OUTPATIENT)
Dept: GERIATRICS | Facility: CLINIC | Age: 59
End: 2024-06-27
Payer: COMMERCIAL

## 2024-06-27 DIAGNOSIS — C18.1 MALIGNANT NEOPLASM OF APPENDIX: ICD-10-CM

## 2024-06-27 DIAGNOSIS — Z51.5 ENCOUNTER FOR PALLIATIVE CARE: ICD-10-CM

## 2024-06-27 PROCEDURE — 99212 OFFICE O/P EST SF 10 MIN: CPT

## 2024-08-20 ENCOUNTER — APPOINTMENT (OUTPATIENT)
Dept: CT IMAGING | Facility: IMAGING CENTER | Age: 59
End: 2024-08-20
Payer: COMMERCIAL

## 2024-08-20 ENCOUNTER — OUTPATIENT (OUTPATIENT)
Dept: OUTPATIENT SERVICES | Facility: HOSPITAL | Age: 59
LOS: 1 days | End: 2024-08-20
Payer: COMMERCIAL

## 2024-08-20 DIAGNOSIS — C18.1 MALIGNANT NEOPLASM OF APPENDIX: ICD-10-CM

## 2024-08-20 DIAGNOSIS — Z98.890 OTHER SPECIFIED POSTPROCEDURAL STATES: Chronic | ICD-10-CM

## 2024-08-20 DIAGNOSIS — Z87.59 PERSONAL HISTORY OF OTHER COMPLICATIONS OF PREGNANCY, CHILDBIRTH AND THE PUERPERIUM: Chronic | ICD-10-CM

## 2024-08-20 DIAGNOSIS — Z90.49 ACQUIRED ABSENCE OF OTHER SPECIFIED PARTS OF DIGESTIVE TRACT: Chronic | ICD-10-CM

## 2024-08-20 DIAGNOSIS — Z95.828 PRESENCE OF OTHER VASCULAR IMPLANTS AND GRAFTS: Chronic | ICD-10-CM

## 2024-08-20 DIAGNOSIS — Z90.710 ACQUIRED ABSENCE OF BOTH CERVIX AND UTERUS: Chronic | ICD-10-CM

## 2024-08-20 PROCEDURE — 71260 CT THORAX DX C+: CPT | Mod: 26

## 2024-08-20 PROCEDURE — 74177 CT ABD & PELVIS W/CONTRAST: CPT

## 2024-08-20 PROCEDURE — 74177 CT ABD & PELVIS W/CONTRAST: CPT | Mod: 26

## 2024-08-20 PROCEDURE — 71260 CT THORAX DX C+: CPT

## 2024-08-21 ENCOUNTER — OUTPATIENT (OUTPATIENT)
Dept: OUTPATIENT SERVICES | Facility: HOSPITAL | Age: 59
LOS: 1 days | Discharge: HOME | End: 2024-08-21

## 2024-08-21 DIAGNOSIS — Z90.49 ACQUIRED ABSENCE OF OTHER SPECIFIED PARTS OF DIGESTIVE TRACT: Chronic | ICD-10-CM

## 2024-08-21 DIAGNOSIS — Z87.59 PERSONAL HISTORY OF OTHER COMPLICATIONS OF PREGNANCY, CHILDBIRTH AND THE PUERPERIUM: Chronic | ICD-10-CM

## 2024-08-21 DIAGNOSIS — Z98.890 OTHER SPECIFIED POSTPROCEDURAL STATES: Chronic | ICD-10-CM

## 2024-08-21 DIAGNOSIS — Z90.710 ACQUIRED ABSENCE OF BOTH CERVIX AND UTERUS: Chronic | ICD-10-CM

## 2024-08-21 DIAGNOSIS — Z95.828 PRESENCE OF OTHER VASCULAR IMPLANTS AND GRAFTS: Chronic | ICD-10-CM

## 2024-08-21 DIAGNOSIS — C18.1 MALIGNANT NEOPLASM OF APPENDIX: ICD-10-CM

## 2024-09-12 ENCOUNTER — LABORATORY RESULT (OUTPATIENT)
Age: 59
End: 2024-09-12

## 2024-09-12 ENCOUNTER — APPOINTMENT (OUTPATIENT)
Dept: INFUSION THERAPY | Facility: HOSPITAL | Age: 59
End: 2024-09-12

## 2024-09-12 ENCOUNTER — APPOINTMENT (OUTPATIENT)
Dept: HEMATOLOGY ONCOLOGY | Facility: CLINIC | Age: 59
End: 2024-09-12
Payer: COMMERCIAL

## 2024-09-12 ENCOUNTER — RESULT REVIEW (OUTPATIENT)
Age: 59
End: 2024-09-12

## 2024-09-12 VITALS
HEART RATE: 89 BPM | OXYGEN SATURATION: 99 % | TEMPERATURE: 97.2 F | WEIGHT: 126.98 LBS | BODY MASS INDEX: 23.6 KG/M2 | SYSTOLIC BLOOD PRESSURE: 124 MMHG | RESPIRATION RATE: 16 BRPM | DIASTOLIC BLOOD PRESSURE: 86 MMHG

## 2024-09-12 DIAGNOSIS — Z87.898 PERSONAL HISTORY OF OTHER SPECIFIED CONDITIONS: ICD-10-CM

## 2024-09-12 DIAGNOSIS — C18.1 MALIGNANT NEOPLASM OF APPENDIX: ICD-10-CM

## 2024-09-12 DIAGNOSIS — R09.81 NASAL CONGESTION: ICD-10-CM

## 2024-09-12 LAB
BASOPHILS # BLD AUTO: 0.01 K/UL — SIGNIFICANT CHANGE UP (ref 0–0.2)
BASOPHILS NFR BLD AUTO: 0.2 % — SIGNIFICANT CHANGE UP (ref 0–2)
EOSINOPHIL # BLD AUTO: 0.04 K/UL — SIGNIFICANT CHANGE UP (ref 0–0.5)
EOSINOPHIL NFR BLD AUTO: 0.8 % — SIGNIFICANT CHANGE UP (ref 0–6)
HCT VFR BLD CALC: 38.2 % — SIGNIFICANT CHANGE UP (ref 34.5–45)
HGB BLD-MCNC: 12.6 G/DL — SIGNIFICANT CHANGE UP (ref 11.5–15.5)
IMM GRANULOCYTES NFR BLD AUTO: 0.4 % — SIGNIFICANT CHANGE UP (ref 0–0.9)
LYMPHOCYTES # BLD AUTO: 0.94 K/UL — LOW (ref 1–3.3)
LYMPHOCYTES # BLD AUTO: 18.3 % — SIGNIFICANT CHANGE UP (ref 13–44)
MCHC RBC-ENTMCNC: 27.5 PG — SIGNIFICANT CHANGE UP (ref 27–34)
MCHC RBC-ENTMCNC: 33 G/DL — SIGNIFICANT CHANGE UP (ref 32–36)
MCV RBC AUTO: 83.4 FL — SIGNIFICANT CHANGE UP (ref 80–100)
MONOCYTES # BLD AUTO: 0.63 K/UL — SIGNIFICANT CHANGE UP (ref 0–0.9)
MONOCYTES NFR BLD AUTO: 12.3 % — SIGNIFICANT CHANGE UP (ref 2–14)
NEUTROPHILS # BLD AUTO: 3.49 K/UL — SIGNIFICANT CHANGE UP (ref 1.8–7.4)
NEUTROPHILS NFR BLD AUTO: 68 % — SIGNIFICANT CHANGE UP (ref 43–77)
NRBC # BLD: 0 /100 WBCS — SIGNIFICANT CHANGE UP (ref 0–0)
PLATELET # BLD AUTO: 278 K/UL — SIGNIFICANT CHANGE UP (ref 150–400)
RBC # BLD: 4.58 M/UL — SIGNIFICANT CHANGE UP (ref 3.8–5.2)
RBC # FLD: 14.6 % — HIGH (ref 10.3–14.5)
WBC # BLD: 5.13 K/UL — SIGNIFICANT CHANGE UP (ref 3.8–10.5)
WBC # FLD AUTO: 5.13 K/UL — SIGNIFICANT CHANGE UP (ref 3.8–10.5)

## 2024-09-12 PROCEDURE — 99213 OFFICE O/P EST LOW 20 MIN: CPT

## 2024-09-13 LAB
ALBUMIN SERPL ELPH-MCNC: 4.3 G/DL
ALP BLD-CCNC: 103 U/L
ALT SERPL-CCNC: 56 U/L
ANION GAP SERPL CALC-SCNC: 14 MMOL/L
AST SERPL-CCNC: 47 U/L
BILIRUB SERPL-MCNC: 0.4 MG/DL
BUN SERPL-MCNC: 16 MG/DL
CALCIUM SERPL-MCNC: 9.5 MG/DL
CEA SERPL-MCNC: 28.7 NG/ML
CHLORIDE SERPL-SCNC: 103 MMOL/L
CO2 SERPL-SCNC: 24 MMOL/L
CREAT SERPL-MCNC: 0.93 MG/DL
EGFR: 70 ML/MIN/1.73M2
GLUCOSE SERPL-MCNC: 101 MG/DL
POTASSIUM SERPL-SCNC: 3.9 MMOL/L
PROT SERPL-MCNC: 8 G/DL
SODIUM SERPL-SCNC: 141 MMOL/L

## 2024-09-17 PROBLEM — Z87.898 HISTORY OF NAUSEA AND VOMITING: Status: RESOLVED | Noted: 2023-11-25 | Resolved: 2024-09-17

## 2024-09-17 PROBLEM — R09.81 NASAL CONGESTION: Status: ACTIVE | Noted: 2024-09-17

## 2024-09-17 NOTE — HISTORY OF PRESENT ILLNESS
[Disease: _____________________] : Disease: [unfilled] [AJCC Stage: ____] : AJCC Stage: [unfilled] [de-identified] : 59 F w/ pMMR, stage IV mucinous appendiceal adenocarcinoma presents for further management.   Yojana started experiencing weight gain and lower abdominal pelvic pressure in April/May 2023. She went to her GYN who palpated a mass on exam. US Abdomen on 5/24/23 noted fluid within the endometrium, 12.9 cm complex cystic R adnexal mass suspicious for ovarian neoplasm. CEA and CA 19-9 noted to be elevated by AFP, CA  were normal. She was then referred to GYN/ onc. Dr. Sanchez and ultimately to Dr. Dong, surg onc.    6/20/23: CT CAP large bilateral complex adnexal masses. Extensive soft tissue infiltration of the mesentery and nodularity of the omentum as well as a moderate amount of ascites.  6/30/23: Omental bx: mucinous adenocarcinoma, CK7, CDX2, CK19+, neg for PAX8, TTF1, MMR intact.  7/20/23: Case reviewed at , likely appendiceal primary given abnormal appendix and IHC staining suggesting GI, elevated CEA, normal , plan for neoadjuvant FOLFOX followed by cytoreduction, HIPEC and adjuvant chemo   Referred to medical oncology for systemic therapy.   7/24/23 - 9/5/23: C1-C4 FOLFOX  10/4-11/8/23: admitted to [facility] for planned appendectomy, cholecystectomy, hysterectomy, omenectomy, peritenectomy, LAR with divertingloop ileostomy and bladder injury with primary repair on 10/4. Course c/b n/v, inability to tolerate PO, severe malnutrition. Started on TPN via PICC. 12/11-12/14/23 Admitted to  for loop ileostomy reversal 2/20/24 CT C/A/P:  s/p hysterectomy & surgical excision of b/l adnexal masses. Overall decreased ascites & carcinomatosis. Unchanged pulm nodule. 5/21/24 CT C/A/P: stable hepatic and pulmonary collections along with stable collections along the RP and gastric antrum. 8/20/24: CT CAP:  decreased size of subcapsular hepatic collections. Increasing collections/nodularity along gastric antrum & w/in the left retroperitoneal space & pelvis.  [de-identified] : mucinous adenocarcinoma [de-identified] : pre treatment CEA 63 [FreeTextEntry1] : surveillance  [de-identified] : cold w/ cough and raspy voice. Her father passed away over the weekend. He has been sick since August. Has been doing a lot of traveling between [place], [place] and [place].  loose stool Tuesday and Wednesday night. Took imodium. No BM today.  Has lost weight due to stress. otherwise able to eat normally.

## 2024-09-17 NOTE — HISTORY OF PRESENT ILLNESS
[Disease: _____________________] : Disease: [unfilled] [AJCC Stage: ____] : AJCC Stage: [unfilled] [de-identified] : 59 F w/ pMMR, stage IV mucinous appendiceal adenocarcinoma presents for further management.   Yojana started experiencing weight gain and lower abdominal pelvic pressure in April/May 2023. She went to her GYN who palpated a mass on exam. US Abdomen on 5/24/23 noted fluid within the endometrium, 12.9 cm complex cystic R adnexal mass suspicious for ovarian neoplasm. CEA and CA 19-9 noted to be elevated by AFP, CA  were normal. She was then referred to GYN/ onc. Dr. Sanchez and ultimately to Dr. Dong, surg onc.    6/20/23: CT CAP large bilateral complex adnexal masses. Extensive soft tissue infiltration of the mesentery and nodularity of the omentum as well as a moderate amount of ascites.  6/30/23: Omental bx: mucinous adenocarcinoma, CK7, CDX2, CK19+, neg for PAX8, TTF1, MMR intact.  7/20/23: Case reviewed at , likely appendiceal primary given abnormal appendix and IHC staining suggesting GI, elevated CEA, normal , plan for neoadjuvant FOLFOX followed by cytoreduction, HIPEC and adjuvant chemo   Referred to medical oncology for systemic therapy.   7/24/23 - 9/5/23: C1-C4 FOLFOX  10/4-11/8/23: admitted to [facility] for planned appendectomy, cholecystectomy, hysterectomy, omenectomy, peritenectomy, LAR with divertingloop ileostomy and bladder injury with primary repair on 10/4. Course c/b n/v, inability to tolerate PO, severe malnutrition. Started on TPN via PICC. 12/11-12/14/23 Admitted to  for loop ileostomy reversal 2/20/24 CT C/A/P:  s/p hysterectomy & surgical excision of b/l adnexal masses. Overall decreased ascites & carcinomatosis. Unchanged pulm nodule. 5/21/24 CT C/A/P: stable hepatic and pulmonary collections along with stable collections along the RP and gastric antrum. 8/20/24: CT CAP:  decreased size of subcapsular hepatic collections. Increasing collections/nodularity along gastric antrum & w/in the left retroperitoneal space & pelvis.  [de-identified] : mucinous adenocarcinoma [de-identified] : pre treatment CEA 63 [FreeTextEntry1] : surveillance  [de-identified] : cold w/ cough and raspy voice. Her father passed away over the weekend. He has been sick since August. Has been doing a lot of traveling between [place], [place] and [place].  loose stool Tuesday and Wednesday night. Took imodium. No BM today.  Has lost weight due to stress. otherwise able to eat normally.

## 2024-09-17 NOTE — HISTORY OF PRESENT ILLNESS
[Disease: _____________________] : Disease: [unfilled] [AJCC Stage: ____] : AJCC Stage: [unfilled] [de-identified] : 59 F w/ pMMR, stage IV mucinous appendiceal adenocarcinoma presents for further management.   Yojana started experiencing weight gain and lower abdominal pelvic pressure in April/May 2023. She went to her GYN who palpated a mass on exam. US Abdomen on 5/24/23 noted fluid within the endometrium, 12.9 cm complex cystic R adnexal mass suspicious for ovarian neoplasm. CEA and CA 19-9 noted to be elevated by AFP, CA  were normal. She was then referred to GYN/ onc. Dr. Sanchez and ultimately to Dr. Dong, surg onc.    6/20/23: CT CAP large bilateral complex adnexal masses. Extensive soft tissue infiltration of the mesentery and nodularity of the omentum as well as a moderate amount of ascites.  6/30/23: Omental bx: mucinous adenocarcinoma, CK7, CDX2, CK19+, neg for PAX8, TTF1, MMR intact.  7/20/23: Case reviewed at , likely appendiceal primary given abnormal appendix and IHC staining suggesting GI, elevated CEA, normal , plan for neoadjuvant FOLFOX followed by cytoreduction, HIPEC and adjuvant chemo   Referred to medical oncology for systemic therapy.   7/24/23 - 9/5/23: C1-C4 FOLFOX  10/4-11/8/23: admitted to [facility] for planned appendectomy, cholecystectomy, hysterectomy, omenectomy, peritenectomy, LAR with divertingloop ileostomy and bladder injury with primary repair on 10/4. Course c/b n/v, inability to tolerate PO, severe malnutrition. Started on TPN via PICC. 12/11-12/14/23 Admitted to  for loop ileostomy reversal 2/20/24 CT C/A/P:  s/p hysterectomy & surgical excision of b/l adnexal masses. Overall decreased ascites & carcinomatosis. Unchanged pulm nodule. 5/21/24 CT C/A/P: stable hepatic and pulmonary collections along with stable collections along the RP and gastric antrum. 8/20/24: CT CAP:  decreased size of subcapsular hepatic collections. Increasing collections/nodularity along gastric antrum & w/in the left retroperitoneal space & pelvis.  [de-identified] : mucinous adenocarcinoma [de-identified] : pre treatment CEA 63 [FreeTextEntry1] : surveillance  [de-identified] : cold w/ cough and raspy voice. Her father passed away over the weekend. He has been sick since August. Has been doing a lot of traveling between [place], [place] and [place].  loose stool Tuesday and Wednesday night. Took imodium. No BM today.  Has lost weight due to stress. otherwise able to eat normally.

## 2024-09-17 NOTE — REVIEW OF SYSTEMS
[Diarrhea: Grade 0] : Diarrhea: Grade 0 [Negative] : Allergic/Immunologic [Hoarseness] : hoarseness [Abdominal Pain] : no abdominal pain [Vomiting] : no vomiting [Constipation] : no constipation [FreeTextEntry4] : + nasal congestion

## 2024-09-17 NOTE — HISTORY OF PRESENT ILLNESS
[Disease: _____________________] : Disease: [unfilled] [AJCC Stage: ____] : AJCC Stage: [unfilled] [de-identified] : 59 F w/ pMMR, stage IV mucinous appendiceal adenocarcinoma presents for further management.   Yojana started experiencing weight gain and lower abdominal pelvic pressure in April/May 2023. She went to her GYN who palpated a mass on exam. US Abdomen on 5/24/23 noted fluid within the endometrium, 12.9 cm complex cystic R adnexal mass suspicious for ovarian neoplasm. CEA and CA 19-9 noted to be elevated by AFP, CA  were normal. She was then referred to GYN/ onc. Dr. Sanchez and ultimately to Dr. Dong, surg onc.    6/20/23: CT CAP large bilateral complex adnexal masses. Extensive soft tissue infiltration of the mesentery and nodularity of the omentum as well as a moderate amount of ascites.  6/30/23: Omental bx: mucinous adenocarcinoma, CK7, CDX2, CK19+, neg for PAX8, TTF1, MMR intact.  7/20/23: Case reviewed at , likely appendiceal primary given abnormal appendix and IHC staining suggesting GI, elevated CEA, normal , plan for neoadjuvant FOLFOX followed by cytoreduction, HIPEC and adjuvant chemo   Referred to medical oncology for systemic therapy.   7/24/23 - 9/5/23: C1-C4 FOLFOX  10/4-11/8/23: admitted to [facility] for planned appendectomy, cholecystectomy, hysterectomy, omenectomy, peritenectomy, LAR with divertingloop ileostomy and bladder injury with primary repair on 10/4. Course c/b n/v, inability to tolerate PO, severe malnutrition. Started on TPN via PICC. 12/11-12/14/23 Admitted to  for loop ileostomy reversal 2/20/24 CT C/A/P:  s/p hysterectomy & surgical excision of b/l adnexal masses. Overall decreased ascites & carcinomatosis. Unchanged pulm nodule. 5/21/24 CT C/A/P: stable hepatic and pulmonary collections along with stable collections along the RP and gastric antrum. 8/20/24: CT CAP:  decreased size of subcapsular hepatic collections. Increasing collections/nodularity along gastric antrum & w/in the left retroperitoneal space & pelvis.  [de-identified] : mucinous adenocarcinoma [de-identified] : pre treatment CEA 63 [FreeTextEntry1] : surveillance  [de-identified] : cold w/ cough and raspy voice. Her father passed away over the weekend. He has been sick since August. Has been doing a lot of traveling between [place], [place] and [place].  loose stool Tuesday and Wednesday night. Took imodium. No BM today.  Has lost weight due to stress. otherwise able to eat normally.

## 2024-09-27 ENCOUNTER — APPOINTMENT (OUTPATIENT)
Dept: UROGYNECOLOGY | Facility: CLINIC | Age: 59
End: 2024-09-27
Payer: COMMERCIAL

## 2024-09-27 VITALS
BODY MASS INDEX: 24.04 KG/M2 | SYSTOLIC BLOOD PRESSURE: 131 MMHG | WEIGHT: 129 LBS | DIASTOLIC BLOOD PRESSURE: 83 MMHG | HEIGHT: 61.5 IN | HEART RATE: 64 BPM

## 2024-09-27 DIAGNOSIS — N32.81 OVERACTIVE BLADDER: ICD-10-CM

## 2024-09-27 DIAGNOSIS — N95.8 OTHER SPECIFIED MENOPAUSAL AND PERIMENOPAUSAL DISORDERS: ICD-10-CM

## 2024-09-27 DIAGNOSIS — A60.00 HERPESVIRAL INFECTION OF UROGENITAL SYSTEM, UNSPECIFIED: ICD-10-CM

## 2024-09-27 DIAGNOSIS — N39.41 URGE INCONTINENCE: ICD-10-CM

## 2024-09-27 LAB
BILIRUB UR QL STRIP: NORMAL
CLARITY UR: CLEAR
COLLECTION METHOD: NORMAL
GLUCOSE UR-MCNC: NORMAL
HCG UR QL: 0.2 EU/DL
HGB UR QL STRIP.AUTO: NORMAL
KETONES UR-MCNC: NORMAL
LEUKOCYTE ESTERASE UR QL STRIP: NORMAL
NITRITE UR QL STRIP: NORMAL
PH UR STRIP: 6.5
PROT UR STRIP-MCNC: NORMAL
SP GR UR STRIP: 1.01

## 2024-09-27 PROCEDURE — 81003 URINALYSIS AUTO W/O SCOPE: CPT | Mod: QW

## 2024-09-27 PROCEDURE — 51701 INSERT BLADDER CATHETER: CPT

## 2024-09-27 PROCEDURE — 99204 OFFICE O/P NEW MOD 45 MIN: CPT | Mod: 25

## 2024-09-27 PROCEDURE — 99459 PELVIC EXAMINATION: CPT

## 2024-09-27 RX ORDER — ESTRADIOL 0.1 MG/G
0.1 CREAM VAGINAL
Qty: 1 | Refills: 3 | Status: ACTIVE | COMMUNITY
Start: 2024-09-27 | End: 1900-01-01

## 2024-09-27 RX ORDER — VALACYCLOVIR 1 G/1
1 TABLET, FILM COATED ORAL DAILY
Qty: 30 | Refills: 1 | Status: ACTIVE | COMMUNITY
Start: 2024-09-27 | End: 1900-01-01

## 2024-09-27 NOTE — HISTORY OF PRESENT ILLNESS
[FreeTextEntry1] : DAQUAN CHRISTENSEN is a 59-year-old P1 presenting for evaluation of urinary frequency and incontinence. Patient reports that her symptoms started after surgery for a stage IV appendiceal adenocarcinoma. She underwent HIPEC, 4 cycles of FOLFOX chemotherapy, followed by optimal debulking surgery in 10/2023 including: appendectomy, cholecystectomy, peritonectomy, omentectomy, BSO, posterior exenteration en bloc hysterectomy/BSO, LAR with diverting loop ileostomy complicated by a small cystotomy with primary repair on 10/4. She has since had a reversal of ileostomy. She reports significant urgency with difficulty making it to the bathroom. She wears Depends and leaks every time she has to void. No leakage with exertion. No dysuria or h/o gross hematuria. Denies sensation of vaginal bulge. No issues with bowel movements.   Daytime voids: 10  Nighttime voids: 1  Her fluid intake includes: 32 oz iced tea (decaf), 32 oz water, +spicy foods

## 2024-09-27 NOTE — PROCEDURE
[FreeTextEntry1] : A sterile straight catheterization was performed to rule out a urinary tract infection and measure postvoid residual volume, which was 45 ml.

## 2024-09-27 NOTE — DISCUSSION/SUMMARY
[FreeTextEntry1] : #Urinary urgency and urgency incontinence #Genitourinary syndrome of menopause - We discussed etiologies in light of recent cancer treatments and surgery, all of which can predisposed to bladder dysfunction and OAB. We also discussed how a lack of estrogen can worsen urinary symptoms and lead to increased urgency. Behavioral and dietary factors were also reviewed.  - We reviewed management alternatives for urgency/leakage symptoms, including behavioral/fluid modification, pelvic floor PT with bladder retraining, pharmacological therapy, and more advanced options including Botox injection, PTNS, and SNM. We also discussed vaginal estrogen therapy. - She is interested with estrogen therapy as well as pelvic floor physical therapy. Orders were placed for both. She was given a list of STARS and external locations for PT. She was provided instruction on proper use of vaginal estrogen cream. - Will follow-up UA and urine culture 2/2 urine dipstick with trace blood from catheterized specimen.   #Ulcerated lesion - Posterior ulcerated lesion noted, herpetic in appearance. Pt endorses a h/o HSV. - Offered Rx for Valtrex, which she accepted; this was sent electronically.  Follow-up in 6-8 weeks to re-evaluate her symptoms with above treatment.

## 2024-09-27 NOTE — PHYSICAL EXAM
[Chaperone Present] : A chaperone was present in the examining room during all aspects of the physical examination [02155] : A chaperone was present during the pelvic exam. [FreeTextEntry2] : Deandra [FreeTextEntry1] : General: Well, appearing, no acute distress HEENT: Normocephalic, atraumatic Respiratory: Speaking in full sentences comfortably, normal work of breathing and no cough during visit Extremities: No upper extremity edema noted Skin: No obvious rash or skin lesions Neuro: Alert and oriented x 3, speech is fluent, normal rate Psych: Normal mood and affect [Scar] : a scar was noted [Mid-line] : in the mid-line [No Lesions] : no lesions were seen on the external genitalia [Labia Majora] : were normal [Labia Minora] : were normal [Normal Appearance] : general appearance was normal [Atrophy] : atrophy [Normal] : was normal [Absent] : absent [Exam Deferred] : was deferred [FreeTextEntry4] : small ulcerated lesion noted in the posterior vaginal introitus

## 2024-10-01 DIAGNOSIS — Z82.49 FAMILY HISTORY OF ISCHEMIC HEART DISEASE AND OTHER DISEASES OF THE CIRCULATORY SYSTEM: ICD-10-CM

## 2024-10-01 DIAGNOSIS — Z85.038 ENCOUNTER FOR FOLLOW-UP EXAMINATION AFTER COMPLETED TREATMENT FOR MALIGNANT NEOPLASM: ICD-10-CM

## 2024-10-01 DIAGNOSIS — Z87.39 PERSONAL HISTORY OF OTHER DISEASES OF THE MUSCULOSKELETAL SYSTEM AND CONNECTIVE TISSUE: ICD-10-CM

## 2024-10-01 DIAGNOSIS — Z86.012 PERSONAL HISTORY OF BENIGN CARCINOID TUMOR: ICD-10-CM

## 2024-10-01 DIAGNOSIS — Z84.1 FAMILY HISTORY OF DISORDERS OF KIDNEY AND URETER: ICD-10-CM

## 2024-10-01 DIAGNOSIS — Z86.718 PERSONAL HISTORY OF OTHER VENOUS THROMBOSIS AND EMBOLISM: ICD-10-CM

## 2024-10-01 DIAGNOSIS — Z87.19 PERSONAL HISTORY OF OTHER DISEASES OF THE DIGESTIVE SYSTEM: ICD-10-CM

## 2024-10-01 DIAGNOSIS — Z08 ENCOUNTER FOR FOLLOW-UP EXAMINATION AFTER COMPLETED TREATMENT FOR MALIGNANT NEOPLASM: ICD-10-CM

## 2024-10-05 LAB
APPEARANCE: CLEAR
BACTERIA: NEGATIVE /HPF
BILIRUBIN URINE: NEGATIVE
BLOOD URINE: ABNORMAL
CAST: 0 /LPF
COLOR: YELLOW
EPITHELIAL CELLS: 0 /HPF
GLUCOSE QUALITATIVE U: NEGATIVE MG/DL
KETONES URINE: NEGATIVE MG/DL
LEUKOCYTE ESTERASE URINE: NEGATIVE
MICROSCOPIC-UA: NORMAL
NITRITE URINE: NEGATIVE
PH URINE: 6.5
PROTEIN URINE: NEGATIVE MG/DL
RED BLOOD CELLS URINE: 0 /HPF
SPECIFIC GRAVITY URINE: 1.01
UROBILINOGEN URINE: 0.2 MG/DL
WHITE BLOOD CELLS URINE: 0 /HPF

## 2024-10-05 RX ORDER — SULFAMETHOXAZOLE AND TRIMETHOPRIM 800; 160 MG/1; MG/1
800-160 TABLET ORAL TWICE DAILY
Qty: 6 | Refills: 0 | Status: ACTIVE | COMMUNITY
Start: 2024-10-05 | End: 1900-01-01

## 2024-11-06 ENCOUNTER — NON-APPOINTMENT (OUTPATIENT)
Age: 59
End: 2024-11-06

## 2024-11-06 ENCOUNTER — RESULT REVIEW (OUTPATIENT)
Age: 59
End: 2024-11-06

## 2024-11-06 DIAGNOSIS — R10.9 UNSPECIFIED ABDOMINAL PAIN: ICD-10-CM

## 2024-11-06 DIAGNOSIS — C78.6 SECONDARY MALIGNANT NEOPLASM OF RETROPERITONEUM AND PERITONEUM: ICD-10-CM

## 2024-11-07 ENCOUNTER — APPOINTMENT (OUTPATIENT)
Dept: CT IMAGING | Facility: IMAGING CENTER | Age: 59
End: 2024-11-07
Payer: COMMERCIAL

## 2024-11-07 ENCOUNTER — OUTPATIENT (OUTPATIENT)
Dept: OUTPATIENT SERVICES | Facility: HOSPITAL | Age: 59
LOS: 1 days | End: 2024-11-07
Payer: COMMERCIAL

## 2024-11-07 DIAGNOSIS — Z98.890 OTHER SPECIFIED POSTPROCEDURAL STATES: Chronic | ICD-10-CM

## 2024-11-07 DIAGNOSIS — Z95.828 PRESENCE OF OTHER VASCULAR IMPLANTS AND GRAFTS: Chronic | ICD-10-CM

## 2024-11-07 DIAGNOSIS — Z90.710 ACQUIRED ABSENCE OF BOTH CERVIX AND UTERUS: Chronic | ICD-10-CM

## 2024-11-07 DIAGNOSIS — Z90.49 ACQUIRED ABSENCE OF OTHER SPECIFIED PARTS OF DIGESTIVE TRACT: Chronic | ICD-10-CM

## 2024-11-07 DIAGNOSIS — C18.1 MALIGNANT NEOPLASM OF APPENDIX: ICD-10-CM

## 2024-11-07 DIAGNOSIS — Z87.59 PERSONAL HISTORY OF OTHER COMPLICATIONS OF PREGNANCY, CHILDBIRTH AND THE PUERPERIUM: Chronic | ICD-10-CM

## 2024-11-07 PROCEDURE — 71260 CT THORAX DX C+: CPT

## 2024-11-07 PROCEDURE — 74177 CT ABD & PELVIS W/CONTRAST: CPT | Mod: 26

## 2024-11-07 PROCEDURE — 71260 CT THORAX DX C+: CPT | Mod: 26

## 2024-11-07 PROCEDURE — 74177 CT ABD & PELVIS W/CONTRAST: CPT

## 2024-11-08 ENCOUNTER — OUTPATIENT (OUTPATIENT)
Dept: OUTPATIENT SERVICES | Facility: HOSPITAL | Age: 59
LOS: 1 days | Discharge: HOME | End: 2024-11-08

## 2024-11-08 DIAGNOSIS — Z90.49 ACQUIRED ABSENCE OF OTHER SPECIFIED PARTS OF DIGESTIVE TRACT: Chronic | ICD-10-CM

## 2024-11-08 DIAGNOSIS — C18.1 MALIGNANT NEOPLASM OF APPENDIX: ICD-10-CM

## 2024-11-08 DIAGNOSIS — Z87.59 PERSONAL HISTORY OF OTHER COMPLICATIONS OF PREGNANCY, CHILDBIRTH AND THE PUERPERIUM: Chronic | ICD-10-CM

## 2024-11-08 DIAGNOSIS — Z90.710 ACQUIRED ABSENCE OF BOTH CERVIX AND UTERUS: Chronic | ICD-10-CM

## 2024-11-08 DIAGNOSIS — Z95.828 PRESENCE OF OTHER VASCULAR IMPLANTS AND GRAFTS: Chronic | ICD-10-CM

## 2024-11-08 DIAGNOSIS — Z98.890 OTHER SPECIFIED POSTPROCEDURAL STATES: Chronic | ICD-10-CM

## 2024-11-09 LAB
APPEARANCE: CLEAR
BACTERIA: NEGATIVE /HPF
BILIRUBIN URINE: NEGATIVE
BLOOD URINE: NEGATIVE
CAST: 0 /LPF
COLOR: YELLOW
EPITHELIAL CELLS: 2 /HPF
GLUCOSE QUALITATIVE U: NEGATIVE MG/DL
KETONES URINE: NEGATIVE MG/DL
LEUKOCYTE ESTERASE URINE: NEGATIVE
MICROSCOPIC-UA: NORMAL
NITRITE URINE: NEGATIVE
PH URINE: 7
PROTEIN URINE: NEGATIVE MG/DL
RED BLOOD CELLS URINE: 1 /HPF
SPECIFIC GRAVITY URINE: 1.01
UROBILINOGEN URINE: 0.2 MG/DL
WHITE BLOOD CELLS URINE: 0 /HPF

## 2024-11-11 LAB — BACTERIA UR CULT: ABNORMAL

## 2024-11-12 ENCOUNTER — NON-APPOINTMENT (OUTPATIENT)
Age: 59
End: 2024-11-12

## 2024-11-12 ENCOUNTER — APPOINTMENT (OUTPATIENT)
Dept: SURGICAL ONCOLOGY | Facility: CLINIC | Age: 59
End: 2024-11-12
Payer: COMMERCIAL

## 2024-11-12 ENCOUNTER — APPOINTMENT (OUTPATIENT)
Dept: HEMATOLOGY ONCOLOGY | Facility: CLINIC | Age: 59
End: 2024-11-12
Payer: COMMERCIAL

## 2024-11-12 VITALS
RESPIRATION RATE: 16 BRPM | SYSTOLIC BLOOD PRESSURE: 119 MMHG | OXYGEN SATURATION: 100 % | DIASTOLIC BLOOD PRESSURE: 80 MMHG | BODY MASS INDEX: 24.33 KG/M2 | TEMPERATURE: 97.2 F | HEART RATE: 65 BPM | WEIGHT: 133 LBS

## 2024-11-12 VITALS
HEART RATE: 75 BPM | OXYGEN SATURATION: 99 % | DIASTOLIC BLOOD PRESSURE: 78 MMHG | SYSTOLIC BLOOD PRESSURE: 125 MMHG | BODY MASS INDEX: 24.48 KG/M2 | HEIGHT: 62 IN | WEIGHT: 133 LBS

## 2024-11-12 DIAGNOSIS — C18.1 MALIGNANT NEOPLASM OF APPENDIX: ICD-10-CM

## 2024-11-12 PROCEDURE — G2211 COMPLEX E/M VISIT ADD ON: CPT | Mod: NC

## 2024-11-12 PROCEDURE — 99214 OFFICE O/P EST MOD 30 MIN: CPT

## 2024-11-12 PROCEDURE — 99213 OFFICE O/P EST LOW 20 MIN: CPT

## 2024-11-27 NOTE — PROGRESS NOTE ADULT - SUBJECTIVE AND OBJECTIVE BOX
NUTRITION NOTE  [id number] OWENSSTEWART  ===============================    Interval events - Patient was seen and examined at bedside, no acute events overnight. Patient denies chest pain, shortness of breath, nausea or vomiting at this time. Pt remains on TPN cycled over 12 hours.     ROS: Except as noted above, all other systems reviewed and are negative     Allergies  penicillin (Rash)    PAST MEDICAL & SURGICAL HISTORY:  History of sickle cell trait  Malignant neoplasm of appendix  History of ectopic pregnancy  H/O colonoscopy  H/O endoscopy    Vital Signs Last 24 Hrs  T(C): 36.8 (2023 08:45), Max: 37.4 (2023 00:32)  T(F): 98.2 (2023 08:45), Max: 99.4 (2023 00:32)  HR: 112 (2023 08:45) (112 - 120)  BP: 117/68 (2023 08:45) (110/60 - 122/68)  RR: 18 (2023 08:45) (18 - 19)  SpO2: 98% (2023 08:45) (98% - 100%)    MEDICATIONS  (STANDING):  cefTRIAXone   IVPB 1000 milliGRAM(s) IV Intermittent every 24 hours  chlorhexidine 2% Cloths 1 Application(s) Topical daily  influenza   Vaccine 0.5 milliLiter(s) IntraMuscular once  iron sucrose IVPB 200 milliGRAM(s) IV Intermittent every 24 hours  lipid, fat emulsion (Fish Oil and Plant Based) 20% Infusion 16.6 mL/Hr (16.6 mL/Hr) IV Continuous <Continuous>  pantoprazole    Tablet 40 milliGRAM(s) Oral before breakfast  Parenteral Nutrition - Adult 1 Each TPN Continuous <Continuous>  Parenteral Nutrition - Adult 1 Each TPN Continuous <Continuous>  scopolamine 1 mG/72 Hr(s) Patch 1 Patch Transdermal every 72 hours    MEDICATIONS  (PRN):  acetaminophen     Tablet .. 650 milliGRAM(s) Oral every 6 hours PRN Mild Pain (1 - 3)  HYDROmorphone   Tablet 1 milliGRAM(s) Oral every 4 hours PRN Moderate and Severe Pain  LORazepam     Tablet 0.25 milliGRAM(s) Oral every 6 hours PRN Nausea and/or Vomiting  naloxone Injectable 0.1 milliGRAM(s) IV Push every 3 minutes PRN For ANY of the following changes in patient status:  A. RR LESS THAN 10 breaths per minute, B. Oxygen saturation LESS THAN 90%, C. Sedation score of 6  ondansetron   Disintegrating Tablet 8 milliGRAM(s) Oral every 8 hours PRN Nausea  sodium chloride 0.9% lock flush 10 milliLiter(s) IV Push every 1 hour PRN Pre/post blood products, medications, blood draw, and to maintain line patency    I&O's Detail    2023 07:01  -  2023 07:00  --------------------------------------------------------  IN:    Fat Emulsion (Fish Oil &amp; Plant Based) 20% Infusion: 199.2 mL    TPN (Total Parenteral Nutrition): 1425 mL  Total IN: 1624.2 mL    OUT:    Ileostomy (mL): 850 mL    Oral Fluid: 0 mL    Voided (mL): 1100 mL  Total OUT: 1950 mL    Total NET: -325.8 mL      2023 07:01  -  2023 11:48  --------------------------------------------------------  IN:  Total IN: 0 mL    OUT:    Voided (mL): 150 mL  Total OUT: 150 mL    Total NET: -150 mL    POCT Blood Glucose.: 124 mg/dL (2023 00:17)  POCT Blood Glucose.: 139 mg/dL (2023 12:03)    Daily Weight in k.5 (2023 06:34)    Drug Dosing Weight  Height (cm): 154.9 (04 Oct 2023 07:27)  Weight (kg): 54.5 (2023 06:34)  BMI (kg/m2): 22.7 (2023 06:34)  BSA (m2): 1.52 (2023 06:34)    PHYSICAL EXAM:  General: NAD, resting in bed comfortably.  Cardiac: regular rate, warm and well perfused  Respiratory: Nonlabored respirations, normal cw expansion.  Abdomen: soft, nontender, nondistended  Extremities: normal strength, FROM, no deformities  PICC Site: [facility] [place] PICC site clean and day (placed on 10/24/23)    Diet: NPO and TPN/lipids (started on 10/24/23)    LABORATORY                                               9.1    4.14  )-----------( 252      ( 2023 05:53 )             28.3   11-03    146<H>  |  112<H>  |  23  ----------------------------<  126<H>  4.3   |  23  |  0.40<L>    Ca    9.2      2023 05:53  Phos  3.4     11-  Mg     2.30     11-    TPro  7.2  /  Alb  2.8<L>  /  TBili  0.5  /  DBili  <0.2  /  AST  44<H>  /  ALT  47<H>  /  AlkPhos  319<H>  11    LIVER FUNCTIONS - ( 2023 10:48 )  Alb: 2.8 g/dL / Pro: 7.2 g/dL / ALK PHOS: 319 U/L / ALT: 47 U/L / AST: 44 U/L / GGT: x           10-25 Chol -- LDL -- HDL -- Trig 87    CT A/P 10/28 notable for large R pleural effusion, increased in size from previous imaging, decreased hepatic and perihepatic hematomas, similar R hepatic lobe hypodensities (unclear if infarct vs small collections).     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 Patient's course c/b continuous retching and intolerance to tube feeds. Nutrition support consult called for prolonged NPO and severe protein calorie malnutrition. PICC placed and TPN started on 10/24/23.    continue TPN with infusion volume of 1.5L, TPN will provide 1508 kcal/day; TPN cycled over 12 hours     labs reviewed - electrolytes adjusted in TPN bag     monitor fingersticks, obtain daily weights     continue parenteral nutrition at this time, will follow up with primary team on plan, discharge planning in process with home TPN (on hold due to persistent tachycardia); TPN formula verbally verified with outpatient pharmacy, will follow up with pharmacy once discharge plan is confirmed       - Outpatient TPN via [facility] and follow up with Dr Jeffery Sullivan:    [id number]  [place] [id number]  Entrance on [facility]  Phone [phone]    Please instruct patient when calling to make the appointment to schedule for the first MONDAY after discharge from hospital. Please instruct patient to explain she is on TPN when making the appointment. Check labs, CMP, electrolytes, ionized Calcium, triglycerides and fingersticks- frequency to be determined by Dr. Sullivan as outpatient.    1.  Severe protein calorie malnutrition being optimized with TPN: CHO [220] gm.  AA [90] gm. SMOF Lipids [40] gm.  2.  Hyperglycemia managed with: [0] units of regular insulin    3.  Check fluid balance daily.  Strict I/O  [ ] [ ]   4.  Daily BMP, Ionized Calcium, Magnesium and Phosphorous   5.  Triglycerides at initiation of TPN and monthly 10-25 Chol -- LDL -- HDL -- Trig 87    Nutrition Support 69175  38.4

## 2024-12-16 ENCOUNTER — APPOINTMENT (OUTPATIENT)
Dept: HEMATOLOGY ONCOLOGY | Facility: CLINIC | Age: 59
End: 2024-12-16

## 2024-12-27 ENCOUNTER — APPOINTMENT (OUTPATIENT)
Dept: UROGYNECOLOGY | Facility: CLINIC | Age: 59
End: 2024-12-27
Payer: COMMERCIAL

## 2024-12-27 VITALS
DIASTOLIC BLOOD PRESSURE: 82 MMHG | SYSTOLIC BLOOD PRESSURE: 144 MMHG | HEIGHT: 61.5 IN | HEART RATE: 71 BPM | WEIGHT: 125 LBS | BODY MASS INDEX: 23.3 KG/M2

## 2024-12-27 DIAGNOSIS — N95.8 OTHER SPECIFIED MENOPAUSAL AND PERIMENOPAUSAL DISORDERS: ICD-10-CM

## 2024-12-27 DIAGNOSIS — N94.10 UNSPECIFIED DYSPAREUNIA: ICD-10-CM

## 2024-12-27 PROCEDURE — 99213 OFFICE O/P EST LOW 20 MIN: CPT

## 2025-02-04 DIAGNOSIS — C18.1 MALIGNANT NEOPLASM OF APPENDIX: ICD-10-CM

## 2025-02-04 DIAGNOSIS — C78.6 SECONDARY MALIGNANT NEOPLASM OF RETROPERITONEUM AND PERITONEUM: ICD-10-CM

## 2025-02-11 ENCOUNTER — APPOINTMENT (OUTPATIENT)
Dept: ORTHOPEDIC SURGERY | Facility: CLINIC | Age: 60
End: 2025-02-11
Payer: COMMERCIAL

## 2025-02-11 ENCOUNTER — NON-APPOINTMENT (OUTPATIENT)
Age: 60
End: 2025-02-11

## 2025-02-11 VITALS
HEART RATE: 80 BPM | SYSTOLIC BLOOD PRESSURE: 129 MMHG | HEIGHT: 61.5 IN | DIASTOLIC BLOOD PRESSURE: 86 MMHG | BODY MASS INDEX: 23.3 KG/M2 | WEIGHT: 125 LBS

## 2025-02-11 DIAGNOSIS — M25.511 PAIN IN RIGHT SHOULDER: ICD-10-CM

## 2025-02-11 DIAGNOSIS — M54.12 RADICULOPATHY, CERVICAL REGION: ICD-10-CM

## 2025-02-11 DIAGNOSIS — M25.512 PAIN IN LEFT SHOULDER: ICD-10-CM

## 2025-02-11 PROCEDURE — 73030 X-RAY EXAM OF SHOULDER: CPT | Mod: 50

## 2025-02-11 PROCEDURE — 99203 OFFICE O/P NEW LOW 30 MIN: CPT

## 2025-02-11 PROCEDURE — 72040 X-RAY EXAM NECK SPINE 2-3 VW: CPT

## 2025-03-14 ENCOUNTER — APPOINTMENT (OUTPATIENT)
Dept: CT IMAGING | Facility: IMAGING CENTER | Age: 60
End: 2025-03-14

## 2025-03-14 ENCOUNTER — OUTPATIENT (OUTPATIENT)
Dept: OUTPATIENT SERVICES | Facility: HOSPITAL | Age: 60
LOS: 1 days | End: 2025-03-14
Payer: COMMERCIAL

## 2025-03-14 DIAGNOSIS — C78.6 SECONDARY MALIGNANT NEOPLASM OF RETROPERITONEUM AND PERITONEUM: ICD-10-CM

## 2025-03-14 DIAGNOSIS — Z98.890 OTHER SPECIFIED POSTPROCEDURAL STATES: Chronic | ICD-10-CM

## 2025-03-14 DIAGNOSIS — Z90.710 ACQUIRED ABSENCE OF BOTH CERVIX AND UTERUS: Chronic | ICD-10-CM

## 2025-03-14 DIAGNOSIS — Z87.59 PERSONAL HISTORY OF OTHER COMPLICATIONS OF PREGNANCY, CHILDBIRTH AND THE PUERPERIUM: Chronic | ICD-10-CM

## 2025-03-14 DIAGNOSIS — Z95.828 PRESENCE OF OTHER VASCULAR IMPLANTS AND GRAFTS: Chronic | ICD-10-CM

## 2025-03-14 DIAGNOSIS — Z90.49 ACQUIRED ABSENCE OF OTHER SPECIFIED PARTS OF DIGESTIVE TRACT: Chronic | ICD-10-CM

## 2025-03-14 PROCEDURE — 74177 CT ABD & PELVIS W/CONTRAST: CPT | Mod: 26

## 2025-03-14 PROCEDURE — 71260 CT THORAX DX C+: CPT

## 2025-03-14 PROCEDURE — 74177 CT ABD & PELVIS W/CONTRAST: CPT

## 2025-03-14 PROCEDURE — 71260 CT THORAX DX C+: CPT | Mod: 26

## 2025-03-18 ENCOUNTER — APPOINTMENT (OUTPATIENT)
Dept: ORTHOPEDIC SURGERY | Facility: CLINIC | Age: 60
End: 2025-03-18
Payer: COMMERCIAL

## 2025-03-18 VITALS
WEIGHT: 123 LBS | SYSTOLIC BLOOD PRESSURE: 117 MMHG | BODY MASS INDEX: 22.92 KG/M2 | HEART RATE: 69 BPM | HEIGHT: 61.5 IN | DIASTOLIC BLOOD PRESSURE: 73 MMHG

## 2025-03-18 DIAGNOSIS — M54.50 LOW BACK PAIN, UNSPECIFIED: ICD-10-CM

## 2025-03-18 DIAGNOSIS — M47.816 SPONDYLOSIS W/OUT MYELOPATHY OR RADICULOPATHY, LUMBAR REGION: ICD-10-CM

## 2025-03-18 DIAGNOSIS — M54.12 RADICULOPATHY, CERVICAL REGION: ICD-10-CM

## 2025-03-18 DIAGNOSIS — M25.512 PAIN IN LEFT SHOULDER: ICD-10-CM

## 2025-03-18 PROCEDURE — 99214 OFFICE O/P EST MOD 30 MIN: CPT

## 2025-03-18 PROCEDURE — 72100 X-RAY EXAM L-S SPINE 2/3 VWS: CPT

## 2025-04-18 ENCOUNTER — APPOINTMENT (OUTPATIENT)
Dept: ORTHOPEDIC SURGERY | Facility: CLINIC | Age: 60
End: 2025-04-18

## 2025-04-23 NOTE — CONSULT NOTE ADULT - PROBLEM SELECTOR RECOMMENDATION 2
What to expect after a Nerve Block    Nerve blocks administered to block pain affect many types of nerves, including those nerves that control movement, pain, and normal sensation. Following a nerve block, you may notice some bruising at the site where the block was given. You may experience sensations such as: numbness of the affected area or limb, tingling, heaviness (that is the limb feels heavy to you), weakness or inability to move the affected arm or leg, or a feeling as if your arm or leg has “fallen asleep.”     A nerve block can last from 2 to 36 hours depending on the medications used.  Usually the weakness wears off first followed by the tingling and heaviness. As the block wears off, you may begin to notice pain; however, this sequence of events may occur in any order. Typically, you will be able to move your limb before you will feel it. Once a nerve block begins to wear off, the effects are usually completely gone within 60 minutes.  If you experience continued side effects that you believe are block related for longer than 48 hours, please call your healthcare provider. Please see block-specific instructions below.    Instructions for any block involving the shoulder or arm  If you have had any kind of shoulder/arm block, you will go home with your arm in a sling. Wear the sling until the block has completely worn off. You may be required to wear it for a longer period of time per your surgeon’s recommendations.  If you have had a shoulder/arm block, it is a good idea to sleep on a recliner with pillows under your arm.    You may experience symptoms such as:  Shortness of breath  Hoarseness   Blurry vision  Unequal pupils  Drooping of your face on the same side as the block was performed    These are side effects associated with this kind of block and should go away within 12 hours.    Note: If you have severe or prolonged shortness of breath, please seek medical assistance as soon as possible.      Protection of a “blocked” arm or leg (limb)  After a nerve block, you cannot feel pain, pressure, or extremes of temperature in the affected limb. And because of this, your blocked limb is at more risk for injury. For example, it is possible to burn your limb on an extremely hot surface without feeling it.     When resting, it is important to reposition your limb periodically to avoid prolonged pressure on it. This may require the use of pillows and padding.    While sleeping, you should avoid rolling onto the affected limb or putting too much pressure on it.     If you have a cast or tight dressing, check the color of your fingers or toes of the affected limb. Call your surgeon if they look discolored (that is, dusky, dark colored).    Use caution in cold weather. Cover your limb appropriately to protect it from the cold.      Pain Management:    Your surgeon will give you a prescription for pain medication. Begin taking this before the nerve block wears off. Bear in mind that sometimes the block can wear off in the middle of the night.     Controlled  Would recommend:  - Continue Dilaudid 0.5mg IV can increase frequency to q4hrs PRN for breakthrough pain  - Bowel regimen while on opioids

## 2025-05-23 ENCOUNTER — OUTPATIENT (OUTPATIENT)
Dept: OUTPATIENT SERVICES | Facility: HOSPITAL | Age: 60
LOS: 1 days | Discharge: HOME | End: 2025-05-23

## 2025-05-23 DIAGNOSIS — Z95.828 PRESENCE OF OTHER VASCULAR IMPLANTS AND GRAFTS: Chronic | ICD-10-CM

## 2025-05-23 DIAGNOSIS — Z90.710 ACQUIRED ABSENCE OF BOTH CERVIX AND UTERUS: Chronic | ICD-10-CM

## 2025-05-23 DIAGNOSIS — C18.1 MALIGNANT NEOPLASM OF APPENDIX: ICD-10-CM

## 2025-05-23 DIAGNOSIS — Z98.890 OTHER SPECIFIED POSTPROCEDURAL STATES: Chronic | ICD-10-CM

## 2025-05-23 DIAGNOSIS — Z87.59 PERSONAL HISTORY OF OTHER COMPLICATIONS OF PREGNANCY, CHILDBIRTH AND THE PUERPERIUM: Chronic | ICD-10-CM

## 2025-05-23 DIAGNOSIS — Z90.49 ACQUIRED ABSENCE OF OTHER SPECIFIED PARTS OF DIGESTIVE TRACT: Chronic | ICD-10-CM

## 2025-05-28 ENCOUNTER — APPOINTMENT (OUTPATIENT)
Dept: HEMATOLOGY ONCOLOGY | Facility: CLINIC | Age: 60
End: 2025-05-28

## 2025-07-09 ENCOUNTER — OUTPATIENT (OUTPATIENT)
Dept: OUTPATIENT SERVICES | Facility: HOSPITAL | Age: 60
LOS: 1 days | Discharge: HOME | End: 2025-07-09

## 2025-07-09 DIAGNOSIS — Z90.710 ACQUIRED ABSENCE OF BOTH CERVIX AND UTERUS: Chronic | ICD-10-CM

## 2025-07-09 DIAGNOSIS — Z98.890 OTHER SPECIFIED POSTPROCEDURAL STATES: Chronic | ICD-10-CM

## 2025-07-09 DIAGNOSIS — Z95.828 PRESENCE OF OTHER VASCULAR IMPLANTS AND GRAFTS: Chronic | ICD-10-CM

## 2025-07-09 DIAGNOSIS — Z90.49 ACQUIRED ABSENCE OF OTHER SPECIFIED PARTS OF DIGESTIVE TRACT: Chronic | ICD-10-CM

## 2025-07-09 DIAGNOSIS — C18.1 MALIGNANT NEOPLASM OF APPENDIX: ICD-10-CM

## 2025-07-09 DIAGNOSIS — Z87.59 PERSONAL HISTORY OF OTHER COMPLICATIONS OF PREGNANCY, CHILDBIRTH AND THE PUERPERIUM: Chronic | ICD-10-CM

## 2025-07-10 ENCOUNTER — APPOINTMENT (OUTPATIENT)
Dept: HEMATOLOGY ONCOLOGY | Facility: CLINIC | Age: 60
End: 2025-07-10
Payer: COMMERCIAL

## 2025-07-10 ENCOUNTER — RESULT REVIEW (OUTPATIENT)
Age: 60
End: 2025-07-10

## 2025-07-10 VITALS
BODY MASS INDEX: 24.49 KG/M2 | RESPIRATION RATE: 16 BRPM | WEIGHT: 131.38 LBS | HEIGHT: 61.5 IN | TEMPERATURE: 97.3 F | OXYGEN SATURATION: 98 % | DIASTOLIC BLOOD PRESSURE: 85 MMHG | HEART RATE: 60 BPM | SYSTOLIC BLOOD PRESSURE: 151 MMHG

## 2025-07-10 LAB
BASOPHILS # BLD AUTO: 0.02 K/UL — SIGNIFICANT CHANGE UP (ref 0–0.2)
BASOPHILS NFR BLD AUTO: 0.3 % — SIGNIFICANT CHANGE UP (ref 0–2)
EOSINOPHIL # BLD AUTO: 0.06 K/UL — SIGNIFICANT CHANGE UP (ref 0–0.5)
EOSINOPHIL NFR BLD AUTO: 0.9 % — SIGNIFICANT CHANGE UP (ref 0–6)
HCT VFR BLD CALC: 33.6 % — LOW (ref 34.5–45)
HGB BLD-MCNC: 10.8 G/DL — LOW (ref 11.5–15.5)
IMM GRANULOCYTES NFR BLD AUTO: 0.2 % — SIGNIFICANT CHANGE UP (ref 0–0.9)
LYMPHOCYTES # BLD AUTO: 1.35 K/UL — SIGNIFICANT CHANGE UP (ref 1–3.3)
LYMPHOCYTES # BLD AUTO: 21.2 % — SIGNIFICANT CHANGE UP (ref 13–44)
MCHC RBC-ENTMCNC: 26.6 PG — LOW (ref 27–34)
MCHC RBC-ENTMCNC: 32.1 G/DL — SIGNIFICANT CHANGE UP (ref 32–36)
MCV RBC AUTO: 82.8 FL — SIGNIFICANT CHANGE UP (ref 80–100)
MONOCYTES # BLD AUTO: 0.45 K/UL — SIGNIFICANT CHANGE UP (ref 0–0.9)
MONOCYTES NFR BLD AUTO: 7.1 % — SIGNIFICANT CHANGE UP (ref 2–14)
NEUTROPHILS # BLD AUTO: 4.48 K/UL — SIGNIFICANT CHANGE UP (ref 1.8–7.4)
NEUTROPHILS NFR BLD AUTO: 70.3 % — SIGNIFICANT CHANGE UP (ref 43–77)
NRBC BLD AUTO-RTO: 0 /100 WBCS — SIGNIFICANT CHANGE UP (ref 0–0)
PLATELET # BLD AUTO: 364 K/UL — SIGNIFICANT CHANGE UP (ref 150–400)
RBC # BLD: 4.06 M/UL — SIGNIFICANT CHANGE UP (ref 3.8–5.2)
RBC # FLD: 15.7 % — HIGH (ref 10.3–14.5)
WBC # BLD: 6.37 K/UL — SIGNIFICANT CHANGE UP (ref 3.8–10.5)
WBC # FLD AUTO: 6.37 K/UL — SIGNIFICANT CHANGE UP (ref 3.8–10.5)

## 2025-07-10 PROCEDURE — 99213 OFFICE O/P EST LOW 20 MIN: CPT

## 2025-07-11 LAB
25(OH)D3 SERPL-MCNC: 21.5 NG/ML
ALBUMIN SERPL ELPH-MCNC: 4.1 G/DL
ALP BLD-CCNC: 96 U/L
ALT SERPL-CCNC: 20 U/L
ANION GAP SERPL CALC-SCNC: 10 MMOL/L
AST SERPL-CCNC: 20 U/L
BILIRUB SERPL-MCNC: 0.4 MG/DL
BUN SERPL-MCNC: 20 MG/DL
CALCIUM SERPL-MCNC: 9.8 MG/DL
CEA SERPL-MCNC: 68.1 NG/ML
CHLORIDE SERPL-SCNC: 111 MMOL/L
CO2 SERPL-SCNC: 24 MMOL/L
CREAT SERPL-MCNC: 0.76 MG/DL
EGFRCR SERPLBLD CKD-EPI 2021: 89 ML/MIN/1.73M2
FOLATE SERPL-MCNC: 17.4 NG/ML
GLUCOSE SERPL-MCNC: 101 MG/DL
POTASSIUM SERPL-SCNC: 4.5 MMOL/L
PROT SERPL-MCNC: 7.6 G/DL
SODIUM SERPL-SCNC: 145 MMOL/L
TSH SERPL-ACNC: 1.16 UIU/ML
VIT B12 SERPL-MCNC: 818 PG/ML

## 2025-07-15 ENCOUNTER — RESULT REVIEW (OUTPATIENT)
Age: 60
End: 2025-07-15

## 2025-07-17 ENCOUNTER — OUTPATIENT (OUTPATIENT)
Dept: OUTPATIENT SERVICES | Facility: HOSPITAL | Age: 60
LOS: 1 days | End: 2025-07-17
Payer: COMMERCIAL

## 2025-07-17 ENCOUNTER — APPOINTMENT (OUTPATIENT)
Dept: CT IMAGING | Facility: IMAGING CENTER | Age: 60
End: 2025-07-17
Payer: COMMERCIAL

## 2025-07-17 DIAGNOSIS — Z98.890 OTHER SPECIFIED POSTPROCEDURAL STATES: Chronic | ICD-10-CM

## 2025-07-17 DIAGNOSIS — Z00.8 ENCOUNTER FOR OTHER GENERAL EXAMINATION: ICD-10-CM

## 2025-07-17 DIAGNOSIS — Z90.49 ACQUIRED ABSENCE OF OTHER SPECIFIED PARTS OF DIGESTIVE TRACT: Chronic | ICD-10-CM

## 2025-07-17 DIAGNOSIS — Z90.710 ACQUIRED ABSENCE OF BOTH CERVIX AND UTERUS: Chronic | ICD-10-CM

## 2025-07-17 DIAGNOSIS — Z95.828 PRESENCE OF OTHER VASCULAR IMPLANTS AND GRAFTS: Chronic | ICD-10-CM

## 2025-07-17 DIAGNOSIS — C18.1 MALIGNANT NEOPLASM OF APPENDIX: ICD-10-CM

## 2025-07-17 DIAGNOSIS — Z87.59 PERSONAL HISTORY OF OTHER COMPLICATIONS OF PREGNANCY, CHILDBIRTH AND THE PUERPERIUM: Chronic | ICD-10-CM

## 2025-07-17 PROCEDURE — 74177 CT ABD & PELVIS W/CONTRAST: CPT

## 2025-07-17 PROCEDURE — 74177 CT ABD & PELVIS W/CONTRAST: CPT | Mod: 26

## 2025-07-17 PROCEDURE — 71260 CT THORAX DX C+: CPT | Mod: 26

## 2025-07-17 PROCEDURE — 71260 CT THORAX DX C+: CPT

## 2025-07-24 ENCOUNTER — APPOINTMENT (OUTPATIENT)
Dept: HEMATOLOGY ONCOLOGY | Facility: CLINIC | Age: 60
End: 2025-07-24
Payer: COMMERCIAL

## 2025-07-24 VITALS
HEART RATE: 60 BPM | BODY MASS INDEX: 24.89 KG/M2 | RESPIRATION RATE: 17 BRPM | TEMPERATURE: 98 F | DIASTOLIC BLOOD PRESSURE: 87 MMHG | OXYGEN SATURATION: 98 % | WEIGHT: 133.92 LBS | SYSTOLIC BLOOD PRESSURE: 144 MMHG

## 2025-07-24 PROCEDURE — G2211 COMPLEX E/M VISIT ADD ON: CPT | Mod: NC

## 2025-07-24 PROCEDURE — 99214 OFFICE O/P EST MOD 30 MIN: CPT

## 2025-08-02 ENCOUNTER — APPOINTMENT (OUTPATIENT)
Dept: NUCLEAR MEDICINE | Facility: IMAGING CENTER | Age: 60
End: 2025-08-02
Payer: COMMERCIAL

## 2025-08-02 ENCOUNTER — OUTPATIENT (OUTPATIENT)
Dept: OUTPATIENT SERVICES | Facility: HOSPITAL | Age: 60
LOS: 1 days | End: 2025-08-02
Payer: COMMERCIAL

## 2025-08-02 DIAGNOSIS — Z95.828 PRESENCE OF OTHER VASCULAR IMPLANTS AND GRAFTS: Chronic | ICD-10-CM

## 2025-08-02 DIAGNOSIS — Z00.8 ENCOUNTER FOR OTHER GENERAL EXAMINATION: ICD-10-CM

## 2025-08-02 DIAGNOSIS — C18.1 MALIGNANT NEOPLASM OF APPENDIX: ICD-10-CM

## 2025-08-02 DIAGNOSIS — Z87.59 PERSONAL HISTORY OF OTHER COMPLICATIONS OF PREGNANCY, CHILDBIRTH AND THE PUERPERIUM: Chronic | ICD-10-CM

## 2025-08-02 DIAGNOSIS — Z98.890 OTHER SPECIFIED POSTPROCEDURAL STATES: Chronic | ICD-10-CM

## 2025-08-02 DIAGNOSIS — Z90.49 ACQUIRED ABSENCE OF OTHER SPECIFIED PARTS OF DIGESTIVE TRACT: Chronic | ICD-10-CM

## 2025-08-02 DIAGNOSIS — Z90.710 ACQUIRED ABSENCE OF BOTH CERVIX AND UTERUS: Chronic | ICD-10-CM

## 2025-08-02 PROCEDURE — 78815 PET IMAGE W/CT SKULL-THIGH: CPT

## 2025-08-02 PROCEDURE — 78815 PET IMAGE W/CT SKULL-THIGH: CPT | Mod: 26,PI

## 2025-08-02 PROCEDURE — A9552: CPT

## 2025-08-15 ENCOUNTER — NON-APPOINTMENT (OUTPATIENT)
Age: 60
End: 2025-08-15

## 2025-08-15 DIAGNOSIS — C18.1 MALIGNANT NEOPLASM OF APPENDIX: ICD-10-CM

## 2025-08-19 ENCOUNTER — APPOINTMENT (OUTPATIENT)
Dept: SURGICAL ONCOLOGY | Facility: CLINIC | Age: 60
End: 2025-08-19
Payer: COMMERCIAL

## 2025-08-19 ENCOUNTER — NON-APPOINTMENT (OUTPATIENT)
Age: 60
End: 2025-08-19

## 2025-08-19 VITALS
HEART RATE: 66 BPM | TEMPERATURE: 97.4 F | OXYGEN SATURATION: 97 % | DIASTOLIC BLOOD PRESSURE: 85 MMHG | BODY MASS INDEX: 25.53 KG/M2 | RESPIRATION RATE: 16 BRPM | SYSTOLIC BLOOD PRESSURE: 136 MMHG | HEIGHT: 61.5 IN | WEIGHT: 137 LBS

## 2025-08-19 PROCEDURE — 99214 OFFICE O/P EST MOD 30 MIN: CPT

## (undated) DEVICE — GOWN LG EXTRA LONG

## (undated) DEVICE — SUT SOFSILK 2-0 18" V-20 (POP-OFF)

## (undated) DEVICE — CUSA EXCEL TORQUE WRENCH 36KHZ

## (undated) DEVICE — LIGASURE IMPACT

## (undated) DEVICE — DRAPE TOWEL BLUE 17" X 24"

## (undated) DEVICE — ELCTR BOVIE PENCIL SMOKE EVACUATION

## (undated) DEVICE — CUSA TIP ASPIRATOR PACK 1523211-1517079

## (undated) DEVICE — DRAPE INSTRUMENT POUCH 6.75" X 11"

## (undated) DEVICE — WARMING BLANKET LOWER ADULT

## (undated) DEVICE — DRSG TAPE UMBILICAL COTTON 2" X 30 X 1/8"

## (undated) DEVICE — VENODYNE/SCD SLEEVE CALF MEDIUM

## (undated) DEVICE — SUT POLYSORB 3-0 30" V-20 UNDYED

## (undated) DEVICE — SUT POLYSORB 0 30" GS-21 UNDYED

## (undated) DEVICE — URETERAL CATH RED RUBBER 16FR (ORANGE)

## (undated) DEVICE — CUSA EXCEL TIP 23KHZ STANDARD

## (undated) DEVICE — SPECIMEN CONTAINER 8OZ W LID

## (undated) DEVICE — DRAIN RESERVOIR FOR JACKSON PRATT 100CC CARDINAL

## (undated) DEVICE — SUT SURGIPRO 1 60" GS-26

## (undated) DEVICE — STAPLER COVIDIEN ENDO GIA SHORT HANDLE

## (undated) DEVICE — WARMING BLANKET DUO-THERM HYPER/HYPOTHERM ADULT

## (undated) DEVICE — BIOSEL SIGMOIDOSCOPE KIT DISP

## (undated) DEVICE — LIJ/LIA-HYPOTHERMIA GAYMAR: Type: DURABLE MEDICAL EQUIPMENT

## (undated) DEVICE — MEDICATION LABELS W MARKER

## (undated) DEVICE — KIT SPILL CHEMO

## (undated) DEVICE — DRAIN PENROSE 1" X 12" SILICONE

## (undated) DEVICE — TUBE VENOUS BLOOD COLLECTION LIGHT GREEN TOP

## (undated) DEVICE — SUT MAXON 1 36" GS-24

## (undated) DEVICE — CUSA CLARITY QUICK CONNECT CARTRIDGE & TUBING SET

## (undated) DEVICE — PACK MAJOR ABDOMINAL SUPINE

## (undated) DEVICE — WARMING BLANKET UPPER ADULT

## (undated) DEVICE — DRAPE MAYO STAND 30"

## (undated) DEVICE — Device

## (undated) DEVICE — CUSA EXCEL TORQUE WRENCH ULTRA ASPIRATOR

## (undated) DEVICE — VESSEL LOOP MAXI-BLUE 0.120" X 16"

## (undated) DEVICE — SUT PDS II 1 48" TP-1

## (undated) DEVICE — SUT SOFSILK 3-0 18" V-20 (POP-OFF)

## (undated) DEVICE — CUSA CLARITY TORQUE WRENCH 23KHZ

## (undated) DEVICE — BLADE SURGICAL #15 CARBON

## (undated) DEVICE — CUSA MANIFOLD TUBING 36KHZ

## (undated) DEVICE — CUSA CLARITY TIP 23KHZ TOUGH TISSUE

## (undated) DEVICE — KIT PROCEDURE LAVAGE DISPOSABLE

## (undated) DEVICE — SUT SOFSILK 2-0 18" C-23

## (undated) DEVICE — KIT RAND HANG AND GO HT PAC PRE-ASSEMBLED